# Patient Record
Sex: MALE | Race: WHITE | Employment: OTHER | URBAN - METROPOLITAN AREA
[De-identification: names, ages, dates, MRNs, and addresses within clinical notes are randomized per-mention and may not be internally consistent; named-entity substitution may affect disease eponyms.]

---

## 2017-01-17 PROBLEM — K21.00 GASTROESOPHAGEAL REFLUX DISEASE WITH ESOPHAGITIS: Status: ACTIVE | Noted: 2017-01-17

## 2017-04-17 PROBLEM — F32.A DEPRESSION: Status: ACTIVE | Noted: 2017-04-17

## 2018-03-29 ENCOUNTER — OFFICE VISIT (OUTPATIENT)
Dept: FAMILY MEDICINE CLINIC | Age: 68
End: 2018-03-29
Payer: MEDICARE

## 2018-03-29 VITALS
WEIGHT: 217 LBS | HEIGHT: 69 IN | RESPIRATION RATE: 16 BRPM | DIASTOLIC BLOOD PRESSURE: 84 MMHG | OXYGEN SATURATION: 94 % | SYSTOLIC BLOOD PRESSURE: 130 MMHG | TEMPERATURE: 98.5 F | BODY MASS INDEX: 32.14 KG/M2 | HEART RATE: 95 BPM

## 2018-03-29 DIAGNOSIS — M25.511 CHRONIC RIGHT SHOULDER PAIN: ICD-10-CM

## 2018-03-29 DIAGNOSIS — Z00.00 ROUTINE GENERAL MEDICAL EXAMINATION AT A HEALTH CARE FACILITY: Primary | ICD-10-CM

## 2018-03-29 DIAGNOSIS — G89.29 CHRONIC RIGHT SHOULDER PAIN: ICD-10-CM

## 2018-03-29 PROCEDURE — G0438 PPPS, INITIAL VISIT: HCPCS | Performed by: FAMILY MEDICINE

## 2018-03-29 RX ORDER — CLOPIDOGREL BISULFATE 75 MG/1
TABLET ORAL
COMMUNITY
Start: 2018-03-28 | End: 2019-04-25 | Stop reason: SDUPTHER

## 2018-03-29 RX ORDER — HYDROCODONE BITARTRATE AND ACETAMINOPHEN 7.5; 325 MG/1; MG/1
1 TABLET ORAL 3 TIMES DAILY PRN
Qty: 90 TABLET | Refills: 0 | Status: SHIPPED | OUTPATIENT
Start: 2018-03-29 | End: 2018-04-26 | Stop reason: SDUPTHER

## 2018-03-29 ASSESSMENT — LIFESTYLE VARIABLES
HOW OFTEN DURING THE LAST YEAR HAVE YOU HAD A FEELING OF GUILT OR REMORSE AFTER DRINKING: 0
HAVE YOU OR SOMEONE ELSE BEEN INJURED AS A RESULT OF YOUR DRINKING: 0
HOW OFTEN DURING THE LAST YEAR HAVE YOU NEEDED AN ALCOHOLIC DRINK FIRST THING IN THE MORNING TO GET YOURSELF GOING AFTER A NIGHT OF HEAVY DRINKING: 0
AUDIT TOTAL SCORE: 4
HOW OFTEN DURING THE LAST YEAR HAVE YOU BEEN UNABLE TO REMEMBER WHAT HAPPENED THE NIGHT BEFORE BECAUSE YOU HAD BEEN DRINKING: 0
AUDIT-C TOTAL SCORE: 4
HOW OFTEN DO YOU HAVE SIX OR MORE DRINKS ON ONE OCCASION: 0
HOW MANY STANDARD DRINKS CONTAINING ALCOHOL DO YOU HAVE ON A TYPICAL DAY: 0
HOW OFTEN DURING THE LAST YEAR HAVE YOU FAILED TO DO WHAT WAS NORMALLY EXPECTED FROM YOU BECAUSE OF DRINKING: 0
HAS A RELATIVE, FRIEND, DOCTOR, OR ANOTHER HEALTH PROFESSIONAL EXPRESSED CONCERN ABOUT YOUR DRINKING OR SUGGESTED YOU CUT DOWN: 0
HOW OFTEN DO YOU HAVE A DRINK CONTAINING ALCOHOL: 4
HOW OFTEN DURING THE LAST YEAR HAVE YOU FOUND THAT YOU WERE NOT ABLE TO STOP DRINKING ONCE YOU HAD STARTED: 0

## 2018-03-29 ASSESSMENT — ANXIETY QUESTIONNAIRES: GAD7 TOTAL SCORE: 1

## 2018-03-29 ASSESSMENT — PATIENT HEALTH QUESTIONNAIRE - PHQ9: SUM OF ALL RESPONSES TO PHQ QUESTIONS 1-9: 0

## 2018-03-29 NOTE — PROGRESS NOTES
Medicare Annual Wellness Visit  Name: Bianca Holt Date: 3/29/2018   MRN: <[id number]> Sex: Male   Age: 79 y.o. Ethnicity: Non-/Non    : 1950 Race: White      Ben [address] is here for Medicare AWV    Screenings for behavioral, psychosocial and functional/safety risks, and cognitive dysfunction are all negative except as indicated below. These results, as well as other patient data from the [address] form, are documented in Flowsheets linked to this Encounter. Allergies   Allergen Reactions    Midazolam Hcl      Wake up wild. ... Must be reversed with romazicon or will wake up wild and combative     Prior to Visit Medications    Medication Sig Taking? Authorizing Provider   clopidogrel (PLAVIX) 75 MG tablet  Yes Historical Provider, MD   HYDROcodone-acetaminophen (NORCO) 7.5-325 MG per tablet Take 1 tablet by mouth 3 times daily as needed for Pain for up to 30 days. Earliest Fill Date: 18 Yes Barbara Corona DO   fenofibrate (TRICOR) 145 MG tablet TAKE ONE TABLET BY MOUTH ONCE DAILY Yes Barbara Corona DO   varenicline (CHANTIX STARTING MONTH GAVIN) 0.5 MG X 11 & 1 MG X 42 tablet Take by mouth. Yes Barbara Corona DO   simvastatin (ZOCOR) 20 MG tablet TAKE ONE TABLET BY MOUTH ONCE DAILY Yes Elvie Devine CNP   tamsulosin (FLOMAX) 0.4 MG capsule TAKE ONE CAPSULE BY MOUTH ONCE DAILY Yes Barbara Corona DO   meloxicam (MOBIC) 15 MG tablet TAKE ONE TABLET BY MOUTH ONCE DAILY Yes Barbara Corona DO   tiZANidine (ZANAFLEX) 4 MG tablet TAKE ONE TABLET BY MOUTH ONCE DAILY AT  NIGHT Yes Barbara Corona DO   sertraline (ZOLOFT) 50 MG tablet Take 1 tablet by mouth daily Yes Barbara Corona DO   LORazepam (ATIVAN) 1 MG tablet Take 1 mg by mouth daily .  Yes Historical Provider, MD   buPROPion [facility] SR) 150 MG extended release tablet Take 1 tablet by mouth 2 times daily Yes Elvie Devine CNP   meclizihaven [facility] Call) General  Josh Vazquez DO as PCP - [facility] Attributed Provider  Nelly Layton MD as Surgeon (Gastroenterology)    Wt Readings from Last 3 Encounters:   03/29/18 217 lb (98.4 kg)   02/27/18 217 lb (98.4 kg)   01/30/18 214 lb (97.1 kg)     Vitals:    03/29/18 1039   BP: 130/84   Pulse: 95   Resp: 16   Temp: 98.5 °F (36.9 °C)   SpO2: 94%   Weight: 217 lb (98.4 kg)   Height: 5' 9\" (1.753 m)       General Appearance: alert and oriented to person, place and time, well developed and well- nourished, in no acute distress  Skin: warm and dry, no rash or erythema. Head: normocephalic and atraumatic  Eyes: pupils equal, round, and reactive to light, extraocular eye movements intact, conjunctivae normal  ENT: tympanic membrane, external ear and ear canal normal bilaterally, nose without deformity, nasal mucosa and turbinates normal without polyps  Neck: supple and non-tender without mass, no thyromegaly or thyroid nodules, no cervical lymphadenopathy  Pulmonary/Chest: clear to auscultation bilaterally- no wheezes, rales or rhonchi, normal air movement, no respiratory distress  Cardiovascular: normal rate, regular rhythm, normal S1 and S2, no murmurs, rubs, clicks, or gallops, distal pulses intact, no carotid bruits  Abdomen: soft, non-tender, non-distended, normal bowel sounds, no masses or organomegaly  Extremities: no cyanosis, clubbing or edema  Musculoskeletal: normal range of motion, no joint swelling, deformity or tenderness  Neurologic: reflexes normal and symmetric, no cranial nerve deficit, gait, coordination and speech normal    Patient's complete Health Risk Assessment and screening values have been reviewed and are found in Flowsheets. The following problems were reviewed today and where indicated follow up appointments were made and/or referrals ordered.     Positive Risk Factor Screenings with Interventions:           Substance Abuse:  Social History     Tobacco History     Smoking Status  Current Some Day Smoker Smoking Frequency  0.5 packs/day for 52 years (26 pk yrs) Smoking Tobacco Type  Cigarettes    Smokeless Tobacco Use  Never Used          Alcohol History     Alcohol Use Status  Yes Drinks/Week  12 Cans of beer per week Amount  7.2 oz alcohol/wk Comment  oc12 cans per mth          Drug Use     Drug Use Status  No Comment  in past cocaine 10 yrs ago          Sexual Activity     Sexually Active  No               Audit Questionnaire: Screen for Alcohol Misuse  How often do you have a drink containing alcohol?: Four or more times a week  How many standard drinks containing alcohol do you have on a typical day when drinking?: One or two  How often do you have six or more drinks on one occasion?: Never  Audit-C Score: 4  During the past year, how often have you found that you were not able to stop drinking once you had started?: Never  During the past year, how often have you failed to do what was normally expected of you because of drinking?: Never  During the past year, how often have you needed a drink in the morning to get yourself going after a heavy drinking session?: Never  During the past year, how often have you had a feeling of guilt or remorse after drinking?: Never  During the past year, have you been unable to remember what happened the night before because you had been drinking?: Never  Have you or someone else been injured as a result of your drinking?: No  Has a relative or friend, doctor or health worker been concerned about your drinking or suggested you cut down?: No  Total Score: 4  Substance Abuse Interventions:  · None indicated    General Health:  General  In general, how would you say your health is?: Fair  In the past 7 days, have you experienced any of the following?: (!) Loneliness, Stress  Do you get the social and emotional support that you need?: (!) No  Do you have a Living Will?: Yes  General Health Risk Interventions:  · None indicated    Health Habits/Nutrition:  Health monitoring: possible medication side effects, risk of tolerance and/or dependence, and alternative treatments discussed, no signs of potential drug abuse or diversion identified and OARRS report reviewed today- activity consistent with treatment plan. ASSESSMENT/PLAN:    1. Chronic right shoulder pain  - HYDROcodone-acetaminophen (NORCO) 7.5-325 MG per tablet; Take 1 tablet by mouth 3 times daily as needed for Pain for up to 30 days. Earliest Fill Date: 3/29/18  Dispense: 90 tablet;  Refill: 0

## 2018-04-20 ENCOUNTER — TELEPHONE (OUTPATIENT)
Dept: FAMILY MEDICINE CLINIC | Age: 68
End: 2018-04-20

## 2018-04-20 RX ORDER — VARENICLINE TARTRATE 1 MG/1
1 TABLET, FILM COATED ORAL 2 TIMES DAILY
Qty: 60 TABLET | Refills: 3 | Status: SHIPPED | OUTPATIENT
Start: 2018-04-20 | End: 2018-04-26

## 2018-04-26 ENCOUNTER — OFFICE VISIT (OUTPATIENT)
Dept: FAMILY MEDICINE CLINIC | Age: 68
End: 2018-04-26
Payer: MEDICARE

## 2018-04-26 VITALS
RESPIRATION RATE: 16 BRPM | SYSTOLIC BLOOD PRESSURE: 112 MMHG | DIASTOLIC BLOOD PRESSURE: 68 MMHG | OXYGEN SATURATION: 95 % | TEMPERATURE: 98.1 F | BODY MASS INDEX: 32.89 KG/M2 | HEIGHT: 68 IN | WEIGHT: 217 LBS | HEART RATE: 71 BPM

## 2018-04-26 DIAGNOSIS — K21.00 GASTROESOPHAGEAL REFLUX DISEASE WITH ESOPHAGITIS: ICD-10-CM

## 2018-04-26 DIAGNOSIS — R73.9 HYPERGLYCEMIA: Primary | ICD-10-CM

## 2018-04-26 DIAGNOSIS — G89.29 CHRONIC RIGHT SHOULDER PAIN: ICD-10-CM

## 2018-04-26 DIAGNOSIS — M25.511 CHRONIC RIGHT SHOULDER PAIN: ICD-10-CM

## 2018-04-26 LAB — HBA1C MFR BLD: 6 %

## 2018-04-26 PROCEDURE — 83036 HEMOGLOBIN GLYCOSYLATED A1C: CPT | Performed by: FAMILY MEDICINE

## 2018-04-26 PROCEDURE — G8427 DOCREV CUR MEDS BY ELIG CLIN: HCPCS | Performed by: FAMILY MEDICINE

## 2018-04-26 PROCEDURE — 4004F PT TOBACCO SCREEN RCVD TLK: CPT | Performed by: FAMILY MEDICINE

## 2018-04-26 PROCEDURE — 99213 OFFICE O/P EST LOW 20 MIN: CPT | Performed by: FAMILY MEDICINE

## 2018-04-26 PROCEDURE — 1123F ACP DISCUSS/DSCN MKR DOCD: CPT | Performed by: FAMILY MEDICINE

## 2018-04-26 PROCEDURE — 4040F PNEUMOC VAC/ADMIN/RCVD: CPT | Performed by: FAMILY MEDICINE

## 2018-04-26 PROCEDURE — G8417 CALC BMI ABV UP PARAM F/U: HCPCS | Performed by: FAMILY MEDICINE

## 2018-04-26 PROCEDURE — 3017F COLORECTAL CA SCREEN DOC REV: CPT | Performed by: FAMILY MEDICINE

## 2018-04-26 RX ORDER — OMEPRAZOLE 40 MG/1
40 CAPSULE, DELAYED RELEASE ORAL DAILY
Qty: 90 CAPSULE | Refills: 3 | Status: SHIPPED | OUTPATIENT
Start: 2018-04-26 | End: 2019-07-26 | Stop reason: SDUPTHER

## 2018-04-26 RX ORDER — HYDROCODONE BITARTRATE AND ACETAMINOPHEN 7.5; 325 MG/1; MG/1
1 TABLET ORAL 3 TIMES DAILY PRN
Qty: 90 TABLET | Refills: 0 | Status: SHIPPED | OUTPATIENT
Start: 2018-04-26 | End: 2018-05-24 | Stop reason: SDUPTHER

## 2018-04-26 RX ORDER — VARENICLINE TARTRATE
KIT
COMMUNITY
Start: 2018-04-25 | End: 2019-02-25

## 2018-05-18 ENCOUNTER — HOSPITAL ENCOUNTER (OUTPATIENT)
Dept: CT IMAGING | Age: 68
Discharge: HOME | End: 2018-05-18
Payer: MEDICARE

## 2018-05-18 DIAGNOSIS — C85.90 NON-HODGKIN'S LYMPHOMA, UNSPECIFIED BODY REGION, UNSPECIFIED NON-HODGKIN LYMPHOMA TYPE (HCC): ICD-10-CM

## 2018-05-18 PROCEDURE — 6360000004 HC RX CONTRAST MEDICATION: Performed by: RADIOLOGY

## 2018-05-18 PROCEDURE — 71270 CT THORAX DX C-/C+: CPT

## 2018-05-18 RX ADMIN — IOPAMIDOL 80 ML: 755 INJECTION, SOLUTION INTRAVENOUS at 15:28

## 2018-05-24 ENCOUNTER — OFFICE VISIT (OUTPATIENT)
Dept: FAMILY MEDICINE CLINIC | Age: 68
End: 2018-05-24
Payer: MEDICARE

## 2018-05-24 VITALS
RESPIRATION RATE: 20 BRPM | BODY MASS INDEX: 32.89 KG/M2 | HEART RATE: 57 BPM | OXYGEN SATURATION: 94 % | SYSTOLIC BLOOD PRESSURE: 138 MMHG | WEIGHT: 217 LBS | HEIGHT: 68 IN | TEMPERATURE: 98.6 F | DIASTOLIC BLOOD PRESSURE: 86 MMHG

## 2018-05-24 DIAGNOSIS — M25.511 CHRONIC RIGHT SHOULDER PAIN: ICD-10-CM

## 2018-05-24 DIAGNOSIS — R53.82 CHRONIC FATIGUE: Primary | ICD-10-CM

## 2018-05-24 DIAGNOSIS — G89.29 CHRONIC RIGHT SHOULDER PAIN: ICD-10-CM

## 2018-05-24 DIAGNOSIS — F33.0 MAJOR DEPRESSIVE DISORDER, RECURRENT, MILD (HCC): ICD-10-CM

## 2018-05-24 PROCEDURE — 1123F ACP DISCUSS/DSCN MKR DOCD: CPT | Performed by: FAMILY MEDICINE

## 2018-05-24 PROCEDURE — G8427 DOCREV CUR MEDS BY ELIG CLIN: HCPCS | Performed by: FAMILY MEDICINE

## 2018-05-24 PROCEDURE — G8417 CALC BMI ABV UP PARAM F/U: HCPCS | Performed by: FAMILY MEDICINE

## 2018-05-24 PROCEDURE — 3017F COLORECTAL CA SCREEN DOC REV: CPT | Performed by: FAMILY MEDICINE

## 2018-05-24 PROCEDURE — 4040F PNEUMOC VAC/ADMIN/RCVD: CPT | Performed by: FAMILY MEDICINE

## 2018-05-24 PROCEDURE — 99213 OFFICE O/P EST LOW 20 MIN: CPT | Performed by: FAMILY MEDICINE

## 2018-05-24 PROCEDURE — 4004F PT TOBACCO SCREEN RCVD TLK: CPT | Performed by: FAMILY MEDICINE

## 2018-05-24 RX ORDER — SILDENAFIL CITRATE 20 MG/1
TABLET ORAL
Qty: 30 TABLET | Refills: 3 | Status: ON HOLD
Start: 2018-05-24 | End: 2022-01-17 | Stop reason: HOSPADM

## 2018-05-24 RX ORDER — HYDROCODONE BITARTRATE AND ACETAMINOPHEN 7.5; 325 MG/1; MG/1
1 TABLET ORAL 3 TIMES DAILY PRN
Qty: 90 TABLET | Refills: 0 | Status: SHIPPED | OUTPATIENT
Start: 2018-05-24 | End: 2018-06-26 | Stop reason: SDUPTHER

## 2018-05-24 RX ORDER — TIZANIDINE 4 MG/1
4 TABLET ORAL EVERY 8 HOURS PRN
Qty: 90 TABLET | Refills: 3 | Status: SHIPPED | OUTPATIENT
Start: 2018-05-24 | End: 2019-10-23 | Stop reason: SDUPTHER

## 2018-05-24 RX ORDER — AMOXICILLIN AND CLAVULANATE POTASSIUM 875; 125 MG/1; MG/1
TABLET, FILM COATED ORAL
COMMUNITY
Start: 2018-05-17 | End: 2018-06-26

## 2018-06-26 ENCOUNTER — OFFICE VISIT (OUTPATIENT)
Dept: FAMILY MEDICINE CLINIC | Age: 68
End: 2018-06-26
Payer: MEDICARE

## 2018-06-26 VITALS
TEMPERATURE: 98.4 F | RESPIRATION RATE: 16 BRPM | OXYGEN SATURATION: 97 % | HEIGHT: 69 IN | HEART RATE: 74 BPM | DIASTOLIC BLOOD PRESSURE: 82 MMHG | BODY MASS INDEX: 31.84 KG/M2 | WEIGHT: 215 LBS | SYSTOLIC BLOOD PRESSURE: 128 MMHG

## 2018-06-26 DIAGNOSIS — G89.29 CHRONIC RIGHT SHOULDER PAIN: ICD-10-CM

## 2018-06-26 DIAGNOSIS — M25.511 CHRONIC RIGHT SHOULDER PAIN: ICD-10-CM

## 2018-06-26 PROCEDURE — 99213 OFFICE O/P EST LOW 20 MIN: CPT | Performed by: FAMILY MEDICINE

## 2018-06-26 PROCEDURE — G8427 DOCREV CUR MEDS BY ELIG CLIN: HCPCS | Performed by: FAMILY MEDICINE

## 2018-06-26 PROCEDURE — 3017F COLORECTAL CA SCREEN DOC REV: CPT | Performed by: FAMILY MEDICINE

## 2018-06-26 PROCEDURE — 1123F ACP DISCUSS/DSCN MKR DOCD: CPT | Performed by: FAMILY MEDICINE

## 2018-06-26 PROCEDURE — 4004F PT TOBACCO SCREEN RCVD TLK: CPT | Performed by: FAMILY MEDICINE

## 2018-06-26 PROCEDURE — 4040F PNEUMOC VAC/ADMIN/RCVD: CPT | Performed by: FAMILY MEDICINE

## 2018-06-26 PROCEDURE — G8417 CALC BMI ABV UP PARAM F/U: HCPCS | Performed by: FAMILY MEDICINE

## 2018-06-26 RX ORDER — MONTELUKAST SODIUM 10 MG/1
10 TABLET ORAL DAILY
Qty: 90 TABLET | Refills: 3 | Status: SHIPPED | OUTPATIENT
Start: 2018-06-26 | End: 2019-07-18 | Stop reason: SDUPTHER

## 2018-06-26 RX ORDER — HYDROCODONE BITARTRATE AND ACETAMINOPHEN 7.5; 325 MG/1; MG/1
1 TABLET ORAL 3 TIMES DAILY PRN
Qty: 90 TABLET | Refills: 0 | Status: SHIPPED | OUTPATIENT
Start: 2018-06-26 | End: 2018-07-31 | Stop reason: SDUPTHER

## 2018-06-26 ASSESSMENT — ENCOUNTER SYMPTOMS
WHEEZING: 0
DIARRHEA: 0
ORTHOPNEA: 0
DOUBLE VISION: 0
CONSTIPATION: 0
BLOOD IN STOOL: 0

## 2018-07-24 ENCOUNTER — TELEPHONE (OUTPATIENT)
Dept: ADMINISTRATIVE | Age: 68
End: 2018-07-24

## 2018-07-31 ENCOUNTER — HOSPITAL ENCOUNTER (OUTPATIENT)
Age: 68
Discharge: HOME | End: 2018-08-02
Payer: MEDICARE

## 2018-07-31 ENCOUNTER — OFFICE VISIT (OUTPATIENT)
Dept: FAMILY MEDICINE CLINIC | Age: 68
End: 2018-07-31
Payer: MEDICARE

## 2018-07-31 VITALS
SYSTOLIC BLOOD PRESSURE: 124 MMHG | HEIGHT: 69 IN | DIASTOLIC BLOOD PRESSURE: 70 MMHG | WEIGHT: 214 LBS | TEMPERATURE: 98.1 F | HEART RATE: 70 BPM | RESPIRATION RATE: 16 BRPM | BODY MASS INDEX: 31.7 KG/M2 | OXYGEN SATURATION: 95 %

## 2018-07-31 DIAGNOSIS — M25.511 CHRONIC RIGHT SHOULDER PAIN: ICD-10-CM

## 2018-07-31 DIAGNOSIS — Z51.81 THERAPEUTIC DRUG MONITORING: ICD-10-CM

## 2018-07-31 DIAGNOSIS — R73.9 HYPERGLYCEMIA: Primary | ICD-10-CM

## 2018-07-31 DIAGNOSIS — G89.29 CHRONIC RIGHT SHOULDER PAIN: ICD-10-CM

## 2018-07-31 LAB
AMPHETAMINE SCREEN, URINE: NOT DETECTED
BARBITURATE SCREEN URINE: NOT DETECTED
BENZODIAZEPINE SCREEN, URINE: NOT DETECTED
CANNABINOID SCREEN URINE: NOT DETECTED
COCAINE METABOLITE SCREEN URINE: NOT DETECTED
HBA1C MFR BLD: 5.6 %
METHADONE SCREEN, URINE: NOT DETECTED
OPIATE SCREEN URINE: POSITIVE
PHENCYCLIDINE SCREEN URINE: NOT DETECTED
PROPOXYPHENE SCREEN: NOT DETECTED

## 2018-07-31 PROCEDURE — 4040F PNEUMOC VAC/ADMIN/RCVD: CPT | Performed by: FAMILY MEDICINE

## 2018-07-31 PROCEDURE — 4004F PT TOBACCO SCREEN RCVD TLK: CPT | Performed by: FAMILY MEDICINE

## 2018-07-31 PROCEDURE — 3017F COLORECTAL CA SCREEN DOC REV: CPT | Performed by: FAMILY MEDICINE

## 2018-07-31 PROCEDURE — 80307 DRUG TEST PRSMV CHEM ANLYZR: CPT

## 2018-07-31 PROCEDURE — 83036 HEMOGLOBIN GLYCOSYLATED A1C: CPT | Performed by: FAMILY MEDICINE

## 2018-07-31 PROCEDURE — G8427 DOCREV CUR MEDS BY ELIG CLIN: HCPCS | Performed by: FAMILY MEDICINE

## 2018-07-31 PROCEDURE — G8417 CALC BMI ABV UP PARAM F/U: HCPCS | Performed by: FAMILY MEDICINE

## 2018-07-31 PROCEDURE — 99214 OFFICE O/P EST MOD 30 MIN: CPT | Performed by: FAMILY MEDICINE

## 2018-07-31 PROCEDURE — G0480 DRUG TEST DEF 1-7 CLASSES: HCPCS

## 2018-07-31 PROCEDURE — 1101F PT FALLS ASSESS-DOCD LE1/YR: CPT | Performed by: FAMILY MEDICINE

## 2018-07-31 PROCEDURE — 1123F ACP DISCUSS/DSCN MKR DOCD: CPT | Performed by: FAMILY MEDICINE

## 2018-07-31 RX ORDER — HYDROCODONE BITARTRATE AND ACETAMINOPHEN 7.5; 325 MG/1; MG/1
1 TABLET ORAL 3 TIMES DAILY PRN
Qty: 90 TABLET | Refills: 0 | Status: SHIPPED | OUTPATIENT
Start: 2018-07-31 | End: 2018-08-28 | Stop reason: SDUPTHER

## 2018-07-31 NOTE — PROGRESS NOTES
Chief Complaint   Patient presents with    Pain     pain is all over. pain is 5/10. pt took last [place] yesterday. pt needs refill of meds    Diabetes     A1C today 5.6       HPI:  Jonsa here for follow-up of BACK PAIN. Patient complains of  Spasm, Tightness and worsening after sitting or standing. Pt needs refills. Scheduled Meds:  Continuous Infusions:  PRN Meds:.    Past Medical History, Surgical History, and Family History has been reviewed and updated. We did discuss pain management and exercises. Review of Systems:  Constitutional:  No fever, no fatigue, no chills, no headaches, no weight change  Dermatology:  No rash, no mole, no dry or sensitive skin  ENT:  No cough, no sore throat, no sinus pain, no runny nose, no ear pain  Cardiology:  No chest pain, no palpitations, no leg edema, no shortness of breath, no PND  Gastroenterology:  No dysphagia, no abdominal pain, no nausea, no Straight Leg Raise Test on the , no constipation, no diarrhea, no heartburn  Musculoskeletal:  Pain and Spasm Lumbar Triangle. Respiratory:  No shortness of breath, no orthopnea, no wheezing, no ESQUIVEL, no hemoptysis    ON EXAMINATION    Vitals:    07/31/18 0923   BP: 124/70   Pulse: 70   Resp: 16   Temp: 98.1 °F (36.7 °C)   SpO2: 95%   Weight: 214 lb (97.1 kg)   Height: 5' 9\" (1.753 m)       Spinal Examination: decreased ROM, palpable pain and straight leg raising pain,     General:  Patient alert and oriented x 3, NAD, pleasant  Neck:  Supple, no goiter, no carotid bruits, no LAD  Lungs:  CTA Bilaterally  Heart:  RRR, no murmurs, gallops or rubs  Abdomen:  Soft/nt/nd, + bowel sounds  Extremities:  No clubbing, cyanosis or edema      Assessment/Plan:  Natural history/expected course discussed, and patient's questions answered  ASSESSMENT/PLAN:    1. Chronic right shoulder pain  - HYDROcodone-acetaminophen (NORCO) 7.5-325 MG per tablet; Take 1 tablet by mouth 3 times daily as needed for Pain for up to 30 days. Haley Meyers Date: 7/31/18  Dispense: 90 tablet; Refill: 0    2. Hyperglycemia  - POCT glycosylated hemoglobin (Hb A1C)    3. Therapeutic drug monitoring  - URINE DRUG Kristine Deal was seen today for pain and diabetes. Diagnoses and all orders for this visit:    Hyperglycemia  -     POCT glycosylated hemoglobin (Hb A1C)    Chronic right shoulder pain  -     HYDROcodone-acetaminophen (NORCO) 7.5-325 MG per tablet; Take 1 tablet by mouth 3 times daily as needed for Pain for up to 30 days. Akilah Soria Date: 7/31/18    Therapeutic drug monitoring  -     URINE DRUG SCREEN        Controlled substances monitoring: possible medication side effects, risk of tolerance and/or dependence, and alternative treatments discussed, no signs of potential drug abuse or diversion identified and OARRS report reviewed today- activity consistent with treatment plan. Diagnosis:    Patient Active Problem List   Diagnosis Code    Lymphoma ([facility] [address] C85.90    Gastroenteritis K52.9    Back pain at L4-L5 level M54.5    Impotence N52.9    Chronic fatigue R53.82    Urinary frequency R35.0    Tobacco abuse Z72.0    Bronchitis J40    Gastroesophageal reflux disease with esophagitis K21.0    Depression F32.9     RX: As written and recorded.                                                      Wally Bernal D.O. 7/31/18

## 2018-08-04 LAB
6AM URINE: <10 NG/ML
CODEINE, URINE: <20 NG/ML
HYDROCODONE, URINE: 817 NG/ML
HYDROMORPHONE, URINE: <20 NG/ML
MORPHINE URINE: <20 NG/ML
NORHYDROCODONE, URINE: 1610 NG/ML
NOROXYCODONE, URINE: <20 NG/ML
NOROXYMORPHONE, URINE: <20 NG/ML
OXYCODONE, URINE CONFIRMATION: <20 NG/ML
OXYMORPHONE, URINE: <20 NG/ML

## 2018-08-28 ENCOUNTER — OFFICE VISIT (OUTPATIENT)
Dept: FAMILY MEDICINE CLINIC | Age: 68
End: 2018-08-28
Payer: MEDICARE

## 2018-08-28 VITALS
RESPIRATION RATE: 16 BRPM | SYSTOLIC BLOOD PRESSURE: 124 MMHG | BODY MASS INDEX: 31.4 KG/M2 | HEIGHT: 69 IN | DIASTOLIC BLOOD PRESSURE: 78 MMHG | OXYGEN SATURATION: 96 % | TEMPERATURE: 97.9 F | HEART RATE: 62 BPM | WEIGHT: 212 LBS

## 2018-08-28 DIAGNOSIS — G89.29 CHRONIC RIGHT SHOULDER PAIN: ICD-10-CM

## 2018-08-28 DIAGNOSIS — Z12.5 SCREENING FOR PROSTATE CANCER: ICD-10-CM

## 2018-08-28 DIAGNOSIS — M25.511 CHRONIC RIGHT SHOULDER PAIN: ICD-10-CM

## 2018-08-28 DIAGNOSIS — N53.14 RETROGRADE EJACULATION: Primary | ICD-10-CM

## 2018-08-28 PROCEDURE — 3017F COLORECTAL CA SCREEN DOC REV: CPT | Performed by: FAMILY MEDICINE

## 2018-08-28 PROCEDURE — G8427 DOCREV CUR MEDS BY ELIG CLIN: HCPCS | Performed by: FAMILY MEDICINE

## 2018-08-28 PROCEDURE — 4040F PNEUMOC VAC/ADMIN/RCVD: CPT | Performed by: FAMILY MEDICINE

## 2018-08-28 PROCEDURE — 1101F PT FALLS ASSESS-DOCD LE1/YR: CPT | Performed by: FAMILY MEDICINE

## 2018-08-28 PROCEDURE — G8417 CALC BMI ABV UP PARAM F/U: HCPCS | Performed by: FAMILY MEDICINE

## 2018-08-28 PROCEDURE — 1123F ACP DISCUSS/DSCN MKR DOCD: CPT | Performed by: FAMILY MEDICINE

## 2018-08-28 PROCEDURE — 4004F PT TOBACCO SCREEN RCVD TLK: CPT | Performed by: FAMILY MEDICINE

## 2018-08-28 PROCEDURE — 99213 OFFICE O/P EST LOW 20 MIN: CPT | Performed by: FAMILY MEDICINE

## 2018-08-28 RX ORDER — HYDROCODONE BITARTRATE AND ACETAMINOPHEN 7.5; 325 MG/1; MG/1
1 TABLET ORAL 3 TIMES DAILY PRN
Qty: 90 TABLET | Refills: 0 | Status: SHIPPED | OUTPATIENT
Start: 2018-08-28 | End: 2018-09-25 | Stop reason: SDUPTHER

## 2018-08-28 NOTE — PROGRESS NOTES
Chief Complaint   Patient presents with    Back Pain     low back pain. pain is 6/10. pt needs refill of meds. pt would like to discuss something stonger, states these don't last long. HPI:  Rustam Deborahdulce maria here for follow-up of BACK PAIN. Patient complains of  Spasm, Tightness and worsening after sitting or standing. Pt needs refills. Scheduled Meds:  Continuous Infusions:  PRN Meds:.    Past Medical History, Surgical History, and Family History has been reviewed and updated. We did discuss pain management and exercises. Review of Systems:  Constitutional:  No fever, no fatigue, no chills, no headaches, no weight change  Dermatology:  No rash, no mole, no dry or sensitive skin  ENT:  No cough, no sore throat, no sinus pain, no runny nose, no ear pain  Cardiology:  No chest pain, no palpitations, no leg edema, no shortness of breath, no PND  Gastroenterology:  No dysphagia, no abdominal pain, no nausea, no Straight Leg Raise Test on the , no constipation, no diarrhea, no heartburn  Musculoskeletal:  Pain and Spasm Lumbar Triangle. Respiratory:  No shortness of breath, no orthopnea, no wheezing, no ESQUIVEL, no hemoptysis  Urology:  No blood in the urine, no urinary frequency, no urinary incontinence, no urinary urgency, no nocturia, no dysuria    ON EXAMINATION    Vitals:    08/28/18 0931   BP: 124/78   Pulse: 62   Resp: 16   Temp: 97.9 °F (36.6 °C)   SpO2: 96%   Weight: 212 lb (96.2 kg)   Height: 5' 9\" (1.753 m)       Spinal Examination: decreased ROM, palpable pain and straight leg raising pain,     General:  Patient alert and oriented x 3, NAD, pleasant  Neck:  Supple, no goiter, no carotid bruits, no LAD  Lungs:  CTA Bilaterally  Heart:  RRR, no murmurs, gallops or rubs  Abdomen:  Soft/nt/nd, + bowel sounds  Extremities:  No clubbing, cyanosis or edema      Assessment/Plan:  Natural history/expected course discussed, and patient's questions answered  ASSESSMENT/PLAN:    1.  Chronic right shoulder pain  -

## 2018-09-05 ENCOUNTER — TELEPHONE (OUTPATIENT)
Dept: FAMILY MEDICINE CLINIC | Age: 68
End: 2018-09-05

## 2018-09-13 ENCOUNTER — OFFICE VISIT (OUTPATIENT)
Dept: FAMILY MEDICINE CLINIC | Age: 68
End: 2018-09-13
Payer: MEDICARE

## 2018-09-13 VITALS
DIASTOLIC BLOOD PRESSURE: 72 MMHG | RESPIRATION RATE: 16 BRPM | TEMPERATURE: 98.4 F | SYSTOLIC BLOOD PRESSURE: 138 MMHG | OXYGEN SATURATION: 97 % | WEIGHT: 215 LBS | HEIGHT: 68 IN | HEART RATE: 64 BPM | BODY MASS INDEX: 32.58 KG/M2

## 2018-09-13 DIAGNOSIS — K40.90 HERNIA, INGUINAL, LEFT: Primary | ICD-10-CM

## 2018-09-13 PROCEDURE — G8417 CALC BMI ABV UP PARAM F/U: HCPCS | Performed by: FAMILY MEDICINE

## 2018-09-13 PROCEDURE — 4004F PT TOBACCO SCREEN RCVD TLK: CPT | Performed by: FAMILY MEDICINE

## 2018-09-13 PROCEDURE — 1101F PT FALLS ASSESS-DOCD LE1/YR: CPT | Performed by: FAMILY MEDICINE

## 2018-09-13 PROCEDURE — 1123F ACP DISCUSS/DSCN MKR DOCD: CPT | Performed by: FAMILY MEDICINE

## 2018-09-13 PROCEDURE — G8427 DOCREV CUR MEDS BY ELIG CLIN: HCPCS | Performed by: FAMILY MEDICINE

## 2018-09-13 PROCEDURE — 4040F PNEUMOC VAC/ADMIN/RCVD: CPT | Performed by: FAMILY MEDICINE

## 2018-09-13 PROCEDURE — 3017F COLORECTAL CA SCREEN DOC REV: CPT | Performed by: FAMILY MEDICINE

## 2018-09-13 PROCEDURE — 99213 OFFICE O/P EST LOW 20 MIN: CPT | Performed by: FAMILY MEDICINE

## 2018-09-13 NOTE — PROGRESS NOTES
Chief Complaint   Patient presents with    Groin Pain     pain in left side groin area. pt thinks he might have pulled a muscle Tuesday       HPI:  Felipe Toure here for follow-up of Right groin hernia pain. Patient complains of  Spasm, Tightness and worsening after sitting or standing. Pt needs refills. Past Medical History, Surgical History, and Family History has been reviewed and updated. We did discuss pain management and exercises. Review of Systems:  Constitutional:  No fever, no fatigue, no chills, no headaches, no weight change  Dermatology:  No rash, no mole, no dry or sensitive skin  ENT:  No cough, no sore throat, no sinus pain, no runny nose, no ear pain  Cardiology:  No chest pain, no palpitations, no leg edema, no shortness of breath, no PND  Gastroenterology:  No dysphagia, no abdominal pain, no nausea, no Straight Leg Raise Test on the , no constipation, no diarrhea, no heartburn  Musculoskeletal:  Left inguinal hernia   Respiratory:  No shortness of breath, no orthopnea, no wheezing, no ESQUIVEL, no hemoptysis  Urology:  No blood in the urine, no urinary frequency, no urinary incontinence, no urinary urgency, no nocturia, no dysuria    ON EXAMINATION    Vitals:    09/13/18 0801   BP: 138/72   Pulse: 64   Resp: 16   Temp: 98.4 °F (36.9 °C)   SpO2: 97%   Weight: 215 lb (97.5 kg)   Height: 5' 8\" (1.727 m)       Spinal Examination: decreased ROM, palpable pain and straight leg raising pain,     General:  Patient alert and oriented x 3, NAD, pleasant  Neck:  Supple, no goiter, no carotid bruits, no LAD  Lungs:  CTA Bilaterally  Heart:  RRR, no murmurs, gallops or rubs  Abdomen:  Soft/nt/nd, + bowel sounds  Extremities:  No clubbing, cyanosis or edema      Assessment/Plan:  Natural history/expected course discussed, and patient's questions answered  ASSESSMENT/PLAN:     ASSESSMENT/PLAN:    1.  Hernia, inguinal, left          Diagnosis:    Patient Active Problem List   Diagnosis Code    [facility] [place]

## 2018-09-25 ENCOUNTER — OFFICE VISIT (OUTPATIENT)
Dept: FAMILY MEDICINE CLINIC | Age: 68
End: 2018-09-25
Payer: MEDICARE

## 2018-09-25 ENCOUNTER — HOSPITAL ENCOUNTER (OUTPATIENT)
Age: 68
Discharge: HOME | End: 2018-09-27
Payer: MEDICARE

## 2018-09-25 VITALS
BODY MASS INDEX: 32.28 KG/M2 | HEART RATE: 60 BPM | WEIGHT: 213 LBS | HEIGHT: 68 IN | TEMPERATURE: 98.4 F | OXYGEN SATURATION: 94 % | RESPIRATION RATE: 16 BRPM | SYSTOLIC BLOOD PRESSURE: 130 MMHG | DIASTOLIC BLOOD PRESSURE: 78 MMHG

## 2018-09-25 DIAGNOSIS — Z12.5 SCREENING FOR PROSTATE CANCER: ICD-10-CM

## 2018-09-25 DIAGNOSIS — G89.29 CHRONIC RIGHT SHOULDER PAIN: ICD-10-CM

## 2018-09-25 DIAGNOSIS — M25.511 CHRONIC RIGHT SHOULDER PAIN: ICD-10-CM

## 2018-09-25 LAB — PROSTATE SPECIFIC ANTIGEN: 5.55 NG/ML (ref 0–4)

## 2018-09-25 PROCEDURE — 1101F PT FALLS ASSESS-DOCD LE1/YR: CPT | Performed by: FAMILY MEDICINE

## 2018-09-25 PROCEDURE — G0103 PSA SCREENING: HCPCS

## 2018-09-25 PROCEDURE — G8417 CALC BMI ABV UP PARAM F/U: HCPCS | Performed by: FAMILY MEDICINE

## 2018-09-25 PROCEDURE — 99213 OFFICE O/P EST LOW 20 MIN: CPT | Performed by: FAMILY MEDICINE

## 2018-09-25 PROCEDURE — 4040F PNEUMOC VAC/ADMIN/RCVD: CPT | Performed by: FAMILY MEDICINE

## 2018-09-25 PROCEDURE — 4004F PT TOBACCO SCREEN RCVD TLK: CPT | Performed by: FAMILY MEDICINE

## 2018-09-25 PROCEDURE — 1123F ACP DISCUSS/DSCN MKR DOCD: CPT | Performed by: FAMILY MEDICINE

## 2018-09-25 PROCEDURE — G8427 DOCREV CUR MEDS BY ELIG CLIN: HCPCS | Performed by: FAMILY MEDICINE

## 2018-09-25 PROCEDURE — 3017F COLORECTAL CA SCREEN DOC REV: CPT | Performed by: FAMILY MEDICINE

## 2018-09-25 RX ORDER — HYDROCODONE BITARTRATE AND ACETAMINOPHEN 7.5; 325 MG/1; MG/1
1 TABLET ORAL 3 TIMES DAILY PRN
Qty: 90 TABLET | Refills: 0 | Status: SHIPPED | OUTPATIENT
Start: 2018-09-25 | End: 2018-10-23 | Stop reason: SDUPTHER

## 2018-09-25 NOTE — PROGRESS NOTES
HYDROcodone-acetaminophen (NORCO) 7.5-325 MG per tablet; Take 1 tablet by mouth 3 times daily as needed for Pain for up to 30 days. .  Dispense: 90 tablet; Refill: 0      Mario Orlando was seen today for shoulder pain. Diagnoses and all orders for this visit:    Chronic right shoulder pain  -     HYDROcodone-acetaminophen (NORCO) 7.5-325 MG per tablet; Take 1 tablet by mouth 3 times daily as needed for Pain for up to 30 days. .        Controlled substances monitoring: possible medication side effects, risk of tolerance and/or dependence, and alternative treatments discussed, no signs of potential drug abuse or diversion identified and OARRS report reviewed today- activity consistent with treatment plan. Diagnosis:    Patient Active Problem List   Diagnosis Code    Lymphoma ([facility] [address] C85.90    Gastroenteritis K52.9    Back pain at L4-L5 level M54.5    Impotence N52.9    Chronic fatigue R53.82    Urinary frequency R35.0    Tobacco abuse Z72.0    Bronchitis J40    Gastroesophageal reflux disease with esophagitis K21.0    Depression F32.9     RX: As written and recorded.                                                      Deshawn Hernandez D.O. 9/25/18

## 2018-10-23 ENCOUNTER — OFFICE VISIT (OUTPATIENT)
Dept: FAMILY MEDICINE CLINIC | Age: 68
End: 2018-10-23
Payer: MEDICARE

## 2018-10-23 VITALS
HEIGHT: 69 IN | SYSTOLIC BLOOD PRESSURE: 132 MMHG | BODY MASS INDEX: 30.36 KG/M2 | DIASTOLIC BLOOD PRESSURE: 78 MMHG | RESPIRATION RATE: 16 BRPM | HEART RATE: 63 BPM | WEIGHT: 205 LBS | TEMPERATURE: 98.1 F | OXYGEN SATURATION: 97 %

## 2018-10-23 DIAGNOSIS — G89.29 CHRONIC RIGHT SHOULDER PAIN: ICD-10-CM

## 2018-10-23 DIAGNOSIS — M25.511 CHRONIC RIGHT SHOULDER PAIN: ICD-10-CM

## 2018-10-23 PROCEDURE — 3017F COLORECTAL CA SCREEN DOC REV: CPT | Performed by: FAMILY MEDICINE

## 2018-10-23 PROCEDURE — 99213 OFFICE O/P EST LOW 20 MIN: CPT | Performed by: FAMILY MEDICINE

## 2018-10-23 PROCEDURE — 1123F ACP DISCUSS/DSCN MKR DOCD: CPT | Performed by: FAMILY MEDICINE

## 2018-10-23 PROCEDURE — 4040F PNEUMOC VAC/ADMIN/RCVD: CPT | Performed by: FAMILY MEDICINE

## 2018-10-23 PROCEDURE — G8427 DOCREV CUR MEDS BY ELIG CLIN: HCPCS | Performed by: FAMILY MEDICINE

## 2018-10-23 PROCEDURE — 4004F PT TOBACCO SCREEN RCVD TLK: CPT | Performed by: FAMILY MEDICINE

## 2018-10-23 PROCEDURE — G8484 FLU IMMUNIZE NO ADMIN: HCPCS | Performed by: FAMILY MEDICINE

## 2018-10-23 PROCEDURE — 1101F PT FALLS ASSESS-DOCD LE1/YR: CPT | Performed by: FAMILY MEDICINE

## 2018-10-23 PROCEDURE — G8417 CALC BMI ABV UP PARAM F/U: HCPCS | Performed by: FAMILY MEDICINE

## 2018-10-23 RX ORDER — HYDROCODONE BITARTRATE AND ACETAMINOPHEN 7.5; 325 MG/1; MG/1
1 TABLET ORAL 3 TIMES DAILY PRN
Qty: 90 TABLET | Refills: 0 | Status: SHIPPED | OUTPATIENT
Start: 2018-10-23 | End: 2018-11-26 | Stop reason: SDUPTHER

## 2018-11-26 ENCOUNTER — OFFICE VISIT (OUTPATIENT)
Dept: FAMILY MEDICINE CLINIC | Age: 68
End: 2018-11-26
Payer: MEDICARE

## 2018-11-26 VITALS
RESPIRATION RATE: 16 BRPM | HEIGHT: 68 IN | HEART RATE: 72 BPM | SYSTOLIC BLOOD PRESSURE: 120 MMHG | BODY MASS INDEX: 31.37 KG/M2 | OXYGEN SATURATION: 93 % | TEMPERATURE: 98.2 F | DIASTOLIC BLOOD PRESSURE: 80 MMHG | WEIGHT: 207 LBS

## 2018-11-26 DIAGNOSIS — M25.511 CHRONIC RIGHT SHOULDER PAIN: ICD-10-CM

## 2018-11-26 DIAGNOSIS — G89.29 CHRONIC RIGHT SHOULDER PAIN: ICD-10-CM

## 2018-11-26 DIAGNOSIS — I25.110 CORONARY ARTERY DISEASE INVOLVING NATIVE CORONARY ARTERY OF NATIVE HEART WITH UNSTABLE ANGINA PECTORIS (HCC): ICD-10-CM

## 2018-11-26 PROCEDURE — 1123F ACP DISCUSS/DSCN MKR DOCD: CPT | Performed by: FAMILY MEDICINE

## 2018-11-26 PROCEDURE — G8484 FLU IMMUNIZE NO ADMIN: HCPCS | Performed by: FAMILY MEDICINE

## 2018-11-26 PROCEDURE — 3017F COLORECTAL CA SCREEN DOC REV: CPT | Performed by: FAMILY MEDICINE

## 2018-11-26 PROCEDURE — 4040F PNEUMOC VAC/ADMIN/RCVD: CPT | Performed by: FAMILY MEDICINE

## 2018-11-26 PROCEDURE — G8417 CALC BMI ABV UP PARAM F/U: HCPCS | Performed by: FAMILY MEDICINE

## 2018-11-26 PROCEDURE — 1101F PT FALLS ASSESS-DOCD LE1/YR: CPT | Performed by: FAMILY MEDICINE

## 2018-11-26 PROCEDURE — 4004F PT TOBACCO SCREEN RCVD TLK: CPT | Performed by: FAMILY MEDICINE

## 2018-11-26 PROCEDURE — G8427 DOCREV CUR MEDS BY ELIG CLIN: HCPCS | Performed by: FAMILY MEDICINE

## 2018-11-26 PROCEDURE — G8598 ASA/ANTIPLAT THER USED: HCPCS | Performed by: FAMILY MEDICINE

## 2018-11-26 PROCEDURE — 99214 OFFICE O/P EST MOD 30 MIN: CPT | Performed by: FAMILY MEDICINE

## 2018-11-26 RX ORDER — HYDROCODONE BITARTRATE AND ACETAMINOPHEN 7.5; 325 MG/1; MG/1
1 TABLET ORAL 3 TIMES DAILY PRN
Qty: 90 TABLET | Refills: 0 | Status: SHIPPED | OUTPATIENT
Start: 2018-11-26 | End: 2018-12-20 | Stop reason: SDUPTHER

## 2018-11-26 NOTE — PROGRESS NOTES
shoulder pain  - HYDROcodone-acetaminophen (NORCO) 7.5-325 MG per tablet; Take 1 tablet by mouth 3 times daily as needed for Pain for up to 30 days. Shelda Beery Date: 11/26/18  Dispense: 90 tablet; Refill: 0    2. Coronary artery disease involving native coronary artery of native heart with unstable angina pectoris ([facility] [address]  - ECHO Complete 2D W Doppler W Color; Future      Fabiola Garcia was seen today for shoulder pain, shortness of breath and lower back pain. Diagnoses and all orders for this visit:    Chronic right shoulder pain  -     HYDROcodone-acetaminophen (NORCO) 7.5-325 MG per tablet; Take 1 tablet by mouth 3 times daily as needed for Pain for up to 30 days. Shelda Beery Date: 11/26/18    Coronary artery disease involving native coronary artery of native heart with unstable angina pectoris ([facility]  -     ECHO Complete 2D W Doppler W Color; Future        Controlled substances monitoring: possible medication side effects, risk of tolerance and/or dependence, and alternative treatments discussed, no signs of potential drug abuse or diversion identified and OARRS report reviewed today- activity consistent with treatment plan. Diagnosis:    Patient Active Problem List   Diagnosis Code    Lymphoma ([facility] [address] C85.90    Gastroenteritis K52.9    Back pain at L4-L5 level M54.5    Impotence N52.9    Chronic fatigue R53.82    Urinary frequency R35.0    Tobacco abuse Z72.0    Bronchitis J40    Gastroesophageal reflux disease with esophagitis K21.0    Depression F32.9    Coronary artery disease involving native coronary artery of native heart with unstable angina pectoris ([facility] I25.110     RX: As written and recorded.                                                      Paola Benitez D.O. 11/26/18

## 2018-12-06 ENCOUNTER — TELEPHONE (OUTPATIENT)
Dept: FAMILY MEDICINE CLINIC | Age: 68
End: 2018-12-06

## 2018-12-13 ENCOUNTER — HOSPITAL ENCOUNTER (OUTPATIENT)
Dept: NON INVASIVE DIAGNOSTICS | Age: 68
Discharge: HOME | End: 2018-12-13
Payer: MEDICARE

## 2018-12-13 DIAGNOSIS — I25.110 CORONARY ARTERY DISEASE INVOLVING NATIVE CORONARY ARTERY OF NATIVE HEART WITH UNSTABLE ANGINA PECTORIS (HCC): ICD-10-CM

## 2018-12-13 LAB
LV EF: 50 %
LVEF MODALITY: NORMAL

## 2018-12-13 PROCEDURE — 93306 TTE W/DOPPLER COMPLETE: CPT

## 2018-12-20 ENCOUNTER — OFFICE VISIT (OUTPATIENT)
Dept: FAMILY MEDICINE CLINIC | Age: 68
End: 2018-12-20
Payer: MEDICARE

## 2018-12-20 VITALS
SYSTOLIC BLOOD PRESSURE: 138 MMHG | HEART RATE: 74 BPM | RESPIRATION RATE: 14 BRPM | DIASTOLIC BLOOD PRESSURE: 80 MMHG | OXYGEN SATURATION: 97 % | TEMPERATURE: 98 F | HEIGHT: 69 IN | BODY MASS INDEX: 31.1 KG/M2 | WEIGHT: 210 LBS

## 2018-12-20 DIAGNOSIS — Z72.0 TOBACCO ABUSE: Primary | ICD-10-CM

## 2018-12-20 DIAGNOSIS — M25.511 CHRONIC RIGHT SHOULDER PAIN: ICD-10-CM

## 2018-12-20 DIAGNOSIS — G89.29 CHRONIC RIGHT SHOULDER PAIN: ICD-10-CM

## 2018-12-20 PROCEDURE — 1101F PT FALLS ASSESS-DOCD LE1/YR: CPT | Performed by: FAMILY MEDICINE

## 2018-12-20 PROCEDURE — G8598 ASA/ANTIPLAT THER USED: HCPCS | Performed by: FAMILY MEDICINE

## 2018-12-20 PROCEDURE — 4040F PNEUMOC VAC/ADMIN/RCVD: CPT | Performed by: FAMILY MEDICINE

## 2018-12-20 PROCEDURE — 4004F PT TOBACCO SCREEN RCVD TLK: CPT | Performed by: FAMILY MEDICINE

## 2018-12-20 PROCEDURE — G8427 DOCREV CUR MEDS BY ELIG CLIN: HCPCS | Performed by: FAMILY MEDICINE

## 2018-12-20 PROCEDURE — G8484 FLU IMMUNIZE NO ADMIN: HCPCS | Performed by: FAMILY MEDICINE

## 2018-12-20 PROCEDURE — 99213 OFFICE O/P EST LOW 20 MIN: CPT | Performed by: FAMILY MEDICINE

## 2018-12-20 PROCEDURE — 3017F COLORECTAL CA SCREEN DOC REV: CPT | Performed by: FAMILY MEDICINE

## 2018-12-20 PROCEDURE — 1123F ACP DISCUSS/DSCN MKR DOCD: CPT | Performed by: FAMILY MEDICINE

## 2018-12-20 PROCEDURE — G8417 CALC BMI ABV UP PARAM F/U: HCPCS | Performed by: FAMILY MEDICINE

## 2018-12-20 RX ORDER — VARENICLINE TARTRATE
KIT
Status: CANCELLED | OUTPATIENT
Start: 2018-12-20

## 2018-12-20 RX ORDER — VARENICLINE TARTRATE 1 MG/1
1 TABLET, FILM COATED ORAL 2 TIMES DAILY
Qty: 60 TABLET | Refills: 3 | Status: SHIPPED | OUTPATIENT
Start: 2018-12-20 | End: 2019-05-20

## 2018-12-20 RX ORDER — HYDROCODONE BITARTRATE AND ACETAMINOPHEN 7.5; 325 MG/1; MG/1
1 TABLET ORAL 3 TIMES DAILY PRN
Qty: 90 TABLET | Refills: 0 | Status: SHIPPED | OUTPATIENT
Start: 2018-12-20 | End: 2019-01-21 | Stop reason: SDUPTHER

## 2018-12-20 NOTE — PROGRESS NOTES
HYDROcodone-acetaminophen (NORCO) 7.5-325 MG per tablet; Take 1 tablet by mouth 3 times daily as needed for Pain for up to 30 days. Josselyn Connor Date: 12/20/18  Dispense: 90 tablet; Refill: 0    2. Tobacco abuse  - varenicline (CHANTIX CONTINUING MONTH GAVIN) 1 MG tablet; Take 1 tablet by mouth 2 times daily  Dispense: 60 tablet; Refill: 3      Tali Howard was seen today for shoulder pain. Diagnoses and all orders for this visit:    Tobacco abuse  -     varenicline (CHANTIX CONTINUING MONTH GAVIN) 1 MG tablet; Take 1 tablet by mouth 2 times daily    Chronic right shoulder pain  -     HYDROcodone-acetaminophen (NORCO) 7.5-325 MG per tablet; Take 1 tablet by mouth 3 times daily as needed for Pain for up to 30 days. Josselyn Ryan Date: 12/20/18    Other orders  -     Cancel: CHANTIX STARTING MONTH PAK 0.5 MG X 11 & 1 MG X 42 tablet; Controlled substances monitoring: possible medication side effects, risk of tolerance and/or dependence, and alternative treatments discussed, no signs of potential drug abuse or diversion identified and OARRS report reviewed today- activity consistent with treatment plan. Diagnosis:    Patient Active Problem List   Diagnosis Code    [facility] C85.90    Gastroenteritis K52.9    Back pain at L4-L5 level M54.5    Impotence N52.9    Chronic fatigue R53.82    Urinary frequency R35.0    Tobacco abuse Z72.0    Bronchitis J40    Gastroesophageal reflux disease with esophagitis K21.0    Depression F32.9    Coronary artery disease involving native coronary artery of native heart with unstable angina pectoris ([facility] I25.110     RX: As written and recorded.                                                      Alona Lima D.O. 12/20/18

## 2019-01-18 ENCOUNTER — HOSPITAL ENCOUNTER (EMERGENCY)
Age: 69
Discharge: HOME | End: 2019-01-18
Attending: EMERGENCY MEDICINE
Payer: MEDICARE

## 2019-01-18 VITALS
SYSTOLIC BLOOD PRESSURE: 178 MMHG | HEART RATE: 72 BPM | OXYGEN SATURATION: 93 % | RESPIRATION RATE: 20 BRPM | TEMPERATURE: 97.8 F | DIASTOLIC BLOOD PRESSURE: 77 MMHG

## 2019-01-18 DIAGNOSIS — S05.01XA CORNEAL ABRASION, RIGHT, INITIAL ENCOUNTER: Primary | ICD-10-CM

## 2019-01-18 PROCEDURE — 99283 EMERGENCY DEPT VISIT LOW MDM: CPT

## 2019-01-18 PROCEDURE — 6370000000 HC RX 637 (ALT 250 FOR IP): Performed by: EMERGENCY MEDICINE

## 2019-01-18 PROCEDURE — 6370000000 HC RX 637 (ALT 250 FOR IP): Performed by: STUDENT IN AN ORGANIZED HEALTH CARE EDUCATION/TRAINING PROGRAM

## 2019-01-18 RX ORDER — TETRACAINE HYDROCHLORIDE 5 MG/ML
1 SOLUTION OPHTHALMIC ONCE
Status: COMPLETED | OUTPATIENT
Start: 2019-01-18 | End: 2019-01-18

## 2019-01-18 RX ORDER — ERYTHROMYCIN 5 MG/G
OINTMENT OPHTHALMIC ONCE
Status: COMPLETED | OUTPATIENT
Start: 2019-01-18 | End: 2019-01-18

## 2019-01-18 RX ORDER — ERYTHROMYCIN 5 MG/G
OINTMENT OPHTHALMIC
Qty: 1 TUBE | Refills: 0 | Status: SHIPPED | OUTPATIENT
Start: 2019-01-18 | End: 2019-01-21 | Stop reason: SDUPTHER

## 2019-01-18 RX ADMIN — TETRACAINE HYDROCHLORIDE 1 DROP: 5 SOLUTION OPHTHALMIC at 21:50

## 2019-01-18 RX ADMIN — FLUORESCEIN SODIUM 1 EACH: 0.6 STRIP OPHTHALMIC at 21:50

## 2019-01-18 RX ADMIN — ERYTHROMYCIN: 5 OINTMENT OPHTHALMIC at 22:18

## 2019-01-18 ASSESSMENT — ENCOUNTER SYMPTOMS
ABDOMINAL PAIN: 0
VOMITING: 0
CONSTIPATION: 0
WHEEZING: 0
NAUSEA: 0
SORE THROAT: 0
EYE ITCHING: 0
CHEST TIGHTNESS: 0
TROUBLE SWALLOWING: 0
DIARRHEA: 0
EYE REDNESS: 0
PHOTOPHOBIA: 0
BLOOD IN STOOL: 0
EYE DISCHARGE: 0
ABDOMINAL DISTENTION: 0
BACK PAIN: 0
SINUS PRESSURE: 0
SHORTNESS OF BREATH: 0
EYE PAIN: 1
COUGH: 0
RHINORRHEA: 0

## 2019-01-18 ASSESSMENT — PAIN DESCRIPTION - PAIN TYPE: TYPE: ACUTE PAIN

## 2019-01-18 ASSESSMENT — PAIN DESCRIPTION - LOCATION: LOCATION: EYE

## 2019-01-18 ASSESSMENT — PAIN SCALES - GENERAL: PAINLEVEL_OUTOF10: 9

## 2019-01-18 ASSESSMENT — PAIN DESCRIPTION - ORIENTATION: ORIENTATION: RIGHT

## 2019-01-21 ENCOUNTER — OFFICE VISIT (OUTPATIENT)
Dept: FAMILY MEDICINE CLINIC | Age: 69
End: 2019-01-21
Payer: MEDICARE

## 2019-01-21 VITALS
RESPIRATION RATE: 12 BRPM | HEART RATE: 96 BPM | HEIGHT: 69 IN | DIASTOLIC BLOOD PRESSURE: 76 MMHG | BODY MASS INDEX: 31.1 KG/M2 | WEIGHT: 210 LBS | TEMPERATURE: 98.2 F | OXYGEN SATURATION: 96 % | SYSTOLIC BLOOD PRESSURE: 118 MMHG

## 2019-01-21 DIAGNOSIS — H18.891 CORNEAL RUST RING OF RIGHT EYE: Primary | ICD-10-CM

## 2019-01-21 DIAGNOSIS — M25.511 CHRONIC RIGHT SHOULDER PAIN: ICD-10-CM

## 2019-01-21 DIAGNOSIS — G89.29 CHRONIC RIGHT SHOULDER PAIN: ICD-10-CM

## 2019-01-21 PROCEDURE — 99213 OFFICE O/P EST LOW 20 MIN: CPT | Performed by: FAMILY MEDICINE

## 2019-01-21 PROCEDURE — G8417 CALC BMI ABV UP PARAM F/U: HCPCS | Performed by: FAMILY MEDICINE

## 2019-01-21 PROCEDURE — 1101F PT FALLS ASSESS-DOCD LE1/YR: CPT | Performed by: FAMILY MEDICINE

## 2019-01-21 PROCEDURE — G8484 FLU IMMUNIZE NO ADMIN: HCPCS | Performed by: FAMILY MEDICINE

## 2019-01-21 PROCEDURE — G8427 DOCREV CUR MEDS BY ELIG CLIN: HCPCS | Performed by: FAMILY MEDICINE

## 2019-01-21 PROCEDURE — 1123F ACP DISCUSS/DSCN MKR DOCD: CPT | Performed by: FAMILY MEDICINE

## 2019-01-21 PROCEDURE — 4004F PT TOBACCO SCREEN RCVD TLK: CPT | Performed by: FAMILY MEDICINE

## 2019-01-21 PROCEDURE — 3017F COLORECTAL CA SCREEN DOC REV: CPT | Performed by: FAMILY MEDICINE

## 2019-01-21 PROCEDURE — G8598 ASA/ANTIPLAT THER USED: HCPCS | Performed by: FAMILY MEDICINE

## 2019-01-21 PROCEDURE — 4040F PNEUMOC VAC/ADMIN/RCVD: CPT | Performed by: FAMILY MEDICINE

## 2019-01-21 RX ORDER — HYDROCODONE BITARTRATE AND ACETAMINOPHEN 7.5; 325 MG/1; MG/1
1 TABLET ORAL 3 TIMES DAILY PRN
Qty: 90 TABLET | Refills: 0 | Status: SHIPPED | OUTPATIENT
Start: 2019-01-21 | End: 2019-02-25 | Stop reason: SDUPTHER

## 2019-01-21 RX ORDER — ERYTHROMYCIN 5 MG/G
OINTMENT OPHTHALMIC
Qty: 1 TUBE | Refills: 0 | Status: SHIPPED | OUTPATIENT
Start: 2019-01-21 | End: 2019-01-31

## 2019-02-25 ENCOUNTER — OFFICE VISIT (OUTPATIENT)
Dept: FAMILY MEDICINE CLINIC | Age: 69
End: 2019-02-25
Payer: MEDICARE

## 2019-02-25 VITALS
TEMPERATURE: 98.1 F | OXYGEN SATURATION: 96 % | HEIGHT: 69 IN | RESPIRATION RATE: 16 BRPM | SYSTOLIC BLOOD PRESSURE: 134 MMHG | BODY MASS INDEX: 32.14 KG/M2 | DIASTOLIC BLOOD PRESSURE: 82 MMHG | WEIGHT: 217 LBS | HEART RATE: 72 BPM

## 2019-02-25 DIAGNOSIS — G89.29 CHRONIC RIGHT SHOULDER PAIN: ICD-10-CM

## 2019-02-25 DIAGNOSIS — M75.101 TEAR OF RIGHT ROTATOR CUFF, UNSPECIFIED TEAR EXTENT: Primary | ICD-10-CM

## 2019-02-25 DIAGNOSIS — M25.511 CHRONIC RIGHT SHOULDER PAIN: ICD-10-CM

## 2019-02-25 PROCEDURE — 1123F ACP DISCUSS/DSCN MKR DOCD: CPT | Performed by: FAMILY MEDICINE

## 2019-02-25 PROCEDURE — G8427 DOCREV CUR MEDS BY ELIG CLIN: HCPCS | Performed by: FAMILY MEDICINE

## 2019-02-25 PROCEDURE — 4040F PNEUMOC VAC/ADMIN/RCVD: CPT | Performed by: FAMILY MEDICINE

## 2019-02-25 PROCEDURE — 4004F PT TOBACCO SCREEN RCVD TLK: CPT | Performed by: FAMILY MEDICINE

## 2019-02-25 PROCEDURE — G8417 CALC BMI ABV UP PARAM F/U: HCPCS | Performed by: FAMILY MEDICINE

## 2019-02-25 PROCEDURE — 3017F COLORECTAL CA SCREEN DOC REV: CPT | Performed by: FAMILY MEDICINE

## 2019-02-25 PROCEDURE — G8598 ASA/ANTIPLAT THER USED: HCPCS | Performed by: FAMILY MEDICINE

## 2019-02-25 PROCEDURE — 99213 OFFICE O/P EST LOW 20 MIN: CPT | Performed by: FAMILY MEDICINE

## 2019-02-25 PROCEDURE — 20610 DRAIN/INJ JOINT/BURSA W/O US: CPT | Performed by: FAMILY MEDICINE

## 2019-02-25 PROCEDURE — G8484 FLU IMMUNIZE NO ADMIN: HCPCS | Performed by: FAMILY MEDICINE

## 2019-02-25 PROCEDURE — 1101F PT FALLS ASSESS-DOCD LE1/YR: CPT | Performed by: FAMILY MEDICINE

## 2019-02-25 RX ORDER — HYDROCODONE BITARTRATE AND ACETAMINOPHEN 7.5; 325 MG/1; MG/1
1 TABLET ORAL 3 TIMES DAILY PRN
Qty: 90 TABLET | Refills: 0 | Status: SHIPPED | OUTPATIENT
Start: 2019-02-25 | End: 2019-03-27

## 2019-02-25 RX ORDER — HYDROCODONE BITARTRATE AND ACETAMINOPHEN 7.5; 325 MG/1; MG/1
1 TABLET ORAL EVERY 8 HOURS PRN
COMMUNITY
End: 2019-03-25 | Stop reason: SDUPTHER

## 2019-02-25 RX ORDER — METHYLPREDNISOLONE ACETATE 80 MG/ML
80 INJECTION, SUSPENSION INTRA-ARTICULAR; INTRALESIONAL; INTRAMUSCULAR; SOFT TISSUE ONCE
Status: COMPLETED | OUTPATIENT
Start: 2019-02-25 | End: 2019-02-25

## 2019-02-25 RX ADMIN — METHYLPREDNISOLONE ACETATE 80 MG: 80 INJECTION, SUSPENSION INTRA-ARTICULAR; INTRALESIONAL; INTRAMUSCULAR; SOFT TISSUE at 13:56

## 2019-03-25 ENCOUNTER — OFFICE VISIT (OUTPATIENT)
Dept: FAMILY MEDICINE CLINIC | Age: 69
End: 2019-03-25
Payer: MEDICARE

## 2019-03-25 VITALS
BODY MASS INDEX: 32.88 KG/M2 | WEIGHT: 222 LBS | TEMPERATURE: 98.7 F | HEART RATE: 69 BPM | OXYGEN SATURATION: 96 % | HEIGHT: 69 IN | RESPIRATION RATE: 14 BRPM | SYSTOLIC BLOOD PRESSURE: 132 MMHG | DIASTOLIC BLOOD PRESSURE: 70 MMHG

## 2019-03-25 DIAGNOSIS — G89.29 CHRONIC RIGHT SHOULDER PAIN: Primary | ICD-10-CM

## 2019-03-25 DIAGNOSIS — M75.101 TEAR OF RIGHT ROTATOR CUFF, UNSPECIFIED TEAR EXTENT: ICD-10-CM

## 2019-03-25 DIAGNOSIS — M25.511 CHRONIC RIGHT SHOULDER PAIN: Primary | ICD-10-CM

## 2019-03-25 PROCEDURE — G8484 FLU IMMUNIZE NO ADMIN: HCPCS | Performed by: FAMILY MEDICINE

## 2019-03-25 PROCEDURE — G8427 DOCREV CUR MEDS BY ELIG CLIN: HCPCS | Performed by: FAMILY MEDICINE

## 2019-03-25 PROCEDURE — 99213 OFFICE O/P EST LOW 20 MIN: CPT | Performed by: FAMILY MEDICINE

## 2019-03-25 PROCEDURE — 1123F ACP DISCUSS/DSCN MKR DOCD: CPT | Performed by: FAMILY MEDICINE

## 2019-03-25 PROCEDURE — 3017F COLORECTAL CA SCREEN DOC REV: CPT | Performed by: FAMILY MEDICINE

## 2019-03-25 PROCEDURE — 4004F PT TOBACCO SCREEN RCVD TLK: CPT | Performed by: FAMILY MEDICINE

## 2019-03-25 PROCEDURE — G8598 ASA/ANTIPLAT THER USED: HCPCS | Performed by: FAMILY MEDICINE

## 2019-03-25 PROCEDURE — G8417 CALC BMI ABV UP PARAM F/U: HCPCS | Performed by: FAMILY MEDICINE

## 2019-03-25 PROCEDURE — 1101F PT FALLS ASSESS-DOCD LE1/YR: CPT | Performed by: FAMILY MEDICINE

## 2019-03-25 PROCEDURE — 4040F PNEUMOC VAC/ADMIN/RCVD: CPT | Performed by: FAMILY MEDICINE

## 2019-03-25 RX ORDER — HYDROCODONE BITARTRATE AND ACETAMINOPHEN 7.5; 325 MG/1; MG/1
1 TABLET ORAL EVERY 8 HOURS PRN
Qty: 90 TABLET | Refills: 0 | Status: SHIPPED | OUTPATIENT
Start: 2019-03-25 | End: 2019-04-25 | Stop reason: SDUPTHER

## 2019-04-25 ENCOUNTER — OFFICE VISIT (OUTPATIENT)
Dept: FAMILY MEDICINE CLINIC | Age: 69
End: 2019-04-25
Payer: MEDICARE

## 2019-04-25 VITALS
TEMPERATURE: 97.7 F | DIASTOLIC BLOOD PRESSURE: 74 MMHG | BODY MASS INDEX: 33.03 KG/M2 | HEART RATE: 74 BPM | RESPIRATION RATE: 12 BRPM | HEIGHT: 69 IN | WEIGHT: 223 LBS | SYSTOLIC BLOOD PRESSURE: 124 MMHG | OXYGEN SATURATION: 97 %

## 2019-04-25 DIAGNOSIS — F33.0 MAJOR DEPRESSIVE DISORDER, RECURRENT, MILD (HCC): ICD-10-CM

## 2019-04-25 DIAGNOSIS — I25.110 CORONARY ARTERY DISEASE INVOLVING NATIVE CORONARY ARTERY OF NATIVE HEART WITH UNSTABLE ANGINA PECTORIS (HCC): Primary | ICD-10-CM

## 2019-04-25 DIAGNOSIS — K40.90 NON-RECURRENT UNILATERAL INGUINAL HERNIA WITHOUT OBSTRUCTION OR GANGRENE: ICD-10-CM

## 2019-04-25 DIAGNOSIS — G89.29 CHRONIC RIGHT SHOULDER PAIN: ICD-10-CM

## 2019-04-25 DIAGNOSIS — M75.101 TEAR OF RIGHT ROTATOR CUFF, UNSPECIFIED TEAR EXTENT: ICD-10-CM

## 2019-04-25 DIAGNOSIS — M25.511 CHRONIC RIGHT SHOULDER PAIN: ICD-10-CM

## 2019-04-25 DIAGNOSIS — Z91.81 AT HIGH RISK FOR FALLS: ICD-10-CM

## 2019-04-25 PROCEDURE — G8417 CALC BMI ABV UP PARAM F/U: HCPCS | Performed by: FAMILY MEDICINE

## 2019-04-25 PROCEDURE — 99214 OFFICE O/P EST MOD 30 MIN: CPT | Performed by: FAMILY MEDICINE

## 2019-04-25 PROCEDURE — G8427 DOCREV CUR MEDS BY ELIG CLIN: HCPCS | Performed by: FAMILY MEDICINE

## 2019-04-25 PROCEDURE — 4040F PNEUMOC VAC/ADMIN/RCVD: CPT | Performed by: FAMILY MEDICINE

## 2019-04-25 PROCEDURE — 4004F PT TOBACCO SCREEN RCVD TLK: CPT | Performed by: FAMILY MEDICINE

## 2019-04-25 PROCEDURE — G8598 ASA/ANTIPLAT THER USED: HCPCS | Performed by: FAMILY MEDICINE

## 2019-04-25 PROCEDURE — 3017F COLORECTAL CA SCREEN DOC REV: CPT | Performed by: FAMILY MEDICINE

## 2019-04-25 PROCEDURE — 1123F ACP DISCUSS/DSCN MKR DOCD: CPT | Performed by: FAMILY MEDICINE

## 2019-04-25 RX ORDER — HYDROCODONE BITARTRATE AND ACETAMINOPHEN 7.5; 325 MG/1; MG/1
1 TABLET ORAL EVERY 8 HOURS PRN
Qty: 90 TABLET | Refills: 0 | Status: SHIPPED | OUTPATIENT
Start: 2019-04-25 | End: 2019-05-20 | Stop reason: SDUPTHER

## 2019-04-25 RX ORDER — CLOPIDOGREL BISULFATE 75 MG/1
75 TABLET ORAL DAILY
Qty: 90 TABLET | Refills: 3 | Status: SHIPPED | OUTPATIENT
Start: 2019-04-25 | End: 2019-10-23 | Stop reason: SDUPTHER

## 2019-04-25 ASSESSMENT — ENCOUNTER SYMPTOMS
APNEA: 0
EYE DISCHARGE: 0
WHEEZING: 0
BLOOD IN STOOL: 0
DIARRHEA: 0
CONSTIPATION: 0

## 2019-04-25 NOTE — PROGRESS NOTES
HPI:  Patient comes in today for   Chief Complaint   Patient presents with    Shoulder Pain     Med refill visit. Pt states the med does help with everyday shoulder pain. Rates pain 4/10    Lower Back Pain     left sided pain   . Review of Systems  Review of Systems   Constitutional: Negative for activity change, chills and diaphoresis. HENT: Negative for congestion, ear discharge, ear pain, hearing loss, nosebleeds and tinnitus. Eyes: Negative for discharge. Respiratory: Negative for apnea and wheezing. Cardiovascular: Negative for chest pain (Chest pressure at rest at times). Gastrointestinal: Negative for blood in stool, constipation and diarrhea. Genitourinary: Negative for dysuria, flank pain and hematuria. Skin: Negative for rash. Neurological: Negative for dizziness and headaches. Hematological: Does not bruise/bleed easily. Psychiatric/Behavioral: Negative for agitation. PE:  VS:  /74   Pulse 74   Temp 97.7 °F (36.5 °C) (Oral)   Resp 12   Ht 5' 9\" (1.753 m)   Wt 223 lb (101.2 kg)   SpO2 97%   BMI 32.93 kg/m²   Physical Exam   Constitutional: He appears well-developed. HENT:   Head: Normocephalic. Eyes: Pupils are equal, round, and reactive to light. Neck: Normal range of motion. No thyromegaly present. Cardiovascular: Normal rate and regular rhythm. No murmur heard. Pulmonary/Chest: Effort normal.   Few rhonchi     Abdominal: Soft. Musculoskeletal: Normal range of motion. He exhibits tenderness (Right shoulder pain with Impingement post op.). Neurological: He is alert. Skin: Skin is warm. Capillary refill takes less than 2 seconds. Psychiatric: He has a normal mood and affect. Assessment/Plan:  Kendall Soares was seen today for shoulder pain and lower back pain.     Diagnoses and all orders for this visit:    Coronary artery disease involving native coronary artery of native heart with unstable angina pectoris (HCC)  -     clopidogrel (PLAVIX) 75 MG tablet; Take 1 tablet by mouth daily    Chronic right shoulder pain  -     HYDROcodone-acetaminophen (NORCO) 7.5-325 MG per tablet; Take 1 tablet by mouth every 8 hours as needed for Pain for up to 30 days. Tear of right rotator cuff, unspecified tear extent  -     HYDROcodone-acetaminophen (NORCO) 7.5-325 MG per tablet; Take 1 tablet by mouth every 8 hours as needed for Pain for up to 30 days. Non-recurrent unilateral inguinal hernia without obstruction or gangrene  -     Rimma Blevins MD, General Surgery, [facility]      Controlled substances monitoring: possible medication side effects, risk of tolerance and/or dependence, and alternative treatments discussed, no signs of potential drug abuse or diversion identified and OARRS report reviewed today- activity consistent with treatment plan. Martinez Jama D.O. On the basis of positive falls risk screening, assessment and plan is as follows: Rachel Adame

## 2019-04-29 ENCOUNTER — INITIAL CONSULT (OUTPATIENT)
Dept: SURGERY | Age: 69
End: 2019-04-29
Payer: MEDICARE

## 2019-04-29 VITALS
TEMPERATURE: 98 F | HEIGHT: 68 IN | WEIGHT: 221 LBS | SYSTOLIC BLOOD PRESSURE: 158 MMHG | HEART RATE: 67 BPM | DIASTOLIC BLOOD PRESSURE: 76 MMHG | BODY MASS INDEX: 33.49 KG/M2

## 2019-04-29 DIAGNOSIS — N53.19 EJACULATORY DISORDER: Primary | ICD-10-CM

## 2019-04-29 PROCEDURE — 99204 OFFICE O/P NEW MOD 45 MIN: CPT | Performed by: SURGERY

## 2019-04-29 PROCEDURE — 3017F COLORECTAL CA SCREEN DOC REV: CPT | Performed by: SURGERY

## 2019-04-29 PROCEDURE — 1123F ACP DISCUSS/DSCN MKR DOCD: CPT | Performed by: SURGERY

## 2019-04-29 PROCEDURE — G8427 DOCREV CUR MEDS BY ELIG CLIN: HCPCS | Performed by: SURGERY

## 2019-04-29 PROCEDURE — 4040F PNEUMOC VAC/ADMIN/RCVD: CPT | Performed by: SURGERY

## 2019-04-29 PROCEDURE — G8417 CALC BMI ABV UP PARAM F/U: HCPCS | Performed by: SURGERY

## 2019-04-29 PROCEDURE — 4004F PT TOBACCO SCREEN RCVD TLK: CPT | Performed by: SURGERY

## 2019-04-29 PROCEDURE — G8598 ASA/ANTIPLAT THER USED: HCPCS | Performed by: SURGERY

## 2019-04-29 NOTE — PROGRESS NOTES
ANGIOPLASTY WITH STENT PLACEMENT      2008    OTHER SURGICAL HISTORY  6/29/2015    lapraoscopic cholecystectomy    SHOULDER ARTHROSCOPY Right 10-8-15    rotator cuff repair, subachromoplasty with labrial debridement    TONSILLECTOMY      UPPER GASTROINTESTINAL ENDOSCOPY         Allergies   Allergen Reactions    Midazolam Hcl      Wake up wild. ... Must be reversed with romazicon or will wake up wild and combative       The patient has a family history that is negative for severe cardiovascular or respiratory issues, negative for reaction to anesthesia. Time spent reviewing past medical, surgical, social and family history, vitals, nursing assessment and images. No changes from above documented history. Social History     Socioeconomic History    Marital status:      Spouse name: Not on file    Number of children: Not on file    Years of education: Not on file    Highest education level: Not on file   Occupational History    Not on file   Social Needs    Financial resource strain: Not on file    Food insecurity:     Worry: Not on file     Inability: Not on file    Transportation needs:     Medical: Not on file     Non-medical: Not on file   Tobacco Use    Smoking status: Current Some Day Smoker     Packs/day: 0.50     Years: 52.00     Pack years: 26.00     Types: Cigarettes    Smokeless tobacco: Never Used   Substance and Sexual Activity    Alcohol use:  Yes     Alcohol/week: 7.2 oz     Types: 12 Cans of beer per week     Comment: oc12 cans per mth, 1-2 beers/day    Drug use: No     Comment: in past cocaine 10 yrs ago    Sexual activity: Never   Lifestyle    Physical activity:     Days per week: Not on file     Minutes per session: Not on file    Stress: Not on file   Relationships    Social connections:     Talks on phone: Not on file     Gets together: Not on file     Attends L[employer] service: Not on file     Active member of club or organization: Not on file     Attends meetings reviewed relevant abdominal imaging from this admission and that available in the EMR       Assessment:  Cece Santana is a 76 y.o. male with left inguinal hernia, nonrecurrent  Patient Active Problem List   Diagnosis    Lymphoma ([facility] [address]    Gastroenteritis    Back pain at L4-L5 level    Impotence    Chronic fatigue    Urinary frequency    Tobacco abuse    Bronchitis    Gastroesophageal reflux disease with esophagitis    Depression    Coronary artery disease involving native coronary artery of native heart with unstable angina pectoris ([address]         Plan:  OR for laparoscopic robot assisted left inguinal hernia repair with mesh possible bilateral  Cardiac and medical clearance  Needs off plavix for one week  Discussed the risk, benefits and alternatives of surgery including wound infections, bleeding, scar, recurrent hernia formation, mesh infection and migration, chronic groin pain, nerve injury, testicle injury, repeat procedures and the risks of general anesthetic including MI, CVA, sudden death or reactions to anesthetic medications. The patient understands the risks and alternatives and the possibility of converting to an open procedure. All questions were answered to the patient's satisfaction and they freely signed the consent.            Alem Irene MD  9:46 AM  4/29/2019

## 2019-05-07 ENCOUNTER — OFFICE VISIT (OUTPATIENT)
Dept: CARDIOLOGY CLINIC | Age: 69
End: 2019-05-07
Payer: MEDICARE

## 2019-05-07 VITALS
SYSTOLIC BLOOD PRESSURE: 128 MMHG | BODY MASS INDEX: 32.58 KG/M2 | WEIGHT: 220 LBS | HEART RATE: 63 BPM | DIASTOLIC BLOOD PRESSURE: 80 MMHG | HEIGHT: 69 IN

## 2019-05-07 DIAGNOSIS — E66.8 MODERATE OBESITY: ICD-10-CM

## 2019-05-07 DIAGNOSIS — E78.00 PURE HYPERCHOLESTEROLEMIA: ICD-10-CM

## 2019-05-07 DIAGNOSIS — J44.9 CHRONIC OBSTRUCTIVE PULMONARY DISEASE, UNSPECIFIED COPD TYPE (HCC): ICD-10-CM

## 2019-05-07 DIAGNOSIS — Z01.810 PREOPERATIVE CARDIOVASCULAR EXAMINATION: ICD-10-CM

## 2019-05-07 DIAGNOSIS — I25.110 CORONARY ARTERY DISEASE INVOLVING NATIVE CORONARY ARTERY OF NATIVE HEART WITH UNSTABLE ANGINA PECTORIS (HCC): Primary | ICD-10-CM

## 2019-05-07 PROBLEM — E66.9 MODERATE OBESITY: Status: ACTIVE | Noted: 2019-05-07

## 2019-05-07 PROCEDURE — 3023F SPIROM DOC REV: CPT | Performed by: INTERNAL MEDICINE

## 2019-05-07 PROCEDURE — 3017F COLORECTAL CA SCREEN DOC REV: CPT | Performed by: INTERNAL MEDICINE

## 2019-05-07 PROCEDURE — 4004F PT TOBACCO SCREEN RCVD TLK: CPT | Performed by: INTERNAL MEDICINE

## 2019-05-07 PROCEDURE — G8926 SPIRO NO PERF OR DOC: HCPCS | Performed by: INTERNAL MEDICINE

## 2019-05-07 PROCEDURE — G8598 ASA/ANTIPLAT THER USED: HCPCS | Performed by: INTERNAL MEDICINE

## 2019-05-07 PROCEDURE — G8427 DOCREV CUR MEDS BY ELIG CLIN: HCPCS | Performed by: INTERNAL MEDICINE

## 2019-05-07 PROCEDURE — 4040F PNEUMOC VAC/ADMIN/RCVD: CPT | Performed by: INTERNAL MEDICINE

## 2019-05-07 PROCEDURE — 99204 OFFICE O/P NEW MOD 45 MIN: CPT | Performed by: INTERNAL MEDICINE

## 2019-05-07 PROCEDURE — 1123F ACP DISCUSS/DSCN MKR DOCD: CPT | Performed by: INTERNAL MEDICINE

## 2019-05-07 PROCEDURE — 93000 ELECTROCARDIOGRAM COMPLETE: CPT | Performed by: INTERNAL MEDICINE

## 2019-05-07 PROCEDURE — G8417 CALC BMI ABV UP PARAM F/U: HCPCS | Performed by: INTERNAL MEDICINE

## 2019-05-07 NOTE — PROGRESS NOTES
OUTPATIENT CARDIOLOGY CONSULT    Name: Lisa Brooks    Age: 76 y.o. Date of Service: 5/7/2019    Reason for Consultation: Coronary atherosclerosis, chronic obstructive lung disease, moderate obesity, preoperative cardiovascular evaluation    Referring Physician: Lamont Esteban D.O. History of Present Illness: The patient is a 55-year-old white male with a known history of coronary atherosclerosis with previous local cardiovascular care by Ari Pickett and coronary angiography and percutaneous coronary intervention at the [facility] in March, 2018 demonstrating evidence of a 70% stenosis of the mid right coronary at the distal edge of a previously placed stent and 60% stenosis of the distal circumflex with subsequent repeat coronary intervention of the right coronary distribution. A subsequent echocardiogram has demonstrated evidence of left ventricular systolic function at the lower limits of normal in the absence of regional wall motion abnormalities with right ventricular dilatation and no reports of right ventricular dysfunction. He additionally has a history of chronic obstructive lung disease with ongoing tobacco consumption as well as that of hyperlipidemia. He remains active with functional capabilities of approximately 5 METs and denies any subsequent recurrent symptoms of anginal-like chest discomfort following his intervention with chronic (functional class II) exertional dyspnea in the absence of additional manifestations of decompensated left ventricular systolic dysfunction or volume overload. He denies any arrhythmia related symptoms. At the time of his evaluation, he remains normotensive. Review of Systems: The remainder of a complete multisystem review including consitutional, central nervous, respiratory, circulatory, gastrointestinal, genitourinary, endocrinologic, hematologic, musculoskeletal and psychiatric are negative.     Past Medical History:  Past Medical History:   Diagnosis Date    Anesthesia complication     wakes up  violant   only ok if reversed with romazacon    Arthritis     shoulders,ankle    CAD (coronary artery disease)     Erectile dysfunction     GERD (gastroesophageal reflux disease)     H/O coronary angioplasty with stents[V45.82] 9/2018 another stent    Hyperlipidemia     Lymphoma ([address] 11/4/2011    Sleep apnea     wont wear machine    Unspecified cerebral artery occlusion with cerebral infarction        Past Surgical History:  Past Surgical History:   Procedure Laterality Date    ANKLE FRACTURE SURGERY Right march 2014    ORIF right ankle    ANKLE SURGERY  2007    rt ankle    BRONCHOSCOPY  sept 2011    bronch / medistinoscopy    CARDIAC SURGERY      stent    CAROTID ENDARTERECTOMY Left     12 yrs ago    CHOLECYSTECTOMY      COLONOSCOPY  7/30/13     [facility]      2008    OTHER SURGICAL HISTORY  6/29/2015    lapraoscopic cholecystectomy    SHOULDER ARTHROSCOPY Right 10-8-15    rotator cuff repair, subachromoplasty with labrial debridement    TONSILLECTOMY      UPPER GASTROINTESTINAL ENDOSCOPY         Family History:  Family History   Problem Relation Age of Onset    Diabetes Mother     Diabetes Father     Diabetes Sister     Diabetes Brother        Social History:  Social History     Socioeconomic History    Marital status:      Spouse name: Not on file    Number of children: Not on file    Years of education: Not on file    Highest education level: Not on file   Occupational History    Not on file   Social Needs    Financial resource strain: Not on file    Food insecurity:     Worry: Not on file     Inability: Not on file    Transportation needs:     Medical: Not on file     Non-medical: Not on file   Tobacco Use    Smoking status: Current Some Day Smoker     Packs/day: 0.25     Years: 52.00     Pack years: 13.00     Types: Cigarettes    ONE TABLET BY MOUTH ONCE DAILY 90 tablet 3    montelukast (SINGULAIR) 10 MG tablet Take 1 tablet by mouth daily 90 tablet 3    tiZANidine (ZANAFLEX) 4 MG tablet Take 1 tablet by mouth every 8 hours as needed (spasms) 90 tablet 3    sildenafil (REVATIO) 20 MG tablet Take one daily as needed 30 tablet 3    omeprazole (PRILOSEC) 40 MG delayed release capsule Take 1 capsule by mouth daily 90 capsule 3    LORazepam (ATIVAN) 1 MG tablet Take 1 mg by mouth daily .  buPROPion (WELLBUTRIN SR) 150 MG extended release tablet Take 1 tablet by mouth 2 times daily (Patient taking differently: Take 150 mg by mouth daily ) 60 tablet 3    meclizine (ANTIVERT) 25 MG tablet TAKE ONE TABLET BY MOUTH THREE TIMES DAILY AS NEEDED FOR DIZZINESS 45 tablet 0    PROAIR  (90 BASE) MCG/ACT inhaler INHALE TWO PUFFS BY MOUTH EVERY 6 HOURS AS DIRECTED 3 Inhaler 5    aspirin 81 MG chewable tablet Take 81 mg by mouth daily. Last dose 1 week ago       No current facility-administered medications for this visit. Physical Exam:  /80 (Cuff Size: Large Adult)   Pulse 63   Ht 5' 9\" (1.753 m)   Wt 220 lb (99.8 kg)   BMI 32.49 kg/m²   Wt Readings from Last 3 Encounters:   05/07/19 220 lb (99.8 kg)   04/29/19 221 lb (100.2 kg)   04/25/19 223 lb (101.2 kg)     The patient is awake, alert and in no discomfort or distress. No gross musculoskeletal deformity or lymphadenopathy are present. No significant skin or nail changes are present. Gross examination of head, eyes, nose and throat are negative. Jugular venous pressure is normal and no carotid bruits are present. No thyromegaly is noted. Normal respiratory effort is noted with no accessory muscle usage present. Lung fields are clear to ascultation with distant breath sounds throughout all lung fields. Cardiac examination is notable for a regular rate and rhythm with no palpable thrill. No gallop rhythm or cardiac murmur are identified.  A benign abdominal examination is present with the exception of obesity and no masses or organomegaly. A normal abdominal aortic pulsation is present. Intact pulses are present throughout all extremities and no peripheral edema is present. No focal neurologic deficits are present. Laboratory Tests:  Lab Results   Component Value Date    CREATININE 1.0 01/12/2018    BUN 22 01/12/2018     01/12/2018    K 4.0 01/12/2018     01/12/2018    CO2 28 01/12/2018     Lab Results   Component Value Date     (H) 12/01/2011     Lab Results   Component Value Date    WBC 8.6 02/14/2017    RBC 5.48 02/14/2017    HGB 15.9 02/14/2017    HCT 47.8 02/14/2017    MCV 87.2 02/14/2017    MCH 29.0 02/14/2017    MCHC 33.3 02/14/2017    RDW 12.6 02/14/2017     02/14/2017    MPV 12.4 02/14/2017     No results for input(s): ALKPHOS, ALT, AST, PROT, BILITOT, BILIDIR, LABALBU in the last 72 hours.   Lab Results   Component Value Date    MG 1.9 07/20/2013     Lab Results   Component Value Date    PROTIME 12.2 10/07/2015    PROTIME 13.0 11/03/2011    INR 1.0 10/07/2015     Lab Results   Component Value Date    TSH 1.100 01/12/2018     No components found for: CHLPL  Lab Results   Component Value Date    TRIG 129 01/12/2018    TRIG 138 07/20/2013    TRIG 88 04/11/2011     Lab Results   Component Value Date    HDL 45 01/12/2018    HDL 42.0 07/20/2013    HDL 39.0 (A) 04/11/2011     Lab Results   Component Value Date    LDLCALC 104 (H) 01/12/2018    LDLCALC 77 07/20/2013    LDLCALC 91 04/11/2011       Cardiac Tests:  ECG: A resting electrocardiogram demonstrates evidence of sinus rhythm with nonspecific ST changes  Last Echocardiogram: An echocardiogram of December, 2018 reportedly demonstrated evidence of a normal size left ventricular chamber with mild concentric left ventricular hypertrophy and left ventricular systolic function at the lower limits of normal with an estimated ejection fraction of 50% with right ventricular dilatation no mention of right ventricular function with no evidence of significant valvular pathology  Last cardiac catheterization: Coronary angiography of March, 2018 at the [facility] demonstrated evidence of a 70% stenosis of the mid right coronary artery distal to a previous stent with a 60% stenosis of the distal circumflex and no significant disease of the left anterior descending. Subsequent percutaneous coronary intervention including that of a 2.75 x 38 mm drug-eluting stent was placed with post dilation to 3.0 cm    ASSESSMENT / PLAN: On a clinical basis, the patient appears stable from a cardiovascular standpoint with no active cardiovascular symptomatology in the face of his known coronary atherosclerosis. On this basis, I feel that he would being susceptible candidate for his proposed noncardiac surgical procedure with a low risk of ischemic events and needs of cautious periprocedural fluid administration to reduce risk of iatrogenic volume overload. A five-day interruption of his clopidogrel would be permissible and unless absolutely contraindicated the continuation of low-dose aspirin would be advisable to optimal reduce risk of adverse perioperative cardiovascular events. I have additionally discussed recommended alteration of his medical regimen with a discontinuation of his meloxicam based on the increased risk of bleeding in conjunction with dual antiplatelet therapy as well as its adverse effects on cardiovascular health in addition to his needs of both lifestyle modification to achieve weight reduction to optimal benefit diastolic cardiac performance as well as that of smoking cessation both to reduce risk of further adverse effects on his chronic obstructive lung disease as well as that of reducing risk of progressive atherosclerotic development.  I would addition recommend optimization of his lipid management with conversion of his combined simvastatin and fibric acid derivative that of high-dose atorvastatin or rosuvastatin to reduce risk of atherosclerotic progression. I have recommended annual cardiovascular follow-up but he is extremely disinterested in any medical care date is not in his mind essential. I will happily reassess him the skin the future should any additional cardiovascular difficulties or concerns arise. Follow-up office visit in 1 year. Thank you for allowing me to participate in your patient's care. Please feel free to contact me if you have any questions or concerns. Note: This report was completed utilizing a computerized voice recognition software. Every effort has been made to insure accuracy, however; inadvertent computerized transcription errors may be present. Lynnette Joshi.  [facility], [address] [place] [address] Cardiology    An electronic copy of this consult note was forwarded to Dr. Patrick Schulte and Blanca Leger

## 2019-05-07 NOTE — PATIENT INSTRUCTIONS
Patient Education        Learning About Benefits From Quitting Smoking  How does quitting smoking make you healthier? If you're thinking about quitting smoking, you may have a few reasons to be smoke-free. Your health may be one of them. · When you quit smoking, you lower your risks for cancer, lung disease, heart attack, stroke, blood vessel disease, and blindness from macular degeneration. · When you're smoke-free, you get sick less often, and you heal faster. You are less likely to get colds, flu, bronchitis, and pneumonia. · As a nonsmoker, you may find that your mood is better and you are less stressed. When and how will you feel healthier? Quitting has real health benefits that start from day 1 of being smoke-free. And the longer you stay smoke-free, the healthier you get and the better you feel. The first hours  · After just 20 minutes, your blood pressure and heart rate go down. That means there's less stress on your heart and blood vessels. · Within 12 hours, the level of carbon monoxide in your blood drops back to normal. That makes room for more oxygen. With more oxygen in your body, you may notice that you have more energy than when you smoked. After 2 weeks  · Your lungs start to work better. · Your risk of heart attack starts to drop. After 1 month  · When your lungs are clear, you cough less and breathe deeper, so it's easier to be active. · Your sense of taste and smell return. That means you can enjoy food more than you have since you started smoking. Over the years  · After 1 year, your risk of heart disease is half what it would be if you kept smoking. · After 5 years, your risk of stroke starts to shrink. Within a few years after that, it's about the same as if you'd never smoked. · After 10 years, your risk of dying from lung cancer is cut by about half. And your risk for many other types of cancer is lower too. How would quitting help others in your life?   When you quit smoking, you improve the health of everyone who now breathes in your smoke. · Their heart, lung, and cancer risks drop, much like yours. · They are sick less. For babies and small children, living smoke-free means they're less likely to have ear infections, pneumonia, and bronchitis. · If you're a woman who is or will be pregnant someday, quitting smoking means a healthier . · Children who are close to you are less likely to become adult smokers. Where can you learn more? Go to https://[employer]. org and sign in to your [employer] account. Enter [phone] in the [facility] box to learn more about \"Learning About Benefits From Quitting Smoking. \"     If you do not have an account, please click on the \"Sign Up Now\" link. Current as of: 2018  Content Version: 11.9  © 2808-6152 [employer]. Care instructions adapted under license by [facility] ([facility]). If you have questions about a medical condition or this instruction, always ask your healthcare professional. [address] any warranty or liability for your use of this information.

## 2019-05-20 ENCOUNTER — OFFICE VISIT (OUTPATIENT)
Dept: FAMILY MEDICINE CLINIC | Age: 69
End: 2019-05-20
Payer: MEDICARE

## 2019-05-20 VITALS
WEIGHT: 218 LBS | HEART RATE: 84 BPM | SYSTOLIC BLOOD PRESSURE: 128 MMHG | OXYGEN SATURATION: 95 % | DIASTOLIC BLOOD PRESSURE: 76 MMHG | RESPIRATION RATE: 16 BRPM | TEMPERATURE: 98.1 F | BODY MASS INDEX: 33.04 KG/M2 | HEIGHT: 68 IN

## 2019-05-20 DIAGNOSIS — G89.29 CHRONIC RIGHT SHOULDER PAIN: ICD-10-CM

## 2019-05-20 DIAGNOSIS — M25.511 CHRONIC RIGHT SHOULDER PAIN: ICD-10-CM

## 2019-05-20 DIAGNOSIS — M75.101 TEAR OF RIGHT ROTATOR CUFF, UNSPECIFIED TEAR EXTENT: ICD-10-CM

## 2019-05-20 PROCEDURE — 3017F COLORECTAL CA SCREEN DOC REV: CPT | Performed by: FAMILY MEDICINE

## 2019-05-20 PROCEDURE — G8417 CALC BMI ABV UP PARAM F/U: HCPCS | Performed by: FAMILY MEDICINE

## 2019-05-20 PROCEDURE — 99213 OFFICE O/P EST LOW 20 MIN: CPT | Performed by: FAMILY MEDICINE

## 2019-05-20 PROCEDURE — 1123F ACP DISCUSS/DSCN MKR DOCD: CPT | Performed by: FAMILY MEDICINE

## 2019-05-20 PROCEDURE — G8427 DOCREV CUR MEDS BY ELIG CLIN: HCPCS | Performed by: FAMILY MEDICINE

## 2019-05-20 PROCEDURE — G8598 ASA/ANTIPLAT THER USED: HCPCS | Performed by: FAMILY MEDICINE

## 2019-05-20 PROCEDURE — 4004F PT TOBACCO SCREEN RCVD TLK: CPT | Performed by: FAMILY MEDICINE

## 2019-05-20 PROCEDURE — 4040F PNEUMOC VAC/ADMIN/RCVD: CPT | Performed by: FAMILY MEDICINE

## 2019-05-20 RX ORDER — HYDROCODONE BITARTRATE AND ACETAMINOPHEN 7.5; 325 MG/1; MG/1
1 TABLET ORAL EVERY 8 HOURS PRN
Qty: 90 TABLET | Refills: 0 | Status: SHIPPED | OUTPATIENT
Start: 2019-05-20 | End: 2019-06-20 | Stop reason: SDUPTHER

## 2019-06-17 PROBLEM — F33.0 MAJOR DEPRESSIVE DISORDER, RECURRENT, MILD (HCC): Status: ACTIVE | Noted: 2019-06-17

## 2019-06-18 ENCOUNTER — HOSPITAL ENCOUNTER (OUTPATIENT)
Dept: CT IMAGING | Age: 69
Discharge: HOME | End: 2019-06-18
Payer: MEDICARE

## 2019-06-18 ENCOUNTER — HOSPITAL ENCOUNTER (OUTPATIENT)
Age: 69
Discharge: HOME | End: 2019-06-18
Payer: MEDICARE

## 2019-06-18 DIAGNOSIS — C85.90 NON-HODGKIN'S LYMPHOMA, UNSPECIFIED BODY REGION, UNSPECIFIED NON-HODGKIN LYMPHOMA TYPE (HCC): ICD-10-CM

## 2019-06-18 DIAGNOSIS — C85.80 OTHER SPECIFIED TYPE OF NON-HODGKIN LYMPHOMA, UNSPECIFIED BODY REGION (HCC): ICD-10-CM

## 2019-06-18 LAB
ANION GAP SERPL CALCULATED.3IONS-SCNC: 12 MMOL/L (ref 7–16)
BASOPHILS ABSOLUTE: 0.04 E9/L (ref 0–0.2)
BASOPHILS RELATIVE PERCENT: 0.6 % (ref 0–2)
BUN BLDV-MCNC: 22 MG/DL (ref 8–23)
CALCIUM SERPL-MCNC: 9.4 MG/DL (ref 8.6–10.2)
CHLORIDE BLD-SCNC: 108 MMOL/L (ref 98–107)
CO2: 26 MMOL/L (ref 22–29)
CREAT SERPL-MCNC: 1 MG/DL (ref 0.7–1.2)
EOSINOPHILS ABSOLUTE: 0.21 E9/L (ref 0.05–0.5)
EOSINOPHILS RELATIVE PERCENT: 3.1 % (ref 0–6)
GFR AFRICAN AMERICAN: >60
GFR NON-AFRICAN AMERICAN: >60 ML/MIN/1.73
GLUCOSE BLD-MCNC: 106 MG/DL (ref 74–99)
HCT VFR BLD CALC: 45.4 % (ref 37–54)
HEMOGLOBIN: 14.8 G/DL (ref 12.5–16.5)
IMMATURE GRANULOCYTES #: 0.04 E9/L
IMMATURE GRANULOCYTES %: 0.6 % (ref 0–5)
LYMPHOCYTES ABSOLUTE: 1.78 E9/L (ref 1.5–4)
LYMPHOCYTES RELATIVE PERCENT: 26 % (ref 20–42)
MCH RBC QN AUTO: 29.4 PG (ref 26–35)
MCHC RBC AUTO-ENTMCNC: 32.6 % (ref 32–34.5)
MCV RBC AUTO: 90.1 FL (ref 80–99.9)
MONOCYTES ABSOLUTE: 0.6 E9/L (ref 0.1–0.95)
MONOCYTES RELATIVE PERCENT: 8.8 % (ref 2–12)
NEUTROPHILS ABSOLUTE: 4.18 E9/L (ref 1.8–7.3)
NEUTROPHILS RELATIVE PERCENT: 60.9 % (ref 43–80)
PDW BLD-RTO: 12.5 FL (ref 11.5–15)
PLATELET # BLD: 232 E9/L (ref 130–450)
PMV BLD AUTO: 11.5 FL (ref 7–12)
POTASSIUM SERPL-SCNC: 3.9 MMOL/L (ref 3.5–5)
RBC # BLD: 5.04 E12/L (ref 3.8–5.8)
SODIUM BLD-SCNC: 146 MMOL/L (ref 132–146)
WBC # BLD: 6.9 E9/L (ref 4.5–11.5)

## 2019-06-18 PROCEDURE — 80048 BASIC METABOLIC PNL TOTAL CA: CPT

## 2019-06-18 PROCEDURE — 71260 CT THORAX DX C+: CPT

## 2019-06-18 PROCEDURE — 6360000004 HC RX CONTRAST MEDICATION: Performed by: RADIOLOGY

## 2019-06-18 PROCEDURE — 74177 CT ABD & PELVIS W/CONTRAST: CPT

## 2019-06-18 PROCEDURE — 85025 COMPLETE CBC W/AUTO DIFF WBC: CPT

## 2019-06-18 PROCEDURE — 36415 COLL VENOUS BLD VENIPUNCTURE: CPT

## 2019-06-18 RX ADMIN — IOPAMIDOL 80 ML: 755 INJECTION, SOLUTION INTRAVENOUS at 16:02

## 2019-06-18 RX ADMIN — IOHEXOL 50 ML: 240 INJECTION, SOLUTION INTRATHECAL; INTRAVASCULAR; INTRAVENOUS; ORAL at 16:01

## 2019-06-20 ENCOUNTER — OFFICE VISIT (OUTPATIENT)
Dept: FAMILY MEDICINE CLINIC | Age: 69
End: 2019-06-20
Payer: MEDICARE

## 2019-06-20 VITALS
BODY MASS INDEX: 32.19 KG/M2 | HEIGHT: 69 IN | DIASTOLIC BLOOD PRESSURE: 70 MMHG | SYSTOLIC BLOOD PRESSURE: 128 MMHG | TEMPERATURE: 98.2 F | HEART RATE: 76 BPM | OXYGEN SATURATION: 97 % | RESPIRATION RATE: 14 BRPM

## 2019-06-20 DIAGNOSIS — M54.50 CHRONIC BILATERAL LOW BACK PAIN WITHOUT SCIATICA: Primary | ICD-10-CM

## 2019-06-20 DIAGNOSIS — M25.511 CHRONIC RIGHT SHOULDER PAIN: ICD-10-CM

## 2019-06-20 DIAGNOSIS — G89.29 CHRONIC RIGHT SHOULDER PAIN: ICD-10-CM

## 2019-06-20 DIAGNOSIS — G89.29 CHRONIC BILATERAL LOW BACK PAIN WITHOUT SCIATICA: Primary | ICD-10-CM

## 2019-06-20 PROCEDURE — 4040F PNEUMOC VAC/ADMIN/RCVD: CPT | Performed by: FAMILY MEDICINE

## 2019-06-20 PROCEDURE — 99213 OFFICE O/P EST LOW 20 MIN: CPT | Performed by: FAMILY MEDICINE

## 2019-06-20 PROCEDURE — G8598 ASA/ANTIPLAT THER USED: HCPCS | Performed by: FAMILY MEDICINE

## 2019-06-20 PROCEDURE — 4004F PT TOBACCO SCREEN RCVD TLK: CPT | Performed by: FAMILY MEDICINE

## 2019-06-20 PROCEDURE — 3017F COLORECTAL CA SCREEN DOC REV: CPT | Performed by: FAMILY MEDICINE

## 2019-06-20 PROCEDURE — 1123F ACP DISCUSS/DSCN MKR DOCD: CPT | Performed by: FAMILY MEDICINE

## 2019-06-20 PROCEDURE — 20610 DRAIN/INJ JOINT/BURSA W/O US: CPT | Performed by: FAMILY MEDICINE

## 2019-06-20 PROCEDURE — G8417 CALC BMI ABV UP PARAM F/U: HCPCS | Performed by: FAMILY MEDICINE

## 2019-06-20 PROCEDURE — G8427 DOCREV CUR MEDS BY ELIG CLIN: HCPCS | Performed by: FAMILY MEDICINE

## 2019-06-20 RX ORDER — METHYLPREDNISOLONE ACETATE 80 MG/ML
80 INJECTION, SUSPENSION INTRA-ARTICULAR; INTRALESIONAL; INTRAMUSCULAR; SOFT TISSUE ONCE
Status: COMPLETED | OUTPATIENT
Start: 2019-06-20 | End: 2019-06-20

## 2019-06-20 RX ORDER — HYDROCODONE BITARTRATE AND ACETAMINOPHEN 7.5; 325 MG/1; MG/1
1 TABLET ORAL EVERY 8 HOURS PRN
Qty: 90 TABLET | Refills: 0 | Status: SHIPPED | OUTPATIENT
Start: 2019-06-20 | End: 2019-07-18 | Stop reason: SDUPTHER

## 2019-06-20 RX ADMIN — METHYLPREDNISOLONE ACETATE 80 MG: 80 INJECTION, SUSPENSION INTRA-ARTICULAR; INTRALESIONAL; INTRAMUSCULAR; SOFT TISSUE at 14:41

## 2019-06-20 NOTE — PROGRESS NOTES
Chief Complaint   Patient presents with    Shoulder Pain     Med refill visit    Fatigue       HPI:  [place] here for follow-up of BACK PAIN. Patient complains of  Spasm, Tightness and worsening after sitting or standing. Pt needs refills. Scheduled Meds:  Continuous Infusions:  PRN Meds:.    Past Medical History, Surgical History, and Family History has been reviewed and updated. We did discuss pain management and exercises. Review of Systems:  Constitutional:  No fever, no fatigue, no chills, no headaches, no weight change  Dermatology:  No rash, no mole, no dry or sensitive skin  ENT:  No cough, no sore throat, no sinus pain, no runny nose, no ear pain  Cardiology:  No chest pain, no palpitations, no leg edema, no shortness of breath, no PND  Gastroenterology:  No dysphagia, no abdominal pain, no nausea, no Straight Leg Raise Test on the , no constipation, no diarrhea, no heartburn  Musculoskeletal:  Pain and Spasm Lumbar Triangle. Respiratory:  No shortness of breath, no orthopnea, no wheezing, no ESQUIVEL, no hemoptysis  Urology:  No blood in the urine, no urinary frequency, no urinary incontinence, no urinary urgency, no nocturia, no dysuria    ON EXAMINATION    Vitals:    06/20/19 0844   BP: 128/70   Pulse: 76   Resp: 14   Temp: 98.2 °F (36.8 °C)   TempSrc: Oral   SpO2: 97%   Height: 5' 9\" (1.753 m)       Spinal Examination: decreased ROM, palpable pain and straight leg raising pain,     General:  Patient alert and oriented x 3, NAD, pleasant  Neck:  Supple, no goiter, no carotid bruits, no LAD  Lungs:  CTA Bilaterally  Heart:  RRR, no murmurs, gallops or rubs  Abdomen:  Soft/nt/nd, + bowel sounds  Extremities:  No clubbing, cyanosis or edema    Procedure:  Intra-Articular Injection right shoulder: The patient was counseled on the options for treating the affected shoulder.   These include but were not limited to trail of oral anti-inflammatories, oral steroids, physical therapy referral, or ortho monitoring: possible medication side effects, risk of tolerance and/or dependence, and alternative treatments discussed, no signs of potential drug abuse or diversion identified and OARRS report reviewed today- activity consistent with treatment plan. CT's reviewed. Diagnosis:    Patient Active Problem List   Diagnosis Code    Lymphoma ([facility] [address] C85.90    Gastroenteritis K52.9    Back pain at L4-L5 level M54.5    Impotence N52.9    Chronic fatigue R53.82    Urinary frequency R35.0    Tobacco abuse Z72.0    Bronchitis J40    Gastroesophageal reflux disease with esophagitis K21.0    Depression F32.9    Coronary artery disease involving native coronary artery of native heart with unstable angina pectoris (HCC) I25.110    Chronic obstructive pulmonary disease (HCC) J44.9    Pure hypercholesterolemia E78.00    Moderate obesity E66.8    Major depressive disorder, recurrent, mild (HCC) F33.0     RX: As written and recorded.                                                      Paramjit Kiser D.O. 6/20/19

## 2019-07-18 ENCOUNTER — OFFICE VISIT (OUTPATIENT)
Dept: FAMILY MEDICINE CLINIC | Age: 69
End: 2019-07-18
Payer: MEDICARE

## 2019-07-18 ENCOUNTER — HOSPITAL ENCOUNTER (OUTPATIENT)
Age: 69
Discharge: HOME | End: 2019-07-20
Payer: MEDICARE

## 2019-07-18 VITALS
BODY MASS INDEX: 33.65 KG/M2 | WEIGHT: 222 LBS | DIASTOLIC BLOOD PRESSURE: 70 MMHG | SYSTOLIC BLOOD PRESSURE: 128 MMHG | RESPIRATION RATE: 16 BRPM | HEIGHT: 68 IN | OXYGEN SATURATION: 96 % | HEART RATE: 63 BPM | TEMPERATURE: 98.7 F

## 2019-07-18 DIAGNOSIS — R53.83 FATIGUE, UNSPECIFIED TYPE: ICD-10-CM

## 2019-07-18 DIAGNOSIS — Z23 NEED FOR PNEUMOCOCCAL VACCINE: Primary | ICD-10-CM

## 2019-07-18 DIAGNOSIS — G89.29 CHRONIC RIGHT SHOULDER PAIN: ICD-10-CM

## 2019-07-18 DIAGNOSIS — M25.511 CHRONIC RIGHT SHOULDER PAIN: ICD-10-CM

## 2019-07-18 DIAGNOSIS — G47.33 OSA (OBSTRUCTIVE SLEEP APNEA): ICD-10-CM

## 2019-07-18 LAB
T4 FREE: 1.05 NG/DL (ref 0.93–1.7)
TSH SERPL DL<=0.05 MIU/L-ACNC: 1.54 UIU/ML (ref 0.27–4.2)

## 2019-07-18 PROCEDURE — G8598 ASA/ANTIPLAT THER USED: HCPCS | Performed by: FAMILY MEDICINE

## 2019-07-18 PROCEDURE — 84443 ASSAY THYROID STIM HORMONE: CPT

## 2019-07-18 PROCEDURE — 4004F PT TOBACCO SCREEN RCVD TLK: CPT | Performed by: FAMILY MEDICINE

## 2019-07-18 PROCEDURE — 84439 ASSAY OF FREE THYROXINE: CPT

## 2019-07-18 PROCEDURE — 3017F COLORECTAL CA SCREEN DOC REV: CPT | Performed by: FAMILY MEDICINE

## 2019-07-18 PROCEDURE — G8417 CALC BMI ABV UP PARAM F/U: HCPCS | Performed by: FAMILY MEDICINE

## 2019-07-18 PROCEDURE — 1123F ACP DISCUSS/DSCN MKR DOCD: CPT | Performed by: FAMILY MEDICINE

## 2019-07-18 PROCEDURE — 99214 OFFICE O/P EST MOD 30 MIN: CPT | Performed by: FAMILY MEDICINE

## 2019-07-18 PROCEDURE — 4040F PNEUMOC VAC/ADMIN/RCVD: CPT | Performed by: FAMILY MEDICINE

## 2019-07-18 PROCEDURE — G8427 DOCREV CUR MEDS BY ELIG CLIN: HCPCS | Performed by: FAMILY MEDICINE

## 2019-07-18 RX ORDER — HYDROCODONE BITARTRATE AND ACETAMINOPHEN 7.5; 325 MG/1; MG/1
1 TABLET ORAL EVERY 8 HOURS PRN
Qty: 90 TABLET | Refills: 0 | Status: SHIPPED | OUTPATIENT
Start: 2019-07-18 | End: 2019-08-22 | Stop reason: SDUPTHER

## 2019-07-18 RX ORDER — MONTELUKAST SODIUM 10 MG/1
TABLET ORAL
Qty: 90 TABLET | Refills: 3 | Status: SHIPPED | OUTPATIENT
Start: 2019-07-18 | End: 2019-10-23 | Stop reason: SDUPTHER

## 2019-07-18 NOTE — PROGRESS NOTES
Chief Complaint   Patient presents with    Surgical Consult     Pt needs surgical clearance for an Inguinal Hernia surgery performed by Dr. Uriel Bingham, Date TBD    Back Pain     Needs refill. Rates pain 4.5/10    Health Maintenance     Pt would like to be referred for colonoscopy, [id number] ordered       HPI:  Jamie Leung here for follow-up of BACK PAIN. Patient complains of  Spasm, Tightness and worsening after sitting or standing. Pt needs refills. Scheduled Meds:  Continuous Infusions:  PRN Meds:.    Past Medical History, Surgical History, and Family History has been reviewed and updated. We did discuss pain management and exercises. Review of Systems:  Constitutional:  No fever, no fatigue, no chills, no headaches, no weight change  Dermatology:  No rash, no mole, no dry or sensitive skin  ENT:  No cough, no sore throat, no sinus pain, no runny nose, no ear pain  Cardiology:  No chest pain, no palpitations, no leg edema, no shortness of breath, no PND  Gastroenterology:  No dysphagia, no abdominal pain, no nausea, no Straight Leg Raise Test on the , no constipation, no diarrhea, no heartburn  Musculoskeletal:  Pain and Spasm Lumbar Triangle.    Respiratory:  No shortness of breath, no orthopnea, no wheezing, no ESQUIVEL, no hemoptysis  Urology:  No blood in the urine, no urinary frequency, no urinary incontinence, no urinary urgency, no nocturia, no dysuria    ON EXAMINATION    Vitals:    07/18/19 0807   BP: 128/70   Pulse: 63   Resp: 16   Temp: 98.7 °F (37.1 °C)   TempSrc: Oral   SpO2: 96%   Weight: 222 lb (100.7 kg)   Height: 5' 8\" (1.727 m)       Spinal Examination: decreased ROM, palpable pain and straight leg raising pain,     General:  Patient alert and oriented x 3, NAD, pleasant  Neck:  Supple, no goiter, no carotid bruits, no LAD  Lungs:  CTA Bilaterally  Heart:  RRR, no murmurs, gallops or rubs  Abdomen:  Soft/nt/nd, + bowel sounds  Extremities:  No clubbing, cyanosis or edema      Assessment/Plan:  Natural

## 2019-07-24 ENCOUNTER — TELEPHONE (OUTPATIENT)
Dept: SURGERY | Age: 69
End: 2019-07-24

## 2019-07-24 ENCOUNTER — TELEPHONE (OUTPATIENT)
Dept: FAMILY MEDICINE CLINIC | Age: 69
End: 2019-07-24

## 2019-07-29 ENCOUNTER — OFFICE VISIT (OUTPATIENT)
Dept: SURGERY | Age: 69
End: 2019-07-29
Payer: MEDICARE

## 2019-07-29 VITALS
HEIGHT: 68 IN | SYSTOLIC BLOOD PRESSURE: 117 MMHG | HEART RATE: 76 BPM | BODY MASS INDEX: 33.65 KG/M2 | RESPIRATION RATE: 18 BRPM | TEMPERATURE: 98.3 F | DIASTOLIC BLOOD PRESSURE: 65 MMHG | WEIGHT: 222 LBS | OXYGEN SATURATION: 96 %

## 2019-07-29 DIAGNOSIS — K40.90 LEFT INGUINAL HERNIA: Primary | ICD-10-CM

## 2019-07-29 PROCEDURE — 4040F PNEUMOC VAC/ADMIN/RCVD: CPT | Performed by: SURGERY

## 2019-07-29 PROCEDURE — G8417 CALC BMI ABV UP PARAM F/U: HCPCS | Performed by: SURGERY

## 2019-07-29 PROCEDURE — 4004F PT TOBACCO SCREEN RCVD TLK: CPT | Performed by: SURGERY

## 2019-07-29 PROCEDURE — G8598 ASA/ANTIPLAT THER USED: HCPCS | Performed by: SURGERY

## 2019-07-29 PROCEDURE — 99214 OFFICE O/P EST MOD 30 MIN: CPT | Performed by: SURGERY

## 2019-07-29 PROCEDURE — 3017F COLORECTAL CA SCREEN DOC REV: CPT | Performed by: SURGERY

## 2019-07-29 PROCEDURE — 1123F ACP DISCUSS/DSCN MKR DOCD: CPT | Performed by: SURGERY

## 2019-07-29 PROCEDURE — G8427 DOCREV CUR MEDS BY ELIG CLIN: HCPCS | Performed by: SURGERY

## 2019-07-29 NOTE — PROGRESS NOTES
General Surgery History and Physical  [facility]    Patient's Name/Date of Birth: Hattie Dickinson / 1950    Date: July 29, 2019     Surgeon: Christie Crawford M.D.    PCP: Aj Monge DO     Chief Complaint: left Inguinal bulge    HPI:   Hattie Dickinson is a 76 y.o. male who presents for evaluation of left inguinal hernia. Timing is constant, radiation to left groin, alleviated by none and started last July and severity is 8/10. States pain has been worsening, no symptoms of bowel obstruction, nausea, vomiting, fever, chills, abdominal pain. States it protrudes with activity, it is reducible. He is a smoker. Had recent stenting 6 months ago at [facility].      Patient Active Problem List   Diagnosis    Lymphoma ([address]    Gastroenteritis    Back pain at L4-L5 level    Impotence    Chronic fatigue    Urinary frequency    Tobacco abuse    Bronchitis    Gastroesophageal reflux disease with esophagitis    Depression    Coronary artery disease involving native coronary artery of native heart with unstable angina pectoris (HCC)    Chronic obstructive pulmonary disease ([address]    Pure hypercholesterolemia    Moderate obesity    Major depressive disorder, recurrent, mild (HCC)       Past Medical History:   Diagnosis Date    Anesthesia complication     wakes up  violant   only ok if reversed with romazacon    Arthritis     shoulders,ankle    CAD (coronary artery disease)     Erectile dysfunction     GERD (gastroesophageal reflux disease)     H/O coronary angioplasty with stents[V45.82] 9/2018 another stent    Hyperlipidemia     Lymphoma ([address] 11/4/2011    Sleep apnea     wont wear machine    Unspecified cerebral artery occlusion with cerebral infarction        Past Surgical History:   Procedure Laterality Date    ANKLE FRACTURE SURGERY Right march 2014    ORIF right ankle    ANKLE SURGERY  2007    rt ankle    BRONCHOSCOPY  sept 2011    bronch / medistinoscopy   

## 2019-08-01 DIAGNOSIS — R19.5 POSITIVE COLORECTAL CANCER SCREENING USING COLOGUARD TEST: Primary | ICD-10-CM

## 2019-08-15 ENCOUNTER — ANESTHESIA EVENT (OUTPATIENT)
Dept: OPERATING ROOM | Age: 69
End: 2019-08-15
Payer: MEDICARE

## 2019-08-15 ENCOUNTER — HOSPITAL ENCOUNTER (OUTPATIENT)
Age: 69
Setting detail: OUTPATIENT SURGERY
Discharge: HOME | End: 2019-08-15
Attending: SURGERY | Admitting: SURGERY
Payer: MEDICARE

## 2019-08-15 ENCOUNTER — ANESTHESIA (OUTPATIENT)
Dept: OPERATING ROOM | Age: 69
End: 2019-08-15
Payer: MEDICARE

## 2019-08-15 VITALS
TEMPERATURE: 96.6 F | OXYGEN SATURATION: 99 % | DIASTOLIC BLOOD PRESSURE: 72 MMHG | SYSTOLIC BLOOD PRESSURE: 122 MMHG | RESPIRATION RATE: 8 BRPM

## 2019-08-15 VITALS
SYSTOLIC BLOOD PRESSURE: 140 MMHG | HEART RATE: 72 BPM | OXYGEN SATURATION: 91 % | BODY MASS INDEX: 32.13 KG/M2 | DIASTOLIC BLOOD PRESSURE: 70 MMHG | TEMPERATURE: 97.8 F | HEIGHT: 68 IN | RESPIRATION RATE: 20 BRPM | WEIGHT: 212 LBS

## 2019-08-15 DIAGNOSIS — M25.511 CHRONIC RIGHT SHOULDER PAIN: ICD-10-CM

## 2019-08-15 DIAGNOSIS — G89.29 CHRONIC RIGHT SHOULDER PAIN: ICD-10-CM

## 2019-08-15 LAB
ANION GAP SERPL CALCULATED.3IONS-SCNC: 10 MMOL/L (ref 7–16)
BUN BLDV-MCNC: 21 MG/DL (ref 8–23)
CALCIUM SERPL-MCNC: 9.2 MG/DL (ref 8.6–10.2)
CHLORIDE BLD-SCNC: 103 MMOL/L (ref 98–107)
CO2: 27 MMOL/L (ref 22–29)
CREAT SERPL-MCNC: 1 MG/DL (ref 0.7–1.2)
GFR AFRICAN AMERICAN: >60
GFR NON-AFRICAN AMERICAN: >60 ML/MIN/1.73
GLUCOSE BLD-MCNC: 112 MG/DL (ref 74–99)
HCT VFR BLD CALC: 47.4 % (ref 37–54)
HEMOGLOBIN: 15.4 G/DL (ref 12.5–16.5)
MCH RBC QN AUTO: 29 PG (ref 26–35)
MCHC RBC AUTO-ENTMCNC: 32.5 % (ref 32–34.5)
MCV RBC AUTO: 89.3 FL (ref 80–99.9)
PDW BLD-RTO: 13.2 FL (ref 11.5–15)
PLATELET # BLD: 206 E9/L (ref 130–450)
PMV BLD AUTO: 11.4 FL (ref 7–12)
POTASSIUM REFLEX MAGNESIUM: 4.1 MMOL/L (ref 3.5–5)
RBC # BLD: 5.31 E12/L (ref 3.8–5.8)
SODIUM BLD-SCNC: 140 MMOL/L (ref 132–146)
WBC # BLD: 7 E9/L (ref 4.5–11.5)

## 2019-08-15 PROCEDURE — 2709999900 HC NON-CHARGEABLE SUPPLY: Performed by: SURGERY

## 2019-08-15 PROCEDURE — 36415 COLL VENOUS BLD VENIPUNCTURE: CPT

## 2019-08-15 PROCEDURE — 6360000002 HC RX W HCPCS: Performed by: ANESTHESIOLOGY

## 2019-08-15 PROCEDURE — 7100000000 HC PACU RECOVERY - FIRST 15 MIN: Performed by: SURGERY

## 2019-08-15 PROCEDURE — 3600000019 HC SURGERY ROBOT ADDTL 15MIN: Performed by: SURGERY

## 2019-08-15 PROCEDURE — 85027 COMPLETE CBC AUTOMATED: CPT

## 2019-08-15 PROCEDURE — 6360000002 HC RX W HCPCS: Performed by: NURSE ANESTHETIST, CERTIFIED REGISTERED

## 2019-08-15 PROCEDURE — 7100000010 HC PHASE II RECOVERY - FIRST 15 MIN: Performed by: SURGERY

## 2019-08-15 PROCEDURE — 49650 LAP ING HERNIA REPAIR INIT: CPT | Performed by: SURGERY

## 2019-08-15 PROCEDURE — 3600000009 HC SURGERY ROBOT BASE: Performed by: SURGERY

## 2019-08-15 PROCEDURE — 2500000003 HC RX 250 WO HCPCS: Performed by: SURGERY

## 2019-08-15 PROCEDURE — 2580000003 HC RX 258: Performed by: SURGERY

## 2019-08-15 PROCEDURE — 7100000011 HC PHASE II RECOVERY - ADDTL 15 MIN: Performed by: SURGERY

## 2019-08-15 PROCEDURE — 3700000000 HC ANESTHESIA ATTENDED CARE: Performed by: SURGERY

## 2019-08-15 PROCEDURE — 3700000001 HC ADD 15 MINUTES (ANESTHESIA): Performed by: SURGERY

## 2019-08-15 PROCEDURE — 80048 BASIC METABOLIC PNL TOTAL CA: CPT

## 2019-08-15 PROCEDURE — 7100000001 HC PACU RECOVERY - ADDTL 15 MIN: Performed by: SURGERY

## 2019-08-15 PROCEDURE — S2900 ROBOTIC SURGICAL SYSTEM: HCPCS | Performed by: SURGERY

## 2019-08-15 PROCEDURE — 2500000003 HC RX 250 WO HCPCS: Performed by: NURSE ANESTHETIST, CERTIFIED REGISTERED

## 2019-08-15 PROCEDURE — 6360000002 HC RX W HCPCS: Performed by: SURGERY

## 2019-08-15 PROCEDURE — C1781 MESH (IMPLANTABLE): HCPCS | Performed by: SURGERY

## 2019-08-15 DEVICE — MESH HERN W10XL15CM POLY POLYLACTIC ACID 70% CLLGN 30% GLYC: Type: IMPLANTABLE DEVICE | Site: INGUINAL | Status: FUNCTIONAL

## 2019-08-15 RX ORDER — SUCCINYLCHOLINE/SOD CL,ISO/PF 200MG/10ML
SYRINGE (ML) INTRAVENOUS PRN
Status: DISCONTINUED | OUTPATIENT
Start: 2019-08-15 | End: 2019-08-15 | Stop reason: SDUPTHER

## 2019-08-15 RX ORDER — DEXAMETHASONE SODIUM PHOSPHATE 10 MG/ML
INJECTION, SOLUTION INTRAMUSCULAR; INTRAVENOUS PRN
Status: DISCONTINUED | OUTPATIENT
Start: 2019-08-15 | End: 2019-08-15 | Stop reason: SDUPTHER

## 2019-08-15 RX ORDER — SODIUM CHLORIDE 9 MG/ML
INJECTION, SOLUTION INTRAVENOUS CONTINUOUS
Status: DISCONTINUED | OUTPATIENT
Start: 2019-08-15 | End: 2019-08-15 | Stop reason: HOSPADM

## 2019-08-15 RX ORDER — MEPERIDINE HYDROCHLORIDE 25 MG/ML
12.5 INJECTION INTRAMUSCULAR; INTRAVENOUS; SUBCUTANEOUS EVERY 5 MIN PRN
Status: DISCONTINUED | OUTPATIENT
Start: 2019-08-15 | End: 2019-08-15 | Stop reason: HOSPADM

## 2019-08-15 RX ORDER — ONDANSETRON 2 MG/ML
INJECTION INTRAMUSCULAR; INTRAVENOUS PRN
Status: DISCONTINUED | OUTPATIENT
Start: 2019-08-15 | End: 2019-08-15 | Stop reason: SDUPTHER

## 2019-08-15 RX ORDER — LIDOCAINE HYDROCHLORIDE 20 MG/ML
INJECTION, SOLUTION INTRAVENOUS PRN
Status: DISCONTINUED | OUTPATIENT
Start: 2019-08-15 | End: 2019-08-15 | Stop reason: SDUPTHER

## 2019-08-15 RX ORDER — BUPIVACAINE HYDROCHLORIDE AND EPINEPHRINE 2.5; 5 MG/ML; UG/ML
INJECTION, SOLUTION EPIDURAL; INFILTRATION; INTRACAUDAL; PERINEURAL PRN
Status: DISCONTINUED | OUTPATIENT
Start: 2019-08-15 | End: 2019-08-15 | Stop reason: ALTCHOICE

## 2019-08-15 RX ORDER — FENTANYL CITRATE 50 UG/ML
INJECTION, SOLUTION INTRAMUSCULAR; INTRAVENOUS PRN
Status: DISCONTINUED | OUTPATIENT
Start: 2019-08-15 | End: 2019-08-15 | Stop reason: SDUPTHER

## 2019-08-15 RX ORDER — FENTANYL CITRATE 50 UG/ML
INJECTION, SOLUTION INTRAMUSCULAR; INTRAVENOUS
Status: DISCONTINUED
Start: 2019-08-15 | End: 2019-08-15 | Stop reason: HOSPADM

## 2019-08-15 RX ORDER — NEOSTIGMINE METHYLSULFATE 1 MG/ML
INJECTION, SOLUTION INTRAVENOUS PRN
Status: DISCONTINUED | OUTPATIENT
Start: 2019-08-15 | End: 2019-08-15 | Stop reason: SDUPTHER

## 2019-08-15 RX ORDER — SODIUM CHLORIDE 0.9 % (FLUSH) 0.9 %
10 SYRINGE (ML) INJECTION PRN
Status: DISCONTINUED | OUTPATIENT
Start: 2019-08-15 | End: 2019-08-15 | Stop reason: HOSPADM

## 2019-08-15 RX ORDER — FENTANYL CITRATE 50 UG/ML
50 INJECTION, SOLUTION INTRAMUSCULAR; INTRAVENOUS EVERY 5 MIN PRN
Status: DISCONTINUED | OUTPATIENT
Start: 2019-08-15 | End: 2019-08-15 | Stop reason: HOSPADM

## 2019-08-15 RX ORDER — SODIUM CHLORIDE 0.9 % (FLUSH) 0.9 %
10 SYRINGE (ML) INJECTION EVERY 12 HOURS SCHEDULED
Status: DISCONTINUED | OUTPATIENT
Start: 2019-08-15 | End: 2019-08-15 | Stop reason: HOSPADM

## 2019-08-15 RX ORDER — DOCUSATE SODIUM 100 MG/1
100 CAPSULE, LIQUID FILLED ORAL 2 TIMES DAILY
Qty: 30 CAPSULE | Refills: 0 | Status: SHIPPED | OUTPATIENT
Start: 2019-08-15 | End: 2019-08-30

## 2019-08-15 RX ORDER — PROPOFOL 10 MG/ML
INJECTION, EMULSION INTRAVENOUS PRN
Status: DISCONTINUED | OUTPATIENT
Start: 2019-08-15 | End: 2019-08-15 | Stop reason: SDUPTHER

## 2019-08-15 RX ORDER — GLYCOPYRROLATE 1 MG/5 ML
SYRINGE (ML) INTRAVENOUS PRN
Status: DISCONTINUED | OUTPATIENT
Start: 2019-08-15 | End: 2019-08-15 | Stop reason: SDUPTHER

## 2019-08-15 RX ORDER — IBUPROFEN 800 MG/1
800 TABLET ORAL EVERY 6 HOURS PRN
Qty: 90 TABLET | Refills: 1 | Status: SHIPPED | OUTPATIENT
Start: 2019-08-15 | End: 2019-10-23 | Stop reason: SDUPTHER

## 2019-08-15 RX ORDER — MIDAZOLAM HYDROCHLORIDE 1 MG/ML
INJECTION INTRAMUSCULAR; INTRAVENOUS
Status: DISCONTINUED
Start: 2019-08-15 | End: 2019-08-15 | Stop reason: HOSPADM

## 2019-08-15 RX ORDER — FENTANYL CITRATE 50 UG/ML
25 INJECTION, SOLUTION INTRAMUSCULAR; INTRAVENOUS EVERY 5 MIN PRN
Status: DISCONTINUED | OUTPATIENT
Start: 2019-08-15 | End: 2019-08-15 | Stop reason: HOSPADM

## 2019-08-15 RX ORDER — ONDANSETRON 2 MG/ML
4 INJECTION INTRAMUSCULAR; INTRAVENOUS
Status: DISCONTINUED | OUTPATIENT
Start: 2019-08-15 | End: 2019-08-15 | Stop reason: HOSPADM

## 2019-08-15 RX ORDER — HYDROMORPHONE HYDROCHLORIDE 1 MG/ML
0.25 INJECTION, SOLUTION INTRAMUSCULAR; INTRAVENOUS; SUBCUTANEOUS EVERY 5 MIN PRN
Status: DISCONTINUED | OUTPATIENT
Start: 2019-08-15 | End: 2019-08-15 | Stop reason: HOSPADM

## 2019-08-15 RX ORDER — ROCURONIUM BROMIDE 10 MG/ML
INJECTION, SOLUTION INTRAVENOUS PRN
Status: DISCONTINUED | OUTPATIENT
Start: 2019-08-15 | End: 2019-08-15 | Stop reason: SDUPTHER

## 2019-08-15 RX ADMIN — FENTANYL CITRATE 100 MCG: 50 INJECTION, SOLUTION INTRAMUSCULAR; INTRAVENOUS at 08:52

## 2019-08-15 RX ADMIN — Medication 3 MG: at 08:46

## 2019-08-15 RX ADMIN — ONDANSETRON HYDROCHLORIDE 4 MG: 2 INJECTION, SOLUTION INTRAMUSCULAR; INTRAVENOUS at 08:06

## 2019-08-15 RX ADMIN — FENTANYL CITRATE 50 MCG: 50 INJECTION INTRAMUSCULAR; INTRAVENOUS at 10:05

## 2019-08-15 RX ADMIN — Medication 0.6 MG: at 08:46

## 2019-08-15 RX ADMIN — Medication 10 MG: at 08:01

## 2019-08-15 RX ADMIN — Medication 30 MG: at 08:06

## 2019-08-15 RX ADMIN — Medication 2 G: at 07:58

## 2019-08-15 RX ADMIN — SODIUM CHLORIDE: 9 INJECTION, SOLUTION INTRAVENOUS at 07:09

## 2019-08-15 RX ADMIN — LIDOCAINE HYDROCHLORIDE 80 MG: 20 INJECTION, SOLUTION INTRAVENOUS at 08:01

## 2019-08-15 RX ADMIN — DEXAMETHASONE SODIUM PHOSPHATE 10 MG: 10 INJECTION, SOLUTION INTRAMUSCULAR; INTRAVENOUS at 08:06

## 2019-08-15 RX ADMIN — PROPOFOL 200 MG: 10 INJECTION, EMULSION INTRAVENOUS at 08:01

## 2019-08-15 RX ADMIN — Medication 180 MG: at 08:01

## 2019-08-15 RX ADMIN — FENTANYL CITRATE 100 MCG: 50 INJECTION, SOLUTION INTRAMUSCULAR; INTRAVENOUS at 08:01

## 2019-08-15 RX ADMIN — FENTANYL CITRATE 50 MCG: 50 INJECTION INTRAMUSCULAR; INTRAVENOUS at 10:00

## 2019-08-15 RX ADMIN — FENTANYL CITRATE 50 MCG: 50 INJECTION, SOLUTION INTRAMUSCULAR; INTRAVENOUS at 08:13

## 2019-08-15 ASSESSMENT — PULMONARY FUNCTION TESTS
PIF_VALUE: 36
PIF_VALUE: 39
PIF_VALUE: 7
PIF_VALUE: 39
PIF_VALUE: 23
PIF_VALUE: 24
PIF_VALUE: 1
PIF_VALUE: 0
PIF_VALUE: 39
PIF_VALUE: 35
PIF_VALUE: 21
PIF_VALUE: 25
PIF_VALUE: 0
PIF_VALUE: 35
PIF_VALUE: 23
PIF_VALUE: 39
PIF_VALUE: 21
PIF_VALUE: 39
PIF_VALUE: 8
PIF_VALUE: 21
PIF_VALUE: 1
PIF_VALUE: 23
PIF_VALUE: 24
PIF_VALUE: 39
PIF_VALUE: 18
PIF_VALUE: 39
PIF_VALUE: 28
PIF_VALUE: 6
PIF_VALUE: 6
PIF_VALUE: 39
PIF_VALUE: 35
PIF_VALUE: 0
PIF_VALUE: 21
PIF_VALUE: 35
PIF_VALUE: 39
PIF_VALUE: 22
PIF_VALUE: 39
PIF_VALUE: 39
PIF_VALUE: 1
PIF_VALUE: 24
PIF_VALUE: 23
PIF_VALUE: 40
PIF_VALUE: 39
PIF_VALUE: 30
PIF_VALUE: 21
PIF_VALUE: 3
PIF_VALUE: 22
PIF_VALUE: 39
PIF_VALUE: 38
PIF_VALUE: 26
PIF_VALUE: 22
PIF_VALUE: 28
PIF_VALUE: 39
PIF_VALUE: 27
PIF_VALUE: 0
PIF_VALUE: 30

## 2019-08-15 ASSESSMENT — PAIN SCALES - GENERAL
PAINLEVEL_OUTOF10: 5
PAINLEVEL_OUTOF10: 7
PAINLEVEL_OUTOF10: 0
PAINLEVEL_OUTOF10: 7
PAINLEVEL_OUTOF10: 0

## 2019-08-15 ASSESSMENT — PAIN DESCRIPTION - ORIENTATION: ORIENTATION: MID

## 2019-08-15 ASSESSMENT — PAIN DESCRIPTION - FREQUENCY: FREQUENCY: INTERMITTENT

## 2019-08-15 ASSESSMENT — PAIN - FUNCTIONAL ASSESSMENT: PAIN_FUNCTIONAL_ASSESSMENT: 0-10

## 2019-08-15 ASSESSMENT — PAIN DESCRIPTION - LOCATION: LOCATION: ABDOMEN

## 2019-08-15 ASSESSMENT — PAIN DESCRIPTION - DESCRIPTORS: DESCRIPTORS: ACHING

## 2019-08-15 ASSESSMENT — PAIN DESCRIPTION - PAIN TYPE
TYPE: SURGICAL PAIN
TYPE: SURGICAL PAIN

## 2019-08-15 NOTE — ANESTHESIA PRE PROCEDURE
tablet Take 1 tablet by mouth 2 times daily  Patient taking differently: Take 150 mg by mouth daily  4/17/17  Yes JULIAN Finley CNP   meclizine (ANTIVERT) 25 MG tablet TAKE ONE TABLET BY MOUTH THREE TIMES DAILY AS NEEDED FOR DIZZINESS 1/18/17  Yes Daren Joel DO   PROAIR  (90 BASE) MCG/ACT inhaler INHALE TWO PUFFS BY MOUTH EVERY 6 HOURS AS DIRECTED 11/4/16  Yes Daren Joel DO   aspirin 81 MG chewable tablet Take 81 mg by mouth daily. Last dose 1 week ago   Yes Historical Provider, MD       Current medications:    Current Facility-Administered Medications   Medication Dose Route Frequency Provider Last Rate Last Dose    0.9 % sodium chloride infusion   Intravenous Continuous Keri Mayer MD        sodium chloride flush 0.9 % injection 10 mL  10 mL Intravenous 2 times per day Keri Mayer MD        sodium chloride flush 0.9 % injection 10 mL  10 mL Intravenous PRN Keri Mayer MD        ceFAZolin (ANCEF) 2 g in dextrose 5 % 100 mL IVPB  2 g Intravenous On Call to Leonard Lees MD           Allergies: Allergies   Allergen Reactions    Midazolam Hcl      Wake up wild. ...  Must be reversed with romazicon or will wake up wild and combative       Problem List:    Patient Active Problem List   Diagnosis Code    Lymphoma ([facility] C85.90    Gastroenteritis K52.9    Back pain at L4-L5 level M54.5    Impotence N52.9    Chronic fatigue R53.82    Urinary frequency R35.0    Tobacco abuse Z72.0    Bronchitis J40    Gastroesophageal reflux disease with esophagitis K21.0    Depression F32.9    Coronary artery disease involving native coronary artery of native heart with unstable angina pectoris (HCC) I25.110    Chronic obstructive pulmonary disease (HCC) J44.9    Pure hypercholesterolemia E78.00    Moderate obesity E66.8    Major depressive disorder, recurrent, mild (HCC) F33.0       Past Medical History:        Diagnosis Date    Anesthesia complication wakes up  violant   only ok if reversed with romazacon    Arthritis     shoulders,ankle    CAD (coronary artery disease)     Erectile dysfunction     GERD (gastroesophageal reflux disease)     H/O coronary angioplasty with stents[V45.82] 9/2018 another stent    Hyperlipidemia     Lymphoma ([address] 11/4/2011    Sleep apnea     wont wear machine    Unspecified cerebral artery occlusion with cerebral infarction     no deficits       Past Surgical History:        Procedure Laterality Date    ANKLE FRACTURE SURGERY Right march 2014    ORIF right ankle    ANKLE SURGERY  2007    rt ankle    BRONCHOSCOPY  sept 2011    bronch / medistinoscopy    CARDIAC SURGERY      stent    CAROTID ENDARTERECTOMY Left     12 yrs ago    CHOLECYSTECTOMY      COLONOSCOPY  7/30/13    DR MULLINS    CORONARY ANGIOPLASTY WITH STENT PLACEMENT      2008    ENDOSCOPY, COLON, DIAGNOSTIC      HAND SURGERY Left     fell on a buzzsaw    OTHER SURGICAL HISTORY  6/29/2015    lapraoscopic cholecystectomy    SHOULDER ARTHROSCOPY Right 10-8-15    rotator cuff repair, subachromoplasty with labrial debridement    TESTICLE REMOVAL      TONSILLECTOMY      UPPER GASTROINTESTINAL ENDOSCOPY         Social History:    Social History     Tobacco Use    Smoking status: Current Some Day Smoker     Packs/day: 0.50     Years: 52.00     Pack years: 26.00     Types: Cigarettes    Smokeless tobacco: Never Used   Substance Use Topics    Alcohol use: Yes     Alcohol/week: 12.0 standard drinks     Types: 12 Cans of beer per week     Comment: 2-3 beers per week                                Ready to quit: Not Answered  Counseling given: Not Answered      Vital Signs (Current): There were no vitals filed for this visit.                                            BP Readings from Last 3 Encounters:   07/29/19 117/65   07/18/19 128/70   06/20/19 128/70       NPO Status:                                                                                 BMI:   Wt Readings from Last 3 Encounters:   07/29/19 222 lb (100.7 kg)   07/18/19 222 lb (100.7 kg)   05/20/19 218 lb (98.9 kg)     There is no height or weight on file to calculate BMI.    CBC:   Lab Results   Component Value Date    WBC 6.9 06/18/2019    RBC 5.04 06/18/2019    HGB 14.8 06/18/2019    HCT 45.4 06/18/2019    MCV 90.1 06/18/2019    RDW 12.5 06/18/2019     06/18/2019       CMP:   Lab Results   Component Value Date     06/18/2019    K 3.9 06/18/2019     06/18/2019    CO2 26 06/18/2019    BUN 22 06/18/2019    CREATININE 1.0 06/18/2019    GFRAA >60 06/18/2019    LABGLOM >60 06/18/2019    GLUCOSE 106 06/18/2019    GLUCOSE 152 12/01/2011    PROT 6.9 01/12/2018    CALCIUM 9.4 06/18/2019    BILITOT 0.4 01/12/2018    ALKPHOS 52 01/12/2018    AST 18 01/12/2018    ALT 25 01/12/2018       POC Tests: No results for input(s): POCGLU, POCNA, POCK, POCCL, POCBUN, POCHEMO, POCHCT in the last 72 hours. Coags:   Lab Results   Component Value Date    PROTIME 12.2 10/07/2015    PROTIME 13.0 11/03/2011    INR 1.0 10/07/2015    APTT 30.9 10/07/2015       HCG (If Applicable): No results found for: PREGTESTUR, PREGSERUM, HCG, HCGQUANT     ABGs: No results found for: PHART, PO2ART, AAJ4ORG, GKT7MIG, BEART, F2OIXDGB     Type & Screen (If Applicable):  No results found for: [facility]    Anesthesia Evaluation  Patient summary reviewed history of anesthetic complications (Wake up violent.):   Airway:         Dental:          Pulmonary:   (+) COPD:  sleep apnea:                             Cardiovascular:    (+) angina:, CAD:, CABG/stent (2 stents.):, hyperlipidemia                  Neuro/Psych:   (+) CVA:, psychiatric history:            GI/Hepatic/Renal:   (+) GERD:,          ROS comment: Erectile Dysfunction. .   Endo/Other: Negative Endo/Other ROS                     ROS comment: Lymphoma. Abdominal:           Vascular: negative vascular ROS.                                        Anesthesia Plan      general

## 2019-08-15 NOTE — PROGRESS NOTES
1220 O2 removed and pulse ox running 92% on room air. Will continue to assess. Pt states' ready to get out of here\". denie any sob or difficulty with breathing. Will continue to assess.  faby rn

## 2019-08-15 NOTE — OP NOTE
Operative Note  Lennox Beck, MD, MS Bernard Gutiérrez  MRN: [id number]  DATE OF PROCEDURE: 8/15/2019    PREOPERATIVE DIAGNOSIS: Symptomatic left inguinal hernia.     POSTOPERATIVE DIAGNOSIS: Symptomatic bilateral inguinal hernias.     PROCEDURE: Laparoscopic robotic assisted bilateral inguinal hernia repair with mesh.      ANESTHESIA: General endotracheal.     SURGEON: Jesús Bragg MD     ASSISTANT: None.     COMPLICATIONS: None.     ESTIMATED BLOOD LOSS: Minimal.     FLUIDS: Crystalloid.     SPECIMENS: None.      MESH: ProGrip 15 cm x 10 cm mesh bilateral.     INDICATIONS: Bernard Gutiérrez is a 76 y.o. male with symptomatic inguinal hernia. On physical exam, he had a palpable bilateral inguinal hernia. After being explained the risks, benefits, and alternatives of procedure, including but not limited to bleeding, scar, infection, recurrence he agreed to proceed.     DESCRIPTION OF PROCEDURE: He was taken to the operating room, placed supine and administered general anesthesia and intubated. Once the airway was secured and he was adequately sedated, he was prepped and draped in the normal sterile fashion. Timeout was performed to confirm surgical site and the patient's name. He received preoperative antibiotics in the form of IV. We initially made an 8-mm incision superior to the umbilicus, inserted a Veress needle, confirmed the needle placement and insufflated to 15 mmHg. We then removed the Veress needle, inserted an 8-mm trocar, inserted the camera and, following, inspected the abdomen. Upon entrance into the abdomen, we identified large indirect left and smaller indirect right inguinal hernias. The patient was placed in steep Trendelenburg position and two more 8 mm trocars, 1 was inserted in the right lower quadrant and 1 in the left lower quadrant. The robot was then docked over the left side of the patient.  We used electrocautery and scissors to dissect the preperitoneal space creating bilateral peritoneal inguinal flaps. We started on the left side and dissected out the cord structures and completely dissected out the hernia sac reducing it from the inguinal canal. There was a large hernia sac and cord lipoma, which was completely reduced from the inguinal canal. We completely exposed the pubic tubercle and Jesús's ligament. In a similar fashion on the opposite side, we created a preperitoneal space and identified the hernia sac. We completely reduced the hernia sac off of the cord structures. We surrounded the cord structures and skeletonized them. We then inserted a 15 cm x 10 cm piece of ProGrip mesh, placed in the inguinal canal and unrolled it. It self adhered to the inguinal canal overlapping Jesús's ligament with at least 2cm overlap. In a similar fashion, the same size ProGrip mesh was inserted in the opposite inguinal canal. It was completely unfolded and it secured itself in place. We then closed the preperitoneal space with running 6 inch V-Loc sutures. We then removed both of the needles from the V-Loc sutures, decompressed the abdomen and removed each one of our trocars. We performed inguinal block with 0.25% Marcaine 10 mL. We then reapproximated each one of the skin incisions using 4-0 Monocryl suture. SurgiGlue was placed over the top. The patient was awoken, extubated and transferred to the postoperative care unit in stable condition. All instrument counts, lap counts, and needle counts were correct at the completion of the procedure.     Thien Smith MD, MS  Minimally Invasive and Bariatric Surgery  [phone] (p)  8/15/2019  9:00 AM

## 2019-08-22 ENCOUNTER — OFFICE VISIT (OUTPATIENT)
Dept: FAMILY MEDICINE CLINIC | Age: 69
End: 2019-08-22
Payer: MEDICARE

## 2019-08-22 VITALS
TEMPERATURE: 98.3 F | DIASTOLIC BLOOD PRESSURE: 80 MMHG | SYSTOLIC BLOOD PRESSURE: 128 MMHG | HEIGHT: 68 IN | OXYGEN SATURATION: 97 % | WEIGHT: 219.2 LBS | RESPIRATION RATE: 16 BRPM | BODY MASS INDEX: 33.22 KG/M2 | HEART RATE: 57 BPM

## 2019-08-22 DIAGNOSIS — G89.29 CHRONIC RIGHT SHOULDER PAIN: ICD-10-CM

## 2019-08-22 DIAGNOSIS — M25.511 CHRONIC RIGHT SHOULDER PAIN: ICD-10-CM

## 2019-08-22 PROCEDURE — G8427 DOCREV CUR MEDS BY ELIG CLIN: HCPCS | Performed by: FAMILY MEDICINE

## 2019-08-22 PROCEDURE — 1123F ACP DISCUSS/DSCN MKR DOCD: CPT | Performed by: FAMILY MEDICINE

## 2019-08-22 PROCEDURE — 99213 OFFICE O/P EST LOW 20 MIN: CPT | Performed by: FAMILY MEDICINE

## 2019-08-22 PROCEDURE — G8417 CALC BMI ABV UP PARAM F/U: HCPCS | Performed by: FAMILY MEDICINE

## 2019-08-22 PROCEDURE — 4040F PNEUMOC VAC/ADMIN/RCVD: CPT | Performed by: FAMILY MEDICINE

## 2019-08-22 PROCEDURE — 4004F PT TOBACCO SCREEN RCVD TLK: CPT | Performed by: FAMILY MEDICINE

## 2019-08-22 PROCEDURE — G8598 ASA/ANTIPLAT THER USED: HCPCS | Performed by: FAMILY MEDICINE

## 2019-08-22 PROCEDURE — 3017F COLORECTAL CA SCREEN DOC REV: CPT | Performed by: FAMILY MEDICINE

## 2019-08-22 RX ORDER — HYDROCODONE BITARTRATE AND ACETAMINOPHEN 7.5; 325 MG/1; MG/1
1 TABLET ORAL EVERY 8 HOURS PRN
Qty: 90 TABLET | Refills: 0 | Status: SHIPPED | OUTPATIENT
Start: 2019-08-22 | End: 2019-09-23 | Stop reason: SDUPTHER

## 2019-08-22 NOTE — PROGRESS NOTES
Take 1 tablet by mouth every 8 hours as needed for Pain for up to 30 days. Dispense: 90 tablet; Refill: 0      Roverto Torres was seen today for back pain. Diagnoses and all orders for this visit:    Chronic right shoulder pain  -     HYDROcodone-acetaminophen (NORCO) 7.5-325 MG per tablet; Take 1 tablet by mouth every 8 hours as needed for Pain for up to 30 days. Controlled substances monitoring: possible medication side effects, risk of tolerance and/or dependence, and alternative treatments discussed, no signs of potential drug abuse or diversion identified and OARRS report reviewed today- activity consistent with treatment plan. Diagnosis:    Patient Active Problem List   Diagnosis Code    Lymphoma ([facility] [address] C85.90    Gastroenteritis K52.9    Back pain at L4-L5 level M54.5    Impotence N52.9    Chronic fatigue R53.82    Urinary frequency R35.0    Tobacco abuse Z72.0    Bronchitis J40    Gastroesophageal reflux disease with esophagitis K21.0    Depression F32.9    Coronary artery disease involving native coronary artery of native heart with unstable angina pectoris (HCC) I25.110    Chronic obstructive pulmonary disease (HCC) J44.9    Pure hypercholesterolemia E78.00    Moderate obesity E66.8    Major depressive disorder, recurrent, mild (HCC) F33.0     RX: As written and recorded.                                                      Aide Henderson D.O. 8/22/19

## 2019-08-28 ENCOUNTER — OFFICE VISIT (OUTPATIENT)
Dept: SURGERY | Age: 69
End: 2019-08-28

## 2019-08-28 VITALS
HEART RATE: 76 BPM | TEMPERATURE: 98.2 F | OXYGEN SATURATION: 97 % | SYSTOLIC BLOOD PRESSURE: 121 MMHG | WEIGHT: 219 LBS | RESPIRATION RATE: 18 BRPM | DIASTOLIC BLOOD PRESSURE: 73 MMHG | HEIGHT: 68 IN | BODY MASS INDEX: 33.19 KG/M2

## 2019-08-28 DIAGNOSIS — K40.20 NON-RECURRENT BILATERAL INGUINAL HERNIA WITHOUT OBSTRUCTION OR GANGRENE: ICD-10-CM

## 2019-08-28 DIAGNOSIS — K40.90 LEFT INGUINAL HERNIA: Primary | ICD-10-CM

## 2019-08-28 PROCEDURE — 99024 POSTOP FOLLOW-UP VISIT: CPT | Performed by: SURGERY

## 2019-08-28 NOTE — PROGRESS NOTES
General Surgery Office Note  Lorraine Campbell MD, MS    Patient's Name/Date of Birth: Jackelyn Stewart / 1950    Date: August 28, 2019     Chief compaint: Postop visit from laparoscopic bilateral inguinal hernia repair with mesh    Surgeon: Christin Bird MD    Patient Active Problem List   Diagnosis    Lymphoma ([facility] [address]    Gastroenteritis    Back pain at L4-L5 level    Impotence    Chronic fatigue    Urinary frequency    Tobacco abuse    Bronchitis    Gastroesophageal reflux disease with esophagitis    Depression    Coronary artery disease involving native coronary artery of native heart with unstable angina pectoris ([address]    Chronic obstructive pulmonary disease ([address]    Pure hypercholesterolemia    Moderate obesity    Major depressive disorder, recurrent, mild (HCC)       Subjective: Doing well, no new complaints, pain prior to surgery has resolved, tolerating diet, bowel function normal, anxious to return to normal physical activity    Objective:  /73 (Site: Left Upper Arm, Position: Sitting, Cuff Size: Large Adult)   Pulse 76   Temp 98.2 °F (36.8 °C) (Oral)   Resp 18   Ht 5' 8\" (1.727 m)   Wt 219 lb (99.3 kg)   SpO2 97%   BMI 33.30 kg/m²   Labs:  No results for input(s): WBC, HGB, HCT in the last 72 hours. Invalid input(s): PLR  Lab Results   Component Value Date    CREATININE 1.0 08/15/2019    BUN 21 08/15/2019     08/15/2019    K 4.1 08/15/2019     08/15/2019    CO2 27 08/15/2019     No results for input(s): LIPASE, AMYLASE in the last 72 hours.       Review of Systems -  General ROS: negative for - chills, fatigue or malaise  ENT ROS: negative for - hearing change, nasal congestion or nasal discharge  Allergy and Immunology ROS: negative for - hives, itchy/watery eyes or nasal congestion  Hematological and Lymphatic ROS: negative for - blood clots, blood transfusions, bruising or fatigue  Endocrine ROS: negative for -

## 2019-09-23 ENCOUNTER — OFFICE VISIT (OUTPATIENT)
Dept: FAMILY MEDICINE CLINIC | Age: 69
End: 2019-09-23
Payer: MEDICARE

## 2019-09-23 VITALS
WEIGHT: 215 LBS | DIASTOLIC BLOOD PRESSURE: 78 MMHG | HEART RATE: 58 BPM | BODY MASS INDEX: 32.58 KG/M2 | HEIGHT: 68 IN | OXYGEN SATURATION: 91 % | SYSTOLIC BLOOD PRESSURE: 124 MMHG | TEMPERATURE: 97.9 F

## 2019-09-23 DIAGNOSIS — F43.21 GRIEF: ICD-10-CM

## 2019-09-23 DIAGNOSIS — M25.511 CHRONIC RIGHT SHOULDER PAIN: ICD-10-CM

## 2019-09-23 DIAGNOSIS — G89.29 CHRONIC RIGHT SHOULDER PAIN: ICD-10-CM

## 2019-09-23 DIAGNOSIS — F32.89 OTHER DEPRESSION: Primary | ICD-10-CM

## 2019-09-23 PROCEDURE — G8598 ASA/ANTIPLAT THER USED: HCPCS | Performed by: FAMILY MEDICINE

## 2019-09-23 PROCEDURE — 4004F PT TOBACCO SCREEN RCVD TLK: CPT | Performed by: FAMILY MEDICINE

## 2019-09-23 PROCEDURE — 4040F PNEUMOC VAC/ADMIN/RCVD: CPT | Performed by: FAMILY MEDICINE

## 2019-09-23 PROCEDURE — G8417 CALC BMI ABV UP PARAM F/U: HCPCS | Performed by: FAMILY MEDICINE

## 2019-09-23 PROCEDURE — G8427 DOCREV CUR MEDS BY ELIG CLIN: HCPCS | Performed by: FAMILY MEDICINE

## 2019-09-23 PROCEDURE — 1123F ACP DISCUSS/DSCN MKR DOCD: CPT | Performed by: FAMILY MEDICINE

## 2019-09-23 PROCEDURE — 3017F COLORECTAL CA SCREEN DOC REV: CPT | Performed by: FAMILY MEDICINE

## 2019-09-23 PROCEDURE — 99214 OFFICE O/P EST MOD 30 MIN: CPT | Performed by: FAMILY MEDICINE

## 2019-09-23 RX ORDER — SERTRALINE HYDROCHLORIDE 100 MG/1
100 TABLET, FILM COATED ORAL DAILY
Qty: 30 TABLET | Refills: 3 | Status: SHIPPED | OUTPATIENT
Start: 2019-09-23 | End: 2019-10-23 | Stop reason: SDUPTHER

## 2019-09-23 RX ORDER — HYDROCODONE BITARTRATE AND ACETAMINOPHEN 7.5; 325 MG/1; MG/1
1 TABLET ORAL EVERY 8 HOURS PRN
Qty: 90 TABLET | Refills: 0 | Status: SHIPPED | OUTPATIENT
Start: 2019-09-23 | End: 2019-10-23 | Stop reason: SDUPTHER

## 2019-10-23 ENCOUNTER — OFFICE VISIT (OUTPATIENT)
Dept: FAMILY MEDICINE CLINIC | Age: 69
End: 2019-10-23
Payer: MEDICARE

## 2019-10-23 VITALS
DIASTOLIC BLOOD PRESSURE: 70 MMHG | SYSTOLIC BLOOD PRESSURE: 138 MMHG | WEIGHT: 217 LBS | TEMPERATURE: 98.7 F | OXYGEN SATURATION: 98 % | HEIGHT: 68 IN | HEART RATE: 87 BPM | RESPIRATION RATE: 18 BRPM | BODY MASS INDEX: 32.89 KG/M2

## 2019-10-23 DIAGNOSIS — K21.00 GASTROESOPHAGEAL REFLUX DISEASE WITH ESOPHAGITIS: ICD-10-CM

## 2019-10-23 DIAGNOSIS — F32.A DEPRESSION, UNSPECIFIED DEPRESSION TYPE: ICD-10-CM

## 2019-10-23 DIAGNOSIS — M25.511 CHRONIC RIGHT SHOULDER PAIN: ICD-10-CM

## 2019-10-23 DIAGNOSIS — Z12.11 SCREENING FOR MALIGNANT NEOPLASM OF COLON: Primary | ICD-10-CM

## 2019-10-23 DIAGNOSIS — J45.909 MODERATE ASTHMA WITHOUT COMPLICATION, UNSPECIFIED WHETHER PERSISTENT: ICD-10-CM

## 2019-10-23 DIAGNOSIS — T17.208S: ICD-10-CM

## 2019-10-23 DIAGNOSIS — F32.89 OTHER DEPRESSION: ICD-10-CM

## 2019-10-23 DIAGNOSIS — Z23 NEED FOR PNEUMOCOCCAL VACCINE: ICD-10-CM

## 2019-10-23 DIAGNOSIS — R35.0 URINARY FREQUENCY: ICD-10-CM

## 2019-10-23 DIAGNOSIS — I25.110 CORONARY ARTERY DISEASE INVOLVING NATIVE CORONARY ARTERY OF NATIVE HEART WITH UNSTABLE ANGINA PECTORIS (HCC): ICD-10-CM

## 2019-10-23 DIAGNOSIS — G89.29 CHRONIC RIGHT SHOULDER PAIN: ICD-10-CM

## 2019-10-23 DIAGNOSIS — E78.2 MIXED HYPERLIPIDEMIA: ICD-10-CM

## 2019-10-23 DIAGNOSIS — T17.308S: ICD-10-CM

## 2019-10-23 DIAGNOSIS — F43.21 GRIEF: ICD-10-CM

## 2019-10-23 PROCEDURE — 3017F COLORECTAL CA SCREEN DOC REV: CPT | Performed by: FAMILY MEDICINE

## 2019-10-23 PROCEDURE — G8427 DOCREV CUR MEDS BY ELIG CLIN: HCPCS | Performed by: FAMILY MEDICINE

## 2019-10-23 PROCEDURE — 1123F ACP DISCUSS/DSCN MKR DOCD: CPT | Performed by: FAMILY MEDICINE

## 2019-10-23 PROCEDURE — G8484 FLU IMMUNIZE NO ADMIN: HCPCS | Performed by: FAMILY MEDICINE

## 2019-10-23 PROCEDURE — 4040F PNEUMOC VAC/ADMIN/RCVD: CPT | Performed by: FAMILY MEDICINE

## 2019-10-23 PROCEDURE — 99214 OFFICE O/P EST MOD 30 MIN: CPT | Performed by: FAMILY MEDICINE

## 2019-10-23 PROCEDURE — G8598 ASA/ANTIPLAT THER USED: HCPCS | Performed by: FAMILY MEDICINE

## 2019-10-23 PROCEDURE — G8417 CALC BMI ABV UP PARAM F/U: HCPCS | Performed by: FAMILY MEDICINE

## 2019-10-23 PROCEDURE — 4004F PT TOBACCO SCREEN RCVD TLK: CPT | Performed by: FAMILY MEDICINE

## 2019-10-23 RX ORDER — IBUPROFEN 800 MG/1
800 TABLET ORAL EVERY 6 HOURS PRN
Qty: 90 TABLET | Refills: 1 | Status: SHIPPED
Start: 2019-10-23 | End: 2020-10-23 | Stop reason: CLARIF

## 2019-10-23 RX ORDER — SERTRALINE HYDROCHLORIDE 100 MG/1
100 TABLET, FILM COATED ORAL DAILY
Qty: 30 TABLET | Refills: 3 | Status: SHIPPED
Start: 2019-10-23 | End: 2020-04-28 | Stop reason: SDUPTHER

## 2019-10-23 RX ORDER — TIZANIDINE 4 MG/1
4 TABLET ORAL EVERY 8 HOURS PRN
Qty: 90 TABLET | Refills: 3 | Status: SHIPPED
Start: 2019-10-23 | End: 2021-02-12 | Stop reason: ALTCHOICE

## 2019-10-23 RX ORDER — FENOFIBRATE 145 MG/1
TABLET, COATED ORAL
Qty: 90 TABLET | Refills: 2 | Status: SHIPPED
Start: 2019-10-23 | End: 2020-07-15 | Stop reason: SDUPTHER

## 2019-10-23 RX ORDER — BUPROPION HYDROCHLORIDE 150 MG/1
150 TABLET, EXTENDED RELEASE ORAL 2 TIMES DAILY
Qty: 60 TABLET | Refills: 3 | Status: SHIPPED
Start: 2019-10-23 | End: 2020-04-22

## 2019-10-23 RX ORDER — OMEPRAZOLE 40 MG/1
CAPSULE, DELAYED RELEASE ORAL
Qty: 90 CAPSULE | Refills: 3 | Status: SHIPPED
Start: 2019-10-23 | End: 2020-07-15 | Stop reason: SDUPTHER

## 2019-10-23 RX ORDER — SIMVASTATIN 20 MG
TABLET ORAL
Qty: 90 TABLET | Refills: 0 | Status: SHIPPED
Start: 2019-10-23 | End: 2020-03-02 | Stop reason: SDUPTHER

## 2019-10-23 RX ORDER — MONTELUKAST SODIUM 10 MG/1
TABLET ORAL
Qty: 90 TABLET | Refills: 3 | Status: SHIPPED
Start: 2019-10-23 | End: 2020-07-15 | Stop reason: SDUPTHER

## 2019-10-23 RX ORDER — CLOPIDOGREL BISULFATE 75 MG/1
75 TABLET ORAL DAILY
Qty: 90 TABLET | Refills: 3 | Status: SHIPPED
Start: 2019-10-23 | End: 2020-07-15 | Stop reason: SDUPTHER

## 2019-10-23 RX ORDER — ALBUTEROL SULFATE 90 UG/1
AEROSOL, METERED RESPIRATORY (INHALATION)
Qty: 3 INHALER | Refills: 5 | Status: SHIPPED
Start: 2019-10-23 | End: 2020-07-15 | Stop reason: SDUPTHER

## 2019-10-23 RX ORDER — TAMSULOSIN HYDROCHLORIDE 0.4 MG/1
CAPSULE ORAL
Qty: 90 CAPSULE | Refills: 0 | Status: SHIPPED
Start: 2019-10-23 | End: 2020-03-02 | Stop reason: SDUPTHER

## 2019-10-23 RX ORDER — MELOXICAM 15 MG/1
TABLET ORAL
Qty: 90 TABLET | Refills: 2 | Status: SHIPPED
Start: 2019-10-23 | End: 2020-07-15 | Stop reason: SDUPTHER

## 2019-10-23 RX ORDER — HYDROCODONE BITARTRATE AND ACETAMINOPHEN 7.5; 325 MG/1; MG/1
1 TABLET ORAL EVERY 8 HOURS PRN
Qty: 90 TABLET | Refills: 0 | Status: SHIPPED | OUTPATIENT
Start: 2019-10-23 | End: 2019-11-25 | Stop reason: SDUPTHER

## 2019-10-23 ASSESSMENT — ENCOUNTER SYMPTOMS
DIARRHEA: 0
WHEEZING: 0
CONSTIPATION: 0
BLOOD IN STOOL: 0

## 2019-10-25 ENCOUNTER — TELEPHONE (OUTPATIENT)
Dept: FAMILY MEDICINE CLINIC | Age: 69
End: 2019-10-25

## 2019-10-25 ENCOUNTER — TELEPHONE (OUTPATIENT)
Dept: ENT CLINIC | Age: 69
End: 2019-10-25

## 2019-11-01 ENCOUNTER — OFFICE VISIT (OUTPATIENT)
Dept: ENT CLINIC | Age: 69
End: 2019-11-01
Payer: MEDICARE

## 2019-11-01 VITALS
BODY MASS INDEX: 32.44 KG/M2 | SYSTOLIC BLOOD PRESSURE: 130 MMHG | DIASTOLIC BLOOD PRESSURE: 74 MMHG | HEIGHT: 69 IN | WEIGHT: 219 LBS

## 2019-11-01 DIAGNOSIS — K22.5 ZENKER DIVERTICULA: Primary | ICD-10-CM

## 2019-11-01 DIAGNOSIS — R49.0 HOARSENESS OF VOICE: ICD-10-CM

## 2019-11-01 PROCEDURE — G8417 CALC BMI ABV UP PARAM F/U: HCPCS | Performed by: OTOLARYNGOLOGY

## 2019-11-01 PROCEDURE — G8427 DOCREV CUR MEDS BY ELIG CLIN: HCPCS | Performed by: OTOLARYNGOLOGY

## 2019-11-01 PROCEDURE — 3017F COLORECTAL CA SCREEN DOC REV: CPT | Performed by: OTOLARYNGOLOGY

## 2019-11-01 PROCEDURE — G8484 FLU IMMUNIZE NO ADMIN: HCPCS | Performed by: OTOLARYNGOLOGY

## 2019-11-01 PROCEDURE — G8598 ASA/ANTIPLAT THER USED: HCPCS | Performed by: OTOLARYNGOLOGY

## 2019-11-01 PROCEDURE — 4040F PNEUMOC VAC/ADMIN/RCVD: CPT | Performed by: OTOLARYNGOLOGY

## 2019-11-01 PROCEDURE — 99204 OFFICE O/P NEW MOD 45 MIN: CPT | Performed by: OTOLARYNGOLOGY

## 2019-11-01 PROCEDURE — 1123F ACP DISCUSS/DSCN MKR DOCD: CPT | Performed by: OTOLARYNGOLOGY

## 2019-11-01 PROCEDURE — 4004F PT TOBACCO SCREEN RCVD TLK: CPT | Performed by: OTOLARYNGOLOGY

## 2019-11-01 PROCEDURE — 31575 DIAGNOSTIC LARYNGOSCOPY: CPT | Performed by: OTOLARYNGOLOGY

## 2019-11-06 ENCOUNTER — HOSPITAL ENCOUNTER (OUTPATIENT)
Age: 69
Discharge: HOME | End: 2019-11-08
Payer: MEDICARE

## 2019-11-06 ENCOUNTER — OFFICE VISIT (OUTPATIENT)
Dept: FAMILY MEDICINE CLINIC | Age: 69
End: 2019-11-06
Payer: MEDICARE

## 2019-11-06 VITALS
DIASTOLIC BLOOD PRESSURE: 78 MMHG | TEMPERATURE: 98.1 F | RESPIRATION RATE: 16 BRPM | BODY MASS INDEX: 31.84 KG/M2 | OXYGEN SATURATION: 96 % | HEART RATE: 62 BPM | SYSTOLIC BLOOD PRESSURE: 132 MMHG | HEIGHT: 69 IN | WEIGHT: 215 LBS

## 2019-11-06 DIAGNOSIS — I25.110 CORONARY ARTERY DISEASE INVOLVING NATIVE CORONARY ARTERY OF NATIVE HEART WITH UNSTABLE ANGINA PECTORIS (HCC): ICD-10-CM

## 2019-11-06 DIAGNOSIS — K92.1 MELENA: Primary | ICD-10-CM

## 2019-11-06 DIAGNOSIS — Z12.5 SCREENING FOR PROSTATE CANCER: ICD-10-CM

## 2019-11-06 DIAGNOSIS — K92.1 MELENA: ICD-10-CM

## 2019-11-06 LAB
ALBUMIN SERPL-MCNC: 4.6 G/DL (ref 3.5–5.2)
ALP BLD-CCNC: 73 U/L (ref 40–129)
ALT SERPL-CCNC: 21 U/L (ref 0–40)
ANION GAP SERPL CALCULATED.3IONS-SCNC: 15 MMOL/L (ref 7–16)
AST SERPL-CCNC: 19 U/L (ref 0–39)
BASOPHILS ABSOLUTE: 0.05 E9/L (ref 0–0.2)
BASOPHILS RELATIVE PERCENT: 0.7 % (ref 0–2)
BILIRUB SERPL-MCNC: 0.4 MG/DL (ref 0–1.2)
BUN BLDV-MCNC: 16 MG/DL (ref 8–23)
CALCIUM SERPL-MCNC: 9.6 MG/DL (ref 8.6–10.2)
CHLORIDE BLD-SCNC: 102 MMOL/L (ref 98–107)
CHOLESTEROL, TOTAL: 198 MG/DL (ref 0–199)
CO2: 25 MMOL/L (ref 22–29)
CREAT SERPL-MCNC: 0.9 MG/DL (ref 0.7–1.2)
EOSINOPHILS ABSOLUTE: 0.37 E9/L (ref 0.05–0.5)
EOSINOPHILS RELATIVE PERCENT: 4.9 % (ref 0–6)
GFR AFRICAN AMERICAN: >60
GFR NON-AFRICAN AMERICAN: >60 ML/MIN/1.73
GLUCOSE BLD-MCNC: 101 MG/DL (ref 74–99)
HCT VFR BLD CALC: 52.7 % (ref 37–54)
HDLC SERPL-MCNC: 36 MG/DL
HEMOGLOBIN: 16.4 G/DL (ref 12.5–16.5)
IMMATURE GRANULOCYTES #: 0.04 E9/L
IMMATURE GRANULOCYTES %: 0.5 % (ref 0–5)
LDL CHOLESTEROL CALCULATED: 127 MG/DL (ref 0–99)
LYMPHOCYTES ABSOLUTE: 1.67 E9/L (ref 1.5–4)
LYMPHOCYTES RELATIVE PERCENT: 22 % (ref 20–42)
MCH RBC QN AUTO: 28.4 PG (ref 26–35)
MCHC RBC AUTO-ENTMCNC: 31.1 % (ref 32–34.5)
MCV RBC AUTO: 91.3 FL (ref 80–99.9)
MONOCYTES ABSOLUTE: 0.64 E9/L (ref 0.1–0.95)
MONOCYTES RELATIVE PERCENT: 8.4 % (ref 2–12)
NEUTROPHILS ABSOLUTE: 4.82 E9/L (ref 1.8–7.3)
NEUTROPHILS RELATIVE PERCENT: 63.5 % (ref 43–80)
PDW BLD-RTO: 13.3 FL (ref 11.5–15)
PLATELET # BLD: 269 E9/L (ref 130–450)
PMV BLD AUTO: 12.4 FL (ref 7–12)
POTASSIUM SERPL-SCNC: 4.8 MMOL/L (ref 3.5–5)
PROSTATE SPECIFIC ANTIGEN: 5.58 NG/ML (ref 0–4)
RBC # BLD: 5.77 E12/L (ref 3.8–5.8)
SODIUM BLD-SCNC: 142 MMOL/L (ref 132–146)
TOTAL PROTEIN: 7.3 G/DL (ref 6.4–8.3)
TRIGL SERPL-MCNC: 174 MG/DL (ref 0–149)
VLDLC SERPL CALC-MCNC: 35 MG/DL
WBC # BLD: 7.6 E9/L (ref 4.5–11.5)

## 2019-11-06 PROCEDURE — G0103 PSA SCREENING: HCPCS

## 2019-11-06 PROCEDURE — 85025 COMPLETE CBC W/AUTO DIFF WBC: CPT

## 2019-11-06 PROCEDURE — 80061 LIPID PANEL: CPT

## 2019-11-06 PROCEDURE — 4040F PNEUMOC VAC/ADMIN/RCVD: CPT | Performed by: FAMILY MEDICINE

## 2019-11-06 PROCEDURE — 1123F ACP DISCUSS/DSCN MKR DOCD: CPT | Performed by: FAMILY MEDICINE

## 2019-11-06 PROCEDURE — G8598 ASA/ANTIPLAT THER USED: HCPCS | Performed by: FAMILY MEDICINE

## 2019-11-06 PROCEDURE — G8484 FLU IMMUNIZE NO ADMIN: HCPCS | Performed by: FAMILY MEDICINE

## 2019-11-06 PROCEDURE — G8427 DOCREV CUR MEDS BY ELIG CLIN: HCPCS | Performed by: FAMILY MEDICINE

## 2019-11-06 PROCEDURE — 80053 COMPREHEN METABOLIC PANEL: CPT

## 2019-11-06 PROCEDURE — 99213 OFFICE O/P EST LOW 20 MIN: CPT | Performed by: FAMILY MEDICINE

## 2019-11-06 PROCEDURE — 3017F COLORECTAL CA SCREEN DOC REV: CPT | Performed by: FAMILY MEDICINE

## 2019-11-06 PROCEDURE — G8417 CALC BMI ABV UP PARAM F/U: HCPCS | Performed by: FAMILY MEDICINE

## 2019-11-06 PROCEDURE — 4004F PT TOBACCO SCREEN RCVD TLK: CPT | Performed by: FAMILY MEDICINE

## 2019-11-06 ASSESSMENT — ENCOUNTER SYMPTOMS
NAUSEA: 1
VOMITING: 1
BLOOD IN STOOL: 1
DIARRHEA: 0
CONSTIPATION: 0
WHEEZING: 0

## 2019-11-12 ASSESSMENT — ENCOUNTER SYMPTOMS
TROUBLE SWALLOWING: 1
COUGH: 0
VOICE CHANGE: 0
VOMITING: 0
SHORTNESS OF BREATH: 0
RHINORRHEA: 1

## 2019-11-25 ENCOUNTER — OFFICE VISIT (OUTPATIENT)
Dept: FAMILY MEDICINE CLINIC | Age: 69
End: 2019-11-25
Payer: MEDICARE

## 2019-11-25 VITALS
SYSTOLIC BLOOD PRESSURE: 126 MMHG | HEART RATE: 87 BPM | WEIGHT: 216 LBS | BODY MASS INDEX: 31.99 KG/M2 | HEIGHT: 69 IN | TEMPERATURE: 98.5 F | DIASTOLIC BLOOD PRESSURE: 72 MMHG | OXYGEN SATURATION: 96 %

## 2019-11-25 DIAGNOSIS — Z12.5 SCREENING FOR PROSTATE CANCER: ICD-10-CM

## 2019-11-25 DIAGNOSIS — Z23 NEED FOR PNEUMOCOCCAL VACCINE: Primary | ICD-10-CM

## 2019-11-25 DIAGNOSIS — M25.511 CHRONIC RIGHT SHOULDER PAIN: ICD-10-CM

## 2019-11-25 DIAGNOSIS — G89.29 CHRONIC RIGHT SHOULDER PAIN: ICD-10-CM

## 2019-11-25 DIAGNOSIS — R97.20 ELEVATED PSA: ICD-10-CM

## 2019-11-25 PROCEDURE — 90732 PPSV23 VACC 2 YRS+ SUBQ/IM: CPT | Performed by: FAMILY MEDICINE

## 2019-11-25 PROCEDURE — G0009 ADMIN PNEUMOCOCCAL VACCINE: HCPCS | Performed by: FAMILY MEDICINE

## 2019-11-25 PROCEDURE — 1123F ACP DISCUSS/DSCN MKR DOCD: CPT | Performed by: FAMILY MEDICINE

## 2019-11-25 PROCEDURE — G8427 DOCREV CUR MEDS BY ELIG CLIN: HCPCS | Performed by: FAMILY MEDICINE

## 2019-11-25 PROCEDURE — 20610 DRAIN/INJ JOINT/BURSA W/O US: CPT | Performed by: FAMILY MEDICINE

## 2019-11-25 PROCEDURE — 99213 OFFICE O/P EST LOW 20 MIN: CPT | Performed by: FAMILY MEDICINE

## 2019-11-25 PROCEDURE — 4004F PT TOBACCO SCREEN RCVD TLK: CPT | Performed by: FAMILY MEDICINE

## 2019-11-25 PROCEDURE — G8417 CALC BMI ABV UP PARAM F/U: HCPCS | Performed by: FAMILY MEDICINE

## 2019-11-25 PROCEDURE — G8598 ASA/ANTIPLAT THER USED: HCPCS | Performed by: FAMILY MEDICINE

## 2019-11-25 PROCEDURE — 3017F COLORECTAL CA SCREEN DOC REV: CPT | Performed by: FAMILY MEDICINE

## 2019-11-25 PROCEDURE — G8484 FLU IMMUNIZE NO ADMIN: HCPCS | Performed by: FAMILY MEDICINE

## 2019-11-25 PROCEDURE — 4040F PNEUMOC VAC/ADMIN/RCVD: CPT | Performed by: FAMILY MEDICINE

## 2019-11-25 RX ORDER — METHYLPREDNISOLONE ACETATE 80 MG/ML
80 INJECTION, SUSPENSION INTRA-ARTICULAR; INTRALESIONAL; INTRAMUSCULAR; SOFT TISSUE ONCE
Status: COMPLETED | OUTPATIENT
Start: 2019-11-25 | End: 2019-11-25

## 2019-11-25 RX ORDER — HYDROCODONE BITARTRATE AND ACETAMINOPHEN 7.5; 325 MG/1; MG/1
1 TABLET ORAL EVERY 8 HOURS PRN
Qty: 90 TABLET | Refills: 0 | Status: SHIPPED | OUTPATIENT
Start: 2019-11-25 | End: 2020-01-02 | Stop reason: SDUPTHER

## 2019-11-25 RX ADMIN — METHYLPREDNISOLONE ACETATE 80 MG: 80 INJECTION, SUSPENSION INTRA-ARTICULAR; INTRALESIONAL; INTRAMUSCULAR; SOFT TISSUE at 10:22

## 2019-11-25 ASSESSMENT — ENCOUNTER SYMPTOMS
RHINORRHEA: 0
DIARRHEA: 0
COUGH: 0
NAUSEA: 0
SHORTNESS OF BREATH: 0
BACK PAIN: 0
ABDOMINAL PAIN: 0
VOMITING: 0

## 2019-12-06 ENCOUNTER — HOSPITAL ENCOUNTER (OUTPATIENT)
Dept: GENERAL RADIOLOGY | Age: 69
Discharge: HOME | End: 2019-12-08
Payer: MEDICARE

## 2019-12-06 DIAGNOSIS — R49.0 HOARSENESS OF VOICE: ICD-10-CM

## 2019-12-06 DIAGNOSIS — K22.5 ZENKER DIVERTICULA: ICD-10-CM

## 2019-12-06 PROCEDURE — 74220 X-RAY XM ESOPHAGUS 1CNTRST: CPT

## 2019-12-06 PROCEDURE — 6370000000 HC RX 637 (ALT 250 FOR IP): Performed by: OTOLARYNGOLOGY

## 2019-12-06 PROCEDURE — 2500000003 HC RX 250 WO HCPCS: Performed by: OTOLARYNGOLOGY

## 2019-12-06 RX ADMIN — BARIUM SULFATE 176 G: 960 POWDER, FOR SUSPENSION ORAL at 10:15

## 2019-12-06 RX ADMIN — ANTACID/ANTIFLATULENT 1 EACH: 380; 550; 10; 10 GRANULE, EFFERVESCENT ORAL at 10:19

## 2019-12-06 RX ADMIN — BARIUM SULFATE 340 G: 980 POWDER, FOR SUSPENSION ORAL at 10:19

## 2020-01-02 ENCOUNTER — OFFICE VISIT (OUTPATIENT)
Dept: FAMILY MEDICINE CLINIC | Age: 70
End: 2020-01-02
Payer: MEDICARE

## 2020-01-02 VITALS
HEART RATE: 72 BPM | BODY MASS INDEX: 32.29 KG/M2 | TEMPERATURE: 98 F | OXYGEN SATURATION: 96 % | DIASTOLIC BLOOD PRESSURE: 80 MMHG | SYSTOLIC BLOOD PRESSURE: 132 MMHG | HEIGHT: 69 IN | WEIGHT: 218 LBS

## 2020-01-02 PROCEDURE — 99213 OFFICE O/P EST LOW 20 MIN: CPT | Performed by: FAMILY MEDICINE

## 2020-01-02 PROCEDURE — G8484 FLU IMMUNIZE NO ADMIN: HCPCS | Performed by: FAMILY MEDICINE

## 2020-01-02 PROCEDURE — G8417 CALC BMI ABV UP PARAM F/U: HCPCS | Performed by: FAMILY MEDICINE

## 2020-01-02 PROCEDURE — G8427 DOCREV CUR MEDS BY ELIG CLIN: HCPCS | Performed by: FAMILY MEDICINE

## 2020-01-02 PROCEDURE — 20610 DRAIN/INJ JOINT/BURSA W/O US: CPT | Performed by: FAMILY MEDICINE

## 2020-01-02 PROCEDURE — 3017F COLORECTAL CA SCREEN DOC REV: CPT | Performed by: FAMILY MEDICINE

## 2020-01-02 PROCEDURE — 4004F PT TOBACCO SCREEN RCVD TLK: CPT | Performed by: FAMILY MEDICINE

## 2020-01-02 PROCEDURE — 4040F PNEUMOC VAC/ADMIN/RCVD: CPT | Performed by: FAMILY MEDICINE

## 2020-01-02 PROCEDURE — 1123F ACP DISCUSS/DSCN MKR DOCD: CPT | Performed by: FAMILY MEDICINE

## 2020-01-02 RX ORDER — METHYLPREDNISOLONE ACETATE 80 MG/ML
80 INJECTION, SUSPENSION INTRA-ARTICULAR; INTRALESIONAL; INTRAMUSCULAR; SOFT TISSUE ONCE
Status: COMPLETED | OUTPATIENT
Start: 2020-01-02 | End: 2020-01-02

## 2020-01-02 RX ORDER — HYDROCODONE BITARTRATE AND ACETAMINOPHEN 7.5; 325 MG/1; MG/1
1 TABLET ORAL EVERY 8 HOURS PRN
Qty: 90 TABLET | Refills: 0 | Status: SHIPPED | OUTPATIENT
Start: 2020-01-02 | End: 2020-02-03 | Stop reason: SDUPTHER

## 2020-01-02 RX ADMIN — METHYLPREDNISOLONE ACETATE 80 MG: 80 INJECTION, SUSPENSION INTRA-ARTICULAR; INTRALESIONAL; INTRAMUSCULAR; SOFT TISSUE at 12:43

## 2020-01-02 ASSESSMENT — ENCOUNTER SYMPTOMS
BLOOD IN STOOL: 0
DIARRHEA: 0
SHORTNESS OF BREATH: 0
CONSTIPATION: 0
WHEEZING: 0

## 2020-01-02 NOTE — PROGRESS NOTES
HPI:  Patient comes in today for   Chief Complaint   Patient presents with    Shoulder Pain     medication refill and injection   . Review of Systems  Review of Systems   Constitutional: Negative for chills and diaphoresis. HENT: Negative for ear discharge, ear pain, hearing loss, nosebleeds and tinnitus. Respiratory: Negative for shortness of breath and wheezing. Cardiovascular: Negative for chest pain. Gastrointestinal: Negative for blood in stool, constipation and diarrhea. Genitourinary: Negative for dysuria, flank pain and hematuria. Musculoskeletal:        Right shoulder pain. Skin: Negative for rash. Neurological: Negative for headaches. Hematological: Does not bruise/bleed easily. Psychiatric/Behavioral: Negative for hallucinations and suicidal ideas. PE:  VS:  /80   Pulse 72   Temp 98 °F (36.7 °C) (Oral)   Ht 5' 9\" (1.753 m)   Wt 218 lb (98.9 kg)   SpO2 96%   BMI 32.19 kg/m²   Physical Exam  Constitutional:       Appearance: Normal appearance. He is well-developed. HENT:      Head: Normocephalic and atraumatic. Eyes:      Conjunctiva/sclera: Conjunctivae normal.      Pupils: Pupils are equal, round, and reactive to light. Neck:      Musculoskeletal: Neck supple. Thyroid: No thyromegaly. Vascular: No JVD. Trachea: No tracheal deviation. Cardiovascular:      Rate and Rhythm: Normal rate and regular rhythm. Heart sounds: Normal heart sounds. No murmur. No gallop. Pulmonary:      Effort: No respiratory distress. Breath sounds: No wheezing. Chest:      Chest wall: No tenderness. Abdominal:      Palpations: There is no mass. Tenderness: There is no tenderness. There is no rebound. Musculoskeletal:         General: No swelling or tenderness. Comments: Right shoulder decreased ABduction 30 degrees with pain. Skin:     General: Skin is warm and dry. Findings: No erythema or rash.    Neurological: General: No focal deficit present. Mental Status: He is alert and oriented to person, place, and time. Cranial Nerves: No cranial nerve deficit. Coordination: Coordination normal.      Deep Tendon Reflexes: Reflexes normal.      Comments:            Procedure:  Intra-Articular Injection right shoulder: The patient was counseled on the options for treating the affected shoulder. These include but were not limited to trail of oral anti-inflammatories, oral steroids, physical therapy referral, or ortho consultation. The patient is electing injection. The risks of the injection were explained, including but not limited to infection, bleeding, bruising, tendon injury, skin contour deformity and soft tissue/fat necrosis. The patient gave verbal permission to proceed. The patient was placed in a seated position. The skin of [facility] was prepped with Betadine swabs. It was allowed to dry. Utilizing a  Posterior Sub-Acromial approach an injection of 80 mg of  DepoMedrol and 1 cc of 1% lidocaine without epinephrine and 0.5 cc of 0.25% Bupivacaine,was injected into the right Shoulder joint after first aspirating to assure no blood return. The injection site was then wiped again with Betadine and covered with a band aid. The procedure was tolerated well. Routine wound instructions given verbally to the patient. Advised to notify if bleeding, excessive bruising, or swelling develop. Surgeon: Azeem Jama D.O. Assessment/Plan:  Stefania Dill was seen today for shoulder pain. Diagnoses and all orders for this visit:    Medication monitoring encounter  -     Urine Drug Screen; Future    Chronic right shoulder pain  -     HYDROcodone-acetaminophen (NORCO) 7.5-325 MG per tablet; Take 1 tablet by mouth every 8 hours as needed for Pain for up to 30 days.   -     methylPREDNISolone acetate (DEPO-MEDROL) injection 80 mg  -     SD ARTHROCENTESIS ASPIR&/INJ MAJOR JT/BURSA W/O

## 2020-02-03 ENCOUNTER — HOSPITAL ENCOUNTER (OUTPATIENT)
Age: 70
Discharge: HOME | End: 2020-02-05
Payer: MEDICARE

## 2020-02-03 ENCOUNTER — OFFICE VISIT (OUTPATIENT)
Dept: FAMILY MEDICINE CLINIC | Age: 70
End: 2020-02-03
Payer: MEDICARE

## 2020-02-03 VITALS
SYSTOLIC BLOOD PRESSURE: 124 MMHG | TEMPERATURE: 98 F | DIASTOLIC BLOOD PRESSURE: 82 MMHG | OXYGEN SATURATION: 93 % | BODY MASS INDEX: 33.56 KG/M2 | HEART RATE: 80 BPM | WEIGHT: 226.6 LBS | HEIGHT: 69 IN

## 2020-02-03 LAB
AMPHETAMINE SCREEN, URINE: NOT DETECTED
BARBITURATE SCREEN URINE: NOT DETECTED
BENZODIAZEPINE SCREEN, URINE: NOT DETECTED
CANNABINOID SCREEN URINE: NOT DETECTED
COCAINE METABOLITE SCREEN URINE: NOT DETECTED
FENTANYL SCREEN, URINE: POSITIVE
Lab: ABNORMAL
METHADONE SCREEN, URINE: NOT DETECTED
OPIATE SCREEN URINE: POSITIVE
OXYCODONE URINE: NOT DETECTED
PHENCYCLIDINE SCREEN URINE: NOT DETECTED

## 2020-02-03 PROCEDURE — 4040F PNEUMOC VAC/ADMIN/RCVD: CPT | Performed by: FAMILY MEDICINE

## 2020-02-03 PROCEDURE — 80307 DRUG TEST PRSMV CHEM ANLYZR: CPT

## 2020-02-03 PROCEDURE — G8427 DOCREV CUR MEDS BY ELIG CLIN: HCPCS | Performed by: FAMILY MEDICINE

## 2020-02-03 PROCEDURE — 3017F COLORECTAL CA SCREEN DOC REV: CPT | Performed by: FAMILY MEDICINE

## 2020-02-03 PROCEDURE — 99213 OFFICE O/P EST LOW 20 MIN: CPT | Performed by: FAMILY MEDICINE

## 2020-02-03 PROCEDURE — G8417 CALC BMI ABV UP PARAM F/U: HCPCS | Performed by: FAMILY MEDICINE

## 2020-02-03 PROCEDURE — 1123F ACP DISCUSS/DSCN MKR DOCD: CPT | Performed by: FAMILY MEDICINE

## 2020-02-03 PROCEDURE — G0480 DRUG TEST DEF 1-7 CLASSES: HCPCS

## 2020-02-03 PROCEDURE — 4004F PT TOBACCO SCREEN RCVD TLK: CPT | Performed by: FAMILY MEDICINE

## 2020-02-03 PROCEDURE — G8484 FLU IMMUNIZE NO ADMIN: HCPCS | Performed by: FAMILY MEDICINE

## 2020-02-03 RX ORDER — HYDROCODONE BITARTRATE AND ACETAMINOPHEN 7.5; 325 MG/1; MG/1
1 TABLET ORAL EVERY 8 HOURS PRN
Qty: 90 TABLET | Refills: 0 | Status: SHIPPED | OUTPATIENT
Start: 2020-02-03 | End: 2020-03-02 | Stop reason: SDUPTHER

## 2020-02-03 ASSESSMENT — ENCOUNTER SYMPTOMS
CONSTIPATION: 0
DIARRHEA: 0
WHEEZING: 0
BLOOD IN STOOL: 0

## 2020-02-07 LAB
FENTANYL URINE: <1 NG/ML
NORFENTANYL, URINE: <1 NG/ML

## 2020-02-08 LAB
6AM URINE: <10 NG/ML
CODEINE, URINE: <20 NG/ML
HYDROCODONE, URINE: 455 NG/ML
HYDROMORPHONE, URINE: <20 NG/ML
MORPHINE URINE: <20 NG/ML
NORHYDROCODONE, URINE: 1137 NG/ML
NOROXYCODONE, URINE: <20 NG/ML
NOROXYMORPHONE, URINE: <20 NG/ML
OXYCODONE, URINE CONFIRMATION: <20 NG/ML
OXYMORPHONE, URINE: <20 NG/ML

## 2020-02-17 ENCOUNTER — TELEPHONE (OUTPATIENT)
Dept: FAMILY MEDICINE CLINIC | Age: 70
End: 2020-02-17

## 2020-02-17 NOTE — TELEPHONE ENCOUNTER
LM for patient to call regarding sleep study referral placed in July 2019.      Was patient ever contacted to schedule and does he wish to proceed with the referral.

## 2020-03-02 ENCOUNTER — OFFICE VISIT (OUTPATIENT)
Dept: FAMILY MEDICINE CLINIC | Age: 70
End: 2020-03-02
Payer: MEDICARE

## 2020-03-02 VITALS
BODY MASS INDEX: 34.51 KG/M2 | DIASTOLIC BLOOD PRESSURE: 84 MMHG | HEART RATE: 79 BPM | SYSTOLIC BLOOD PRESSURE: 136 MMHG | TEMPERATURE: 98.2 F | HEIGHT: 69 IN | WEIGHT: 233 LBS | RESPIRATION RATE: 16 BRPM | OXYGEN SATURATION: 96 %

## 2020-03-02 PROCEDURE — G8417 CALC BMI ABV UP PARAM F/U: HCPCS | Performed by: FAMILY MEDICINE

## 2020-03-02 PROCEDURE — 1123F ACP DISCUSS/DSCN MKR DOCD: CPT | Performed by: FAMILY MEDICINE

## 2020-03-02 PROCEDURE — 3017F COLORECTAL CA SCREEN DOC REV: CPT | Performed by: FAMILY MEDICINE

## 2020-03-02 PROCEDURE — 3023F SPIROM DOC REV: CPT | Performed by: FAMILY MEDICINE

## 2020-03-02 PROCEDURE — 4004F PT TOBACCO SCREEN RCVD TLK: CPT | Performed by: FAMILY MEDICINE

## 2020-03-02 PROCEDURE — G8926 SPIRO NO PERF OR DOC: HCPCS | Performed by: FAMILY MEDICINE

## 2020-03-02 PROCEDURE — 4040F PNEUMOC VAC/ADMIN/RCVD: CPT | Performed by: FAMILY MEDICINE

## 2020-03-02 PROCEDURE — 99214 OFFICE O/P EST MOD 30 MIN: CPT | Performed by: FAMILY MEDICINE

## 2020-03-02 PROCEDURE — G8484 FLU IMMUNIZE NO ADMIN: HCPCS | Performed by: FAMILY MEDICINE

## 2020-03-02 PROCEDURE — G8427 DOCREV CUR MEDS BY ELIG CLIN: HCPCS | Performed by: FAMILY MEDICINE

## 2020-03-02 RX ORDER — SIMVASTATIN 20 MG
TABLET ORAL
Qty: 90 TABLET | Refills: 0 | Status: SHIPPED
Start: 2020-03-02 | End: 2020-07-15 | Stop reason: SDUPTHER

## 2020-03-02 RX ORDER — TAMSULOSIN HYDROCHLORIDE 0.4 MG/1
CAPSULE ORAL
Qty: 90 CAPSULE | Refills: 0 | Status: SHIPPED
Start: 2020-03-02 | End: 2021-02-12 | Stop reason: ALTCHOICE

## 2020-03-02 RX ORDER — HYDROCODONE BITARTRATE AND ACETAMINOPHEN 7.5; 325 MG/1; MG/1
1 TABLET ORAL EVERY 8 HOURS PRN
Qty: 90 TABLET | Refills: 0 | Status: SHIPPED | OUTPATIENT
Start: 2020-03-02 | End: 2020-04-02 | Stop reason: SDUPTHER

## 2020-03-02 RX ORDER — IPRATROPIUM BROMIDE AND ALBUTEROL SULFATE 2.5; .5 MG/3ML; MG/3ML
1 SOLUTION RESPIRATORY (INHALATION) ONCE
Status: COMPLETED | OUTPATIENT
Start: 2020-03-02 | End: 2020-03-02

## 2020-03-02 RX ADMIN — IPRATROPIUM BROMIDE AND ALBUTEROL SULFATE 1 AMPULE: 2.5; .5 SOLUTION RESPIRATORY (INHALATION) at 16:25

## 2020-03-02 ASSESSMENT — ENCOUNTER SYMPTOMS
BACK PAIN: 0
SHORTNESS OF BREATH: 1
SORE THROAT: 0
ABDOMINAL PAIN: 0
CONSTIPATION: 0
BLOOD IN STOOL: 0
WHEEZING: 0
DIARRHEA: 0
COUGH: 0

## 2020-03-02 NOTE — PROGRESS NOTES
HPI:  Patient comes in today for   Chief Complaint   Patient presents with    Shoulder Pain     right shoulder pt rates pain at 8/10 here for refills    . Pt reports shortness of breath with exertion in the last couple of days. Reduced smoking and reports smoking one cigarette in the last month. Review of Systems  Review of Systems   Constitutional: Negative for chills and diaphoresis. HENT: Negative for ear discharge, ear pain, hearing loss, nosebleeds, sore throat and tinnitus. Respiratory: Positive for shortness of breath. Negative for cough and wheezing. Cardiovascular: Negative for chest pain. Gastrointestinal: Negative for abdominal pain, blood in stool, constipation and diarrhea. Genitourinary: Negative for dysuria, flank pain and hematuria. Musculoskeletal: Negative for back pain and neck pain. Right shoulder pain   Skin: Negative for rash. Neurological: Negative for dizziness, weakness, numbness and headaches. Hematological: Does not bruise/bleed easily. Psychiatric/Behavioral: Negative for hallucinations and suicidal ideas. PE:  VS:  /84   Pulse 79   Temp 98.2 °F (36.8 °C) (Oral)   Resp 16   Ht 5' 9\" (1.753 m)   Wt 233 lb (105.7 kg)   SpO2 96%   BMI 34.41 kg/m²        Physical Exam  Constitutional:       Appearance: Normal appearance. He is well-developed. HENT:      Head: Normocephalic and atraumatic. Eyes:      General: No scleral icterus. Conjunctiva/sclera: Conjunctivae normal.      Pupils: Pupils are equal, round, and reactive to light. Neck:      Musculoskeletal: Neck supple. Thyroid: No thyromegaly. Vascular: No JVD. Trachea: No tracheal deviation. Cardiovascular:      Rate and Rhythm: Normal rate and regular rhythm. Heart sounds: Normal heart sounds. No murmur. No gallop. Pulmonary:      Effort: Pulmonary effort is normal. No respiratory distress. Breath sounds: Normal breath sounds. No wheezing.

## 2020-03-11 ENCOUNTER — APPOINTMENT (OUTPATIENT)
Dept: CT IMAGING | Age: 70
End: 2020-03-11
Payer: MEDICARE

## 2020-03-11 ENCOUNTER — HOSPITAL ENCOUNTER (EMERGENCY)
Age: 70
Discharge: HOME | End: 2020-03-11
Attending: EMERGENCY MEDICINE
Payer: MEDICARE

## 2020-03-11 ENCOUNTER — HOSPITAL ENCOUNTER (EMERGENCY)
Age: 70
Discharge: HOME | End: 2020-03-11
Payer: MEDICARE

## 2020-03-11 VITALS
BODY MASS INDEX: 35.31 KG/M2 | DIASTOLIC BLOOD PRESSURE: 89 MMHG | HEART RATE: 102 BPM | SYSTOLIC BLOOD PRESSURE: 168 MMHG | WEIGHT: 233 LBS | HEIGHT: 68 IN | RESPIRATION RATE: 18 BRPM | TEMPERATURE: 98.3 F | OXYGEN SATURATION: 97 %

## 2020-03-11 VITALS
WEIGHT: 233 LBS | SYSTOLIC BLOOD PRESSURE: 151 MMHG | RESPIRATION RATE: 20 BRPM | BODY MASS INDEX: 35.31 KG/M2 | DIASTOLIC BLOOD PRESSURE: 93 MMHG | HEART RATE: 73 BPM | TEMPERATURE: 98 F | HEIGHT: 68 IN | OXYGEN SATURATION: 97 %

## 2020-03-11 LAB
ANION GAP SERPL CALCULATED.3IONS-SCNC: 14 MMOL/L (ref 7–16)
BASOPHILS ABSOLUTE: 0.03 E9/L (ref 0–0.2)
BASOPHILS RELATIVE PERCENT: 0.4 % (ref 0–2)
BUN BLDV-MCNC: 19 MG/DL (ref 8–23)
CALCIUM SERPL-MCNC: 9.6 MG/DL (ref 8.6–10.2)
CHLORIDE BLD-SCNC: 99 MMOL/L (ref 98–107)
CO2: 27 MMOL/L (ref 22–29)
CREAT SERPL-MCNC: 1.2 MG/DL (ref 0.7–1.2)
EOSINOPHILS ABSOLUTE: 0.26 E9/L (ref 0.05–0.5)
EOSINOPHILS RELATIVE PERCENT: 3.2 % (ref 0–6)
GFR AFRICAN AMERICAN: >60
GFR NON-AFRICAN AMERICAN: 60 ML/MIN/1.73
GLUCOSE BLD-MCNC: 103 MG/DL (ref 74–99)
HCT VFR BLD CALC: 48 % (ref 37–54)
HEMOGLOBIN: 15.9 G/DL (ref 12.5–16.5)
IMMATURE GRANULOCYTES #: 0.03 E9/L
IMMATURE GRANULOCYTES %: 0.4 % (ref 0–5)
LYMPHOCYTES ABSOLUTE: 1.27 E9/L (ref 1.5–4)
LYMPHOCYTES RELATIVE PERCENT: 15.8 % (ref 20–42)
MCH RBC QN AUTO: 29.5 PG (ref 26–35)
MCHC RBC AUTO-ENTMCNC: 33.1 % (ref 32–34.5)
MCV RBC AUTO: 89.1 FL (ref 80–99.9)
MONOCYTES ABSOLUTE: 0.62 E9/L (ref 0.1–0.95)
MONOCYTES RELATIVE PERCENT: 7.7 % (ref 2–12)
NEUTROPHILS ABSOLUTE: 5.81 E9/L (ref 1.8–7.3)
NEUTROPHILS RELATIVE PERCENT: 72.5 % (ref 43–80)
PDW BLD-RTO: 14.2 FL (ref 11.5–15)
PLATELET # BLD: 241 E9/L (ref 130–450)
PMV BLD AUTO: 10.9 FL (ref 7–12)
POTASSIUM SERPL-SCNC: 5.4 MMOL/L (ref 3.5–5)
RBC # BLD: 5.39 E12/L (ref 3.8–5.8)
SODIUM BLD-SCNC: 140 MMOL/L (ref 132–146)
WBC # BLD: 8 E9/L (ref 4.5–11.5)

## 2020-03-11 PROCEDURE — 6360000004 HC RX CONTRAST MEDICATION: Performed by: RADIOLOGY

## 2020-03-11 PROCEDURE — 6370000000 HC RX 637 (ALT 250 FOR IP): Performed by: STUDENT IN AN ORGANIZED HEALTH CARE EDUCATION/TRAINING PROGRAM

## 2020-03-11 PROCEDURE — 85025 COMPLETE CBC W/AUTO DIFF WBC: CPT

## 2020-03-11 PROCEDURE — 99212 OFFICE O/P EST SF 10 MIN: CPT

## 2020-03-11 PROCEDURE — 99283 EMERGENCY DEPT VISIT LOW MDM: CPT

## 2020-03-11 PROCEDURE — 80048 BASIC METABOLIC PNL TOTAL CA: CPT

## 2020-03-11 PROCEDURE — 70491 CT SOFT TISSUE NECK W/DYE: CPT

## 2020-03-11 RX ADMIN — LIDOCAINE HYDROCHLORIDE: 20 SOLUTION ORAL; TOPICAL at 13:57

## 2020-03-11 RX ADMIN — IOPAMIDOL 80 ML: 755 INJECTION, SOLUTION INTRAVENOUS at 15:09

## 2020-03-11 ASSESSMENT — PAIN DESCRIPTION - LOCATION
LOCATION: THROAT
LOCATION: THROAT

## 2020-03-11 ASSESSMENT — ENCOUNTER SYMPTOMS
SORE THROAT: 1
NAUSEA: 0
WHEEZING: 0
EYE DISCHARGE: 0
EYE PAIN: 0
COUGH: 0
ABDOMINAL PAIN: 0
SHORTNESS OF BREATH: 0
BACK PAIN: 0
DIARRHEA: 0
VOMITING: 0
TROUBLE SWALLOWING: 1
EYE REDNESS: 0
SINUS PRESSURE: 0

## 2020-03-11 ASSESSMENT — PAIN SCALES - GENERAL
PAINLEVEL_OUTOF10: 3
PAINLEVEL_OUTOF10: 7

## 2020-03-11 ASSESSMENT — PAIN - FUNCTIONAL ASSESSMENT: PAIN_FUNCTIONAL_ASSESSMENT: PREVENTS OR INTERFERES SOME ACTIVE ACTIVITIES AND ADLS

## 2020-03-11 ASSESSMENT — PAIN DESCRIPTION - FREQUENCY
FREQUENCY: CONTINUOUS
FREQUENCY: CONTINUOUS

## 2020-03-11 ASSESSMENT — PAIN DESCRIPTION - PAIN TYPE
TYPE: ACUTE PAIN
TYPE: ACUTE PAIN

## 2020-03-11 ASSESSMENT — PAIN DESCRIPTION - PROGRESSION: CLINICAL_PROGRESSION: NOT CHANGED

## 2020-03-11 ASSESSMENT — PAIN DESCRIPTION - DESCRIPTORS: DESCRIPTORS: DISCOMFORT

## 2020-03-11 ASSESSMENT — PAIN DESCRIPTION - ONSET: ONSET: SUDDEN

## 2020-03-11 NOTE — ED PROVIDER NOTES
oropharyngeal erythema. Tonsils: No tonsillar exudate or tonsillar abscesses. Cardiovascular:      Rate and Rhythm: Normal rate and regular rhythm. Pulses: Normal pulses. Heart sounds: Normal heart sounds. No murmur. Pulmonary:      Effort: Pulmonary effort is normal. No respiratory distress. Breath sounds: Normal breath sounds. No stridor. No wheezing, rhonchi or rales. Abdominal:      General: Abdomen is flat. There is no distension. Palpations: Abdomen is soft. Tenderness: There is no abdominal tenderness. There is no guarding or rebound. Neurological:      General: No focal deficit present. Mental Status: He is alert and oriented to person, place, and time. Motor: No weakness. Coordination: Coordination normal.          Procedures     MDM  Number of Diagnoses or Management Options  Foreign body sensation in throat:   Diagnosis management comments: I doubt that the patient has a retained foreign body has he was able to continue eating and drinking after the sensation developed. He has had this sensation in the past and a foreign body has never been identified on radiographs or scopes. A CT scan was performed today in the department which also did not reveal any masses, foreign bodies, or diverticuli. He was thus discharged home in stable condition. He does not meet criteria for an emergent endoscopy at this time. --------------------------------------------- PAST HISTORY ---------------------------------------------  Past Medical History:  has a past medical history of Anesthesia complication, Arthritis, CAD (coronary artery disease), Erectile dysfunction, GERD (gastroesophageal reflux disease), H/O coronary angioplasty with stents[V45.82], Hyperlipidemia, Lymphoma ([facility] [address] Sleep apnea, and Unspecified cerebral artery occlusion with cerebral infarction.     Past Surgical History:  has a past surgical history that includes Cardiac surgery;
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
## 2020-03-13 ENCOUNTER — OFFICE VISIT (OUTPATIENT)
Dept: ENT CLINIC | Age: 70
End: 2020-03-13
Payer: MEDICARE

## 2020-03-13 VITALS
BODY MASS INDEX: 36.83 KG/M2 | WEIGHT: 243 LBS | SYSTOLIC BLOOD PRESSURE: 146 MMHG | HEIGHT: 68 IN | DIASTOLIC BLOOD PRESSURE: 90 MMHG

## 2020-03-13 PROCEDURE — 1123F ACP DISCUSS/DSCN MKR DOCD: CPT | Performed by: OTOLARYNGOLOGY

## 2020-03-13 PROCEDURE — 4040F PNEUMOC VAC/ADMIN/RCVD: CPT | Performed by: OTOLARYNGOLOGY

## 2020-03-13 PROCEDURE — G8427 DOCREV CUR MEDS BY ELIG CLIN: HCPCS | Performed by: OTOLARYNGOLOGY

## 2020-03-13 PROCEDURE — 31575 DIAGNOSTIC LARYNGOSCOPY: CPT | Performed by: OTOLARYNGOLOGY

## 2020-03-13 PROCEDURE — G8484 FLU IMMUNIZE NO ADMIN: HCPCS | Performed by: OTOLARYNGOLOGY

## 2020-03-13 PROCEDURE — 3017F COLORECTAL CA SCREEN DOC REV: CPT | Performed by: OTOLARYNGOLOGY

## 2020-03-13 PROCEDURE — 99214 OFFICE O/P EST MOD 30 MIN: CPT | Performed by: OTOLARYNGOLOGY

## 2020-03-13 PROCEDURE — G8417 CALC BMI ABV UP PARAM F/U: HCPCS | Performed by: OTOLARYNGOLOGY

## 2020-03-13 PROCEDURE — 4004F PT TOBACCO SCREEN RCVD TLK: CPT | Performed by: OTOLARYNGOLOGY
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
## 2020-03-31 ASSESSMENT — ENCOUNTER SYMPTOMS
VOMITING: 0
COUGH: 0
SHORTNESS OF BREATH: 0

## 2020-04-02 ENCOUNTER — OFFICE VISIT (OUTPATIENT)
Dept: FAMILY MEDICINE CLINIC | Age: 70
End: 2020-04-02
Payer: MEDICARE

## 2020-04-02 VITALS
WEIGHT: 235 LBS | DIASTOLIC BLOOD PRESSURE: 73 MMHG | HEIGHT: 69 IN | SYSTOLIC BLOOD PRESSURE: 121 MMHG | RESPIRATION RATE: 18 BRPM | BODY MASS INDEX: 34.8 KG/M2 | TEMPERATURE: 98.1 F | HEART RATE: 88 BPM

## 2020-04-02 PROCEDURE — 1123F ACP DISCUSS/DSCN MKR DOCD: CPT | Performed by: FAMILY MEDICINE

## 2020-04-02 PROCEDURE — G0439 PPPS, SUBSEQ VISIT: HCPCS | Performed by: FAMILY MEDICINE

## 2020-04-02 PROCEDURE — 4040F PNEUMOC VAC/ADMIN/RCVD: CPT | Performed by: FAMILY MEDICINE

## 2020-04-02 PROCEDURE — G0444 DEPRESSION SCREEN ANNUAL: HCPCS | Performed by: FAMILY MEDICINE

## 2020-04-02 PROCEDURE — 3017F COLORECTAL CA SCREEN DOC REV: CPT | Performed by: FAMILY MEDICINE

## 2020-04-02 RX ORDER — HYDROCODONE BITARTRATE AND ACETAMINOPHEN 7.5; 325 MG/1; MG/1
1 TABLET ORAL EVERY 6 HOURS PRN
COMMUNITY
End: 2020-04-28

## 2020-04-02 RX ORDER — HYDROCODONE BITARTRATE AND ACETAMINOPHEN 7.5; 325 MG/1; MG/1
1 TABLET ORAL EVERY 8 HOURS PRN
Qty: 90 TABLET | Refills: 0 | Status: SHIPPED | OUTPATIENT
Start: 2020-04-02 | End: 2020-04-28 | Stop reason: SDUPTHER

## 2020-04-02 ASSESSMENT — PATIENT HEALTH QUESTIONNAIRE - PHQ9
5. POOR APPETITE OR OVEREATING: 1
3. TROUBLE FALLING OR STAYING ASLEEP: 1
1. LITTLE INTEREST OR PLEASURE IN DOING THINGS: 0
4. FEELING TIRED OR HAVING LITTLE ENERGY: 3
9. THOUGHTS THAT YOU WOULD BE BETTER OFF DEAD, OR OF HURTING YOURSELF: 0
8. MOVING OR SPEAKING SO SLOWLY THAT OTHER PEOPLE COULD HAVE NOTICED. OR THE OPPOSITE, BEING SO FIGETY OR RESTLESS THAT YOU HAVE BEEN MOVING AROUND A LOT MORE THAN USUAL: 0
6. FEELING BAD ABOUT YOURSELF - OR THAT YOU ARE A FAILURE OR HAVE LET YOURSELF OR YOUR FAMILY DOWN: 0
10. IF YOU CHECKED OFF ANY PROBLEMS, HOW DIFFICULT HAVE THESE PROBLEMS MADE IT FOR YOU TO DO YOUR WORK, TAKE CARE OF THINGS AT HOME, OR GET ALONG WITH OTHER PEOPLE: 1
SUM OF ALL RESPONSES TO PHQ QUESTIONS 1-9: 8
SUM OF ALL RESPONSES TO PHQ QUESTIONS 1-9: 8
7. TROUBLE CONCENTRATING ON THINGS, SUCH AS READING THE NEWSPAPER OR WATCHING TELEVISION: 0
SUM OF ALL RESPONSES TO PHQ9 QUESTIONS 1 & 2: 3
2. FEELING DOWN, DEPRESSED OR HOPELESS: 3

## 2020-04-02 NOTE — PROGRESS NOTES
Yes  Have all throw rugs been removed or fastened?: (!) No  Do you have non-slip mats or surfaces in all bathtubs/showers?: (!) No  Do all of your stairways have a railing or banister?: (!) No  Are your doorways, halls and stairs free of clutter?: Yes  Do you always fasten your seatbelt when you are in a car?: (!) No  Safety Interventions:  · none    Personalized Preventive Plan   Current Health Maintenance Status  Immunization History   Administered Date(s) Administered    Pneumococcal Conjugate 13-valent ([id number]) 12/14/2016    Pneumococcal Polysaccharide ([id number]) 07/20/2013, 03/30/2014, 11/25/2019        Health Maintenance   Topic Date Due    Hepatitis C screen  1950    DTaP/Tdap/Td vaccine (1 - Tdap) 09/13/1969    Shingles Vaccine (1 of 2) 09/13/2000    Annual Wellness Visit (AWV)  05/29/2019    Flu vaccine (Season Ended) 09/01/2020    Lipid screen  11/06/2020    PSA counseling  11/06/2020    Diabetes screen  07/31/2021    Colon cancer screen colonoscopy  10/18/2024    Pneumococcal 65+ yrs at Risk Vaccine  Completed    AAA screen  Completed    Hepatitis A vaccine  Aged Out    Hepatitis B vaccine  Aged Out    Hib vaccine  Aged Out    Meningococcal (ACWY) vaccine  Aged Out     Recommendations for [facility] Due: see orders and patient instructions/AVS.  . Recommended screening schedule for the next 5-10 years is provided to the patient in written form: see Patient Instructions/AVS.    Noe De Dios was seen today for medicare awv and shoulder pain. Diagnoses and all orders for this visit:    Chronic right shoulder pain  -     HYDROcodone-acetaminophen (NORCO) 7.5-325 MG per tablet; Take 1 tablet by mouth every 8 hours as needed for Pain for up to 30 days. Controlled substances monitoring: no signs of potential drug abuse or diversion identified and OARRS report reviewed today- activity consistent with treatment plan.

## 2020-04-20 ENCOUNTER — HOSPITAL ENCOUNTER (OUTPATIENT)
Dept: CT IMAGING | Age: 70
Discharge: HOME | End: 2020-04-20
Payer: MEDICARE

## 2020-04-20 LAB
ANION GAP SERPL CALCULATED.3IONS-SCNC: 10 MMOL/L (ref 7–16)
BASOPHILS ABSOLUTE: 0.07 E9/L (ref 0–0.2)
BASOPHILS RELATIVE PERCENT: 0.9 % (ref 0–2)
BUN BLDV-MCNC: 20 MG/DL (ref 8–23)
CALCIUM SERPL-MCNC: 9.5 MG/DL (ref 8.6–10.2)
CHLORIDE BLD-SCNC: 103 MMOL/L (ref 98–107)
CO2: 28 MMOL/L (ref 22–29)
CREAT SERPL-MCNC: 1.3 MG/DL (ref 0.7–1.2)
EOSINOPHILS ABSOLUTE: 0.26 E9/L (ref 0.05–0.5)
EOSINOPHILS RELATIVE PERCENT: 3.4 % (ref 0–6)
GFR AFRICAN AMERICAN: >60
GFR NON-AFRICAN AMERICAN: 55 ML/MIN/1.73
GLUCOSE BLD-MCNC: 107 MG/DL (ref 74–99)
HCT VFR BLD CALC: 48.7 % (ref 37–54)
HEMOGLOBIN: 15.6 G/DL (ref 12.5–16.5)
IMMATURE GRANULOCYTES #: 0.06 E9/L
IMMATURE GRANULOCYTES %: 0.8 % (ref 0–5)
LYMPHOCYTES ABSOLUTE: 1.6 E9/L (ref 1.5–4)
LYMPHOCYTES RELATIVE PERCENT: 20.7 % (ref 20–42)
MCH RBC QN AUTO: 28.9 PG (ref 26–35)
MCHC RBC AUTO-ENTMCNC: 32 % (ref 32–34.5)
MCV RBC AUTO: 90.4 FL (ref 80–99.9)
MONOCYTES ABSOLUTE: 0.71 E9/L (ref 0.1–0.95)
MONOCYTES RELATIVE PERCENT: 9.2 % (ref 2–12)
NEUTROPHILS ABSOLUTE: 5.03 E9/L (ref 1.8–7.3)
NEUTROPHILS RELATIVE PERCENT: 65 % (ref 43–80)
PDW BLD-RTO: 13.2 FL (ref 11.5–15)
PLATELET # BLD: 239 E9/L (ref 130–450)
PMV BLD AUTO: 10.2 FL (ref 7–12)
POTASSIUM SERPL-SCNC: 4.1 MMOL/L (ref 3.5–5)
RBC # BLD: 5.39 E12/L (ref 3.8–5.8)
SODIUM BLD-SCNC: 141 MMOL/L (ref 132–146)
WBC # BLD: 7.7 E9/L (ref 4.5–11.5)

## 2020-04-20 PROCEDURE — 36415 COLL VENOUS BLD VENIPUNCTURE: CPT

## 2020-04-20 PROCEDURE — 6360000004 HC RX CONTRAST MEDICATION: Performed by: RADIOLOGY

## 2020-04-20 PROCEDURE — 74177 CT ABD & PELVIS W/CONTRAST: CPT

## 2020-04-20 PROCEDURE — 85025 COMPLETE CBC W/AUTO DIFF WBC: CPT

## 2020-04-20 PROCEDURE — 80048 BASIC METABOLIC PNL TOTAL CA: CPT

## 2020-04-20 PROCEDURE — 71260 CT THORAX DX C+: CPT

## 2020-04-20 RX ADMIN — IOHEXOL 50 ML: 240 INJECTION, SOLUTION INTRATHECAL; INTRAVASCULAR; INTRAVENOUS; ORAL at 10:46

## 2020-04-20 RX ADMIN — IOPAMIDOL 75 ML: 755 INJECTION, SOLUTION INTRAVENOUS at 10:46

## 2020-04-22 RX ORDER — BUPROPION HYDROCHLORIDE 150 MG/1
TABLET, EXTENDED RELEASE ORAL
Qty: 60 TABLET | Refills: 3 | Status: SHIPPED
Start: 2020-04-22 | End: 2020-07-15 | Stop reason: SDUPTHER

## 2020-04-28 ENCOUNTER — OFFICE VISIT (OUTPATIENT)
Dept: FAMILY MEDICINE CLINIC | Age: 70
End: 2020-04-28
Payer: MEDICARE

## 2020-04-28 VITALS
DIASTOLIC BLOOD PRESSURE: 84 MMHG | HEIGHT: 69 IN | OXYGEN SATURATION: 97 % | HEART RATE: 88 BPM | RESPIRATION RATE: 16 BRPM | WEIGHT: 236.3 LBS | TEMPERATURE: 97.7 F | SYSTOLIC BLOOD PRESSURE: 128 MMHG | BODY MASS INDEX: 35 KG/M2

## 2020-04-28 PROCEDURE — 1123F ACP DISCUSS/DSCN MKR DOCD: CPT | Performed by: FAMILY MEDICINE

## 2020-04-28 PROCEDURE — 99213 OFFICE O/P EST LOW 20 MIN: CPT | Performed by: FAMILY MEDICINE

## 2020-04-28 PROCEDURE — G8427 DOCREV CUR MEDS BY ELIG CLIN: HCPCS | Performed by: FAMILY MEDICINE

## 2020-04-28 PROCEDURE — 4040F PNEUMOC VAC/ADMIN/RCVD: CPT | Performed by: FAMILY MEDICINE

## 2020-04-28 PROCEDURE — G8417 CALC BMI ABV UP PARAM F/U: HCPCS | Performed by: FAMILY MEDICINE

## 2020-04-28 PROCEDURE — 1036F TOBACCO NON-USER: CPT | Performed by: FAMILY MEDICINE

## 2020-04-28 PROCEDURE — 3017F COLORECTAL CA SCREEN DOC REV: CPT | Performed by: FAMILY MEDICINE

## 2020-04-28 RX ORDER — HYDROCODONE BITARTRATE AND ACETAMINOPHEN 7.5; 325 MG/1; MG/1
1 TABLET ORAL EVERY 8 HOURS PRN
Qty: 90 TABLET | Refills: 0 | Status: SHIPPED | OUTPATIENT
Start: 2020-04-28 | End: 2020-06-02 | Stop reason: SDUPTHER

## 2020-04-28 RX ORDER — SERTRALINE HYDROCHLORIDE 100 MG/1
100 TABLET, FILM COATED ORAL DAILY
Qty: 30 TABLET | Refills: 3 | Status: SHIPPED
Start: 2020-04-28 | End: 2020-07-15 | Stop reason: SDUPTHER

## 2020-04-28 ASSESSMENT — ENCOUNTER SYMPTOMS
DIARRHEA: 0
BLOOD IN STOOL: 0
CONSTIPATION: 0
WHEEZING: 0

## 2020-05-04 ENCOUNTER — APPOINTMENT (OUTPATIENT)
Dept: CT IMAGING | Age: 70
End: 2020-05-04
Payer: MEDICARE

## 2020-05-04 ENCOUNTER — APPOINTMENT (OUTPATIENT)
Dept: GENERAL RADIOLOGY | Age: 70
End: 2020-05-04
Payer: MEDICARE

## 2020-05-04 ENCOUNTER — HOSPITAL ENCOUNTER (EMERGENCY)
Age: 70
Discharge: HOME | End: 2020-05-04
Attending: EMERGENCY MEDICINE
Payer: MEDICARE

## 2020-05-04 ENCOUNTER — OFFICE VISIT (OUTPATIENT)
Dept: FAMILY MEDICINE CLINIC | Age: 70
End: 2020-05-04
Payer: MEDICARE

## 2020-05-04 VITALS
WEIGHT: 243 LBS | HEART RATE: 65 BPM | RESPIRATION RATE: 15 BRPM | BODY MASS INDEX: 36.83 KG/M2 | TEMPERATURE: 98.1 F | SYSTOLIC BLOOD PRESSURE: 183 MMHG | DIASTOLIC BLOOD PRESSURE: 122 MMHG | HEIGHT: 68 IN | OXYGEN SATURATION: 95 %

## 2020-05-04 VITALS
DIASTOLIC BLOOD PRESSURE: 94 MMHG | RESPIRATION RATE: 26 BRPM | SYSTOLIC BLOOD PRESSURE: 178 MMHG | HEART RATE: 107 BPM | HEIGHT: 69 IN | OXYGEN SATURATION: 93 % | WEIGHT: 246 LBS | TEMPERATURE: 98 F | BODY MASS INDEX: 36.43 KG/M2

## 2020-05-04 LAB
ANION GAP SERPL CALCULATED.3IONS-SCNC: 11 MMOL/L (ref 7–16)
BASOPHILS ABSOLUTE: 0.04 E9/L (ref 0–0.2)
BASOPHILS RELATIVE PERCENT: 0.6 % (ref 0–2)
BUN BLDV-MCNC: 22 MG/DL (ref 8–23)
CALCIUM SERPL-MCNC: 9.9 MG/DL (ref 8.6–10.2)
CHLORIDE BLD-SCNC: 103 MMOL/L (ref 98–107)
CO2: 30 MMOL/L (ref 22–29)
CREAT SERPL-MCNC: 1.3 MG/DL (ref 0.7–1.2)
EKG ATRIAL RATE: 74 BPM
EKG P AXIS: 10 DEGREES
EKG P-R INTERVAL: 144 MS
EKG Q-T INTERVAL: 384 MS
EKG QRS DURATION: 92 MS
EKG QTC CALCULATION (BAZETT): 426 MS
EKG R AXIS: 14 DEGREES
EKG T AXIS: -12 DEGREES
EKG VENTRICULAR RATE: 74 BPM
EOSINOPHILS ABSOLUTE: 0.29 E9/L (ref 0.05–0.5)
EOSINOPHILS RELATIVE PERCENT: 4 % (ref 0–6)
GFR AFRICAN AMERICAN: >60
GFR NON-AFRICAN AMERICAN: 55 ML/MIN/1.73
GLUCOSE BLD-MCNC: 109 MG/DL (ref 74–99)
HCT VFR BLD CALC: 48 % (ref 37–54)
HEMOGLOBIN: 15.4 G/DL (ref 12.5–16.5)
IMMATURE GRANULOCYTES #: 0.06 E9/L
IMMATURE GRANULOCYTES %: 0.8 % (ref 0–5)
LYMPHOCYTES ABSOLUTE: 1.82 E9/L (ref 1.5–4)
LYMPHOCYTES RELATIVE PERCENT: 25.3 % (ref 20–42)
MCH RBC QN AUTO: 29 PG (ref 26–35)
MCHC RBC AUTO-ENTMCNC: 32.1 % (ref 32–34.5)
MCV RBC AUTO: 90.4 FL (ref 80–99.9)
MONOCYTES ABSOLUTE: 0.71 E9/L (ref 0.1–0.95)
MONOCYTES RELATIVE PERCENT: 9.9 % (ref 2–12)
NEUTROPHILS ABSOLUTE: 4.28 E9/L (ref 1.8–7.3)
NEUTROPHILS RELATIVE PERCENT: 59.4 % (ref 43–80)
PDW BLD-RTO: 12.8 FL (ref 11.5–15)
PLATELET # BLD: 251 E9/L (ref 130–450)
PMV BLD AUTO: 11.1 FL (ref 7–12)
POTASSIUM REFLEX MAGNESIUM: 4.4 MMOL/L (ref 3.5–5)
RBC # BLD: 5.31 E12/L (ref 3.8–5.8)
SODIUM BLD-SCNC: 144 MMOL/L (ref 132–146)
TROPONIN: <0.01 NG/ML (ref 0–0.03)
WBC # BLD: 7.2 E9/L (ref 4.5–11.5)

## 2020-05-04 PROCEDURE — 80048 BASIC METABOLIC PNL TOTAL CA: CPT

## 2020-05-04 PROCEDURE — 85025 COMPLETE CBC W/AUTO DIFF WBC: CPT

## 2020-05-04 PROCEDURE — 4040F PNEUMOC VAC/ADMIN/RCVD: CPT | Performed by: FAMILY MEDICINE

## 2020-05-04 PROCEDURE — 6360000004 HC RX CONTRAST MEDICATION: Performed by: RADIOLOGY

## 2020-05-04 PROCEDURE — G8926 SPIRO NO PERF OR DOC: HCPCS | Performed by: FAMILY MEDICINE

## 2020-05-04 PROCEDURE — 71045 X-RAY EXAM CHEST 1 VIEW: CPT

## 2020-05-04 PROCEDURE — 3017F COLORECTAL CA SCREEN DOC REV: CPT | Performed by: FAMILY MEDICINE

## 2020-05-04 PROCEDURE — 93005 ELECTROCARDIOGRAM TRACING: CPT | Performed by: STUDENT IN AN ORGANIZED HEALTH CARE EDUCATION/TRAINING PROGRAM

## 2020-05-04 PROCEDURE — G8417 CALC BMI ABV UP PARAM F/U: HCPCS | Performed by: FAMILY MEDICINE

## 2020-05-04 PROCEDURE — 70450 CT HEAD/BRAIN W/O DYE: CPT

## 2020-05-04 PROCEDURE — 3023F SPIROM DOC REV: CPT | Performed by: FAMILY MEDICINE

## 2020-05-04 PROCEDURE — 71275 CT ANGIOGRAPHY CHEST: CPT

## 2020-05-04 PROCEDURE — 1036F TOBACCO NON-USER: CPT | Performed by: FAMILY MEDICINE

## 2020-05-04 PROCEDURE — 99284 EMERGENCY DEPT VISIT MOD MDM: CPT

## 2020-05-04 PROCEDURE — 84484 ASSAY OF TROPONIN QUANT: CPT

## 2020-05-04 PROCEDURE — G8427 DOCREV CUR MEDS BY ELIG CLIN: HCPCS | Performed by: FAMILY MEDICINE

## 2020-05-04 PROCEDURE — 1123F ACP DISCUSS/DSCN MKR DOCD: CPT | Performed by: FAMILY MEDICINE

## 2020-05-04 PROCEDURE — 99213 OFFICE O/P EST LOW 20 MIN: CPT | Performed by: FAMILY MEDICINE

## 2020-05-04 RX ORDER — IPRATROPIUM BROMIDE AND ALBUTEROL SULFATE 2.5; .5 MG/3ML; MG/3ML
1 SOLUTION RESPIRATORY (INHALATION) ONCE
Status: COMPLETED | OUTPATIENT
Start: 2020-05-04 | End: 2020-05-04

## 2020-05-04 RX ORDER — LISINOPRIL 10 MG/1
10 TABLET ORAL DAILY
Qty: 14 TABLET | Refills: 0 | Status: SHIPPED | OUTPATIENT
Start: 2020-05-04 | End: 2020-05-11 | Stop reason: SDUPTHER

## 2020-05-04 RX ADMIN — IOPAMIDOL 75 ML: 755 INJECTION, SOLUTION INTRAVENOUS at 13:23

## 2020-05-04 RX ADMIN — IPRATROPIUM BROMIDE AND ALBUTEROL SULFATE 1 AMPULE: 2.5; .5 SOLUTION RESPIRATORY (INHALATION) at 11:21

## 2020-05-04 ASSESSMENT — ENCOUNTER SYMPTOMS
DIARRHEA: 0
TROUBLE SWALLOWING: 0
VOMITING: 0
BACK PAIN: 0
ABDOMINAL PAIN: 0
NAUSEA: 0
COUGH: 0
PHOTOPHOBIA: 0
VOMITING: 0
WHEEZING: 1
BLOOD IN STOOL: 0
NAUSEA: 0
VOICE CHANGE: 0
SHORTNESS OF BREATH: 1
DIARRHEA: 0
WHEEZING: 0
CONSTIPATION: 0
SHORTNESS OF BREATH: 1
COUGH: 0

## 2020-05-04 NOTE — ED PROVIDER NOTES
The patient is a 19-year-old male who presents to the emergency department complaining of high blood pressure and dizziness. Patient saw his family doctor this morning, Dr. Arnie Castillo, who recommended him to come here for evaluation and treatment. Patient states he is been experiencing dizziness since Friday that has become more constant, he also complains of intermittent headaches. Patient also has been experiencing shortness of breath worse with exertion. He denies any fever, chills, nausea, vomiting, diarrhea, abdominal pain, history of blood clots, history of bleeding disorders, history of high blood pressure, change in medications, recent hospitalization, recent illness, cough, congestion, numbness, tingling, blurry vision, double vision, or other acute symptoms or concerns. The history is provided by the patient. Dizziness   Quality:  Lightheadedness  Severity:  Mild  Onset quality:  Sudden  Timing:  Intermittent  Progression:  Worsening  Chronicity:  New  Context: not with inactivity and not with loss of consciousness    Relieved by:  None tried  Worsened by:  Nothing  Ineffective treatments:  None tried  Associated symptoms: headaches and shortness of breath    Associated symptoms: no chest pain, no diarrhea, no nausea, no palpitations, no syncope, no vomiting and no weakness    Risk factors: no new medications         Review of Systems   Constitutional: Negative for chills, fatigue and fever. HENT: Negative for congestion, trouble swallowing and voice change. Eyes: Negative for photophobia and visual disturbance. Respiratory: Positive for shortness of breath. Negative for cough and wheezing. Cardiovascular: Negative for chest pain, palpitations, leg swelling and syncope. Gastrointestinal: Negative for abdominal pain, diarrhea, nausea and vomiting. Genitourinary: Negative for dysuria and frequency. Musculoskeletal: Negative for arthralgias and back pain.    Skin: Negative for rash and wound. Neurological: Positive for dizziness and headaches. Negative for syncope and weakness. Hematological: Negative for adenopathy. All other systems reviewed and are negative. Physical Exam  Vitals signs and nursing note reviewed. Constitutional:       General: He is not in acute distress. Appearance: He is well-developed. He is not ill-appearing, toxic-appearing or diaphoretic. HENT:      Head: Normocephalic and atraumatic. Nose: Nose normal.      Mouth/Throat:      Lips: Pink. No lesions. Mouth: Mucous membranes are moist.   Neck:      Musculoskeletal: Normal range of motion and neck supple. Cardiovascular:      Rate and Rhythm: Normal rate and regular rhythm. Heart sounds: Normal heart sounds, S1 normal and S2 normal. No murmur. Pulmonary:      Effort: Pulmonary effort is normal. No respiratory distress. Breath sounds: Normal air entry. Wheezing (Intermittent inspiratory and expiratory wheezing when the patient becomes anxious or agitated, no wheezing at rest.) present. No decreased breath sounds, rhonchi or rales. Abdominal:      General: Bowel sounds are normal.      Palpations: Abdomen is soft. Tenderness: There is no abdominal tenderness. There is no guarding or rebound. Skin:     General: Skin is warm and dry. Neurological:      Mental Status: He is alert and oriented to person, place, and time. Motor: No tremor or abnormal muscle tone. Coordination: Coordination normal.          Procedures     MDM  Number of Diagnoses or Management Options  Dizziness:   Essential hypertension:   Diagnosis management comments: The patient is a 51-year-old male presents the emergency department complaining of hypertension, dizziness, headache, and shortness of breath. The patient was hypertensive on arrival with a blood pressure of 174/90, otherwise hemodynamically stable, nontoxic in appearance, and in no acute distress.   I suspect possible orthostatic DO Aggie       --------------------------------------------- PAST HISTORY ---------------------------------------------  Past Medical History:  has a past medical history of Anesthesia complication, Arthritis, CAD (coronary artery disease), Erectile dysfunction, GERD (gastroesophageal reflux disease), H/O coronary angioplasty with stents[V45.82], Hyperlipidemia, Lymphoma [address] [address] Sleep apnea, and Unspecified cerebral artery occlusion with cerebral infarction. Past Surgical History:  has a past surgical history that includes Cardiac surgery; Coronary angioplasty with stent; Ankle surgery (2007); bronchoscopy (sept 2011); Carotid endarterectomy (Left); Ankle fracture surgery (Right, march 2014); other surgical history (6/29/2015); Cholecystectomy; Upper gastrointestinal endoscopy; Colonoscopy (7/30/13); Tonsillectomy; Shoulder arthroscopy (Right, 10-8-15); Endoscopy, colon, diagnostic; Hand surgery (Left); Testicle removal; and hernia repair (Bilateral, 8/15/2019). Social History:  reports that he quit smoking about 4 months ago. His smoking use included cigarettes. He has a 26.00 pack-year smoking history. He has never used smokeless tobacco. He reports current alcohol use of about 12.0 standard drinks of alcohol per week. He reports that he does not use drugs. Family History: family history includes Diabetes in his brother, father, mother, and sister. The patients home medications have been reviewed.     Allergies: Midazolam hcl    -------------------------------------------------- RESULTS -------------------------------------------------  Labs:  Results for orders placed or performed during the hospital encounter of 05/04/20   CBC auto differential   Result Value Ref Range    WBC 7.2 4.5 - 11.5 E9/L    RBC 5.31 3.80 - 5.80 E12/L    Hemoglobin 15.4 12.5 - 16.5 g/dL    Hematocrit 48.0 37.0 - 54.0 %    MCV 90.4 80.0 - 99.9 fL    MCH 29.0 26.0 - 35.0 pg    MCHC 32.1 32.0 - 34.5 %    RDW 12.8 11.5 - Ht 5' 8\" (1.727 m)   Wt 243 lb (110.2 kg)   SpO2 95%   BMI 36.95 kg/m²   Oxygen Saturation Interpretation: Normal      ------------------------------------------ PROGRESS NOTES ------------------------------------------  3:41 PM EDT  I have spoken with the patient and discussed todays results, in addition to providing specific details for the plan of care and counseling regarding the diagnosis and prognosis. Their questions are answered at this time and they are agreeable with the plan. I discussed at length with them reasons for immediate return here for re evaluation. They will followup with their primary care physician by calling their office tomorrow. --------------------------------- ADDITIONAL PROVIDER NOTES ---------------------------------  At this time the patient is without objective evidence of an acute process requiring hospitalization or inpatient management. They have remained hemodynamically stable throughout their entire ED visit and are stable for discharge with outpatient follow-up. The plan has been discussed in detail and they are aware of the specific conditions for emergent return, as well as the importance of follow-up. Discharge Medication List as of 5/4/2020  3:03 PM      START taking these medications    Details   lisinopril (PRINIVIL;ZESTRIL) 10 MG tablet Take 1 tablet by mouth daily for 14 days, Disp-14 tablet, R-0Print             Diagnosis:  1. Essential hypertension    2. Dizziness        Disposition:  Patient's disposition: Discharge to home  Patient's condition is stable.        Frances Yang DO  Resident  05/04/20 0383

## 2020-05-04 NOTE — ED NOTES
Orthostatic vital signs preformed, the patent complained of being lightheaded with all movement, lying, sitting, and standing. Dr. Josi Schroeder notified.      Roberto Starkey RN  05/04/20 1912

## 2020-05-04 NOTE — ED NOTES
Dr. Stefan Zamora notified of the patient's BP, okay to discharge.       Rhonda Guthrie RN  05/04/20 1127

## 2020-05-04 NOTE — PROGRESS NOTES
Skin:     Findings: No erythema or rash. Neurological:      General: No focal deficit present. Mental Status: He is alert and oriented to person, place, and time. Comments:            Assessment/Plan:  Qing Rueda was seen today for dizziness. Diagnoses and all orders for this visit:    SOB (shortness of breath)  -     XR CHEST STANDARD (2 VW); Future    Acute exacerbation of chronic obstructive pulmonary disease (COPD) ([facility] [address]      Pt hypertensive. Wheezing in office. Given breathing treatment. Pt sent to emergency room, spoke with attending prior. LILIA Luz.

## 2020-05-05 ENCOUNTER — TELEPHONE (OUTPATIENT)
Dept: PRIMARY CARE CLINIC | Age: 70
End: 2020-05-05

## 2020-05-05 NOTE — TELEPHONE ENCOUNTER
[facility] ED Follow Up Call    ED Visit Date:  2020    Patient: Gilbert Mendoza Patient : 1950     PCP: Shira Schmitz DO     Left message to call the office back regarding ED visit. Patient was seen in the ED on 2020. Awaiting call back.

## 2020-05-11 ENCOUNTER — OFFICE VISIT (OUTPATIENT)
Dept: FAMILY MEDICINE CLINIC | Age: 70
End: 2020-05-11
Payer: MEDICARE

## 2020-05-11 VITALS
TEMPERATURE: 97.7 F | WEIGHT: 235 LBS | SYSTOLIC BLOOD PRESSURE: 140 MMHG | OXYGEN SATURATION: 94 % | HEART RATE: 73 BPM | BODY MASS INDEX: 34.8 KG/M2 | RESPIRATION RATE: 18 BRPM | DIASTOLIC BLOOD PRESSURE: 88 MMHG | HEIGHT: 69 IN

## 2020-05-11 PROCEDURE — 3017F COLORECTAL CA SCREEN DOC REV: CPT | Performed by: FAMILY MEDICINE

## 2020-05-11 PROCEDURE — 99214 OFFICE O/P EST MOD 30 MIN: CPT | Performed by: FAMILY MEDICINE

## 2020-05-11 PROCEDURE — 1036F TOBACCO NON-USER: CPT | Performed by: FAMILY MEDICINE

## 2020-05-11 PROCEDURE — 1123F ACP DISCUSS/DSCN MKR DOCD: CPT | Performed by: FAMILY MEDICINE

## 2020-05-11 PROCEDURE — 4040F PNEUMOC VAC/ADMIN/RCVD: CPT | Performed by: FAMILY MEDICINE

## 2020-05-11 PROCEDURE — G8427 DOCREV CUR MEDS BY ELIG CLIN: HCPCS | Performed by: FAMILY MEDICINE

## 2020-05-11 PROCEDURE — G8417 CALC BMI ABV UP PARAM F/U: HCPCS | Performed by: FAMILY MEDICINE

## 2020-05-11 RX ORDER — LISINOPRIL 10 MG/1
10 TABLET ORAL DAILY
Qty: 14 TABLET | Refills: 0 | Status: SHIPPED
Start: 2020-05-11 | End: 2020-07-15 | Stop reason: SDUPTHER

## 2020-05-11 ASSESSMENT — ENCOUNTER SYMPTOMS
CONSTIPATION: 0
DIARRHEA: 0
WHEEZING: 0
BLOOD IN STOOL: 0

## 2020-05-11 NOTE — PROGRESS NOTES
HPI:  Patient comes in today for   Chief Complaint   Patient presents with    Hypertension     was in [facility] ER 5/4. does not check BP at home. has appt with dr. Augusta Beebe on the 14th   . Review of Systems  Review of Systems   Constitutional: Negative for chills and diaphoresis. HENT: Negative for ear discharge, ear pain, hearing loss, nosebleeds and tinnitus. Respiratory: Negative for wheezing. Cardiovascular: Negative for chest pain. Gastrointestinal: Negative for blood in stool, constipation and diarrhea. Genitourinary: Negative for dysuria, flank pain and hematuria. Skin: Negative for rash. Neurological: Negative for headaches. Hematological: Does not bruise/bleed easily. PE:  VS:  BP (!) 140/88   Pulse 73   Temp 97.7 °F (36.5 °C) (Oral)   Resp 18   Ht 5' 9\" (1.753 m)   Wt 235 lb (106.6 kg)   SpO2 94%   BMI 34.70 kg/m²   Physical Exam  Constitutional:       Appearance: He is well-developed. HENT:      Head: Normocephalic and atraumatic. Right Ear: Tympanic membrane normal.      Nose: Nose normal.      Mouth/Throat:      Mouth: Mucous membranes are moist.   Eyes:      General: No scleral icterus. Conjunctiva/sclera: Conjunctivae normal.      Pupils: Pupils are equal, round, and reactive to light. Neck:      Musculoskeletal: Neck supple. Thyroid: No thyromegaly. Vascular: No JVD. Trachea: No tracheal deviation. Cardiovascular:      Rate and Rhythm: Normal rate and regular rhythm. Heart sounds: Normal heart sounds. No murmur. No gallop. Pulmonary:      Effort: Pulmonary effort is normal. No respiratory distress. Breath sounds: No wheezing. Chest:      Chest wall: No tenderness. Abdominal:      General: Abdomen is flat. Palpations: There is no mass. Tenderness: There is no abdominal tenderness. There is no rebound. Musculoskeletal:         General: No tenderness.    Skin:     Capillary Refill: Capillary refill takes less than 2 seconds. Findings: No erythema or rash. Neurological:      General: No focal deficit present. Cranial Nerves: No cranial nerve deficit. Coordination: Coordination normal.      Deep Tendon Reflexes: Reflexes normal.      Comments:        Psychiatric:         Mood and Affect: Mood normal.         Greater than 15 minutes was spent during this visit counseling this patient. This time was spent with pt reviewing CT chest Images of 5/4/2020 and 6/19. I se a left lung mass not mentioned. I'll check with RAD      Assessment/Plan:  Amarjit Silva was seen today for hypertension. 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

## 2020-05-14 ENCOUNTER — TELEPHONE (OUTPATIENT)
Dept: ENT CLINIC | Age: 70
End: 2020-05-14

## 2020-06-02 ENCOUNTER — OFFICE VISIT (OUTPATIENT)
Dept: FAMILY MEDICINE CLINIC | Age: 70
End: 2020-06-02
Payer: MEDICARE

## 2020-06-02 VITALS
DIASTOLIC BLOOD PRESSURE: 78 MMHG | HEART RATE: 80 BPM | SYSTOLIC BLOOD PRESSURE: 130 MMHG | BODY MASS INDEX: 35.7 KG/M2 | HEIGHT: 69 IN | OXYGEN SATURATION: 95 % | WEIGHT: 241 LBS | TEMPERATURE: 98 F

## 2020-06-02 PROCEDURE — G8427 DOCREV CUR MEDS BY ELIG CLIN: HCPCS | Performed by: FAMILY MEDICINE

## 2020-06-02 PROCEDURE — 3017F COLORECTAL CA SCREEN DOC REV: CPT | Performed by: FAMILY MEDICINE

## 2020-06-02 PROCEDURE — 3023F SPIROM DOC REV: CPT | Performed by: FAMILY MEDICINE

## 2020-06-02 PROCEDURE — 1036F TOBACCO NON-USER: CPT | Performed by: FAMILY MEDICINE

## 2020-06-02 PROCEDURE — G8417 CALC BMI ABV UP PARAM F/U: HCPCS | Performed by: FAMILY MEDICINE

## 2020-06-02 PROCEDURE — 4040F PNEUMOC VAC/ADMIN/RCVD: CPT | Performed by: FAMILY MEDICINE

## 2020-06-02 PROCEDURE — 1123F ACP DISCUSS/DSCN MKR DOCD: CPT | Performed by: FAMILY MEDICINE

## 2020-06-02 PROCEDURE — G8926 SPIRO NO PERF OR DOC: HCPCS | Performed by: FAMILY MEDICINE

## 2020-06-02 PROCEDURE — 99213 OFFICE O/P EST LOW 20 MIN: CPT | Performed by: FAMILY MEDICINE

## 2020-06-02 RX ORDER — HYDROCODONE BITARTRATE AND ACETAMINOPHEN 7.5; 325 MG/1; MG/1
1 TABLET ORAL EVERY 8 HOURS PRN
Qty: 90 TABLET | Refills: 0 | Status: SHIPPED | OUTPATIENT
Start: 2020-06-02 | End: 2020-07-08 | Stop reason: SDUPTHER

## 2020-06-02 ASSESSMENT — ENCOUNTER SYMPTOMS
WHEEZING: 0
BLOOD IN STOOL: 0
CONSTIPATION: 0
DIARRHEA: 0

## 2020-06-11 ENCOUNTER — OFFICE VISIT (OUTPATIENT)
Dept: FAMILY MEDICINE CLINIC | Age: 70
End: 2020-06-11
Payer: MEDICARE

## 2020-06-11 VITALS
OXYGEN SATURATION: 96 % | DIASTOLIC BLOOD PRESSURE: 86 MMHG | RESPIRATION RATE: 14 BRPM | SYSTOLIC BLOOD PRESSURE: 134 MMHG | TEMPERATURE: 97.2 F | BODY MASS INDEX: 35.7 KG/M2 | WEIGHT: 241 LBS | HEIGHT: 69 IN | HEART RATE: 77 BPM

## 2020-06-11 PROCEDURE — 3017F COLORECTAL CA SCREEN DOC REV: CPT | Performed by: FAMILY MEDICINE

## 2020-06-11 PROCEDURE — G8427 DOCREV CUR MEDS BY ELIG CLIN: HCPCS | Performed by: FAMILY MEDICINE

## 2020-06-11 PROCEDURE — 1036F TOBACCO NON-USER: CPT | Performed by: FAMILY MEDICINE

## 2020-06-11 PROCEDURE — 1123F ACP DISCUSS/DSCN MKR DOCD: CPT | Performed by: FAMILY MEDICINE

## 2020-06-11 PROCEDURE — G8417 CALC BMI ABV UP PARAM F/U: HCPCS | Performed by: FAMILY MEDICINE

## 2020-06-11 PROCEDURE — 4040F PNEUMOC VAC/ADMIN/RCVD: CPT | Performed by: FAMILY MEDICINE

## 2020-06-11 PROCEDURE — 99213 OFFICE O/P EST LOW 20 MIN: CPT | Performed by: FAMILY MEDICINE

## 2020-06-11 RX ORDER — PREDNISONE 5 MG/1
TABLET ORAL
Qty: 21 EACH | Refills: 0 | Status: SHIPPED
Start: 2020-06-11 | End: 2020-10-23 | Stop reason: CLARIF

## 2020-06-11 ASSESSMENT — ENCOUNTER SYMPTOMS
SHORTNESS OF BREATH: 1
BLOOD IN STOOL: 0
ABDOMINAL DISTENTION: 0
CHEST TIGHTNESS: 1
CONSTIPATION: 0
COUGH: 1
DIARRHEA: 0
WHEEZING: 0

## 2020-06-11 NOTE — PROGRESS NOTES
HPI:  Patient comes in today for   Chief Complaint   Patient presents with    Shoulder Pain     right arm. Monday he was drying back off and \"heard a snap\". still able to move arm but hurts.  Shortness of Breath     At rest           Review of Systems  Review of Systems   Constitutional: Negative for chills and diaphoresis. HENT: Negative for congestion, ear discharge, ear pain, hearing loss, nosebleeds and tinnitus. Respiratory: Positive for cough, chest tightness and shortness of breath. Negative for wheezing. Cardiovascular: Negative for chest pain. Gastrointestinal: Negative for abdominal distention, blood in stool, constipation and diarrhea. Genitourinary: Negative for dysuria, flank pain and hematuria. Musculoskeletal: Positive for arthralgias and myalgias (Right biceps). Right shoulder pain at rest and worse with movement   Skin: Negative for rash. Neurological: Negative for headaches. Hematological: Does not bruise/bleed easily. Psychiatric/Behavioral: Negative for agitation. PE[de-identified]  /86   Pulse 77   Temp 97.2 °F (36.2 °C) (Temporal)   Resp 14   Ht 5' 9\" (1.753 m)   Wt 241 lb (109.3 kg)   SpO2 96%   BMI 35.59 kg/m²       Physical Exam  Constitutional:       Appearance: Normal appearance. He is well-developed. HENT:      Head: Normocephalic and atraumatic. Mouth/Throat:      Mouth: Mucous membranes are moist.   Eyes:      General: No scleral icterus. Conjunctiva/sclera: Conjunctivae normal.      Pupils: Pupils are equal, round, and reactive to light. Neck:      Musculoskeletal: Neck supple. Thyroid: No thyromegaly. Vascular: No JVD. Trachea: No tracheal deviation. Cardiovascular:      Rate and Rhythm: Normal rate and regular rhythm. Heart sounds: Normal heart sounds. No murmur. No gallop. Pulmonary:      Effort: Pulmonary effort is normal. No respiratory distress. Breath sounds: Normal breath sounds. No wheezing.

## 2020-06-12 ENCOUNTER — HOSPITAL ENCOUNTER (OUTPATIENT)
Age: 70
Discharge: HOME | End: 2020-06-12
Payer: MEDICARE

## 2020-06-12 ENCOUNTER — HOSPITAL ENCOUNTER (OUTPATIENT)
Dept: GENERAL RADIOLOGY | Age: 70
Discharge: HOME | End: 2020-06-14
Payer: MEDICARE

## 2020-06-12 PROCEDURE — 73030 X-RAY EXAM OF SHOULDER: CPT

## 2020-07-08 ENCOUNTER — OFFICE VISIT (OUTPATIENT)
Dept: FAMILY MEDICINE CLINIC | Age: 70
End: 2020-07-08
Payer: MEDICARE

## 2020-07-08 VITALS
BODY MASS INDEX: 35.99 KG/M2 | HEIGHT: 69 IN | SYSTOLIC BLOOD PRESSURE: 138 MMHG | OXYGEN SATURATION: 96 % | TEMPERATURE: 98 F | DIASTOLIC BLOOD PRESSURE: 82 MMHG | WEIGHT: 243 LBS | HEART RATE: 76 BPM

## 2020-07-08 PROCEDURE — G8417 CALC BMI ABV UP PARAM F/U: HCPCS | Performed by: FAMILY MEDICINE

## 2020-07-08 PROCEDURE — 4040F PNEUMOC VAC/ADMIN/RCVD: CPT | Performed by: FAMILY MEDICINE

## 2020-07-08 PROCEDURE — 3017F COLORECTAL CA SCREEN DOC REV: CPT | Performed by: FAMILY MEDICINE

## 2020-07-08 PROCEDURE — G8427 DOCREV CUR MEDS BY ELIG CLIN: HCPCS | Performed by: FAMILY MEDICINE

## 2020-07-08 PROCEDURE — 1123F ACP DISCUSS/DSCN MKR DOCD: CPT | Performed by: FAMILY MEDICINE

## 2020-07-08 PROCEDURE — 99213 OFFICE O/P EST LOW 20 MIN: CPT | Performed by: FAMILY MEDICINE

## 2020-07-08 PROCEDURE — 1036F TOBACCO NON-USER: CPT | Performed by: FAMILY MEDICINE

## 2020-07-08 RX ORDER — HYDROCODONE BITARTRATE AND ACETAMINOPHEN 7.5; 325 MG/1; MG/1
1 TABLET ORAL EVERY 8 HOURS PRN
Qty: 90 TABLET | Refills: 0 | Status: SHIPPED | OUTPATIENT
Start: 2020-07-08 | End: 2020-08-05 | Stop reason: SDUPTHER

## 2020-07-08 NOTE — PROGRESS NOTES
Chief Complaint   Patient presents with    Shoulder Pain     med refills       HPI:  Wally Hendrix here for follow-up of BACK PAIN. Patient complains of  Spasm, Tightness and worsening after sitting or standing. Pt needs refills. Scheduled Meds:  Continuous Infusions:  PRN Meds:.    Past Medical History, Surgical History, and Family History has been reviewed and updated. We did discuss pain management and exercises. Review of Systems:  Constitutional:  No fever, no fatigue, no chills, no headaches, no weight change  Dermatology:  No rash, no mole, no dry or sensitive skin  ENT:  No cough, no sore throat, no sinus pain, no runny nose, no ear pain  Cardiology:  No chest pain, no palpitations, no leg edema, no shortness of breath, no PND  Gastroenterology:  No dysphagia, no abdominal pain, no nausea, no Straight Leg Raise Test on the , no constipation, no diarrhea, no heartburn  Musculoskeletal:  Pain and Spasm Lumbar Triangle.    Respiratory:  No shortness of breath, no orthopnea, no wheezing, no ESQUIVEL, no hemoptysis  Urology:  No blood in the urine, no urinary frequency, no urinary incontinence, no urinary urgency, no nocturia, no dysuria    ON EXAMINATION    Vitals:    07/08/20 0906   BP: 138/82   Pulse: 76   Temp: 98 °F (36.7 °C)   TempSrc: Oral   SpO2: 96%   Weight: 243 lb (110.2 kg)   Height: 5' 9\" (1.753 m)       Spinal Examination: decreased ROM, palpable pain and straight leg raising pain,     General:  Patient alert and oriented x 3, NAD, pleasant  Neck:  Supple, no goiter, no carotid bruits, no LAD  Lungs:  CTA Bilaterally  Heart:  RRR, no murmurs, gallops or rubs  Abdomen:  Soft/nt/nd, + bowel sounds  Extremities:  No clubbing, cyanosis or edema                 Diagnosis:    Patient Active Problem List   Diagnosis Code    Lymphoma ([facility] [address] C85.90    Gastroenteritis K52.9    Back pain at L4-L5 level M54.5    Impotence N52.9    Chronic fatigue R53.82    Urinary frequency R35.0    Tobacco abuse Z72.0    Bronchitis J40    Gastroesophageal reflux disease with esophagitis K21.0    Depression F32.9    Coronary artery disease involving native coronary artery of native heart with unstable angina pectoris (HCC) I25.110    Chronic obstructive pulmonary disease (HCC) J44.9    Pure hypercholesterolemia E78.00    Moderate obesity E66.8    Major depressive disorder, recurrent, mild (HCC) F33.0     RX: As written and recorded. ASSESSMENT/PLAN:    1. Chronic right shoulder pain  - HYDROcodone-acetaminophen (NORCO) 7.5-325 MG per tablet; Take 1 tablet by mouth every 8 hours as needed for Pain for up to 30 days. Dispense: 90 tablet; Refill: 0      Controlled substances monitoring: no signs of potential drug abuse or diversion identified and OARRS report reviewed today- activity consistent with treatment plan.                                                               Marifer Hernandez D.O. 7/8/20

## 2020-07-15 RX ORDER — FENOFIBRATE 145 MG/1
TABLET, COATED ORAL
Qty: 90 TABLET | Refills: 2 | Status: SHIPPED
Start: 2020-07-15 | End: 2021-02-25 | Stop reason: SDUPTHER

## 2020-07-15 RX ORDER — OMEPRAZOLE 40 MG/1
CAPSULE, DELAYED RELEASE ORAL
Qty: 90 CAPSULE | Refills: 3 | Status: SHIPPED
Start: 2020-07-15 | End: 2021-06-01

## 2020-07-15 RX ORDER — MONTELUKAST SODIUM 10 MG/1
TABLET ORAL
Qty: 90 TABLET | Refills: 3 | Status: SHIPPED
Start: 2020-07-15 | End: 2020-08-03

## 2020-07-15 RX ORDER — LISINOPRIL 10 MG/1
10 TABLET ORAL DAILY
Qty: 14 TABLET | Refills: 0 | Status: SHIPPED
Start: 2020-07-15 | End: 2020-07-22 | Stop reason: SDUPTHER

## 2020-07-15 RX ORDER — SERTRALINE HYDROCHLORIDE 100 MG/1
100 TABLET, FILM COATED ORAL DAILY
Qty: 30 TABLET | Refills: 3 | Status: SHIPPED
Start: 2020-07-15 | End: 2020-11-03

## 2020-07-15 RX ORDER — ALBUTEROL SULFATE 90 UG/1
AEROSOL, METERED RESPIRATORY (INHALATION)
Qty: 3 INHALER | Refills: 5 | Status: SHIPPED
Start: 2020-07-15 | End: 2021-02-25 | Stop reason: SDUPTHER

## 2020-07-15 RX ORDER — CLOPIDOGREL BISULFATE 75 MG/1
75 TABLET ORAL DAILY
Qty: 90 TABLET | Refills: 3 | Status: SHIPPED
Start: 2020-07-15 | End: 2021-06-01

## 2020-07-15 RX ORDER — BUPROPION HYDROCHLORIDE 150 MG/1
TABLET, EXTENDED RELEASE ORAL
Qty: 60 TABLET | Refills: 3 | Status: SHIPPED
Start: 2020-07-15 | End: 2020-11-03

## 2020-07-15 RX ORDER — SIMVASTATIN 20 MG
TABLET ORAL
Qty: 90 TABLET | Refills: 0 | Status: SHIPPED
Start: 2020-07-15 | End: 2020-10-05

## 2020-07-15 RX ORDER — MELOXICAM 15 MG/1
TABLET ORAL
Qty: 90 TABLET | Refills: 2 | Status: ON HOLD
Start: 2020-07-15 | End: 2021-02-19 | Stop reason: HOSPADM

## 2020-07-22 RX ORDER — LISINOPRIL 10 MG/1
10 TABLET ORAL DAILY
Qty: 30 TABLET | Refills: 3 | Status: SHIPPED
Start: 2020-07-22 | End: 2020-10-23 | Stop reason: CLARIF

## 2020-08-03 RX ORDER — MONTELUKAST SODIUM 10 MG/1
TABLET ORAL
Qty: 90 TABLET | Refills: 0 | Status: SHIPPED
Start: 2020-08-03 | End: 2021-06-01

## 2020-08-05 ENCOUNTER — OFFICE VISIT (OUTPATIENT)
Dept: FAMILY MEDICINE CLINIC | Age: 70
End: 2020-08-05
Payer: MEDICARE

## 2020-08-05 VITALS
WEIGHT: 236.6 LBS | SYSTOLIC BLOOD PRESSURE: 196 MMHG | RESPIRATION RATE: 20 BRPM | DIASTOLIC BLOOD PRESSURE: 82 MMHG | BODY MASS INDEX: 35.04 KG/M2 | HEIGHT: 69 IN | HEART RATE: 75 BPM | OXYGEN SATURATION: 97 % | TEMPERATURE: 97.4 F

## 2020-08-05 PROBLEM — F01.53 VASCULAR DEMENTIA WITH DEPRESSED MOOD (HCC): Status: ACTIVE | Noted: 2020-08-05

## 2020-08-05 PROBLEM — J45.909 MODERATE ASTHMA WITHOUT COMPLICATION: Status: ACTIVE | Noted: 2020-08-05

## 2020-08-05 PROCEDURE — 99213 OFFICE O/P EST LOW 20 MIN: CPT | Performed by: FAMILY MEDICINE

## 2020-08-05 PROCEDURE — 1123F ACP DISCUSS/DSCN MKR DOCD: CPT | Performed by: FAMILY MEDICINE

## 2020-08-05 PROCEDURE — 4040F PNEUMOC VAC/ADMIN/RCVD: CPT | Performed by: FAMILY MEDICINE

## 2020-08-05 PROCEDURE — G8428 CUR MEDS NOT DOCUMENT: HCPCS | Performed by: FAMILY MEDICINE

## 2020-08-05 PROCEDURE — 1036F TOBACCO NON-USER: CPT | Performed by: FAMILY MEDICINE

## 2020-08-05 PROCEDURE — G8417 CALC BMI ABV UP PARAM F/U: HCPCS | Performed by: FAMILY MEDICINE

## 2020-08-05 PROCEDURE — 3017F COLORECTAL CA SCREEN DOC REV: CPT | Performed by: FAMILY MEDICINE

## 2020-08-05 RX ORDER — HYDROCODONE BITARTRATE AND ACETAMINOPHEN 7.5; 325 MG/1; MG/1
1 TABLET ORAL EVERY 8 HOURS PRN
Qty: 90 TABLET | Refills: 0 | Status: SHIPPED
Start: 2020-08-05 | End: 2020-09-02 | Stop reason: SDUPTHER

## 2020-08-05 RX ORDER — MECLIZINE HYDROCHLORIDE 25 MG/1
25 TABLET ORAL 3 TIMES DAILY PRN
Qty: 30 TABLET | Refills: 0 | Status: SHIPPED | OUTPATIENT
Start: 2020-08-05 | End: 2020-08-15

## 2020-08-05 NOTE — PROGRESS NOTES
Chief Complaint   Patient presents with    1 Month Follow-Up    Shoulder Pain       HPI:  Gray Cuadra here for follow-up of BACK PAIN. Patient complains of  Spasm, Tightness and worsening after sitting or standing. Pt needs refills. Scheduled Meds:  Continuous Infusions:  PRN Meds:.    Past Medical History, Surgical History, and Family History has been reviewed and updated. We did discuss pain management and exercises. Review of Systems:  Constitutional:  No fever, no fatigue, no chills, no headaches, no weight change  Dermatology:  No rash, no mole, no dry or sensitive skin  ENT:  No cough, no sore throat, no sinus pain, no runny nose, no ear pain  Cardiology:  No chest pain, no palpitations, no leg edema, no shortness of breath, no PND  Gastroenterology:  No dysphagia, no abdominal pain, no nausea, no Straight Leg Raise Test on the , no constipation, no diarrhea, no heartburn  Musculoskeletal:  Pain and Spasm Lumbar Triangle. Respiratory:  No shortness of breath, no orthopnea, no wheezing, no ESQUIVEL, no hemoptysis  Urology:  No blood in the urine, no urinary frequency, no urinary incontinence, no urinary urgency, no nocturia, no dysuria    ON EXAMINATION    Vitals:    08/05/20 1012   BP: (!) 196/82   Pulse: 75   Resp: 20   Temp: 97.4 °F (36.3 °C)   TempSrc: Temporal   SpO2: 97%   Weight: 236 lb 9.6 oz (107.3 kg)   Height: 5' 9\" (1.753 m)       Spinal Examination: decreased ROM, palpable pain and straight leg raising pain,     General:  Patient alert and oriented x 3, NAD, pleasant  Neck:  Supple, no goiter, no carotid bruits, no LAD  Lungs:  CTA Bilaterally  Heart:  RRR, no murmurs, gallops or rubs  Abdomen:  Soft/nt/nd, + bowel sounds  Extremities:  Right shoulder  Decreased ROM      Assessment/Plan:  Natural history/expected course discussed, and patient's questions answered  ASSESSMENT/PLAN:    1. Chronic right shoulder pain  - HYDROcodone-acetaminophen (NORCO) 7.5-325 MG per tablet;  Take 1 tablet by mouth depressive disorder, recurrent, mild (HCC) F33.0    Vascular dementia with depressed mood ([facility] [address] F01.51, F32.9    Moderate asthma without complication J49.269     RX: As written and recorded.                                                      Sd Henderson D.O. 8/5/20

## 2020-09-02 ENCOUNTER — OFFICE VISIT (OUTPATIENT)
Dept: FAMILY MEDICINE CLINIC | Age: 70
End: 2020-09-02
Payer: MEDICARE

## 2020-09-02 ENCOUNTER — HOSPITAL ENCOUNTER (OUTPATIENT)
Age: 70
Discharge: HOME | End: 2020-09-04
Payer: MEDICARE

## 2020-09-02 VITALS
SYSTOLIC BLOOD PRESSURE: 160 MMHG | TEMPERATURE: 96.7 F | HEIGHT: 69 IN | WEIGHT: 234.8 LBS | DIASTOLIC BLOOD PRESSURE: 78 MMHG | BODY MASS INDEX: 34.78 KG/M2 | HEART RATE: 94 BPM | OXYGEN SATURATION: 95 % | RESPIRATION RATE: 20 BRPM

## 2020-09-02 LAB
ALBUMIN SERPL-MCNC: 4.5 G/DL (ref 3.5–5.2)
ANION GAP SERPL CALCULATED.3IONS-SCNC: 12 MMOL/L (ref 7–16)
BUN BLDV-MCNC: 28 MG/DL (ref 8–23)
CALCIUM SERPL-MCNC: 10 MG/DL (ref 8.6–10.2)
CHLORIDE BLD-SCNC: 103 MMOL/L (ref 98–107)
CO2: 26 MMOL/L (ref 22–29)
CREAT SERPL-MCNC: 1.3 MG/DL (ref 0.7–1.2)
GFR AFRICAN AMERICAN: >60
GFR NON-AFRICAN AMERICAN: 55 ML/MIN/1.73
GLUCOSE BLD-MCNC: 98 MG/DL (ref 74–99)
PHOSPHORUS: 3.8 MG/DL (ref 2.5–4.5)
POTASSIUM SERPL-SCNC: 4.6 MMOL/L (ref 3.5–5)
PROSTATE SPECIFIC ANTIGEN: 5.75 NG/ML (ref 0–4)
SODIUM BLD-SCNC: 141 MMOL/L (ref 132–146)

## 2020-09-02 PROCEDURE — 80069 RENAL FUNCTION PANEL: CPT

## 2020-09-02 PROCEDURE — 99214 OFFICE O/P EST MOD 30 MIN: CPT | Performed by: FAMILY MEDICINE

## 2020-09-02 PROCEDURE — 1036F TOBACCO NON-USER: CPT | Performed by: FAMILY MEDICINE

## 2020-09-02 PROCEDURE — 3017F COLORECTAL CA SCREEN DOC REV: CPT | Performed by: FAMILY MEDICINE

## 2020-09-02 PROCEDURE — G8417 CALC BMI ABV UP PARAM F/U: HCPCS | Performed by: FAMILY MEDICINE

## 2020-09-02 PROCEDURE — 84153 ASSAY OF PSA TOTAL: CPT

## 2020-09-02 PROCEDURE — 4040F PNEUMOC VAC/ADMIN/RCVD: CPT | Performed by: FAMILY MEDICINE

## 2020-09-02 PROCEDURE — G8427 DOCREV CUR MEDS BY ELIG CLIN: HCPCS | Performed by: FAMILY MEDICINE

## 2020-09-02 PROCEDURE — 1123F ACP DISCUSS/DSCN MKR DOCD: CPT | Performed by: FAMILY MEDICINE

## 2020-09-02 RX ORDER — DUTASTERIDE 0.5 MG/1
0.5 CAPSULE, LIQUID FILLED ORAL DAILY
Qty: 90 CAPSULE | Refills: 3 | Status: SHIPPED
Start: 2020-09-02 | End: 2022-01-26

## 2020-09-02 RX ORDER — HYDROCODONE BITARTRATE AND ACETAMINOPHEN 7.5; 325 MG/1; MG/1
1 TABLET ORAL EVERY 8 HOURS PRN
Qty: 90 TABLET | Refills: 0 | Status: SHIPPED | OUTPATIENT
Start: 2020-09-02 | End: 2020-09-23 | Stop reason: SDUPTHER

## 2020-09-02 NOTE — PROGRESS NOTES
Chief Complaint   Patient presents with    1 Month Follow-Up    Medication Refill    Urinary Frequency     would like medication    Urniary Frequency at Night     3-5 X per night       HPI:  Jonas here for follow-up of BACK PAIN. Patient complains of  Spasm, Tightness and worsening after sitting or standing. Pt needs refills. Scheduled Meds:  Continuous Infusions:  Also CO Nocturia as well as some positural urgency and feels like could leak. Weak stream, non powerful,    Past Medical History, Surgical History, and Family History has been reviewed and updated. We did discuss pain management and exercises. Review of Systems:  Constitutional:  No fever, no fatigue, no chills, no headaches, no weight change  Dermatology:  No rash, no mole, no dry or sensitive skin  ENT:  No cough, no sore throat, no sinus pain, no runny nose, no ear pain  Cardiology:  No chest pain, no palpitations, no leg edema, no shortness of breath, no PND  Gastroenterology:  No dysphagia, no abdominal pain, no nausea, no Straight Leg Raise Test on the , no constipation, no diarrhea, no heartburn  Musculoskeletal:  Pain and Spasm Lumbar Triangle. Respiratory:  No shortness of breath, no orthopnea, no wheezing, no ESQUIVEL, no hemoptysis  Urology:  No blood in the urine, no urinary frequency, no urinary incontinence, no urinary urgency, no nocturia, no dysuria    ON EXAMINATION    Vitals:    09/02/20 0948 09/02/20 0950   BP: (!) 180/72 (!) 160/78   Pulse: 94    Resp: 20    Temp: 96.7 °F (35.9 °C)    TempSrc: Temporal    SpO2: 95%    Weight: 234 lb 12.8 oz (106.5 kg)    Height: 5' 9\" (1.753 m)        Spinal Examination: decreased ROM, palpable pain and straight leg raising pain,     General:  Patient alert and oriented x 3, NAD, pleasant  Neck:  Supple, no goiter, no carotid bruits, no LAD  Lungs:  CTA Bilaterally  Heart:  RRR, no murmurs, gallops or rubs  Abdomen:  Soft/nt/nd, + bowel sounds  Extremities:  No clubbing, cyanosis or edema. Pain right shoulder. Assessment/Plan:  Lumbar and some radiation. ASSESSMENT/PLAN:    1. Chronic right shoulder pain  - HYDROcodone-acetaminophen (NORCO) 7.5-325 MG per tablet; Take 1 tablet by mouth every 8 hours as needed for Pain for up to 30 days. Dispense: 90 tablet; Refill: 0    2. CKD stage G3a/A3, GFR 45-59 and albumin creatinine ratio >300 mg/g ([facility])  - Renal Function Panel; Future    3. Elevated PSA  - PSA, Diagnostic; Future    4. Benign prostatic hyperplasia with nocturia  - dutasteride (AVODART) 0.5 MG capsule; Take 1 capsule by mouth daily  Dispense: 90 capsule; Refill: 3      Vasquez Noel was seen today for 1 month follow-up, medication refill, urinary frequency and urniary frequency at night. Diagnoses and all orders for this visit:    CKD stage G3a/A3, GFR 45-59 and albumin creatinine ratio >300 mg/g ([facility])  -     Renal Function Panel; Future    Chronic right shoulder pain  -     HYDROcodone-acetaminophen (NORCO) 7.5-325 MG per tablet; Take 1 tablet by mouth every 8 hours as needed for Pain for up to 30 days. Elevated PSA  -     PSA, Diagnostic; Future    Benign prostatic hyperplasia with nocturia  -     dutasteride (AVODART) 0.5 MG capsule;  Take 1 capsule by mouth daily            Diagnosis:    Patient Active Problem List   Diagnosis Code    Lymphoma ([facility] [address] C85.90    Gastroenteritis K52.9    Back pain at L4-L5 level M54.5    Impotence N52.9    Chronic fatigue R53.82    Urinary frequency R35.0    Tobacco abuse Z72.0    Bronchitis J40    Gastroesophageal reflux disease with esophagitis K21.0    Depression F32.9    Coronary artery disease involving native coronary artery of native heart with unstable angina pectoris (HCC) I25.110    Chronic obstructive pulmonary disease (HCC) J44.9    Pure hypercholesterolemia E78.00    Moderate obesity E66.8    Major depressive disorder, recurrent, mild (HCC) F33.0    Vascular dementia with depressed mood (HCC) F01.51, F32.9    Moderate asthma

## 2020-09-23 ENCOUNTER — OFFICE VISIT (OUTPATIENT)
Dept: FAMILY MEDICINE CLINIC | Age: 70
End: 2020-09-23
Payer: MEDICARE

## 2020-09-23 VITALS
BODY MASS INDEX: 34.4 KG/M2 | HEIGHT: 68 IN | SYSTOLIC BLOOD PRESSURE: 122 MMHG | DIASTOLIC BLOOD PRESSURE: 74 MMHG | WEIGHT: 227 LBS | RESPIRATION RATE: 18 BRPM | TEMPERATURE: 97.3 F | HEART RATE: 80 BPM | OXYGEN SATURATION: 96 %

## 2020-09-23 PROCEDURE — 1123F ACP DISCUSS/DSCN MKR DOCD: CPT | Performed by: FAMILY MEDICINE

## 2020-09-23 PROCEDURE — G8427 DOCREV CUR MEDS BY ELIG CLIN: HCPCS | Performed by: FAMILY MEDICINE

## 2020-09-23 PROCEDURE — 3017F COLORECTAL CA SCREEN DOC REV: CPT | Performed by: FAMILY MEDICINE

## 2020-09-23 PROCEDURE — 1036F TOBACCO NON-USER: CPT | Performed by: FAMILY MEDICINE

## 2020-09-23 PROCEDURE — G8417 CALC BMI ABV UP PARAM F/U: HCPCS | Performed by: FAMILY MEDICINE

## 2020-09-23 PROCEDURE — 99213 OFFICE O/P EST LOW 20 MIN: CPT | Performed by: FAMILY MEDICINE

## 2020-09-23 PROCEDURE — 4040F PNEUMOC VAC/ADMIN/RCVD: CPT | Performed by: FAMILY MEDICINE

## 2020-09-23 RX ORDER — MECLIZINE HYDROCHLORIDE 25 MG/1
25 TABLET ORAL 3 TIMES DAILY PRN
Qty: 30 TABLET | Refills: 0 | Status: SHIPPED | OUTPATIENT
Start: 2020-09-23 | End: 2020-10-03

## 2020-09-23 RX ORDER — HYDROCODONE BITARTRATE AND ACETAMINOPHEN 7.5; 325 MG/1; MG/1
1 TABLET ORAL EVERY 8 HOURS PRN
Qty: 90 TABLET | Refills: 0 | Status: SHIPPED | OUTPATIENT
Start: 2020-09-23 | End: 2020-10-23 | Stop reason: SDUPTHER

## 2020-09-23 ASSESSMENT — ENCOUNTER SYMPTOMS
DIARRHEA: 0
BLOOD IN STOOL: 0
CONSTIPATION: 0
WHEEZING: 0

## 2020-09-23 NOTE — PROGRESS NOTES
HPI:  Patient comes in today for   Chief Complaint   Patient presents with    Dizziness     ongoing for about 10 days           Review of Systems  Review of Systems   Constitutional: Negative for chills and diaphoresis. HENT: Negative for ear discharge, ear pain, hearing loss, nosebleeds and tinnitus. Respiratory: Negative for wheezing. Cardiovascular: Negative for chest pain. Gastrointestinal: Negative for blood in stool, constipation and diarrhea. Genitourinary: Negative for dysuria, flank pain and hematuria. Skin: Negative for rash. Neurological: Negative for headaches. Hematological: Does not bruise/bleed easily. PE[de-identified]  /74   Pulse 80   Temp 97.3 °F (36.3 °C) (Temporal)   Resp 18   Ht 5' 8\" (1.727 m)   Wt 227 lb (103 kg)   SpO2 96%   BMI 34.52 kg/m²       Physical Exam  Pulmonary:      Comments: Some cough    Musculoskeletal:      Comments: Marked back and shoulder pain. Neurological:      Comments: Negative Rhomberg's Test    Positional vertigo       ASSESSMENT/PLAN:    1. Benign paroxysmal positional vertigo of left ear  - meclizine (ANTIVERT) 25 MG tablet; Take 1 tablet by mouth 3 times daily as needed for Dizziness  Dispense: 30 tablet; Refill: 0    2. Chronic right shoulder pain  - HYDROcodone-acetaminophen (NORCO) 7.5-325 MG per tablet; Take 1 tablet by mouth every 8 hours as needed for Pain for up to 30 days. Dispense: 90 tablet; Refill: 0    Controlled substances monitoring: no signs of potential drug abuse or diversion identified and OARRS report reviewed today- activity consistent with treatment plan. LILIA Rogers.

## 2020-10-05 RX ORDER — SIMVASTATIN 20 MG
TABLET ORAL
Qty: 90 TABLET | Refills: 0 | Status: SHIPPED
Start: 2020-10-05 | End: 2020-12-07

## 2020-10-06 ENCOUNTER — HOSPITAL ENCOUNTER (EMERGENCY)
Age: 70
Discharge: HOME | End: 2020-10-06
Payer: MEDICARE

## 2020-10-06 VITALS
SYSTOLIC BLOOD PRESSURE: 150 MMHG | WEIGHT: 233 LBS | HEIGHT: 69 IN | OXYGEN SATURATION: 94 % | BODY MASS INDEX: 34.51 KG/M2 | TEMPERATURE: 96.9 F | HEART RATE: 88 BPM | DIASTOLIC BLOOD PRESSURE: 80 MMHG | RESPIRATION RATE: 20 BRPM

## 2020-10-06 PROCEDURE — 12001 RPR S/N/AX/GEN/TRNK 2.5CM/<: CPT

## 2020-10-06 PROCEDURE — 99213 OFFICE O/P EST LOW 20 MIN: CPT

## 2020-10-06 PROCEDURE — 90471 IMMUNIZATION ADMIN: CPT | Performed by: PHYSICIAN ASSISTANT

## 2020-10-06 PROCEDURE — 2500000003 HC RX 250 WO HCPCS: Performed by: PHYSICIAN ASSISTANT

## 2020-10-06 PROCEDURE — 90715 TDAP VACCINE 7 YRS/> IM: CPT | Performed by: PHYSICIAN ASSISTANT

## 2020-10-06 PROCEDURE — 96372 THER/PROPH/DIAG INJ SC/IM: CPT

## 2020-10-06 PROCEDURE — 6360000002 HC RX W HCPCS: Performed by: PHYSICIAN ASSISTANT

## 2020-10-06 RX ORDER — LIDOCAINE HYDROCHLORIDE 10 MG/ML
5 INJECTION, SOLUTION INFILTRATION; PERINEURAL ONCE
Status: COMPLETED | OUTPATIENT
Start: 2020-10-06 | End: 2020-10-06

## 2020-10-06 RX ADMIN — LIDOCAINE HYDROCHLORIDE 5 ML: 10 INJECTION, SOLUTION INFILTRATION; PERINEURAL at 14:11

## 2020-10-06 RX ADMIN — TETANUS TOXOID, REDUCED DIPHTHERIA TOXOID AND ACELLULAR PERTUSSIS VACCINE, ADSORBED 0.5 ML: 5; 2.5; 8; 8; 2.5 SUSPENSION INTRAMUSCULAR at 14:11

## 2020-10-06 ASSESSMENT — PAIN DESCRIPTION - PROGRESSION
CLINICAL_PROGRESSION: GRADUALLY WORSENING
CLINICAL_PROGRESSION: GRADUALLY IMPROVING

## 2020-10-06 ASSESSMENT — PAIN DESCRIPTION - FREQUENCY
FREQUENCY: CONTINUOUS
FREQUENCY: CONTINUOUS

## 2020-10-06 ASSESSMENT — PAIN DESCRIPTION - ORIENTATION
ORIENTATION: LEFT
ORIENTATION: LEFT

## 2020-10-06 ASSESSMENT — PAIN SCALES - GENERAL
PAINLEVEL_OUTOF10: 2
PAINLEVEL_OUTOF10: 2
PAINLEVEL_OUTOF10: 1

## 2020-10-06 ASSESSMENT — PAIN DESCRIPTION - LOCATION
LOCATION: ARM
LOCATION: ARM

## 2020-10-06 ASSESSMENT — PAIN - FUNCTIONAL ASSESSMENT
PAIN_FUNCTIONAL_ASSESSMENT: ACTIVITIES ARE NOT PREVENTED
PAIN_FUNCTIONAL_ASSESSMENT: 0-10
PAIN_FUNCTIONAL_ASSESSMENT: ACTIVITIES ARE NOT PREVENTED

## 2020-10-06 ASSESSMENT — PAIN DESCRIPTION - DESCRIPTORS
DESCRIPTORS: ACHING
DESCRIPTORS: ACHING

## 2020-10-06 ASSESSMENT — PAIN DESCRIPTION - ONSET
ONSET: ON-GOING
ONSET: ON-GOING

## 2020-10-06 ASSESSMENT — PAIN DESCRIPTION - PAIN TYPE
TYPE: ACUTE PAIN
TYPE: ACUTE PAIN

## 2020-10-06 NOTE — ED PROVIDER NOTES
This is a 78-year-old male that presents to urgent care complaining of a laceration to the left forearm today. He was moving a piece of steel and it rubbed up against his skin. He denies any numbness or tingling. No weakness. No bony pain. On first contact patient he appears to be in no acute distress. Patient states he is unsure of his last tetanus shot. Review of Systems   Constitutional:        Pertinent positives and negatives are stated within HPI, all other systems reviewed and are negative. Physical Exam  Vitals signs and nursing note reviewed. Constitutional:       Appearance: He is well-developed. HENT:      Head: Normocephalic and atraumatic. Jaw: No trismus. Right Ear: Hearing, tympanic membrane, ear canal and external ear normal.      Left Ear: Hearing, tympanic membrane, ear canal and external ear normal.      Nose: Nose normal.      Right Sinus: No maxillary sinus tenderness or frontal sinus tenderness. Left Sinus: No maxillary sinus tenderness or frontal sinus tenderness. Mouth/Throat:      Pharynx: Uvula midline. No uvula swelling. Eyes:      General: Lids are normal.      Conjunctiva/sclera: Conjunctivae normal.      Pupils: Pupils are equal, round, and reactive to light. Neck:      Musculoskeletal: Normal range of motion and neck supple. Cardiovascular:      Rate and Rhythm: Normal rate and regular rhythm. Heart sounds: Normal heart sounds. No murmur. Pulmonary:      Effort: Pulmonary effort is normal.      Breath sounds: Normal breath sounds. Abdominal:      General: Bowel sounds are normal.      Palpations: Abdomen is soft. Abdomen is not rigid. Tenderness: There is no abdominal tenderness. There is no guarding or rebound. Skin:     General: Skin is warm and dry. Findings: Laceration present. No abrasion or rash. Comments: Approximate 3.5 cm linear laceration to the dorsal aspect of the mid left forearm.   I do not immediate complications        MDM         --------------------------------------------- PAST HISTORY ---------------------------------------------  Past Medical History:  has a past medical history of Anesthesia complication, Arthritis, CAD (coronary artery disease), Erectile dysfunction, GERD (gastroesophageal reflux disease), H/O coronary angioplasty with stents[V45.82], Hyperlipidemia, Lymphoma ([facility] [address] Sleep apnea, and Unspecified cerebral artery occlusion with cerebral infarction. Past Surgical History:  has a past surgical history that includes Cardiac surgery; Coronary angioplasty with stent; Ankle surgery (2007); bronchoscopy (sept 2011); Carotid endarterectomy (Left); Ankle fracture surgery (Right, march 2014); other surgical history (6/29/2015); Cholecystectomy; Upper gastrointestinal endoscopy; Colonoscopy (7/30/13); Tonsillectomy; Shoulder arthroscopy (Right, 10-8-15); Endoscopy, colon, diagnostic; Hand surgery (Left); Testicle removal; and hernia repair (Bilateral, 8/15/2019). Social History:  reports that he quit smoking about 9 months ago. His smoking use included cigarettes. He has a 26.00 pack-year smoking history. He has never used smokeless tobacco. He reports current alcohol use of about 12.0 standard drinks of alcohol per week. He reports that he does not use drugs. Family History: family history includes Diabetes in his brother, father, mother, and sister. The patients home medications have been reviewed. Allergies: Midazolam hcl    -------------------------------------------------- RESULTS -------------------------------------------------  No results found for this visit on 10/06/20. No orders to display       ------------------------- NURSING NOTES AND VITALS REVIEWED ---------------------------   The nursing notes within the ED encounter and vital signs as below have been reviewed.    BP (!) 150/80   Pulse 88   Temp 96.9 °F (36.1 °C) (Temporal)   Resp 20   Ht 5' 9\" (1.753 m)   Wt 233 lb (105.7 kg)   SpO2 94%   BMI 34.41 kg/m²   Oxygen Saturation Interpretation: Normal      ------------------------------------------ PROGRESS NOTES ------------------------------------------   I have spoken with the patient and discussed todays results, in addition to providing specific details for the plan of care and counseling regarding the diagnosis and prognosis. Their questions are answered at this time and they are agreeable with the plan.      --------------------------------- ADDITIONAL PROVIDER NOTES ---------------------------------      This patient is stable for discharge. I have shared the specific conditions for return, as well as the importance of follow-up. * NOTE: This report was transcribed using voice recognition software. Every effort was made to ensure accuracy; however, inadvertent computerized transcription errors may be present.    --------------------------------- IMPRESSION AND DISPOSITION ---------------------------------    IMPRESSION  1.  Laceration of left forearm, initial encounter        DISPOSITION  Disposition: Discharge to home  Patient condition is good         Gena Guzman PA-C  10/06/20 3958

## 2020-10-07 ENCOUNTER — CARE COORDINATION (OUTPATIENT)
Dept: CARE COORDINATION | Age: 70
End: 2020-10-07

## 2020-10-07 NOTE — CARE COORDINATION
Called patient's daughter Jesus Truong number listed (on HIPPA form) to discuss Care Coordination Program. I left a voice mail message introducing myself. Requested a call back. Direct contact information given.

## 2020-10-07 NOTE — CARE COORDINATION
Called patient to discuss Care Coordination Program. I left a voice mail message on pt's listed mobile number introducing myself and a brief description of the Care Coordination Program.   Requested a call back to discuss the program, enrollment, and schedule a time to speak with me. Direct contact information given.

## 2020-10-14 ENCOUNTER — CARE COORDINATION (OUTPATIENT)
Dept: CARE COORDINATION | Age: 70
End: 2020-10-14

## 2020-10-14 NOTE — CARE COORDINATION
2nd attempt, Called patient to discuss Care Coordination Program. I left a voice mail message introducing myself and a brief description of the Care Coordination Program.   Requested a call back to discuss the program, enrollment, and schedule a time to speak with me. Direct contact information given.

## 2020-10-19 RX ORDER — MECLIZINE HYDROCHLORIDE 25 MG/1
TABLET ORAL
Qty: 45 TABLET | Refills: 0 | Status: SHIPPED
Start: 2020-10-19 | End: 2021-09-16

## 2020-10-22 ENCOUNTER — CARE COORDINATION (OUTPATIENT)
Dept: CARE COORDINATION | Age: 70
End: 2020-10-22

## 2020-10-22 NOTE — CARE COORDINATION
Care Coordination Services explained to patient. Patient verbalizes understanding, but declines at this time. Reports that he has pill pack medications currently and is happy with that, denies any needs or any needed to manage his health conditions. Reports that he follows up regularly wit his PCP and his specialists. Patient encouraged to call PCP office with any questions/concerns.

## 2020-10-22 NOTE — CARE COORDINATION
3rd attempt, Called patient to discuss Care Coordination Program. I left a voice mail message introducing myself and a brief description of the Care Coordination Program.   Requested a call back to discuss the program, enrollment, and schedule a time to speak with me. Direct contact information given. Unable to reach patient. No further outreaches at this time.

## 2020-10-23 ENCOUNTER — HOSPITAL ENCOUNTER (OUTPATIENT)
Age: 70
Discharge: HOME | End: 2020-10-25
Payer: MEDICARE

## 2020-10-23 ENCOUNTER — OFFICE VISIT (OUTPATIENT)
Dept: FAMILY MEDICINE CLINIC | Age: 70
End: 2020-10-23
Payer: MEDICARE

## 2020-10-23 VITALS
WEIGHT: 225 LBS | TEMPERATURE: 97.3 F | BODY MASS INDEX: 34.1 KG/M2 | SYSTOLIC BLOOD PRESSURE: 142 MMHG | HEART RATE: 70 BPM | RESPIRATION RATE: 16 BRPM | HEIGHT: 68 IN | DIASTOLIC BLOOD PRESSURE: 82 MMHG | OXYGEN SATURATION: 93 %

## 2020-10-23 LAB
ANION GAP SERPL CALCULATED.3IONS-SCNC: 14 MMOL/L (ref 7–16)
BUN BLDV-MCNC: 25 MG/DL (ref 8–23)
CALCIUM SERPL-MCNC: 10.3 MG/DL (ref 8.6–10.2)
CHLORIDE BLD-SCNC: 104 MMOL/L (ref 98–107)
CO2: 24 MMOL/L (ref 22–29)
CREAT SERPL-MCNC: 1.3 MG/DL (ref 0.7–1.2)
GFR AFRICAN AMERICAN: >60
GFR NON-AFRICAN AMERICAN: 55 ML/MIN/1.73
GLUCOSE BLD-MCNC: 91 MG/DL (ref 74–99)
POTASSIUM SERPL-SCNC: 4.2 MMOL/L (ref 3.5–5)
SODIUM BLD-SCNC: 142 MMOL/L (ref 132–146)

## 2020-10-23 PROCEDURE — 80048 BASIC METABOLIC PNL TOTAL CA: CPT

## 2020-10-23 PROCEDURE — 99213 OFFICE O/P EST LOW 20 MIN: CPT | Performed by: NURSE PRACTITIONER

## 2020-10-23 PROCEDURE — G8417 CALC BMI ABV UP PARAM F/U: HCPCS | Performed by: NURSE PRACTITIONER

## 2020-10-23 PROCEDURE — 4040F PNEUMOC VAC/ADMIN/RCVD: CPT | Performed by: NURSE PRACTITIONER

## 2020-10-23 PROCEDURE — 3017F COLORECTAL CA SCREEN DOC REV: CPT | Performed by: NURSE PRACTITIONER

## 2020-10-23 PROCEDURE — 1036F TOBACCO NON-USER: CPT | Performed by: NURSE PRACTITIONER

## 2020-10-23 PROCEDURE — G8484 FLU IMMUNIZE NO ADMIN: HCPCS | Performed by: NURSE PRACTITIONER

## 2020-10-23 PROCEDURE — G8427 DOCREV CUR MEDS BY ELIG CLIN: HCPCS | Performed by: NURSE PRACTITIONER

## 2020-10-23 PROCEDURE — 1123F ACP DISCUSS/DSCN MKR DOCD: CPT | Performed by: NURSE PRACTITIONER

## 2020-10-23 RX ORDER — HYDROCODONE BITARTRATE AND ACETAMINOPHEN 7.5; 325 MG/1; MG/1
1 TABLET ORAL EVERY 8 HOURS PRN
Qty: 90 TABLET | Refills: 0 | Status: SHIPPED
Start: 2020-10-23 | End: 2020-11-30 | Stop reason: SDUPTHER

## 2020-10-23 ASSESSMENT — ENCOUNTER SYMPTOMS
VOMITING: 0
DIARRHEA: 0
NAUSEA: 0
SHORTNESS OF BREATH: 1
COUGH: 1
WHEEZING: 0
CONSTIPATION: 0

## 2020-10-23 NOTE — PROGRESS NOTES
HPI:  Patient comes intoday for   Chief Complaint   Patient presents with    Dizziness     patient reports dizziness everytime he moves his head too fast. orthostatic bp's competed .  Shoulder Pain     patient is scheduled with  on the 16th but wondered if he could address the shoulder pain while here. requests refill on  norco to manage pain. .    Complains of dizziness and pressure to head when changing positions quickly. Some relief with meclizine but does not completely correct symptoms. Symptoms started one month ago    Complains of chronic pain to right shoulder. He has had surgery but continues to have pain. Taking norco prn with good results. Prior to Visit Medications    Medication Sig Taking? Authorizing Provider   HYDROcodone-acetaminophen (NORCO) 7.5-325 MG per tablet Take 1 tablet by mouth every 8 hours as needed for Pain for up to 30 days.  Yes JULIAN Miller CNP   meclizine (ANTIVERT) 25 MG tablet TAKE ONE TABLET BY MOUTH THREE TIMES DAILY AS NEEDED FOR DIZZINESS Yes Aura Lyn DO   simvastatin (ZOCOR) 20 MG tablet TAKE 1 TABLET BY MOUTH ONCE DAILY Yes Aura Lyn DO   dutasteride (AVODART) 0.5 MG capsule Take 1 capsule by mouth daily Yes Aura Lyn DO   montelukast (SINGULAIR) 10 MG tablet Take 1 tablet by mouth once daily Yes JULIAN Miller CNP   sertraline (ZOLOFT) 100 MG tablet Take 1 tablet by mouth daily Yes Aura Lyn DO   albuterol sulfate HFA (PROAIR HFA) 108 (90 Base) MCG/ACT inhaler INHALE TWO PUFFS BY MOUTH EVERY 6 HOURS AS DIRECTED Yes Aura Lyn DO   clopidogrel (PLAVIX) 75 MG tablet Take 1 tablet by mouth daily Yes Aura Lyn DO   meloxicam (MOBIC) 15 MG tablet TAKE ONE TABLET BY MOUTH ONCE DAILY Yes Aura Lyn DO   buPROPion [facility]) 150 MG extended release tablet Take 1 tablet by mouth twice daily Yes Aura Lyn DO   fenofibrate (TRICOR) 145 MG tablet TAKE ONE TABLET BY MOUTH ONCE DAILY Yes Christina Fraga, DO   omeprazole (PRILOSEC) 40 MG delayed release capsule TAKE 1 CAPSULE BY MOUTH ONCE DAILY Yes Christina Fraga DO   tamsulosin (FLOMAX) 0.4 MG capsule TAKE 1 CAPSULE BY MOUTH ONCE DAILY Yes Christina Fraga, DO   tiZANidine (ZANAFLEX) 4 MG tablet Take 1 tablet by mouth every 8 hours as needed (spasms) Yes Christina Fraga DO   sildenafil (REVATIO) 20 MG tablet Take one daily as needed Yes Christina Fraga DO   LORazepam (ATIVAN) 1 MG tablet Take 1 mg by mouth daily as needed. Yes Historical Provider, MD   aspirin 81 MG chewable tablet Take 81 mg by mouth daily. Last dose 1 week ago Yes Historical Provider, MD         Allergies   Allergen Reactions    Midazolam Hcl      Wake up wild. ... Must be reversed with romazicon or will wake up wild and combative         Review of Systems  Review of Systems   Constitutional: Positive for unexpected weight change (loss due to diet). Negative for activity change and appetite change. Respiratory: Positive for cough and shortness of breath. Negative for wheezing. Chronic   Cardiovascular: Negative for chest pain and palpitations. Gastrointestinal: Negative for constipation, diarrhea, nausea and vomiting. Musculoskeletal: Positive for arthralgias and myalgias. Neurological: Positive for dizziness. Negative for weakness and headaches. VS:  BP (!) 142/82   Pulse 70   Temp 97.3 °F (36.3 °C) (Temporal)   Resp 16   Ht 5' 8\" (1.727 m)   Wt 225 lb (102.1 kg)   SpO2 93%   BMI 34.21 kg/m²     Physical Exam  Physical Exam  Constitutional:       General: He is not in acute distress. Appearance: He is well-developed. HENT:      Head: Normocephalic and atraumatic. Eyes:      Extraocular Movements: Extraocular movements intact. Pupils: Pupils are equal, round, and reactive to light. Neck:      Thyroid: No thyromegaly. Vascular: No carotid bruit.       Trachea: No tracheal deviation. Cardiovascular:      Rate and Rhythm: Normal rate and regular rhythm. Heart sounds: No murmur. Pulmonary:      Effort: Pulmonary effort is normal.      Breath sounds: Wheezing and rales (coarse) present. Chest:      Chest wall: No tenderness. Abdominal:      General: Bowel sounds are normal.      Palpations: Abdomen is soft. Tenderness: There is no abdominal tenderness. Musculoskeletal:         General: Tenderness present. Comments: Right shoulder shows no obvious deformity. Tender anteriorly. ROM limited due to pain. Lymphadenopathy:      Cervical: No cervical adenopathy. Skin:     General: Skin is warm and dry. Neurological:      Mental Status: He is alert and oriented to person, place, and time. Cranial Nerves: No cranial nerve deficit. Comments: Negative pronator drift   Psychiatric:         Behavior: Behavior normal.           Assessment/Plan:  Marychuy Marti was seen today for dizziness and shoulder pain. Diagnoses and all orders for this visit:    Dizziness  -     CT HEAD W WO CONTRAST; Future  -     Basic Metabolic Panel; Future  -Epley maneuver information given  -Meclizine prn  -Contact office if symptoms do not improve as expected or worsen. Chronic right shoulder pain  -     HYDROcodone-acetaminophen (NORCO) 7.5-325 MG per tablet; Take 1 tablet by mouth every 8 hours as needed for Pain for up to 30 days.  -The current medical regimen is effective;  continue present plan and medications. Pressure in head  -     CT HEAD W WO CONTRAST; Future  -     Basic Metabolic Panel; Future    Controlled Substance Monitoring:    Acute and Chronic Pain Monitoring:   RX Monitoring 10/23/2020   Attestation -   Acute Pain Prescriptions -   Periodic Controlled Substance Monitoring No signs of potential drug abuse or diversion identified.    Chronic Pain > 80 MEDD -             Greater than 15  Minutes was spent with patient and more than 50% of the time was spent face to facecounseling and educating regarding diagnoses

## 2020-10-29 ENCOUNTER — HOSPITAL ENCOUNTER (OUTPATIENT)
Dept: CT IMAGING | Age: 70
Discharge: HOME | End: 2020-10-29
Payer: MEDICARE

## 2020-10-29 PROCEDURE — 6360000004 HC RX CONTRAST MEDICATION: Performed by: RADIOLOGY

## 2020-10-29 PROCEDURE — 70470 CT HEAD/BRAIN W/O & W/DYE: CPT

## 2020-10-29 RX ADMIN — IOPAMIDOL 75 ML: 755 INJECTION, SOLUTION INTRAVENOUS at 12:58

## 2020-11-03 RX ORDER — BUPROPION HYDROCHLORIDE 150 MG/1
TABLET, EXTENDED RELEASE ORAL
Qty: 60 TABLET | Refills: 2 | Status: SHIPPED
Start: 2020-11-03 | End: 2021-01-04

## 2020-11-03 RX ORDER — LISINOPRIL 10 MG/1
10 TABLET ORAL DAILY
Qty: 30 TABLET | Refills: 2 | Status: SHIPPED
Start: 2020-11-03 | End: 2021-02-01

## 2020-11-03 RX ORDER — SERTRALINE HYDROCHLORIDE 100 MG/1
100 TABLET, FILM COATED ORAL DAILY
Qty: 30 TABLET | Refills: 2 | Status: SHIPPED
Start: 2020-11-03 | End: 2021-01-04

## 2020-11-30 ENCOUNTER — OFFICE VISIT (OUTPATIENT)
Dept: FAMILY MEDICINE CLINIC | Age: 70
End: 2020-11-30
Payer: MEDICARE

## 2020-11-30 VITALS
BODY MASS INDEX: 34.28 KG/M2 | HEIGHT: 68 IN | TEMPERATURE: 97.4 F | OXYGEN SATURATION: 97 % | SYSTOLIC BLOOD PRESSURE: 140 MMHG | DIASTOLIC BLOOD PRESSURE: 80 MMHG | WEIGHT: 226.2 LBS | HEART RATE: 71 BPM | RESPIRATION RATE: 20 BRPM

## 2020-11-30 PROCEDURE — 99214 OFFICE O/P EST MOD 30 MIN: CPT | Performed by: FAMILY MEDICINE

## 2020-11-30 PROCEDURE — 3017F COLORECTAL CA SCREEN DOC REV: CPT | Performed by: FAMILY MEDICINE

## 2020-11-30 PROCEDURE — G8484 FLU IMMUNIZE NO ADMIN: HCPCS | Performed by: FAMILY MEDICINE

## 2020-11-30 PROCEDURE — G8417 CALC BMI ABV UP PARAM F/U: HCPCS | Performed by: FAMILY MEDICINE

## 2020-11-30 PROCEDURE — 1123F ACP DISCUSS/DSCN MKR DOCD: CPT | Performed by: FAMILY MEDICINE

## 2020-11-30 PROCEDURE — G8427 DOCREV CUR MEDS BY ELIG CLIN: HCPCS | Performed by: FAMILY MEDICINE

## 2020-11-30 PROCEDURE — 1036F TOBACCO NON-USER: CPT | Performed by: FAMILY MEDICINE

## 2020-11-30 PROCEDURE — 4040F PNEUMOC VAC/ADMIN/RCVD: CPT | Performed by: FAMILY MEDICINE

## 2020-11-30 RX ORDER — VARENICLINE TARTRATE 1 MG/1
TABLET, FILM COATED ORAL
COMMUNITY
Start: 2020-10-26 | End: 2021-02-12 | Stop reason: ALTCHOICE

## 2020-11-30 RX ORDER — HYDROCODONE BITARTRATE AND ACETAMINOPHEN 7.5; 325 MG/1; MG/1
1 TABLET ORAL EVERY 8 HOURS PRN
Qty: 90 TABLET | Refills: 0 | Status: SHIPPED | OUTPATIENT
Start: 2020-11-30 | End: 2021-01-18 | Stop reason: SDUPTHER

## 2020-11-30 NOTE — PROGRESS NOTES
Chief Complaint   Patient presents with    Pain       HPI:  Davin Baker here for follow-up of BACK PAIN. Patient complains of  Spasm, Tightness and worsening after sitting or standing. Pt needs refills. Scheduled Meds:  Continuous Infusions:  PRN Meds:.    Past Medical History, Surgical History, and Family History has been reviewed and updated. We did discuss pain management and exercises. Review of Systems:  Constitutional:  No fever, no fatigue, no chills, no headaches, no weight change  Dermatology:  No rash, no mole, no dry or sensitive skin  ENT:  No cough, no sore throat, no sinus pain, no runny nose, no ear pain  Cardiology:  No chest pain, no palpitations, no leg edema, no shortness of breath, no PND  Gastroenterology:  No dysphagia, no abdominal pain, no nausea, no Straight Leg Raise Test on the , no constipation, no diarrhea, no heartburn  Musculoskeletal:  Pain and Spasm Lumbar Triangle. Right shoulder [id number] with a long tern rotator cuff. Respiratory:  No shortness of breath, no orthopnea, no wheezing, no ESQUIVEL, no hemoptysis  Urology:  No blood in the urine, no urinary frequency, no urinary incontinence, no urinary urgency, no nocturia, no dysuria    ON EXAMINATION    Vitals:    11/30/20 0834 11/30/20 0837   BP: (!) 148/82 (!) 140/80   Pulse: 71    Resp: 20    Temp: 97.4 °F (36.3 °C)    TempSrc: Temporal    SpO2: 97%    Weight: 226 lb 3.2 oz (102.6 kg)    Height: 5' 8\" (1.727 m)        Spinal Examination: decreased ROM, palpable pain and straight leg raising pain,     General:  Patient alert and oriented x 3, NAD, pleasant  Neck:  Supple, no goiter, no carotid bruits, no LAD  Lungs:  CTA Bilaterally  Heart:  RRR, no murmurs, gallops or rubs  Abdomen:  Soft/nt/nd, + bowel sounds  Extremities:  No clubbing, cyanosis or edema Pain on all ranges of motion ABduction is 15 degrees. Assessment/Plan:  none  ASSESSMENT/PLAN:    1.  Chronic right shoulder pain  - HYDROcodone-acetaminophen (NORCO) 7.5-325 MG per tablet; Take 1 tablet by mouth every 8 hours as needed for Pain for up to 30 days. Dispense: 90 tablet; Refill: 0    2. Nontraumatic complete tear of right rotator cuff  - MRI SHOULDER RIGHT WO CONTRAST; Future  - Giovani Gray DO, [facility] ([facility])  - [facility], Johanny 74 was seen today for pain. Diagnoses and all orders for this visit:    Nontraumatic complete tear of right rotator cuff  -     MRI SHOULDER RIGHT WO CONTRAST; Future  -     Giovani Gray DO, [facility] ([facility])  -     [facility]    Chronic right shoulder pain  -     HYDROcodone-acetaminophen (NORCO) 7.5-325 MG per tablet; Take 1 tablet by mouth every 8 hours as needed for Pain for up to 30 days. Controlled substances monitoring: no signs of potential drug abuse or diversion identified and OARRS report reviewed today- activity consistent with treatment plan. Diagnosis:    Patient Active Problem List   Diagnosis Code    Lymphoma ([facility] [address] C85.90    Gastroenteritis K52.9    Back pain at L4-L5 level M54.5    Impotence N52.9    Chronic fatigue R53.82    Urinary frequency R35.0    Tobacco abuse Z72.0    Bronchitis J40    Gastroesophageal reflux disease with esophagitis K21.00    Depression F32.9    Coronary artery disease involving native coronary artery of native heart with unstable angina pectoris (HCC) I25.110    Chronic obstructive pulmonary disease (HCC) J44.9    Pure hypercholesterolemia E78.00    Moderate obesity E66.8    Major depressive disorder, recurrent, mild (HCC) F33.0    Vascular dementia with depressed mood (HCC) F01.51, F32.9    Moderate asthma without complication N55.452     RX: As written and recorded.                                                      Juliane Alvarado D.O. 11/30/20

## 2020-12-01 ENCOUNTER — EVALUATION (OUTPATIENT)
Dept: PHYSICAL THERAPY | Age: 70
End: 2020-12-01
Payer: MEDICARE

## 2020-12-01 PROBLEM — M75.121 NONTRAUMATIC COMPLETE TEAR OF RIGHT ROTATOR CUFF: Status: ACTIVE | Noted: 2020-12-01

## 2020-12-01 PROCEDURE — 97162 PT EVAL MOD COMPLEX 30 MIN: CPT | Performed by: PHYSICAL THERAPIST

## 2020-12-01 PROCEDURE — 97110 THERAPEUTIC EXERCISES: CPT | Performed by: PHYSICAL THERAPIST

## 2020-12-01 NOTE — PROGRESS NOTES
Physical Therapy Daily Treatment Note    Date: 2020  Patient Name: Christos Bingham  : 1950   MRN: [id number]  [facility]: 5 years  DOSx: None   Referring Provider: Kylee Gonzalez DO  [address], [address] [facility] Diagnosis:      Diagnosis Orders   1. Nontraumatic complete tear of right rotator cuff         Outcome Measure: Quick Dash: 53% Impairment    S: See eval  O: Pt given written HEP  Time 8624-7038     Visit 1 Repeat outcome measure at mid point and end. Pain 7/10     ROM See eval     Modalities            Manual 15 mins flex, abd, IR, ER bilaterally                 Stretch      Table slides      Wall Walk Flex      Wall Walk ABD      Wall ER Stretch      Towel IR Stretch      Supine Stretch with Arms Behind Head            Exercise      UBE          Pulleys - Flex      Pulleys - IR      Supine Wand Flex NEXT     Supine Wand Bench Press NEXT     Supine Wand ER/IR NEXT     Standing Wand IR NEXT     Standing Wand Scaption NEXT     Standing Wand Flex      Standing Wand ER/IR      Shrugs AROM       Pendulum Ex            Supine Flex      S-lying ABD      S-lying ER            Prone Flexion      Prone Ext      Prone Row with ER      Prone Horizontal ABD            Standing Flex      Standing Scaption       Standing ABD      Standing Shoulder Press       Functional activities To aid in ROM and strength needed for reaching , lifting ,pushing and pulling at home/work    ROWS: H       ROWS: M  \"    ROWS: L  \"    ER  \"    IR  \"    Shoulder Flex      Shoulder ABD      A:  Tolerated well. Above added to written HEP.   P: Continue with rehab plan  Keesha Pryor, PT    Treatment Charges: Mins Units   Initial Evaluation 30 1   Re-Evaluation     Ther Exercise         TE 15 1   Manual Therapy     MT     Ther Activities        TA     Gait Training          GT     Neuro Re-education NR     Modalities     Non-Billable Service Time     Other     Total Time/Units 45 2

## 2020-12-01 NOTE — PROGRESS NOTES
[facility] OUTPATIENT REHABILITATION  PHYSICAL THERAPY INITIAL EVALUATION         Date:  2020   Patient: Edil Romero   \"Red\"   : 1950    MRN: [id number]  Referring Provider: Yaakov Roque DO  [address], [address]      Medical Diagnosis:      Diagnosis Orders   1. Nontraumatic complete tear of right rotator cuff     2. L shoulder pain     SUBJECTIVE:     Onset date: 4-5 years    Onset[de-identified] Gradual Onset    Mechanism of Injury / History: Pt stated that his shoulder progressively got worse throughout the years but that there was not a specific insult to the joint. Patient is right handed. Previous PT: none    Medical Management for Current Problem: medications, injections, OTC meds     Chief complaint: pain, decreased motion, decreased mobility and weakness    Behavior: condition is getting worse    Pain: constant  Current: 4/10     Best: 4/10     Worst:10/10    Symptom Type/Quality: sharp, shooting  Location[de-identified] noted above mid-deltoid region      Aggravated by: reaching overhead, reaching behind back, lifting/carrying/material handling    Relieved by: rest, ice, heat, medication      Imaging results: No results found.     Past Medical History:  Past Medical History:   Diagnosis Date    Anesthesia complication     wakes up  violant   only ok if reversed with romazacon    Arthritis     shoulders,ankle    CAD (coronary artery disease)     Erectile dysfunction     GERD (gastroesophageal reflux disease)     H/O coronary angioplasty with stents[V45.82] 2018 another stent    Hyperlipidemia     Lymphoma ([facility] [address] 2011    Sleep apnea     wont wear machine    Unspecified cerebral artery occlusion with cerebral infarction     no deficits     Past Surgical History:   Procedure Laterality Date    ANKLE FRACTURE SURGERY Right 2014    ORIF right ankle    ANKLE SURGERY      rt ankle    BRONCHOSCOPY  2011    Nancy Pry / DAILY 90 tablet 2    fenofibrate (TRICOR) 145 MG tablet TAKE ONE TABLET BY MOUTH ONCE DAILY 90 tablet 2    omeprazole (PRILOSEC) 40 MG delayed release capsule TAKE 1 CAPSULE BY MOUTH ONCE DAILY 90 capsule 3    tamsulosin (FLOMAX) 0.4 MG capsule TAKE 1 CAPSULE BY MOUTH ONCE DAILY 90 capsule 0    tiZANidine (ZANAFLEX) 4 MG tablet Take 1 tablet by mouth every 8 hours as needed (spasms) 90 tablet 3    sildenafil (REVATIO) 20 MG tablet Take one daily as needed 30 tablet 3    LORazepam (ATIVAN) 1 MG tablet Take 1 mg by mouth daily as needed.  aspirin 81 MG chewable tablet Take 81 mg by mouth daily. Last dose 1 week ago       No current facility-administered medications for this visit. Occupation: retired but is still active with [employer] jobs. Physical demands include: lifting, carrying, pushing, pulling heavy weighted items, assorted work positions (kneeling, crouching, crawling, stooping). Status: part time. Exercise regimen: none    Hobbies: yard work, working around [employer], work on car    Patient Goals: pain relief, return to prior activity, get back to normal    Precautions/Contraindications: none    OBJECTIVE:     Observations: well nourished male    Inspection: irregular scapulohumeral rhythm right shoulder, irregular scapulohumeral rhythm left shoulder.                      Palpation: Tender to palpation mid deltoid     Joint/Motion:  Right Shoulder:  AROM: 105° Forward elevation,  60° ER,  IR to 40  PROM: 160° Forward elevation,  80° ER,  70° IR    Left Shoulder:  AROM: 150° Forward elevation,  65° ER,  IR to 45  PROM: 160° Forward elevation,  80° ER , 55° IR    Strength:   Right Shoulder: Flexion 3-/5,  Abduction 3-/5, ER 3-/5, IR 3-/5      Left Shoulder: Flexion 4-/5,  Abduction 4-/5, ER 4-/5, IR 4-/5       Special Tests/Functional Screens:    [] Moses []+ / [] -  [] Chaves's []+ / [] -   []  Joshua's []+ / [] -    []GH drawer []+ / [] -    [] Bicep Load []+ / [] - [] Crank []+ / [] -  [] Alyx Peper []+ / [] -   [] Elbow Varus []+ / [] -  [] Neer's []+ / [] -      [] Speed's []+ / [] -   []Sulcus Sign []+ / [] -   [] Apprehension []+ / [] -   [] Bicep Load II []+ / [] -   [] Juaquin Vanegas []+ / [] -   [] Elbow Valgus []+ / [] -     [] Other: []+ / [] -         ASSESSMENT     Outcome Measure:   QuickDASH (Disorders of the Arm, Shoulder, and Hand) 53% disability    Problems:    Pain reported 10/10   ROM decreased   Strength decreased 3-/5 R shoulder, 4-/5 L shoulder   Decreased functional ability with work, ADLs, use of right upper extremity, use of left upper extremity, reaching, lifting, carrying    [x] There are no barriers affecting plan of care or recovery    [] Barriers to this patient's plan of care or recovery include. Domestic Concerns:  [x] No  [] Yes:    Short Term goals (2-3 weeks)   Decrease reported pain to 5/10   Increase ROM to [facility]   Increase Strength to 4/5 B shoulders     Able to perform/complete the following functions/tasks: Perform ADLs, hobbies, housework with less pain.  QuickDASH (Disorders of the Arm, Shoulder, and Hand) 40% disability    Long Term goals (4-6 weeks)   Decrease reported pain to 0-3/10   Increase ROM to WNL   Increase Strength to 5/5 B shoulders     Able to perform/complete the following functions/tasks: Perform ADLs, hobbies, housework with no/minimal pain.     QuickDASH 0-30% disability   Independent with Home Exercise Programs    Rehab Potential: [x] Good  [] Fair  [] Poor    PLAN       Treatment Plan:   [x] Therapeutic Exercise  [x] Therapeutic Activity  [x] Neuromuscular Re-education   [] Gait Training  [] Balance Training  [] Aerobic conditioning  [x] Manual Therapy  [x] Massage/Fascial release   [] Work/Sport specific activities    [] Pain Neuroscience [x] Cold/hotpack  [] Vasocompression  [x] Electrical Stimulation  [] Lumbar/Cervical Traction  [x] Ultrasound   [] Iontophoresis: 4 mg/mL Dexamethasone Sodium Phosphate 40-80 Cain        [x] Instruction in HEP      []  Medication allergies reviewed for use of Dexamethasone Sodium Phosphate 4mg/ml  with iontophoresis treatments. Patient is not allergic. The following CPT codes are likely to be used in the care of this patient: 56044 PT Evaluation: Moderate Complexity , 19788 PT Re-Evaluation , 28318 Therapeutic Exercise , 74138 Neuromuscular Re-Education , 26495 Therapeutic Activities , 35167 Manual Therapy ,  Electrical Stimulation      Suggested Professional Referral: [x] No  [] Yes:     Patient Education:  [x] Plans/Goals, Risks/Benefits discussed  [x] Home exercise program  Method of Education: [x] Verbal  [x] Demo  [x] Written  Comprehension of Education:  [x] Verbalizes understanding. [x] Demonstrates understanding. [] Needs Review. [] Demonstrates/verbalizes understanding of HEP/Ed previously given. Frequency:  1-2 days per week for 4-6 weeks    Patient understands diagnosis/prognosis and consents to treatment, plan and goals: [x] Yes    [] No     Thank you for the opportunity to work with your patient. If you have questions or comments, please contact me at [phone]; fax: [phone]. Electronically signed by: Claudia Coto, PT    Medicare Patients Only     Please sign Physician's Certification and return to: [address] [place] [address]  Dept: [phone]  Dept Fax: [phone] Certification / Comments     Frequency/Duration 1-2 days per week for 4-6 weeks. Certification period from 12/1/2020  to 3/1/2020. I have reviewed the Plan of Care established for skilled therapy services and certify that the services are required and that they will be provided while the patient is under my care.     Physician's Comments/Revisions:               Physician's Printed Name:                                           [de-identified] Signature: Date:

## 2020-12-07 ENCOUNTER — TREATMENT (OUTPATIENT)
Dept: PHYSICAL THERAPY | Age: 70
End: 2020-12-07
Payer: MEDICARE

## 2020-12-07 ENCOUNTER — HOSPITAL ENCOUNTER (OUTPATIENT)
Age: 70
Discharge: HOME | End: 2020-12-07
Payer: MEDICARE

## 2020-12-07 LAB — PROSTATE SPECIFIC ANTIGEN: 3.02 NG/ML (ref 0–4)

## 2020-12-07 PROCEDURE — 97110 THERAPEUTIC EXERCISES: CPT

## 2020-12-07 PROCEDURE — 36415 COLL VENOUS BLD VENIPUNCTURE: CPT

## 2020-12-07 PROCEDURE — 97140 MANUAL THERAPY 1/> REGIONS: CPT

## 2020-12-07 PROCEDURE — 84153 ASSAY OF PSA TOTAL: CPT

## 2020-12-07 RX ORDER — SIMVASTATIN 20 MG
TABLET ORAL
Qty: 90 TABLET | Refills: 0 | Status: SHIPPED
Start: 2020-12-07 | End: 2021-02-25 | Stop reason: SDUPTHER

## 2020-12-07 NOTE — PROGRESS NOTES
Physical Therapy Daily Treatment Note    Date: 2020  Patient Name: Roberto Wright  : 1950   MRN: [id number]  [facility]: 5 years  DOSx: None   Referring Provider:  Jolene Vazquez DO    Medical Diagnosis:        Diagnosis Orders   1. Nontraumatic complete tear of right rotator cuff         Outcome Measure: Quick Dash: 53% Impairment    S: no change in status  O: Pt given written HEP  Time 845-930     Visit 2 Repeat outcome measure at mid point and end. Pain 7/10     ROM See eval     Modalities            Manual 15 mins flex, abd, IR, ER bilaterally                 Stretch      Table slides      Wall Walk Flex      Wall Walk ABD      Wall ER Stretch      Towel IR Stretch      Supine Stretch with Arms Behind Head            Exercise      UBE          Pulleys - Flex 5 min     Pulleys - IR      Supine Wand Flex 2 x 20     Supine Wand Bench Press 2 x 20     Supine Wand ER/IR 2 x 20 Elbows at side    Standing Wand IR 2 x 20     Standing Wand Scaption Unable due to pain     Standing Wand Flex      Standing Wand ER/IR      Shrugs AROM       Pendulum Ex            Supine Flex      S-lying ABD      S-lying ER            Prone Flexion      Prone Ext      Prone Row with ER      Prone Horizontal ABD            Standing Flex      Standing Scaption       Standing ABD      Standing Shoulder Press       Functional activities To aid in ROM and strength needed for reaching , lifting ,pushing and pulling at home/work    ROWS: H       ROWS: M  \"    ROWS: L  \"    ER  \"    IR  \"    Shoulder Flex      Shoulder ABD      A:  Tolerated fair. Above added to written HEP.   P: Continue with rehab plan  Jacques Albert, ROHAN    Treatment Charges: Mins Units   Initial Evaluation     Re-Evaluation     Ther Exercise         TE 30 2   Manual Therapy     MT 12 1   Ther Activities        TA     Gait Training          GT     Neuro Re-education NR     Modalities     Non-Billable Service Time     Other     Total Time/Units 42 3

## 2020-12-09 ENCOUNTER — TREATMENT (OUTPATIENT)
Dept: PHYSICAL THERAPY | Age: 70
End: 2020-12-09
Payer: MEDICARE

## 2020-12-09 PROCEDURE — 97140 MANUAL THERAPY 1/> REGIONS: CPT

## 2020-12-09 PROCEDURE — 97110 THERAPEUTIC EXERCISES: CPT

## 2020-12-09 NOTE — PROGRESS NOTES
Physical Therapy Daily Treatment Note    Date: 2020  Patient Name: Petrona Mejia  : 1950   MRN: [id number]  [facility]: 5 years  DOSx: None   Referring Provider:  Reena Etseban DO    Medical Diagnosis:        Diagnosis Orders   1. Nontraumatic complete tear of right rotator cuff         Outcome Measure: Quick Dash: 53% Impairment    S: no change in status  O: Pt given written HEP  Time 243-000     Visit 3 Repeat outcome measure at mid point and end. Pain 7/10     ROM See eval     Modalities            Manual 15 mins flex, abd, IR, ER bilaterally                 Stretch      Table slides      Wall Walk Flex      Wall Walk ABD      Wall ER Stretch      Towel IR Stretch      Supine Stretch with Arms Behind Head            Exercise      UBE          Pulleys - Flex 5 min     Pulleys - IR      Supine Wand Flex 2 x 20     Supine Wand Bench Press 2 x 20     Supine Wand ER/IR 15x Elbows at side    Standing Wand IR 2 x 20     Standing Wand Scaption Unable due to pain     Standing Wand Flex      Standing Wand ER/IR      Shrugs AROM       Pendulum Ex            Supine Flex      S-lying ABD      S-lying ER            Prone Flexion      Prone Ext      Prone Row with ER      Prone Horizontal ABD            Standing Flex      Standing Scaption       Standing ABD      Standing Shoulder Press       Functional activities To aid in ROM and strength needed for reaching , lifting ,pushing and pulling at home/work    ROWS: H Green 2 x 20      ROWS: M Green 2 x 20 \"    ROWS: L  \"    ER  \"    IR  \"    Shoulder Flex      Shoulder ABD      A:  Tolerated well. SOB with all activities.   O2 level at 97%  P: Continue with rehab plan  Usama Lipoma, PTA    Treatment Charges: Mins Units   Initial Evaluation     Re-Evaluation     Ther Exercise         TE 30 2   Manual Therapy     MT 12 1   Ther Activities        TA     Gait Training          GT     Neuro Re-education NR     Modalities     Non-Billable Service Time Other     Total Time/Units 42 3

## 2020-12-14 ENCOUNTER — HOSPITAL ENCOUNTER (OUTPATIENT)
Dept: MRI IMAGING | Age: 70
Discharge: HOME | End: 2020-12-16
Payer: MEDICARE

## 2020-12-15 ENCOUNTER — TREATMENT (OUTPATIENT)
Dept: PHYSICAL THERAPY | Age: 70
End: 2020-12-15
Payer: MEDICARE

## 2020-12-15 PROCEDURE — 97140 MANUAL THERAPY 1/> REGIONS: CPT

## 2020-12-15 PROCEDURE — 97110 THERAPEUTIC EXERCISES: CPT

## 2020-12-15 NOTE — PROGRESS NOTES
Physical Therapy Daily Treatment Note    Date: 12/15/2020  Patient Name: Bhavna Jesus  : 1950   MRN: [id number]  [facility]: 5 years  DOSx: None   Referring Provider:  Lashawn Clarke DO    Medical Diagnosis:        Diagnosis Orders   1. Nontraumatic complete tear of right rotator cuff         Outcome Measure: Quick Dash: 53% Impairment    S: no change in status  O: Pt given written HEP  Time 841-930     Visit 3 Repeat outcome measure at mid point and end. Pain 7/10     ROM See eval     Modalities            Manual 15 mins flex, abd, IR, ER bilaterally                 Stretch      Table slides      Wall Walk Flex      Wall Walk ABD      Wall ER Stretch      Towel IR Stretch      Supine Stretch with Arms Behind Head            Exercise      UBE          Pulleys - Flex 5 min     Pulleys - IR      Supine Wand Flex 2 x 20     Supine Wand Bench Press  x 20 1# 2 x 20     Supine Wand ER/IR 2 x 25 Elbows at side    Standing Wand IR 2 x 20     Standing Wand Scaption Unable due to pain     Supine flex 1# 10x     Standing Wand Flex      Standing Wand ER/IR      Shrugs AROM       Pendulum Ex            Supine Flex      S-lying ABD      S-lying ER 10x           Prone Flexion      Prone Ext      Prone Row with ER      Prone Horizontal ABD            Standing Flex      Standing Scaption       Standing ABD      Standing Shoulder Press       Functional activities To aid in ROM and strength needed for reaching , lifting ,pushing and pulling at home/work    ROWS: H Green 2 x 20      ROWS: M Green 2 x 20 \"    ROWS: L  \"    ER Red 2 x 20 \"    IR  2 x 20 Limited motion    Shoulder Flex      Shoulder ABD      A:  Tolerated well.   Due to increased pain in left shoulder eliminate wand ex's   P: Continue with rehab plan  Radha Patten PTA    Treatment Charges: Mins Units   Initial Evaluation     Re-Evaluation     Ther Exercise         TE 30 2   Manual Therapy     MT 12 1   Ther Activities        TA     Gait Training

## 2020-12-17 ENCOUNTER — TREATMENT (OUTPATIENT)
Dept: PHYSICAL THERAPY | Age: 70
End: 2020-12-17
Payer: MEDICARE

## 2020-12-17 PROCEDURE — 97110 THERAPEUTIC EXERCISES: CPT

## 2020-12-17 PROCEDURE — 97140 MANUAL THERAPY 1/> REGIONS: CPT

## 2020-12-17 NOTE — PROGRESS NOTES
Physical Therapy Daily Treatment Note    Date: 2020  Patient Name: Jey Vigil  : 1950   MRN: [id number]  [facility]: 5 years  DOSx: None   Referring Provider:  Giuseppe English DO    Medical Diagnosis:   1. Nontraumatic complete tear of right rotator cuff             Outcome Measure: Quick Dash: 53% Impairment    S: no change in status  O: Pt given written HEP  Time      Visit 4 Repeat outcome measure at mid point and end. Pain 7/10     ROM See eval     Modalities            Manual 15 mins flex, abd, IR, ER bilaterally                 Stretch      Table slides      Wall Walk Flex      Wall Walk ABD      Wall ER Stretch      Towel IR Stretch      Supine Stretch with Arms Behind Head            Exercise      UBE          Pulleys - Flex 5 min     Pulleys - IR      Supine Wand Flex 2 x 20     Supine Wand Bench Press  x 20 1# 2 x 20     Supine Wand ER/IR 2 x 25 Elbows at side    Standing Wand IR 2 x 20     Standing Wand Scaption Unable due to pain     Supine flex 1# 15x, 10x     Standing Wand Flex      Standing Wand ER/IR      Shrugs AROM       Pendulum Ex            Supine Flex      S-lying ABD      S-lying ER 8x, 25x           Prone Flexion      Prone Ext      Prone Row with ER      Prone Horizontal ABD            Standing Flex      Standing Scaption       Standing ABD      Standing Shoulder Press       Functional activities To aid in ROM and strength needed for reaching , lifting ,pushing and pulling at home/work    ROWS: H Green 2 x 20      ROWS: M Green 2 x 20 \"    ROWS: L  \"    ER Red 2 x 20 \"    IR  2 x 20 Limited motion    Shoulder Flex      Shoulder ABD      A:  Tolerated well.   Due to increased pain in left shoulder eliminate wand ex's   P: Continue with rehab plan  Zari Singh PTA    Treatment Charges: Mins Units   Initial Evaluation     Re-Evaluation     Ther Exercise         TE 30 2   Manual Therapy     MT 12 1   Ther Activities        TA     Gait Training          GT Neuro Re-education NR     Modalities     Non-Billable Service Time     Other     Total Time/Units 42 3

## 2020-12-22 ENCOUNTER — TREATMENT (OUTPATIENT)
Dept: PHYSICAL THERAPY | Age: 70
End: 2020-12-22
Payer: MEDICARE

## 2020-12-22 PROCEDURE — 97140 MANUAL THERAPY 1/> REGIONS: CPT

## 2020-12-22 PROCEDURE — 97110 THERAPEUTIC EXERCISES: CPT

## 2020-12-22 NOTE — PROGRESS NOTES
Neuro Re-education NR     Modalities     Non-Billable Service Time     Other     Total Time/Units 42 3

## 2020-12-28 ENCOUNTER — OFFICE VISIT (OUTPATIENT)
Dept: CARDIOLOGY CLINIC | Age: 70
End: 2020-12-28
Payer: MEDICARE

## 2020-12-28 VITALS
BODY MASS INDEX: 33.95 KG/M2 | WEIGHT: 224 LBS | DIASTOLIC BLOOD PRESSURE: 82 MMHG | HEIGHT: 68 IN | HEART RATE: 76 BPM | SYSTOLIC BLOOD PRESSURE: 138 MMHG

## 2020-12-28 PROCEDURE — G8427 DOCREV CUR MEDS BY ELIG CLIN: HCPCS | Performed by: INTERNAL MEDICINE

## 2020-12-28 PROCEDURE — G8484 FLU IMMUNIZE NO ADMIN: HCPCS | Performed by: INTERNAL MEDICINE

## 2020-12-28 PROCEDURE — 99214 OFFICE O/P EST MOD 30 MIN: CPT | Performed by: INTERNAL MEDICINE

## 2020-12-28 PROCEDURE — 3017F COLORECTAL CA SCREEN DOC REV: CPT | Performed by: INTERNAL MEDICINE

## 2020-12-28 PROCEDURE — G8417 CALC BMI ABV UP PARAM F/U: HCPCS | Performed by: INTERNAL MEDICINE

## 2020-12-28 PROCEDURE — 93000 ELECTROCARDIOGRAM COMPLETE: CPT | Performed by: INTERNAL MEDICINE

## 2020-12-28 PROCEDURE — G8926 SPIRO NO PERF OR DOC: HCPCS | Performed by: INTERNAL MEDICINE

## 2020-12-28 PROCEDURE — 4040F PNEUMOC VAC/ADMIN/RCVD: CPT | Performed by: INTERNAL MEDICINE

## 2020-12-28 PROCEDURE — 3023F SPIROM DOC REV: CPT | Performed by: INTERNAL MEDICINE

## 2020-12-28 PROCEDURE — 1123F ACP DISCUSS/DSCN MKR DOCD: CPT | Performed by: INTERNAL MEDICINE

## 2020-12-28 PROCEDURE — 4004F PT TOBACCO SCREEN RCVD TLK: CPT | Performed by: INTERNAL MEDICINE

## 2020-12-28 SDOH — HEALTH STABILITY: MENTAL HEALTH: HOW MANY STANDARD DRINKS CONTAINING ALCOHOL DO YOU HAVE ON A TYPICAL DAY?: NOT ASKED

## 2020-12-28 SDOH — HEALTH STABILITY: MENTAL HEALTH: HOW OFTEN DO YOU HAVE A DRINK CONTAINING ALCOHOL?: NOT ASKED

## 2020-12-28 NOTE — PROGRESS NOTES
OUTPATIENT CARDIOLOGY FOLLOW-UP    Name: Bhavna Jesus    Age: 79 y.o. Primary Care Physician: Lashawn Clarke DO    Date of Service: 12/28/2020    Chief Complaint: Coronary atherosclerosis, chronic obstructive lung disease, hypertension, moderate obesity    Interim History: Since his most recent evaluation, the patient denies any interim symptoms of anginal-like chest discomfort or other ischemic equivalents nor manifestations of decompensated left ventricular systolic dysfunction or volume overload. He does relate symptoms of mild (functional class II) exertional dyspnea of a multifactorial nature with no additional manifestations of decompensated left ventricular systolic dysfunction or volume overload. He denies any arrhythmia related manifestations. Since his most recent assessment, he has gained approximately 5 pounds with borderline control of systolic blood pressures at the time of assessment. Review of Systems: The remainder of a complete multisystem review including consitutional, central nervous, respiratory, circulatory, gastrointestinal, genitourinary, endocrinologic, hematologic, musculoskeletal and psychiatric are negative.     Past Medical History:  Past Medical History:   Diagnosis Date    Anesthesia complication     wakes up  violant   only ok if reversed with romazacon    Arthritis     shoulders,ankle    CAD (coronary artery disease)     Erectile dysfunction     GERD (gastroesophageal reflux disease)     H/O coronary angioplasty with stents[V45.82] 9/2018 another stent    Hyperlipidemia     Lymphoma ([address] 11/4/2011    Sleep apnea     wont wear machine    Unspecified cerebral artery occlusion with cerebral infarction     no deficits       Past Surgical History:  Past Surgical History:   Procedure Laterality Date    ANKLE FRACTURE SURGERY Right march 2014    ORIF right ankle    ANKLE SURGERY  2007    rt ankle    BRONCHOSCOPY  sept 2011 bronch / medistinoscopy    CARDIAC SURGERY      stent    CAROTID ENDARTERECTOMY Left     12 yrs ago    CHOLECYSTECTOMY      COLONOSCOPY  13     [facility]    CORONARY ANGIOPLASTY WITH STENT PLACEMENT          ENDOSCOPY, COLON, DIAGNOSTIC      HAND SURGERY Left     fell on a buzzsaw    HERNIA REPAIR Bilateral 8/15/2019    HERNIA  BILATERAL INGUINAL REPAIR WITH MESH LAPAROSCOPIC ROBOTIC XI ASSISTED performed by Sandra Sims MD at [address] [place] [address]  2015    lapraoscopic cholecystectomy    SHOULDER ARTHROSCOPY Right 10-8-15    rotator cuff repair, subachromoplasty with labrial debridement    TESTICLE REMOVAL      TONSILLECTOMY      UPPER GASTROINTESTINAL ENDOSCOPY         Family History:  Family History   Problem Relation Age of Onset    Diabetes Mother     Diabetes Father     Diabetes Sister     Diabetes Brother        Social History:  Social History     Socioeconomic History    Marital status:      Spouse name: Not on file    Number of children: Not on file    Years of education: Not on file    Highest education level: Not on file   Occupational History    Not on file   Social Needs    Financial resource strain: Not on file    Food insecurity     Worry: Not on file     Inability: Not on file    Transportation needs     Medical: Not on file     Non-medical: Not on file   Tobacco Use    Smoking status: Current Some Day Smoker     Packs/day: 0.50     Years: 52.00     Pack years: 26.00     Types: Cigarettes     Last attempt to quit: 2020     Years since quittin.9    Smokeless tobacco: Never Used    Tobacco comment: trying to quit. maybe one a week   Substance and Sexual Activity    Alcohol use: Yes     Alcohol/week: 12.0 standard drinks     Types: 12 Cans of beer per week     Comment: 2-3 beers per week.  mtn dew daily    Drug use: No     Comment: in past cocaine 10 yrs ago    Sexual activity: Not Currently     Partners: Female   Lifestyle  Physical activity     Days per week: Not on file     Minutes per session: Not on file    Stress: Not on file   Relationships    Social connections     Talks on phone: Not on file     Gets together: Not on file     Attends Nondenominational service: Not on file     Active member of club or organization: Not on file     Attends meetings of clubs or organizations: Not on file     Relationship status: Not on file    Intimate partner violence     Fear of current or ex partner: Not on file     Emotionally abused: Not on file     Physically abused: Not on file     Forced sexual activity: Not on file   Other Topics Concern    Not on file   Social History Narrative    Not on file       Allergies: Allergies   Allergen Reactions    Midazolam Hcl      Wake up wild. ... Must be reversed with romazicon or will wake up wild and combative       Current Medications:  Current Outpatient Medications   Medication Sig Dispense Refill    simvastatin (ZOCOR) 20 MG tablet Take 1 tablet by mouth once daily. 90 tablet 0    HYDROcodone-acetaminophen (NORCO) 7.5-325 MG per tablet Take 1 tablet by mouth every 8 hours as needed for Pain for up to 30 days. 90 tablet 0    CHANTIX 1 MG tablet TAKE 1 TABLET BY MOUTH TWICE DAILY. START AFTER FIRST WEEK      sertraline (ZOLOFT) 100 MG tablet Take 1 tablet by mouth daily. 30 tablet 2    buPROPion (WELLBUTRIN SR) 150 MG extended release tablet Take 1 tablet by mouth twice daily. 60 tablet 2    lisinopril (PRINIVIL;ZESTRIL) 10 MG tablet Take 1 tablet by mouth daily.  30 tablet 2    meclizine (ANTIVERT) 25 MG tablet TAKE ONE TABLET BY MOUTH THREE TIMES DAILY AS NEEDED FOR DIZZINESS 45 tablet 0    dutasteride (AVODART) 0.5 MG capsule Take 1 capsule by mouth daily 90 capsule 3    montelukast (SINGULAIR) 10 MG tablet Take 1 tablet by mouth once daily 90 tablet 0    albuterol sulfate HFA (PROAIR HFA) 108 (90 Base) MCG/ACT inhaler INHALE TWO PUFFS BY MOUTH EVERY 6 HOURS AS DIRECTED 3 Inhaler 5  clopidogrel (PLAVIX) 75 MG tablet Take 1 tablet by mouth daily 90 tablet 3    meloxicam (MOBIC) 15 MG tablet TAKE ONE TABLET BY MOUTH ONCE DAILY 90 tablet 2    fenofibrate (TRICOR) 145 MG tablet TAKE ONE TABLET BY MOUTH ONCE DAILY 90 tablet 2    omeprazole (PRILOSEC) 40 MG delayed release capsule TAKE 1 CAPSULE BY MOUTH ONCE DAILY 90 capsule 3    tamsulosin (FLOMAX) 0.4 MG capsule TAKE 1 CAPSULE BY MOUTH ONCE DAILY 90 capsule 0    tiZANidine (ZANAFLEX) 4 MG tablet Take 1 tablet by mouth every 8 hours as needed (spasms) 90 tablet 3    sildenafil (REVATIO) 20 MG tablet Take one daily as needed 30 tablet 3    LORazepam (ATIVAN) 1 MG tablet Take 1 mg by mouth daily as needed.  aspirin 81 MG chewable tablet Take 81 mg by mouth daily. Last dose 1 week ago       No current facility-administered medications for this visit. Physical Exam:  /82   Pulse 76   Ht 5' 8\" (1.727 m)   Wt 224 lb (101.6 kg)   BMI 34.06 kg/m²   Wt Readings from Last 3 Encounters:   12/28/20 224 lb (101.6 kg)   11/30/20 226 lb 3.2 oz (102.6 kg)   10/23/20 225 lb (102.1 kg)     The patient is awake, alert and in no discomfort or distress. No gross musculoskeletal deformity is present. No significant skin or nail changes are present. Gross examination of head, eyes, nose and throat are negative. Jugular venous pressure is normal and no carotid bruits are present. Normal respiratory effort is noted with no accessory muscle usage present. Lung fields are clear to ascultation. Cardiac examination is notable for a regular rate and rhythm with no palpable thrill. No gallop rhythm or cardiac murmur are identified. A benign abdominal examination is present with the exception of obesity and no masses or organomegaly. Intact pulses are present throughout all extremities and no peripheral edema is present. No focal neurologic deficits are present.     Laboratory Tests:  Lab Results   Component Value Date CREATININE 1.3 (H) 10/23/2020    BUN 25 (H) 10/23/2020     10/23/2020    K 4.2 10/23/2020     10/23/2020    CO2 24 10/23/2020     Lab Results   Component Value Date     (H) 12/01/2011     Lab Results   Component Value Date    WBC 7.2 05/04/2020    RBC 5.31 05/04/2020    HGB 15.4 05/04/2020    HCT 48.0 05/04/2020    MCV 90.4 05/04/2020    MCH 29.0 05/04/2020    MCHC 32.1 05/04/2020    RDW 12.8 05/04/2020     05/04/2020    MPV 11.1 05/04/2020     No results for input(s): ALKPHOS, ALT, AST, PROT, BILITOT, BILIDIR, LABALBU in the last 72 hours.   Lab Results   Component Value Date    MG 1.9 07/20/2013     Lab Results   Component Value Date    PROTIME 12.2 10/07/2015    PROTIME 13.0 11/03/2011    INR 1.0 10/07/2015     Lab Results   Component Value Date    TSH 1.540 07/18/2019     No components found for: CHLPL  Lab Results   Component Value Date    TRIG 174 (H) 11/06/2019    TRIG 129 01/12/2018    TRIG 138 07/20/2013     Lab Results   Component Value Date    HDL 36 11/06/2019    HDL 45 01/12/2018    HDL 42.0 07/20/2013     Lab Results   Component Value Date    LDLCALC 127 (H) 11/06/2019    LDLCALC 104 (H) 01/12/2018    LDLCALC 77 07/20/2013       Cardiac Tests:  ECG: A resting electrocardiogram demonstrates evidence of sinus rhythm with nonspecific ST changes ASSESSMENT / PLAN: On a clinical basis, the patient remains compensated from a cardiovascular standpoint in the face of his coronary atherosclerosis with residual symptoms of exertional dyspnea of a multifactorial nature in the face of concomitant chronic obstructive lung disease with only a recent termination of tobacco consumption as well as that of moderate obesity. Presently, I have not altered his existing medical regimen and have reinforced his needs of ongoing smoking cessation both to assist in reducing risk of further adverse effects on his chronic obstructive lung disease as well as to assist in reducing his risk of progressive atherosclerosis as well as that of weight reduction to further benefit diastolic cardiac performance. Continued careful monitoring of his blood pressure will be necessary both to benefit diastolic cardiac performance as well as that of reducing risk of future atherosclerosis in addition to that of aggressive risk factor modification of serum lipids. As previously outlined, the adverse effects of his nonsteroidal anti-inflammatory therapy both on blood pressure control and cardiovascular risk suggest the discontinuation of his meloxicam and I will defer this to your discretion as well as that of an appropriate alternative. I presently plan to continue annual cardiovascular reassessment with consideration of a functional assessment of his coronary circulation in conjunction with his next follow-up. I would happily reassess him in the interim should additional cardiovascular difficulties or concerns arise. The patient's current medication list, allergies, problem list and results of all previously ordered testing were reviewed at today's visit.     Follow-up office visit in 1 year Note: This report was completed using computerized voice recognition software. Every effort has been made to ensure accuracy, however; and invert and computerized transcription errors may be present. [place].  Gabriela Lau, [address] [place] [address]    An electronic copy of this follow-up progress note was forwarded to Dr. Stefano Jackson

## 2020-12-29 ENCOUNTER — TREATMENT (OUTPATIENT)
Dept: PHYSICAL THERAPY | Age: 70
End: 2020-12-29
Payer: MEDICARE

## 2020-12-29 PROCEDURE — 97140 MANUAL THERAPY 1/> REGIONS: CPT

## 2020-12-29 PROCEDURE — 97110 THERAPEUTIC EXERCISES: CPT

## 2020-12-29 NOTE — PROGRESS NOTES
Physical Therapy Daily Treatment Note    Date: 2020  Patient Name: Ememtt Barfield  : 1950   MRN: [id number]  [facility]: 5 years  DOSx: None   Referring Provider:  Gardenia Kauffman DO    Medical Diagnosis:   1. 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 Pain 7/10     ROM See eval     Modalities            Manual 15 mins flex, abd, IR, ER bilaterally                 Stretch      Table slides      Wall Walk Flex      Wall Walk ABD      Wall ER Stretch      Towel IR Stretch      Supine Stretch with Arms Behind Head            Exercise      UBE          Pulleys - Flex 5 min     Pulleys - IR      Supine  Flex 1# 2 x 20     Supine  Bench Press   1# 2 x 20     Supine Wand ER/IR 2 x 25 Elbows at side    Standing Wand IR 2 x 20     Standing Wand Scaption Unable due to pain     Supine flex      Standing Wand Flex      Standing Wand ER/IR      Shrugs AROM       Pendulum Ex            Supine Flex      S-lying ABD      S-lying ER 15x, 5x           Prone Flexion      Prone Ext      Prone Row with ER      Prone Horizontal ABD            Standing Flex      Standing Scaption       Standing ABD      Standing Shoulder Press 1# 20x      Functional activities To aid in ROM and strength needed for reaching , lifting ,pushing and pulling at home/work    ROWS: H Green 2 x 20      ROWS: M Green 2 x 20 \"    ROWS: L  \"    ER Red 2 x 20 \"    IR  2 x 20 Limited motion    Shoulder Flex      Shoulder ABD      A:  Tolerated well.   Due to increased pain in left shoulder eliminate wand ex's   P: Continue with rehab plan  Domingo Tom PTA    Treatment Charges: Mins Units   Initial Evaluation     Re-Evaluation     Ther Exercise         TE 30 2   Manual Therapy     MT 12 1   Ther Activities        TA     Gait Training          GT     Neuro

## 2021-01-01 DIAGNOSIS — F32.89 OTHER DEPRESSION: ICD-10-CM

## 2021-01-01 DIAGNOSIS — F32.A DEPRESSION, UNSPECIFIED DEPRESSION TYPE: ICD-10-CM

## 2021-01-01 DIAGNOSIS — F43.21 GRIEF: ICD-10-CM

## 2021-01-04 RX ORDER — SERTRALINE HYDROCHLORIDE 100 MG/1
100 TABLET, FILM COATED ORAL DAILY
Qty: 30 TABLET | Refills: 1 | Status: SHIPPED
Start: 2021-01-04 | End: 2021-02-25 | Stop reason: SDUPTHER

## 2021-01-04 RX ORDER — BUPROPION HYDROCHLORIDE 150 MG/1
TABLET, EXTENDED RELEASE ORAL
Qty: 60 TABLET | Refills: 1 | Status: SHIPPED
Start: 2021-01-04 | End: 2021-02-25 | Stop reason: SDUPTHER

## 2021-01-06 ENCOUNTER — TREATMENT (OUTPATIENT)
Dept: PHYSICAL THERAPY | Age: 71
End: 2021-01-06
Payer: MEDICARE

## 2021-01-06 ENCOUNTER — OFFICE VISIT (OUTPATIENT)
Dept: ORTHOPEDIC SURGERY | Age: 71
End: 2021-01-06
Payer: MEDICARE

## 2021-01-06 VITALS — WEIGHT: 224 LBS | HEIGHT: 68 IN | TEMPERATURE: 98.6 F | BODY MASS INDEX: 33.95 KG/M2

## 2021-01-06 DIAGNOSIS — M25.512 ACUTE PAIN OF LEFT SHOULDER: Primary | ICD-10-CM

## 2021-01-06 DIAGNOSIS — M75.121 NONTRAUMATIC COMPLETE TEAR OF RIGHT ROTATOR CUFF: Primary | ICD-10-CM

## 2021-01-06 DIAGNOSIS — M96.89 FAILURE OF PREVIOUS ROTATOR CUFF REPAIR: ICD-10-CM

## 2021-01-06 PROCEDURE — G8427 DOCREV CUR MEDS BY ELIG CLIN: HCPCS | Performed by: ORTHOPAEDIC SURGERY

## 2021-01-06 PROCEDURE — G8417 CALC BMI ABV UP PARAM F/U: HCPCS | Performed by: ORTHOPAEDIC SURGERY

## 2021-01-06 PROCEDURE — 4040F PNEUMOC VAC/ADMIN/RCVD: CPT | Performed by: ORTHOPAEDIC SURGERY

## 2021-01-06 PROCEDURE — 1123F ACP DISCUSS/DSCN MKR DOCD: CPT | Performed by: ORTHOPAEDIC SURGERY

## 2021-01-06 PROCEDURE — 97140 MANUAL THERAPY 1/> REGIONS: CPT | Performed by: PHYSICAL THERAPIST

## 2021-01-06 PROCEDURE — 4004F PT TOBACCO SCREEN RCVD TLK: CPT | Performed by: ORTHOPAEDIC SURGERY

## 2021-01-06 PROCEDURE — 99203 OFFICE O/P NEW LOW 30 MIN: CPT | Performed by: ORTHOPAEDIC SURGERY

## 2021-01-06 PROCEDURE — 3017F COLORECTAL CA SCREEN DOC REV: CPT | Performed by: ORTHOPAEDIC SURGERY

## 2021-01-06 PROCEDURE — 97110 THERAPEUTIC EXERCISES: CPT | Performed by: PHYSICAL THERAPIST

## 2021-01-06 PROCEDURE — G8484 FLU IMMUNIZE NO ADMIN: HCPCS | Performed by: ORTHOPAEDIC SURGERY

## 2021-01-06 NOTE — PROGRESS NOTES
Physical Therapy Daily Treatment Note    Date: 2021  Patient Name: Nancy Paulino  : 1950   MRN: [id number]  [facility]: 5 years  DOSx: None   Referring Provider:  Toby Suarez DO    Medical Diagnosis:   1. 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 O: Pt given written HEP  Time 486-322     Visit 5 Repeat outcome measure at mid point and end. Pain 8/10 L shoulder, 5/10 R shoulder     ROM See eval     Modalities            Manual 15 mins flex, abd, IR, ER bilaterally                 Stretch      Table slides      Wall Walk Flex      Wall Walk ABD      Wall ER Stretch      Towel IR Stretch      Supine Stretch with Arms Behind Head            Exercise      UBE          Pulleys - Flex 5 min     Pulleys - IR      Supine  Flex 1# 2 x 20     Supine  Bench Press   1# 2 x 20     Supine Wand ER/IR 2 x 25 Elbows at side    Standing Wand IR 2 x 20     Standing Wand Scaption Unable due to pain     Supine flex      Standing Wand Flex      Standing Wand ER/IR      Shrugs AROM       Pendulum Ex            Supine Flex      S-lying ABD      S-lying ER 15x, 5x           Prone Flexion      Prone Ext      Prone Row with ER      Prone Horizontal ABD            Standing Flex      Standing Scaption       Standing ABD      Standing Shoulder Press 1# 20x      Functional activities To aid in ROM and strength needed for reaching , lifting ,pushing and pulling at home/work    ROWS: H      ROWS: M \"    ROWS: L \"    ER \"    IR Limited motion    Shoulder Flex      Shoulder ABD      A: = Attempted wand exercises again with JANNET but pt said it hurt his L arm too bad to continue on with. Pt unable to progress weight and tolerated fairly.    P: Continue with rehab plan  Hoa Zhang PT    Treatment Charges: Mins Units   Initial Evaluation     Re-Evaluation     Ther Exercise         TE 30 2   Manual Therapy     MT 15 1   Ther Activities        TA     Gait Training          GT     Neuro Re-education NR     Modalities     Non-Billable Service Time     Other     Total Time/Units 45 3

## 2021-01-06 NOTE — PROGRESS NOTES
Yasmany Molina is a 79 y.o. male, who presents   Chief Complaint   Patient presents with    Shoulder Pain     B/l shoulder pain. Right shoulder has been hurting since 11/25/2020. Was carrying gun one day and hurt shoulder. Left shoulder started hurting on 1/3/2021 when putting wood into fireplace and felt snap in shoulder. Overall shoulders have been giving him problems for about 20 years when a car landed on him while working on it. HPI[de-identified] Right shoulder pain is been present for quite some time. There is also weakness. Patient has difficulty with some ranges of motion and lowering the arm as well. There has been a history of a rotator cuff repair patient says about 6 years ago was done by a doctor in [facility]. A few days ago he was tossing some wood with his left upper extremity experienced a pop and had pain and weakness in the shoulder since that time. He has difficulty elevating his arm away from his body much. Allergies; medications; past medical, surgical, family, and social history; and problem list have been reviewed today and updated as indicated in this encounter - see below following Ortho specifics. Musculoskeletal: The patient has some labored breathing and obvious wheezing at all times. He has had problems with this apparently for quite some time. The right shoulder range of motion is good actively and passively but he has pain lowering the arm. There is pain and weakness with manual muscle testing suggesting pathology in the rotator cuff. There is no gross instability. There are no palpable deformities and little tenderness to palpation. The left shoulder range of motion is good as well. There is no gross instability in [facility] or glenohumeral joints. There is no swelling or redness. There is weakness in the muscles with rotator cuff testing and this is most noticeable on abduction and also with extra terminal rotation with the arm at the side.     Radiologic Studies: Imaging studies of the right shoulder shows presence of 3 anchors with 2 in the humeral head and 1 beneath the acromion, apparently having pulled loose at some point. There are some [facility] joint. The glenohumeral joint was not well visualized on these standard positions. The left shoulder shows some lateral sharpening of the acromion with slight decrease in the subacromial space. There is little degeneration the acromioclavicular joint as well. ASSESSMENT:  Suki Howard was seen today for shoulder pain. Diagnoses and all orders for this visit:    Acute pain of left shoulder  -     XR SHOULDER LEFT (MIN 2 VIEWS); Future    Failure of previous rotator cuff repair     Treatment alternatives were reviewed including medical and physical therapies, injections, and surgical options, expected risks benefits and likely outcome of each were discussed in detail, questions asked and answered and understood. We discussed the symptoms as well as physical findings and imaging results. There is suspicion of acute tear in the left rotator cuff. There is suggestion of at least partial failure of the rotator cuff repair on the right. PLAN: We will seek approval for MRI of the left shoulder in a more open machine setting that the patient can tolerate. After that we will schedule arthrogram of the right shoulder to evaluate repair status and also help determine position of the loose anchor.         Patient Active Problem List   Diagnosis    Lymphoma ([address]    Gastroenteritis    Back pain at L4-L5 level    Impotence    Chronic fatigue    Urinary frequency    Tobacco abuse    Bronchitis    Gastroesophageal reflux disease with esophagitis    Depression    Coronary artery disease involving native coronary artery of native heart with unstable angina pectoris (HCC)    Chronic obstructive pulmonary disease ([address]    Pure hypercholesterolemia    Moderate obesity    Major depressive disorder, TAKE 1 TABLET BY MOUTH TWICE DAILY. START AFTER FIRST WEEK      lisinopril (PRINIVIL;ZESTRIL) 10 MG tablet Take 1 tablet by mouth daily. 30 tablet 2    meclizine (ANTIVERT) 25 MG tablet TAKE ONE TABLET BY MOUTH THREE TIMES DAILY AS NEEDED FOR DIZZINESS 45 tablet 0    dutasteride (AVODART) 0.5 MG capsule Take 1 capsule by mouth daily 90 capsule 3    montelukast (SINGULAIR) 10 MG tablet Take 1 tablet by mouth once daily 90 tablet 0    albuterol sulfate HFA (PROAIR HFA) 108 (90 Base) MCG/ACT inhaler INHALE TWO PUFFS BY MOUTH EVERY 6 HOURS AS DIRECTED 3 Inhaler 5    clopidogrel (PLAVIX) 75 MG tablet Take 1 tablet by mouth daily 90 tablet 3    meloxicam (MOBIC) 15 MG tablet TAKE ONE TABLET BY MOUTH ONCE DAILY 90 tablet 2    fenofibrate (TRICOR) 145 MG tablet TAKE ONE TABLET BY MOUTH ONCE DAILY 90 tablet 2    omeprazole (PRILOSEC) 40 MG delayed release capsule TAKE 1 CAPSULE BY MOUTH ONCE DAILY 90 capsule 3    tamsulosin (FLOMAX) 0.4 MG capsule TAKE 1 CAPSULE BY MOUTH ONCE DAILY 90 capsule 0    tiZANidine (ZANAFLEX) 4 MG tablet Take 1 tablet by mouth every 8 hours as needed (spasms) 90 tablet 3    sildenafil (REVATIO) 20 MG tablet Take one daily as needed 30 tablet 3    LORazepam (ATIVAN) 1 MG tablet Take 1 mg by mouth daily as needed.  aspirin 81 MG chewable tablet Take 81 mg by mouth daily. Last dose 1 week ago       No current facility-administered medications for this visit. Allergies   Allergen Reactions    Midazolam Hcl      Wake up wild. ...  Must be reversed with romazicon or will wake up wild and combative       Social History     Socioeconomic History    Marital status:      Spouse name: None    Number of children: None    Years of education: None    Highest education level: None   Occupational History    None   Social Needs    Financial resource strain: None    Food insecurity     Worry: None     Inability: None    Transportation needs     Medical: None     Non-medical: None   Tobacco Use    Smoking status: Current Some Day Smoker     Packs/day: 0.50     Years: 52.00     Pack years: 26.00     Types: Cigarettes     Last attempt to quit: 2020     Years since quittin.0    Smokeless tobacco: Never Used    Tobacco comment: trying to quit. maybe one a week   Substance and Sexual Activity    Alcohol use: Yes     Alcohol/week: 12.0 standard drinks     Types: 12 Cans of beer per week     Comment: 2-3 beers per week. mtn dew daily    Drug use: No     Comment: in past cocaine 10 yrs ago    Sexual activity: Not Currently     Partners: Female   Lifestyle    Physical activity     Days per week: None     Minutes per session: None    Stress: None   Relationships    Social connections     Talks on phone: None     Gets together: None     Attends [employer] service: None     Active member of club or organization: None     Attends meetings of clubs or organizations: None     Relationship status: None    Intimate partner violence     Fear of current or ex partner: None     Emotionally abused: None     Physically abused: None     Forced sexual activity: None   Other Topics Concern    None   Social History Narrative    None       Family History   Problem Relation Age of Onset    Diabetes Mother     Diabetes Father     Diabetes Sister     Diabetes Brother          Review of Systems:   As follows except as previously noted in HPI:  Constitutional: Negative for chills, diaphoresis,  fever   Respiratory: Negative for cough, shortness of breath and wheezing. Cardiovascular: Negative for chest pain and palpitations. Neurological: Negative for dizziness, syncope,   GI / : abdominal pain or cramping  Musculoskeletal: see HPI       Objective:   Physical Exam   Constitutional: Oriented to person, place, and time. and appears well-developed and well-nourished. :   Head: Normocephalic and atraumatic. Neck: Neck supple.   Eyes: EOM are normal.   Pulmonary/Chest: Effort normal.  No respiratory distress, no wheezes. Neurological: Alert and oriented to person  Skin: Skin is warm and dry. Willa Posey DO    1/6/21  10:01 AM    All reasonable efforts have been made to minimize the risk of errors that may occur in the use of voice recognition and other electronic means of charting.

## 2021-01-08 ENCOUNTER — TREATMENT (OUTPATIENT)
Dept: PHYSICAL THERAPY | Age: 71
End: 2021-01-08
Payer: MEDICARE

## 2021-01-08 DIAGNOSIS — M75.121 NONTRAUMATIC COMPLETE TEAR OF RIGHT ROTATOR CUFF: Primary | ICD-10-CM

## 2021-01-08 PROCEDURE — 97110 THERAPEUTIC EXERCISES: CPT | Performed by: PHYSICAL THERAPIST

## 2021-01-08 PROCEDURE — 97140 MANUAL THERAPY 1/> REGIONS: CPT | Performed by: PHYSICAL THERAPIST

## 2021-01-08 NOTE — PROGRESS NOTES
Physical Therapy Daily Treatment Note    Date: 2021  Patient Name: Mirtha Nielsen  : 1950   MRN: [id number]  [facility]: 5 years  DOSx: None   Referring Provider:  Ping Antony DO    Medical Diagnosis:   1. Nontraumatic complete tear of right rotator cuff             Outcome Measure: Quick Dash: 53% Impairment    S: Pt states that his daughter is looking into setting up an MRI for both shoulders for the pt. Pt stated that he performs his HEP 2x per day at home currently. O: Pt given written HEP  Time 497-324     Visit 5 Repeat outcome measure at mid point and end.     Pain 8/10 L shoulder, 5/10 R shoulder     ROM See eval     Modalities            Manual 15 mins flex, abd, IR, ER bilaterally                 Stretch      Table slides      Wall Walk Flex      Wall Walk ABD      Wall ER Stretch      Towel IR Stretch      Supine Stretch with Arms Behind Head            Exercise      UBE          Pulleys - Flex 5 min     Pulleys - IR      Supine  Flex 1# 2 x 20     Supine  Bench Press   1# 2 x 20     Supine Wand ER/IR 2 x 25 Elbows at side    Standing Wand IR 2 x 20     Standing Wand Scaption Unable due to pain     Supine flex      Standing Wand Flex      Standing Wand ER/IR      Shrugs AROM       Pendulum Ex            Supine Flex      S-lying ABD 1# 1 x 20     S-lying ER 1 x 20           Prone Flexion      Prone Ext      Prone Row with ER      Prone Horizontal ABD            Standing Flex      Standing Scaption       Standing ABD      Standing Shoulder Press 1# 20x      Functional activities To aid in ROM and strength needed for reaching , lifting ,pushing and pulling at home/work    ROWS: H      ROWS: M \"    ROWS: L \"    ER \"    IR Limited motion    Shoulder Flex      Shoulder ABD      A: Pt continues to be very painful and limited with AROM with his PROM being WFL except for R IR.   P: Continue with rehab plan  Fatimah De La Rosa PT    Treatment Charges: Mins Units   Initial Evaluation

## 2021-01-13 ENCOUNTER — TREATMENT (OUTPATIENT)
Dept: PHYSICAL THERAPY | Age: 71
End: 2021-01-13
Payer: MEDICARE

## 2021-01-13 DIAGNOSIS — M75.121 NONTRAUMATIC COMPLETE TEAR OF RIGHT ROTATOR CUFF: Primary | ICD-10-CM

## 2021-01-13 PROCEDURE — 97140 MANUAL THERAPY 1/> REGIONS: CPT

## 2021-01-13 PROCEDURE — 97110 THERAPEUTIC EXERCISES: CPT

## 2021-01-13 NOTE — PROGRESS NOTES
Physical Therapy Daily Treatment Note    Date: 2021  Patient Name: Elias Baltazar  : 1950   MRN: [id number]  [facility]: 5 years  DOSx: None   Referring Provider:  Timmy Dent DO    Medical Diagnosis:   1. Nontraumatic complete tear of right rotator cuff             Outcome Measure: Quick Dash: 53% Impairment    S: Pt reports going to [facility] for open MRI on Left shoulder, then dr is going to address R shoulder. Pt stated that he performs his HEP 2x per day at home currently. O: Pt given written HEP  Time 922-538     Visit 6 Repeat outcome measure at mid point and end.     Pain 8/10 L shoulder, 5/10 R shoulder     ROM See eval     Modalities            Manual 15 mins flex, abd, IR, ER bilaterally                 Stretch      Table slides      Wall Walk Flex      Wall Walk ABD      Wall ER Stretch      Towel IR Stretch      Supine Stretch with Arms Behind Head            Exercise      UBE          Pulleys - Flex 5 min     Pulleys - IR      Supine  Flex 1# 2 x 20     Supine  Bench Press   1# 2 x 20     Supine Wand ER/IR 2 x 25 Elbows at side    Standing Wand IR 2 x 20     Standing Wand Scaption Unable due to pain     Supine flex      Standing Wand Flex      Standing Wand ER/IR      Shrugs AROM       Pendulum Ex            Supine Flex      S-lying ABD 1# 2 x 20     S-lying ER 2 x 20 Painful unable 1#          Prone Flexion      Prone Ext      Prone Row with ER      Prone Horizontal ABD            Standing Flex      Standing Scaption       Standing ABD      Standing Shoulder Press 1# 20x      Functional activities To aid in ROM and strength needed for reaching , lifting ,pushing and pulling at home/work    ROWS: H      ROWS: M \"    ROWS: L \"    ER \"    IR Limited motion    Shoulder Flex      Shoulder ABD      A: Pt continues to be very painful and limited with AROM with his PROM being WFL except for R IR.   P: Continue with rehab plan  Jose Chung, PTA    Treatment Charges: Mins Units Initial Evaluation     Re-Evaluation     Ther Exercise         TE 30 2   Manual Therapy     MT 15 1   Ther Activities        TA     Gait Training          GT     Neuro Re-education NR     Modalities     Non-Billable Service Time     Other     Total Time/Units 45 3

## 2021-01-15 ENCOUNTER — TREATMENT (OUTPATIENT)
Dept: PHYSICAL THERAPY | Age: 71
End: 2021-01-15
Payer: MEDICARE

## 2021-01-15 DIAGNOSIS — M75.121 NONTRAUMATIC COMPLETE TEAR OF RIGHT ROTATOR CUFF: Primary | ICD-10-CM

## 2021-01-15 PROCEDURE — 97110 THERAPEUTIC EXERCISES: CPT

## 2021-01-15 PROCEDURE — 97140 MANUAL THERAPY 1/> REGIONS: CPT

## 2021-01-15 NOTE — PROGRESS NOTES
Physical Therapy Daily Treatment Note    Date: 1/15/2021  Patient Name: Wild Olson  : 1950   MRN: [id number]  [facility]: 5 years  DOSx: None   Referring Provider:  Matt Sanchez DO    Medical Diagnosis:   1. Nontraumatic complete tear of right rotator cuff             Outcome Measure: Quick Dash: 53% Impairment    S: Pt reports shouldn't get MRI due to not knowing if a stent was put in the neck carotid several years ago  . Pt stated that he performs his HEP 2x per day at home currently. O: Pt given written HEP  Time [id number]     Visit 7 Repeat outcome measure at mid point and end. Pain 8/10 L shoulder, 5/10 R shoulder     ROM See eval     Modalities            Manual 15 mins flex, abd, IR, ER bilaterally                 Stretch      Table slides      Wall Walk Flex      Wall Walk ABD      Wall ER Stretch      Towel IR Stretch      Supine Stretch with Arms Behind Head            Exercise      UBE          Pulleys - Flex 5 min     Pulleys - IR      Supine  Flex 1# 2 x 20     Supine  Bench Press   1# 2 x 20     Supine Wand ER/IR 2 x 25 Elbows at side    Standing Wand IR 2 x 20     Standing Wand Scaption Unable due to pain     Supine flex      Standing Wand Flex      Standing Wand ER/IR      Shrugs AROM       Pendulum Ex            Supine Flex      S-lying ABD 1# 2 x 20     S-lying ER 2 x 20 Painful unable 1#          Prone Flexion      Prone Ext      Prone Row with ER      Prone Horizontal ABD            Standing Flex      Standing Scaption       Standing ABD      Standing Shoulder Press 1# 15x,      Functional activities To aid in ROM and strength needed for reaching , lifting ,pushing and pulling at home/work    ROWS: H      ROWS: M \"    ROWS: L \"    ER \"    IR Limited motion    Shoulder Flex      Shoulder ABD      A: Pt continues to be very painful and limited with AROM with his PROM being [facility] except for R IR. P: put on hold until can get an MRI and further follow up with ortho.    Alisa Dozier Protiva, PTA    Treatment Charges: Mins Units   Initial Evaluation     Re-Evaluation     Ther Exercise         TE 30 2   Manual Therapy     MT 15 1   Ther Activities        TA     Gait Training          GT     Neuro Re-education NR     Modalities     Non-Billable Service Time     Other     Total Time/Units 45 3

## 2021-01-18 ENCOUNTER — OFFICE VISIT (OUTPATIENT)
Dept: FAMILY MEDICINE CLINIC | Age: 71
End: 2021-01-18
Payer: MEDICARE

## 2021-01-18 VITALS
BODY MASS INDEX: 35.43 KG/M2 | OXYGEN SATURATION: 96 % | RESPIRATION RATE: 20 BRPM | WEIGHT: 233.8 LBS | TEMPERATURE: 97.6 F | DIASTOLIC BLOOD PRESSURE: 76 MMHG | HEART RATE: 76 BPM | SYSTOLIC BLOOD PRESSURE: 130 MMHG | HEIGHT: 68 IN

## 2021-01-18 DIAGNOSIS — R22.1 MASS IN NECK: ICD-10-CM

## 2021-01-18 DIAGNOSIS — G47.33 OSA (OBSTRUCTIVE SLEEP APNEA): Primary | ICD-10-CM

## 2021-01-18 DIAGNOSIS — G89.29 CHRONIC RIGHT SHOULDER PAIN: ICD-10-CM

## 2021-01-18 DIAGNOSIS — M25.511 CHRONIC RIGHT SHOULDER PAIN: ICD-10-CM

## 2021-01-18 PROCEDURE — G8417 CALC BMI ABV UP PARAM F/U: HCPCS | Performed by: FAMILY MEDICINE

## 2021-01-18 PROCEDURE — 3017F COLORECTAL CA SCREEN DOC REV: CPT | Performed by: FAMILY MEDICINE

## 2021-01-18 PROCEDURE — G8484 FLU IMMUNIZE NO ADMIN: HCPCS | Performed by: FAMILY MEDICINE

## 2021-01-18 PROCEDURE — 99214 OFFICE O/P EST MOD 30 MIN: CPT | Performed by: FAMILY MEDICINE

## 2021-01-18 PROCEDURE — 4004F PT TOBACCO SCREEN RCVD TLK: CPT | Performed by: FAMILY MEDICINE

## 2021-01-18 PROCEDURE — 1123F ACP DISCUSS/DSCN MKR DOCD: CPT | Performed by: FAMILY MEDICINE

## 2021-01-18 PROCEDURE — G8427 DOCREV CUR MEDS BY ELIG CLIN: HCPCS | Performed by: FAMILY MEDICINE

## 2021-01-18 PROCEDURE — 4040F PNEUMOC VAC/ADMIN/RCVD: CPT | Performed by: FAMILY MEDICINE

## 2021-01-18 RX ORDER — HYDROCODONE BITARTRATE AND ACETAMINOPHEN 7.5; 325 MG/1; MG/1
1 TABLET ORAL EVERY 8 HOURS PRN
Qty: 90 TABLET | Refills: 0 | Status: SHIPPED | OUTPATIENT
Start: 2021-01-18 | End: 2021-02-17 | Stop reason: SDUPTHER

## 2021-01-18 ASSESSMENT — ENCOUNTER SYMPTOMS
DIARRHEA: 0
COUGH: 1
WHEEZING: 0
SHORTNESS OF BREATH: 1
CONSTIPATION: 0
BLOOD IN STOOL: 0
BACK PAIN: 1

## 2021-01-18 NOTE — PROGRESS NOTES
Shirley Iqbal (:  1950) is a 79 y.o. male,Established patient, here for evaluation of the following chief complaint(s):  Shoulder Pain (Patient presents for a follow up on bilateral shoulder pain and he is requesting a refill on norco )      ASSESSMENT/PLAN:  1. JOSE (obstructive sleep apnea)  -     Baseline Diagnostic Sleep Study; Future  2. Chronic right shoulder pain  -     HYDROcodone-acetaminophen (NORCO) 7.5-325 MG per tablet; Take 1 tablet by mouth every 8 hours as needed for Pain for up to 30 days. , Disp-90 tablet, R-0Print  3. Mass in neck  -     XR NECK SOFT TISSUE; Future           SUBJECTIVE/OBJECTIVE:  Continued back pain as well as right shoulder pain as well as no energy. Sleep study ordered, but he never called back when they called him to schedule. Review of Systems   Constitutional: Negative for chills and diaphoresis. HENT: Negative for ear discharge, ear pain, hearing loss, nosebleeds and tinnitus. Respiratory: Positive for cough and shortness of breath (especially with exertion). Negative for wheezing. Cardiovascular: Negative for chest pain. Gastrointestinal: Negative for blood in stool, constipation and diarrhea. Genitourinary: Negative for dysuria, flank pain and hematuria. Musculoskeletal: Positive for arthralgias, back pain (Chronic and severe at times) and myalgias. Skin: Negative for rash. Neurological: Negative for headaches. Hematological: Does not bruise/bleed easily. Physical Exam  HENT:      Right Ear: Tympanic membrane normal.      Nose: Nose normal.   Eyes:      General:         Right eye: No discharge. Left eye: No discharge. Neck:      Musculoskeletal: No neck rigidity. Cardiovascular:      Rate and Rhythm: Normal rate and regular rhythm. Heart sounds: No murmur. Pulmonary:      Effort: No respiratory distress. Breath sounds: No rhonchi or rales. Abdominal:      Palpations: Abdomen is soft. Tenderness: There is no abdominal tenderness. Musculoskeletal:      Comments: Right neck fullness  At site where he had his carotid surgery. Skin:     General: Skin is warm. Capillary Refill: Capillary refill takes less than 2 seconds. Coloration: Skin is not pale. Findings: No bruising. Neurological:      Mental Status: He is alert. Psychiatric:         Mood and Affect: Mood normal.       Controlled substances monitoring: no signs of potential drug abuse or diversion identified and [place] report reviewed today- activity consistent with treatment plan. On this date 01/18/21 I have spent 20 minutes reviewing previous notes, test results and face to face with the patient discussing the diagnosis and importance of compliance with the treatment plan as well as documenting on the day of the visit. An electronic signature was used to authenticate this note.     --Azeb Richter, DO

## 2021-01-19 ENCOUNTER — HOSPITAL ENCOUNTER (OUTPATIENT)
Dept: GENERAL RADIOLOGY | Age: 71
Discharge: HOME | End: 2021-01-21
Payer: MEDICARE

## 2021-01-19 ENCOUNTER — HOSPITAL ENCOUNTER (OUTPATIENT)
Age: 71
Discharge: HOME | End: 2021-01-21
Payer: MEDICARE

## 2021-01-19 DIAGNOSIS — R22.1 MASS IN NECK: ICD-10-CM

## 2021-01-19 PROCEDURE — 70360 X-RAY EXAM OF NECK: CPT

## 2021-01-25 ENCOUNTER — TELEPHONE (OUTPATIENT)
Dept: SLEEP CENTER | Age: 71
End: 2021-01-25

## 2021-01-25 DIAGNOSIS — G47.33 OSA (OBSTRUCTIVE SLEEP APNEA): Primary | ICD-10-CM

## 2021-01-26 DIAGNOSIS — M25.512 ACUTE PAIN OF LEFT SHOULDER: ICD-10-CM

## 2021-01-27 ENCOUNTER — HOSPITAL ENCOUNTER (OUTPATIENT)
Age: 71
Discharge: HOME | End: 2021-01-29
Payer: MEDICARE

## 2021-01-27 DIAGNOSIS — G47.33 OSA (OBSTRUCTIVE SLEEP APNEA): ICD-10-CM

## 2021-01-27 PROCEDURE — U0003 INFECTIOUS AGENT DETECTION BY NUCLEIC ACID (DNA OR RNA); SEVERE ACUTE RESPIRATORY SYNDROME CORONAVIRUS 2 (SARS-COV-2) (CORONAVIRUS DISEASE [COVID-19]), AMPLIFIED PROBE TECHNIQUE, MAKING USE OF HIGH THROUGHPUT TECHNOLOGIES AS DESCRIBED BY CMS-2020-01-R: HCPCS

## 2021-01-29 ENCOUNTER — OFFICE VISIT (OUTPATIENT)
Dept: ORTHOPEDIC SURGERY | Age: 71
End: 2021-01-29
Payer: MEDICARE

## 2021-01-29 ENCOUNTER — TELEPHONE (OUTPATIENT)
Dept: CARDIOLOGY CLINIC | Age: 71
End: 2021-01-29

## 2021-01-29 VITALS — HEIGHT: 68 IN | TEMPERATURE: 98 F | WEIGHT: 235 LBS | BODY MASS INDEX: 35.61 KG/M2

## 2021-01-29 DIAGNOSIS — M19.012 ARTHRITIS OF LEFT ACROMIOCLAVICULAR JOINT: ICD-10-CM

## 2021-01-29 DIAGNOSIS — M75.122 COMPLETE TEAR OF LEFT ROTATOR CUFF, UNSPECIFIED WHETHER TRAUMATIC: Primary | ICD-10-CM

## 2021-01-29 LAB
SARS-COV-2: NOT DETECTED
SOURCE: NORMAL

## 2021-01-29 PROCEDURE — G8417 CALC BMI ABV UP PARAM F/U: HCPCS | Performed by: ORTHOPAEDIC SURGERY

## 2021-01-29 PROCEDURE — 99214 OFFICE O/P EST MOD 30 MIN: CPT | Performed by: ORTHOPAEDIC SURGERY

## 2021-01-29 PROCEDURE — G8427 DOCREV CUR MEDS BY ELIG CLIN: HCPCS | Performed by: ORTHOPAEDIC SURGERY

## 2021-01-29 PROCEDURE — 3017F COLORECTAL CA SCREEN DOC REV: CPT | Performed by: ORTHOPAEDIC SURGERY

## 2021-01-29 PROCEDURE — 1036F TOBACCO NON-USER: CPT | Performed by: ORTHOPAEDIC SURGERY

## 2021-01-29 PROCEDURE — 1123F ACP DISCUSS/DSCN MKR DOCD: CPT | Performed by: ORTHOPAEDIC SURGERY

## 2021-01-29 PROCEDURE — 4040F PNEUMOC VAC/ADMIN/RCVD: CPT | Performed by: ORTHOPAEDIC SURGERY

## 2021-01-29 PROCEDURE — G8484 FLU IMMUNIZE NO ADMIN: HCPCS | Performed by: ORTHOPAEDIC SURGERY

## 2021-01-29 NOTE — TELEPHONE ENCOUNTER
Patient is scheduled for Left Shoulder Cuff Repair on 2/18/21. Dr. Isaura Kirk is requesting Cardiac Clearance. Please Advise.

## 2021-01-29 NOTE — PROGRESS NOTES
Chief Complaint:   Chief Complaint   Patient presents with    Shoulder Pain     left shoulder pain since 11/25/2020. Getting a little better. Ayan Arias follows up for left shoulder problems. He had his MRI at [facility]. He still has a discomfort is noted before the shoulder area. He said that physical therapy helped him a little bit but not a whole lot. Allergies; medications; past medical, surgical, family, and social history; and problem list have been reviewed today and updated as indicated in this encounter seen below. Exam: Range of motion and stability are as noted before. There is no instability of the glenohumeral or AC joints. There is some weakness of motion and some crepitus. Distal neurovascular function is good. Radiographs: MRI imaging 1/26/2021 showed full-thickness rotator cuff tear supraspinatus and infraspinatus left. There is retraction of these tendons and muscles. The long head biceps tendon appears to be intact and located within the groove. The subscapularis is grossly intact as well. There is muscle atrophy of supraspinatus and infraspinatus consistent with subacute injury. ASSESSMENT:    There are no diagnoses linked to this encounter. PLAN: We discussed surgical treatment of this. He would like this fixed if possible. We discussed the arthroscopic surgery, risk, prognosis, limitations and rehab issues. Also the possibility that repair is not possible if muscles are contracted and fibrosed. He is aware of that and accepts the risks to try to achieve the benefits. The patient was counseled at length about the risks of joana Covid-19 during their perioperative period and any recovery window from their procedure. The patient was made aware that joana Covid-19  may worsen their prognosis for recovering from their procedure  and lend to a higher morbidity and/or mortality risk.   All material risks, benefits, and reasonable alternatives including postponing the procedure were discussed. The patient does wish to proceed with the procedure at this time. No follow-ups on file. Current Outpatient Medications   Medication Sig Dispense Refill    HYDROcodone-acetaminophen (NORCO) 7.5-325 MG per tablet Take 1 tablet by mouth every 8 hours as needed for Pain for up to 30 days. 90 tablet 0    buPROPion (WELLBUTRIN SR) 150 MG extended release tablet Take 1 tablet by mouth twice daily. 60 tablet 1    sertraline (ZOLOFT) 100 MG tablet Take 1 tablet by mouth daily. 30 tablet 1    simvastatin (ZOCOR) 20 MG tablet Take 1 tablet by mouth once daily. 90 tablet 0    CHANTIX 1 MG tablet TAKE 1 TABLET BY MOUTH TWICE DAILY. START AFTER FIRST WEEK      meclizine (ANTIVERT) 25 MG tablet TAKE ONE TABLET BY MOUTH THREE TIMES DAILY AS NEEDED FOR DIZZINESS 45 tablet 0    dutasteride (AVODART) 0.5 MG capsule Take 1 capsule by mouth daily 90 capsule 3    montelukast (SINGULAIR) 10 MG tablet Take 1 tablet by mouth once daily 90 tablet 0    albuterol sulfate HFA (PROAIR HFA) 108 (90 Base) MCG/ACT inhaler INHALE TWO PUFFS BY MOUTH EVERY 6 HOURS AS DIRECTED 3 Inhaler 5    clopidogrel (PLAVIX) 75 MG tablet Take 1 tablet by mouth daily 90 tablet 3    meloxicam (MOBIC) 15 MG tablet TAKE ONE TABLET BY MOUTH ONCE DAILY 90 tablet 2    fenofibrate (TRICOR) 145 MG tablet TAKE ONE TABLET BY MOUTH ONCE DAILY 90 tablet 2    omeprazole (PRILOSEC) 40 MG delayed release capsule TAKE 1 CAPSULE BY MOUTH ONCE DAILY 90 capsule 3    tamsulosin (FLOMAX) 0.4 MG capsule TAKE 1 CAPSULE BY MOUTH ONCE DAILY 90 capsule 0    tiZANidine (ZANAFLEX) 4 MG tablet Take 1 tablet by mouth every 8 hours as needed (spasms) 90 tablet 3    sildenafil (REVATIO) 20 MG tablet Take one daily as needed 30 tablet 3    LORazepam (ATIVAN) 1 MG tablet Take 1 mg by mouth daily as needed.  aspirin 81 MG chewable tablet Take 81 mg by mouth daily.  Last dose 1 week ago     Bibi Cohen lisinopril (PRINIVIL;ZESTRIL) 10 MG tablet Take 1 tablet by mouth daily. 30 tablet 2     No current facility-administered medications for this visit.         Patient Active Problem List   Diagnosis    Lymphoma ([address]    Gastroenteritis    Back pain at L4-L5 level    Impotence    Chronic fatigue    Urinary frequency    Tobacco abuse    Bronchitis    Gastroesophageal reflux disease with esophagitis    Depression    Coronary artery disease involving native coronary artery of native heart with unstable angina pectoris (HCC)    Chronic obstructive pulmonary disease ([address]    Pure hypercholesterolemia    Moderate obesity    Major depressive disorder, recurrent, mild (HCC)    Vascular dementia with depressed mood (HCC)    Moderate asthma without complication    Nontraumatic complete tear of right rotator cuff       Past Medical History:   Diagnosis Date    Anesthesia complication     wakes up  violant   only ok if reversed with romazacon    Arthritis     shoulders,ankle    CAD (coronary artery disease)     Erectile dysfunction     GERD (gastroesophageal reflux disease)     H/O coronary angioplasty with stents[V45.82] 9/2018 another stent    Hyperlipidemia     Lymphoma ([address] 11/4/2011    Sleep apnea     wont wear machine    Unspecified cerebral artery occlusion with cerebral infarction     no deficits       Past Surgical History:   Procedure Laterality Date    ANKLE FRACTURE SURGERY Right march 2014    ORIF right ankle    ANKLE SURGERY  2007    rt ankle    BRONCHOSCOPY  sept 2011    bronch / medistinoscopy    CARDIAC SURGERY      stent    CAROTID ENDARTERECTOMY Left     12 yrs ago    CHOLECYSTECTOMY      COLONOSCOPY  7/30/13    DR MULLINS    CORONARY ANGIOPLASTY WITH STENT PLACEMENT      2008    ENDOSCOPY, COLON, DIAGNOSTIC      HAND SURGERY Left     fell on a buzzsaw    HERNIA REPAIR Bilateral 8/15/2019    HERNIA  BILATERAL INGUINAL REPAIR WITH MESH LAPAROSCOPIC ROBOTIC XI ASSISTED performed by Octaviano Ormond, MD at [facility]  2015    lapraoscopic cholecystectomy    SHOULDER ARTHROSCOPY Right 10-8-15    rotator cuff repair, subachromoplasty with labrial debridement    TESTICLE REMOVAL      TONSILLECTOMY      UPPER GASTROINTESTINAL ENDOSCOPY         Allergies   Allergen Reactions    Midazolam Hcl      Wake up wild. ... Must be reversed with romazicon or will wake up wild and combative       Social History     Socioeconomic History    Marital status:      Spouse name: None    Number of children: None    Years of education: None    Highest education level: None   Occupational History    None   Social Needs    Financial resource strain: None    Food insecurity     Worry: None     Inability: None    Transportation needs     Medical: None     Non-medical: None   Tobacco Use    Smoking status: Former Smoker     Packs/day: 0.50     Years: 52.00     Pack years: 26.00     Types: Cigarettes     Quit date: 2020     Years since quittin.0    Smokeless tobacco: Never Used    Tobacco comment: trying to quit. maybe one a week   Substance and Sexual Activity    Alcohol use: Yes     Alcohol/week: 12.0 standard drinks     Types: 12 Cans of beer per week     Comment: 2-3 beers per week.  mtn dew daily    Drug use: No     Comment: in past cocaine 10 yrs ago    Sexual activity: Not Currently     Partners: Female   Lifestyle    Physical activity     Days per week: None     Minutes per session: None    Stress: None   Relationships    Social connections     Talks on phone: None     Gets together: None     Attends Religion service: None     Active member of club or organization: None     Attends meetings of clubs or organizations: None     Relationship status: None    Intimate partner violence     Fear of current or ex partner: None     Emotionally abused: None     Physically abused: None     Forced sexual activity: None   Other Topics Concern    None   Social History Narrative    None       Review of Systems  As follows except as previously noted in HPI:  Constitutional: Negative for chills, diaphoresis, fatigue, fever and unexpected weight change. Respiratory: Negative for cough, shortness of breath and wheezing. Cardiovascular: Negative for chest pain and palpitations. Neurological: Negative for dizziness, syncope, cephalgia. GI / : negative  Musculoskeletal: see HPI       Objective:   Physical Exam   Constitutional: Oriented to person, place, and time. and appears well-developed and well-nourished. :   Head: Normocephalic and atraumatic. Eyes: EOM are normal.   Neck: Neck supple. Cardiovascular: Normal rate and regular rhythm. Pulmonary/Chest: Effort normal. No stridor. No respiratory distress, no wheezes. Abdominal:  No abnormal distension. Neurological: Alert and oriented to person, place, and time. Skin: Skin is warm and dry. Psychiatric: Normal mood and affect.  Behavior is normal. Thought content normal.    AJAY Morales DO    1/29/21  9:10 AM

## 2021-01-29 NOTE — TELEPHONE ENCOUNTER
Patient would be an acceptable candidate for proposed procedure based on most recent assessment and could withhold clopidogrel for 5 days prior to the procedure. Ideally if aspirin can be maintained this would provide optimal protection from a coronary atherosclerotic standpoint but if additionally needs discontinued prior to surgery because of bleeding risk could also hold for 5 days prior to procedure. Need cautious periprocedural fluid administration to reduce risk of perioperative volume related complications.

## 2021-02-01 ENCOUNTER — TELEPHONE (OUTPATIENT)
Dept: SLEEP CENTER | Age: 71
End: 2021-02-01

## 2021-02-01 RX ORDER — LISINOPRIL 10 MG/1
10 TABLET ORAL DAILY
Qty: 90 TABLET | Refills: 2 | Status: SHIPPED
Start: 2021-02-01 | End: 2021-10-28

## 2021-02-03 ENCOUNTER — HOSPITAL ENCOUNTER (OUTPATIENT)
Dept: SLEEP CENTER | Age: 71
Discharge: HOME | End: 2021-02-03
Payer: MEDICARE

## 2021-02-03 DIAGNOSIS — G47.33 OSA (OBSTRUCTIVE SLEEP APNEA): ICD-10-CM

## 2021-02-03 PROCEDURE — 95810 POLYSOM 6/> YRS 4/> PARAM: CPT

## 2021-02-03 PROCEDURE — 95810 POLYSOM 6/> YRS 4/> PARAM: CPT | Performed by: INTERNAL MEDICINE

## 2021-02-08 NOTE — PROGRESS NOTES
[address] [place] [address] Rolando                               SLEEP STUDY REPORT    PATIENT NAME: Rachel ZURITA              :        1950  MED REC NO:   [id number]                            ROOM:  ACCOUNT NO:   [de-identified]                           ADMIT DATE: 2021  PROVIDER:     Cesar Ballesteros MD    DATE OF STUDY:  2021    FULL NIGHT POLYSOMNOGRAM REPORT    LOCATION:  [address] [place] [address]. REFERRING PROVIDER:  Mushtaq Borjas DO    AGE: 79 yrs      SEX: Male          HEIGHT: 5 ft   9 in         WEIGHT: 232 lbs          BMI: 34.3 kg/m2    NECK CIRCUMFERENCE: 18 in    Symptoms: Excessive daytime sleepiness, snoring, napping, nocturnal choking/gasping, nocturia, restless legs, leg kicking, sleep paralysis. The [place] Sleepiness Scale was 14 out of 24 (scores above or equal to 10 are suggestive of hypersomnolence). Indication: Suspected obstructive sleep apnea. Medical History:   Obesity, COPD, hyperlipidemia, GERD, history of lymphoma, depression, GERD, hypertension, BPH, chronic shoulder pain. Medications: Lisinopril, Norco, bupropion, sertraline, simvastatin, Chantix, meclizine, dutasteride, montelukast, Breo, clopidogrel, meloxicam, fenofibrate, omeprazole, tamsulosin, tizanidine, sildenafil, lorazepam, aspirin. For DESCRIPTION: This full night polysomnogram consisted of EEG, EOG, EMG and 2-lead ECG monitoring. Oronasal airflow (nasal pressure transducer and thermistor), chest and abdominal efforts by respiratory inductance plethysmography or polyvinylidene fluoride (PVDF) sensor, and pulse oximetry were monitored as well. Hypopneas were scored as at least a 30% reduction in amplitude of the semi-quantitative flow signal, associated with a 4% or greater oxygen desaturation.  Respiratory effort related arousals (RERAs) were scored as at least a 30% reduction in amplitude of the semi-quantitative flow signal, associated with an EEG microarousal.     FINDINGS:  SLEEP CONTINUITY AND SLEEP ARCHITECTURE:  Lights were turned off 10:03 PM at and lights were turned on at 4:50 AM. Total recording time was 406 minutes and total sleep time was 143 minutes. The overall sleep efficiency was significantly reduced at 35%. Sleep onset latency was normal at 20 minutes and REM latency was significantly increased at 366 minutes. There were 45 awakenings during this study. The duration of wakefulness after sleep onset was 243 minutes. The amount of N1 sleep was 27.5% of total sleep time, or 40 minutes. The amount of N2 sleep was 49% of total sleep time, or 70 minutes. The amount of REM sleep was 12.5% of total sleep time, or 18 minutes. Slow wave sleep was 11% of total sleep time, or 16 minutes. The microarousal index was significantly increased at 44; arousals were spontaneous in nature and related to periodic limb movements and respiratory events. RESPIRATORY MONITORING:  This study documented 1 obstructive apneas, 0 central apneas, 1 mixed apneas, 40 hypopneas, and 8 respiratory effort related arousals (RERAs) during the total recorded sleep time. The overall apnea/hypopnea index (AHI) was 17.6. The overall respiratory disturbance index (RDI includes RERAs) was 21. The non-REM RDI was 23 and the REM RDI was 6.7. There was no supine sleep during the study. The average oxyhemoglobin saturation was 90% while awake, 89% during REM sleep and 89% during non-REM sleep. The overall 4% oxygen desaturation index during sleep was 17.2. The lowest oxygen saturation during sleep was 83%. Oxygen saturations were less than or equal to 88% for  22 minutes or 15% of the total sleep time. Moderate snoring was noted. EEG:  No abnormal epileptiform activity was observed. ECG: The electrocardiogram documented normal sinus rhythm with occasional PVCs.   The average heart rate during sleep was 67 beats per minute. PERIODIC LIMB MOVEMENTS: Occasional periodic limb movements were noted. EMG/VIDEO MONITORING: Loss of REM atonia, bruxism, and parasomnias were not observed. IMPRESSION:   1. This study is consistent with moderate obstructive sleep apnea. The severity of this patient's sleep disordered breathing was likely underestimated due to very poor sleep efficiency and absence of supine sleep. 2.  Hypoxia independent of respiratory events was observed, with an average oxygen saturation of 89% during sleep. 3.  A split night study was not performed due to the moderate nature of this patient's sleep disordered breathing and overall poor sleep efficiency. RECOMMENDATIONS:    1. A full night in-lab titration study is recommended to determine optimal PAP therapy settings to effectively treat the patient's obstructive sleep apnea and ensure normalization of oxygen saturations. This should be ordered by the referring provider. 2.  The patient should be strongly counseled against driving while drowsy. 3.  Weight loss efforts should be encouraged, as the severity of obstructive sleep apnea may improve although no guarantee of cure can be made. 4.  The patient may be referred to the [facility] for ongoing management of his obstructive sleep apnea and PAP therapy.          Mateusz Montiel MD    D: 02/08/2021 12:41:55       T: 02/08/2021 13:41:33     [email or web address]  Job#: [id number]     Doc#: [id number]

## 2021-02-12 ENCOUNTER — ANESTHESIA EVENT (OUTPATIENT)
Dept: OPERATING ROOM | Age: 71
End: 2021-02-12
Payer: MEDICARE

## 2021-02-12 ENCOUNTER — HOSPITAL ENCOUNTER (OUTPATIENT)
Age: 71
Discharge: HOME | End: 2021-02-14
Payer: MEDICARE

## 2021-02-12 DIAGNOSIS — M75.102 TEAR OF LEFT ROTATOR CUFF, UNSPECIFIED TEAR EXTENT, UNSPECIFIED WHETHER TRAUMATIC: ICD-10-CM

## 2021-02-12 LAB
ANION GAP SERPL CALCULATED.3IONS-SCNC: 12 MMOL/L (ref 7–16)
BUN BLDV-MCNC: 25 MG/DL (ref 8–23)
CALCIUM SERPL-MCNC: 10.1 MG/DL (ref 8.6–10.2)
CHLORIDE BLD-SCNC: 106 MMOL/L (ref 98–107)
CO2: 26 MMOL/L (ref 22–29)
CREAT SERPL-MCNC: 1.4 MG/DL (ref 0.7–1.2)
GFR AFRICAN AMERICAN: >60
GFR NON-AFRICAN AMERICAN: 50 ML/MIN/1.73
GLUCOSE BLD-MCNC: 85 MG/DL (ref 74–99)
HCT VFR BLD CALC: 47.6 % (ref 37–54)
HEMOGLOBIN: 14.9 G/DL (ref 12.5–16.5)
MCH RBC QN AUTO: 28.4 PG (ref 26–35)
MCHC RBC AUTO-ENTMCNC: 31.3 % (ref 32–34.5)
MCV RBC AUTO: 90.7 FL (ref 80–99.9)
PDW BLD-RTO: 13.2 FL (ref 11.5–15)
PLATELET # BLD: 270 E9/L (ref 130–450)
PMV BLD AUTO: 11.3 FL (ref 7–12)
POTASSIUM SERPL-SCNC: 4.9 MMOL/L (ref 3.5–5)
RBC # BLD: 5.25 E12/L (ref 3.8–5.8)
SODIUM BLD-SCNC: 144 MMOL/L (ref 132–146)
WBC # BLD: 6.3 E9/L (ref 4.5–11.5)

## 2021-02-12 PROCEDURE — U0003 INFECTIOUS AGENT DETECTION BY NUCLEIC ACID (DNA OR RNA); SEVERE ACUTE RESPIRATORY SYNDROME CORONAVIRUS 2 (SARS-COV-2) (CORONAVIRUS DISEASE [COVID-19]), AMPLIFIED PROBE TECHNIQUE, MAKING USE OF HIGH THROUGHPUT TECHNOLOGIES AS DESCRIBED BY CMS-2020-01-R: HCPCS

## 2021-02-12 NOTE — PROGRESS NOTES
Dr Manisha Mcnair aware pt can stop his plavix pre op but not his baby aspirin per cardiologist recommendation

## 2021-02-13 LAB
SARS-COV-2: NOT DETECTED
SOURCE: NORMAL

## 2021-02-16 DIAGNOSIS — G47.33 OSA (OBSTRUCTIVE SLEEP APNEA): Primary | ICD-10-CM

## 2021-02-17 ENCOUNTER — OFFICE VISIT (OUTPATIENT)
Dept: FAMILY MEDICINE CLINIC | Age: 71
End: 2021-02-17
Payer: MEDICARE

## 2021-02-17 VITALS
OXYGEN SATURATION: 96 % | DIASTOLIC BLOOD PRESSURE: 78 MMHG | HEIGHT: 69 IN | SYSTOLIC BLOOD PRESSURE: 134 MMHG | TEMPERATURE: 96.8 F | HEART RATE: 80 BPM | BODY MASS INDEX: 34.36 KG/M2 | WEIGHT: 232 LBS | RESPIRATION RATE: 16 BRPM

## 2021-02-17 DIAGNOSIS — J44.9 CHRONIC OBSTRUCTIVE PULMONARY DISEASE, UNSPECIFIED COPD TYPE (HCC): ICD-10-CM

## 2021-02-17 DIAGNOSIS — M25.511 CHRONIC RIGHT SHOULDER PAIN: ICD-10-CM

## 2021-02-17 DIAGNOSIS — J40 BRONCHITIS: Primary | ICD-10-CM

## 2021-02-17 DIAGNOSIS — M12.812 ROTATOR CUFF ARTHROPATHY OF LEFT SHOULDER: ICD-10-CM

## 2021-02-17 DIAGNOSIS — G89.29 CHRONIC RIGHT SHOULDER PAIN: ICD-10-CM

## 2021-02-17 PROCEDURE — G8427 DOCREV CUR MEDS BY ELIG CLIN: HCPCS | Performed by: FAMILY MEDICINE

## 2021-02-17 PROCEDURE — 3017F COLORECTAL CA SCREEN DOC REV: CPT | Performed by: FAMILY MEDICINE

## 2021-02-17 PROCEDURE — 1036F TOBACCO NON-USER: CPT | Performed by: FAMILY MEDICINE

## 2021-02-17 PROCEDURE — 99214 OFFICE O/P EST MOD 30 MIN: CPT | Performed by: FAMILY MEDICINE

## 2021-02-17 PROCEDURE — 4040F PNEUMOC VAC/ADMIN/RCVD: CPT | Performed by: FAMILY MEDICINE

## 2021-02-17 PROCEDURE — G8484 FLU IMMUNIZE NO ADMIN: HCPCS | Performed by: FAMILY MEDICINE

## 2021-02-17 PROCEDURE — 1123F ACP DISCUSS/DSCN MKR DOCD: CPT | Performed by: FAMILY MEDICINE

## 2021-02-17 PROCEDURE — G8417 CALC BMI ABV UP PARAM F/U: HCPCS | Performed by: FAMILY MEDICINE

## 2021-02-17 PROCEDURE — G8926 SPIRO NO PERF OR DOC: HCPCS | Performed by: FAMILY MEDICINE

## 2021-02-17 PROCEDURE — 3023F SPIROM DOC REV: CPT | Performed by: FAMILY MEDICINE

## 2021-02-17 RX ORDER — BUDESONIDE AND FORMOTEROL FUMARATE DIHYDRATE 80; 4.5 UG/1; UG/1
2 AEROSOL RESPIRATORY (INHALATION) 2 TIMES DAILY
Qty: 1 INHALER | Refills: 3 | Status: SHIPPED
Start: 2021-02-17 | End: 2022-01-26

## 2021-02-17 RX ORDER — HYDROCODONE BITARTRATE AND ACETAMINOPHEN 7.5; 325 MG/1; MG/1
1 TABLET ORAL EVERY 8 HOURS PRN
Qty: 90 TABLET | Refills: 0 | Status: SHIPPED | OUTPATIENT
Start: 2021-02-17 | End: 2021-03-17 | Stop reason: SDUPTHER

## 2021-02-17 ASSESSMENT — ENCOUNTER SYMPTOMS
BLOOD IN STOOL: 0
CONSTIPATION: 0
WHEEZING: 0
DIARRHEA: 0

## 2021-02-17 NOTE — PROGRESS NOTES
[address] [facility]                                                                                                                    PRE OP INSTRUCTIONS FOR  Taty Parker        Date: 2/17/2021    Date of surgery:  2/18/2021   Arrival Time: Hospital will call you between 5pm and 7pm with your final arrival time for surgery    1. Do not eat or drink anything after    Midnight   prior to surgery. This includes no water, chewing gum, mints or ice chips. 2. Take the following medications with a small sip of water on the morning of Surgery:  Use inhalers and bring  May have pain med up to 4 hrs preop also take prilosec zoloft and wellbutrin dos with sip water     3. Diabetics may take evening dose of insulin but none after midnight. If you feel symptomatic or low blood sugar morning of surgery drink 1-2 ounces of apple juice only. 4. Aspirin, Ibuprofen, Advil, Naproxen, Vitamin E and other Anti-inflammatory products should be stopped  before surgery  as directed by your physician. Take Tylenol only unless instructed otherwise by your surgeon. 5. Check with your Doctor regarding stopping Plavix, Coumadin, Lovenox, Eliquis, Effient, or other blood thinners. 6. Do not smoke,use illicit drugs and do not drink any alcoholic beverages 24 hours prior to surgery. 7. You may brush your teeth the morning of surgery. DO NOT SWALLOW WATER    8. You MUST make arrangements for a responsible adult to take you home after your surgery. You will not be allowed to leave alone or drive yourself home. It is strongly suggested someone stay with you the first 24 hrs. Your surgery will be cancelled if you do not have a ride home. 9. PEDIATRIC PATIENTS ONLY:  A parent/legal guardian must accompany a child scheduled for surgery and plan to stay at the hospital until the child is discharged. Please do not bring other children with you.     10. Please wear simple, loose fitting clothing to the hospital.  Debo Baeza not bring valuables (money, credit cards, checkbooks, etc.) Do not wear any makeup (including no eye makeup) or nail polish on your fingers or toes. 11. DO NOT wear any jewelry or piercings on day of surgery. All body piercing jewelry must be removed. 12. Shower the night before surgery with _x__Antibacterial soap /BRIANDA WIPES________    13. TOTAL JOINT REPLACEMENT/HYSTERECTOMY PATIENTS ONLY---Remember to bring Blood Bank bracelet to the hospital on the day of surgery. 14. If you have a Living Will and Durable Power of  for Healthcare, please bring in a copy. 15. If appropriate bring crutches, inspirex, WALKER, CANE etc... 12. Notify your Surgeon if you develop any illness between now and surgery time, cough, cold, fever, sore throat, nausea, vomiting, etc.  Please notify your surgeon if you experience dizziness, shortness of breath or blurred vision between now & the time of your surgery. 17. If you have ___dentures, they will be removed before going to the OR; we will provide you a container. If you wear ___contact lenses or ___glasses, they will be removed; please bring a case for them. 18. To provide excellent care visitors will be limited to 2 in the room at any given time. 19. Please bring picture ID and insurance card. 20. Sleep apnea patients need to bring CPAP AND SETTINGS to hospital on day of surgery. 21. During flu season no children under the age of 15 are permitted in the hospital for the safety of all patients. 22. Other                  Please call AMBULATORY CARE if you have any further questions.    [address]     [address]

## 2021-02-17 NOTE — PROGRESS NOTES
Marlyn Belle (:  1950) is a 79 y.o. male,Established patient, here for evaluation of the following chief complaint(s):  Pre-op Exam (21 left shoulder rotator cuff - Dr Zee Mauricio)      ASSESSMENT/PLAN:  1. Bronchitis  2. Chronic right shoulder pain  -     HYDROcodone-acetaminophen (NORCO) 7.5-325 MG per tablet; Take 1 tablet by mouth every 8 hours as needed for Pain for up to 30 days. , Disp-90 tablet, R-0Print  3. Chronic obstructive pulmonary disease, unspecified COPD type (HCC)  -     budesonide-formoterol (SYMBICORT) 80-4.5 MCG/ACT AERO; Inhale 2 puffs into the lungs 2 times daily, Disp-1 Inhaler, R-3Normal  4. Rotator cuff arthropathy of left shoulder      No follow-ups on file. SUBJECTIVE/OBJECTIVE:  Here for pre op. He already saw [facility] Dr Zee Mauricio and is cleared from that standpoint. I did read his note from last month in tho regard. Review of Systems   Constitutional: Negative for chills and diaphoresis. HENT: Negative for ear discharge, ear pain, hearing loss (Yes and not compensated), nosebleeds and tinnitus. Respiratory: Negative for wheezing. Cardiovascular: Negative for chest pain. Gastrointestinal: Negative for blood in stool, constipation and diarrhea. Genitourinary: Negative for dysuria, flank pain and hematuria. Musculoskeletal: Negative for arthralgias. Skin: Negative for rash. Neurological: Negative for seizures, speech difficulty and headaches. Hematological: Does not bruise/bleed easily. Psychiatric/Behavioral: Negative for agitation. Physical Exam  Eyes:      General:         Right eye: No discharge. Left eye: No discharge. Neck:      Musculoskeletal: No neck rigidity. Cardiovascular:      Rate and Rhythm: Normal rate and regular rhythm. Heart sounds: No murmur. Pulmonary:      Effort: No respiratory distress. Breath sounds: Wheezing (present on recumbency ) present. No rhonchi or rales.    Abdominal: Tenderness: There is no abdominal tenderness. Musculoskeletal:         General: No deformity. Comments: Left shoulder has decreased abduction and is tender   Skin:     General: Skin is warm. Capillary Refill: Capillary refill takes less than 2 seconds. Coloration: Skin is not pale. Findings: No bruising. Neurological:      Mental Status: He is alert. Psychiatric:         Mood and Affect: Mood normal.       ASSESSMENT/PLAN:    1. Chronic right shoulder pain  - HYDROcodone-acetaminophen (NORCO) 7.5-325 MG per tablet; Take 1 tablet by mouth every 8 hours as needed for Pain for up to 30 days. Dispense: 90 tablet; Refill: 0    2. Bronchitis. sTABLE AND WE WILL PRESCRIBE INHALER TODAY. 3. Chronic obstructive pulmonary disease, unspecified COPD type ([facility] [address]    Currently status QUO AND HE IS GOING TO SURGERY TOMORROW  Controlled substances monitoring: no signs of potential drug abuse or diversion identified and [place]ARRS report reviewed today- activity consistent with treatment plan. MDM: Moderate                   An electronic signature was used to authenticate this note.     --Ping Antony, DO

## 2021-02-18 ENCOUNTER — HOSPITAL ENCOUNTER (OUTPATIENT)
Age: 71
Setting detail: OBSERVATION
Discharge: HOME | End: 2021-02-19
Attending: ORTHOPAEDIC SURGERY | Admitting: ORTHOPAEDIC SURGERY
Payer: MEDICARE

## 2021-02-18 ENCOUNTER — APPOINTMENT (OUTPATIENT)
Dept: GENERAL RADIOLOGY | Age: 71
End: 2021-02-18
Attending: ORTHOPAEDIC SURGERY
Payer: MEDICARE

## 2021-02-18 ENCOUNTER — ANESTHESIA (OUTPATIENT)
Dept: OPERATING ROOM | Age: 71
End: 2021-02-18
Payer: MEDICARE

## 2021-02-18 VITALS
SYSTOLIC BLOOD PRESSURE: 145 MMHG | RESPIRATION RATE: 22 BRPM | TEMPERATURE: 98.6 F | DIASTOLIC BLOOD PRESSURE: 96 MMHG | OXYGEN SATURATION: 94 %

## 2021-02-18 DIAGNOSIS — M75.102 TEAR OF LEFT ROTATOR CUFF, UNSPECIFIED TEAR EXTENT, UNSPECIFIED WHETHER TRAUMATIC: Primary | ICD-10-CM

## 2021-02-18 PROBLEM — Z98.890 S/P ARTHROSCOPY OF SHOULDER: Status: ACTIVE | Noted: 2021-02-18

## 2021-02-18 PROBLEM — M75.52 BURSITIS OF LEFT SHOULDER: Chronic | Status: ACTIVE | Noted: 2021-02-18

## 2021-02-18 PROBLEM — M24.112 LABRAL TEAR OF SHOULDER, DEGENERATIVE, LEFT: Chronic | Status: ACTIVE | Noted: 2021-02-18

## 2021-02-18 PROBLEM — M75.42 IMPINGEMENT SYNDROME OF LEFT SHOULDER: Chronic | Status: ACTIVE | Noted: 2021-02-18

## 2021-02-18 PROBLEM — M75.122 NONTRAUMATIC COMPLETE TEAR OF ROTATOR CUFF, LEFT: Chronic | Status: ACTIVE | Noted: 2021-02-18

## 2021-02-18 LAB
ALBUMIN SERPL-MCNC: 4.4 G/DL (ref 3.5–5.2)
ALP BLD-CCNC: 41 U/L (ref 40–129)
ALT SERPL-CCNC: 24 U/L (ref 0–40)
ANION GAP SERPL CALCULATED.3IONS-SCNC: 12 MMOL/L (ref 7–16)
AST SERPL-CCNC: 27 U/L (ref 0–39)
BILIRUB SERPL-MCNC: 0.3 MG/DL (ref 0–1.2)
BUN BLDV-MCNC: 24 MG/DL (ref 8–23)
CALCIUM SERPL-MCNC: 9 MG/DL (ref 8.6–10.2)
CHLORIDE BLD-SCNC: 104 MMOL/L (ref 98–107)
CO2: 21 MMOL/L (ref 22–29)
CREAT SERPL-MCNC: 1.3 MG/DL (ref 0.7–1.2)
D DIMER: 234 NG/ML DDU
GFR AFRICAN AMERICAN: >60
GFR NON-AFRICAN AMERICAN: 55 ML/MIN/1.73
GLUCOSE BLD-MCNC: 169 MG/DL (ref 74–99)
HCT VFR BLD CALC: 42.1 % (ref 37–54)
HEMOGLOBIN: 13.9 G/DL (ref 12.5–16.5)
MAGNESIUM: 1.4 MG/DL (ref 1.6–2.6)
MCH RBC QN AUTO: 29.1 PG (ref 26–35)
MCHC RBC AUTO-ENTMCNC: 33 % (ref 32–34.5)
MCV RBC AUTO: 88.1 FL (ref 80–99.9)
PDW BLD-RTO: 13.2 FL (ref 11.5–15)
PHOSPHORUS: 3.6 MG/DL (ref 2.5–4.5)
PLATELET # BLD: 279 E9/L (ref 130–450)
PMV BLD AUTO: 10.9 FL (ref 7–12)
POTASSIUM SERPL-SCNC: 4.9 MMOL/L (ref 3.5–5)
RBC # BLD: 4.78 E12/L (ref 3.8–5.8)
SODIUM BLD-SCNC: 137 MMOL/L (ref 132–146)
TOTAL PROTEIN: 6.8 G/DL (ref 6.4–8.3)
TROPONIN: <0.01 NG/ML (ref 0–0.03)
WBC # BLD: 10.5 E9/L (ref 4.5–11.5)

## 2021-02-18 PROCEDURE — 6360000002 HC RX W HCPCS

## 2021-02-18 PROCEDURE — 6360000002 HC RX W HCPCS: Performed by: ANESTHESIOLOGY

## 2021-02-18 PROCEDURE — 36415 COLL VENOUS BLD VENIPUNCTURE: CPT

## 2021-02-18 PROCEDURE — 85027 COMPLETE CBC AUTOMATED: CPT

## 2021-02-18 PROCEDURE — 84484 ASSAY OF TROPONIN QUANT: CPT

## 2021-02-18 PROCEDURE — 3600000003 HC SURGERY LEVEL 3 BASE: Performed by: ORTHOPAEDIC SURGERY

## 2021-02-18 PROCEDURE — 7100000000 HC PACU RECOVERY - FIRST 15 MIN: Performed by: ORTHOPAEDIC SURGERY

## 2021-02-18 PROCEDURE — G0378 HOSPITAL OBSERVATION PER HR: HCPCS

## 2021-02-18 PROCEDURE — 7100000001 HC PACU RECOVERY - ADDTL 15 MIN: Performed by: ORTHOPAEDIC SURGERY

## 2021-02-18 PROCEDURE — 3700000001 HC ADD 15 MINUTES (ANESTHESIA): Performed by: ORTHOPAEDIC SURGERY

## 2021-02-18 PROCEDURE — 29827 SHO ARTHRS SRG RT8TR CUF RPR: CPT | Performed by: ORTHOPAEDIC SURGERY

## 2021-02-18 PROCEDURE — 84100 ASSAY OF PHOSPHORUS: CPT

## 2021-02-18 PROCEDURE — 29826 SHO ARTHRS SRG DECOMPRESSION: CPT | Performed by: ORTHOPAEDIC SURGERY

## 2021-02-18 PROCEDURE — 2580000003 HC RX 258: Performed by: ANESTHESIOLOGY

## 2021-02-18 PROCEDURE — 85378 FIBRIN DEGRADE SEMIQUANT: CPT

## 2021-02-18 PROCEDURE — 2720000010 HC SURG SUPPLY STERILE: Performed by: ORTHOPAEDIC SURGERY

## 2021-02-18 PROCEDURE — 2500000003 HC RX 250 WO HCPCS

## 2021-02-18 PROCEDURE — C1713 ANCHOR/SCREW BN/BN,TIS/BN: HCPCS | Performed by: ORTHOPAEDIC SURGERY

## 2021-02-18 PROCEDURE — 7100000011 HC PHASE II RECOVERY - ADDTL 15 MIN: Performed by: ORTHOPAEDIC SURGERY

## 2021-02-18 PROCEDURE — 2709999900 HC NON-CHARGEABLE SUPPLY: Performed by: ORTHOPAEDIC SURGERY

## 2021-02-18 PROCEDURE — 6360000002 HC RX W HCPCS: Performed by: INTERNAL MEDICINE

## 2021-02-18 PROCEDURE — 6370000000 HC RX 637 (ALT 250 FOR IP): Performed by: ORTHOPAEDIC SURGERY

## 2021-02-18 PROCEDURE — 3600000013 HC SURGERY LEVEL 3 ADDTL 15MIN: Performed by: ORTHOPAEDIC SURGERY

## 2021-02-18 PROCEDURE — 83735 ASSAY OF MAGNESIUM: CPT

## 2021-02-18 PROCEDURE — 7100000010 HC PHASE II RECOVERY - FIRST 15 MIN: Performed by: ORTHOPAEDIC SURGERY

## 2021-02-18 PROCEDURE — 3700000000 HC ANESTHESIA ATTENDED CARE: Performed by: ORTHOPAEDIC SURGERY

## 2021-02-18 PROCEDURE — 2500000003 HC RX 250 WO HCPCS: Performed by: ORTHOPAEDIC SURGERY

## 2021-02-18 PROCEDURE — 71045 X-RAY EXAM CHEST 1 VIEW: CPT

## 2021-02-18 PROCEDURE — 6360000002 HC RX W HCPCS: Performed by: ORTHOPAEDIC SURGERY

## 2021-02-18 PROCEDURE — 94640 AIRWAY INHALATION TREATMENT: CPT

## 2021-02-18 PROCEDURE — 2700000000 HC OXYGEN THERAPY PER DAY

## 2021-02-18 PROCEDURE — 94664 DEMO&/EVAL PT USE INHALER: CPT

## 2021-02-18 PROCEDURE — 80053 COMPREHEN METABOLIC PANEL: CPT

## 2021-02-18 PROCEDURE — 6370000000 HC RX 637 (ALT 250 FOR IP)

## 2021-02-18 PROCEDURE — 6370000000 HC RX 637 (ALT 250 FOR IP): Performed by: INTERNAL MEDICINE

## 2021-02-18 DEVICE — ANCHOR SUT W/ ORTHOCORD KNOTLESS FOR ROT CUF REP VERSALOK: Type: IMPLANTABLE DEVICE | Site: SHOULDER | Status: FUNCTIONAL

## 2021-02-18 RX ORDER — NEOSTIGMINE METHYLSULFATE 1 MG/ML
INJECTION, SOLUTION INTRAVENOUS PRN
Status: DISCONTINUED | OUTPATIENT
Start: 2021-02-18 | End: 2021-02-18 | Stop reason: SDUPTHER

## 2021-02-18 RX ORDER — ONDANSETRON 2 MG/ML
4 INJECTION INTRAMUSCULAR; INTRAVENOUS EVERY 6 HOURS PRN
Status: DISCONTINUED | OUTPATIENT
Start: 2021-02-18 | End: 2021-02-19 | Stop reason: HOSPADM

## 2021-02-18 RX ORDER — ACETAMINOPHEN 500 MG
1000 TABLET ORAL EVERY 6 HOURS PRN
Status: DISCONTINUED | OUTPATIENT
Start: 2021-02-18 | End: 2021-02-19 | Stop reason: HOSPADM

## 2021-02-18 RX ORDER — POLYETHYLENE GLYCOL 3350 17 G/17G
17 POWDER, FOR SOLUTION ORAL DAILY PRN
Status: DISCONTINUED | OUTPATIENT
Start: 2021-02-18 | End: 2021-02-19 | Stop reason: HOSPADM

## 2021-02-18 RX ORDER — OXYCODONE HYDROCHLORIDE 5 MG/1
5 TABLET ORAL EVERY 4 HOURS PRN
Status: DISCONTINUED | OUTPATIENT
Start: 2021-02-18 | End: 2021-02-19 | Stop reason: HOSPADM

## 2021-02-18 RX ORDER — ARFORMOTEROL TARTRATE 15 UG/2ML
15 SOLUTION RESPIRATORY (INHALATION) 2 TIMES DAILY
Status: DISCONTINUED | OUTPATIENT
Start: 2021-02-18 | End: 2021-02-19 | Stop reason: HOSPADM

## 2021-02-18 RX ORDER — SODIUM CHLORIDE, SODIUM LACTATE, POTASSIUM CHLORIDE, CALCIUM CHLORIDE 600; 310; 30; 20 MG/100ML; MG/100ML; MG/100ML; MG/100ML
INJECTION, SOLUTION INTRAVENOUS CONTINUOUS
Status: DISCONTINUED | OUTPATIENT
Start: 2021-02-18 | End: 2021-02-18 | Stop reason: SDUPTHER

## 2021-02-18 RX ORDER — ONDANSETRON 2 MG/ML
INJECTION INTRAMUSCULAR; INTRAVENOUS PRN
Status: DISCONTINUED | OUTPATIENT
Start: 2021-02-18 | End: 2021-02-18 | Stop reason: SDUPTHER

## 2021-02-18 RX ORDER — FENTANYL CITRATE 50 UG/ML
25 INJECTION, SOLUTION INTRAMUSCULAR; INTRAVENOUS EVERY 5 MIN PRN
Status: DISCONTINUED | OUTPATIENT
Start: 2021-02-18 | End: 2021-02-18 | Stop reason: HOSPADM

## 2021-02-18 RX ORDER — SODIUM CHLORIDE 0.9 % (FLUSH) 0.9 %
10 SYRINGE (ML) INJECTION PRN
Status: DISCONTINUED | OUTPATIENT
Start: 2021-02-18 | End: 2021-02-19 | Stop reason: HOSPADM

## 2021-02-18 RX ORDER — LABETALOL HYDROCHLORIDE 5 MG/ML
INJECTION, SOLUTION INTRAVENOUS PRN
Status: DISCONTINUED | OUTPATIENT
Start: 2021-02-18 | End: 2021-02-18 | Stop reason: SDUPTHER

## 2021-02-18 RX ORDER — BUDESONIDE AND FORMOTEROL FUMARATE DIHYDRATE 80; 4.5 UG/1; UG/1
2 AEROSOL RESPIRATORY (INHALATION) 2 TIMES DAILY
Status: DISCONTINUED | OUTPATIENT
Start: 2021-02-18 | End: 2021-02-18

## 2021-02-18 RX ORDER — OXYCODONE HYDROCHLORIDE 5 MG/1
10 TABLET ORAL EVERY 4 HOURS PRN
Status: DISCONTINUED | OUTPATIENT
Start: 2021-02-18 | End: 2021-02-19 | Stop reason: HOSPADM

## 2021-02-18 RX ORDER — HYDROCODONE BITARTRATE AND ACETAMINOPHEN 5; 325 MG/1; MG/1
2 TABLET ORAL PRN
Status: DISCONTINUED | OUTPATIENT
Start: 2021-02-18 | End: 2021-02-18 | Stop reason: HOSPADM

## 2021-02-18 RX ORDER — HYDROCODONE BITARTRATE AND ACETAMINOPHEN 5; 325 MG/1; MG/1
1 TABLET ORAL PRN
Status: DISCONTINUED | OUTPATIENT
Start: 2021-02-18 | End: 2021-02-18 | Stop reason: HOSPADM

## 2021-02-18 RX ORDER — ASPIRIN 81 MG/1
81 TABLET, CHEWABLE ORAL DAILY
Status: DISCONTINUED | OUTPATIENT
Start: 2021-02-19 | End: 2021-02-19 | Stop reason: HOSPADM

## 2021-02-18 RX ORDER — GLYCOPYRROLATE 1 MG/5 ML
SYRINGE (ML) INTRAVENOUS PRN
Status: DISCONTINUED | OUTPATIENT
Start: 2021-02-18 | End: 2021-02-18 | Stop reason: SDUPTHER

## 2021-02-18 RX ORDER — MEPERIDINE HYDROCHLORIDE 25 MG/ML
25 INJECTION INTRAMUSCULAR; INTRAVENOUS; SUBCUTANEOUS ONCE
Status: COMPLETED | OUTPATIENT
Start: 2021-02-18 | End: 2021-02-18

## 2021-02-18 RX ORDER — MONTELUKAST SODIUM 10 MG/1
10 TABLET ORAL NIGHTLY
Status: DISCONTINUED | OUTPATIENT
Start: 2021-02-18 | End: 2021-02-19 | Stop reason: HOSPADM

## 2021-02-18 RX ORDER — FINASTERIDE 5 MG/1
5 TABLET, FILM COATED ORAL DAILY
Refills: 3 | Status: DISCONTINUED | OUTPATIENT
Start: 2021-02-18 | End: 2021-02-19 | Stop reason: HOSPADM

## 2021-02-18 RX ORDER — IPRATROPIUM BROMIDE AND ALBUTEROL SULFATE 2.5; .5 MG/3ML; MG/3ML
SOLUTION RESPIRATORY (INHALATION)
Status: COMPLETED
Start: 2021-02-18 | End: 2021-02-18

## 2021-02-18 RX ORDER — SODIUM CHLORIDE 0.9 % (FLUSH) 0.9 %
10 SYRINGE (ML) INJECTION PRN
Status: DISCONTINUED | OUTPATIENT
Start: 2021-02-18 | End: 2021-02-18 | Stop reason: HOSPADM

## 2021-02-18 RX ORDER — LISINOPRIL 10 MG/1
10 TABLET ORAL DAILY
Status: DISCONTINUED | OUTPATIENT
Start: 2021-02-18 | End: 2021-02-19 | Stop reason: HOSPADM

## 2021-02-18 RX ORDER — PHENYLEPHRINE HYDROCHLORIDE 10 MG/ML
INJECTION INTRAVENOUS PRN
Status: DISCONTINUED | OUTPATIENT
Start: 2021-02-18 | End: 2021-02-18 | Stop reason: SDUPTHER

## 2021-02-18 RX ORDER — MEPERIDINE HYDROCHLORIDE 25 MG/ML
12.5 INJECTION INTRAMUSCULAR; INTRAVENOUS; SUBCUTANEOUS EVERY 5 MIN PRN
Status: DISCONTINUED | OUTPATIENT
Start: 2021-02-18 | End: 2021-02-18 | Stop reason: HOSPADM

## 2021-02-18 RX ORDER — SODIUM CHLORIDE 0.9 % (FLUSH) 0.9 %
10 SYRINGE (ML) INJECTION EVERY 12 HOURS SCHEDULED
Status: DISCONTINUED | OUTPATIENT
Start: 2021-02-18 | End: 2021-02-19 | Stop reason: HOSPADM

## 2021-02-18 RX ORDER — PROMETHAZINE HYDROCHLORIDE 25 MG/ML
6.25 INJECTION, SOLUTION INTRAMUSCULAR; INTRAVENOUS
Status: DISCONTINUED | OUTPATIENT
Start: 2021-02-18 | End: 2021-02-18 | Stop reason: HOSPADM

## 2021-02-18 RX ORDER — BUPIVACAINE HYDROCHLORIDE AND EPINEPHRINE 2.5; 5 MG/ML; UG/ML
INJECTION, SOLUTION EPIDURAL; INFILTRATION; INTRACAUDAL; PERINEURAL PRN
Status: DISCONTINUED | OUTPATIENT
Start: 2021-02-18 | End: 2021-02-18 | Stop reason: ALTCHOICE

## 2021-02-18 RX ORDER — MORPHINE SULFATE 2 MG/ML
2 INJECTION, SOLUTION INTRAMUSCULAR; INTRAVENOUS EVERY 5 MIN PRN
Status: DISCONTINUED | OUTPATIENT
Start: 2021-02-18 | End: 2021-02-18 | Stop reason: HOSPADM

## 2021-02-18 RX ORDER — IPRATROPIUM BROMIDE AND ALBUTEROL SULFATE 2.5; .5 MG/3ML; MG/3ML
1 SOLUTION RESPIRATORY (INHALATION) ONCE
Status: COMPLETED | OUTPATIENT
Start: 2021-02-18 | End: 2021-02-18

## 2021-02-18 RX ORDER — PROMETHAZINE HYDROCHLORIDE 25 MG/ML
12.5 INJECTION, SOLUTION INTRAMUSCULAR; INTRAVENOUS ONCE
Status: COMPLETED | OUTPATIENT
Start: 2021-02-18 | End: 2021-02-18

## 2021-02-18 RX ORDER — PANTOPRAZOLE SODIUM 40 MG/1
40 TABLET, DELAYED RELEASE ORAL
Status: DISCONTINUED | OUTPATIENT
Start: 2021-02-19 | End: 2021-02-19 | Stop reason: HOSPADM

## 2021-02-18 RX ORDER — FENTANYL CITRATE 50 UG/ML
INJECTION, SOLUTION INTRAMUSCULAR; INTRAVENOUS PRN
Status: DISCONTINUED | OUTPATIENT
Start: 2021-02-18 | End: 2021-02-18 | Stop reason: SDUPTHER

## 2021-02-18 RX ORDER — LIDOCAINE HYDROCHLORIDE 20 MG/ML
INJECTION, SOLUTION INTRAVENOUS PRN
Status: DISCONTINUED | OUTPATIENT
Start: 2021-02-18 | End: 2021-02-18 | Stop reason: SDUPTHER

## 2021-02-18 RX ORDER — PROPOFOL 10 MG/ML
INJECTION, EMULSION INTRAVENOUS PRN
Status: DISCONTINUED | OUTPATIENT
Start: 2021-02-18 | End: 2021-02-18 | Stop reason: SDUPTHER

## 2021-02-18 RX ORDER — CEFAZOLIN SODIUM 2 G/50ML
2000 SOLUTION INTRAVENOUS
Status: COMPLETED | OUTPATIENT
Start: 2021-02-18 | End: 2021-02-18

## 2021-02-18 RX ORDER — ACETAMINOPHEN 325 MG/1
650 TABLET ORAL EVERY 4 HOURS PRN
Status: DISCONTINUED | OUTPATIENT
Start: 2021-02-18 | End: 2021-02-19 | Stop reason: HOSPADM

## 2021-02-18 RX ORDER — SODIUM CHLORIDE, SODIUM LACTATE, POTASSIUM CHLORIDE, CALCIUM CHLORIDE 600; 310; 30; 20 MG/100ML; MG/100ML; MG/100ML; MG/100ML
INJECTION, SOLUTION INTRAVENOUS CONTINUOUS
Status: DISCONTINUED | OUTPATIENT
Start: 2021-02-18 | End: 2021-02-18

## 2021-02-18 RX ORDER — MEPERIDINE HYDROCHLORIDE 25 MG/ML
INJECTION INTRAMUSCULAR; INTRAVENOUS; SUBCUTANEOUS
Status: DISPENSED
Start: 2021-02-18 | End: 2021-02-19

## 2021-02-18 RX ORDER — MAGNESIUM OXIDE 400 MG/1
400 TABLET ORAL 2 TIMES DAILY
Status: COMPLETED | OUTPATIENT
Start: 2021-02-18 | End: 2021-02-19

## 2021-02-18 RX ORDER — PROMETHAZINE HYDROCHLORIDE 25 MG/ML
INJECTION, SOLUTION INTRAMUSCULAR; INTRAVENOUS
Status: COMPLETED
Start: 2021-02-18 | End: 2021-02-18

## 2021-02-18 RX ORDER — CEFAZOLIN SODIUM 1 G/50ML
1000 SOLUTION INTRAVENOUS
Status: DISCONTINUED | OUTPATIENT
Start: 2021-02-18 | End: 2021-02-18 | Stop reason: DRUGHIGH

## 2021-02-18 RX ORDER — PROMETHAZINE HYDROCHLORIDE 25 MG/1
12.5 TABLET ORAL EVERY 6 HOURS PRN
Status: DISCONTINUED | OUTPATIENT
Start: 2021-02-18 | End: 2021-02-19 | Stop reason: HOSPADM

## 2021-02-18 RX ORDER — BUDESONIDE 0.25 MG/2ML
0.25 INHALANT ORAL 2 TIMES DAILY
Status: DISCONTINUED | OUTPATIENT
Start: 2021-02-18 | End: 2021-02-19 | Stop reason: HOSPADM

## 2021-02-18 RX ORDER — ROCURONIUM BROMIDE 10 MG/ML
INJECTION, SOLUTION INTRAVENOUS PRN
Status: DISCONTINUED | OUTPATIENT
Start: 2021-02-18 | End: 2021-02-18 | Stop reason: SDUPTHER

## 2021-02-18 RX ADMIN — SODIUM CHLORIDE, POTASSIUM CHLORIDE, SODIUM LACTATE AND CALCIUM CHLORIDE: 600; 310; 30; 20 INJECTION, SOLUTION INTRAVENOUS at 11:45

## 2021-02-18 RX ADMIN — MAGNESIUM OXIDE 400 MG: 400 TABLET ORAL at 23:24

## 2021-02-18 RX ADMIN — ACETAMINOPHEN 1000 MG: 500 TABLET ORAL at 21:35

## 2021-02-18 RX ADMIN — LABETALOL HYDROCHLORIDE 10 MG: 5 INJECTION INTRAVENOUS at 13:51

## 2021-02-18 RX ADMIN — LIDOCAINE HYDROCHLORIDE 100 MG: 20 INJECTION, SOLUTION INTRAVENOUS at 11:56

## 2021-02-18 RX ADMIN — PROMETHAZINE HYDROCHLORIDE 12.5 MG: 25 INJECTION, SOLUTION INTRAMUSCULAR; INTRAVENOUS at 14:14

## 2021-02-18 RX ADMIN — MONTELUKAST 10 MG: 10 TABLET, FILM COATED ORAL at 21:35

## 2021-02-18 RX ADMIN — ONDANSETRON 4 MG: 2 INJECTION INTRAMUSCULAR; INTRAVENOUS at 13:46

## 2021-02-18 RX ADMIN — HYDROMORPHONE HYDROCHLORIDE 0.5 MG: 1 INJECTION, SOLUTION INTRAMUSCULAR; INTRAVENOUS; SUBCUTANEOUS at 14:42

## 2021-02-18 RX ADMIN — OXYCODONE 5 MG: 5 TABLET ORAL at 18:04

## 2021-02-18 RX ADMIN — MEPERIDINE HYDROCHLORIDE 25 MG: 25 INJECTION INTRAMUSCULAR; INTRAVENOUS; SUBCUTANEOUS at 14:15

## 2021-02-18 RX ADMIN — SODIUM CHLORIDE, POTASSIUM CHLORIDE, SODIUM LACTATE AND CALCIUM CHLORIDE: 600; 310; 30; 20 INJECTION, SOLUTION INTRAVENOUS at 10:08

## 2021-02-18 RX ADMIN — ARFORMOTEROL TARTRATE 15 MCG: 15 SOLUTION RESPIRATORY (INHALATION) at 19:14

## 2021-02-18 RX ADMIN — Medication 0.5 MG: at 14:29

## 2021-02-18 RX ADMIN — FENTANYL CITRATE 150 MCG: 50 INJECTION, SOLUTION INTRAMUSCULAR; INTRAVENOUS at 11:56

## 2021-02-18 RX ADMIN — PROPOFOL 200 MG: 10 INJECTION, EMULSION INTRAVENOUS at 11:56

## 2021-02-18 RX ADMIN — PHENYLEPHRINE HYDROCHLORIDE 100 MCG: 10 INJECTION INTRAVENOUS at 12:19

## 2021-02-18 RX ADMIN — IPRATROPIUM BROMIDE AND ALBUTEROL SULFATE 1 AMPULE: .5; 3 SOLUTION RESPIRATORY (INHALATION) at 14:52

## 2021-02-18 RX ADMIN — FINASTERIDE 5 MG: 5 TABLET, FILM COATED ORAL at 21:35

## 2021-02-18 RX ADMIN — Medication 0.6 MG: at 13:45

## 2021-02-18 RX ADMIN — FENTANYL CITRATE 50 MCG: 50 INJECTION, SOLUTION INTRAMUSCULAR; INTRAVENOUS at 11:50

## 2021-02-18 RX ADMIN — PROMETHAZINE HYDROCHLORIDE 12.5 MG: 25 INJECTION INTRAMUSCULAR; INTRAVENOUS at 14:14

## 2021-02-18 RX ADMIN — ROCURONIUM BROMIDE 50 MG: 10 SOLUTION INTRAVENOUS at 11:56

## 2021-02-18 RX ADMIN — FENTANYL CITRATE 50 MCG: 50 INJECTION, SOLUTION INTRAMUSCULAR; INTRAVENOUS at 11:46

## 2021-02-18 RX ADMIN — BUDESONIDE 250 MCG: 0.25 INHALANT RESPIRATORY (INHALATION) at 19:14

## 2021-02-18 RX ADMIN — CEFAZOLIN SODIUM 2 G: 2 SOLUTION INTRAVENOUS at 11:55

## 2021-02-18 RX ADMIN — IPRATROPIUM BROMIDE AND ALBUTEROL SULFATE 1 AMPULE: 2.5; .5 SOLUTION RESPIRATORY (INHALATION) at 14:52

## 2021-02-18 RX ADMIN — LISINOPRIL 10 MG: 10 TABLET ORAL at 23:25

## 2021-02-18 RX ADMIN — Medication 3 MG: at 13:45

## 2021-02-18 RX ADMIN — Medication 0.5 MG: at 14:42

## 2021-02-18 RX ADMIN — HYDROMORPHONE HYDROCHLORIDE 0.5 MG: 1 INJECTION, SOLUTION INTRAMUSCULAR; INTRAVENOUS; SUBCUTANEOUS at 14:29

## 2021-02-18 RX ADMIN — OXYCODONE 10 MG: 5 TABLET ORAL at 22:20

## 2021-02-18 ASSESSMENT — PAIN DESCRIPTION - DESCRIPTORS
DESCRIPTORS: ACHING
DESCRIPTORS: CONSTANT;DISCOMFORT
DESCRIPTORS: ACHING
DESCRIPTORS: DISCOMFORT
DESCRIPTORS: CONSTANT;DISCOMFORT

## 2021-02-18 ASSESSMENT — PULMONARY FUNCTION TESTS
PIF_VALUE: 19
PIF_VALUE: 16
PIF_VALUE: 21
PIF_VALUE: 20
PIF_VALUE: 19
PIF_VALUE: 21
PIF_VALUE: 21
PIF_VALUE: 23
PIF_VALUE: 19
PIF_VALUE: 21
PIF_VALUE: 46
PIF_VALUE: 22
PIF_VALUE: 21
PIF_VALUE: 21
PIF_VALUE: 19
PIF_VALUE: 23
PIF_VALUE: 22
PIF_VALUE: 19
PIF_VALUE: 18
PIF_VALUE: 22
PIF_VALUE: 0
PIF_VALUE: 19
PIF_VALUE: 21
PIF_VALUE: 21
PIF_VALUE: 22
PIF_VALUE: 19
PIF_VALUE: 21
PIF_VALUE: 19
PIF_VALUE: 21
PIF_VALUE: 35
PIF_VALUE: 21
PIF_VALUE: 0
PIF_VALUE: 21
PIF_VALUE: 1
PIF_VALUE: 21
PIF_VALUE: 22
PIF_VALUE: 22
PIF_VALUE: 35
PIF_VALUE: 19
PIF_VALUE: 19
PIF_VALUE: 21
PIF_VALUE: 21
PIF_VALUE: 2
PIF_VALUE: 22
PIF_VALUE: 19
PIF_VALUE: 22
PIF_VALUE: 19
PIF_VALUE: 20
PIF_VALUE: 21
PIF_VALUE: 21
PIF_VALUE: 22
PIF_VALUE: 46
PIF_VALUE: 0
PIF_VALUE: 20
PIF_VALUE: 21
PIF_VALUE: 22
PIF_VALUE: 19
PIF_VALUE: 19
PIF_VALUE: 21
PIF_VALUE: 19
PIF_VALUE: 19
PIF_VALUE: 21
PIF_VALUE: 18
PIF_VALUE: 20
PIF_VALUE: 3
PIF_VALUE: 20
PIF_VALUE: 20

## 2021-02-18 ASSESSMENT — PAIN DESCRIPTION - LOCATION
LOCATION: ELBOW
LOCATION: ELBOW
LOCATION: ARM
LOCATION: ELBOW

## 2021-02-18 ASSESSMENT — PAIN DESCRIPTION - FREQUENCY
FREQUENCY: CONTINUOUS
FREQUENCY: CONTINUOUS

## 2021-02-18 ASSESSMENT — PAIN DESCRIPTION - ORIENTATION
ORIENTATION: LEFT

## 2021-02-18 ASSESSMENT — PAIN DESCRIPTION - PAIN TYPE
TYPE: SURGICAL PAIN
TYPE: ACUTE PAIN

## 2021-02-18 ASSESSMENT — PAIN SCALES - GENERAL
PAINLEVEL_OUTOF10: 6
PAINLEVEL_OUTOF10: 4
PAINLEVEL_OUTOF10: 5
PAINLEVEL_OUTOF10: 5
PAINLEVEL_OUTOF10: 6
PAINLEVEL_OUTOF10: 6

## 2021-02-18 ASSESSMENT — LIFESTYLE VARIABLES: SMOKING_STATUS: 1

## 2021-02-18 ASSESSMENT — ENCOUNTER SYMPTOMS: SHORTNESS OF BREATH: 1

## 2021-02-18 ASSESSMENT — PAIN - FUNCTIONAL ASSESSMENT
PAIN_FUNCTIONAL_ASSESSMENT: PREVENTS OR INTERFERES WITH MANY ACTIVE NOT PASSIVE ACTIVITIES
PAIN_FUNCTIONAL_ASSESSMENT: PREVENTS OR INTERFERES WITH MANY ACTIVE NOT PASSIVE ACTIVITIES

## 2021-02-18 ASSESSMENT — PAIN DESCRIPTION - PROGRESSION: CLINICAL_PROGRESSION: NOT CHANGED

## 2021-02-18 NOTE — CONSULTS
Department of Internal Medicine  Internal Medicine Consultation Note    Primary Care Physician: Ping Antony DO   Admitting Physician:  Cesar Pillai DO  Admission date and time: 2/18/2021  9:29 AM    Room:  [address] [place] [address]  Admitting diagnosis: Tear of left rotator cuff, unspecified tear extent, unspecified whether traumatic [M75.102]  S/P arthroscopy of shoulder [Z98.890]    Patient Name: Mirtha Nielsen  MRN: [id number]    Date of Service: 2/19/2021     Reason for consultation: Medical management, hypoxic respiratory failure    History of present illness:    Patient is 70-year-old male who is status post left rotator cuff repair. Initial plan was for discharge after rotator cuff repair however due to persistent hypoxia he was admitted to the hospital. Currently patient does admit to shortness of breath and lightheadedness. He denies any chest pain or palpitations.     PAST MEDICAL Hx:  Past Medical History:   Diagnosis Date    Anesthesia complication     wakes up  violant   only ok if reversed with romazacon    Arthritis     shoulders,ankle    CAD (coronary artery disease)     COPD (chronic obstructive pulmonary disease) ([address]     Erectile dysfunction     GERD (gastroesophageal reflux disease)     H/O coronary angioplasty with stents[V45.82] 9/2018 another stent    Hyperlipidemia     Hypertension     Lymphoma ([facility] [address] 11/4/2011    had chemo  currently in remission    Myocardial infarction [facility])     more than 10 yrs ago    Sleep apnea     wont wear machine    Unspecified cerebral artery occlusion with cerebral infarction 2005    no deficits       PAST SURGICAL Hx:   Past Surgical History:   Procedure Laterality Date    ANKLE FRACTURE SURGERY Right 03/2014    ORIF right ankle    ANKLE SURGERY  2007    rt ankle    BRONCHOSCOPY  09/2011    bronch / medistinoscopy    CAROTID ENDARTERECTOMY Left     12 yrs ago    CHOLECYSTECTOMY      COLONOS[facility]  07/30/2013    [facility]ONARY [facility]GIOPLAST[facility] WITH STENT PLACEMENT  2008    states has 2 stents  follows with DR Ramos [place]    ENDOSCOPY, COLON, DIAGNOSTIC      HAND SURGERY Left     fell on a buzzsaw    HERNIA REPAIR Bilateral 08/15/2019    HERNIA  BILATERAL INGUINAL REPAIR WITH MESH LAPAROSCOPIC ROBOTIC XI ASSISTED performed by Aubree Dunham MD at [facility]  06/29/2015    lapraoscopic cholecystectomy    SHOULDER ARTHROSCOPY Right 10/08/2015    rotator cuff repair, subachromoplasty with labrial debridement    TESTICLE REMOVAL      TONSILLECTOMY      UPPER GASTROINTESTINAL ENDOSCOPY         FAMILY Hx:  Family History   Problem Relation Age of Onset    Diabetes Mother     Heart Disease Mother     Diabetes Father     Heart Disease Father     Diabetes Sister     Cancer Sister     Diabetes Brother     Cancer Brother        HOME MEDICATIONS:  Prior to Admission medications    Medication Sig Start Date End Date Taking? Authorizing Provider   HYDROcodone-acetaminophen (NORCO) 7.5-325 MG per tablet Take 1 tablet by mouth every 8 hours as needed for Pain for up to 30 days. 2/17/21 3/19/21 Yes Sushil Jack, DO   budesonide-formoterol [facility]) 80-4.5 MCG/ACT AERO Inhale 2 puffs into the lungs 2 times daily 2/17/21  Yes Sushil Jack, DO   lisinopril (PRINIVIL;ZESTRIL) 10 MG tablet Take 1 tablet by mouth daily 2/1/21 5/2/21 Yes Sushil Jack, DO   buPROPion [facility]) 150 MG extended release tablet Take 1 tablet by mouth twice daily. 1/4/21  Yes Sushil Jack, DO   sertraline (ZOLOFT) 100 MG tablet Take 1 tablet by mouth daily. 1/4/21  Yes Sushil Jack, DO   simvastatin (ZOCOR) 20 MG tablet Take 1 tablet by mouth once daily.  12/7/20  Yes Sushil Jack, DO   meclizine (ANTIVERT) 25 MG tablet TAKE ONE TABLET BY MOUTH THREE TIMES DAILY AS NEEDED FOR DIZZINESS 10/19/20  Yes Sushil Jack, DO   dutasteride (AVODART) 0.5 MG capsule Take 1 capsule by mouth daily 9/2/20 Yes Marii England DO   montelukast (SINGULAIR) 10 MG tablet Take 1 tablet by mouth once daily  Patient taking differently: Take 10 mg by mouth nightly TAKE 1 TABLET BY MOUTH ONCE DAILY 8/3/20  Yes JULIAN Menjivar CNP   albuterol sulfate HFA (PROAIR HFA) 108 (90 Base) MCG/ACT inhaler INHALE TWO PUFFS BY MOUTH EVERY 6 HOURS AS DIRECTED 7/15/20  Yes Marii England DO   clopidogrel (PLAVIX) 75 MG tablet Take 1 tablet by mouth daily  Patient taking differently: Take 75 mg by mouth daily Stopped 5 days pre op 7/15/20  Yes Marii England DO   meloxicam (MOBIC) 15 MG tablet TAKE ONE TABLET BY MOUTH ONCE DAILY 7/15/20  Yes Marii England DO   fenofibrate (TRICOR) 145 MG tablet TAKE ONE TABLET BY MOUTH ONCE DAILY 7/15/20  Yes Marii England DO   omeprazole (PRILOSEC) 40 MG delayed release capsule TAKE 1 CAPSULE BY MOUTH ONCE DAILY 7/15/20  Yes Marii England DO   LORazepam (ATIVAN) 1 MG tablet Take 1 mg by mouth daily as needed.   5/4/17  Yes Historical Provider, MD   aspirin 81 MG chewable tablet Take 81 mg by mouth daily Ld 1 week ago patient not compliant with asa daughter stated ld about 1 week ago   Yes Historical Provider, MD   sildenafil (REVATIO) 20 MG tablet Take one daily as needed 5/24/18   Marii England DO       ALLERGIES:  Midazolam hcl    SOCIAL Hx:  Social History     Socioeconomic History    Marital status:      Spouse name: Not on file    Number of children: Not on file    Years of education: Not on file    Highest education level: Not on file   Occupational History    Not on file   Social Needs    Financial resource strain: Not on file    Food insecurity     Worry: Not on file     Inability: Not on file    Transportation needs     Medical: Not on file     Non-medical: Not on file   Tobacco Use    Smoking status: Former Smoker     Packs/day: 0.50     Years: 60.00     Pack years: 30.00     Types: Cigarettes     Quit date: 5/2/2020 Years since quittin.8    Smokeless tobacco: Never Used   Substance and Sexual Activity    Alcohol use: Yes     Comment: 2-3 beers daily    Drug use: Not Currently     Comment: in past cocaine last used     Sexual activity: Not Currently     Partners: Female   Lifestyle    Physical activity     Days per week: Not on file     Minutes per session: Not on file    Stress: Not on file   Relationships    Social connections     Talks on phone: Not on file     Gets together: Not on file     Attends Confucianism service: Not on file     Active member of club or organization: Not on file     Attends meetings of clubs or organizations: Not on file     Relationship status: Not on file    Intimate partner violence     Fear of current or ex partner: Not on file     Emotionally abused: Not on file     Physically abused: Not on file     Forced sexual activity: Not on file   Other Topics Concern    Not on file   Social History Narrative    Not on file       ROS: Positive in bold  General:   Denies chills, fatigue, fever, malaise, night sweats or weight loss    Psychological:   Denies anxiety, disorientation or hallucinations    ENT:    Denies epistaxis, headaches, vertigo or visual changes    Cardiovascular:   Denies any chest pain, irregular heartbeats, or palpitations. No paroxysmal nocturnal dyspnea. Respiratory:   Denies shortness of breath, coughing, sputum production, hemoptysis, or wheezing. No orthopnea. Gastrointestinal:   Denies nausea, vomiting, diarrhea, or constipation. Denies any abdominal pain. Denies change in bowel habits or stools. Genito-Urinary:    Denies any urgency, frequency, hematuria. Voiding without difficulty. Musculoskeletal:   Denies joint pain, joint stiffness, joint swelling or muscle pain    Neurology:    Denies any headache or focal neurological deficits. No weakness or paresthesia. Derm:    Denies any rashes, ulcers, or excoriations. Denies bruising. Extremities:   Denies any lower extremity swelling or edema. PHYSICAL EXAM:  VITALS:  Vitals:    02/18/21 2302   BP: 122/63   Pulse: 74   Resp: 18   Temp:    SpO2:          CONSTITUTIONAL:    Awake, alert, cooperative, no apparent distress, and appears stated age    EYES:    PERRL, EOMI, sclera clear without icterus, conjunctiva normal    ENT:    Normocephalic, atraumatic, sinuses nontender on palpation. External ears without lesions. NECK:    Supple, symmetrical, trachea midline, no adenopathy, thyroid symmetric, not enlarged and no tenderness, skin normal, no bruits, no JVD    HEMATOLOGIC/LYMPHATICS:    No cervical lymphadenopathy and no supraclavicular lymphadenopathy    LUNGS:    Diminished breath sounds bilaterally    CARDIOVASCULAR:    Unable to assess due to sling    ABDOMEN:    Normal contour, normal bowel sounds, soft, non-distended, non-tender, no masses palpated, no hepatosplenomegaly, no rebound or guarding elicited on palpation     MUSCULOSKELETAL:    There is no redness, warmth, or swelling of the joints. Full range of motion noted. Motor strength is 5 out of 5 all extremities. Tone is normal.  Right upper extremity within normal limits. Left upper extremity with dressing over shoulder joint and arm in sling    NEUROLOGIC:    Awake, alert, oriented to name, place and time. Cranial nerves II-XII are grossly intact. Motor is 5 out of 5 bilaterally. SKIN:    No bruising or bleeding. No redness, warmth, or swelling  Patient has dressing in place over left shoulder with associated bleeding    EXTREMITIES:    Peripheral pulses present. No edema, cyanosis, or swelling.     LABORATORY DATA:  CBC with Differential:    Lab Results   Component Value Date    WBC 10.5 02/18/2021    RBC 4.78 02/18/2021    HGB 13.9 02/18/2021    HCT 42.1 02/18/2021     02/18/2021    MCV 88.1 02/18/2021    MCH 29.1 02/18/2021    MCHC 33.0 02/18/2021    RDW 13.2 02/18/2021    SEGSPCT 66 07/21/2013 LYMPHOPCT 25.3 05/04/2020    MONOPCT 9.9 05/04/2020    BASOPCT 0.6 05/04/2020    MONOSABS 0.71 05/04/2020    LYMPHSABS 1.82 05/04/2020    EOSABS 0.29 05/04/2020    BASOSABS 0.04 05/04/2020     CMP:    Lab Results   Component Value Date     02/18/2021    K 4.9 02/18/2021    K 4.4 05/04/2020     02/18/2021    CO2 21 02/18/2021    BUN 24 02/18/2021    CREATININE 1.3 02/18/2021    GFRAA >60 02/18/2021    LABGLOM 55 02/18/2021    GLUCOSE 169 02/18/2021    GLUCOSE 152 12/01/2011    PROT 6.8 02/18/2021    LABALBU 4.4 02/18/2021    LABALBU 4.2 12/01/2011    CALCIUM 9.0 02/18/2021    BILITOT 0.3 02/18/2021    ALKPHOS 41 02/18/2021    AST 27 02/18/2021    ALT 24 02/18/2021       ASSESSMENT/PLAN:  1. Status post left rotator cuff repair  2. Acute hypoxic respiratory failure  3. Coronary artery disease status post TATI placement  4. COPD  5. Hyperlipidemia  6. History of TIA  7. History of left carotid artery stent  8. Non-Hodgkin's lymphoma status post chemotherapy follows with Dr. Shahbaz May  9. Sleep apnea does not tolerate CPAP  10. GERD  11. History of tobacco abuse      Patient hypoxic status post rotator cuff repair. Will obtain blood work and chest x-ray. He was counseled on incentive spirometry use. Wean patient off supplemental oxygen as tolerated. Aspirin to be resumed tomorrow.       Karen Conner DO  1:32 AM  2/19/2021    Electronically signed by Karen Conner DO on 2/18/21 at 5:46 PM EST

## 2021-02-18 NOTE — PROGRESS NOTES
Sp02 85% on RA despite duoneb and IS. 2L 02 applied and SP02 93%. Dr. Corinne Edin notified and ok to send to [facility] to have 02 weaned off.

## 2021-02-18 NOTE — H&P
History and Physical      CHIEF COMPLAINT: low o2 saturation    HISTORY OF PRESENT ILLNESS:      Patient underwent left shoulder arthroscopy with SAD, Labral debridement and rotator cuff repair today. After the procedure the patient shortness of breath and had an oxygen saturation of 90%. The patient was given a breathing treatment and put on 2L of O2 and his saturations improved only to 92%. The patient has no oxygen at home and continues to have shortness of breath.      Past Medical History:        Diagnosis Date    Anesthesia complication     wakes up  violant   only ok if reversed with romazacon    Arthritis     shoulders,ankle    CAD (coronary artery disease)     [facility]     Erectile dysfunction     GERD (gastroesophageal reflux disease)     H/O coronary angioplasty with stents[V45.82] 9/2018 another stent    Hyperlipidemia     Hypertension     Lymphoma ([facility] 11/4/2011    had chemo  currently in remission    Myocardial infarction [facility])     more than 10 yrs ago    Sleep apnea     wont wear machine    Unspecified cerebral artery occlusion with cerebral infarction 2005    no deficits     Past Surgical History:        Procedure Laterality Date    ANKLE FRACTURE SURGERY Right 03/2014    ORIF right ankle    ANKLE SURGERY  2007    rt ankle    BRONCHOSCOPY  09/2011    bronch / medistinoscopy    CAROTID ENDARTERECTOMY Left     12 yrs ago    CHOLECYSTECTOMY      COLONOSCOPY  07/30/2013     [facility]    CORONARY ANGIOPLASTY WITH STENT PLACEMENT  2008    states has 2 stents  follows with DR Wiliam Navarro    ENDOSCOPY, COLON, DIAGNOSTIC      HAND SURGERY Left     fell on a buzzsaw    HERNIA REPAIR Bilateral 08/15/2019    HERNIA  BILATERAL INGUINAL REPAIR WITH MESH LAPAROSCOPIC ROBOTIC XI ASSISTED performed by Chun Rome MD at [facility]  06/29/2015    lapraoscopic cholecystectomy    SHOULDER ARTHROSCOPY Right 10/08/2015    rotator cuff repair, subachromoplasty with labrial debridement    TESTICLE REMOVAL      TONSILLECTOMY      UPPER GASTROINTESTINAL ENDOSCOPY       Social History:    TOBACCO:   reports that he has been smoking cigarettes. He has a 30.00 pack-year smoking history. He has never used smokeless tobacco.  ETOH:   reports current alcohol use. DRUGS:   reports previous drug use. Family History:       Problem Relation Age of Onset    Diabetes Mother     Diabetes Father     Diabetes Sister     Diabetes Brother      Medications Prior to Admission:  Medications Prior to Admission: HYDROcodone-acetaminophen (NORCO) 7.5-325 MG per tablet, Take 1 tablet by mouth every 8 hours as needed for Pain for up to 30 days. budesonide-formoterol (SYMBICORT) 80-4.5 MCG/ACT AERO, Inhale 2 puffs into the lungs 2 times daily  lisinopril (PRINIVIL;ZESTRIL) 10 MG tablet, Take 1 tablet by mouth daily  buPROPion (WELLBUTRIN SR) 150 MG extended release tablet, Take 1 tablet by mouth twice daily. sertraline (ZOLOFT) 100 MG tablet, Take 1 tablet by mouth daily. simvastatin (ZOCOR) 20 MG tablet, Take 1 tablet by mouth once daily.   meclizine (ANTIVERT) 25 MG tablet, TAKE ONE TABLET BY MOUTH THREE TIMES DAILY AS NEEDED FOR DIZZINESS  dutasteride (AVODART) 0.5 MG capsule, Take 1 capsule by mouth daily  montelukast (SINGULAIR) 10 MG tablet, Take 1 tablet by mouth once daily (Patient taking differently: Take 10 mg by mouth nightly TAKE 1 TABLET BY MOUTH ONCE DAILY)  albuterol sulfate HFA (PROAIR HFA) 108 (90 Base) MCG/ACT inhaler, INHALE TWO PUFFS BY MOUTH EVERY 6 HOURS AS DIRECTED  clopidogrel (PLAVIX) 75 MG tablet, Take 1 tablet by mouth daily (Patient taking differently: Take 75 mg by mouth daily Stopped 5 days pre op)  meloxicam (MOBIC) 15 MG tablet, TAKE ONE TABLET BY MOUTH ONCE DAILY  fenofibrate (TRICOR) 145 MG tablet, TAKE ONE TABLET BY MOUTH ONCE DAILY  omeprazole (PRILOSEC) 40 MG delayed release capsule, TAKE 1 CAPSULE BY MOUTH ONCE DAILY  LORazepam (ATIVAN) 1 MG tablet, Take 1 mg by mouth daily as needed. aspirin 81 MG chewable tablet, Take 81 mg by mouth daily Ld 1 week ago patient not compliant with asa daughter stated ld about 1 week ago  sildenafil (REVATIO) 20 MG tablet, Take one daily as needed  Allergies:  Midazolam hcl    CONSTITUTIONAL:  negative for  chills and anorexia  HEENT:  negative for  tinnitus  RESPIRATORY:  positive for  Wheezing and shortness of breath  CARDIOVASCULAR:  negative for  palpitations, syncope  GASTROINTESTINAL:  negative for vomiting and hematemesis  GENITOURINARY:  negative for hematuria  ENDOCRINE:  negative for tremor  MUSCULOSKELETAL:  positive for  arthralgias, decreased range of motion and muscle weakness  NEUROLOGICAL:  negative for seizures and syncope  BEHAVIOR/PSYCH:  negative for agitated and anxiety    PHYSICAL EXAM:  BP (!) 148/88   Pulse 84   Temp 96.6 °F (35.9 °C) (Temporal)   Resp 20   Ht 5' 8.5\" (1.74 m)   Wt 228 lb (103.4 kg)   SpO2 92% Comment: on O2 2lnc  BMI 34.16 kg/m²   General appearance:  awake, alert, cooperative, no apparent distress, and appears stated age  Neurologic:  Awake, alert, oriented to name, place and time. Cranial nerves II-XII are grossly intact. Motor is 5 out of 5 bilaterally. Cerebellar finger to nose, heel to shin intact. Sensory is intact. Babinski down going, Romberg negative, and gait is normal.  Lungs: Moderate respiratory distress  Heart:  Normal apical impulse, regular rate and rhythm, normal S1 and S2, no S3 or S4, and no murmur noted  Abdomen:  normal bowel sounds  Skin: warm and dry, no rash or erythema  ENT: tympanic membrane, external ear and ear canal normal bilaterally, oropharynx clear and moist with normal mucous membranes  Musculoskeletal: Reduced active range of motion left shoulder. Weakness and crepitus.     General Labs:  CBC:   Lab Results   Component Value Date    WBC 6.3 02/12/2021    RBC 5.25 02/12/2021    HGB 14.9 02/12/2021    HCT 47.6

## 2021-02-18 NOTE — PROGRESS NOTES
2/18/21 1635 ortho resident santana carrasco served regarding pt running between 90-92% on o2 2l nc. Aware of pre op pt was 96% on room air and aware pt had duoneb tx in pacu.  Awaiting response. kmo rn

## 2021-02-18 NOTE — PROGRESS NOTES
2/18/21 170 nurse to nurse given to fady on 3rd floor.  Pt aware of admission for observation and verbalized understanding. kmo rn

## 2021-02-18 NOTE — OP NOTE
Operative report        DATE OF PROCEDURE: 2/18/2021     SURGEON: Nakul Cochran    ASSISTANT: Joey Duff    PREOPERATIVE DIAGNOSIS: Full-thickness rotator cuff tear left shoulder with impingement    POSTOPERATIVE DIAGNOSIS: Massive rotator cuff tear left shoulder with labral degeneration and tearing, subacromial bursitis and impingement. OPERATION: Arthroscopic exam and treatment left shoulder with debridement glenohumeral joint and glenoid labrum, subacromial, acromioplasty and repair rotator cuff. ANESTHESIA: General    ESTIMATED BLOOD LOSS: Less than 50 cc    COMPLICATIONS: None    SPECIMENS: Was sent to pathology    HISTORY: The patient is a 79y.o. year old male with history of above preop diagnosis. I explained the risk, benefits, expected outcome, and alternatives to the procedure. Patient understands and is in agreement. PROCEDURE: The patient is brought the operating room after signs are identified. Adequate general anesthetic was administered by anesthesia with the patient supine on the operating table. He was then turned to the right lateral cubitus position on the Olymp backpack with careful axillary padding. The left upper extremity was placed in 10 pounds skin traction at 45° abduction 20° forward flexion. The left shoulder area was then prepped and draped in sterile fashion. The bony anatomy was outlined with a skin marker. Posterior and lateral portal areas were injected with local anesthetic with epinephrine. Posterior portal was made and glenohumeral joint was entered. There was significant tearing of the labrum anterior superior and posterior. The biceps anchor was grossly intact as was the biceps tendon itself. There is obviously a massive rotator cuff tear with retraction involving supraspinatus infraspinatus and teres minor. The subscapularis was intact. There is soft tissue remnants of tendon on the greater tuberosity.   The bursa was quite thick and fibrotic. The shaver was introduced through the lateral portal.  The glenoid labrum was debrided and then the undersurface of the cuff was debrided as well as the footprint and greater tuberosity area of the proximal humerus. The grasper this and placed in the joint and the cuff tendon was grasped. It was somewhat shredded in its midportion between supra and infraspinatus tendons. There was some tendon of both that had a firm exterior and was strong enough to hold suture. This was drawn out laterally and it was possible to bring this to the footprint area. A taper punch was used to make a hole anterior just below the top of the greater tuberosity perpendicular to the bone surface. This was in a good line to allow advancement of the supraspinatus. Horizontal mattress suture of Ortho cord was placed in the supraspinatus tendon which was then brought out then threaded through the anchor which was then placed in the joint and in the prepared hole in the humerus. The anchor was impacted and the cuff was pulled out to the footprint and tension. The anchor was then fully seated and then deployed locking the suture and the anchor in place with a stable repair. More posterior in the infraspinatus another suture was placed in a horizontal mattress fashion. A tapered hole was made for another anchor at the posterior aspect of the greater tuberosity. The suture tacks were then placed through the anchor which was placed in the joint and into the prepared hole and then impacted partially. The cuff was tensioned and the anchor fully seated and the anchor was deployed locking the suture and the anchor in place with stable fixation. There was some narrowing in the anterior aspect over underneath the acromion which was partly soft tissue partly bony. This was debrided with the shaver decompressing the area.   The area was then thoroughly irrigated and excess fluid expressed after thorough examination with no additional pathology identified. The wounds were closed with Steri-Strips and Xeroform gauze and a bulky dressing were applied and patient placed in sling and swath. His anesthetic was reversed he was taken recovery in stable condition. GROSS PATHOLOGY: Full-thickness rotator cuff tear supraspinatus and infraspinatus with at least a portion of the teres minor left shoulder with retraction. There was pliability to the footprint area. Labral degeneration and tearing. Subacromial bursal hypertrophy and fibrosis. Anterior-inferior acromial prominence. COMPONENTS USED : Ortho cord horizontal mattress sutures x2. Versalok anchors x2. All reasonable efforts have been made to minimize the risk of errors that may occur in the use of voice recognition and other electronic means of charting.       Electronically signed by Kirill Reid DO on 2/18/21 at 1:56 PM EST

## 2021-02-18 NOTE — PROGRESS NOTES
Dr. Zia Cunningham at bedside. Shoulder immobilizer adjusted. Instructed to reinforced dressing prn.

## 2021-02-18 NOTE — H&P
tobacco.  ETOH:   reports current alcohol use. DRUGS:   reports previous drug use. Family History:       Problem Relation Age of Onset    Diabetes Mother     Diabetes Father     Diabetes Sister     Diabetes Brother      Medications Prior to Admission:  Medications Prior to Admission: HYDROcodone-acetaminophen (NORCO) 7.5-325 MG per tablet, Take 1 tablet by mouth every 8 hours as needed for Pain for up to 30 days. budesonide-formoterol (SYMBICORT) 80-4.5 MCG/ACT AERO, Inhale 2 puffs into the lungs 2 times daily  lisinopril (PRINIVIL;ZESTRIL) 10 MG tablet, Take 1 tablet by mouth daily  buPROPion (WELLBUTRIN SR) 150 MG extended release tablet, Take 1 tablet by mouth twice daily. sertraline (ZOLOFT) 100 MG tablet, Take 1 tablet by mouth daily. simvastatin (ZOCOR) 20 MG tablet, Take 1 tablet by mouth once daily. meclizine (ANTIVERT) 25 MG tablet, TAKE ONE TABLET BY MOUTH THREE TIMES DAILY AS NEEDED FOR DIZZINESS  dutasteride (AVODART) 0.5 MG capsule, Take 1 capsule by mouth daily  montelukast (SINGULAIR) 10 MG tablet, Take 1 tablet by mouth once daily (Patient taking differently: Take 10 mg by mouth nightly TAKE 1 TABLET BY MOUTH ONCE DAILY)  albuterol sulfate HFA (PROAIR HFA) 108 (90 Base) MCG/ACT inhaler, INHALE TWO PUFFS BY MOUTH EVERY 6 HOURS AS DIRECTED  clopidogrel (PLAVIX) 75 MG tablet, Take 1 tablet by mouth daily (Patient taking differently: Take 75 mg by mouth daily Stopped 5 days pre op)  meloxicam (MOBIC) 15 MG tablet, TAKE ONE TABLET BY MOUTH ONCE DAILY  fenofibrate (TRICOR) 145 MG tablet, TAKE ONE TABLET BY MOUTH ONCE DAILY  omeprazole (PRILOSEC) 40 MG delayed release capsule, TAKE 1 CAPSULE BY MOUTH ONCE DAILY  LORazepam (ATIVAN) 1 MG tablet, Take 1 mg by mouth daily as needed.    aspirin 81 MG chewable tablet, Take 81 mg by mouth daily Ld 1 week ago patient not compliant with asa daughter stated ld about 1 week ago  sildenafil (REVATIO) 20 MG tablet, Take one daily as needed  Allergies: Midazolam hcl    CONSTITUTIONAL:  negative for  chills and anorexia  HEENT:  negative for  tinnitus  RESPIRATORY:  positive for  wheezing  CARDIOVASCULAR:  negative for  palpitations, syncope  GASTROINTESTINAL:  negative for vomiting and hematemesis  GENITOURINARY:  negative for hematuria  ENDOCRINE:  negative for tremor  MUSCULOSKELETAL:  positive for  arthralgias, decreased range of motion and muscle weakness  NEUROLOGICAL:  negative for seizures and syncope  BEHAVIOR/PSYCH:  negative for agitated and anxiety    PHYSICAL EXAM:  /72   Pulse 72   Temp 97.8 °F (36.6 °C) (Infrared)   Resp 18   Ht 5' 8.5\" (1.74 m)   Wt 228 lb (103.4 kg)   SpO2 96%   BMI 34.16 kg/m²   General appearance:  awake, alert, cooperative, no apparent distress, and appears stated age  Neurologic:  Awake, alert, oriented to name, place and time. Cranial nerves II-XII are grossly intact. Motor is 5 out of 5 bilaterally. Cerebellar finger to nose, heel to shin intact. Sensory is intact. Babinski down going, Romberg negative, and gait is normal.  Lungs: Moderate respiratory distress  Heart:  Normal apical impulse, regular rate and rhythm, normal S1 and S2, no S3 or S4, and no murmur noted  Abdomen:  normal bowel sounds  Skin: warm and dry, no rash or erythema  ENT: tympanic membrane, external ear and ear canal normal bilaterally, oropharynx clear and moist with normal mucous membranes  Musculoskeletal: Reduced active range of motion left shoulder. Weakness and crepitus.     General Labs:  CBC:   Lab Results   Component Value Date    WBC 6.3 02/12/2021    RBC 5.25 02/12/2021    HGB 14.9 02/12/2021    HCT 47.6 02/12/2021    MCV 90.7 02/12/2021    RDW 13.2 02/12/2021     02/12/2021     CMP:    Lab Results   Component Value Date     02/12/2021    K 4.9 02/12/2021    K 4.4 05/04/2020     02/12/2021    CO2 26 02/12/2021    BUN 25 02/12/2021    PROT 7.3 11/06/2019     U/A:  No components found for: Yonny Curtis Paul Vides, Najma Soto, REAL, LESLEY, VITA, UNITRITE, [facility], [place], Rianna, [place], [facility], [place], [facility], [place]    Radiology: Rotator cuff tear with retraction left shoulder.     ASSESSMENT AND PLAN:    Arthroscopic exam and treatment left shoulder      Electronically signed by Janine Khanna DO on 2/18/2021 at 11:27 AM

## 2021-02-18 NOTE — ANESTHESIA PRE PROCEDURE
Department of Anesthesiology  Preprocedure Note       Name:  John Schumacher   Age:  79 y.o.  :  1950                                          MRN:  [id number]         Date:  2021      Surgeon: Nina Ireland):  AJAY Valencia DO    Procedure: ARTHROSCOPIC EXAM AND TREATMENT LEFT SHOULDER (CPT 24242) (Left )    Medications prior to admission:   Prior to Admission medications    Medication Sig Start Date End Date Taking? Authorizing Provider   HYDROcodone-acetaminophen (NORCO) 7.5-325 MG per tablet Take 1 tablet by mouth every 8 hours as needed for Pain for up to 30 days. 2/17/21 3/19/21  [facility], DO   budesonide-formoterol [facility]) 80-4.5 MCG/ACT AERO Inhale 2 puffs into the lungs 2 times daily 21   [facility], DO   lisinopril (PRINIVIL;ZESTRIL) 10 MG tablet Take 1 tablet by mouth daily 21  [facility], DO   buPROPion [facility]) 150 MG extended release tablet Take 1 tablet by mouth twice daily. 21   [facility], DO   sertraline (ZOLOFT) 100 MG tablet Take 1 tablet by mouth daily. 21   [facility], DO   simvastatin (ZOCOR) 20 MG tablet Take 1 tablet by mouth once daily.  20   [facility], DO   meclizine (ANTIVERT) 25 MG tablet TAKE ONE TABLET BY MOUTH THREE TIMES DAILY AS NEEDED FOR DIZZINESS 10/19/20   [facility], DO   dutasteride (AVODART) 0.5 MG capsule Take 1 capsule by mouth daily 20   [facility], DO   montelukast (SINGULAIR) 10 MG tablet Take 1 tablet by mouth once daily  Patient taking differently: Take 10 mg by mouth nightly TAKE 1 TABLET BY MOUTH ONCE DAILY 8/3/20   JULIAN Vences CNP   albuterol sulfate HFA (PROAIR HFA) 108 (90 Base) MCG/ACT inhaler INHALE TWO PUFFS BY MOUTH EVERY 6 HOURS AS DIRECTED 7/15/20   [facility], DO   clopidogrel (PLAVIX) 75 MG tablet Take 1 tablet by mouth daily  Patient taking differently: Take 75 mg by mouth daily Stopped 5 days pre op 7/15/20   Susie Scale, DO   meloxicam (MOBIC) 15 MG tablet TAKE ONE TABLET BY MOUTH ONCE DAILY 7/15/20   Susie Scale, DO   fenofibrate (TRICOR) 145 MG tablet TAKE ONE TABLET BY MOUTH ONCE DAILY 7/15/20   Susie Scale, DO   omeprazole (PRILOSEC) 40 MG delayed release capsule TAKE 1 CAPSULE BY MOUTH ONCE DAILY 7/15/20   Susie Scale, DO   sildenafil (REVATIO) 20 MG tablet Take one daily as needed 5/24/18   Susie Scale, DO   LORazepam (ATIVAN) 1 MG tablet Take 1 mg by mouth daily as needed. 5/4/17   Historical Provider, MD   aspirin 81 MG chewable tablet Take 81 mg by mouth daily Ld 1 week ago patient not compliant with asa daughter stated ld about 1 week ago    Historical Provider, MD       Current medications:    No current facility-administered medications for this visit. No current outpatient medications on file. Facility-Administered Medications Ordered in Other Visits   Medication Dose Route Frequency Provider Last Rate Last Admin    lactated ringers infusion   Intravenous Continuous Lynn Matthews  mL/hr at 02/18/21 1008 New Bag at 02/18/21 1008    sodium chloride flush 0.9 % injection 10 mL  10 mL Intravenous PRN Lynn Matthews MD        ceFAZolin (ANCEF) 2000 mg in dextrose 3 % 50 mL IVPB (duplex)  2,000 mg Intravenous On Call to [address], DO           Allergies: Allergies   Allergen Reactions    Midazolam Hcl      Wake up wild. ...  Must be reversed with romazicon or will wake up wild and combative       Problem List:    Patient Active Problem List   Diagnosis Code    [facility] C85.90    Gastroenteritis K52.9    Back pain at L4-L5 level M54.5    Impotence N52.9    Chronic fatigue R53.82    Urinary frequency R35.0    Bronchitis J40    Gastroesophageal reflux disease with esophagitis K21.00    Depression F32.9    Coronary artery disease involving native coronary artery of native heart with unstable angina pectoris ([facility] I25.110    Chronic obstructive pulmonary disease (HCC) J44.9    Pure hypercholesterolemia E78.00    Moderate obesity E66.8    Major depressive disorder, recurrent, mild (HCC) F33.0    Vascular dementia with depressed mood (HCC) F01.51, F32.9    Moderate asthma without complication Z99.535    Nontraumatic complete tear of right rotator cuff M75.121    Rotator cuff arthropathy of left shoulder M12.812       Past Medical History:        Diagnosis Date    Anesthesia complication     wakes up  violant   only ok if reversed with romazacon    Arthritis     shoulders,ankle    CAD (coronary artery disease)     [facility]) [facility]     Erectile dysfunction     GERD (gastroesophageal reflux disease)     H/O coronary angioplasty with stents[V45.82] 9/2018 another stent    Hyperlipidemia     Hypertension     Lymphoma ([facility] 11/4/2011    had chemo  currently in remission    Myocardial infarction [facility])     more than 10 yrs ago    Sleep apnea     wont wear machine    Unspecified cerebral artery occlusion with cerebral infarction 2005    no deficits       Past Surgical History:        Procedure Laterality Date    ANKLE FRACTURE SURGERY Right 03/2014    ORIF right ankle    ANKLE SURGERY  2007    rt ankle    BRONCHOSCOPY  09/2011    bronch / medistinoscopy    CAROTID ENDARTERECTOMY Left     12 yrs ago    CHOLECYSTECTOMY      COLONOSCOPY  07/30/2013    DR MULLINS    CORONARY ANGIOPLASTY WITH STENT PLACEMENT  2008    states has 2 stents  follows with DR Ramos Willard    ENDOSCOPY, COLON, DIAGNOSTIC      HAND SURGERY Left     fell on a buzzsaw    HERNIA REPAIR Bilateral 08/15/2019    HERNIA  BILATERAL INGUINAL REPAIR WITH MESH LAPAROSCOPIC ROBOTIC XI ASSISTED performed by Aubree Dunham MD at [address] [place] [address]  06/29/2015    lapraoscopic cholecystectomy    SHOULDER ARTHROSCOPY Right 10/08/2015    rotator cuff repair, subachromoplasty with labrial debridement    TESTICLE REMOVAL      TONSILLECTOMY      UPPER GASTROINTESTINAL ENDOSCOPY         Social History:    Social History     Tobacco Use    Smoking status: Current Every Day Smoker     Packs/day: 0.50     Years: 60.00     Pack years: 30.00     Types: Cigarettes     Last attempt to quit: 2020     Years since quittin.2    Smokeless tobacco: Never Used   Substance Use Topics    Alcohol use: Yes     Comment: 2-3 beers daily                                Ready to quit: Not Answered  Counseling given: Not Answered      Vital Signs (Current): There were no vitals filed for this visit. BP Readings from Last 3 Encounters:   21 132/72   21 134/78   21 130/76       NPO Status:                                                                                 BMI:   Wt Readings from Last 3 Encounters:   21 228 lb (103.4 kg)   21 232 lb (105.2 kg)   21 235 lb (106.6 kg)     There is no height or weight on file to calculate BMI.    CBC:   Lab Results   Component Value Date    WBC 6.3 2021    RBC 5.25 2021    HGB 14.9 2021    HCT 47.6 2021    MCV 90.7 2021    RDW 13.2 2021     2021       CMP:   Lab Results   Component Value Date     2021    K 4.9 2021    K 4.4 2020     2021    CO2 26 2021    BUN 25 2021    CREATININE 1.4 2021    GFRAA >60 2021    LABGLOM 50 2021    GLUCOSE 85 2021    GLUCOSE 152 2011    PROT 7.3 2019    CALCIUM 10.1 2021    BILITOT 0.4 2019    ALKPHOS 73 2019    AST 19 2019    ALT 21 2019       POC Tests: No results for input(s): POCGLU, POCNA, POCK, POCCL, POCBUN, POCHEMO, POCHCT in the last 72 hours.     Coags:   Lab Results   Component Value Date    PROTIME 12.2 10/07/2015    PROTIME 13.0 2011    INR 1.0 10/07/2015    APTT 30.9 10/07/2015       HCG (If

## 2021-02-18 NOTE — ANESTHESIA POSTPROCEDURE EVALUATION
Department of Anesthesiology  Postprocedure Note    Patient: Jeffery Giron  MRN: [id number]  YOB: 1950  Date of evaluation: 2/18/2021  Time:  4:40 PM     Procedure Summary     Date: 02/18/21 Room / Location: [address] [place] [address] / [facility]    Anesthesia Start: 0856 Anesthesia Stop: 5153    Procedure: LEFT ROTATOR CUFF REPAIR LABRAL DEBRIDEMENT SUBACROMIAL DECOMPRESSION (Left Shoulder) Diagnosis: (ROTATOR CUFF TEAR LEFT SHOULDER)    Surgeons: Willie Charles DO Responsible Provider: Efrem Hart MD    Anesthesia Type: general ASA Status: 3          Anesthesia Type: general    Laura Phase I: Laura Score: 9    Laura Phase II: Laura Score: 10    Last vitals: Reviewed and per EMR flowsheets.        Anesthesia Post Evaluation    Patient location during evaluation: PACU  Patient participation: complete - patient participated  Level of consciousness: awake and alert  Airway patency: patent  Nausea & Vomiting: no nausea and no vomiting  Complications: no  Cardiovascular status: hemodynamically stable  Respiratory status: acceptable  Hydration status: euvolemic

## 2021-02-19 VITALS
RESPIRATION RATE: 18 BRPM | DIASTOLIC BLOOD PRESSURE: 62 MMHG | WEIGHT: 228 LBS | HEART RATE: 70 BPM | SYSTOLIC BLOOD PRESSURE: 141 MMHG | TEMPERATURE: 97.7 F | BODY MASS INDEX: 33.77 KG/M2 | HEIGHT: 69 IN | OXYGEN SATURATION: 94 %

## 2021-02-19 LAB
ALBUMIN SERPL-MCNC: 4.2 G/DL (ref 3.5–5.2)
ALP BLD-CCNC: 37 U/L (ref 40–129)
ALT SERPL-CCNC: 20 U/L (ref 0–40)
ANION GAP SERPL CALCULATED.3IONS-SCNC: 11 MMOL/L (ref 7–16)
AST SERPL-CCNC: 24 U/L (ref 0–39)
BASOPHILS ABSOLUTE: 0.02 E9/L (ref 0–0.2)
BASOPHILS RELATIVE PERCENT: 0.2 % (ref 0–2)
BILIRUB SERPL-MCNC: 0.4 MG/DL (ref 0–1.2)
BUN BLDV-MCNC: 23 MG/DL (ref 8–23)
CALCIUM SERPL-MCNC: 9.2 MG/DL (ref 8.6–10.2)
CHLORIDE BLD-SCNC: 101 MMOL/L (ref 98–107)
CO2: 27 MMOL/L (ref 22–29)
CREAT SERPL-MCNC: 1.4 MG/DL (ref 0.7–1.2)
EOSINOPHILS ABSOLUTE: 0.04 E9/L (ref 0.05–0.5)
EOSINOPHILS RELATIVE PERCENT: 0.4 % (ref 0–6)
GFR AFRICAN AMERICAN: >60
GFR NON-AFRICAN AMERICAN: 50 ML/MIN/1.73
GLUCOSE BLD-MCNC: 152 MG/DL (ref 74–99)
HCT VFR BLD CALC: 38.8 % (ref 37–54)
HEMOGLOBIN: 12.9 G/DL (ref 12.5–16.5)
IMMATURE GRANULOCYTES #: 0.06 E9/L
IMMATURE GRANULOCYTES %: 0.6 % (ref 0–5)
LYMPHOCYTES ABSOLUTE: 1.4 E9/L (ref 1.5–4)
LYMPHOCYTES RELATIVE PERCENT: 13.8 % (ref 20–42)
MCH RBC QN AUTO: 29.7 PG (ref 26–35)
MCHC RBC AUTO-ENTMCNC: 33.2 % (ref 32–34.5)
MCV RBC AUTO: 89.2 FL (ref 80–99.9)
MONOCYTES ABSOLUTE: 0.56 E9/L (ref 0.1–0.95)
MONOCYTES RELATIVE PERCENT: 5.5 % (ref 2–12)
NEUTROPHILS ABSOLUTE: 8.09 E9/L (ref 1.8–7.3)
NEUTROPHILS RELATIVE PERCENT: 79.5 % (ref 43–80)
PDW BLD-RTO: 13.2 FL (ref 11.5–15)
PLATELET # BLD: 255 E9/L (ref 130–450)
PMV BLD AUTO: 11.1 FL (ref 7–12)
POTASSIUM SERPL-SCNC: 3.9 MMOL/L (ref 3.5–5)
RBC # BLD: 4.35 E12/L (ref 3.8–5.8)
SODIUM BLD-SCNC: 139 MMOL/L (ref 132–146)
TOTAL PROTEIN: 6.2 G/DL (ref 6.4–8.3)
TROPONIN: <0.01 NG/ML (ref 0–0.03)
WBC # BLD: 10.2 E9/L (ref 4.5–11.5)

## 2021-02-19 PROCEDURE — G0378 HOSPITAL OBSERVATION PER HR: HCPCS

## 2021-02-19 PROCEDURE — 2580000003 HC RX 258: Performed by: ORTHOPAEDIC SURGERY

## 2021-02-19 PROCEDURE — 36415 COLL VENOUS BLD VENIPUNCTURE: CPT

## 2021-02-19 PROCEDURE — 6370000000 HC RX 637 (ALT 250 FOR IP): Performed by: ORTHOPAEDIC SURGERY

## 2021-02-19 PROCEDURE — 80053 COMPREHEN METABOLIC PANEL: CPT

## 2021-02-19 PROCEDURE — 94640 AIRWAY INHALATION TREATMENT: CPT

## 2021-02-19 PROCEDURE — 6360000002 HC RX W HCPCS: Performed by: INTERNAL MEDICINE

## 2021-02-19 PROCEDURE — 85025 COMPLETE CBC W/AUTO DIFF WBC: CPT

## 2021-02-19 PROCEDURE — 94761 N-INVAS EAR/PLS OXIMETRY MLT: CPT

## 2021-02-19 PROCEDURE — 2700000000 HC OXYGEN THERAPY PER DAY

## 2021-02-19 PROCEDURE — 84484 ASSAY OF TROPONIN QUANT: CPT

## 2021-02-19 PROCEDURE — 6370000000 HC RX 637 (ALT 250 FOR IP): Performed by: INTERNAL MEDICINE

## 2021-02-19 PROCEDURE — 6360000002 HC RX W HCPCS: Performed by: ORTHOPAEDIC SURGERY

## 2021-02-19 RX ORDER — ALBUTEROL SULFATE 2.5 MG/3ML
2.5 SOLUTION RESPIRATORY (INHALATION) EVERY 6 HOURS PRN
Status: DISCONTINUED | OUTPATIENT
Start: 2021-02-19 | End: 2021-02-19 | Stop reason: HOSPADM

## 2021-02-19 RX ORDER — BUPROPION HYDROCHLORIDE 150 MG/1
150 TABLET, EXTENDED RELEASE ORAL 2 TIMES DAILY
Status: DISCONTINUED | OUTPATIENT
Start: 2021-02-19 | End: 2021-02-19 | Stop reason: HOSPADM

## 2021-02-19 RX ORDER — ALBUTEROL SULFATE 90 UG/1
2 AEROSOL, METERED RESPIRATORY (INHALATION) EVERY 6 HOURS PRN
Status: DISCONTINUED | OUTPATIENT
Start: 2021-02-19 | End: 2021-02-19 | Stop reason: CLARIF

## 2021-02-19 RX ADMIN — OXYCODONE 10 MG: 5 TABLET ORAL at 08:52

## 2021-02-19 RX ADMIN — SERTRALINE HYDROCHLORIDE 100 MG: 50 TABLET ORAL at 08:52

## 2021-02-19 RX ADMIN — PANTOPRAZOLE SODIUM 40 MG: 40 TABLET, DELAYED RELEASE ORAL at 05:36

## 2021-02-19 RX ADMIN — SODIUM CHLORIDE, PRESERVATIVE FREE 10 ML: 5 INJECTION INTRAVENOUS at 08:53

## 2021-02-19 RX ADMIN — ENOXAPARIN SODIUM 40 MG: 40 INJECTION SUBCUTANEOUS at 08:52

## 2021-02-19 RX ADMIN — ARFORMOTEROL TARTRATE 15 MCG: 15 SOLUTION RESPIRATORY (INHALATION) at 06:02

## 2021-02-19 RX ADMIN — OXYCODONE 10 MG: 5 TABLET ORAL at 13:13

## 2021-02-19 RX ADMIN — BUDESONIDE 250 MCG: 0.25 INHALANT RESPIRATORY (INHALATION) at 06:02

## 2021-02-19 RX ADMIN — ASPIRIN 81 MG: 81 TABLET, CHEWABLE ORAL at 08:52

## 2021-02-19 RX ADMIN — BUPROPION HYDROCHLORIDE 150 MG: 150 TABLET, EXTENDED RELEASE ORAL at 08:52

## 2021-02-19 RX ADMIN — FINASTERIDE 5 MG: 5 TABLET, FILM COATED ORAL at 08:52

## 2021-02-19 RX ADMIN — LISINOPRIL 10 MG: 10 TABLET ORAL at 08:52

## 2021-02-19 RX ADMIN — MAGNESIUM OXIDE 400 MG: 400 TABLET ORAL at 08:52

## 2021-02-19 ASSESSMENT — PAIN SCALES - GENERAL: PAINLEVEL_OUTOF10: 7

## 2021-02-19 ASSESSMENT — PAIN DESCRIPTION - LOCATION: LOCATION: SHOULDER

## 2021-02-19 ASSESSMENT — PAIN - FUNCTIONAL ASSESSMENT: PAIN_FUNCTIONAL_ASSESSMENT: PREVENTS OR INTERFERES SOME ACTIVE ACTIVITIES AND ADLS

## 2021-02-19 NOTE — DISCHARGE SUMMARY
History of Present Illness and Hospital Course  Gabe Arita is a 79 y.o. male patient of Azeb Richter DO who  has a past medical history of Anesthesia complication, Arthritis, CAD (coronary artery disease), COPD (chronic obstructive pulmonary disease) ([facility] [address] Erectile dysfunction, GERD (gastroesophageal reflux disease), H/O coronary angioplasty with stents[V45.82], Hyperlipidemia, Hypertension, Lymphoma ([facility] [address] Myocardial infarction [facility]), Sleep apnea, and Unspecified cerebral artery occlusion with cerebral infarction. who originally had no chief complaint listed for this encounter. at presentation on 2/18/2021, and was found to have Tear of left rotator cuff, unspecified tear extent, unspecified whether traumatic [M75.102]  S/P arthroscopy of shoulder [Z98.890] after workup. The patient was admitted under the service of Peyton Wang DO with consultation to Dr. Vivi Wade and Dr. Sarah Duque. The patient resented to the hospital for elective repair of a rotator cuff tear. Patient stated that he has limited ROM and diminished function of the affected extremity. He underwent arthroscopic exam and treatment to left shoulder with debridement glenohumeral joint and glenoid labrum, subacromial, acromioplasty and repair rotator cuff on 2/18/2021. Postoperatively the patient was hypoxic. He did require oxygen therapy and was weaned off. Was ambulatory. Pain well controlled in the left upper extremity. Stable for discharge.        Brief Physical Examination and Laboratory Data on Day of Discharge  Vitals:  BP (!) 141/62   Pulse 70   Temp 97.7 °F (36.5 °C) (Oral)   Resp 18   Ht 5' 8.5\" (1.74 m)   Wt 228 lb (103.4 kg)   SpO2 94%   BMI 34.16 kg/m²   General Appearance:  awake, alert, and oriented to person, place, time, and purpose; appears stated age and cooperative; no apparent distress no labored breathing  HEENT:  NCAT; PERRL; conjunctiva pink, sclera clear  Neck:  no adenopathy, bruit, JVD, tenderness, masses, or nodules; supple, symmetrical, trachea midline, thyroid not enlarged  Lung:  clear to auscultation bilaterally; no use of accessory muscles; no rhonchi, rales, or crackles  Heart:  regular rate and regular rhythm without murmur, rub, or gallop  Abdomen: obese. soft, nontender, nondistended; normoactive bowel sounds; no organomegaly  Extremities:  extremities normal, atraumatic, no cyanosis or edema, Surgical changes to the left shoulder. Musculokeletal:  no joint swelling, no muscle tenderness. ROM normal in all joints of extremities.    Neurologic:  mental status A&Ox3, thought content appropriate; CN II-XII grossly intact; sensation intact, motor strength 5/5 globally; no slurred speech    Labs  Lab Results   Component Value Date    WBC 10.2 02/19/2021    HGB 12.9 02/19/2021    HCT 38.8 02/19/2021     02/19/2021     02/19/2021    K 3.9 02/19/2021     02/19/2021    CREATININE 1.4 (H) 02/19/2021    BUN 23 02/19/2021    CO2 27 02/19/2021    GLUCOSE 152 (H) 02/19/2021    ALT 20 02/19/2021    AST 24 02/19/2021    INR 1.0 10/07/2015     Lab Results   Component Value Date    INR 1.0 10/07/2015    INR 1.1 11/03/2011    INR 1.1 09/23/2011    PROTIME 12.2 10/07/2015    PROTIME 13.0 (H) 11/03/2011    PROTIME 12.7 09/23/2011      Lab Results   Component Value Date    TSH 1.540 07/18/2019     Lab Results   Component Value Date    TRIG 174 (H) 11/06/2019    TRIG 129 01/12/2018    TRIG 138 07/20/2013     Lab Results   Component Value Date    HDL 36 11/06/2019    HDL 45 01/12/2018    HDL 42.0 07/20/2013     Lab Results   Component Value Date    LDLCALC 127 (H) 11/06/2019    LDLCALC 104 (H) 01/12/2018    LDLCALC 77 07/20/2013     Lab Results   Component Value Date    LABA1C 5.6 07/31/2018       Pertinent Imaging  Xr Chest Portable    Result Date: 2/18/2021  EXAMINATION: ONE XRAY VIEW OF THE CHEST 2/18/2021 7:04 pm COMPARISON: May 4, 2020 HISTORY: ORDERING SYSTEM PROVIDED HISTORY: hypoxia TECHNOLOGIST PROVIDED HISTORY: Reason for exam:->hypoxia FINDINGS: Cardiac silhouette is not enlarged. The patient's arm overlies the lung bases which cannot be adequately evaluated. No evidence is seen of airspace consolidation or pleural effusions. The pulmonary vasculature is within normal limits. No radiographic evidence of acute cardiopulmonary disease. The patient's arm overlies the lung bases which cannot be  adequately evaluated. Follow-up PA and lateral radiographs may be helpful in further evaluation. Echocardiogram      Disposition  The patient's condition is stable. At this time the patient is without objective evidence of an acute process requiring continuing hospitalization or inpatient management. They are stable for discharge with outpatient follow-up. I have spoken with the patient and discussed the results of the current hospitalization, in addition to providing specific details for the plan of care and counseling regarding the diagnosis and prognosis. The plan has been discussed in detail and they are aware of the specific conditions for emergent return, as well as the importance of follow-up. Their questions are answered at this time and they are agreeable with the plan for discharge to home    Discharge Medications   [facility], [address] [facility] Medication Instructions YIX:[id number]    Printed on:02/19/21 1059   Medication Information                      albuterol sulfate HFA (PROAIR HFA) 108 (90 Base) MCG/ACT inhaler  INHALE TWO PUFFS BY MOUTH EVERY 6 HOURS AS DIRECTED             aspirin 81 MG chewable tablet  Take 81 mg by mouth daily Ld 1 week ago patient not compliant with asa daughter stated ld about 1 week ago             budesonide-formoterol (SYMBICORT) 80-4.5 MCG/ACT AERO  Inhale 2 puffs into the lungs 2 times daily             buPROPion (WELLBUTRIN SR) 150 MG extended release tablet  Take 1 tablet by mouth twice daily.              clopidogrel (PLAVIX) 75 MG tablet  Take 1 tablet by mouth daily             dutasteride (AVODART) 0.5 MG capsule  Take 1 capsule by mouth daily             fenofibrate (TRICOR) 145 MG tablet  TAKE ONE TABLET BY MOUTH ONCE DAILY             HYDROcodone-acetaminophen (NORCO) 7.5-325 MG per tablet  Take 1 tablet by mouth every 8 hours as needed for Pain for up to 30 days. lisinopril (PRINIVIL;ZESTRIL) 10 MG tablet  Take 1 tablet by mouth daily             LORazepam (ATIVAN) 1 MG tablet  Take 1 mg by mouth daily as needed. meclizine (ANTIVERT) 25 MG tablet  TAKE ONE TABLET BY MOUTH THREE TIMES DAILY AS NEEDED FOR DIZZINESS             montelukast (SINGULAIR) 10 MG tablet  Take 1 tablet by mouth once daily             omeprazole (PRILOSEC) 40 MG delayed release capsule  TAKE 1 CAPSULE BY MOUTH ONCE DAILY             sertraline (ZOLOFT) 100 MG tablet  Take 1 tablet by mouth daily. sildenafil (REVATIO) 20 MG tablet  Take one daily as needed             simvastatin (ZOCOR) 20 MG tablet  Take 1 tablet by mouth once daily. Follow-up/Instructions  · This patient is instructed to follow-up with his primary care physician in 3-5 days. · Patient is instructed to follow-up with the consults listed above as directed by them. · he is instructed to resume home medications and take new medications as indicated in the list above. · If the patient has a recurrence of symptoms, he is instructed to go to the ED. Preparing for this patient's discharge, including paperwork, orders, instructions, and meeting with patient required > 35 minutes. Florinda Gee D.O., F.A.C.O.I.  10:52 AM  2/19/2021

## 2021-02-20 ENCOUNTER — CARE COORDINATION (OUTPATIENT)
Dept: CASE MANAGEMENT | Age: 71
End: 2021-02-20

## 2021-02-22 ENCOUNTER — CARE COORDINATION (OUTPATIENT)
Dept: CASE MANAGEMENT | Age: 71
End: 2021-02-22

## 2021-02-22 NOTE — CARE COORDINATION
[facility] Transitions Initial Follow Up Call    Call within 2 business days of discharge: Yes    Patient: Jose Graf Patient : 1950   MRN: [id number]  Reason for Admission: Tear left rotator cuff  Discharge Date: 21 RARS: No data recorded    Last Discharge [facility]       Complaint Diagnosis Description Type Department Provider    21  Tear of left rotator cuff, unspecified tear extent, unspecified whether traumatic Admission (Discharged) [address], DO        Second attempt made today 21 to contact patient for hospital discharge follow up. Left message on both home/mobile numbers requesting a return call back to CTN and provided contact information. CTN signing off if no return call and will provide hand off to ACM.      Britney Bingham, APRN

## 2021-02-24 ENCOUNTER — TELEPHONE (OUTPATIENT)
Dept: SLEEP CENTER | Age: 71
End: 2021-02-24

## 2021-02-24 LAB
EKG ATRIAL RATE: 81 BPM
EKG P AXIS: 20 DEGREES
EKG P-R INTERVAL: 162 MS
EKG Q-T INTERVAL: 382 MS
EKG QRS DURATION: 98 MS
EKG QTC CALCULATION (BAZETT): 443 MS
EKG R AXIS: 27 DEGREES
EKG T AXIS: 2 DEGREES
EKG VENTRICULAR RATE: 81 BPM

## 2021-02-25 DIAGNOSIS — F43.21 GRIEF: ICD-10-CM

## 2021-02-25 DIAGNOSIS — J45.909 MODERATE ASTHMA WITHOUT COMPLICATION, UNSPECIFIED WHETHER PERSISTENT: ICD-10-CM

## 2021-02-25 DIAGNOSIS — F32.A DEPRESSION, UNSPECIFIED DEPRESSION TYPE: ICD-10-CM

## 2021-02-25 DIAGNOSIS — F32.89 OTHER DEPRESSION: ICD-10-CM

## 2021-02-25 DIAGNOSIS — E78.2 MIXED HYPERLIPIDEMIA: ICD-10-CM

## 2021-02-25 RX ORDER — SERTRALINE HYDROCHLORIDE 100 MG/1
100 TABLET, FILM COATED ORAL DAILY
Qty: 30 TABLET | Refills: 1 | Status: SHIPPED
Start: 2021-02-25 | End: 2021-05-03

## 2021-02-25 RX ORDER — BUPROPION HYDROCHLORIDE 150 MG/1
TABLET, EXTENDED RELEASE ORAL
Qty: 60 TABLET | Refills: 1 | Status: SHIPPED
Start: 2021-02-25 | End: 2021-05-03

## 2021-02-25 RX ORDER — FENOFIBRATE 145 MG/1
TABLET, COATED ORAL
Qty: 90 TABLET | Refills: 2 | Status: SHIPPED
Start: 2021-02-25 | End: 2021-11-29

## 2021-02-25 RX ORDER — SIMVASTATIN 20 MG
TABLET ORAL
Qty: 90 TABLET | Refills: 0 | Status: SHIPPED
Start: 2021-02-25 | End: 2021-05-13 | Stop reason: SDUPTHER

## 2021-02-25 RX ORDER — ALBUTEROL SULFATE 90 UG/1
AEROSOL, METERED RESPIRATORY (INHALATION)
Qty: 3 INHALER | Refills: 5 | Status: SHIPPED
Start: 2021-02-25 | End: 2021-11-18 | Stop reason: SDUPTHER

## 2021-02-26 ENCOUNTER — TELEPHONE (OUTPATIENT)
Dept: SLEEP CENTER | Age: 71
End: 2021-02-26

## 2021-03-01 ENCOUNTER — CARE COORDINATION (OUTPATIENT)
Dept: CARE COORDINATION | Age: 71
End: 2021-03-01

## 2021-03-01 ENCOUNTER — OFFICE VISIT (OUTPATIENT)
Dept: ORTHOPEDIC SURGERY | Age: 71
End: 2021-03-01

## 2021-03-01 VITALS — HEIGHT: 69 IN | BODY MASS INDEX: 33.77 KG/M2 | WEIGHT: 228 LBS

## 2021-03-01 DIAGNOSIS — M75.122 COMPLETE TEAR OF LEFT ROTATOR CUFF, UNSPECIFIED WHETHER TRAUMATIC: Primary | ICD-10-CM

## 2021-03-01 DIAGNOSIS — M19.012 ARTHRITIS OF LEFT ACROMIOCLAVICULAR JOINT: ICD-10-CM

## 2021-03-01 PROCEDURE — 99024 POSTOP FOLLOW-UP VISIT: CPT | Performed by: ORTHOPAEDIC SURGERY

## 2021-03-01 NOTE — CARE COORDINATION
Called patient to discuss Care Coordination Program. I left a voice mail message introducing myself and a brief description of the Care Coordination Program.   Requested a call back to discuss the program, enrollment, and schedule a time to speak with me. Direct contact information given. [facility] will send introduction letter via [address]

## 2021-03-01 NOTE — PROGRESS NOTES
Chief Complaint:   Chief Complaint   Patient presents with    Shoulder Pain     Post op left rotator cuff repair. DOS 2/18/2021       Lanny Lockhart Postlethwait is 11 days postop left shoulder rotator cuff repair and debridement arthroscopically. He is doing fairly well. He is done a little bit of pendulum exercise. He is not complaining of any pain. Allergies; medications; past medical, surgical, family, and social history; and problem list have been reviewed today and updated as indicated in this encounter seen below. Exam: The wounds look good are well approximated. Steri-Strips are in place. He is fine with letting them fall off by themselves. He has good strength good neurovascular function in the hand and forearm. The shoulder was not ranged for motion because of the severity of the tear. Radiographs: None    ASSESSMENT:    Lis Griffin was seen today for shoulder pain. Diagnoses and all orders for this visit:    Complete tear of left rotator cuff, unspecified whether traumatic    Arthritis of left acromioclavicular joint        PLAN: Gradually increase pendulum exercises and elbow exercises. Continue use of the sling and swath. Follow-up in 3 weeks. Will assess progress and determine when physical therapy will be started. Return in about 3 weeks (around 3/22/2021). Current Outpatient Medications   Medication Sig Dispense Refill    buPROPion (WELLBUTRIN SR) 150 MG extended release tablet bid 60 tablet 1    fenofibrate (TRICOR) 145 MG tablet TAKE ONE TABLET BY MOUTH ONCE DAILY 90 tablet 2    albuterol sulfate HFA (PROAIR HFA) 108 (90 Base) MCG/ACT inhaler INHALE TWO PUFFS BY MOUTH EVERY 6 HOURS AS DIRECTED 3 Inhaler 5    simvastatin (ZOCOR) 20 MG tablet qd 90 tablet 0    sertraline (ZOLOFT) 100 MG tablet Take 1 tablet by mouth daily 30 tablet 1    HYDROcodone-acetaminophen (NORCO) 7.5-325 MG per tablet Take 1 tablet by mouth every 8 hours as needed for Pain for up to 30 days.  80 tablet 0    budesonide-formoterol (SYMBICORT) 80-4.5 MCG/ACT AERO Inhale 2 puffs into the lungs 2 times daily 1 Inhaler 3    lisinopril (PRINIVIL;ZESTRIL) 10 MG tablet Take 1 tablet by mouth daily 90 tablet 2    meclizine (ANTIVERT) 25 MG tablet TAKE ONE TABLET BY MOUTH THREE TIMES DAILY AS NEEDED FOR DIZZINESS 45 tablet 0    dutasteride (AVODART) 0.5 MG capsule Take 1 capsule by mouth daily 90 capsule 3    montelukast (SINGULAIR) 10 MG tablet Take 1 tablet by mouth once daily (Patient taking differently: Take 10 mg by mouth nightly TAKE 1 TABLET BY MOUTH ONCE DAILY) 90 tablet 0    clopidogrel (PLAVIX) 75 MG tablet Take 1 tablet by mouth daily (Patient taking differently: Take 75 mg by mouth daily Stopped 5 days pre op) 90 tablet 3    omeprazole (PRILOSEC) 40 MG delayed release capsule TAKE 1 CAPSULE BY MOUTH ONCE DAILY 90 capsule 3    sildenafil (REVATIO) 20 MG tablet Take one daily as needed 30 tablet 3    LORazepam (ATIVAN) 1 MG tablet Take 1 mg by mouth daily as needed.  aspirin 81 MG chewable tablet Take 81 mg by mouth daily Ld 1 week ago patient not compliant with asa daughter stated ld about 1 week ago       No current facility-administered medications for this visit.         Patient Active Problem List   Diagnosis    Lymphoma ([address]    Gastroenteritis    Back pain at L4-L5 level    Impotence    Chronic fatigue    Urinary frequency    Bronchitis    Gastroesophageal reflux disease with esophagitis    Depression    Coronary artery disease involving native coronary artery of native heart with unstable angina pectoris (HCC)    Chronic obstructive pulmonary disease ([address]    Pure hypercholesterolemia    Moderate obesity    Major depressive disorder, recurrent, mild (HCC)    Vascular dementia with depressed mood (HCC)    Moderate asthma without complication    Nontraumatic complete tear of right rotator cuff    Rotator cuff arthropathy of left shoulder    Nontraumatic complete tear of rotator cuff, left    Bursitis of left shoulder    Impingement syndrome of left shoulder    Labral tear of shoulder, degenerative, left    S/P arthroscopy of shoulder       Past Medical History:   Diagnosis Date    Anesthesia complication     wakes up  violant   only ok if reversed with romazacon    Arthritis     shoulders,ankle    CAD (coronary artery disease)     [facility]) ([facility] [address]     Erectile dysfunction     GERD (gastroesophageal reflux disease)     H/O coronary angioplasty with stents[V45.82] 9/2018 another stent    Hyperlipidemia     Hypertension     Lymphoma ([facility] [address] 11/4/2011    had chemo  currently in remission    Myocardial infarction [facility])     more than 10 yrs ago    Sleep apnea     wont wear machine    Unspecified cerebral artery occlusion with cerebral infarction 2005    no deficits       Past Surgical History:   Procedure Laterality Date    ANKLE FRACTURE SURGERY Right 03/2014    ORIF right ankle    ANKLE SURGERY  2007    rt ankle    BRONCHOSCOPY  09/2011    bronch / medistinoscopy    CAROTID ENDARTERECTOMY Left     12 yrs ago    CHOLECYSTECTOMY      COLONOSCOPY  07/30/2013     [facility]    CORONARY ANGIOPLASTY WITH STENT PLACEMENT  2008    states has 2 stents  follows with DR Priscilla Hitchcock    ENDOSCOPY, COLON, DIAGNOSTIC      HAND SURGERY Left     fell on a buzzsaw    HERNIA REPAIR Bilateral 08/15/2019    HERNIA  BILATERAL INGUINAL REPAIR WITH MESH LAPAROSCOPIC ROBOTIC XI ASSISTED performed by Natasha Giang MD at [address] [facility]  06/29/2015    lapraoscopic cholecystectomy    SHOULDER ARTHROSCOPY Right 10/08/2015    rotator cuff repair, subachromoplasty with labrial debridement    SHOULDER ARTHROSCOPY Left 2/18/2021    LEFT ROTATOR CUFF REPAIR LABRAL DEBRIDEMENT SUBACROMIAL DECOMPRESSION performed by Sandra Canada DO at [place] [address]      UPPER GASTROINTESTINAL ENDOSCOPY         Allergies Objective:   Physical Exam   Constitutional: Oriented to person, place, and time. and appears well-developed and well-nourished. :   Head: Normocephalic and atraumatic. Eyes: EOM are normal.   Neck: Neck supple. Cardiovascular: Normal rate and regular rhythm. Pulmonary/Chest: Effort normal. No stridor. No respiratory distress, no wheezes. Abdominal:  No abnormal distension. Neurological: Alert and oriented to person, place, and time. Skin: Skin is warm and dry. Psychiatric: Normal mood and affect.  Behavior is normal. Thought content normal.    AJAY Valencia DO    3/1/21  9:15 AM

## 2021-03-01 NOTE — LETTER
3/1/2021    Neil Garsia Postlethwait  [address] [facility] [address]      Dear Branden Jaramillo,    My name is Willi Gonzalez and I am a registered nurse who partners with Wanda Madrigal DO to improve patients' health. As a member of your health care team, I would work with other providers involved in your care, offer education for your specific health conditions, and connect you with additional resources as needed. I will collaborate with Wanda Madrigal DO to support you in following your treatment plan. The additional support I provide is no additional cost to you. My primary focus is to help you achieve specific goals and improve your health. We are committed to walk with you on this journey and look forward to working with you. Please call me to further discuss your healthcare needs. I am available by phone at [phone].     In good health,     Willi Gonzalez, [address]

## 2021-03-02 RX ORDER — MELOXICAM 15 MG/1
TABLET ORAL
Qty: 90 TABLET | Refills: 0 | Status: SHIPPED
Start: 2021-03-02 | End: 2021-06-01

## 2021-03-17 ENCOUNTER — OFFICE VISIT (OUTPATIENT)
Dept: FAMILY MEDICINE CLINIC | Age: 71
End: 2021-03-17
Payer: MEDICARE

## 2021-03-17 VITALS
WEIGHT: 233.4 LBS | HEART RATE: 114 BPM | DIASTOLIC BLOOD PRESSURE: 74 MMHG | SYSTOLIC BLOOD PRESSURE: 138 MMHG | TEMPERATURE: 96.9 F | HEIGHT: 69 IN | RESPIRATION RATE: 22 BRPM | BODY MASS INDEX: 34.57 KG/M2 | OXYGEN SATURATION: 93 %

## 2021-03-17 DIAGNOSIS — M25.511 CHRONIC RIGHT SHOULDER PAIN: ICD-10-CM

## 2021-03-17 DIAGNOSIS — G89.29 CHRONIC RIGHT SHOULDER PAIN: ICD-10-CM

## 2021-03-17 DIAGNOSIS — J44.9 CHRONIC OBSTRUCTIVE PULMONARY DISEASE, UNSPECIFIED COPD TYPE (HCC): Primary | ICD-10-CM

## 2021-03-17 PROCEDURE — 4040F PNEUMOC VAC/ADMIN/RCVD: CPT | Performed by: FAMILY MEDICINE

## 2021-03-17 PROCEDURE — 3017F COLORECTAL CA SCREEN DOC REV: CPT | Performed by: FAMILY MEDICINE

## 2021-03-17 PROCEDURE — G8417 CALC BMI ABV UP PARAM F/U: HCPCS | Performed by: FAMILY MEDICINE

## 2021-03-17 PROCEDURE — 1036F TOBACCO NON-USER: CPT | Performed by: FAMILY MEDICINE

## 2021-03-17 PROCEDURE — 1123F ACP DISCUSS/DSCN MKR DOCD: CPT | Performed by: FAMILY MEDICINE

## 2021-03-17 PROCEDURE — 99213 OFFICE O/P EST LOW 20 MIN: CPT | Performed by: FAMILY MEDICINE

## 2021-03-17 PROCEDURE — G8427 DOCREV CUR MEDS BY ELIG CLIN: HCPCS | Performed by: FAMILY MEDICINE

## 2021-03-17 PROCEDURE — G8484 FLU IMMUNIZE NO ADMIN: HCPCS | Performed by: FAMILY MEDICINE

## 2021-03-17 RX ORDER — HYDROCODONE BITARTRATE AND ACETAMINOPHEN 7.5; 325 MG/1; MG/1
1 TABLET ORAL EVERY 8 HOURS PRN
Qty: 90 TABLET | Refills: 0 | Status: SHIPPED
Start: 2021-03-17 | End: 2021-04-15 | Stop reason: SDUPTHER

## 2021-03-17 ASSESSMENT — ENCOUNTER SYMPTOMS
WHEEZING: 0
DIARRHEA: 0
BLOOD IN STOOL: 0
CONSTIPATION: 0

## 2021-03-17 NOTE — PROGRESS NOTES
Wild Olson (:  1950) is a 79 y.o. male,Established patient, here for evaluation of the following chief complaint(s):  1 Month Follow-Up, Back Pain, Medication Refill, and Other (handicap parking placard)      ASSESSMENT/PLAN:  1. Chronic right shoulder pain  -     HYDROcodone-acetaminophen (NORCO) 7.5-325 MG per tablet; Take 1 tablet by mouth every 8 hours as needed for Pain for up to 30 days. , Disp-90 tablet, R-0Normal      No follow-ups on file. SUBJECTIVE/OBJECTIVE:  1 Month Follow-Up, Back Pain, Medication Refill, and Other (handicap parking placard)      Review of Systems   Constitutional: Negative for chills and diaphoresis. HENT: Negative for ear discharge, ear pain, hearing loss, nosebleeds and tinnitus. Respiratory: Negative for wheezing. Cardiovascular: Negative for chest pain. Gastrointestinal: Negative for blood in stool, constipation and diarrhea. Genitourinary: Negative for dysuria, flank pain and hematuria. Skin: Negative for rash. Neurological: Negative for headaches. Hematological: Does not bruise/bleed easily. /74   Pulse 114   Temp 96.9 °F (36.1 °C) (Temporal)   Resp 22   Ht 5' 8.5\" (1.74 m)   Wt 233 lb 6.4 oz (105.9 kg)   SpO2 93%   BMI 34.97 kg/m²     Physical Exam  Eyes:      General:         Right eye: No discharge. Left eye: No discharge. Neck:      Musculoskeletal: No neck rigidity. Cardiovascular:      Rate and Rhythm: Normal rate and regular rhythm. Heart sounds: No murmur. Pulmonary:      Effort: No respiratory distress. Breath sounds: No rhonchi or rales. Abdominal:      Tenderness: There is no abdominal tenderness. Skin:     General: Skin is warm. Capillary Refill: Capillary refill takes less than 2 seconds. Coloration: Skin is not pale. Findings: No bruising. Neurological:      Mental Status: He is alert.    Psychiatric:         Mood and Affect: Mood normal.

## 2021-03-22 ENCOUNTER — OFFICE VISIT (OUTPATIENT)
Dept: ORTHOPEDIC SURGERY | Age: 71
End: 2021-03-22

## 2021-03-22 VITALS — HEIGHT: 68 IN | BODY MASS INDEX: 35.31 KG/M2 | WEIGHT: 233 LBS | TEMPERATURE: 98 F

## 2021-03-22 DIAGNOSIS — M75.122 COMPLETE TEAR OF LEFT ROTATOR CUFF, UNSPECIFIED WHETHER TRAUMATIC: Primary | ICD-10-CM

## 2021-03-22 DIAGNOSIS — M19.012 ARTHRITIS OF LEFT ACROMIOCLAVICULAR JOINT: ICD-10-CM

## 2021-03-22 PROCEDURE — 99024 POSTOP FOLLOW-UP VISIT: CPT | Performed by: ORTHOPAEDIC SURGERY

## 2021-03-22 RX ORDER — TAMSULOSIN HYDROCHLORIDE 0.4 MG/1
CAPSULE ORAL
COMMUNITY
Start: 2021-03-17 | End: 2021-12-27 | Stop reason: SDUPTHER

## 2021-03-22 RX ORDER — VARENICLINE TARTRATE 1 MG/1
TABLET, FILM COATED ORAL
Status: ON HOLD | COMMUNITY
Start: 2021-03-21 | End: 2022-01-17 | Stop reason: HOSPADM

## 2021-03-22 NOTE — PROGRESS NOTES
Chief Complaint:   Chief Complaint   Patient presents with    Post-Op Check     left rotator cuff repair DOS 2/18/21. Doing well does not hurt. Michael Fleming this 32 days postop arthroscopic left rotator cuff repair. This is a very large tear with retraction. He seems to be doing fairly well. He is wearing a sling and swath. He is not doing too much with the arm. Allergies; medications; past medical, surgical, family, and social history; and problem list have been reviewed today and updated as indicated in this encounter seen below. Exam: The wounds are healing well. Gentle passive range of motion was done on the shoulder and no flexion is possible to about 85 degrees. Abduction to about 60. External rotation is about 30 degrees with the arm at the side. The patient tends to assist the motion having a difficult time relaxing. He does not seem to have much discomfort with these maneuvers. Radiographs: None    ASSESSMENT:    Willa Lea was seen today for post-op check. Diagnoses and all orders for this visit:    Complete tear of left rotator cuff, unspecified whether traumatic  -     [facility]    Arthritis of left acromioclavicular joint  -     [facility]        PLAN: Continue use of the sling and swath with limited active motion of the arm. We will schedule physical therapy to start in 7 to 10 days and follow-up in 4 weeks. Return in about 4 weeks (around 4/19/2021). Current Outpatient Medications   Medication Sig Dispense Refill    tamsulosin (FLOMAX) 0.4 MG capsule TAKE 1 CAPSULE BY MOUTH AT BEDTIME      Handicap Placard MISC by Does not apply route 1 each 0    HYDROcodone-acetaminophen (NORCO) 7.5-325 MG per tablet Take 1 tablet by mouth every 8 hours as needed for Pain for up to 30 days.  90 tablet 0    Handicap Placard MISC by Does not apply route Unable to ambulate more than 20 feet without difficulty  Expires 3/31/2026 1 each 0    meloxicam (MOBIC) 15 MG tablet Take 1 tablet by mouth once daily. 90 tablet 0    buPROPion (WELLBUTRIN SR) 150 MG extended release tablet bid 60 tablet 1    fenofibrate (TRICOR) 145 MG tablet TAKE ONE TABLET BY MOUTH ONCE DAILY 90 tablet 2    albuterol sulfate HFA (PROAIR HFA) 108 (90 Base) MCG/ACT inhaler INHALE TWO PUFFS BY MOUTH EVERY 6 HOURS AS DIRECTED 3 Inhaler 5    simvastatin (ZOCOR) 20 MG tablet qd 90 tablet 0    sertraline (ZOLOFT) 100 MG tablet Take 1 tablet by mouth daily 30 tablet 1    budesonide-formoterol (SYMBICORT) 80-4.5 MCG/ACT AERO Inhale 2 puffs into the lungs 2 times daily 1 Inhaler 3    lisinopril (PRINIVIL;ZESTRIL) 10 MG tablet Take 1 tablet by mouth daily 90 tablet 2    meclizine (ANTIVERT) 25 MG tablet TAKE ONE TABLET BY MOUTH THREE TIMES DAILY AS NEEDED FOR DIZZINESS 45 tablet 0    dutasteride (AVODART) 0.5 MG capsule Take 1 capsule by mouth daily 90 capsule 3    montelukast (SINGULAIR) 10 MG tablet Take 1 tablet by mouth once daily (Patient taking differently: Take 10 mg by mouth nightly TAKE 1 TABLET BY MOUTH ONCE DAILY) 90 tablet 0    clopidogrel (PLAVIX) 75 MG tablet Take 1 tablet by mouth daily (Patient taking differently: Take 75 mg by mouth daily Stopped 5 days pre op) 90 tablet 3    omeprazole (PRILOSEC) 40 MG delayed release capsule TAKE 1 CAPSULE BY MOUTH ONCE DAILY 90 capsule 3    sildenafil (REVATIO) 20 MG tablet Take one daily as needed 30 tablet 3    LORazepam (ATIVAN) 1 MG tablet Take 1 mg by mouth daily as needed.  aspirin 81 MG chewable tablet Take 81 mg by mouth daily Ld 1 week ago patient not compliant with asa daughter stated ld about 1 week ago      CHANTIX 1 MG tablet        No current facility-administered medications for this visit.         Patient Active Problem List   Diagnosis    Lymphoma ([address]    Gastroenteritis    Back pain at L4-L5 level    Impotence    Chronic fatigue    Urinary frequency  Bronchitis    Gastroesophageal reflux disease with esophagitis    Depression    Coronary artery disease involving native coronary artery of native heart with unstable angina pectoris (HCC)    Chronic obstructive pulmonary disease (HCC)    Pure hypercholesterolemia    Moderate obesity    Major depressive disorder, recurrent, mild (HCC)    Vascular dementia with depressed mood (HCC)    Moderate asthma without complication    Nontraumatic complete tear of right rotator cuff    Rotator cuff arthropathy of left shoulder    Nontraumatic complete tear of rotator cuff, left    Bursitis of left shoulder    Impingement syndrome of left shoulder    Labral tear of shoulder, degenerative, left    S/P arthroscopy of shoulder       Past Medical History:   Diagnosis Date    Anesthesia complication     wakes up  violant   only ok if reversed with romazacon    Arthritis     shoulders,ankle    CAD (coronary artery disease)     [facility] disease) [facility]     Erectile dysfunction     GERD (gastroesophageal reflux disease)     H/O coronary angioplasty with stents[V45.82] 9/2018 another stent    Hyperlipidemia     Hypertension     Lymphoma ([facility] [address] [facility] 11/4/2011    had chemo  currently in remission    Myocardial infarction [facility])     more than 10 yrs ago    Sleep apnea     wont wear machine    Unspecified cerebral artery occlusion with cerebral infarction 2005    no deficits       Past Surgical History:   Procedure Laterality Date    ANKLE FRACTURE SURGERY Right 03/2014    ORIF right ankle    ANKLE SURGERY  2007    rt ankle    BRONCHOSCOPY  09/2011    bronch / medistinoscopy    CAROTID ENDARTERECTOMY Left     12 yrs ago    CHOLECYSTECTOMY      COLONOSCOPY  07/30/2013    DR MULLINS    CORONARY ANGIOPLASTY WITH STENT PLACEMENT  2008    states has 2 stents  follows with DR Donya Liriano    ENDOSCOPY, COLON, DIAGNOSTIC      HAND SURGERY Left     fell on a buzzsaw    HERNIA REPAIR Bilateral 08/15/2019    HERNIA  BILATERAL INGUINAL REPAIR WITH MESH LAPAROSCOPIC ROBOTIC XI ASSISTED performed by Julio Cleveland MD at [address]  2015    lapraoscopic cholecystectomy    SHOULDER ARTHROSCOPY Right 10/08/2015    rotator cuff repair, subachromoplasty with labrial debridement    SHOULDER ARTHROSCOPY Left 2021    LEFT ROTATOR CUFF REPAIR LABRAL DEBRIDEMENT SUBACROMIAL DECOMPRESSION performed by Rosi Newell DO at [address]      TONSILLECTOMY      UPPER GASTROINTESTINAL ENDOSCOPY         Allergies   Allergen Reactions    Midazolam Hcl      Wake up wild. ...  Must be reversed with romazicon or will wake up wild and combative       Social History     Socioeconomic History    Marital status:      Spouse name: None    Number of children: None    Years of education: None    Highest education level: None   Occupational History    None   Social Needs    Financial resource strain: None    Food insecurity     Worry: None     Inability: None    Transportation needs     Medical: None     Non-medical: None   Tobacco Use    Smoking status: Former Smoker     Packs/day: 0.50     Years: 60.00     Pack years: 30.00     Types: Cigarettes     Quit date: 2020     Years since quittin.8    Smokeless tobacco: Never Used   Substance and Sexual Activity    Alcohol use: Yes     Comment: 2-3 beers daily    Drug use: Not Currently     Comment: in past cocaine last used     Sexual activity: Not Currently     Partners: Female   Lifestyle    Physical activity     Days per week: None     Minutes per session: None    Stress: None   Relationships    Social connections     Talks on phone: None     Gets together: None     Attends Latter day service: None     Active member of club or organization: None     Attends meetings of clubs or organizations: None     Relationship status: None    Intimate partner violence     Fear of current or ex partner: None

## 2021-03-29 ENCOUNTER — EVALUATION (OUTPATIENT)
Dept: PHYSICAL THERAPY | Age: 71
End: 2021-03-29
Payer: MEDICARE

## 2021-03-29 DIAGNOSIS — M75.122 COMPLETE TEAR OF LEFT ROTATOR CUFF, UNSPECIFIED WHETHER TRAUMATIC: Primary | ICD-10-CM

## 2021-03-29 PROCEDURE — 97110 THERAPEUTIC EXERCISES: CPT | Performed by: PHYSICAL THERAPIST

## 2021-03-29 PROCEDURE — 97140 MANUAL THERAPY 1/> REGIONS: CPT | Performed by: PHYSICAL THERAPIST

## 2021-03-29 PROCEDURE — 97162 PT EVAL MOD COMPLEX 30 MIN: CPT | Performed by: PHYSICAL THERAPIST

## 2021-03-29 NOTE — PROGRESS NOTES
Physical Therapy Daily Treatment Note    Date: 3/29/2021  Patient Name: Gentry Gimenez    \"Red\"  : 1950   MRN: [id number]  DOInjury: Chronic  DOSx: Arthroscopic exam and treatment left shoulder with debridement glenohumeral joint and glenoid labrum, subacromial, acromioplasty and repair rotator cuff on 2021  Referring Provider: Grey Homans, DO  1932 [address] [facility] [address]     Medical Diagnosis:      Diagnosis Orders   1. Complete tear of left rotator cuff, unspecified whether traumatic         Outcome Measure: Quick Dash: 75% Impairment    S: See eval  O: Pt given written HEP  Time 930-1015     Visit 1 Repeat outcome measure at mid point and end. Pain 6/10     ROM See eval      Modalities            Manual 10 mins flex, scap, ER, IR                 Stretch      Table slides 5 reps x 1 set with 10s holds flex, scap, abd, ER     Wall Walk Flex      Wall Walk ABD      Wall Pec/ER Stretch      Wall ER Stretch      Towel IR Stretch      Cross Body Adduction      Supine Stretch with Arms Behind Head            Exercise      UBE          Pulleys - Flex      Pulleys - IR      Supine Wand Flex      Supine Wand Bench Press      Supine Wand ER/IR      Standing Wand IR      Standing Wand Scaption      Standing Wand Flex      Standing Wand ER/IR      Shrugs AROM       Pendulum Ex HEP           Supine Flex      S-lying ABD      S-lying ER            Prone Flexion      Prone Ext      Prone Row with ER      Prone Horizontal ABD            Standing Flex      Standing ER/IR      Standing Scaption       Standing ABD      Standing Shoulder Press       Functional activities To aid in ROM and strength needed for reaching , lifting ,pushing and pulling at home/work    ROWS: H       ROWS: M  \"    ROWS: L  \"    ER  \"    IR  \"    Punches      Shoulder Press      Shoulder Flex      Shoulder ABD            Weighted Machines      Lat Pull Down      Vertical Row      A:  Tolerated well.   Above added to

## 2021-03-29 NOTE — PROGRESS NOTES
[facility] OUTPATIENT REHABILITATION  PHYSICAL THERAPY INITIAL EVALUATION         Date:  3/29/2021   Patient: Shyann Muhammad  : 1950  MRN: [id number]  Referring Provider: Kapil Armstrong DO  1932 [address],  [facility]     Medical Diagnosis:      Diagnosis Orders   1. Complete tear of left rotator cuff, unspecified whether traumatic       NO STRENGTHENING UNTIL !!!    SUBJECTIVE:     Onset date: 4-5 years    Onset[de-identified] Gradual     Sx: Arthroscopic exam and treatment left shoulder with debridement glenohumeral joint and glenoid labrum, subacromial, acromioplasty and repair rotator cuff on 2021. Mechanism of Injury / History: Pt stated that his shoulder progressively got worse throughout the years but that there was not a specific insult to the joint. Patient is right handed. Previous PT: yes - did not help    Medical Management for Current Problem: medications, OTC meds     Chief complaint: pain, decreased motion, decreased mobility and weakness    Behavior: condition is getting better slowly    Pain: intermittent  Current: 4/10     Best: 0/10     Worst:10/10    Symptom Type/Quality: aching, sharp, throbbing  Location[de-identified] noted above distal biceps tendon      Aggravated by: flexing elbow    Relieved by: rest, placing arm in sling position       Imaging results: No results found.     Past Medical History:  Past Medical History:   Diagnosis Date    Anesthesia complication     wakes up  violant   only ok if reversed with romazacon    Arthritis     shoulders,ankle    CAD (coronary artery disease)     COPD (chronic obstructive pulmonary disease) ([address]     Erectile dysfunction     GERD (gastroesophageal reflux disease)     H/O coronary angioplasty with stents[V45.82] 2018 another stent    Hyperlipidemia     Hypertension     Lymphoma ([address] 2011    had chemo  currently in remission    Myocardial infarction [facility])     more than 10 yrs ago   Osito Godfrey Sleep apnea     wont wear machine    Unspecified cerebral artery occlusion with cerebral infarction 2005    no deficits     Past Surgical History:   Procedure Laterality Date    ANKLE FRACTURE SURGERY Right 03/2014    ORIF right ankle    ANKLE SURGERY  2007    rt ankle    BRONCHOSCOPY  09/2011    bronch / medistinoscopy    CAROTID ENDARTERECTOMY Left     12 yrs ago    CHOLECYSTECTOMY      COLONOSCOPY  07/30/2013    DR Victor Manuel Austin ANGIOPLASTY WITH STENT PLACEMENT  2008    states has 2 stents  follows with DR Valarie Vázquez    ENDOSCOPY, COLON, DIAGNOSTIC      HAND SURGERY Left     fell on a buzzsaw    HERNIA REPAIR Bilateral 08/15/2019    HERNIA  BILATERAL INGUINAL REPAIR WITH MESH LAPAROSCOPIC ROBOTIC XI ASSISTED performed by Librado Eagle MD at [address] [place] [address]  06/29/2015    lapraoscopic cholecystectomy    SHOULDER ARTHROSCOPY Right 10/08/2015    rotator cuff repair, subachromoplasty with labrial debridement    SHOULDER ARTHROSCOPY Left 2/18/2021    LEFT ROTATOR CUFF REPAIR LABRAL DEBRIDEMENT SUBACROMIAL DECOMPRESSION performed by Deepak pEps DO at [place] [address]      UPPER GASTROINTESTINAL ENDOSCOPY         Medications:   Current Outpatient Medications   Medication Sig Dispense Refill    CHANTIX 1 MG tablet       tamsulosin (FLOMAX) 0.4 MG capsule TAKE 1 CAPSULE BY MOUTH AT BEDTIME      Handicap Placard MISC by Does not apply route 1 each 0    HYDROcodone-acetaminophen (NORCO) 7.5-325 MG per tablet Take 1 tablet by mouth every 8 hours as needed for Pain for up to 30 days. 90 tablet 0    Handicap Placard MISC by Does not apply route Unable to ambulate more than 20 feet without difficulty  Expires 3/31/2026 1 each 0    meloxicam (MOBIC) 15 MG tablet Take 1 tablet by mouth once daily.  90 tablet 0    buPROPion (WELLBUTRIN SR) 150 MG extended release tablet bid 60 tablet 1    fenofibrate (TRICOR) 145 MG tablet TAKE ONE TABLET BY MOUTH ONCE DAILY 90 tablet 2    albuterol sulfate HFA (PROAIR HFA) 108 (90 Base) MCG/ACT inhaler INHALE TWO PUFFS BY MOUTH EVERY 6 HOURS AS DIRECTED 3 Inhaler 5    simvastatin (ZOCOR) 20 MG tablet qd 90 tablet 0    sertraline (ZOLOFT) 100 MG tablet Take 1 tablet by mouth daily 30 tablet 1    budesonide-formoterol (SYMBICORT) 80-4.5 MCG/ACT AERO Inhale 2 puffs into the lungs 2 times daily 1 Inhaler 3    lisinopril (PRINIVIL;ZESTRIL) 10 MG tablet Take 1 tablet by mouth daily 90 tablet 2    meclizine (ANTIVERT) 25 MG tablet TAKE ONE TABLET BY MOUTH THREE TIMES DAILY AS NEEDED FOR DIZZINESS 45 tablet 0    dutasteride (AVODART) 0.5 MG capsule Take 1 capsule by mouth daily 90 capsule 3    montelukast (SINGULAIR) 10 MG tablet Take 1 tablet by mouth once daily (Patient taking differently: Take 10 mg by mouth nightly TAKE 1 TABLET BY MOUTH ONCE DAILY) 90 tablet 0    clopidogrel (PLAVIX) 75 MG tablet Take 1 tablet by mouth daily (Patient taking differently: Take 75 mg by mouth daily Stopped 5 days pre op) 90 tablet 3    omeprazole (PRILOSEC) 40 MG delayed release capsule TAKE 1 CAPSULE BY MOUTH ONCE DAILY 90 capsule 3    sildenafil (REVATIO) 20 MG tablet Take one daily as needed 30 tablet 3    LORazepam (ATIVAN) 1 MG tablet Take 1 mg by mouth daily as needed.  aspirin 81 MG chewable tablet Take 81 mg by mouth daily Ld 1 week ago patient not compliant with asa daughter stated ld about 1 week ago       No current facility-administered medications for this visit. Occupation: retired. Physical demands include: n/a. Status: n/a. Exercise regimen: none    Hobbies: yard work, working around [employer], working on your car    Patient Goals: pain relief, return to prior activity, get back to normal    Precautions/Contraindications: recent surgery    OBJECTIVE:     Observations: well nourished male    Inspection: irregular scapulohumeral rhythm left shoulder.   Incision: edges approximated, no purulent drainage, no redness, no swelling, no tenderness and not hot to touch. Palpation: Tender to palpation RTC insertion, distal biceps     Joint/Motion:  Right Shoulder:  AROM: 105° Forward elevation,  60° ER,  IR to 40  PROM: 160° Forward elevation,  80° ER,  70° IR    Left Shoulder:  AROM: N/A° Forward elevation,  N/A° ER,  IR to N/A  PROM: 145° Forward elevation,  65° ER , 35° IR    Strength:  Right Shoulder: Flexion 4/5,  Abduction 4/5, ER 2/5, IR 4-/5      Left Shoulder: Flexion 2-/5,  Abduction 2-/5, ER 2-/5, IR 2-/5       Special Tests/Functional Screens:    [] Moses []+ / [] -  [] Carteret's []+ / [] -   []  Joshua's []+ / [] -    []GH drawer []+ / [] -    [] Bicep Load []+ / [] -   [] Crank []+ / [] -  [] Deedee Darwin []+ / [] -   [] Elbow Varus []+ / [] -  [] Neer's []+ / [] -      [] Speed's []+ / [] -   []Sulcus Sign []+ / [] -   [] Apprehension []+ / [] -   [] Bicep Load II []+ / [] -   [] Madina Celestin []+ / [] -   [] Elbow Valgus []+ / [] -     [] Other: []+ / [] -         ASSESSMENT     Outcome Measure:   QuickDASH (Disorders of the Arm, Shoulder, and Hand) 75% disability    Problems:    Pain reported 10/10   ROM decreased   Strength decreased L shoulder 2-/5   Decreased functional ability with work, ADLs, use of left upper extremity, bending, reaching, lifting, carrying    [x] There are no barriers affecting plan of care or recovery    [] Barriers to this patient's plan of care or recovery include. Domestic Concerns:  [x] No  [] Yes:    Short Term goals (3-4 weeks)   Decrease reported pain to 6/10   Increase ROM to [facility]   Increase Strength to 3-/5 L shoulder    Able to perform/complete the following functions/tasks: Perform ADLs, hobbies, housework with less pain.     QuickDASH (Disorders of the Arm, Shoulder, and Hand) 60% disability    Long Term goals (6-8 weeks)   Decrease reported pain to 0-4/10   Increase ROM to WNL   Increase Strength to 4/5 L shoulder     Able to perform/complete [de-identified] Certification and return to: [facility] [address]  Dept: [phone]  Dept Fax: [phone] Certification / Comments     Frequency/Duration 1-2 days per week for 6-8 weeks. Certification period from 3/29/2021  to 6/28/2021. I have reviewed the Plan of Care established for skilled therapy services and certify that the services are required and that they will be provided while the patient is under my care.     Physician's Comments/Revisions:               Physician's Printed Name:                                           [de-identified] Signature:                                                               Date:

## 2021-03-31 DIAGNOSIS — G47.33 OSA (OBSTRUCTIVE SLEEP APNEA): Primary | ICD-10-CM

## 2021-04-01 ENCOUNTER — TREATMENT (OUTPATIENT)
Dept: PHYSICAL THERAPY | Age: 71
End: 2021-04-01
Payer: MEDICARE

## 2021-04-01 DIAGNOSIS — M75.122 COMPLETE TEAR OF LEFT ROTATOR CUFF, UNSPECIFIED WHETHER TRAUMATIC: Primary | ICD-10-CM

## 2021-04-01 PROCEDURE — 97110 THERAPEUTIC EXERCISES: CPT | Performed by: PHYSICAL THERAPIST

## 2021-04-01 PROCEDURE — 97140 MANUAL THERAPY 1/> REGIONS: CPT | Performed by: PHYSICAL THERAPIST

## 2021-04-01 PROCEDURE — 97162 PT EVAL MOD COMPLEX 30 MIN: CPT | Performed by: PHYSICAL THERAPIST

## 2021-04-01 NOTE — PROGRESS NOTES
Physical Therapy Daily Treatment Note    Date: 2021  Patient Name: Keaton Ko    \"Red\"  : 1950   MRN: [id number]  DOInjury: Chronic  DOSx: Arthroscopic exam and treatment left shoulder with debridement glenohumeral joint and glenoid labrum, subacromial, acromioplasty and repair rotator cuff on 2021  Referring Provider: No referring provider defined for this encounter. Medical Diagnosis:      Diagnosis Orders   1. Complete tear of left rotator cuff, unspecified whether traumatic       **NO STRENGTHENING UNTIL ! Outcome Measure: Quick Dash: 75% Impairment    S: Pt stated that he has been performing his HEP 2x per day since starting therapy. O: Pt given written HEP  Time 875-965     Visit 2 Repeat outcome measure at mid point and end.     Pain 4/10     ROM See eval      Modalities            Manual 15 mins flex, scap, ER, IR, adduction                  Stretch      Table slides 10 reps x 1 set with 15s holds flex, scap, abd, ER     Wall Walk Flex      Wall Walk ABD      Wall Pec/ER Stretch      Wall ER Stretch      Towel IR Stretch      Cross Body Adduction      Supine Stretch with Arms Behind Head            Exercise      UBE          Pulleys - Flex      Pulleys - IR      Supine Wand Flex NEXT     Supine Wand Bench Press NEXT     Supine Wand ER/IR NEXT     Standing Wand IR NEXT     Standing Wand Scaption NEXT     Standing Wand Flex      Standing Wand ER/IR      Shrugs AROM       Pendulum Ex 1 min each fwd/bwd, side to side, cwise/ccwise           Supine Flex      S-lying ABD      S-lying ER            Prone Flexion      Prone Ext      Prone Row with ER      Prone Horizontal ABD            Standing Flex      Standing ER/IR      Standing Scaption       Standing ABD      Standing Shoulder Press       Functional activities To aid in ROM and strength needed for reaching , lifting ,pushing and pulling at home/work    ROWS: H       ROWS: M  \"    ROWS: L  \"    ER  \"    IR  \"    Yogesh Shoulder Press      Shoulder Flex      Shoulder ABD            Weighted Machines      Lat Pull Down      Vertical Row      A:  Tolerated well. Above added to written HEP. Pt tolerated well with moderate pain. P: Continue with rehab plan. **NO STRENGTHENING UNTIL 5/13!   Bigg Dela Cruz PT    Treatment Charges: Mins Units   Initial Evaluation     Re-Evaluation     Ther Exercise         TE 45 3   Manual Therapy     MT     Ther Activities        TA     Gait Training          GT     Neuro Re-education NR     Modalities     Non-Billable Service Time     Other     Total Time/Units 45 3

## 2021-04-05 ENCOUNTER — HOSPITAL ENCOUNTER (OUTPATIENT)
Age: 71
Discharge: HOME | End: 2021-04-07
Payer: MEDICARE

## 2021-04-05 DIAGNOSIS — G47.33 OSA (OBSTRUCTIVE SLEEP APNEA): ICD-10-CM

## 2021-04-05 PROCEDURE — U0005 INFEC AGEN DETEC AMPLI PROBE: HCPCS

## 2021-04-05 PROCEDURE — U0003 INFECTIOUS AGENT DETECTION BY NUCLEIC ACID (DNA OR RNA); SEVERE ACUTE RESPIRATORY SYNDROME CORONAVIRUS 2 (SARS-COV-2) (CORONAVIRUS DISEASE [COVID-19]), AMPLIFIED PROBE TECHNIQUE, MAKING USE OF HIGH THROUGHPUT TECHNOLOGIES AS DESCRIBED BY CMS-2020-01-R: HCPCS

## 2021-04-06 ENCOUNTER — TELEPHONE (OUTPATIENT)
Dept: PHARMACY | Facility: CLINIC | Age: 71
End: 2021-04-06

## 2021-04-06 ENCOUNTER — TREATMENT (OUTPATIENT)
Dept: PHYSICAL THERAPY | Age: 71
End: 2021-04-06
Payer: MEDICARE

## 2021-04-06 ENCOUNTER — TELEPHONE (OUTPATIENT)
Dept: SLEEP CENTER | Age: 71
End: 2021-04-06

## 2021-04-06 DIAGNOSIS — M75.122 COMPLETE TEAR OF LEFT ROTATOR CUFF, UNSPECIFIED WHETHER TRAUMATIC: Primary | ICD-10-CM

## 2021-04-06 LAB
SARS-COV-2: NOT DETECTED
SOURCE: NORMAL

## 2021-04-06 PROCEDURE — 97110 THERAPEUTIC EXERCISES: CPT | Performed by: PHYSICAL THERAPIST

## 2021-04-06 NOTE — PROGRESS NOTES
Physical Therapy Daily Treatment Note    Date: 2021  Patient Name: Noris Batres    \"Red\"  : 1950   MRN: [id number]  DOInjury: Chronic  DOSx: Arthroscopic exam and treatment left shoulder with debridement glenohumeral joint and glenoid labrum, subacromial, acromioplasty and repair rotator cuff on 2021  Referring Provider: No referring provider defined for this encounter. Medical Diagnosis:      Diagnosis Orders   1. Complete tear of left rotator cuff, unspecified whether traumatic       **NO STRENGTHENING UNTIL ! Outcome Measure: Quick Dash: 75% Impairment    S: Pt stated that he has been performing his HEP 2x per day since starting therapy. O: Pt given written HEP  Time 048-339     Visit 3 Repeat outcome measure at mid point and end.     Pain 5/10     ROM See eval      Modalities            Manual               Stretch     Table slides     Wall Walk Flex      Wall Walk ABD      Wall Pec/ER Stretch      Wall ER Stretch      Towel IR Stretch      Cross Body Adduction      Supine Stretch with Arms Behind Head            Exercise      UBE          Pulleys - Flex      Pulleys - IR      Supine Wand Flex 15 reps x 2 sets     Supine Wand Bench Press 15 reps x 2 sets     Supine Wand ER/IR 15 reps x 2 sets     Standing Wand IR 15 reps x 2 sets     Standing Wand Scaption 15 reps x 2 sets     Standing Wand Flex      Standing Wand ER/IR      Shrugs AROM       Pendulum Ex 1 min each fwd/bwd, side to side, cwise/ccwise           Supine Flex      S-lying ABD      S-lying ER            Prone Flexion      Prone Ext      Prone Row with ER      Prone Horizontal ABD            Standing Flex      Standing ER/IR      Standing Scaption       Standing ABD      Standing Shoulder Press       Functional activities To aid in ROM and strength needed for reaching , lifting ,pushing and pulling at home/work    ROWS: H       ROWS: M  \"    ROWS: L  \"    ER  \"    IR  \"    Punches      Shoulder Press      Shoulder Flex      Shoulder ABD            Weighted Machines      Lat Pull Down      Vertical Row      A:  Tolerated well. Pt tolerated wand exercises well without additional pain. Pt given wand exercises for HEP. P: Continue with rehab plan. **NO STRENGTHENING UNTIL 5/13!   Kris Trejo, PT    Treatment Charges: Mins Units   Initial Evaluation     Re-Evaluation     Ther Exercise         TE 40 3   Manual Therapy     MT     Ther Activities        TA     Gait Training          GT     Neuro Re-education NR     Modalities     Non-Billable Service Time     Other     Total Time/Units 40 3

## 2021-04-06 NOTE — TELEPHONE ENCOUNTER
[facility] CLINICAL PHARMACY REVIEW: ADHERENCE REVIEW  Identified care gap per [place]; fills at [address]: ACE/ARB and Statin adherence    Last Visit: 3/17/21    Patient identified as LIS = 1, therefore patient may be able to receive a 90-day supply for the same cost as a 30-day supply     Patient not found in Outcomes MTM    ASSESSMENT  ACE/ARB ADHERENCE    Per Insurance Records through [facility]: ([facility]  100%; Potential Fail Date: 6/4/21):   LISINOPRIL TAB 10MG last filled on 2/27/21 for 30 day supply. Next refill due: 3/29/21    Per [address]:   LISINOPRIL TAB 10MG last picked up on 3/28/21 for 30 day supply. 6 refills remaining. Billed through [place]     BP Readings from Last 3 Encounters:   03/17/21 138/74   02/19/21 (!) 141/62   02/18/21 (!) 145/96     Estimated Creatinine Clearance: 58 mL/min (A) (based on SCr of 1.4 mg/dL (H)). [facility]    Per Insurance Records through [facility]:   100%; Potential Fail Date: 6/4/21):   SIMVASTATIN TAB 20MG last filled on 2/27/21 for 30 day supply. Next refill due: 3/29/21    Per [address]:   SIMVASTATIN TAB 20MG last picked up on 3/28/21 for 30 day supply. 2 refills remaining. Billed through [employer] Value Date    CHOL 198 11/06/2019    TRIG 174 (H) 11/06/2019    HDL 36 11/06/2019    LDLCALC 127 (H) 11/06/2019     ALT   Date Value Ref Range Status   02/19/2021 20 0 - 40 U/L Final     AST   Date Value Ref Range Status   02/19/2021 24 0 - 39 U/L Final     Comment:     Specimen is slightly Hemolyzed. Result may be artificially increased. The ASCVD Risk score (Nancy French et al., 2013) failed to calculate for the following reasons: The patient has a prior MI or stroke diagnosis     PLAN    Patient using pill pack pharmacy which only sends out 30 d/s at a time. Multiple refills on both Lisinopril and Simvastatin. Patient appears to be remaining adherent. No concerns at this time.      Future Appointments   Date Time Provider Kike Nguyen   4/6/2021 10:30 AM Easter Cushing, PT [facility] PT [facility]   4/7/2021  8:30 PM [facility] SLEEP LAB ROOM 1 [facility] SLEEP [facility] Mariaelena Simsca   4/8/2021 10:30 AM Easter Cushing, PT [facility] PT [facility]   4/15/2021  9:45 AM DO Chaparro Crowell [facility]   4/19/2021  8:45 AM DO Layo Everett HP       [facility] [address]   Direct: [phone]  Department, toll free: [phone], option 7

## 2021-04-06 NOTE — TELEPHONE ENCOUNTER
CLINICAL PHARMACY CONSULT: MED RECONCILIATION/REVIEW ADDENDUM    For Pharmacy Admin Tracking Only    PHSO: Yes  Recommended intervention potential cost savings: 1  Time Spent (min): [address], 0229 [facility]

## 2021-04-07 ENCOUNTER — HOSPITAL ENCOUNTER (OUTPATIENT)
Dept: SLEEP CENTER | Age: 71
Discharge: HOME | End: 2021-04-07
Payer: MEDICARE

## 2021-04-07 DIAGNOSIS — G47.33 OSA (OBSTRUCTIVE SLEEP APNEA): ICD-10-CM

## 2021-04-07 PROCEDURE — 95811 POLYSOM 6/>YRS CPAP 4/> PARM: CPT

## 2021-04-07 PROCEDURE — 95811 POLYSOM 6/>YRS CPAP 4/> PARM: CPT | Performed by: STUDENT IN AN ORGANIZED HEALTH CARE EDUCATION/TRAINING PROGRAM

## 2021-04-08 ENCOUNTER — TELEPHONE (OUTPATIENT)
Dept: PHYSICAL THERAPY | Age: 71
End: 2021-04-08

## 2021-04-12 ENCOUNTER — TREATMENT (OUTPATIENT)
Dept: PHYSICAL THERAPY | Age: 71
End: 2021-04-12
Payer: MEDICARE

## 2021-04-12 DIAGNOSIS — M75.122 COMPLETE TEAR OF LEFT ROTATOR CUFF, UNSPECIFIED WHETHER TRAUMATIC: ICD-10-CM

## 2021-04-12 DIAGNOSIS — M75.121 NONTRAUMATIC COMPLETE TEAR OF RIGHT ROTATOR CUFF: Primary | ICD-10-CM

## 2021-04-12 LAB — STATUS: NORMAL

## 2021-04-12 PROCEDURE — 97110 THERAPEUTIC EXERCISES: CPT

## 2021-04-12 NOTE — PROGRESS NOTES
M  \"    ROWS: L  \"    ER  \"    IR  \"    Punches      Shoulder Press      Shoulder Flex      Shoulder ABD            Weighted Machines      Lat Pull Down      Vertical Row      A:  Tolerated well. Pt tolerated wand exercises well without additional pain. Pt given wand exercises for HEP. P: Continue with rehab plan. **NO STRENGTHENING UNTIL 5/13!   Magali Smith PTA    Treatment Charges: Mins Units   Initial Evaluation     Re-Evaluation     Ther Exercise         TE 35 2   Manual Therapy     MT     Ther Activities        TA     Gait Training          GT     Neuro Re-education NR     Modalities     Non-Billable Service Time     Other     Total Time/Units 35 2

## 2021-04-15 ENCOUNTER — OFFICE VISIT (OUTPATIENT)
Dept: FAMILY MEDICINE CLINIC | Age: 71
End: 2021-04-15
Payer: MEDICARE

## 2021-04-15 VITALS
RESPIRATION RATE: 20 BRPM | TEMPERATURE: 96.9 F | SYSTOLIC BLOOD PRESSURE: 116 MMHG | BODY MASS INDEX: 35.74 KG/M2 | OXYGEN SATURATION: 90 % | DIASTOLIC BLOOD PRESSURE: 62 MMHG | HEIGHT: 68 IN | HEART RATE: 90 BPM | WEIGHT: 235.8 LBS

## 2021-04-15 DIAGNOSIS — M25.511 CHRONIC RIGHT SHOULDER PAIN: ICD-10-CM

## 2021-04-15 DIAGNOSIS — G47.33 OSA (OBSTRUCTIVE SLEEP APNEA): Primary | ICD-10-CM

## 2021-04-15 DIAGNOSIS — G89.29 CHRONIC RIGHT SHOULDER PAIN: ICD-10-CM

## 2021-04-15 PROCEDURE — 1123F ACP DISCUSS/DSCN MKR DOCD: CPT | Performed by: FAMILY MEDICINE

## 2021-04-15 PROCEDURE — 3288F FALL RISK ASSESSMENT DOCD: CPT | Performed by: FAMILY MEDICINE

## 2021-04-15 PROCEDURE — 3017F COLORECTAL CA SCREEN DOC REV: CPT | Performed by: FAMILY MEDICINE

## 2021-04-15 PROCEDURE — 99213 OFFICE O/P EST LOW 20 MIN: CPT | Performed by: FAMILY MEDICINE

## 2021-04-15 PROCEDURE — 1036F TOBACCO NON-USER: CPT | Performed by: FAMILY MEDICINE

## 2021-04-15 PROCEDURE — 4040F PNEUMOC VAC/ADMIN/RCVD: CPT | Performed by: FAMILY MEDICINE

## 2021-04-15 PROCEDURE — G8417 CALC BMI ABV UP PARAM F/U: HCPCS | Performed by: FAMILY MEDICINE

## 2021-04-15 PROCEDURE — G8427 DOCREV CUR MEDS BY ELIG CLIN: HCPCS | Performed by: FAMILY MEDICINE

## 2021-04-15 RX ORDER — HYDROCODONE BITARTRATE AND ACETAMINOPHEN 7.5; 325 MG/1; MG/1
1 TABLET ORAL EVERY 8 HOURS PRN
Qty: 90 TABLET | Refills: 0 | Status: SHIPPED
Start: 2021-04-15 | End: 2021-05-13 | Stop reason: SDUPTHER

## 2021-04-15 ASSESSMENT — ENCOUNTER SYMPTOMS
DIARRHEA: 0
CONSTIPATION: 0
WHEEZING: 0
BLOOD IN STOOL: 0

## 2021-04-16 ENCOUNTER — TREATMENT (OUTPATIENT)
Dept: PHYSICAL THERAPY | Age: 71
End: 2021-04-16
Payer: MEDICARE

## 2021-04-16 DIAGNOSIS — M75.122 COMPLETE TEAR OF LEFT ROTATOR CUFF, UNSPECIFIED WHETHER TRAUMATIC: Primary | ICD-10-CM

## 2021-04-16 PROCEDURE — 97110 THERAPEUTIC EXERCISES: CPT | Performed by: PHYSICAL THERAPIST

## 2021-04-16 NOTE — PROGRESS NOTES
Physical Therapy Daily Treatment Note    Date: 2021  Patient Name: Maribel Real    \"Red\"  : 1950   MRN: [id number]  DOInjury: Chronic  DOSx: Arthroscopic exam and treatment left shoulder with debridement glenohumeral joint and glenoid labrum, subacromial, acromioplasty and repair rotator cuff on 2021  Referring Provider:  Graciela Werner DO  1932 [address] Diagnosis:      Diagnosis Orders   1. Complete tear of left rotator cuff, unspecified whether traumatic       **NO STRENGTHENING UNTIL ! Outcome Measure: Quick Dash: 75% Impairment    S: Pt stated that he was planning on removing carpet and installing lament pennie and was told not to be doing activities like this because he should not even be moving his arm actively yet. Pt understood and stated that he would comply with request.   O: Pt given written HEP  Time [id number]     Visit 5 Repeat outcome measure at mid point and end.     Pain 5/10     ROM See eval      Modalities            Manual               Stretch     Table slides     Wall Walk Flex      Wall Walk ABD      Wall Pec/ER Stretch      Wall ER Stretch      Towel IR Stretch      Cross Body Adduction      Supine Stretch with Arms Behind Head            Exercise      UBE          Pulleys - Flex      Pulleys - IR      Supine Wand Flex 15 reps x 1 set with 10s hold     Supine Wand Bench Press 20 reps x 1 set     Supine Wand ER/IR 15 reps x 1 set with 10s hold     Standing Wand IR 15 reps x 1 set with 10s hold     Standing Wand Scaption 15 reps x 1 set     Standing Wand Flex NEXT     Standing Wand ER/IR NEXT     Shrugs AROM       Pendulum Ex 1 min each fwd/bwd, side to side, cwise/ccwise           Supine Flex      S-lying ABD      S-lying ER            Prone Flexion      Prone Ext      Prone Row with ER      Prone Horizontal ABD            Standing Flex      Standing ER/IR      Standing Scaption       Standing ABD      Standing Shoulder Press       Functional activities To aid in ROM and strength needed for reaching , lifting ,pushing and pulling at home/work    ROWS: H       ROWS: M  \"    ROWS: L  \"    ER  \"    IR  \"    Punches      Shoulder Press      Shoulder Flex      Shoulder ABD            Weighted Machines      Lat Pull Down      Vertical Row      A:  Tolerated well. Pt tolerated wand exercises with holds well without additional pain. Pt given wand exercises for HEP. Start AROM on Thurs 4/22/21. P: Continue with rehab plan. **NO STRENGTHENING UNTIL 5/13!   Malcolm Chin, PT    Treatment Charges: Mins Units   Initial Evaluation     Re-Evaluation     Ther Exercise         TE 45 3   Manual Therapy     MT     Ther Activities        TA     Gait Training          GT     Neuro Re-education NR     Modalities     Non-Billable Service Time     Other     Total Time/Units 45 3

## 2021-04-19 ENCOUNTER — TREATMENT (OUTPATIENT)
Dept: PHYSICAL THERAPY | Age: 71
End: 2021-04-19
Payer: MEDICARE

## 2021-04-19 ENCOUNTER — OFFICE VISIT (OUTPATIENT)
Dept: ORTHOPEDIC SURGERY | Age: 71
End: 2021-04-19

## 2021-04-19 VITALS — TEMPERATURE: 98 F | WEIGHT: 235 LBS | BODY MASS INDEX: 35.61 KG/M2 | HEIGHT: 68 IN

## 2021-04-19 DIAGNOSIS — M19.012 ARTHRITIS OF LEFT ACROMIOCLAVICULAR JOINT: ICD-10-CM

## 2021-04-19 DIAGNOSIS — M75.122 COMPLETE TEAR OF LEFT ROTATOR CUFF, UNSPECIFIED WHETHER TRAUMATIC: Primary | ICD-10-CM

## 2021-04-19 PROCEDURE — 97110 THERAPEUTIC EXERCISES: CPT

## 2021-04-19 PROCEDURE — 99024 POSTOP FOLLOW-UP VISIT: CPT | Performed by: ORTHOPAEDIC SURGERY

## 2021-04-19 NOTE — PROGRESS NOTES
Physical Therapy Daily Treatment Note    Date: 2021  Patient Name: Kennth Bosworth    \"Red\"  : 1950   MRN: [id number]  DOInjury: Chronic  DOSx: Arthroscopic exam and treatment left shoulder with debridement glenohumeral joint and glenoid labrum, subacromial, acromioplasty and repair rotator cuff on 2021  Referring Provider:  Gabriel Degroot DO  [address] [facility] [address] Diagnosis:      Diagnosis Orders   1. Complete tear of left rotator cuff, unspecified whether traumatic       **NO STRENGTHENING UNTIL ! Outcome Measure: Quick Dash: 75% Impairment    S: Pt  Continued general soreness also has a RTD visit today after PT O: Pt given written HEP  Time 8479-4370 am     Visit 6/  Repeat outcome measure at mid point and end.     Pain 5/10     ROM See eval      Modalities            Manual               Stretch     Table slides     Wall Walk Flex      Wall Walk ABD      Wall Pec/ER Stretch      Wall ER Stretch      Towel IR Stretch      Cross Body Adduction      Supine Stretch with Arms Behind Head            Exercise      UBE          Pulleys - Flex X 5 min      Pulleys - IR X 3 min            Supine Wand Flex 15 reps x 1 set with 10s hold     Supine Wand Bench Press 20 reps x 1 set     Supine Wand ER/IR 15 reps x 1 set with 10s hold     Standing Wand IR 15 reps x 1 set with 10s hold     Standing Wand Scaption 15 reps x 1 set     Standing Wand Flex NEXT     Standing Wand ER/IR NEXT     Shrugs AROM       Pendulum Ex 1 min each fwd/bwd, side to side, cwise/ccwise           Supine Flex      S-lying ABD      S-lying ER            Prone Flexion      Prone Ext      Prone Row with ER      Prone Horizontal ABD            Standing Flex      Standing ER/IR      Standing Scaption       Standing ABD      Standing Shoulder Press       Functional activities To aid in ROM and strength needed for reaching , lifting ,pushing and pulling at home/work    ROWS: H       ROWS: M  \"

## 2021-04-19 NOTE — PROGRESS NOTES
hypercholesterolemia    Moderate obesity    Major depressive disorder, recurrent, mild (HCC)    Vascular dementia with depressed mood (HCC)    Moderate asthma without complication    Complete tear of left rotator cuff    Rotator cuff arthropathy of left shoulder    Nontraumatic complete tear of rotator cuff, left    Bursitis of left shoulder    Impingement syndrome of left shoulder    Labral tear of shoulder, degenerative, left    S/P arthroscopy of shoulder    JOSE (obstructive sleep apnea)       Past Medical History:   Diagnosis Date    Anesthesia complication     wakes up  violant   only ok if reversed with romazacon    Arthritis     shoulders,ankle    CAD (coronary artery disease)     [facility] obstructive [facility] disease) [facility]     Erectile dysfunction     GERD (gastroesophageal reflux disease)     H/O coronary angioplasty with stents[V45.82] 9/2018 another stent    Hyperlipidemia     Hypertension     Lymphoma ([facility] 11/4/2011    had chemo  currently in remission    Myocardial infarction [facility])     more than 10 yrs ago    Sleep apnea     wont wear machine    Unspecified cerebral artery occlusion with cerebral infarction 2005    no deficits       Past Surgical History:   Procedure Laterality Date    ANKLE FRACTURE SURGERY Right 03/2014    ORIF right ankle    ANKLE SURGERY  2007    rt ankle    BRONCHOSCOPY  09/2011    bronch / medistinoscopy    CAROTID ENDARTERECTOMY Left     12 yrs ago    CHOLECYSTECTOMY      COLONOSCOPY  07/30/2013     [facility]    CORONARY ANGIOPLASTY WITH STENT PLACEMENT  2008    states has 2 stents  follows with DR Blanka Guerra    ENDOSCOPY, COLON, DIAGNOSTIC      HAND SURGERY Left     fell on a buzzsaw    HERNIA REPAIR Bilateral 08/15/2019    HERNIA  BILATERAL INGUINAL REPAIR WITH MESH LAPAROSCOPIC ROBOTIC XI ASSISTED performed by Luda Wood MD at [facility]  06/29/2015    lapraoscopic cholecystectomy    SHOULDER ARTHROSCOPY Right 10/08/2015    rotator cuff repair, subachromoplasty with labrial debridement    SHOULDER ARTHROSCOPY Left 2021    LEFT ROTATOR CUFF REPAIR LABRAL DEBRIDEMENT SUBACROMIAL DECOMPRESSION performed by Johnathon Scott DO at [place] [address]      UPPER GASTROINTESTINAL ENDOSCOPY         Allergies   Allergen Reactions    Midazolam Hcl      Wake up wild. ...  Must be reversed with romazicon or will wake up wild and combative       Social History     Socioeconomic History    Marital status:      Spouse name: None    Number of children: None    Years of education: None    Highest education level: None   Occupational History    None   Social Needs    Financial resource strain: None    Food insecurity     Worry: None     Inability: None    Transportation needs     Medical: None     Non-medical: None   Tobacco Use    Smoking status: Former Smoker     Packs/day: 0.50     Years: 60.00     Pack years: 30.00     Types: Cigarettes     Quit date: 2020     Years since quittin.9    Smokeless tobacco: Never Used   Substance and Sexual Activity    Alcohol use: Yes     Comment: 2-3 beers daily    Drug use: Not Currently     Comment: in past cocaine last used     Sexual activity: Not Currently     Partners: Female   Lifestyle    Physical activity     Days per week: None     Minutes per session: None    Stress: None   Relationships    Social connections     Talks on phone: None     Gets together: None     Attends [employer] service: None     Active member of club or organization: None     Attends meetings of clubs or organizations: None     Relationship status: None    Intimate partner violence     Fear of current or ex partner: None     Emotionally abused: None     Physically abused: None     Forced sexual activity: None   Other Topics Concern    None   Social History Narrative    None       Review of Systems  As follows except as previously noted in HPI:  Constitutional: Negative for chills, diaphoresis, fatigue, fever and unexpected weight change. Respiratory: Negative for cough, shortness of breath and wheezing. Cardiovascular: Negative for chest pain and palpitations. Neurological: Negative for dizziness, syncope, cephalgia. GI / : negative  Musculoskeletal: see HPI       Objective:   Physical Exam   Constitutional: Oriented to person, place, and time. and appears well-developed and well-nourished. :   Head: Normocephalic and atraumatic. Eyes: EOM are normal.   Neck: Neck supple. Cardiovascular: Normal rate and regular rhythm. Pulmonary/Chest: Effort normal. No stridor. No respiratory distress, no wheezes. Abdominal:  No abnormal distension. Neurological: Alert and oriented to person, place, and time. Skin: Skin is warm and dry. Psychiatric: Normal mood and affect.  Behavior is normal. Thought content normal.    AJAY Thao, DO    4/19/21  9:10 AM

## 2021-04-22 ENCOUNTER — TELEPHONE (OUTPATIENT)
Dept: SLEEP CENTER | Age: 71
End: 2021-04-22

## 2021-04-22 ENCOUNTER — TREATMENT (OUTPATIENT)
Dept: PHYSICAL THERAPY | Age: 71
End: 2021-04-22
Payer: MEDICARE

## 2021-04-22 DIAGNOSIS — M75.122 COMPLETE TEAR OF LEFT ROTATOR CUFF, UNSPECIFIED WHETHER TRAUMATIC: Primary | ICD-10-CM

## 2021-04-22 PROCEDURE — 97110 THERAPEUTIC EXERCISES: CPT | Performed by: PHYSICAL THERAPIST

## 2021-04-22 NOTE — PROGRESS NOTES
Physical Therapy Daily Treatment Note    Date: 2021  Patient Name: Jaylan Campos    \"Red\"  : 1950   MRN: [id number]  DOInjury: Chronic  DOSx: Arthroscopic exam and treatment left shoulder with debridement glenohumeral joint and glenoid labrum, subacromial, acromioplasty and repair rotator cuff on 2021  Referring Provider:  William Angulo DO  1932 [address] [facility] [address] Diagnosis:      Diagnosis Orders   1. Complete tear of left rotator cuff, unspecified whether traumatic       **NO STRENGTHENING UNTIL ! Outcome Measure: Quick Dash: 75% Impairment    S: Pt stated that he felt pretty good today but AROM was slightly more painful than AAROM and it was explained that this is to be expected. O: Pt given written HEP  Time 5-420     Visit 7/  Repeat outcome measure at mid point and end.     Pain 6/10     ROM See eval      Modalities            Manual               Stretch     Table slides     Wall Walk Flex 5 reps x 1 set with 15s hold     Wall Walk ABD 5 reps x 1 set with 15s hold     Wall Pec/ER Stretch      Wall ER Stretch      Towel IR Stretch      Cross Body Adduction      Supine Stretch with Arms Behind Head            Exercise      UBE          Pulleys - Flex X 4 min      Pulleys - IR X 4 min            Supine Wand Flex     Supine Wand Bench Press     Supine Wand ER/IR     Standing Wand IR     Standing Wand Scaption     Standing Wand Flex NEXT     Standing Wand ER/IR NEXT     Shrugs AROM       Pendulum Ex            Supine Flex 20 reps x 2 sets     S-lying ABD 20 reps x 2 sets     S-lying ER 20 reps x 2 sets           Prone Flexion      Prone Ext      Prone Row with ER      Prone Horizontal ABD            Standing Flex      Standing ER/IR      Standing Scaption       Standing ABD      Standing Shoulder Press       Functional activities To aid in ROM and strength needed for reaching , lifting ,pushing and pulling at home/work    ROWS: H       ROWS: M  \" ROWS: L  \"    ER  \"    IR  \"    Punches      Shoulder Press      Shoulder Flex      Shoulder ABD            Weighted Machines      Lat Pull Down      Vertical Row      A:  Tolerated well. Pt tolerated wand exercises with holds well without additional pain. Pt progressed to AROM during todays session with good tolerate but was very weak into ER.   P: Continue with rehab plan. **NO STRENGTHENING UNTIL 5/13!   Lauri Dhaliwal, PT    Treatment Charges: Mins Units   Initial Evaluation     Re-Evaluation     Ther Exercise         TE 45 3   Manual Therapy     MT     Ther Activities        TA     Gait Training          GT     Neuro Re-education NR     Modalities     Non-Billable Service Time     Other     Total Time/Units 45 3

## 2021-04-30 ENCOUNTER — TELEPHONE (OUTPATIENT)
Dept: PHYSICAL THERAPY | Age: 71
End: 2021-04-30

## 2021-05-02 DIAGNOSIS — F43.21 GRIEF: ICD-10-CM

## 2021-05-02 DIAGNOSIS — F32.A DEPRESSION, UNSPECIFIED DEPRESSION TYPE: ICD-10-CM

## 2021-05-02 DIAGNOSIS — F32.89 OTHER DEPRESSION: ICD-10-CM

## 2021-05-03 RX ORDER — SERTRALINE HYDROCHLORIDE 100 MG/1
100 TABLET, FILM COATED ORAL DAILY
Qty: 30 TABLET | Refills: 0 | Status: SHIPPED
Start: 2021-05-03 | End: 2021-06-09

## 2021-05-03 RX ORDER — BUPROPION HYDROCHLORIDE 150 MG/1
TABLET, EXTENDED RELEASE ORAL
Qty: 60 TABLET | Refills: 0 | Status: SHIPPED
Start: 2021-05-03 | End: 2021-06-09

## 2021-05-13 ENCOUNTER — OFFICE VISIT (OUTPATIENT)
Dept: FAMILY MEDICINE CLINIC | Age: 71
End: 2021-05-13
Payer: MEDICARE

## 2021-05-13 VITALS
HEART RATE: 72 BPM | SYSTOLIC BLOOD PRESSURE: 138 MMHG | DIASTOLIC BLOOD PRESSURE: 76 MMHG | OXYGEN SATURATION: 93 % | TEMPERATURE: 97.3 F | WEIGHT: 240 LBS | HEIGHT: 68 IN | BODY MASS INDEX: 36.37 KG/M2 | RESPIRATION RATE: 20 BRPM

## 2021-05-13 DIAGNOSIS — G89.29 CHRONIC RIGHT SHOULDER PAIN: ICD-10-CM

## 2021-05-13 DIAGNOSIS — E78.2 MIXED HYPERLIPIDEMIA: ICD-10-CM

## 2021-05-13 DIAGNOSIS — M25.511 CHRONIC RIGHT SHOULDER PAIN: ICD-10-CM

## 2021-05-13 PROCEDURE — 4040F PNEUMOC VAC/ADMIN/RCVD: CPT | Performed by: FAMILY MEDICINE

## 2021-05-13 PROCEDURE — G8427 DOCREV CUR MEDS BY ELIG CLIN: HCPCS | Performed by: FAMILY MEDICINE

## 2021-05-13 PROCEDURE — G8417 CALC BMI ABV UP PARAM F/U: HCPCS | Performed by: FAMILY MEDICINE

## 2021-05-13 PROCEDURE — 1123F ACP DISCUSS/DSCN MKR DOCD: CPT | Performed by: FAMILY MEDICINE

## 2021-05-13 PROCEDURE — 1036F TOBACCO NON-USER: CPT | Performed by: FAMILY MEDICINE

## 2021-05-13 PROCEDURE — 3017F COLORECTAL CA SCREEN DOC REV: CPT | Performed by: FAMILY MEDICINE

## 2021-05-13 PROCEDURE — 99213 OFFICE O/P EST LOW 20 MIN: CPT | Performed by: FAMILY MEDICINE

## 2021-05-13 RX ORDER — HYDROCODONE BITARTRATE AND ACETAMINOPHEN 7.5; 325 MG/1; MG/1
1 TABLET ORAL EVERY 8 HOURS PRN
Qty: 90 TABLET | Refills: 0 | Status: SHIPPED
Start: 2021-05-13 | End: 2021-06-16 | Stop reason: SDUPTHER

## 2021-05-13 RX ORDER — SIMVASTATIN 20 MG
TABLET ORAL
Qty: 90 TABLET | Refills: 1 | Status: SHIPPED
Start: 2021-05-13 | End: 2021-11-29

## 2021-05-13 ASSESSMENT — ENCOUNTER SYMPTOMS
APNEA: 0
NAUSEA: 0
DIARRHEA: 0
VOMITING: 0
ABDOMINAL PAIN: 0
WHEEZING: 0
CONSTIPATION: 0
SHORTNESS OF BREATH: 0
BLOOD IN STOOL: 0
COUGH: 0

## 2021-05-13 NOTE — PROGRESS NOTES
Kira Freire (:  1950) is a 79 y.o. male,Established patient, here for evaluation of the following chief complaint(s):  1 Month Follow-Up, Back Pain, and Medication Refill      ASSESSMENT/PLAN:  1. Chronic right shoulder pain  -     HYDROcodone-acetaminophen (NORCO) 7.5-325 MG per tablet; Take 1 tablet by mouth every 8 hours as needed for Pain for up to 30 days. , Disp-90 tablet, R-0Normal  2. Mixed hyperlipidemia  -     simvastatin (ZOCOR) 20 MG tablet; qd, Disp-90 tablet, R-1Normal    Controlled substances monitoring: no signs of potential drug abuse or diversion identified and [place] report reviewed today- activity consistent with treatment plan. No follow-ups on file. SUBJECTIVE/OBJECTIVE:  1 Month Follow-Up, Back Pain, and Medication Refill  Also hip and knee pain on ambulation. Review of Systems   Constitutional: Negative for chills, diaphoresis and fever. HENT: Negative for ear discharge, ear pain, hearing loss, nosebleeds and tinnitus. Respiratory: Negative for apnea, cough, shortness of breath and wheezing. Cardiovascular: Negative for chest pain. Gastrointestinal: Negative for abdominal pain, blood in stool, constipation, diarrhea, nausea and vomiting. Genitourinary: Negative for dysuria, flank pain and hematuria. Musculoskeletal: Negative for arthralgias and myalgias. Skin: Negative for rash. Neurological: Negative for headaches. Hematological: Does not bruise/bleed easily. Psychiatric/Behavioral: Negative for agitation, hallucinations and suicidal ideas. Physical Exam  HENT:      Head: Normocephalic. Nose: Nose normal.      Mouth/Throat:      Mouth: Mucous membranes are moist.   Eyes:      General:         Right eye: No discharge. Left eye: No discharge. Neck:      Musculoskeletal: No neck rigidity. Cardiovascular:      Rate and Rhythm: Normal rate and regular rhythm. Heart sounds: No murmur.    Pulmonary:      Effort: No respiratory distress. Breath sounds: No rhonchi or rales. Comments: Cough and SOB    Abdominal:      Tenderness: There is no abdominal tenderness. Musculoskeletal:      Comments: Back pain and Shoulder pain. Skin:     General: Skin is warm. Capillary Refill: Capillary refill takes less than 2 seconds. Coloration: Skin is not pale. Findings: No bruising. Neurological:      Mental Status: He is alert. Psychiatric:         Mood and Affect: Mood normal.           On this date 5/13/2021 I have spent 15 minutes reviewing previous notes, test results and face to face with the patient discussing the diagnosis and importance of compliance with the treatment plan as well as documenting on the day of the visit. An electronic signature was used to authenticate this note.     --Gabe Dubon, DO

## 2021-05-31 DIAGNOSIS — I25.110 CORONARY ARTERY DISEASE INVOLVING NATIVE CORONARY ARTERY OF NATIVE HEART WITH UNSTABLE ANGINA PECTORIS (HCC): ICD-10-CM

## 2021-05-31 DIAGNOSIS — K21.00 GASTROESOPHAGEAL REFLUX DISEASE WITH ESOPHAGITIS: ICD-10-CM

## 2021-05-31 DIAGNOSIS — J45.909 MODERATE ASTHMA WITHOUT COMPLICATION, UNSPECIFIED WHETHER PERSISTENT: ICD-10-CM

## 2021-06-01 RX ORDER — MELOXICAM 15 MG/1
TABLET ORAL
Qty: 90 TABLET | Refills: 0 | Status: SHIPPED
Start: 2021-06-01 | End: 2021-08-30

## 2021-06-01 RX ORDER — CLOPIDOGREL BISULFATE 75 MG/1
75 TABLET ORAL DAILY
Qty: 90 TABLET | Refills: 0 | Status: SHIPPED
Start: 2021-06-01 | End: 2021-08-30

## 2021-06-01 RX ORDER — OMEPRAZOLE 40 MG/1
CAPSULE, DELAYED RELEASE ORAL
Qty: 90 CAPSULE | Refills: 0 | Status: SHIPPED
Start: 2021-06-01 | End: 2021-08-30

## 2021-06-01 RX ORDER — MONTELUKAST SODIUM 10 MG/1
TABLET ORAL
Qty: 90 TABLET | Refills: 0 | Status: SHIPPED
Start: 2021-06-01 | End: 2021-08-30

## 2021-06-09 DIAGNOSIS — F32.A DEPRESSION, UNSPECIFIED DEPRESSION TYPE: ICD-10-CM

## 2021-06-09 DIAGNOSIS — F43.21 GRIEF: ICD-10-CM

## 2021-06-09 DIAGNOSIS — F32.89 OTHER DEPRESSION: ICD-10-CM

## 2021-06-09 RX ORDER — BUPROPION HYDROCHLORIDE 150 MG/1
TABLET, EXTENDED RELEASE ORAL
Qty: 60 TABLET | Refills: 2 | Status: SHIPPED
Start: 2021-06-09 | End: 2021-07-30

## 2021-06-09 RX ORDER — SERTRALINE HYDROCHLORIDE 100 MG/1
100 TABLET, FILM COATED ORAL DAILY
Qty: 30 TABLET | Refills: 2 | Status: SHIPPED
Start: 2021-06-09 | End: 2021-08-30

## 2021-06-16 ENCOUNTER — OFFICE VISIT (OUTPATIENT)
Dept: FAMILY MEDICINE CLINIC | Age: 71
End: 2021-06-16
Payer: MEDICARE

## 2021-06-16 VITALS
DIASTOLIC BLOOD PRESSURE: 88 MMHG | BODY MASS INDEX: 35.1 KG/M2 | HEIGHT: 69 IN | WEIGHT: 237 LBS | OXYGEN SATURATION: 96 % | RESPIRATION RATE: 20 BRPM | TEMPERATURE: 97.3 F | HEART RATE: 83 BPM | SYSTOLIC BLOOD PRESSURE: 138 MMHG

## 2021-06-16 DIAGNOSIS — M25.511 CHRONIC RIGHT SHOULDER PAIN: ICD-10-CM

## 2021-06-16 DIAGNOSIS — Z13.220 LIPID SCREENING: ICD-10-CM

## 2021-06-16 DIAGNOSIS — Z11.59 ENCOUNTER FOR HEPATITIS C SCREENING TEST FOR LOW RISK PATIENT: ICD-10-CM

## 2021-06-16 DIAGNOSIS — E66.01 SEVERE OBESITY (BMI 35.0-35.9 WITH COMORBIDITY) (HCC): ICD-10-CM

## 2021-06-16 DIAGNOSIS — G89.29 CHRONIC RIGHT SHOULDER PAIN: ICD-10-CM

## 2021-06-16 DIAGNOSIS — Z00.00 ROUTINE GENERAL MEDICAL EXAMINATION AT A HEALTH CARE FACILITY: ICD-10-CM

## 2021-06-16 DIAGNOSIS — F33.0 MAJOR DEPRESSIVE DISORDER, RECURRENT, MILD (HCC): ICD-10-CM

## 2021-06-16 DIAGNOSIS — F01.53 VASCULAR DEMENTIA WITH DEPRESSED MOOD: ICD-10-CM

## 2021-06-16 DIAGNOSIS — C85.91 NON-HODGKIN LYMPHOMA OF LYMPH NODES OF NECK, UNSPECIFIED NON-HODGKIN LYMPHOMA TYPE (HCC): ICD-10-CM

## 2021-06-16 DIAGNOSIS — I25.118 CORONARY ARTERY DISEASE OF NATIVE ARTERY OF NATIVE HEART WITH STABLE ANGINA PECTORIS (HCC): ICD-10-CM

## 2021-06-16 DIAGNOSIS — R42 VERTIGO: Primary | ICD-10-CM

## 2021-06-16 LAB
CHOLESTEROL, TOTAL: 226 MG/DL (ref 0–199)
HDLC SERPL-MCNC: 43 MG/DL
LDL CHOLESTEROL CALCULATED: 153 MG/DL (ref 0–99)
TRIGL SERPL-MCNC: 149 MG/DL (ref 0–149)
VLDLC SERPL CALC-MCNC: 30 MG/DL

## 2021-06-16 PROCEDURE — 1123F ACP DISCUSS/DSCN MKR DOCD: CPT | Performed by: FAMILY MEDICINE

## 2021-06-16 PROCEDURE — G0439 PPPS, SUBSEQ VISIT: HCPCS | Performed by: FAMILY MEDICINE

## 2021-06-16 PROCEDURE — 3017F COLORECTAL CA SCREEN DOC REV: CPT | Performed by: FAMILY MEDICINE

## 2021-06-16 PROCEDURE — 4040F PNEUMOC VAC/ADMIN/RCVD: CPT | Performed by: FAMILY MEDICINE

## 2021-06-16 RX ORDER — HYDROCODONE BITARTRATE AND ACETAMINOPHEN 7.5; 325 MG/1; MG/1
1 TABLET ORAL EVERY 8 HOURS PRN
Qty: 90 TABLET | Refills: 0 | Status: SHIPPED
Start: 2021-06-16 | End: 2021-07-15 | Stop reason: SDUPTHER

## 2021-06-16 RX ORDER — MECLIZINE HYDROCHLORIDE 25 MG/1
25 TABLET ORAL 3 TIMES DAILY PRN
Qty: 90 TABLET | Refills: 0 | Status: SHIPPED | OUTPATIENT
Start: 2021-06-16 | End: 2021-07-16

## 2021-06-16 SDOH — ECONOMIC STABILITY: FOOD INSECURITY: WITHIN THE PAST 12 MONTHS, YOU WORRIED THAT YOUR FOOD WOULD RUN OUT BEFORE YOU GOT MONEY TO BUY MORE.: NEVER TRUE

## 2021-06-16 SDOH — ECONOMIC STABILITY: FOOD INSECURITY: WITHIN THE PAST 12 MONTHS, THE FOOD YOU BOUGHT JUST DIDN'T LAST AND YOU DIDN'T HAVE MONEY TO GET MORE.: NEVER TRUE

## 2021-06-16 ASSESSMENT — LIFESTYLE VARIABLES
HOW OFTEN DURING THE LAST YEAR HAVE YOU FOUND THAT YOU WERE NOT ABLE TO STOP DRINKING ONCE YOU HAD STARTED: NEVER
HOW OFTEN DURING THE LAST YEAR HAVE YOU NEEDED AN ALCOHOLIC DRINK FIRST THING IN THE MORNING TO GET YOURSELF GOING AFTER A NIGHT OF HEAVY DRINKING: 0
AUDIT TOTAL SCORE: 0
HOW OFTEN DURING THE LAST YEAR HAVE YOU FOUND THAT YOU WERE NOT ABLE TO STOP DRINKING ONCE YOU HAD STARTED: 0
HOW MANY STANDARD DRINKS CONTAINING ALCOHOL DO YOU HAVE ON A TYPICAL DAY: 0
HAS A RELATIVE, FRIEND, DOCTOR, OR ANOTHER HEALTH PROFESSIONAL EXPRESSED CONCERN ABOUT YOUR DRINKING OR SUGGESTED YOU CUT DOWN: 0
HOW OFTEN DURING THE LAST YEAR HAVE YOU HAD A FEELING OF GUILT OR REMORSE AFTER DRINKING: NEVER
AUDIT-C TOTAL SCORE: 4
HAVE YOU OR SOMEONE ELSE BEEN INJURED AS A RESULT OF YOUR DRINKING: 0
HOW OFTEN DO YOU HAVE SIX OR MORE DRINKS ON ONE OCCASION: NEVER
AUDIT-C TOTAL SCORE: 0
HOW OFTEN DO YOU HAVE A DRINK CONTAINING ALCOHOL: FOUR OR MORE TIMES A WEEK
HOW OFTEN DO YOU HAVE SIX OR MORE DRINKS ON ONE OCCASION: 0
HOW OFTEN DURING THE LAST YEAR HAVE YOU NEEDED AN ALCOHOLIC DRINK FIRST THING IN THE MORNING TO GET YOURSELF GOING AFTER A NIGHT OF HEAVY DRINKING: NEVER
HAVE YOU OR SOMEONE ELSE BEEN INJURED AS A RESULT OF YOUR DRINKING: NO
HAS A RELATIVE, FRIEND, DOCTOR, OR ANOTHER HEALTH PROFESSIONAL EXPRESSED CONCERN ABOUT YOUR DRINKING OR SUGGESTED YOU CUT DOWN: NO
HOW OFTEN DURING THE LAST YEAR HAVE YOU HAD A FEELING OF GUILT OR REMORSE AFTER DRINKING: 0
HOW OFTEN DURING THE LAST YEAR HAVE YOU FAILED TO DO WHAT WAS NORMALLY EXPECTED FROM YOU BECAUSE OF DRINKING: 0
HOW OFTEN DO YOU HAVE A DRINK CONTAINING ALCOHOL: 4
HOW OFTEN DURING THE LAST YEAR HAVE YOU FAILED TO DO WHAT WAS NORMALLY EXPECTED FROM YOU BECAUSE OF DRINKING: NEVER
AUDIT TOTAL SCORE: 4
HOW OFTEN DURING THE LAST YEAR HAVE YOU BEEN UNABLE TO REMEMBER WHAT HAPPENED THE NIGHT BEFORE BECAUSE YOU HAD BEEN DRINKING: NEVER
HOW OFTEN DURING THE LAST YEAR HAVE YOU BEEN UNABLE TO REMEMBER WHAT HAPPENED THE NIGHT BEFORE BECAUSE YOU HAD BEEN DRINKING: 0
HOW MANY STANDARD DRINKS CONTAINING ALCOHOL DO YOU HAVE ON A TYPICAL DAY: ONE OR TWO

## 2021-06-16 ASSESSMENT — PATIENT HEALTH QUESTIONNAIRE - PHQ9
SUM OF ALL RESPONSES TO PHQ QUESTIONS 1-9: 2
SUM OF ALL RESPONSES TO PHQ QUESTIONS 1-9: 2
SUM OF ALL RESPONSES TO PHQ9 QUESTIONS 1 & 2: 2
2. FEELING DOWN, DEPRESSED OR HOPELESS: 1
SUM OF ALL RESPONSES TO PHQ QUESTIONS 1-9: 2
1. LITTLE INTEREST OR PLEASURE IN DOING THINGS: 1

## 2021-06-16 ASSESSMENT — SOCIAL DETERMINANTS OF HEALTH (SDOH): HOW HARD IS IT FOR YOU TO PAY FOR THE VERY BASICS LIKE FOOD, HOUSING, MEDICAL CARE, AND HEATING?: HARD

## 2021-06-16 NOTE — PROGRESS NOTES
Medicare Annual Wellness Visit  Name: Shani Valdovinos Date: 2021   MRN: <[id number]> Sex: Male   Age: 79 y.o. Ethnicity: Non-/Non    : 1950 Race: White      Cm Martini is here for Medicare AWV    Screenings for behavioral, psychosocial and functional/safety risks, and cognitive dysfunction are all negative except as indicated below. These results, as well as other patient data from the [address] form, are documented in Flowsheets linked to this Encounter. Allergies   Allergen Reactions    Midazolam Hcl      Wake up wild. ... Must be reversed with romazicon or will wake up wild and combative       Prior to Visit Medications    Medication Sig Taking? Authorizing Provider   sertraline (ZOLOFT) 100 MG tablet Take 1 tablet by mouth daily. Yes Xiang David, DO   buPROPion [facility]) 150 MG extended release tablet Take 1 tablet by mouth every 12 hours. Yes Xiang David, DO   omeprazole (PRILOSEC) 40 MG delayed release capsule Take 1 capsule by mouth once daily. Yes Xiang David, DO   meloxicam (MOBIC) 15 MG tablet Take 1 tablet by mouth once daily. Yes Xiang David, DO   clopidogrel (PLAVIX) 75 MG tablet Take 1 tablet by mouth daily. Yes Xiang David, DO   montelukast (SINGULAIR) 10 MG tablet Take 1 tablet by mouth once daily.  Yes Ritzechariah David, DO   simvastatin (ZOCOR) 20 MG tablet qd Yes Xiang David, DO   tamsulosin (FLOMAX) 0.4 MG capsule TAKE 1 CAPSULE BY MOUTH AT BEDTIME Yes Historical Provider, MD   Handicap Placard MISC by Does not apply route Yes Xiang David, DO   Irish Martinez MISC by Does not apply route Unable to ambulate more than 20 feet without difficulty  Expires 3/31/2026 Yes Xiang David, DO   fenofibrate (TRICOR) 145 MG tablet TAKE ONE TABLET BY MOUTH ONCE DAILY Yes Xiang David, DO   albuterol sulfate HFA (PROAIR HFA) 108 (90 Base) MCG/ACT inhaler INHALE TWO PUFFS BY MOUTH EVERY 6 HOURS AS DIRECTED Yes Talon Young, DO   budesonide-formoterol (SYMBICORT) 80-4.5 MCG/ACT AERO Inhale 2 puffs into the lungs 2 times daily Yes Talon Young, DO   lisinopril (PRINIVIL;ZESTRIL) 10 MG tablet Take 1 tablet by mouth daily Yes Talon Young DO   meclizine (ANTIVERT) 25 MG tablet TAKE ONE TABLET BY MOUTH THREE TIMES DAILY AS NEEDED FOR DIZZINESS Yes Talon Young DO   dutasteride (AVODART) 0.5 MG capsule Take 1 capsule by mouth daily Yes Talon Young, DO   sildenafil (REVATIO) 20 MG tablet Take one daily as needed Yes Talon Young, DO   LORazepam (ATIVAN) 1 MG tablet Take 1 mg by mouth daily as needed.   Yes Historical Provider, MD   CHANTIX 1 MG tablet   Historical Provider, MD   aspirin 81 MG chewable tablet Take 81 mg by mouth daily Ld 1 week ago patient not compliant with asa daughter stated ld about 1 week ago  Historical Provider, MD       Past Medical History:   Diagnosis Date    Anesthesia complication     wakes up  violant   only ok if reversed with romazacon    Arthritis     shoulders,ankle    CAD (coronary artery disease)     COPD [facility]) [facility]     Erectile dysfunction     GERD (gastroesophageal reflux disease)     H/O coronary angioplasty with stents[V45.82] 9/2018 another stent    Hyperlipidemia     Hypertension     Lymphoma ([facility] [facility] 11/4/2011    had chemo  currently in remission    Myocardial infarction [facility])     more than 10 yrs ago    Sleep apnea     wont wear machine    Unspecified cerebral artery occlusion with cerebral infarction 2005    no deficits       Past Surgical History:   Procedure Laterality Date    ANKLE FRACTURE SURGERY Right 03/2014    ORIF right ankle    ANKLE SURGERY  2007    rt ankle    BRONCHOSCOPY  09/2011    bronch / medistinoscopy    CAROTID ENDARTERECTOMY Left     12 yrs ago    CHOLECYSTECTOMY      COLONOSCOPY  07/30/2013    DR Ramon Molina CORONARY ANGIOPLASTY WITH STENT PLACEMENT  2008    states has 2 stents  follows with DR Deisi Torres    ENDOSCOPY, COLON, DIAGNOSTIC      HAND SURGERY Left     fell on a buzzsaw    HERNIA REPAIR Bilateral 08/15/2019    HERNIA  BILATERAL INGUINAL REPAIR WITH MESH LAPAROSCOPIC ROBOTIC XI ASSISTED performed by Lola Nuno MD at [address] [place] [address]  06/29/2015    lapraoscopic cholecystectomy    SHOULDER ARTHROSCOPY Right 10/08/2015    rotator cuff repair, subachromoplasty with labrial debridement    SHOULDER ARTHROSCOPY Left 2/18/2021    LEFT ROTATOR CUFF REPAIR LABRAL DEBRIDEMENT SUBACROMIAL DECOMPRESSION performed by Riley Michelle DO at [place] [address]      UPPER GASTROINTESTINAL ENDOSCOPY         Family History   Problem Relation Age of Onset    Diabetes Mother     Heart Disease Mother     Diabetes Father     Heart Disease Father     Diabetes Sister     Cancer Sister     Diabetes Brother     Cancer Brother        CareTeam (Including outside providers/suppliers regularly involved in providing care):   Patient Care Team:  Yolanda Lee DO as PCP - General (Family Medicine)  Yolanda Lee DO as PCP - [facility] Empaneled Provider  Gerber Quevedo MD as Surgeon (Gastroenterology)  Ben Ledesma MD as Consulting Physician (Cardiology)    Wt Readings from Last 3 Encounters:   06/16/21 237 lb (107.5 kg)   05/13/21 240 lb (108.9 kg)   04/19/21 235 lb (106.6 kg)     Vitals:    06/16/21 0857   BP: 138/88   Pulse: 83   Resp: 20   Temp: 97.3 °F (36.3 °C)   SpO2: 96%   Weight: 237 lb (107.5 kg)   Height: 5' 9\" (1.753 m)     Body mass index is 35 kg/m². Based upon direct observation of the patient, evaluation of cognition reveals recent and remote memory intact.     General Appearance: alert and oriented to person, place and time, well developed and well- nourished, in no acute distress  Skin: warm and dry, no rash or erythema  Head: normocephalic and atraumatic  Eyes: pupils equal, round, and reactive to light, extraocular eye movements intact, conjunctivae normal  ENT: tympanic membrane, external ear and ear canal normal bilaterally, nose without deformity, nasal mucosa and turbinates normal without polyps  Neck: supple and non-tender without mass, no thyromegaly or thyroid nodules, no cervical lymphadenopathy  Pulmonary/Chest: clear to auscultation bilaterally- no wheezes, rales or rhonchi, normal air movement, no respiratory distress  Cardiovascular: normal rate, regular rhythm, normal S1 and S2, no murmurs, rubs, clicks, or gallops, distal pulses intact, no carotid bruits  Abdomen: soft, non-tender, non-distended, normal bowel sounds, no masses or organomegaly  Extremities: no cyanosis, clubbing or edema  Musculoskeletal: normal range of motion, no joint swelling, deformity or tenderness  Neurologic: reflexes normal and symmetric, no cranial nerve deficit, gait, coordination and speech normal  none    Patient's complete Health Risk Assessment and screening values have been reviewed and are found in Flowsheets. The following problems were reviewed today and where indicated follow up appointments were made and/or referrals ordered. Positive Risk Factor Screenings with Interventions:     Fall Risk:  2 or more falls in past year?: (!) yes  Fall with injury in past year?: no  Fall Risk Interventions:    · none        General Health and ACP:  General  In general, how would you say your health is?: (!) Poor  In the past 7 days, have you experienced any of the following?  New or Increased Pain, New or Increased Fatigue, Loneliness, Social Isolation, Stress or Anger?: (!) New or Increased Pain, New or Increased Fatigue, Loneliness, Social Isolation, Stress, Anger  Do you get the social and emotional support that you need?: (!) No  Do you have a Living Will?: Yes  Advance Directives     Power of  Living Will ACP-Advance Directive ACP-Power of     Not on File Filed on 10/10/14 Filed Not on File      General Health Risk Interventions:  · none    Health Habits/Nutrition:  Health Habits/Nutrition  Do you exercise for at least 20 minutes 2-3 times per week?: (!) No  Have you lost any weight without trying in the past 3 months?: No  Do you eat only one meal per day?: (!) Yes  Have you seen the dentist within the past year?: (!) No  Body mass index: (!) 34.99  Health Habits/Nutrition Interventions:  · none    Hearing/Vision:  No exam data present  Hearing/Vision  Do you or your family notice any trouble with your hearing that hasn't been managed with hearing aids?: No  Do you have difficulty driving, watching TV, or doing any of your daily activities because of your eyesight?: No  Have you had an eye exam within the past year?: (!) No  Hearing/Vision Interventions:  · none    Safety:  Safety  Do you have working smoke detectors?: (!) No  Have all throw rugs been removed or fastened?: (!) No  Do you have non-slip mats or surfaces in all bathtubs/showers?: Yes  Do all of your stairways have a railing or banister?: Yes  Are your doorways, halls and stairs free of clutter?: (!) No  Do you always fasten your seatbelt when you are in a car?: (!) No  Safety Interventions:  · none     Personalized Preventive Plan   Current Health Maintenance Status  Immunization History   Administered Date(s) Administered    Pneumococcal Conjugate 13-valent ([id number]) 12/14/2016    Pneumococcal Polysaccharide ([id number]) 07/20/2013, 03/30/2014, 11/25/2019    Tdap (Boostrix, Adacel) 10/06/2020        Health Maintenance   Topic Date Due    Hepatitis C screen  Never done    COVID-19 Vaccine (1) Never done    Shingles Vaccine (1 of 2) Never done   [employer] Visit (AWV)  Never done    Lipid screen  11/06/2020    Low dose CT lung screening  05/04/2021    Diabetes screen  07/31/2021    Flu vaccine (Season Ended) 09/01/2021    Colon cancer screen colonoscopy  10/18/2024    DTaP/Tdap/Td vaccine (2 - Td or Tdap) 10/06/2030    Pneumococcal 65+ yrs at Risk Vaccine  Completed    AAA screen  Completed    Hepatitis A vaccine  Aged Out    Hepatitis B vaccine  Aged Out    Hib vaccine  Aged Out    Meningococcal (ACWY) vaccine  Aged Out     Recommendations for [employer] Due: see orders and patient instructions/AVS.  . Recommended screening schedule for the next 5-10 years is provided to the patient in written form: see Patient Instructions/AVS.    There are no diagnoses linked to this encounter.

## 2021-06-16 NOTE — PATIENT INSTRUCTIONS
Personalized Preventive Plan for Pop Paiz - 6/16/2021  Medicare offers a range of preventive health benefits. Some of the tests and screenings are paid in full while other may be subject to a deductible, co-insurance, and/or copay. Some of these benefits include a comprehensive review of your medical history including lifestyle, illnesses that may run in your family, and various assessments and screenings as appropriate. After reviewing your medical record and screening and assessments performed today your provider may have ordered immunizations, labs, imaging, and/or referrals for you. A list of these orders (if applicable) as well as your Preventive Care list are included within your After Visit Summary for your review. Other Preventive Recommendations:    · A preventive eye exam performed by an eye specialist is recommended every 1-2 years to screen for glaucoma; cataracts, macular degeneration, and other eye disorders. · A preventive dental visit is recommended every 6 months. · Try to get at least 150 minutes of exercise per week or 10,000 steps per day on a pedometer . · Order or download the FREE \"Exercise & Physical Activity: Your Everyday Guide\" from [employer]. Call 0-822.659.9096 or search [employer] online. · You need 8784-4597 mg of calcium and 5214-0539 IU of vitamin D per day. It is possible to meet your calcium requirement with diet alone, but a vitamin D supplement is usually necessary to meet this goal.  · When exposed to the sun, use a sunscreen that protects against both UVA and UVB radiation with an SPF of 30 or greater. Reapply every 2 to 3 hours or after sweating, drying off with a towel, or swimming. · Always wear a seat belt when traveling in a car. Always wear a helmet when riding a bicycle or motorcycle.

## 2021-06-17 LAB — HEPATITIS C ANTIBODY INTERPRETATION: NORMAL

## 2021-07-15 ENCOUNTER — OFFICE VISIT (OUTPATIENT)
Dept: FAMILY MEDICINE CLINIC | Age: 71
End: 2021-07-15
Payer: MEDICARE

## 2021-07-15 VITALS
HEIGHT: 68 IN | WEIGHT: 236.8 LBS | TEMPERATURE: 98.7 F | BODY MASS INDEX: 35.89 KG/M2 | RESPIRATION RATE: 20 BRPM | HEART RATE: 82 BPM | SYSTOLIC BLOOD PRESSURE: 138 MMHG | DIASTOLIC BLOOD PRESSURE: 80 MMHG | OXYGEN SATURATION: 96 %

## 2021-07-15 DIAGNOSIS — Z91.81 AT HIGH RISK FOR FALLS: Primary | ICD-10-CM

## 2021-07-15 DIAGNOSIS — M25.511 CHRONIC RIGHT SHOULDER PAIN: ICD-10-CM

## 2021-07-15 DIAGNOSIS — G89.29 CHRONIC RIGHT SHOULDER PAIN: ICD-10-CM

## 2021-07-15 PROCEDURE — 1123F ACP DISCUSS/DSCN MKR DOCD: CPT | Performed by: FAMILY MEDICINE

## 2021-07-15 PROCEDURE — 1036F TOBACCO NON-USER: CPT | Performed by: FAMILY MEDICINE

## 2021-07-15 PROCEDURE — 4040F PNEUMOC VAC/ADMIN/RCVD: CPT | Performed by: FAMILY MEDICINE

## 2021-07-15 PROCEDURE — 3017F COLORECTAL CA SCREEN DOC REV: CPT | Performed by: FAMILY MEDICINE

## 2021-07-15 PROCEDURE — G8427 DOCREV CUR MEDS BY ELIG CLIN: HCPCS | Performed by: FAMILY MEDICINE

## 2021-07-15 PROCEDURE — G8417 CALC BMI ABV UP PARAM F/U: HCPCS | Performed by: FAMILY MEDICINE

## 2021-07-15 PROCEDURE — 99213 OFFICE O/P EST LOW 20 MIN: CPT | Performed by: FAMILY MEDICINE

## 2021-07-15 RX ORDER — HYDROCODONE BITARTRATE AND ACETAMINOPHEN 7.5; 325 MG/1; MG/1
1 TABLET ORAL EVERY 8 HOURS PRN
Qty: 90 TABLET | Refills: 0 | Status: SHIPPED | OUTPATIENT
Start: 2021-07-15 | End: 2021-08-14

## 2021-07-15 ASSESSMENT — ENCOUNTER SYMPTOMS
ABDOMINAL PAIN: 0
SHORTNESS OF BREATH: 0
BACK PAIN: 1
VOMITING: 0
APNEA: 0
WHEEZING: 0
DIARRHEA: 0
BLOOD IN STOOL: 0
NAUSEA: 0
CONSTIPATION: 0
COUGH: 0

## 2021-07-15 NOTE — PROGRESS NOTES
Carson Lua (:  1950) is a 79 y.o. male,Established patient, here for evaluation of the following chief complaint(s):  Shoulder Pain (chronic right)         ASSESSMENT/PLAN:  1. Chronic right shoulder pain  -     HYDROcodone-acetaminophen (NORCO) 7.5-325 MG per tablet; Take 1 tablet by mouth every 8 hours as needed for Pain for up to 30 days. , Disp-90 tablet, R-0Normal  Controlled substances monitoring: no signs of potential drug abuse or diversion identified and OARRS report reviewed today- activity consistent with treatment plan. No follow-ups on file. Subjective   SUBJECTIVE/OBJECTIVE:  Shoulder Pain (chronic right)      Review of Systems   Constitutional: Negative for chills, diaphoresis and fever. HENT: Negative for ear discharge, ear pain, hearing loss, nosebleeds and tinnitus. Respiratory: Negative for apnea, cough, shortness of breath and wheezing. Cardiovascular: Negative for chest pain. Gastrointestinal: Negative for abdominal pain, blood in stool, constipation, diarrhea, nausea and vomiting. Genitourinary: Negative for dysuria, flank pain and hematuria. Musculoskeletal: Positive for arthralgias (Righ shoulder) and back pain (chronic). Negative for myalgias. Skin: Negative for rash. Neurological: Negative for dizziness and headaches. Hematological: Does not bruise/bleed easily. Psychiatric/Behavioral: Negative for hallucinations and suicidal ideas. Objective    /80   Pulse 82   Temp 98.7 °F (37.1 °C)   Resp 20   Ht 5' 8\" (1.727 m)   Wt 236 lb 12.8 oz (107.4 kg)   SpO2 96%   BMI 36.01 kg/m²     Physical Exam  Eyes:      General:         Right eye: No discharge. Left eye: No discharge. Cardiovascular:      Rate and Rhythm: Normal rate and regular rhythm. Heart sounds: No murmur heard. Pulmonary:      Effort: Pulmonary effort is normal. No respiratory distress. Breath sounds: No rhonchi or rales.    Abdominal: Tenderness: There is no abdominal tenderness. Musculoskeletal:      Cervical back: No rigidity. Skin:     General: Skin is warm. Capillary Refill: Capillary refill takes less than 2 seconds. Coloration: Skin is not pale. Findings: No bruising. Neurological:      Mental Status: He is alert. Psychiatric:         Mood and Affect: Mood normal.            On this date 7/15/2021 I have spent 21 minutes reviewing previous notes, test results and face to face with the patient discussing the diagnosis and importance of compliance with the treatment plan as well as documenting on the day of the visit. An electronic signature was used to authenticate this note. --Angélica Merino DO   On the basis of positive falls risk screening, assessment and plan is as follows: in-office gait and balance testing performed using The Timed Up and Go Test was negative for increased falls risk- no further intervention is currently indicated.

## 2021-07-30 DIAGNOSIS — F32.A DEPRESSION, UNSPECIFIED DEPRESSION TYPE: ICD-10-CM

## 2021-07-30 RX ORDER — BUPROPION HYDROCHLORIDE 150 MG/1
TABLET, EXTENDED RELEASE ORAL
Qty: 60 TABLET | Refills: 1 | Status: SHIPPED
Start: 2021-07-30 | End: 2021-10-28

## 2021-08-16 ENCOUNTER — OFFICE VISIT (OUTPATIENT)
Dept: FAMILY MEDICINE CLINIC | Age: 71
End: 2021-08-16
Payer: MEDICARE

## 2021-08-16 VITALS
TEMPERATURE: 98.7 F | DIASTOLIC BLOOD PRESSURE: 80 MMHG | BODY MASS INDEX: 35.16 KG/M2 | SYSTOLIC BLOOD PRESSURE: 138 MMHG | RESPIRATION RATE: 16 BRPM | WEIGHT: 232 LBS | HEIGHT: 68 IN | OXYGEN SATURATION: 95 % | HEART RATE: 67 BPM

## 2021-08-16 DIAGNOSIS — M25.511 CHRONIC RIGHT SHOULDER PAIN: ICD-10-CM

## 2021-08-16 DIAGNOSIS — G89.29 CHRONIC RIGHT SHOULDER PAIN: ICD-10-CM

## 2021-08-16 PROCEDURE — 4040F PNEUMOC VAC/ADMIN/RCVD: CPT | Performed by: FAMILY MEDICINE

## 2021-08-16 PROCEDURE — G8427 DOCREV CUR MEDS BY ELIG CLIN: HCPCS | Performed by: FAMILY MEDICINE

## 2021-08-16 PROCEDURE — 3017F COLORECTAL CA SCREEN DOC REV: CPT | Performed by: FAMILY MEDICINE

## 2021-08-16 PROCEDURE — G8417 CALC BMI ABV UP PARAM F/U: HCPCS | Performed by: FAMILY MEDICINE

## 2021-08-16 PROCEDURE — 1123F ACP DISCUSS/DSCN MKR DOCD: CPT | Performed by: FAMILY MEDICINE

## 2021-08-16 PROCEDURE — 99213 OFFICE O/P EST LOW 20 MIN: CPT | Performed by: FAMILY MEDICINE

## 2021-08-16 PROCEDURE — 1036F TOBACCO NON-USER: CPT | Performed by: FAMILY MEDICINE

## 2021-08-16 RX ORDER — HYDROCODONE BITARTRATE AND ACETAMINOPHEN 7.5; 325 MG/1; MG/1
1 TABLET ORAL EVERY 8 HOURS PRN
Qty: 90 TABLET | Refills: 0 | Status: SHIPPED
Start: 2021-08-16 | End: 2021-09-16 | Stop reason: SDUPTHER

## 2021-08-16 ASSESSMENT — ENCOUNTER SYMPTOMS
DIARRHEA: 0
BLOOD IN STOOL: 0
WHEEZING: 0
CONSTIPATION: 0

## 2021-08-16 NOTE — PROGRESS NOTES
Joanna Fischer (:  1950) is a 79 y.o. male,Established patient, here for evaluation of the following chief complaint(s):  Shoulder Pain (right )         ASSESSMENT/PLAN:  1. Chronic right shoulder pain  -     HYDROcodone-acetaminophen (NORCO) 7.5-325 MG per tablet; Take 1 tablet by mouth every 8 hours as needed for Pain for up to 30 days. , Disp-90 tablet, R-0Normal    Controlled substances monitoring: no signs of potential drug abuse or diversion identified and OARRS report reviewed today- activity consistent with treatment plan. Subjective   SUBJECTIVE/OBJECTIVE:  Shoulder Pain (right )        Review of Systems   Constitutional: Negative for chills and diaphoresis. HENT: Negative for ear discharge, ear pain, hearing loss, nosebleeds and tinnitus. Respiratory: Negative for wheezing. Cardiovascular: Negative for chest pain. Gastrointestinal: Negative for blood in stool, constipation and diarrhea. Genitourinary: Negative for dysuria, flank pain and hematuria. Musculoskeletal: Positive for arthralgias (Right shoulder decreased External ROM). Skin: Negative for rash. Neurological: Negative for headaches. Hematological: Does not bruise/bleed easily. Objective    /80   Pulse 67   Temp 98.7 °F (37.1 °C)   Resp 16   Ht 5' 8\" (1.727 m)   Wt 232 lb (105.2 kg)   SpO2 95%   BMI 35.28 kg/m²     Physical Exam  Eyes:      General:         Right eye: No discharge. Left eye: No discharge. Cardiovascular:      Rate and Rhythm: Normal rate and regular rhythm. Heart sounds: No murmur heard. Pulmonary:      Effort: No respiratory distress. Breath sounds: No rhonchi or rales. Abdominal:      Tenderness: There is no abdominal tenderness. Musculoskeletal:      Cervical back: No rigidity. Skin:     General: Skin is warm. Capillary Refill: Capillary refill takes less than 2 seconds. Coloration: Skin is not pale.       Findings: No bruising. Neurological:      Mental Status: He is alert. Psychiatric:         Mood and Affect: Mood normal.            On this date 8/16/2021 I have spent 23 minutes reviewing previous notes, test results and face to face with the patient discussing the diagnosis and importance of compliance with the treatment plan as well as documenting on the day of the visit. An electronic signature was used to authenticate this note.     --Yessica Horta, DO

## 2021-08-29 DIAGNOSIS — J45.909 MODERATE ASTHMA WITHOUT COMPLICATION, UNSPECIFIED WHETHER PERSISTENT: ICD-10-CM

## 2021-08-29 DIAGNOSIS — K21.00 GASTROESOPHAGEAL REFLUX DISEASE WITH ESOPHAGITIS: ICD-10-CM

## 2021-08-29 DIAGNOSIS — F43.21 GRIEF: ICD-10-CM

## 2021-08-29 DIAGNOSIS — I25.110 CORONARY ARTERY DISEASE INVOLVING NATIVE CORONARY ARTERY OF NATIVE HEART WITH UNSTABLE ANGINA PECTORIS (HCC): ICD-10-CM

## 2021-08-29 DIAGNOSIS — F32.89 OTHER DEPRESSION: ICD-10-CM

## 2021-08-30 RX ORDER — SERTRALINE HYDROCHLORIDE 100 MG/1
100 TABLET, FILM COATED ORAL DAILY
Qty: 30 TABLET | Refills: 1 | Status: SHIPPED
Start: 2021-08-30 | End: 2021-10-28

## 2021-08-30 RX ORDER — OMEPRAZOLE 40 MG/1
CAPSULE, DELAYED RELEASE ORAL
Qty: 90 CAPSULE | Refills: 0 | Status: SHIPPED
Start: 2021-08-30 | End: 2021-11-29

## 2021-08-30 RX ORDER — MONTELUKAST SODIUM 10 MG/1
TABLET ORAL
Qty: 90 TABLET | Refills: 0 | Status: SHIPPED
Start: 2021-08-30 | End: 2021-11-29

## 2021-08-30 RX ORDER — MELOXICAM 15 MG/1
TABLET ORAL
Qty: 90 TABLET | Refills: 0 | Status: SHIPPED
Start: 2021-08-30 | End: 2021-11-29

## 2021-08-30 RX ORDER — CLOPIDOGREL BISULFATE 75 MG/1
75 TABLET ORAL DAILY
Qty: 90 TABLET | Refills: 0 | Status: SHIPPED
Start: 2021-08-30 | End: 2021-11-29

## 2021-09-16 ENCOUNTER — HOSPITAL ENCOUNTER (OUTPATIENT)
Age: 71
Discharge: HOME | End: 2021-09-18
Payer: MEDICARE

## 2021-09-16 ENCOUNTER — OFFICE VISIT (OUTPATIENT)
Dept: FAMILY MEDICINE CLINIC | Age: 71
End: 2021-09-16
Payer: MEDICARE

## 2021-09-16 VITALS
DIASTOLIC BLOOD PRESSURE: 60 MMHG | RESPIRATION RATE: 16 BRPM | HEART RATE: 50 BPM | SYSTOLIC BLOOD PRESSURE: 122 MMHG | WEIGHT: 232 LBS | OXYGEN SATURATION: 96 % | TEMPERATURE: 96.9 F | BODY MASS INDEX: 35.16 KG/M2 | HEIGHT: 68 IN

## 2021-09-16 DIAGNOSIS — Z51.81 THERAPEUTIC DRUG MONITORING: Primary | ICD-10-CM

## 2021-09-16 DIAGNOSIS — G89.29 CHRONIC RIGHT SHOULDER PAIN: ICD-10-CM

## 2021-09-16 DIAGNOSIS — M25.511 CHRONIC RIGHT SHOULDER PAIN: ICD-10-CM

## 2021-09-16 PROCEDURE — G8427 DOCREV CUR MEDS BY ELIG CLIN: HCPCS | Performed by: FAMILY MEDICINE

## 2021-09-16 PROCEDURE — 1036F TOBACCO NON-USER: CPT | Performed by: FAMILY MEDICINE

## 2021-09-16 PROCEDURE — G8417 CALC BMI ABV UP PARAM F/U: HCPCS | Performed by: FAMILY MEDICINE

## 2021-09-16 PROCEDURE — 1123F ACP DISCUSS/DSCN MKR DOCD: CPT | Performed by: FAMILY MEDICINE

## 2021-09-16 PROCEDURE — 3017F COLORECTAL CA SCREEN DOC REV: CPT | Performed by: FAMILY MEDICINE

## 2021-09-16 PROCEDURE — 4040F PNEUMOC VAC/ADMIN/RCVD: CPT | Performed by: FAMILY MEDICINE

## 2021-09-16 PROCEDURE — 99213 OFFICE O/P EST LOW 20 MIN: CPT | Performed by: FAMILY MEDICINE

## 2021-09-16 RX ORDER — HYDROCODONE BITARTRATE AND ACETAMINOPHEN 7.5; 325 MG/1; MG/1
1 TABLET ORAL EVERY 8 HOURS PRN
Qty: 90 TABLET | Refills: 0 | Status: SHIPPED
Start: 2021-09-16 | End: 2021-10-18 | Stop reason: SDUPTHER

## 2021-09-16 ASSESSMENT — ENCOUNTER SYMPTOMS
DIARRHEA: 0
WHEEZING: 0
CONSTIPATION: 0
BLOOD IN STOOL: 0

## 2021-09-16 NOTE — PROGRESS NOTES
Lisa Mcginnis (:  1950) is a 70 y.o. male,Established patient, here for evaluation of the following chief complaint(s):  Shoulder Pain (norco refill) and Joint Pain (every joint, specifically finger)         ASSESSMENT/PLAN:  1. Therapeutic drug monitoring  -     OPIATE, Quantitative, Urine Panel; Future  -     PAIN MANAGEMENT PROFILE 1 W/ CONFIRMATION, URINE; Future  2. Chronic right shoulder pain  -     HYDROcodone-acetaminophen (NORCO) 7.5-325 MG per tablet; Take 1 tablet by mouth every 8 hours as needed for Pain for up to 30 days. , Disp-90 tablet, R-0Normal           Subjective   SUBJECTIVE/OBJECTIVE:  Shoulder Pain (norco refill) and Joint Pain (every joint, specifically finger)      Review of Systems   Constitutional: Negative for chills and diaphoresis. HENT: Negative for ear discharge, ear pain, hearing loss, nosebleeds and tinnitus. Respiratory: Negative for wheezing. Cardiovascular: Negative for chest pain. Gastrointestinal: Negative for blood in stool, constipation and diarrhea. Genitourinary: Negative for dysuria, flank pain and hematuria. Skin: Negative for rash. Neurological: Negative for headaches. Hematological: Does not bruise/bleed easily. Objective   /60   Pulse 50   Temp 96.9 °F (36.1 °C) (Temporal)   Resp 16   Ht 5' 8\" (1.727 m)   Wt 232 lb (105.2 kg)   SpO2 96%   BMI 35.28 kg/m²   Physical Exam  Eyes:      General:         Right eye: No discharge. Left eye: No discharge. Cardiovascular:      Rate and Rhythm: Normal rate and regular rhythm. Heart sounds: No murmur heard. Pulmonary:      Effort: No respiratory distress. Breath sounds: No rhonchi or rales. Abdominal:      Tenderness: There is no abdominal tenderness. Musculoskeletal:      Cervical back: No rigidity. Skin:     General: Skin is warm. Capillary Refill: Capillary refill takes less than 2 seconds. Coloration: Skin is not pale. Findings: No bruising. Neurological:      Mental Status: He is alert. Psychiatric:         Mood and Affect: Mood normal.            On this date 9/16/2021 I have spent 21 minutes reviewing previous notes, test results and face to face with the patient discussing the diagnosis and importance of compliance with the treatment plan as well as documenting on the day of the visit. An electronic signature was used to authenticate this note.     --Kristan Singh, DO

## 2021-10-18 ENCOUNTER — OFFICE VISIT (OUTPATIENT)
Dept: FAMILY MEDICINE CLINIC | Age: 71
End: 2021-10-18
Payer: MEDICARE

## 2021-10-18 VITALS
HEIGHT: 68 IN | BODY MASS INDEX: 34.46 KG/M2 | TEMPERATURE: 98.7 F | SYSTOLIC BLOOD PRESSURE: 130 MMHG | WEIGHT: 227.4 LBS | RESPIRATION RATE: 16 BRPM | HEART RATE: 105 BPM | OXYGEN SATURATION: 90 % | DIASTOLIC BLOOD PRESSURE: 80 MMHG

## 2021-10-18 DIAGNOSIS — M25.511 CHRONIC RIGHT SHOULDER PAIN: ICD-10-CM

## 2021-10-18 DIAGNOSIS — R26.2 DIFFICULTY IN WALKING: ICD-10-CM

## 2021-10-18 DIAGNOSIS — R10.2 PELVIC PAIN: ICD-10-CM

## 2021-10-18 DIAGNOSIS — M25.559 HIP PAIN: Primary | ICD-10-CM

## 2021-10-18 DIAGNOSIS — I73.9: ICD-10-CM

## 2021-10-18 DIAGNOSIS — R09.89 OTHER SPECIFIED SYMPTOMS AND SIGNS INVOLVING THE CIRCULATORY AND RESPIRATORY SYSTEMS: ICD-10-CM

## 2021-10-18 DIAGNOSIS — G89.29 CHRONIC RIGHT SHOULDER PAIN: ICD-10-CM

## 2021-10-18 PROCEDURE — 1036F TOBACCO NON-USER: CPT | Performed by: FAMILY MEDICINE

## 2021-10-18 PROCEDURE — G8484 FLU IMMUNIZE NO ADMIN: HCPCS | Performed by: FAMILY MEDICINE

## 2021-10-18 PROCEDURE — G8427 DOCREV CUR MEDS BY ELIG CLIN: HCPCS | Performed by: FAMILY MEDICINE

## 2021-10-18 PROCEDURE — 99214 OFFICE O/P EST MOD 30 MIN: CPT | Performed by: FAMILY MEDICINE

## 2021-10-18 PROCEDURE — 1123F ACP DISCUSS/DSCN MKR DOCD: CPT | Performed by: FAMILY MEDICINE

## 2021-10-18 PROCEDURE — 3017F COLORECTAL CA SCREEN DOC REV: CPT | Performed by: FAMILY MEDICINE

## 2021-10-18 PROCEDURE — 4040F PNEUMOC VAC/ADMIN/RCVD: CPT | Performed by: FAMILY MEDICINE

## 2021-10-18 PROCEDURE — G8417 CALC BMI ABV UP PARAM F/U: HCPCS | Performed by: FAMILY MEDICINE

## 2021-10-18 RX ORDER — HYDROCODONE BITARTRATE AND ACETAMINOPHEN 7.5; 325 MG/1; MG/1
1 TABLET ORAL EVERY 8 HOURS PRN
Qty: 90 TABLET | Refills: 0 | Status: SHIPPED | OUTPATIENT
Start: 2021-10-18 | End: 2021-11-18 | Stop reason: SDUPTHER

## 2021-10-18 ASSESSMENT — ENCOUNTER SYMPTOMS
WHEEZING: 0
BLOOD IN STOOL: 0
BACK PAIN: 1
DIARRHEA: 0
APNEA: 0
CONSTIPATION: 0

## 2021-10-18 NOTE — PROGRESS NOTES
Anabel Liz (:  1950) is a 70 y.o. male,Established patient, here for evaluation of the following chief complaint(s):  Pain (bilateral hip pain ), Health Maintenance (declined flu vaccine ), and Fatigue (constantly )         ASSESSMENT/PLAN:  1. Hip pain  -     XR HIP BILATERAL W AP PELVIS (2 VIEWS); Future  2. Chronic right shoulder pain  -     HYDROcodone-acetaminophen (NORCO) 7.5-325 MG per tablet; Take 1 tablet by mouth every 8 hours as needed for Pain for up to 30 days. , Disp-90 tablet, R-0Print  3. Pelvic pain  -     XR HIP BILATERAL W AP PELVIS (2 VIEWS); Future  4. Difficulty in walking  -     XR HIP BILATERAL W AP PELVIS (2 VIEWS); Future  5. Gluteal claudication ([address]  -     US DUPLEX SCAN OF AORTA; Future  6. Other specified symptoms and signs involving the circulatory and respiratory systems   -     US DUPLEX [address]; Future  Controlled substances monitoring: no signs of potential drug abuse or diversion identified and OARRS report reviewed today- activity consistent with treatment plan. Subjective   SUBJECTIVE/OBJECTIVE:  Pain (bilateral hip pain ), Health Maintenance (declined flu vaccine ), and Fatigue (constantly )  He has  feet buttock claudication as well as hip Joint pain on rest      Review of Systems   Constitutional: Negative for chills and diaphoresis. HENT: Negative for ear discharge, ear pain, hearing loss, nosebleeds and tinnitus. Respiratory: Negative for apnea and wheezing. Cardiovascular: Negative for chest pain and leg swelling. Gastrointestinal: Negative for blood in stool, constipation and diarrhea. Genitourinary: Negative for dysuria, flank pain and hematuria. Musculoskeletal: Positive for arthralgias and back pain. Skin: Negative for rash. Neurological: Negative for headaches. Hematological: Does not bruise/bleed easily.           Objective   /80   Pulse 105   Temp 98.7 °F (37.1 °C)   Resp 16   Ht 5' 8\" (1.727 m)   Wt 227 lb 6.4 oz (103.1 kg)   SpO2 90%   BMI 34.58 kg/m²   Lab Results   Component Value Date    LABA1C 5.6 07/31/2018    LABA1C 6.0 04/26/2018     Physical Exam  Eyes:      General:         Right eye: No discharge. Left eye: No discharge. Cardiovascular:      Rate and Rhythm: Normal rate and regular rhythm. Heart sounds: No murmur heard. Pulmonary:      Effort: No respiratory distress. Breath sounds: No rhonchi or rales. Abdominal:      Tenderness: There is no abdominal tenderness. Musculoskeletal:         General: Tenderness (LOw back as well as shoulder ROM issues. ) present. Cervical back: No rigidity. Comments: Hip pain   Skin:     General: Skin is warm. Capillary Refill: Capillary refill takes less than 2 seconds. Coloration: Skin is not pale. Findings: No bruising. Neurological:      General: No focal deficit present. Mental Status: He is alert. Psychiatric:         Mood and Affect: Mood normal.            On this date 10/18/2021 I have spent 23 minutes reviewing previous notes, test results and face to face with the patient discussing the diagnosis and importance of compliance with the treatment plan as well as documenting on the day of the visit. An electronic signature was used to authenticate this note.     --Mary Ibarra,

## 2021-10-22 ENCOUNTER — HOSPITAL ENCOUNTER (OUTPATIENT)
Dept: GENERAL RADIOLOGY | Age: 71
Discharge: HOME | End: 2021-10-24
Payer: MEDICARE

## 2021-10-22 ENCOUNTER — HOSPITAL ENCOUNTER (OUTPATIENT)
Age: 71
Discharge: HOME | End: 2021-10-24
Payer: MEDICARE

## 2021-10-22 DIAGNOSIS — R10.2 PELVIC PAIN: ICD-10-CM

## 2021-10-22 DIAGNOSIS — R26.2 DIFFICULTY IN WALKING: ICD-10-CM

## 2021-10-22 DIAGNOSIS — M25.559 HIP PAIN: ICD-10-CM

## 2021-10-22 PROCEDURE — 73521 X-RAY EXAM HIPS BI 2 VIEWS: CPT

## 2021-10-28 DIAGNOSIS — F43.21 GRIEF: ICD-10-CM

## 2021-10-28 DIAGNOSIS — F32.89 OTHER DEPRESSION: ICD-10-CM

## 2021-10-28 DIAGNOSIS — F32.A DEPRESSION, UNSPECIFIED DEPRESSION TYPE: ICD-10-CM

## 2021-10-28 RX ORDER — SERTRALINE HYDROCHLORIDE 100 MG/1
100 TABLET, FILM COATED ORAL DAILY
Qty: 30 TABLET | Refills: 0 | Status: SHIPPED
Start: 2021-10-28 | End: 2021-12-06

## 2021-10-28 RX ORDER — BUPROPION HYDROCHLORIDE 150 MG/1
TABLET, EXTENDED RELEASE ORAL
Qty: 60 TABLET | Refills: 0 | Status: SHIPPED
Start: 2021-10-28 | End: 2021-12-06

## 2021-10-28 RX ORDER — LISINOPRIL 10 MG/1
10 TABLET ORAL DAILY
Qty: 90 TABLET | Refills: 1 | Status: ON HOLD
Start: 2021-10-28 | End: 2022-01-17 | Stop reason: HOSPADM

## 2021-11-03 ENCOUNTER — TELEPHONE (OUTPATIENT)
Dept: FAMILY MEDICINE CLINIC | Age: 71
End: 2021-11-03

## 2021-11-03 DIAGNOSIS — R53.82 CHRONIC FATIGUE: ICD-10-CM

## 2021-11-03 DIAGNOSIS — I25.118 CORONARY ARTERY DISEASE OF NATIVE ARTERY OF NATIVE HEART WITH STABLE ANGINA PECTORIS (HCC): ICD-10-CM

## 2021-11-03 DIAGNOSIS — N18.31 STAGE 3A CHRONIC KIDNEY DISEASE (HCC): Primary | ICD-10-CM

## 2021-11-03 DIAGNOSIS — C85.91 NON-HODGKIN LYMPHOMA OF LYMPH NODES OF NECK, UNSPECIFIED NON-HODGKIN LYMPHOMA TYPE (HCC): ICD-10-CM

## 2021-11-03 NOTE — TELEPHONE ENCOUNTER
When Roberto comes in for his next visit could you do a full lab work up? I do not see a vitamin D or B with him being so fatigued it might be worth checking. Can he also get his PSA checked its been over a year and maybe a CMP and CBC? Please      He also made the comment that his antidepressant is not helping and he would like it increased.      Lastly his oxygen. His wheezing and SOB is getting worse, is it possible to talk him into O2 at home.  I'm sure he is dropping down low 80's when walking.      Thanks  [facility]

## 2021-11-10 ENCOUNTER — HOSPITAL ENCOUNTER (OUTPATIENT)
Dept: ULTRASOUND IMAGING | Age: 71
Discharge: HOME | End: 2021-11-12
Payer: MEDICARE

## 2021-11-10 DIAGNOSIS — I73.9: ICD-10-CM

## 2021-11-10 DIAGNOSIS — R09.89 OTHER SPECIFIED SYMPTOMS AND SIGNS INVOLVING THE CIRCULATORY AND RESPIRATORY SYSTEMS: ICD-10-CM

## 2021-11-10 PROCEDURE — 76775 US EXAM ABDO BACK WALL LIM: CPT

## 2021-11-10 PROCEDURE — 76775 US EXAM ABDO BACK WALL LIM: CPT | Performed by: RADIOLOGY

## 2021-11-18 ENCOUNTER — OFFICE VISIT (OUTPATIENT)
Dept: FAMILY MEDICINE CLINIC | Age: 71
End: 2021-11-18
Payer: MEDICARE

## 2021-11-18 VITALS
TEMPERATURE: 97.3 F | DIASTOLIC BLOOD PRESSURE: 70 MMHG | HEIGHT: 68 IN | HEART RATE: 66 BPM | WEIGHT: 222 LBS | RESPIRATION RATE: 16 BRPM | BODY MASS INDEX: 33.65 KG/M2 | OXYGEN SATURATION: 96 % | SYSTOLIC BLOOD PRESSURE: 138 MMHG

## 2021-11-18 DIAGNOSIS — N18.31 STAGE 3A CHRONIC KIDNEY DISEASE (HCC): ICD-10-CM

## 2021-11-18 DIAGNOSIS — M25.511 CHRONIC RIGHT SHOULDER PAIN: ICD-10-CM

## 2021-11-18 DIAGNOSIS — J45.909 MODERATE ASTHMA WITHOUT COMPLICATION, UNSPECIFIED WHETHER PERSISTENT: ICD-10-CM

## 2021-11-18 DIAGNOSIS — G89.29 CHRONIC RIGHT SHOULDER PAIN: ICD-10-CM

## 2021-11-18 DIAGNOSIS — C85.91 NON-HODGKIN LYMPHOMA OF LYMPH NODES OF NECK, UNSPECIFIED NON-HODGKIN LYMPHOMA TYPE (HCC): ICD-10-CM

## 2021-11-18 DIAGNOSIS — R53.82 CHRONIC FATIGUE: ICD-10-CM

## 2021-11-18 DIAGNOSIS — N28.9 KIDNEY DISEASE: Primary | ICD-10-CM

## 2021-11-18 LAB
ALBUMIN SERPL-MCNC: 4.8 G/DL (ref 3.5–5.2)
ALP BLD-CCNC: 41 U/L (ref 40–129)
ALT SERPL-CCNC: 19 U/L (ref 0–40)
ANION GAP SERPL CALCULATED.3IONS-SCNC: 17 MMOL/L (ref 7–16)
AST SERPL-CCNC: 25 U/L (ref 0–39)
BASOPHILS ABSOLUTE: 0.04 E9/L (ref 0–0.2)
BASOPHILS RELATIVE PERCENT: 0.7 % (ref 0–2)
BILIRUB SERPL-MCNC: 0.5 MG/DL (ref 0–1.2)
BUN BLDV-MCNC: 29 MG/DL (ref 6–23)
CALCIUM SERPL-MCNC: 10 MG/DL (ref 8.6–10.2)
CHLORIDE BLD-SCNC: 104 MMOL/L (ref 98–107)
CO2: 21 MMOL/L (ref 22–29)
CREAT SERPL-MCNC: 1.8 MG/DL (ref 0.7–1.2)
EOSINOPHILS ABSOLUTE: 0.33 E9/L (ref 0.05–0.5)
EOSINOPHILS RELATIVE PERCENT: 5.5 % (ref 0–6)
FOLATE: 11.7 NG/ML (ref 4.8–24.2)
GFR AFRICAN AMERICAN: 45
GFR NON-AFRICAN AMERICAN: 37 ML/MIN/1.73
GLUCOSE BLD-MCNC: 108 MG/DL (ref 74–99)
HCT VFR BLD CALC: 44.7 % (ref 37–54)
HEMOGLOBIN: 14.3 G/DL (ref 12.5–16.5)
IMMATURE GRANULOCYTES #: 0.01 E9/L
IMMATURE GRANULOCYTES %: 0.2 % (ref 0–5)
LYMPHOCYTES ABSOLUTE: 1.33 E9/L (ref 1.5–4)
LYMPHOCYTES RELATIVE PERCENT: 22 % (ref 20–42)
MCH RBC QN AUTO: 29 PG (ref 26–35)
MCHC RBC AUTO-ENTMCNC: 32 % (ref 32–34.5)
MCV RBC AUTO: 90.7 FL (ref 80–99.9)
MONOCYTES ABSOLUTE: 0.52 E9/L (ref 0.1–0.95)
MONOCYTES RELATIVE PERCENT: 8.6 % (ref 2–12)
NEUTROPHILS ABSOLUTE: 3.82 E9/L (ref 1.8–7.3)
NEUTROPHILS RELATIVE PERCENT: 63 % (ref 43–80)
PDW BLD-RTO: 13.2 FL (ref 11.5–15)
PHOSPHORUS: 3.2 MG/DL (ref 2.5–4.5)
PLATELET # BLD: 293 E9/L (ref 130–450)
PMV BLD AUTO: 11.4 FL (ref 7–12)
POTASSIUM SERPL-SCNC: 4.9 MMOL/L (ref 3.5–5)
RBC # BLD: 4.93 E12/L (ref 3.8–5.8)
SODIUM BLD-SCNC: 142 MMOL/L (ref 132–146)
TOTAL PROTEIN: 7.5 G/DL (ref 6.4–8.3)
VITAMIN B-12: 459 PG/ML (ref 211–946)
VITAMIN D 25-HYDROXY: 29 NG/ML (ref 30–100)
WBC # BLD: 6.1 E9/L (ref 4.5–11.5)

## 2021-11-18 PROCEDURE — 1123F ACP DISCUSS/DSCN MKR DOCD: CPT | Performed by: FAMILY MEDICINE

## 2021-11-18 PROCEDURE — 4040F PNEUMOC VAC/ADMIN/RCVD: CPT | Performed by: FAMILY MEDICINE

## 2021-11-18 PROCEDURE — G8427 DOCREV CUR MEDS BY ELIG CLIN: HCPCS | Performed by: FAMILY MEDICINE

## 2021-11-18 PROCEDURE — 3017F COLORECTAL CA SCREEN DOC REV: CPT | Performed by: FAMILY MEDICINE

## 2021-11-18 PROCEDURE — 99213 OFFICE O/P EST LOW 20 MIN: CPT | Performed by: FAMILY MEDICINE

## 2021-11-18 PROCEDURE — G8417 CALC BMI ABV UP PARAM F/U: HCPCS | Performed by: FAMILY MEDICINE

## 2021-11-18 PROCEDURE — 1036F TOBACCO NON-USER: CPT | Performed by: FAMILY MEDICINE

## 2021-11-18 PROCEDURE — G8484 FLU IMMUNIZE NO ADMIN: HCPCS | Performed by: FAMILY MEDICINE

## 2021-11-18 RX ORDER — ALBUTEROL SULFATE 90 UG/1
AEROSOL, METERED RESPIRATORY (INHALATION)
Qty: 1 EACH | Refills: 3 | Status: SHIPPED
Start: 2021-11-18 | End: 2022-01-26 | Stop reason: SDUPTHER

## 2021-11-18 RX ORDER — HYDROCODONE BITARTRATE AND ACETAMINOPHEN 7.5; 325 MG/1; MG/1
1 TABLET ORAL EVERY 8 HOURS PRN
Qty: 90 TABLET | Refills: 0 | Status: SHIPPED
Start: 2021-11-18 | End: 2021-12-15 | Stop reason: SDUPTHER

## 2021-11-18 ASSESSMENT — ENCOUNTER SYMPTOMS
NAUSEA: 0
VOMITING: 0
DIARRHEA: 0
WHEEZING: 0
CONSTIPATION: 0
SHORTNESS OF BREATH: 0
BLOOD IN STOOL: 0
COUGH: 0
ABDOMINAL PAIN: 0

## 2021-11-18 NOTE — PROGRESS NOTES
Noelle Gama (:  1950) is a 70 y.o. male,Established patient, here for evaluation of the following chief complaint(s):  Depression (per daughter), Shortness of Breath (discuss benifits of wearing O2 ), and Results (go over test results )         ASSESSMENT/PLAN:  1. Chronic right shoulder pain  -     HYDROcodone-acetaminophen (NORCO) 7.5-325 MG per tablet; Take 1 tablet by mouth every 8 hours as needed for Pain for up to 30 days. , Disp-90 tablet, R-0Normal  2. Moderate asthma without complication, unspecified whether persistent  -     albuterol sulfate HFA (PROAIR HFA) 108 (90 Base) MCG/ACT inhaler; INHALE TWO PUFFS BY MOUTH EVERY 6 HOURS AS DIRECTED, Disp-1 each, R-3Normal    Controlled substances monitoring: no signs of potential drug abuse or diversion identified and OARRS report reviewed today- activity consistent with treatment plan. No follow-ups on file. Subjective   SUBJECTIVE/OBJECTIVE:  Depression (per daughter), Shortness of Breath (discuss benifits of wearing O2 ), and Results (go over test results )      Review of Systems   Constitutional: Negative for chills, diaphoresis and fever. HENT: Negative for ear discharge, ear pain, hearing loss, nosebleeds and tinnitus. Respiratory: Negative for cough, shortness of breath and wheezing. Cardiovascular: Negative for chest pain. Gastrointestinal: Negative for abdominal pain, blood in stool, constipation, diarrhea, nausea and vomiting. Genitourinary: Negative for dysuria, flank pain and hematuria. Musculoskeletal: Negative for myalgias. Skin: Negative for rash. Neurological: Negative for headaches. Hematological: Does not bruise/bleed easily. Psychiatric/Behavioral: Negative for hallucinations and suicidal ideas.           Objective   /70   Pulse 66   Temp 97.3 °F (36.3 °C)   Resp 16   Ht 5' 8\" (1.727 m)   Wt 222 lb (100.7 kg)   SpO2 96%   BMI 33.75 kg/m²   Lab Results   Component Value Date    LABA1C

## 2021-11-27 DIAGNOSIS — E78.2 MIXED HYPERLIPIDEMIA: ICD-10-CM

## 2021-11-27 DIAGNOSIS — J45.909 MODERATE ASTHMA WITHOUT COMPLICATION, UNSPECIFIED WHETHER PERSISTENT: ICD-10-CM

## 2021-11-27 DIAGNOSIS — I25.110 CORONARY ARTERY DISEASE INVOLVING NATIVE CORONARY ARTERY OF NATIVE HEART WITH UNSTABLE ANGINA PECTORIS (HCC): ICD-10-CM

## 2021-11-27 DIAGNOSIS — K21.00 GASTROESOPHAGEAL REFLUX DISEASE WITH ESOPHAGITIS: ICD-10-CM

## 2021-11-29 RX ORDER — CLOPIDOGREL BISULFATE 75 MG/1
75 TABLET ORAL DAILY
Qty: 90 TABLET | Refills: 1 | Status: SHIPPED
Start: 2021-11-29 | End: 2022-01-26 | Stop reason: SDUPTHER

## 2021-11-29 RX ORDER — FENOFIBRATE 145 MG/1
TABLET, COATED ORAL
Qty: 90 TABLET | Refills: 1 | Status: SHIPPED
Start: 2021-11-29 | End: 2022-01-26 | Stop reason: SDUPTHER

## 2021-11-29 RX ORDER — MELOXICAM 15 MG/1
TABLET ORAL
Qty: 90 TABLET | Refills: 1 | Status: ON HOLD
Start: 2021-11-29 | End: 2022-01-17 | Stop reason: HOSPADM

## 2021-11-29 RX ORDER — SIMVASTATIN 20 MG
TABLET ORAL
Qty: 90 TABLET | Refills: 1 | Status: SHIPPED
Start: 2021-11-29 | End: 2022-01-26 | Stop reason: SDUPTHER

## 2021-11-29 RX ORDER — MONTELUKAST SODIUM 10 MG/1
TABLET ORAL
Qty: 90 TABLET | Refills: 1 | Status: SHIPPED
Start: 2021-11-29 | End: 2022-01-26 | Stop reason: SDUPTHER

## 2021-11-29 RX ORDER — OMEPRAZOLE 40 MG/1
CAPSULE, DELAYED RELEASE ORAL
Qty: 90 CAPSULE | Refills: 1 | Status: SHIPPED
Start: 2021-11-29 | End: 2022-01-26

## 2021-12-05 DIAGNOSIS — F32.89 OTHER DEPRESSION: ICD-10-CM

## 2021-12-05 DIAGNOSIS — F32.A DEPRESSION, UNSPECIFIED DEPRESSION TYPE: ICD-10-CM

## 2021-12-05 DIAGNOSIS — F43.21 GRIEF: ICD-10-CM

## 2021-12-06 ENCOUNTER — OFFICE VISIT (OUTPATIENT)
Dept: FAMILY MEDICINE CLINIC | Age: 71
End: 2021-12-06
Payer: MEDICARE

## 2021-12-06 VITALS
RESPIRATION RATE: 18 BRPM | SYSTOLIC BLOOD PRESSURE: 138 MMHG | HEIGHT: 68 IN | HEART RATE: 62 BPM | WEIGHT: 226.4 LBS | TEMPERATURE: 97.8 F | DIASTOLIC BLOOD PRESSURE: 70 MMHG | BODY MASS INDEX: 34.31 KG/M2 | OXYGEN SATURATION: 94 %

## 2021-12-06 DIAGNOSIS — N48.6 PEYRONIE DISEASE: Primary | ICD-10-CM

## 2021-12-06 PROCEDURE — 1123F ACP DISCUSS/DSCN MKR DOCD: CPT | Performed by: FAMILY MEDICINE

## 2021-12-06 PROCEDURE — G8417 CALC BMI ABV UP PARAM F/U: HCPCS | Performed by: FAMILY MEDICINE

## 2021-12-06 PROCEDURE — 1036F TOBACCO NON-USER: CPT | Performed by: FAMILY MEDICINE

## 2021-12-06 PROCEDURE — 99213 OFFICE O/P EST LOW 20 MIN: CPT | Performed by: FAMILY MEDICINE

## 2021-12-06 PROCEDURE — G8484 FLU IMMUNIZE NO ADMIN: HCPCS | Performed by: FAMILY MEDICINE

## 2021-12-06 PROCEDURE — 3017F COLORECTAL CA SCREEN DOC REV: CPT | Performed by: FAMILY MEDICINE

## 2021-12-06 PROCEDURE — 4040F PNEUMOC VAC/ADMIN/RCVD: CPT | Performed by: FAMILY MEDICINE

## 2021-12-06 PROCEDURE — G8427 DOCREV CUR MEDS BY ELIG CLIN: HCPCS | Performed by: FAMILY MEDICINE

## 2021-12-06 RX ORDER — SERTRALINE HYDROCHLORIDE 100 MG/1
100 TABLET, FILM COATED ORAL DAILY
Qty: 30 TABLET | Refills: 0 | Status: SHIPPED
Start: 2021-12-06 | End: 2022-01-26 | Stop reason: SDUPTHER

## 2021-12-06 RX ORDER — BUPROPION HYDROCHLORIDE 150 MG/1
TABLET, EXTENDED RELEASE ORAL
Qty: 60 TABLET | Refills: 0 | Status: SHIPPED
Start: 2021-12-06 | End: 2022-01-26 | Stop reason: SDUPTHER

## 2021-12-06 ASSESSMENT — ENCOUNTER SYMPTOMS
COUGH: 0
NAUSEA: 0
WHEEZING: 0
DIARRHEA: 0
VOMITING: 0
BLOOD IN STOOL: 0
ABDOMINAL PAIN: 0
CONSTIPATION: 0
SHORTNESS OF BREATH: 0

## 2021-12-06 NOTE — PROGRESS NOTES
Skin is warm. Capillary Refill: Capillary refill takes less than 2 seconds. Coloration: Skin is not pale. Findings: No bruising. Neurological:      Mental Status: He is alert. Psychiatric:         Mood and Affect: Mood normal.            On this date 12/6/2021 I have spent 23 minutes reviewing previous notes, test results and face to face with the patient discussing the diagnosis and importance of compliance with the treatment plan as well as documenting on the day of the visit. An electronic signature was used to authenticate this note.     --Estefany Carrasco, DO

## 2021-12-15 ENCOUNTER — OFFICE VISIT (OUTPATIENT)
Dept: FAMILY MEDICINE CLINIC | Age: 71
End: 2021-12-15
Payer: MEDICARE

## 2021-12-15 VITALS
RESPIRATION RATE: 20 BRPM | BODY MASS INDEX: 33.89 KG/M2 | OXYGEN SATURATION: 97 % | WEIGHT: 223.6 LBS | DIASTOLIC BLOOD PRESSURE: 88 MMHG | HEART RATE: 63 BPM | HEIGHT: 68 IN | TEMPERATURE: 97.9 F | SYSTOLIC BLOOD PRESSURE: 138 MMHG

## 2021-12-15 DIAGNOSIS — G89.29 CHRONIC RIGHT SHOULDER PAIN: ICD-10-CM

## 2021-12-15 DIAGNOSIS — N18.31 CKD STAGE G3A/A3, GFR 45-59 AND ALBUMIN CREATININE RATIO >300 MG/G (HCC): ICD-10-CM

## 2021-12-15 DIAGNOSIS — M25.511 CHRONIC RIGHT SHOULDER PAIN: ICD-10-CM

## 2021-12-15 DIAGNOSIS — J20.9 BRONCHITIS WITH BRONCHOSPASM: Primary | ICD-10-CM

## 2021-12-15 PROCEDURE — 4040F PNEUMOC VAC/ADMIN/RCVD: CPT | Performed by: FAMILY MEDICINE

## 2021-12-15 PROCEDURE — 1036F TOBACCO NON-USER: CPT | Performed by: FAMILY MEDICINE

## 2021-12-15 PROCEDURE — G8427 DOCREV CUR MEDS BY ELIG CLIN: HCPCS | Performed by: FAMILY MEDICINE

## 2021-12-15 PROCEDURE — 90694 VACC AIIV4 NO PRSRV 0.5ML IM: CPT | Performed by: FAMILY MEDICINE

## 2021-12-15 PROCEDURE — 3017F COLORECTAL CA SCREEN DOC REV: CPT | Performed by: FAMILY MEDICINE

## 2021-12-15 PROCEDURE — 1123F ACP DISCUSS/DSCN MKR DOCD: CPT | Performed by: FAMILY MEDICINE

## 2021-12-15 PROCEDURE — G8417 CALC BMI ABV UP PARAM F/U: HCPCS | Performed by: FAMILY MEDICINE

## 2021-12-15 PROCEDURE — 99214 OFFICE O/P EST MOD 30 MIN: CPT | Performed by: FAMILY MEDICINE

## 2021-12-15 PROCEDURE — G8484 FLU IMMUNIZE NO ADMIN: HCPCS | Performed by: FAMILY MEDICINE

## 2021-12-15 PROCEDURE — G0008 ADMIN INFLUENZA VIRUS VAC: HCPCS | Performed by: FAMILY MEDICINE

## 2021-12-15 RX ORDER — CEPHALEXIN 500 MG/1
500 CAPSULE ORAL 2 TIMES DAILY
Qty: 20 CAPSULE | Refills: 0 | Status: SHIPPED | OUTPATIENT
Start: 2021-12-15 | End: 2021-12-25

## 2021-12-15 RX ORDER — HYDROCODONE BITARTRATE AND ACETAMINOPHEN 7.5; 325 MG/1; MG/1
1 TABLET ORAL EVERY 8 HOURS PRN
Qty: 90 TABLET | Refills: 0 | Status: ON HOLD
Start: 2021-12-15 | End: 2022-01-17 | Stop reason: HOSPADM

## 2021-12-15 RX ORDER — GUAIFENESIN 600 MG/1
1200 TABLET, EXTENDED RELEASE ORAL 2 TIMES DAILY
Qty: 30 TABLET | Refills: 0 | Status: SHIPPED
Start: 2021-12-15 | End: 2022-01-26 | Stop reason: SDUPTHER

## 2021-12-15 ASSESSMENT — ENCOUNTER SYMPTOMS
WHEEZING: 0
SHORTNESS OF BREATH: 0
NAUSEA: 0
VOMITING: 0
BLOOD IN STOOL: 0
DIARRHEA: 0
ABDOMINAL PAIN: 0
COUGH: 0
CONSTIPATION: 0

## 2021-12-15 NOTE — PROGRESS NOTES
Roxanna Childs (:  1950) is a 70 y.o. male,Established patient, here for evaluation of the following chief complaint(s):  Medication Refill (Patient is here for medication refill. )         ASSESSMENT/PLAN:  1. Bronchitis with bronchospasm  -     guaiFENesin (MUCINEX) 600 MG extended release tablet; Take 2 tablets by mouth 2 times daily, Disp-30 tablet, R-0Normal  -     cephALEXin (KEFLEX) 500 MG capsule; Take 1 capsule by mouth 2 times daily for 10 days, Disp-20 capsule, R-0Normal  2. Chronic right shoulder pain  -     HYDROcodone-acetaminophen (NORCO) 7.5-325 MG per tablet; Take 1 tablet by mouth every 8 hours as needed for Pain for up to 30 days. , Disp-90 tablet, R-0Normal  3. CKD stage G3a/A3, GFR 45-59 and albumin creatinine ratio >300 mg/g ([facility])    Controlled substances monitoring: no signs of potential drug abuse or diversion identified and OARRS report reviewed today- activity consistent with treatment plan. Return in about 1 month (around 1/15/2022). Subjective   SUBJECTIVE/OBJECTIVE:   Shoulder and back pain chronic., Also coughing and productive of yellow copious sputum. Review of Systems   Constitutional: Negative for chills, diaphoresis and fever. HENT: Negative for ear discharge, ear pain, hearing loss, nosebleeds and tinnitus. Respiratory: Negative for cough, shortness of breath and wheezing. Cardiovascular: Negative for chest pain. Gastrointestinal: Negative for abdominal pain, blood in stool, constipation, diarrhea, nausea and vomiting. Genitourinary: Negative for dysuria, flank pain and hematuria. Musculoskeletal: Negative for myalgias. Skin: Negative for rash. Neurological: Negative for headaches. Hematological: Does not bruise/bleed easily. Psychiatric/Behavioral: Negative for hallucinations and suicidal ideas.           Objective   /88   Pulse 63   Temp 97.9 °F (36.6 °C)   Resp 20   Ht 5' 8\" (1.727 m)   Wt 223 lb 9.6 oz (101.4 kg) SpO2 97%   BMI 34.00 kg/m²   Lab Results   Component Value Date    LABA1C 5.6 07/31/2018    LABA1C 6.0 04/26/2018     Physical Exam  HENT:      Right Ear: Tympanic membrane normal.      Left Ear: Tympanic membrane normal.      Nose: Nose normal.   Eyes:      General:         Right eye: No discharge. Left eye: No discharge. Cardiovascular:      Rate and Rhythm: Normal rate and regular rhythm. Pulses: Normal pulses. Heart sounds: No murmur heard. Pulmonary:      Effort: No respiratory distress. Breath sounds: No rhonchi or rales. Abdominal:      Palpations: Abdomen is soft. Tenderness: There is no abdominal tenderness. Musculoskeletal:         General: Normal range of motion. Cervical back: No rigidity. Comments: Pain pos SLR    Skin:     General: Skin is warm. Capillary Refill: Capillary refill takes less than 2 seconds. Coloration: Skin is not pale. Findings: No bruising. Neurological:      Mental Status: He is alert. Psychiatric:         Mood and Affect: Mood normal.            On this date 12/15/2021 I have spent 24 minutes reviewing previous notes, test results and face to face with the patient discussing the diagnosis and importance of compliance with the treatment plan as well as documenting on the day of the visit. An electronic signature was used to authenticate this note.     --Maxi Gage DO

## 2021-12-27 RX ORDER — TAMSULOSIN HYDROCHLORIDE 0.4 MG/1
CAPSULE ORAL
Qty: 30 CAPSULE | Refills: 2 | Status: SHIPPED
Start: 2021-12-27 | End: 2022-01-26 | Stop reason: SDUPTHER

## 2022-01-03 ENCOUNTER — TELEPHONE (OUTPATIENT)
Dept: FAMILY MEDICINE CLINIC | Age: 72
End: 2022-01-03

## 2022-01-03 DIAGNOSIS — S43.429S SPRAIN OF ROTATOR CUFF CAPSULE, UNSPECIFIED LATERALITY, SEQUELA: ICD-10-CM

## 2022-01-03 DIAGNOSIS — R09.02 HYPOXIA: ICD-10-CM

## 2022-01-03 DIAGNOSIS — J43.9 PULMONARY EMPHYSEMA, UNSPECIFIED EMPHYSEMA TYPE (HCC): Primary | ICD-10-CM

## 2022-01-03 NOTE — TELEPHONE ENCOUNTER
Nocturnal 02 needed to get oxygen   He may not wear it. He has been encouraged to see ortho for his shoilder., I marybeth refer if he agrees to see. Was it Dr Trish Hernandez that did his surgery?

## 2022-01-03 NOTE — TELEPHONE ENCOUNTER
Received mychart Dagoberto De Los Santos is still having SOB his O2 was down to 88% today while at rest. He was not in distress he said this has become his norm. I was wondering if O2 was ever ordered. He said he planned to mention oxygen again when he comes the 15th but I think he did a walk test at his last visit. Can you please check and get back to me?     *Also, he his having horrible pain and limited movement in his right shoulder can an x-ray be ordered. The last one is from 2019 if I remember correctly.      I don't see anything in chart about o2 being ordered, Yessica Licea and [place] unaware

## 2022-01-04 ENCOUNTER — APPOINTMENT (OUTPATIENT)
Dept: GENERAL RADIOLOGY | Age: 72
DRG: 177 | End: 2022-01-04
Payer: MEDICARE

## 2022-01-04 ENCOUNTER — APPOINTMENT (OUTPATIENT)
Dept: CT IMAGING | Age: 72
DRG: 177 | End: 2022-01-04
Payer: MEDICARE

## 2022-01-04 ENCOUNTER — HOSPITAL ENCOUNTER (INPATIENT)
Age: 72
LOS: 14 days | Discharge: HOME | DRG: 177 | End: 2022-01-18
Attending: EMERGENCY MEDICINE | Admitting: INTERNAL MEDICINE
Payer: MEDICARE

## 2022-01-04 ENCOUNTER — TELEPHONE (OUTPATIENT)
Dept: FAMILY MEDICINE CLINIC | Age: 72
End: 2022-01-04

## 2022-01-04 DIAGNOSIS — R06.03 RESPIRATORY DISTRESS: Primary | ICD-10-CM

## 2022-01-04 DIAGNOSIS — R09.02 HYPOXIA: ICD-10-CM

## 2022-01-04 DIAGNOSIS — U07.1 COVID-19: ICD-10-CM

## 2022-01-04 LAB
ALBUMIN SERPL-MCNC: 4.1 G/DL (ref 3.5–5.2)
ALP BLD-CCNC: 153 U/L (ref 40–129)
ALT SERPL-CCNC: 106 U/L (ref 0–40)
ANION GAP SERPL CALCULATED.3IONS-SCNC: 12 MMOL/L (ref 7–16)
APTT: 32.6 SEC (ref 24.5–35.1)
AST SERPL-CCNC: 171 U/L (ref 0–39)
BASOPHILS ABSOLUTE: 0.02 E9/L (ref 0–0.2)
BASOPHILS RELATIVE PERCENT: 0.2 % (ref 0–2)
BILIRUB SERPL-MCNC: 0.5 MG/DL (ref 0–1.2)
BILIRUBIN URINE: NEGATIVE
BLOOD, URINE: NEGATIVE
BUN BLDV-MCNC: 19 MG/DL (ref 6–23)
CALCIUM SERPL-MCNC: 8.8 MG/DL (ref 8.6–10.2)
CHLORIDE BLD-SCNC: 101 MMOL/L (ref 98–107)
CLARITY: CLEAR
CO2: 27 MMOL/L (ref 22–29)
COLOR: YELLOW
CREAT SERPL-MCNC: 1.5 MG/DL (ref 0.7–1.2)
D DIMER: 477 NG/ML DDU
EKG ATRIAL RATE: 91 BPM
EKG P AXIS: 46 DEGREES
EKG P-R INTERVAL: 140 MS
EKG Q-T INTERVAL: 428 MS
EKG QRS DURATION: 94 MS
EKG QTC CALCULATION (BAZETT): 526 MS
EKG R AXIS: 26 DEGREES
EKG T AXIS: -126 DEGREES
EKG VENTRICULAR RATE: 91 BPM
EOSINOPHILS ABSOLUTE: 0.01 E9/L (ref 0.05–0.5)
EOSINOPHILS RELATIVE PERCENT: 0.1 % (ref 0–6)
GFR AFRICAN AMERICAN: 56
GFR NON-AFRICAN AMERICAN: 46 ML/MIN/1.73
GLUCOSE BLD-MCNC: 97 MG/DL (ref 74–99)
GLUCOSE URINE: NEGATIVE MG/DL
HCT VFR BLD CALC: 41 % (ref 37–54)
HEMOGLOBIN: 12.5 G/DL (ref 12.5–16.5)
IMMATURE GRANULOCYTES #: 0.06 E9/L
IMMATURE GRANULOCYTES %: 0.7 % (ref 0–5)
INFLUENZA A BY PCR: NOT DETECTED
INFLUENZA B BY PCR: NOT DETECTED
INR BLD: 1.1
KETONES, URINE: NEGATIVE MG/DL
LACTIC ACID: 1 MMOL/L (ref 0.5–2.2)
LEUKOCYTE ESTERASE, URINE: NEGATIVE
LYMPHOCYTES ABSOLUTE: 0.97 E9/L (ref 1.5–4)
LYMPHOCYTES RELATIVE PERCENT: 11.9 % (ref 20–42)
MCH RBC QN AUTO: 28.2 PG (ref 26–35)
MCHC RBC AUTO-ENTMCNC: 30.5 % (ref 32–34.5)
MCV RBC AUTO: 92.3 FL (ref 80–99.9)
MONOCYTES ABSOLUTE: 0.59 E9/L (ref 0.1–0.95)
MONOCYTES RELATIVE PERCENT: 7.2 % (ref 2–12)
NEUTROPHILS ABSOLUTE: 6.53 E9/L (ref 1.8–7.3)
NEUTROPHILS RELATIVE PERCENT: 79.9 % (ref 43–80)
NITRITE, URINE: NEGATIVE
PDW BLD-RTO: 13.8 FL (ref 11.5–15)
PH UA: 5.5 (ref 5–9)
PLATELET # BLD: 313 E9/L (ref 130–450)
PMV BLD AUTO: 11.1 FL (ref 7–12)
POTASSIUM REFLEX MAGNESIUM: 4.1 MMOL/L (ref 3.5–5)
PRO-BNP: 5611 PG/ML (ref 0–125)
PROTEIN UA: NEGATIVE MG/DL
PROTHROMBIN TIME: 13.2 SEC (ref 9.3–12.4)
RBC # BLD: 4.44 E12/L (ref 3.8–5.8)
SARS-COV-2, NAAT: DETECTED
SODIUM BLD-SCNC: 140 MMOL/L (ref 132–146)
SPECIFIC GRAVITY UA: >=1.03 (ref 1–1.03)
TOTAL PROTEIN: 7.4 G/DL (ref 6.4–8.3)
TROPONIN, HIGH SENSITIVITY: 38 NG/L (ref 0–11)
UROBILINOGEN, URINE: 2 E.U./DL
WBC # BLD: 8.2 E9/L (ref 4.5–11.5)

## 2022-01-04 PROCEDURE — 94640 AIRWAY INHALATION TREATMENT: CPT

## 2022-01-04 PROCEDURE — 1200000000 HC SEMI PRIVATE

## 2022-01-04 PROCEDURE — 93010 ELECTROCARDIOGRAM REPORT: CPT | Performed by: INTERNAL MEDICINE

## 2022-01-04 PROCEDURE — 87502 INFLUENZA DNA AMP PROBE: CPT

## 2022-01-04 PROCEDURE — 2580000003 HC RX 258: Performed by: INTERNAL MEDICINE

## 2022-01-04 PROCEDURE — 84484 ASSAY OF TROPONIN QUANT: CPT

## 2022-01-04 PROCEDURE — 83880 ASSAY OF NATRIURETIC PEPTIDE: CPT

## 2022-01-04 PROCEDURE — 96374 THER/PROPH/DIAG INJ IV PUSH: CPT

## 2022-01-04 PROCEDURE — 6360000002 HC RX W HCPCS: Performed by: EMERGENCY MEDICINE

## 2022-01-04 PROCEDURE — 6360000002 HC RX W HCPCS: Performed by: INTERNAL MEDICINE

## 2022-01-04 PROCEDURE — 93005 ELECTROCARDIOGRAM TRACING: CPT | Performed by: PHYSICIAN ASSISTANT

## 2022-01-04 PROCEDURE — 85610 PROTHROMBIN TIME: CPT

## 2022-01-04 PROCEDURE — 99285 EMERGENCY DEPT VISIT HI MDM: CPT

## 2022-01-04 PROCEDURE — 85730 THROMBOPLASTIN TIME PARTIAL: CPT

## 2022-01-04 PROCEDURE — 87635 SARS-COV-2 COVID-19 AMP PRB: CPT

## 2022-01-04 PROCEDURE — 80053 COMPREHEN METABOLIC PANEL: CPT

## 2022-01-04 PROCEDURE — 6360000004 HC RX CONTRAST MEDICATION: Performed by: RADIOLOGY

## 2022-01-04 PROCEDURE — 2580000003 HC RX 258: Performed by: EMERGENCY MEDICINE

## 2022-01-04 PROCEDURE — 81003 URINALYSIS AUTO W/O SCOPE: CPT

## 2022-01-04 PROCEDURE — 6370000000 HC RX 637 (ALT 250 FOR IP): Performed by: EMERGENCY MEDICINE

## 2022-01-04 PROCEDURE — 71275 CT ANGIOGRAPHY CHEST: CPT

## 2022-01-04 PROCEDURE — 85378 FIBRIN DEGRADE SEMIQUANT: CPT

## 2022-01-04 PROCEDURE — 6370000000 HC RX 637 (ALT 250 FOR IP): Performed by: INTERNAL MEDICINE

## 2022-01-04 PROCEDURE — 71046 X-RAY EXAM CHEST 2 VIEWS: CPT

## 2022-01-04 PROCEDURE — 87040 BLOOD CULTURE FOR BACTERIA: CPT

## 2022-01-04 PROCEDURE — 85025 COMPLETE CBC W/AUTO DIFF WBC: CPT

## 2022-01-04 PROCEDURE — 83605 ASSAY OF LACTIC ACID: CPT

## 2022-01-04 RX ORDER — ACETAMINOPHEN 650 MG/1
650 SUPPOSITORY RECTAL EVERY 6 HOURS PRN
Status: DISCONTINUED | OUTPATIENT
Start: 2022-01-04 | End: 2022-01-18 | Stop reason: HOSPADM

## 2022-01-04 RX ORDER — HYDROCODONE BITARTRATE AND ACETAMINOPHEN 7.5; 325 MG/1; MG/1
1 TABLET ORAL EVERY 8 HOURS PRN
Status: DISCONTINUED | OUTPATIENT
Start: 2022-01-04 | End: 2022-01-18 | Stop reason: HOSPADM

## 2022-01-04 RX ORDER — ONDANSETRON 2 MG/ML
4 INJECTION INTRAMUSCULAR; INTRAVENOUS EVERY 6 HOURS PRN
Status: DISCONTINUED | OUTPATIENT
Start: 2022-01-04 | End: 2022-01-05

## 2022-01-04 RX ORDER — BUPROPION HYDROCHLORIDE 150 MG/1
150 TABLET, EXTENDED RELEASE ORAL 2 TIMES DAILY
Status: DISCONTINUED | OUTPATIENT
Start: 2022-01-04 | End: 2022-01-09

## 2022-01-04 RX ORDER — DUTASTERIDE 0.5 MG/1
0.5 CAPSULE, LIQUID FILLED ORAL DAILY
Status: DISCONTINUED | OUTPATIENT
Start: 2022-01-04 | End: 2022-01-05

## 2022-01-04 RX ORDER — ALBUTEROL SULFATE 2.5 MG/3ML
2.5 SOLUTION RESPIRATORY (INHALATION) EVERY 6 HOURS PRN
Status: DISCONTINUED | OUTPATIENT
Start: 2022-01-04 | End: 2022-01-08

## 2022-01-04 RX ORDER — ASCORBIC ACID 500 MG
1000 TABLET ORAL DAILY
Status: DISCONTINUED | OUTPATIENT
Start: 2022-01-04 | End: 2022-01-18 | Stop reason: HOSPADM

## 2022-01-04 RX ORDER — MONTELUKAST SODIUM 10 MG/1
10 TABLET ORAL NIGHTLY
Status: DISCONTINUED | OUTPATIENT
Start: 2022-01-04 | End: 2022-01-18 | Stop reason: HOSPADM

## 2022-01-04 RX ORDER — METHYLPREDNISOLONE SODIUM SUCCINATE 125 MG/2ML
125 INJECTION, POWDER, LYOPHILIZED, FOR SOLUTION INTRAMUSCULAR; INTRAVENOUS ONCE
Status: COMPLETED | OUTPATIENT
Start: 2022-01-04 | End: 2022-01-04

## 2022-01-04 RX ORDER — FENOFIBRATE 160 MG/1
160 TABLET ORAL DAILY
Status: DISCONTINUED | OUTPATIENT
Start: 2022-01-04 | End: 2022-01-09

## 2022-01-04 RX ORDER — TAMSULOSIN HYDROCHLORIDE 0.4 MG/1
0.4 CAPSULE ORAL DAILY
Status: DISCONTINUED | OUTPATIENT
Start: 2022-01-04 | End: 2022-01-18 | Stop reason: HOSPADM

## 2022-01-04 RX ORDER — DEXAMETHASONE SODIUM PHOSPHATE 10 MG/ML
6 INJECTION INTRAMUSCULAR; INTRAVENOUS EVERY 24 HOURS
Status: COMPLETED | OUTPATIENT
Start: 2022-01-04 | End: 2022-01-13

## 2022-01-04 RX ORDER — ARFORMOTEROL TARTRATE 15 UG/2ML
15 SOLUTION RESPIRATORY (INHALATION) 2 TIMES DAILY
Status: DISCONTINUED | OUTPATIENT
Start: 2022-01-04 | End: 2022-01-18 | Stop reason: HOSPADM

## 2022-01-04 RX ORDER — DOXYCYCLINE HYCLATE 100 MG/1
100 CAPSULE ORAL EVERY 12 HOURS SCHEDULED
Status: DISCONTINUED | OUTPATIENT
Start: 2022-01-04 | End: 2022-01-18 | Stop reason: HOSPADM

## 2022-01-04 RX ORDER — ASPIRIN 81 MG/1
81 TABLET, CHEWABLE ORAL DAILY
Status: DISCONTINUED | OUTPATIENT
Start: 2022-01-04 | End: 2022-01-18 | Stop reason: HOSPADM

## 2022-01-04 RX ORDER — PANTOPRAZOLE SODIUM 40 MG/1
40 TABLET, DELAYED RELEASE ORAL EVERY MORNING
Status: DISCONTINUED | OUTPATIENT
Start: 2022-01-05 | End: 2022-01-18 | Stop reason: HOSPADM

## 2022-01-04 RX ORDER — ZINC SULFATE 50(220)MG
50 CAPSULE ORAL DAILY
Status: DISCONTINUED | OUTPATIENT
Start: 2022-01-04 | End: 2022-01-18 | Stop reason: HOSPADM

## 2022-01-04 RX ORDER — LORAZEPAM 1 MG/1
1 TABLET ORAL DAILY PRN
Status: DISCONTINUED | OUTPATIENT
Start: 2022-01-04 | End: 2022-01-09

## 2022-01-04 RX ORDER — ATORVASTATIN CALCIUM 20 MG/1
20 TABLET, FILM COATED ORAL NIGHTLY
Status: DISCONTINUED | OUTPATIENT
Start: 2022-01-04 | End: 2022-01-18 | Stop reason: HOSPADM

## 2022-01-04 RX ORDER — VITAMIN B COMPLEX
2000 TABLET ORAL DAILY
Status: DISCONTINUED | OUTPATIENT
Start: 2022-01-04 | End: 2022-01-18 | Stop reason: HOSPADM

## 2022-01-04 RX ORDER — BUDESONIDE 0.5 MG/2ML
500 INHALANT ORAL 2 TIMES DAILY
Status: DISCONTINUED | OUTPATIENT
Start: 2022-01-04 | End: 2022-01-18 | Stop reason: HOSPADM

## 2022-01-04 RX ORDER — GUAIFENESIN 600 MG/1
1200 TABLET, EXTENDED RELEASE ORAL 2 TIMES DAILY
Status: DISCONTINUED | OUTPATIENT
Start: 2022-01-04 | End: 2022-01-18 | Stop reason: HOSPADM

## 2022-01-04 RX ORDER — GUAIFENESIN/DEXTROMETHORPHAN 100-10MG/5
5 SYRUP ORAL EVERY 4 HOURS PRN
Status: DISCONTINUED | OUTPATIENT
Start: 2022-01-04 | End: 2022-01-18 | Stop reason: HOSPADM

## 2022-01-04 RX ORDER — ACETAMINOPHEN 325 MG/1
650 TABLET ORAL EVERY 6 HOURS PRN
Status: DISCONTINUED | OUTPATIENT
Start: 2022-01-04 | End: 2022-01-18 | Stop reason: HOSPADM

## 2022-01-04 RX ORDER — 0.9 % SODIUM CHLORIDE 0.9 %
1000 INTRAVENOUS SOLUTION INTRAVENOUS ONCE
Status: COMPLETED | OUTPATIENT
Start: 2022-01-04 | End: 2022-01-04

## 2022-01-04 RX ORDER — CLOPIDOGREL BISULFATE 75 MG/1
75 TABLET ORAL DAILY
Status: DISCONTINUED | OUTPATIENT
Start: 2022-01-04 | End: 2022-01-18 | Stop reason: HOSPADM

## 2022-01-04 RX ORDER — IPRATROPIUM BROMIDE AND ALBUTEROL SULFATE 2.5; .5 MG/3ML; MG/3ML
3 SOLUTION RESPIRATORY (INHALATION) ONCE
Status: COMPLETED | OUTPATIENT
Start: 2022-01-04 | End: 2022-01-04

## 2022-01-04 RX ADMIN — SERTRALINE 100 MG: 50 TABLET, FILM COATED ORAL at 20:40

## 2022-01-04 RX ADMIN — BUDESONIDE 500 MCG: 0.5 INHALANT RESPIRATORY (INHALATION) at 21:23

## 2022-01-04 RX ADMIN — DOXYCYCLINE HYCLATE 100 MG: 100 CAPSULE ORAL at 20:41

## 2022-01-04 RX ADMIN — FENOFIBRATE 160 MG: 160 TABLET ORAL at 20:50

## 2022-01-04 RX ADMIN — DEXAMETHASONE SODIUM PHOSPHATE 6 MG: 10 INJECTION INTRAMUSCULAR; INTRAVENOUS at 20:49

## 2022-01-04 RX ADMIN — BUPROPION HYDROCHLORIDE 150 MG: 150 TABLET, EXTENDED RELEASE ORAL at 20:47

## 2022-01-04 RX ADMIN — IOPAMIDOL 75 ML: 755 INJECTION, SOLUTION INTRAVENOUS at 17:22

## 2022-01-04 RX ADMIN — ENOXAPARIN SODIUM 30 MG: 100 INJECTION SUBCUTANEOUS at 20:49

## 2022-01-04 RX ADMIN — MONTELUKAST SODIUM 10 MG: 10 TABLET, FILM COATED ORAL at 20:51

## 2022-01-04 RX ADMIN — FUROSEMIDE 20 MG: 10 INJECTION, SOLUTION INTRAMUSCULAR; INTRAVENOUS at 20:48

## 2022-01-04 RX ADMIN — METHYLPREDNISOLONE SODIUM SUCCINATE 125 MG: 125 INJECTION, POWDER, FOR SOLUTION INTRAMUSCULAR; INTRAVENOUS at 15:49

## 2022-01-04 RX ADMIN — GUAIFENESIN 1200 MG: 600 TABLET, EXTENDED RELEASE ORAL at 20:39

## 2022-01-04 RX ADMIN — ARFORMOTEROL TARTRATE 15 MCG: 15 SOLUTION RESPIRATORY (INHALATION) at 21:23

## 2022-01-04 RX ADMIN — CLOPIDOGREL 75 MG: 75 TABLET, FILM COATED ORAL at 20:41

## 2022-01-04 RX ADMIN — ASPIRIN 81 MG CHEWABLE TABLET 81 MG: 81 TABLET CHEWABLE at 20:47

## 2022-01-04 RX ADMIN — Medication 2000 UNITS: at 20:39

## 2022-01-04 RX ADMIN — ATORVASTATIN CALCIUM 20 MG: 20 TABLET, FILM COATED ORAL at 20:41

## 2022-01-04 RX ADMIN — ZINC SULFATE 220 MG (50 MG) CAPSULE 50 MG: CAPSULE at 20:51

## 2022-01-04 RX ADMIN — WATER 1000 MG: 1 INJECTION INTRAMUSCULAR; INTRAVENOUS; SUBCUTANEOUS at 20:48

## 2022-01-04 RX ADMIN — TAMSULOSIN HYDROCHLORIDE 0.4 MG: 0.4 CAPSULE ORAL at 20:39

## 2022-01-04 RX ADMIN — OXYCODONE HYDROCHLORIDE AND ACETAMINOPHEN 1000 MG: 500 TABLET ORAL at 20:40

## 2022-01-04 RX ADMIN — IPRATROPIUM BROMIDE AND ALBUTEROL SULFATE 3 AMPULE: 2.5; .5 SOLUTION RESPIRATORY (INHALATION) at 15:14

## 2022-01-04 RX ADMIN — SODIUM CHLORIDE 1000 ML: 9 INJECTION, SOLUTION INTRAVENOUS at 15:48

## 2022-01-04 ASSESSMENT — ENCOUNTER SYMPTOMS
SORE THROAT: 0
WHEEZING: 1
DIARRHEA: 0
CHEST TIGHTNESS: 0
ABDOMINAL PAIN: 0
VOMITING: 0
BACK PAIN: 0
SHORTNESS OF BREATH: 1
SINUS PRESSURE: 0
NAUSEA: 0
COUGH: 1

## 2022-01-04 NOTE — ED PROVIDER NOTES
FIRST PROVIDER CONTACT ASSESSMENT NOTE           Department of Emergency Medicine                 First Provider Note            22  2:25 PM EST    Date of Encounter: No admission date for patient encounter. Patient Name: Melly Monday  : 1950  MRN: [id number]    Chief Complaint: No chief complaint on file. History of Present Illness:   Melly Monmarisabel is a 70 y.o. male who presents to the ED for SOB. Pt is chronic smoker with COPD. Was Hypoxic on arrival.   On oxygen now. Still very SOB/wheezing. Not vaccinated for Covid 19. Focused Physical Exam:  VS:    ED Triage Vitals [22 1315]   BP Temp Temp Source Pulse Resp SpO2 Height Weight   -- 97.2 °F (36.2 °C) Tympanic 86 -- (!) 84 % -- --        Physical Ex: Constitutional: Alert and non-toxic. Medical History:  has a past medical history of Anesthesia complication, Arthritis, CAD (coronary artery disease), COPD (chronic obstructive pulmonary disease) ([facility] [address] Erectile dysfunction, GERD (gastroesophageal reflux disease), H/O coronary angioplasty with stents[V45.82], Hyperlipidemia, Hypertension, Lymphoma ([facility] [address] Myocardial infarction [facility]), Sleep apnea, and Unspecified cerebral artery occlusion with cerebral infarction. Surgical History:  has a past surgical history that includes Coronary angioplasty with stent (); Ankle surgery (); bronchoscopy (2011); Carotid endarterectomy (Left); Ankle fracture surgery (Right, 2014); other surgical history (2015); Cholecystectomy; Upper gastrointestinal endoscopy; Colonoscopy (2013); Tonsillectomy; Shoulder arthroscopy (Right, 10/08/2015); Endoscopy, colon, diagnostic; Hand surgery (Left); Testicle removal; hernia repair (Bilateral, 08/15/2019); and Shoulder arthroscopy (Left, 2021). Social History:  reports that he quit smoking about 20 months ago. His smoking use included cigarettes. He has a 30.00 pack-year smoking history.  He has never used smokeless tobacco. He reports current alcohol use. He reports previous drug use. Family History: family history includes Cancer in his brother and sister; Diabetes in his brother, father, mother, and sister; Heart Disease in his father and mother.     Allergies: Midazolam hcl     Initial Plan of Care: Initiate Treatment-Testing, Proceed toTreatment Area When Bed Available for ED Attending/MLP to Continue Care      ---END OF FIRST PROVIDER CONTACT ASSESSMENT NOTE---  Electronically signed by Crystal More PA-C   DD: 1/4/22       Crystal More PA-C  01/04/22 1517

## 2022-01-04 NOTE — ED PROVIDER NOTES
Chief complaint:  Short of breath    HPI history provided by the patient and family patient presents here with a history of smoking in the past, has stopped for the last year. Uses inhalers at home but no nebulizers and no oxygen. Not vaccinated against Covid. Comes in complaining of increased shortness of breath most significantly for the last week or two. Has had some mild aches with it and some mild phlegm production but no fevers, sweats or chills and no chest pain. No lightheadedness or syncope. No abdominal pain or vomiting or diarrhea. No leg pain or swelling. No history of blood clots. No recent traveling or surgeries. Exertion makes it worse. Doing better on supplemental oxygen initially provided to him in triage. Review of Systems   Constitutional: Negative for chills, diaphoresis, fatigue and fever. HENT: Negative for congestion, postnasal drip, sinus pressure and sore throat. Respiratory: Positive for cough, shortness of breath and wheezing. Negative for chest tightness. Cardiovascular: Negative for chest pain, palpitations and leg swelling. Gastrointestinal: Negative for abdominal pain, diarrhea, nausea and vomiting. Genitourinary: Negative for dysuria, flank pain, frequency and urgency. Musculoskeletal: Positive for myalgias. Negative for arthralgias, back pain, gait problem, joint swelling, neck pain and neck stiffness. Skin: Negative for rash and wound. Neurological: Negative for dizziness, syncope, weakness, light-headedness and headaches. Hematological: Negative for adenopathy. All other systems reviewed and are negative. Physical Exam  Vitals and nursing note reviewed. Constitutional:       General: He is awake. He is not in acute distress. Appearance: He is well-developed. He is not ill-appearing, toxic-appearing or diaphoretic. HENT:      Head: Normocephalic and atraumatic. Eyes:      General: No scleral icterus.      Pupils: Pupils are equal, round, and Sensory: Sensation is intact. Motor: Motor function is intact. Coordination: Coordination is intact. Coordination normal.   Psychiatric:         Behavior: Behavior is cooperative. Procedures     MDM     ED Course as of 01/04/22 1850 Tue Jan 04, 2022 1850 Case discussed with Dr. Palak Gamboa, detailed overview given, he will admit the patient. [NC]   [id number] Patient laying in the bed in no acute distress, vital signs are noted. CT is negative. He is laying around in the bed watching television through most of his stay. [NC]      ED Course User Index  [NC] Giana Blake DO     EKG Interpretation    Interpreted by emergency department physician    Rhythm: normal sinus   Rate: 91  Axis: normal  Ectopy: none  Conduction: normal  ST Segments: no acute change  T Waves: no acute change  Q Waves: none    Clinical Impression: no acute changes, poor quality    Giana Blake DO     ED Course as of 01/04/22 1850 Tue Jan 04, 2022 1850 Case discussed with Dr. Palak Gamboa, detailed overview given, he will admit the patient. [NC]   [id number] Patient laying in the bed in no acute distress, vital signs are noted. CT is negative. He is laying around in the bed watching television through most of his stay. [NC]      ED Course User Index  [NC] Giana Blake DO       --------------------------------------------- PAST HISTORY ---------------------------------------------  Past Medical History:  has a past medical history of Anesthesia complication, Arthritis, CAD (coronary artery disease), COPD (chronic obstructive pulmonary disease) ([facility] [address] Erectile dysfunction, GERD (gastroesophageal reflux disease), H/O coronary angioplasty with stents[V45.82], Hyperlipidemia, Hypertension, [facility] infarction [facility]), Sleep apnea, and Unspecified cerebral artery occlusion with cerebral infarction. Past Surgical History:  has a past surgical history that includes Coronary angioplasty with stent (2008); Ankle surgery (2007); bronchoscopy (09/2011); Carotid endarterectomy (Left); Ankle fracture surgery (Right, 03/2014); other surgical history (06/29/2015); Cholecystectomy; Upper gastrointestinal endoscopy; Colonoscopy (07/30/2013); Tonsillectomy; Shoulder arthroscopy (Right, 10/08/2015); Endoscopy, colon, diagnostic; Hand surgery (Left); Testicle removal; hernia repair (Bilateral, 08/15/2019); and Shoulder arthroscopy (Left, 2/18/2021). Social History:  reports that he quit smoking about 20 months ago. His smoking use included cigarettes. He has a 30.00 pack-year smoking history. He has never used smokeless tobacco. He reports current alcohol use. He reports previous drug use. Family History: family history includes Cancer in his brother and sister; Diabetes in his brother, father, mother, and sister; Heart Disease in his father and mother. The patients home medications have been reviewed.     Allergies: Midazolam hcl    -------------------------------------------------- RESULTS -------------------------------------------------    Lab  Results for orders placed or performed during the hospital encounter of 01/04/22   Rapid influenza A/B antigens    Specimen: Nares   Result Value Ref Range    Influenza A by PCR Not Detected Not Detected    Influenza B by PCR Not Detected Not Detected   COVID-19, Rapid    Specimen: Nasopharyngeal Swab   Result Value Ref Range    SARS-CoV-2, NAAT DETECTED (A) Not Detected   CBC Auto Differential   Result Value Ref Range    WBC 8.2 4.5 - 11.5 E9/L    RBC 4.44 3.80 - 5.80 E12/L    Hemoglobin 12.5 12.5 - 16.5 g/dL    Hematocrit 41.0 37.0 - 54.0 %    MCV 92.3 80.0 - 99.9 fL    MCH 28.2 26.0 - 35.0 pg    MCHC 30.5 (L) 32.0 - 34.5 %    RDW 13.8 11.5 - 15.0 fL    Platelets 268 786 - 617 E9/L    MPV 11.1 7.0 - 12.0 fL    Neutrophils % 79.9 43.0 - 80.0 %    Immature Granulocytes % 0.7 0.0 - 5.0 %    Lymphocytes % 11.9 (L) 20.0 - 42.0 %    Monocytes % 7.2 2.0 - 12.0 %    Eosinophils % 0.1 0.0 - 6.0 %    Basophils % 0.2 0.0 - 2.0 %    Neutrophils Absolute 6.53 1.80 - 7.30 E9/L    Immature Granulocytes # 0.06 E9/L    Lymphocytes Absolute 0.97 (L) 1.50 - 4.00 E9/L    Monocytes Absolute 0.59 0.10 - 0.95 E9/L    Eosinophils Absolute 0.01 (L) 0.05 - 0.50 E9/L    Basophils Absolute 0.02 0.00 - 0.20 E9/L   Comprehensive Metabolic Panel w/ Reflex to MG   Result Value Ref Range    Sodium 140 132 - 146 mmol/L    Potassium reflex Magnesium 4.1 3.5 - 5.0 mmol/L    Chloride 101 98 - 107 mmol/L    CO2 27 22 - 29 mmol/L    Anion Gap 12 7 - 16 mmol/L    Glucose 97 74 - 99 mg/dL    BUN 19 6 - 23 mg/dL    CREATININE 1.5 (H) 0.7 - 1.2 mg/dL    GFR Non-African American 46 >=60 mL/min/1.73    GFR African American 56     Calcium 8.8 8.6 - 10.2 mg/dL    Total Protein 7.4 6.4 - 8.3 g/dL    Albumin 4.1 3.5 - 5.2 g/dL    Total Bilirubin 0.5 0.0 - 1.2 mg/dL    Alkaline Phosphatase 153 (H) 40 - 129 U/L     (H) 0 - 40 U/L     (H) 0 - 39 U/L   Troponin   Result Value Ref Range    Troponin, High Sensitivity 38 (H) 0 - 11 ng/L   Brain Natriuretic Peptide   Result Value Ref Range    Pro-BNP 5,611 (H) 0 - 125 pg/mL   Lactic Acid, Plasma   Result Value Ref Range    Lactic Acid 1.0 0.5 - 2.2 mmol/L   Urinalysis   Result Value Ref Range    Color, UA Yellow Straw/Yellow    Clarity, UA Clear Clear    Glucose, Ur Negative Negative mg/dL    Bilirubin Urine Negative Negative    Ketones, Urine Negative Negative mg/dL    Specific Gravity, UA >=1.030 1.005 - 1.030    Blood, Urine Negative Negative    pH, UA 5.5 5.0 - 9.0    Protein, UA Negative Negative mg/dL    Urobilinogen, Urine 2.0 (A) <2.0 E.U./dL    Nitrite, Urine Negative Negative    Leukocyte Esterase, Urine Negative Negative   D-Dimer, Quantitative   Result Value Ref Range    D-Dimer, Quant 477 ng/mL DDU   APTT   Result Value Ref Range    aPTT 32.6 24.5 - 35.1 sec   Protime-INR   Result Value Ref Range    Protime 13.2 (H) 9.3 - 12.4 sec    INR 1.1    EKG 12 Lead Result Value Ref Range    Ventricular Rate 91 BPM    Atrial Rate 91 BPM    P-R Interval 140 ms    QRS Duration 94 ms    Q-T Interval 428 ms    QTc Calculation (Bazett) 526 ms    P Axis 46 degrees    R Axis 26 degrees    T Axis -126 degrees       Radiology  CTA CHEST W CONTRAST   Final Result   1. Pleural effusions and bilateral pulmonary opacities which may represent   edema or infiltrates such as COVID pneumonia, superimposed upon pulmonary   emphysema   2. No definite acute pulmonary embolus is identified         XR CHEST (2 VW)   Final Result   Prominent reticular opacities, more confluent in the left lower lobe. Findings are compatible with pneumonia including COVID-19 pneumonia.               ------------------------- NURSING NOTES AND VITALS REVIEWED ---------------------------  Date / Time Roomed:  1/4/2022  2:34 PM  ED Bed Assignment:  26/26    The nursing notes within the ED encounter and vital signs as below have been reviewed. Patient Vitals for the past 24 hrs:   BP Temp Temp src Pulse Resp SpO2   01/04/22 1755 (!) 161/105 99.6 °F (37.6 °C) Oral 98 (!) 36 100 %   01/04/22 1514 -- -- -- -- -- 95 %   01/04/22 1426 (!) 190/98 -- -- 110 28 97 %   01/04/22 1315 -- 97.2 °F (36.2 °C) Tympanic 86 -- (!) 84 %       Oxygen Saturation Interpretation: Abnormal and Improved after treatment        I have spoken with the patient and discussed todays results, in addition to providing specific details for the plan of care and counseling regarding the diagnosis and prognosis.   Their questions are answered at this time and they are agreeable with the plan.      --------------------------------- ADDITIONAL PROVIDER NOTES ---------------------------------    This patient's ED course included: a personal history and physicial examination, re-evaluation prior to disposition, multiple bedside re-evaluations, IV medications, cardiac monitoring, continuous pulse oximetry and complex medical decision making and emergency management    This patient has remained hemodynamically stable, improved and been closely monitored during their ED course. Please note that the withdrawal or failure to initiate urgent interventions for this patient would likely result in a life threatening deterioration or permanent disability. Accordingly this patient received 30 minutes of critical care time, excluding separately billable procedures. Systems at risk for deterioration include: pulmonary, requiring breathing tx times 3 and extra oxygen and remains tachypneic. Clinical Impression  1. Respiratory distress    2. COVID-19    3. Hypoxia          Disposition  Patient's disposition: Admit to IMCU  Patient's condition is stable.        Alyssa Beyer DO  01/04/22 5909

## 2022-01-04 NOTE — TELEPHONE ENCOUNTER
Patient states if he leans forward or sideways, or if he is laying down or sitting up he has to gasp for air, He states if he is not moving it is not too bad, he has a hard time walking his 5 stairs to go to the bathroom states \"almost kills him\". Patient is audibly wheezing. Patient states he has an inhaler at home that he has been using.

## 2022-01-04 NOTE — ED NOTES
Bed: 26  Expected date:   Expected time:   Means of arrival:   Comments:     Zoe Doherty RN  01/04/22 9947

## 2022-01-04 NOTE — TELEPHONE ENCOUNTER
Spoke with  And advised patient to proceed to ER.  Patient states he will go to [address] [place] [address]

## 2022-01-05 ENCOUNTER — APPOINTMENT (OUTPATIENT)
Dept: ULTRASOUND IMAGING | Age: 72
DRG: 177 | End: 2022-01-05
Payer: MEDICARE

## 2022-01-05 ENCOUNTER — APPOINTMENT (OUTPATIENT)
Dept: GENERAL RADIOLOGY | Age: 72
DRG: 177 | End: 2022-01-05
Payer: MEDICARE

## 2022-01-05 LAB
ALBUMIN SERPL-MCNC: 3.9 G/DL (ref 3.5–5.2)
ALP BLD-CCNC: 144 U/L (ref 40–129)
ALT SERPL-CCNC: 107 U/L (ref 0–40)
ANION GAP SERPL CALCULATED.3IONS-SCNC: 11 MMOL/L (ref 7–16)
APTT: 32.9 SEC (ref 24.5–35.1)
AST SERPL-CCNC: 159 U/L (ref 0–39)
B.E.: 1.9 MMOL/L (ref -3–3)
BASOPHILS ABSOLUTE: 0.01 E9/L (ref 0–0.2)
BASOPHILS RELATIVE PERCENT: 0.2 % (ref 0–2)
BILIRUB SERPL-MCNC: 0.5 MG/DL (ref 0–1.2)
BILIRUBIN DIRECT: 0.3 MG/DL (ref 0–0.3)
BILIRUBIN, INDIRECT: 0.2 MG/DL (ref 0–1)
BUN BLDV-MCNC: 23 MG/DL (ref 6–23)
C-REACTIVE PROTEIN: 5 MG/DL (ref 0–0.4)
C-REACTIVE PROTEIN: 5.2 MG/DL (ref 0–0.4)
CALCIUM SERPL-MCNC: 8.8 MG/DL (ref 8.6–10.2)
CHLORIDE BLD-SCNC: 101 MMOL/L (ref 98–107)
CHLORIDE URINE RANDOM: 119 MMOL/L
CO2: 29 MMOL/L (ref 22–29)
CREAT SERPL-MCNC: 1.3 MG/DL (ref 0.7–1.2)
CREATININE URINE: 79 MG/DL (ref 40–278)
D DIMER: 354 NG/ML DDU
DEVICE: ABNORMAL
EOSINOPHIL, URINE: 0 % (ref 0–1)
EOSINOPHILS ABSOLUTE: 0 E9/L (ref 0.05–0.5)
EOSINOPHILS RELATIVE PERCENT: 0 % (ref 0–6)
FERRITIN: 1150 NG/ML
FIBRINOGEN: 658 MG/DL (ref 225–540)
GFR AFRICAN AMERICAN: >60
GFR NON-AFRICAN AMERICAN: 54 ML/MIN/1.73
GLUCOSE BLD-MCNC: 157 MG/DL (ref 74–99)
HCO3 ARTERIAL: 27.6 MMOL/L (ref 22–26)
HCT VFR BLD CALC: 42.5 % (ref 37–54)
HEMOGLOBIN: 13 G/DL (ref 12.5–16.5)
IMMATURE GRANULOCYTES #: 0.04 E9/L
IMMATURE GRANULOCYTES %: 0.7 % (ref 0–5)
INR BLD: 1.1
LACTIC ACID: 1.2 MMOL/L (ref 0.5–2.2)
LUTEINIZING HORMONE: 5 MIU/ML
LV EF: 28 %
LVEF MODALITY: NORMAL
LYMPHOCYTES ABSOLUTE: 0.7 E9/L (ref 1.5–4)
LYMPHOCYTES RELATIVE PERCENT: 12.5 % (ref 20–42)
MAGNESIUM: 1.8 MG/DL (ref 1.6–2.6)
MCH RBC QN AUTO: 28.3 PG (ref 26–35)
MCHC RBC AUTO-ENTMCNC: 30.6 % (ref 32–34.5)
MCV RBC AUTO: 92.6 FL (ref 80–99.9)
MONOCYTES ABSOLUTE: 0.34 E9/L (ref 0.1–0.95)
MONOCYTES RELATIVE PERCENT: 6.1 % (ref 2–12)
NEUTROPHILS ABSOLUTE: 4.5 E9/L (ref 1.8–7.3)
NEUTROPHILS RELATIVE PERCENT: 80.5 % (ref 43–80)
O2 SATURATION: 95.2 % (ref 92–98.5)
OPERATOR ID: 8219
PATIENT TEMP: 37
PCO2 (TEMP CORRECTED): 46.1 MMHG (ref 35–45)
PDW BLD-RTO: 13.8 FL (ref 11.5–15)
PH (TEMPERATURE CORRECTED): 7.38 (ref 7.35–7.45)
PHOSPHORUS: 4.2 MG/DL (ref 2.5–4.5)
PLATELET # BLD: 263 E9/L (ref 130–450)
PMV BLD AUTO: 11 FL (ref 7–12)
PO2 (TEMP CORRECTED): 78.7 MMHG (ref 60–80)
POTASSIUM SERPL-SCNC: 4 MMOL/L (ref 3.5–5)
POTASSIUM, UR: 39.4 MMOL/L
PRO-BNP: 9932 PG/ML (ref 0–125)
PROCALCITONIN: 0.21 NG/ML (ref 0–0.08)
PROTHROMBIN TIME: 12.7 SEC (ref 9.3–12.4)
RBC # BLD: 4.59 E12/L (ref 3.8–5.8)
SODIUM BLD-SCNC: 141 MMOL/L (ref 132–146)
SODIUM URINE: 122 MMOL/L
SOURCE, BLOOD GAS: ABNORMAL
TOTAL PROTEIN: 7 G/DL (ref 6.4–8.3)
TROPONIN, HIGH SENSITIVITY: 28 NG/L (ref 0–11)
TROPONIN, HIGH SENSITIVITY: 29 NG/L (ref 0–11)
VITAMIN D 25-HYDROXY: 18 NG/ML (ref 30–100)
WBC # BLD: 5.6 E9/L (ref 4.5–11.5)

## 2022-01-05 PROCEDURE — 6360000002 HC RX W HCPCS: Performed by: INTERNAL MEDICINE

## 2022-01-05 PROCEDURE — 6370000000 HC RX 637 (ALT 250 FOR IP): Performed by: NURSE PRACTITIONER

## 2022-01-05 PROCEDURE — 85610 PROTHROMBIN TIME: CPT

## 2022-01-05 PROCEDURE — 97530 THERAPEUTIC ACTIVITIES: CPT

## 2022-01-05 PROCEDURE — 82728 ASSAY OF FERRITIN: CPT

## 2022-01-05 PROCEDURE — 6370000000 HC RX 637 (ALT 250 FOR IP): Performed by: INTERNAL MEDICINE

## 2022-01-05 PROCEDURE — 80076 HEPATIC FUNCTION PANEL: CPT

## 2022-01-05 PROCEDURE — 86140 C-REACTIVE PROTEIN: CPT

## 2022-01-05 PROCEDURE — 1200000000 HC SEMI PRIVATE

## 2022-01-05 PROCEDURE — 83735 ASSAY OF MAGNESIUM: CPT

## 2022-01-05 PROCEDURE — 82803 BLOOD GASES ANY COMBINATION: CPT

## 2022-01-05 PROCEDURE — 76705 ECHO EXAM OF ABDOMEN: CPT

## 2022-01-05 PROCEDURE — 82436 ASSAY OF URINE CHLORIDE: CPT

## 2022-01-05 PROCEDURE — 97165 OT EVAL LOW COMPLEX 30 MIN: CPT

## 2022-01-05 PROCEDURE — 94640 AIRWAY INHALATION TREATMENT: CPT

## 2022-01-05 PROCEDURE — 85025 COMPLETE CBC W/AUTO DIFF WBC: CPT

## 2022-01-05 PROCEDURE — 83880 ASSAY OF NATRIURETIC PEPTIDE: CPT

## 2022-01-05 PROCEDURE — 85730 THROMBOPLASTIN TIME PARTIAL: CPT

## 2022-01-05 PROCEDURE — 82306 VITAMIN D 25 HYDROXY: CPT

## 2022-01-05 PROCEDURE — XW0DXM6 INTRODUCTION OF BARICITINIB INTO MOUTH AND PHARYNX, EXTERNAL APPROACH, NEW TECHNOLOGY GROUP 6: ICD-10-PCS | Performed by: INTERNAL MEDICINE

## 2022-01-05 PROCEDURE — 36415 COLL VENOUS BLD VENIPUNCTURE: CPT

## 2022-01-05 PROCEDURE — 85378 FIBRIN DEGRADE SEMIQUANT: CPT

## 2022-01-05 PROCEDURE — 83605 ASSAY OF LACTIC ACID: CPT

## 2022-01-05 PROCEDURE — 84133 ASSAY OF URINE POTASSIUM: CPT

## 2022-01-05 PROCEDURE — 71045 X-RAY EXAM CHEST 1 VIEW: CPT

## 2022-01-05 PROCEDURE — 84300 ASSAY OF URINE SODIUM: CPT

## 2022-01-05 PROCEDURE — 85384 FIBRINOGEN ACTIVITY: CPT

## 2022-01-05 PROCEDURE — 83002 ASSAY OF GONADOTROPIN (LH): CPT

## 2022-01-05 PROCEDURE — 87449 NOS EACH ORGANISM AG IA: CPT

## 2022-01-05 PROCEDURE — 84100 ASSAY OF PHOSPHORUS: CPT

## 2022-01-05 PROCEDURE — 2580000003 HC RX 258: Performed by: INTERNAL MEDICINE

## 2022-01-05 PROCEDURE — 84145 PROCALCITONIN (PCT): CPT

## 2022-01-05 PROCEDURE — 80048 BASIC METABOLIC PNL TOTAL CA: CPT

## 2022-01-05 PROCEDURE — 82570 ASSAY OF URINE CREATININE: CPT

## 2022-01-05 PROCEDURE — 93308 TTE F-UP OR LMTD: CPT

## 2022-01-05 PROCEDURE — 87205 SMEAR GRAM STAIN: CPT

## 2022-01-05 PROCEDURE — 84484 ASSAY OF TROPONIN QUANT: CPT

## 2022-01-05 RX ORDER — PROCHLORPERAZINE EDISYLATE 5 MG/ML
10 INJECTION INTRAMUSCULAR; INTRAVENOUS EVERY 6 HOURS PRN
Status: DISCONTINUED | OUTPATIENT
Start: 2022-01-05 | End: 2022-01-18 | Stop reason: HOSPADM

## 2022-01-05 RX ADMIN — TAMSULOSIN HYDROCHLORIDE 0.4 MG: 0.4 CAPSULE ORAL at 09:15

## 2022-01-05 RX ADMIN — ARFORMOTEROL TARTRATE 15 MCG: 15 SOLUTION RESPIRATORY (INHALATION) at 05:20

## 2022-01-05 RX ADMIN — OXYCODONE HYDROCHLORIDE AND ACETAMINOPHEN 1000 MG: 500 TABLET ORAL at 09:14

## 2022-01-05 RX ADMIN — BUDESONIDE 500 MCG: 0.5 INHALANT RESPIRATORY (INHALATION) at 05:21

## 2022-01-05 RX ADMIN — BARICITINIB 2 MG: 2 TABLET, FILM COATED ORAL at 18:01

## 2022-01-05 RX ADMIN — BUPROPION HYDROCHLORIDE 150 MG: 150 TABLET, EXTENDED RELEASE ORAL at 20:35

## 2022-01-05 RX ADMIN — DOXYCYCLINE HYCLATE 100 MG: 100 CAPSULE ORAL at 09:15

## 2022-01-05 RX ADMIN — ZINC SULFATE 220 MG (50 MG) CAPSULE 50 MG: CAPSULE at 09:14

## 2022-01-05 RX ADMIN — DOXYCYCLINE HYCLATE 100 MG: 100 CAPSULE ORAL at 20:35

## 2022-01-05 RX ADMIN — GUAIFENESIN 1200 MG: 600 TABLET, EXTENDED RELEASE ORAL at 09:15

## 2022-01-05 RX ADMIN — ENOXAPARIN SODIUM 30 MG: 100 INJECTION SUBCUTANEOUS at 09:15

## 2022-01-05 RX ADMIN — PANTOPRAZOLE SODIUM 40 MG: 40 TABLET, DELAYED RELEASE ORAL at 09:14

## 2022-01-05 RX ADMIN — Medication 2000 UNITS: at 09:15

## 2022-01-05 RX ADMIN — SERTRALINE 100 MG: 50 TABLET, FILM COATED ORAL at 09:14

## 2022-01-05 RX ADMIN — GUAIFENESIN 1200 MG: 600 TABLET, EXTENDED RELEASE ORAL at 20:35

## 2022-01-05 RX ADMIN — CLOPIDOGREL 75 MG: 75 TABLET, FILM COATED ORAL at 09:14

## 2022-01-05 RX ADMIN — WATER 1000 MG: 1 INJECTION INTRAMUSCULAR; INTRAVENOUS; SUBCUTANEOUS at 20:34

## 2022-01-05 RX ADMIN — ASPIRIN 81 MG CHEWABLE TABLET 81 MG: 81 TABLET CHEWABLE at 09:13

## 2022-01-05 RX ADMIN — DEXAMETHASONE SODIUM PHOSPHATE 6 MG: 10 INJECTION INTRAMUSCULAR; INTRAVENOUS at 20:35

## 2022-01-05 RX ADMIN — BUDESONIDE 500 MCG: 0.5 INHALANT RESPIRATORY (INHALATION) at 17:53

## 2022-01-05 RX ADMIN — MONTELUKAST SODIUM 10 MG: 10 TABLET, FILM COATED ORAL at 20:34

## 2022-01-05 RX ADMIN — ARFORMOTEROL TARTRATE 15 MCG: 15 SOLUTION RESPIRATORY (INHALATION) at 17:53

## 2022-01-05 RX ADMIN — FUROSEMIDE 20 MG: 10 INJECTION, SOLUTION INTRAMUSCULAR; INTRAVENOUS at 09:15

## 2022-01-05 RX ADMIN — ALBUTEROL SULFATE 2.5 MG: 2.5 SOLUTION RESPIRATORY (INHALATION) at 17:52

## 2022-01-05 RX ADMIN — ENOXAPARIN SODIUM 30 MG: 100 INJECTION SUBCUTANEOUS at 20:34

## 2022-01-05 NOTE — H&P
Hyperlipidemia     Hypertension     Lymphoma ([facility] [address] 11/4/2011    had chemo  currently in remission    Myocardial infarction [facility])     more than 10 yrs ago    Sleep apnea     wont wear machine    Unspecified cerebral artery occlusion with cerebral infarction 2005    no deficits       PAST SURGICAL Hx:   Past Surgical History:   Procedure Laterality Date    ANKLE FRACTURE SURGERY Right 03/2014    ORIF right ankle    ANKLE SURGERY  2007    rt ankle    BRONCHOSCOPY  09/2011    bronch / medistinoscopy    CAROTID ENDARTERECTOMY Left     12 yrs ago    CHOLECYSTECTOMY      COLONOSCOPY  07/30/2013     [facility]    CORONARY ANGIOPLASTY WITH STENT PLACEMENT  2008    states has 2 stents  follows with DR Akilah Gabriel    ENDOSCOPY, COLON, DIAGNOSTIC      HAND SURGERY Left     fell on a buzzsaw    HERNIA REPAIR Bilateral 08/15/2019    HERNIA  BILATERAL INGUINAL REPAIR WITH MESH LAPAROSCOPIC ROBOTIC XI ASSISTED performed by Inocencia Stokes MD at [address] [place] [address]  06/29/2015    lapraoscopic cholecystectomy    SHOULDER ARTHROSCOPY Right 10/08/2015    rotator cuff repair, subachromoplasty with labrial debridement    SHOULDER ARTHROSCOPY Left 2/18/2021    LEFT ROTATOR CUFF REPAIR LABRAL DEBRIDEMENT SUBACROMIAL DECOMPRESSION performed by Dilcia Astorga DO at [place] [address]      UPPER GASTROINTESTINAL ENDOSCOPY         FAMILY Hx:  Family History   Problem Relation Age of Onset    Diabetes Mother     Heart Disease Mother     Diabetes Father     Heart Disease Father     Diabetes Sister     Cancer Sister     Diabetes Brother     Cancer Brother        HOME MEDICATIONS:  Prior to Admission medications    Medication Sig Start Date End Date Taking?  Authorizing Provider   tamsulosin (FLOMAX) 0.4 MG capsule TAKE 1 CAPSULE BY MOUTH AT BEDTIME 12/27/21   Raul Meadows DO   HYDROcodone-acetaminophen [facility]) 7.5-325 MG per tablet Take 1 tablet by mouth every 8 hours as needed for Pain for up to 30 days. 12/15/21 1/14/22  Ryan Catherine DO   guaiFENesin [facility]) 600 MG extended release tablet Take 2 tablets by mouth 2 times daily 12/15/21   Ryan Catherine, DO   sertraline (ZOLOFT) 100 MG tablet Take 1 tablet by mouth daily. 12/6/21   Ryan Catherine, DO   [facility]ion [facility]) 150 MG extended release tablet Take 1 tablet by mouth every 12 hours. 12/6/21   Ryan Catherine, DO   omeprazole (PRILOSEC) 40 MG delayed release capsule Take 1 capsule by mouth once daily. 11/29/21   Ryan Catherine, DO   clopidogrel (PLAVIX) 75 MG tablet Take 1 tablet by mouth daily. 11/29/21   Ryan Catherine, DO   simvastatin (ZOCOR) 20 MG tablet Take 1 tablet by mouth daily. 11/29/21   Ryan Catherine, DO   fenofibrate (TRICOR) 145 MG tablet Take 1 tablet by mouth once daily. 11/29/21   Ryan Catherine, DO   meloxicam (MOBIC) 15 MG tablet Take 1 tablet by mouth once daily. 11/29/21   Ryan Catherine, DO   montelukast (SINGULAIR) 10 MG tablet Take 1 tablet by mouth once daily. 11/29/21   Ryan Catherine, DO   albuterol sulfate HFA (PROAIR HFA) 108 (90 Base) MCG/ACT inhaler INHALE TWO PUFFS BY MOUTH EVERY 6 HOURS AS DIRECTED 11/18/21   Ryan Catherine, DO   lisinopril (PRINIVIL;ZESTRIL) 10 MG tablet Take 1 tablet by mouth daily.  10/28/21 1/26/22  Ryan Catherine DO   CHANTIX 1 MG tablet  3/21/21   Historical Provider, MD   Handicap Placard MISC by Does not apply route 3/17/21   DO Irish Hopkins MISC by Does not apply route Unable to ambulate more than 20 feet without difficulty  Expires 3/31/2026 3/17/21   Ryan Catherine DO   budesonide-formoterol [facility]) 80-4.5 MCG/ACT AERO Inhale 2 puffs into the lungs 2 times daily 2/17/21   Ryan Catherine,    dutasteride (AVODART) 0.5 MG capsule Take 1 capsule by mouth daily 9/2/20   Ryan Catherine,    sildenafil (REVATIO) 20 MG tablet Take one daily as needed 18   Sinda Dux, DO   LORazepam (ATIVAN) 1 MG tablet Take 1 mg by mouth daily as needed. 17   Historical Provider, MD   aspirin 81 MG chewable tablet Take 81 mg by mouth daily Ld 1 week ago patient not compliant with asa daughter stated ld about 1 week ago    Historical Provider, MD       ALLERGIES:  Midazolam hcl    SOCIAL Hx:  Social History     Socioeconomic History    Marital status:      Spouse name: Not on file    Number of children: Not on file    Years of education: Not on file    Highest education level: Not on file   Occupational History    Not on file   Tobacco Use    Smoking status: Former Smoker     Packs/day: 0.50     Years: 60.00     Pack years: 30.00     Types: Cigarettes     Quit date: 2020     Years since quittin.6    Smokeless tobacco: Never Used   Vaping Use    Vaping Use: Never used   Substance and Sexual Activity    Alcohol use: Yes     Comment: 2-3 beers daily    Drug use: Not Currently     Comment: in past cocaine last used     Sexual activity: Not Currently     Partners: Female   Other Topics Concern    Not on file   Social History Narrative    Not on file     Social Determinants of Health     Financial Resource Strain: High Risk    Difficulty of Paying Living Expenses: Hard   Food Insecurity: No Food Insecurity    Worried About Running Out of Food in the Last Year: Never true    Edsi of Food in the Last Year: Never true   Transportation Needs:     Lack of Transportation (Medical): Not on file    Lack of Transportation (Non-Medical):  Not on file   Physical Activity:     Days of Exercise per Week: Not on file    Minutes of Exercise per Session: Not on file   Stress:     Feeling of Stress : Not on file   Social Connections:     Frequency of Communication with Friends and Family: Not on file    Frequency of Social Gatherings with Friends and Family: Not on file    Attends [employer] Services: Not on file   Orly Active Member of Clubs or Organizations: Not on file    Attends Club or Organization Meetings: Not on file    Marital Status: Not on file   Intimate Partner Violence:     Fear of Current or Ex-Partner: Not on file    Emotionally Abused: Not on file    Physically Abused: Not on file    Sexually Abused: Not on file   Housing Stability:     Unable to Pay for Housing in the Last Year: Not on file    Number of [place] in the Last Year: Not on file    Unstable Housing in the Last Year: Not on file       Review of System:   Constitutional:   Positive for significant fatigue and malaise , + fever/chills  HEENT:   Denies ear pain, sore throat, sinus or eye problems. Cardiovascular:   Denies any chest pain, irregular heartbeats, or palpitations. Respiratory:   + dyspnea at rest, + dyspnea on exertion, + coughing, - sputum, - hemoptysis  Gastrointestinal:   Denies nausea, vomiting. + diarrhea, - constipation. + poor appetite and poor intake. Denies any abdominal pain. Genitourinary:    Denies any urgency, frequency, hematuria. Voiding  without difficulty. Extremities:   Denies lower extremity swelling, edema or cyanosis. Neurology:    Denies any headache or focal neurological deficits, positive for generalized weakness and fatigue without focal component  Psch:   Denies being anxious or depressed. Musculoskeletal:    Denies  myalgias, joint complaints or back pain. Integumentary:   Denies any rashes, ulcers, or excoriations. Denies bruising. Hematologic/Lymphatic:  Denies bruising or bleeding. PHYSICAL EXAM:  VITALS:  Vitals:    01/05/22 0054   BP: (!) 166/81   Pulse: 64   Resp: 19   Temp:    SpO2: 95%         General appearance:  Sleeping, easily awakens but returns to sleep examination. Responsive, oriented to person, place, and time. Ill appearing, no distress. Head:  Normocephalic. No masses, lesions or tenderness. Eyes:  PERRLA. EOMI. Sclera clear.   Buccal mucosa moist.  ENT:  Ears normal. Mucosa normal.  Neck:    Supple. Trachea midline. No thyromegaly. No JVD. No bruits. Heart:    Rhythm regular. Rate controlled. S1 and S2  Lungs:    Symmetrical.  Shallow and tachypneic pattern of respiration, diminished aeration throughout. Abdomen:   Soft. Obese. Non-tender. Non-distended. Bowel sounds positive. No organomegaly or masses. No pain on palpation. Extremities:    Peripheral pulses present. No peripheral edema. No ulcers. No cyanosis. No clubbing. Neurologic:    Sleeping, easily awakens but returned to sleep examination multiple times. When awake, he is oriented x 3. Generally weak without focal component focal deficit. Cranial nerves grossly intact. No focal weakness. Psych:   Behavior is normal. Mood appears normal. Speech is not rapid and/or pressured. Musculoskeletal:   Spine ROM normal. Muscular strength intact. Gait not assessed. Integumentary:  No rashes  Skin normal color and texture.   Genitalia/Breast:  Deferred    LABORATORY DATA:  CBC with Differential:    Lab Results   Component Value Date    WBC 8.2 01/04/2022    RBC 4.44 01/04/2022    HGB 12.5 01/04/2022    HCT 41.0 01/04/2022     01/04/2022    MCV 92.3 01/04/2022    MCH 28.2 01/04/2022    MCHC 30.5 01/04/2022    RDW 13.8 01/04/2022    SEGSPCT 66 07/21/2013    LYMPHOPCT 11.9 01/04/2022    MONOPCT 7.2 01/04/2022    BASOPCT 0.2 01/04/2022    MONOSABS 0.59 01/04/2022    LYMPHSABS 0.97 01/04/2022    EOSABS 0.01 01/04/2022    BASOSABS 0.02 01/04/2022     BMP:    Lab Results   Component Value Date     01/04/2022    K 4.1 01/04/2022     01/04/2022    CO2 27 01/04/2022    BUN 19 01/04/2022    LABALBU 4.1 01/04/2022    LABALBU 4.2 12/01/2011    CREATININE 1.5 01/04/2022    CALCIUM 8.8 01/04/2022    GFRAA 56 01/04/2022    LABGLOM 46 01/04/2022    GLUCOSE 97 01/04/2022    GLUCOSE 152 12/01/2011     Hepatic Function Panel:    Lab Results   Component Value Date    ALKPHOS 153 01/04/2022     01/04/2022  01/04/2022    PROT 7.4 01/04/2022    BILITOT 0.5 01/04/2022    BILIDIR 0.2 12/01/2011    LABALBU 4.1 01/04/2022    LABALBU 4.2 12/01/2011     Recent Labs     01/04/22  1528   TROPHS 38*     Recent Labs     01/04/22  1528   DDIMER 477       ASSESSMENT:  · Acute respiratory failure with hypoxia secondary to COVID-19 pneumonia in an unvaccinated host  · Acute exacerbation of COPD secondary to viral pneumonia  · Hypertensive urgency with underlying history of hypertension  · Asymptomatic coronary artery disease with drug-eluting stents in place  · Transaminitis  · Decompensated diastolic congestive heart failure with associated pleural effusion  · Chronic kidney disease stage IIIa  · Hyperlipidemia  · Vascular disease with history of TIA and left carotid stenting   · Non-Hodgkin's lymphoma with prior chemotherapy followed by the heart center   · Untreated obstructive sleep apnea     PLAN:  Marquise Marina was admitted from the emergency department late last evening due to acute respiratory failure and hypoxia from COVID-19 pneumonia. He was rather distressed on arrival but has improved. Presently he has been weaned down to 4 L of nasal cannula oxygen during examination and maintains saturations of approximately 94%. He returns to sleep easily during examination and has a history of sleep apnea. I have concern for CO2 retention and arterial blood gas will be obtained. I have discussed introduction of CPAP or BiPAP nocturnally with him and he is not overly receptive to this as he does not rest well with the mask in place. We have discussed the importance of treating sleep apnea but he is not open to consider this at present. Transaminitis is noted and may be due to COVID-19 infection. Right upper quadrant ultrasound will be obtained we will monitor labs daily and address accordingly statin medication will be held. Mild decompensation of underlying diastolic dysfunction is noted with pleural effusion.  We will diurese gently

## 2022-01-05 NOTE — PROGRESS NOTES
[address] [facility] [address]  [facility] [place]. [place]        Date:2022                                                  Patient Name: Maggie Nava    MRN: [id number]    : 1950    Room: [facility]-84      Evaluating OT: Adore Yang OTR/L #[id number]     Referring Provider and Specific Provider Orders/Date:      22  OT eval and treat  Start:  22,   End:  22,   ONE TIME,   Standing Count:  1 Occurrences,   R         Deepali Powell, DO      Placement Recommendation: HHOT       Diagnosis:   1. Respiratory distress    2. COVID-19    3.  Hypoxia           Surgery: None      Pertinent Medical History:       Past Medical History:   Diagnosis Date    Anesthesia complication     wakes up  violant   only ok if reversed with romazacon    Arthritis     shoulders,ankle    CAD (coronary artery disease)     COPD (chronic obstructive pulmonary disease) ([facility] [address]     Erectile dysfunction     GERD (gastroesophageal reflux disease)     H/O coronary angioplasty with stents[V45.82] 2018 another stent    Hyperlipidemia     Hypertension     Lymphoma ([facility] [address] 2011    had chemo  currently in remission    Myocardial infarction [facility])     more than 10 yrs ago    Sleep apnea     wont wear machine    Unspecified cerebral artery occlusion with cerebral infarction     no deficits         Past Surgical History:   Procedure Laterality Date    ANKLE FRACTURE SURGERY Right 2014    ORIF right ankle    ANKLE SURGERY      rt ankle    BRONCHOSCOPY  2011    bronch / medistinoscopy    CAROTID ENDARTERECTOMY Left     12 yrs ago    CHOLECYSTECTOMY      COLONOSCOPY  2013     [facility]    CORONARY ANGIOPLASTY WITH STENT PLACEMENT  2008    states has 2 stents  follows with DR Drea Odonnell    ENDOSCOPY, COLON, DIAGNOSTIC      HAND SURGERY Left     fell on a buzzsaw    HERNIA REPAIR Bilateral 08/15/2019    HERNIA  BILATERAL INGUINAL REPAIR WITH MESH LAPAROSCOPIC ROBOTIC XI ASSISTED performed by Nick Reyna MD at [address] [facility]  06/29/2015    lapraoscopic cholecystectomy    SHOULDER ARTHROSCOPY Right 10/08/2015    rotator cuff repair, subachromoplasty with labrial debridement    SHOULDER ARTHROSCOPY Left 2/18/2021    LEFT ROTATOR CUFF REPAIR LABRAL DEBRIDEMENT SUBACROMIAL DECOMPRESSION performed by Jw Sanders DO at [place] [address]      UPPER GASTROINTESTINAL ENDOSCOPY          Precautions:  Fall Risk, droplet plus isolation, COVID positive, 3L supplemental O2 via NC.       Assessment of current deficits    [x] Functional mobility  [x]ADLs  [x] Strength               []Cognition    [x] Functional transfers   [x] IADLs         [x] Safety Awareness   [x]Endurance    [] Fine Coordination              [x] Balance      [] Vision/perception   []Sensation     []Gross Motor Coordination  [] ROM  [] Delirium                   [] Motor Control     OT PLAN OF CARE   OT POC based on physician orders, patient diagnosis and results of clinical assessment    Frequency/Duration 1-3 days/wk for 2 weeks PRN     Specific OT Treatment Interventions to include:   * Instruction/training on adapted ADL techniques and AE recommendations to increase functional independence within precautions       * Training on energy conservation strategies, correct breathing pattern and techniques to improve independence/tolerance for self-care routine  * Functional transfer/mobility training/DME recommendations for increased independence, safety, and fall prevention  * Patient/Family education to increase follow through with safety techniques and functional independence  * Recommendation of environmental modifications for increased safety with functional transfers/mobility and ADLs  * Therapeutic exercise to improve motor endurance, ROM, and functional strength for ADLs/functional transfers  * Therapeutic activities to facilitate/challenge dynamic balance, stand tolerance for increased safety and independence with ADLs    Recommended Adaptive Equipment: Possible shower chair     Home Living: Lives alone, single family home, bi-level, 2 steps to enter, 9 steps downward to kitchen, 3 steps upstairs to living room and bedroom, 3 stairs upstairs to bathroom. Bathroom set-up: tub/shower         Equipment owned: wheeled, cane     Prior Level of Function: Independent with ADLs , Independent with IADLs; ambulated independently without AD. Driving: Yes   Occupation: Field work/heavy [address]   Enjoys: Staying active      Pain Level: Pt denied pain. Cognition: A&O: 4/4; Follows 3 step directions   Memory: good    Sequencing: good    Problem solving: good    Judgement/safety: fair     [facility]   AM-PAC Daily Activity Inpatient   How much help for putting on and taking off regular lower body clothing?: A Lot  How much help for Bathing?: A Lot  How much help for Toileting?: A Little  How much help for putting on and taking off regular upper body clothing?: A Little  How much help for taking care of personal grooming?: A Little  How much help for eating meals?: None  AM-[facility] Inpatient Daily Activity Raw Score: 17  AM-PAC Inpatient ADL T-Scale Score : 37.26  ADL Inpatient CMS 0-100% Score: 50.11  ADL Inpatient CMS G-Code Modifier : CK     Functional Assessment:    Initial Eval Status  Date: 1/5/21 Treatment Status  Date: STGs = LTGs  Time frame: 10-14 days   Feeding Independent     Independent    Grooming Minimal Assist     Moderate [place]    UB Dressing Minimal Assist     Moderate [place]    LB Dressing Moderate Assist   Pt unable to don socks at EOB, does not wear socks at home.  Pt donned slip on shoes with set-up assist.   Moderate [place]    Bathing Moderate Assist     Moderate [place]    Toileting Minimal Assist   Simulated seated on commode   Moderate [place]    Bed Mobility  Supine to sit:  n/a  Sit to supine: n/a   Supine to sit: Independent   Sit to supine: Independent    Functional Transfers Sit to stand: Stand by Assist  Stand to sit: Stand by Assist   Commode transfer: Stand by Assist     Independent    Functional Mobility Minimal Assist with HHA to improve balance to/from bathroom, verbal cues for pacing and overall safety. Independent    Balance Sitting:     Static: stand by assist    Dynamic: stand by assist   Standing: minimal assist with HHA    Sitting:     Static: indep    Dynamic: indep  Standing:  indep    Activity Tolerance fair /fair plus; sitting tolerance at EOB >20 mins. Pt reports mild SOB with increased activity demands. Increase standing tolerance >3 minutes for improved engagement with functional transfers and indep in ADLs   Visual/  Perceptual Glasses: No     Reports changes in vision since admission: No      NA      Hand Dominance: Right      AROM (PROM) Strength Additional Info:    RUE  WFL Deferred d/t previous shoulder injury  good  and wfl FMC/dexterity noted during ADL tasks     LUE WFL 4-/5 good  and wfl FMC/dexterity noted during ADL tasks       Hearing:  [facility]   Sensation:   No c/o numbness or tingling  Tone:  WFL   Edema:      Vitals:  HR at rest: 63 bpm HR with activity: 65 bpm HR at end of session: 60s bpm   SpO2 at rest: 93% on 3L SpO2 with activity: 90-95% SpO2 at end of session: 95%   BP at rest:  BP with activity:  BP at end of session:      Comments: RN cleared patient for OT. Upon arrival patient sitting at [facility], daughter at bedside. Therapist facilitated and instructed pt on adapted  techniques & compensatory strategies to improve safety and independence with basic ADLs, bed mobility, functional transfers and mobility  to allow pt to achieve highest level of independence and safely. Pt demonstrated fair/good understanding of education & follow through.   At end of session, patient was with call light and phone within reach, all lines and tubes intact. Overall, patient demonstrated  decreased independence and safety during completion of ADL tasks. Pt would benefit from continued skilled OT to increase safety and independence with completion of ADL tasks and functional mobility for improved quality of life. Treatment: OT treatment provided this date includes:    Instruction/training on safety and adapted techniques for completion of ADLs   Instruction/training on safe functional mobility/transfer techniques   Instruction/training on energy conservation/work simplification for completion of ADLs  · Sitting EOB >20 minutes to improve dynamic sitting balance and activity tolerance during ADLs  · B UE there ex x12-15 reps through all planes to increase strength/ROM for ADLs  · Skilled monitoring of O2 sats-  see section above     Rehab Potential: Good for established goals. Patient / Family Goal: Return home      Patient and/or family were instructed on functional diagnosis, prognosis/goals and OT plan of care. Demonstrated fair/good understanding. Eval Complexity: Low    Time In: 9:45 AM   Time Out: 10:15 AM    Total Treatment Time: 10       Min Units   OT Eval Low 97165  X  1    OT Eval Medium 13847      OT Eval High 05465      OT Re-Eval [id number]            ADL/Self Care 64050     Therapeutic Activities 79106 10 1   Therapeutic Ex 28299       Orthotic Management 18343       Manual 10406     Neuro Re-Ed 40210       Non-Billable Time        Evaluation Time additionally includes thorough review of current medical information, gathering information on past medical history/social history and prior level of function, interpretation of standardized testing/informal observation of tasks, assessment of data and development of plan of care and goals.         Evaluating OT: Chadwick Lennon OTR/L #[id number]

## 2022-01-05 NOTE — ED NOTES
Nurse to Nurse given to Alyson Lacey RN. All questions answered at this time.      Alea Iniguez RN  01/05/22 6725

## 2022-01-06 LAB
ALBUMIN SERPL-MCNC: 4.1 G/DL (ref 3.5–5.2)
ALP BLD-CCNC: 129 U/L (ref 40–129)
ALT SERPL-CCNC: 95 U/L (ref 0–40)
ANION GAP SERPL CALCULATED.3IONS-SCNC: 11 MMOL/L (ref 7–16)
AST SERPL-CCNC: 89 U/L (ref 0–39)
BASOPHILS ABSOLUTE: 0.01 E9/L (ref 0–0.2)
BASOPHILS RELATIVE PERCENT: 0.2 % (ref 0–2)
BILIRUB SERPL-MCNC: 0.5 MG/DL (ref 0–1.2)
BILIRUBIN DIRECT: 0.3 MG/DL (ref 0–0.3)
BILIRUBIN, INDIRECT: 0.2 MG/DL (ref 0–1)
BUN BLDV-MCNC: 32 MG/DL (ref 6–23)
C-REACTIVE PROTEIN: 1.9 MG/DL (ref 0–0.4)
CALCIUM SERPL-MCNC: 9.2 MG/DL (ref 8.6–10.2)
CHLORIDE BLD-SCNC: 100 MMOL/L (ref 98–107)
CO2: 29 MMOL/L (ref 22–29)
CREAT SERPL-MCNC: 1.3 MG/DL (ref 0.7–1.2)
D DIMER: 227 NG/ML DDU
EOSINOPHILS ABSOLUTE: 0 E9/L (ref 0.05–0.5)
EOSINOPHILS RELATIVE PERCENT: 0 % (ref 0–6)
GFR AFRICAN AMERICAN: >60
GFR NON-AFRICAN AMERICAN: 54 ML/MIN/1.73
GLUCOSE BLD-MCNC: 118 MG/DL (ref 74–99)
HCT VFR BLD CALC: 45.2 % (ref 37–54)
HEMOGLOBIN: 13.8 G/DL (ref 12.5–16.5)
IMMATURE GRANULOCYTES #: 0.02 E9/L
IMMATURE GRANULOCYTES %: 0.3 % (ref 0–5)
L. PNEUMOPHILA SEROGP 1 UR AG: NORMAL
LYMPHOCYTES ABSOLUTE: 1.07 E9/L (ref 1.5–4)
LYMPHOCYTES RELATIVE PERCENT: 18.7 % (ref 20–42)
MCH RBC QN AUTO: 28.2 PG (ref 26–35)
MCHC RBC AUTO-ENTMCNC: 30.5 % (ref 32–34.5)
MCV RBC AUTO: 92.2 FL (ref 80–99.9)
MONOCYTES ABSOLUTE: 0.41 E9/L (ref 0.1–0.95)
MONOCYTES RELATIVE PERCENT: 7.2 % (ref 2–12)
NEUTROPHILS ABSOLUTE: 4.22 E9/L (ref 1.8–7.3)
NEUTROPHILS RELATIVE PERCENT: 73.6 % (ref 43–80)
PDW BLD-RTO: 13.9 FL (ref 11.5–15)
PLATELET # BLD: 291 E9/L (ref 130–450)
PMV BLD AUTO: 11.1 FL (ref 7–12)
POTASSIUM SERPL-SCNC: 3.7 MMOL/L (ref 3.5–5)
PROCALCITONIN: 0.18 NG/ML (ref 0–0.08)
RBC # BLD: 4.9 E12/L (ref 3.8–5.8)
SODIUM BLD-SCNC: 140 MMOL/L (ref 132–146)
STREP PNEUMONIAE ANTIGEN, URINE: NORMAL
TOTAL PROTEIN: 7.5 G/DL (ref 6.4–8.3)
WBC # BLD: 5.7 E9/L (ref 4.5–11.5)

## 2022-01-06 PROCEDURE — 99223 1ST HOSP IP/OBS HIGH 75: CPT | Performed by: INTERNAL MEDICINE

## 2022-01-06 PROCEDURE — 2700000000 HC OXYGEN THERAPY PER DAY

## 2022-01-06 PROCEDURE — 94640 AIRWAY INHALATION TREATMENT: CPT

## 2022-01-06 PROCEDURE — APPSS60 APP SPLIT SHARED TIME 46-60 MINUTES: Performed by: PHYSICIAN ASSISTANT

## 2022-01-06 PROCEDURE — 80076 HEPATIC FUNCTION PANEL: CPT

## 2022-01-06 PROCEDURE — 6360000002 HC RX W HCPCS: Performed by: INTERNAL MEDICINE

## 2022-01-06 PROCEDURE — 6370000000 HC RX 637 (ALT 250 FOR IP): Performed by: INTERNAL MEDICINE

## 2022-01-06 PROCEDURE — 80048 BASIC METABOLIC PNL TOTAL CA: CPT

## 2022-01-06 PROCEDURE — 36415 COLL VENOUS BLD VENIPUNCTURE: CPT

## 2022-01-06 PROCEDURE — 97161 PT EVAL LOW COMPLEX 20 MIN: CPT | Performed by: PHYSICAL THERAPIST

## 2022-01-06 PROCEDURE — 84145 PROCALCITONIN (PCT): CPT

## 2022-01-06 PROCEDURE — 97116 GAIT TRAINING THERAPY: CPT | Performed by: PHYSICAL THERAPIST

## 2022-01-06 PROCEDURE — 86140 C-REACTIVE PROTEIN: CPT

## 2022-01-06 PROCEDURE — 85025 COMPLETE CBC W/AUTO DIFF WBC: CPT

## 2022-01-06 PROCEDURE — 6370000000 HC RX 637 (ALT 250 FOR IP): Performed by: NURSE PRACTITIONER

## 2022-01-06 PROCEDURE — 1200000000 HC SEMI PRIVATE

## 2022-01-06 PROCEDURE — 85378 FIBRIN DEGRADE SEMIQUANT: CPT

## 2022-01-06 PROCEDURE — 2580000003 HC RX 258: Performed by: INTERNAL MEDICINE

## 2022-01-06 RX ORDER — CARVEDILOL 6.25 MG/1
6.25 TABLET ORAL 2 TIMES DAILY WITH MEALS
Status: DISCONTINUED | OUTPATIENT
Start: 2022-01-06 | End: 2022-01-09

## 2022-01-06 RX ORDER — SPIRONOLACTONE 25 MG/1
25 TABLET ORAL DAILY
Status: DISCONTINUED | OUTPATIENT
Start: 2022-01-06 | End: 2022-01-09

## 2022-01-06 RX ADMIN — BUDESONIDE 500 MCG: 0.5 INHALANT RESPIRATORY (INHALATION) at 20:47

## 2022-01-06 RX ADMIN — ARFORMOTEROL TARTRATE 15 MCG: 15 SOLUTION RESPIRATORY (INHALATION) at 20:47

## 2022-01-06 RX ADMIN — CARVEDILOL 6.25 MG: 6.25 TABLET, FILM COATED ORAL at 18:37

## 2022-01-06 RX ADMIN — ARFORMOTEROL TARTRATE 15 MCG: 15 SOLUTION RESPIRATORY (INHALATION) at 07:21

## 2022-01-06 RX ADMIN — GUAIFENESIN 1200 MG: 600 TABLET, EXTENDED RELEASE ORAL at 09:48

## 2022-01-06 RX ADMIN — DEXAMETHASONE SODIUM PHOSPHATE 6 MG: 10 INJECTION INTRAMUSCULAR; INTRAVENOUS at 18:48

## 2022-01-06 RX ADMIN — GUAIFENESIN 1200 MG: 600 TABLET, EXTENDED RELEASE ORAL at 21:24

## 2022-01-06 RX ADMIN — BUDESONIDE 500 MCG: 0.5 INHALANT RESPIRATORY (INHALATION) at 07:21

## 2022-01-06 RX ADMIN — ENOXAPARIN SODIUM 30 MG: 100 INJECTION SUBCUTANEOUS at 09:47

## 2022-01-06 RX ADMIN — TAMSULOSIN HYDROCHLORIDE 0.4 MG: 0.4 CAPSULE ORAL at 09:47

## 2022-01-06 RX ADMIN — DOXYCYCLINE HYCLATE 100 MG: 100 CAPSULE ORAL at 09:48

## 2022-01-06 RX ADMIN — PANTOPRAZOLE SODIUM 40 MG: 40 TABLET, DELAYED RELEASE ORAL at 09:47

## 2022-01-06 RX ADMIN — FENOFIBRATE 160 MG: 160 TABLET ORAL at 09:48

## 2022-01-06 RX ADMIN — OXYCODONE HYDROCHLORIDE AND ACETAMINOPHEN 1000 MG: 500 TABLET ORAL at 09:48

## 2022-01-06 RX ADMIN — DOXYCYCLINE HYCLATE 100 MG: 100 CAPSULE ORAL at 21:25

## 2022-01-06 RX ADMIN — BARICITINIB 2 MG: 2 TABLET, FILM COATED ORAL at 09:52

## 2022-01-06 RX ADMIN — Medication 2000 UNITS: at 09:48

## 2022-01-06 RX ADMIN — BUPROPION HYDROCHLORIDE 150 MG: 150 TABLET, EXTENDED RELEASE ORAL at 21:24

## 2022-01-06 RX ADMIN — FUROSEMIDE 20 MG: 10 INJECTION, SOLUTION INTRAMUSCULAR; INTRAVENOUS at 09:48

## 2022-01-06 RX ADMIN — MONTELUKAST SODIUM 10 MG: 10 TABLET, FILM COATED ORAL at 21:26

## 2022-01-06 RX ADMIN — ENOXAPARIN SODIUM 30 MG: 100 INJECTION SUBCUTANEOUS at 21:24

## 2022-01-06 RX ADMIN — CARVEDILOL 6.25 MG: 6.25 TABLET, FILM COATED ORAL at 12:43

## 2022-01-06 RX ADMIN — BUPROPION HYDROCHLORIDE 150 MG: 150 TABLET, EXTENDED RELEASE ORAL at 09:47

## 2022-01-06 RX ADMIN — SPIRONOLACTONE 25 MG: 25 TABLET ORAL at 12:46

## 2022-01-06 RX ADMIN — SERTRALINE 100 MG: 50 TABLET, FILM COATED ORAL at 09:47

## 2022-01-06 RX ADMIN — ASPIRIN 81 MG CHEWABLE TABLET 81 MG: 81 TABLET CHEWABLE at 09:49

## 2022-01-06 RX ADMIN — WATER 1000 MG: 1 INJECTION INTRAMUSCULAR; INTRAVENOUS; SUBCUTANEOUS at 18:48

## 2022-01-06 RX ADMIN — ZINC SULFATE 220 MG (50 MG) CAPSULE 50 MG: CAPSULE at 09:48

## 2022-01-06 RX ADMIN — CLOPIDOGREL 75 MG: 75 TABLET, FILM COATED ORAL at 09:48

## 2022-01-06 NOTE — CARE COORDINATION
1/6/2022 1520 CM note: POSITIVE COVID 1/4/22. ACP done. Spoke with pt by phone for transition of care needs. Pt resides alone in bilevel home, 2 step entry. PTA, he was independent with ADLs. Pt owns cane and ww but does not use. PCP is Dr Jo Lang and uses [employer]. Pt wearing [email or web address] and does not have home o2. IF o2 is needed at dc, pt has no DME preference. [facility] PT recommendations for [address] [facility] reviewed with pt, pt declines HHC at this time. Reinaldo Gonzalez rep, notified of life vest order. Lisa Borjas will obtain auth for life vest and deliver to patient. Pt plans to return home at dc. CM/SW to follow for possible o2/entresto needs.   Mary Grace SUAREZ

## 2022-01-06 NOTE — CONSULTS
Inpatient [facility] Cardiology Consultation      Reason for Consult: Cardiomyopathy    Consulting Physician: Dr. Jyotsna Lynn    Requesting Physician: Dr. Siobhan Bryan    Date of Consultation: 1/6/2022    HISTORY OF PRESENT ILLNESS:   Patient is a 70year old WM who is known to Dr. Ross Gonzalez. He also follows with Cardiology at [facility] (last seen by Dr. Hakeem Raymundo in 2018). He is being seen in consultation this hospital admission by Dr. Jyotsna Lynn for evaluation and recommendations regarding cardiomyopathy. Of note, patient is COVID-19+ and in droplet-plus isolation. In order to preserve PPE and limit exposure, interview was obtained via telephone. Patient has a known past medical history of COPD, obesity, hypertension, hyperlipidemia, coronary artery disease s/p prior PCI/stent placement (further details as noted below), history of TIA, ? LV dysfunction (EF 40%) by stress testing per [facility] records, carotid artery disease s/p left carotid stenting, former tobacco smoker, chronic kidney disease, untreated JOSE, anxiety, BPH, chronic back pain on chronic opioid therapy and non-Hodgkin's lymphoma s/p chemotherapy (follows with the [facility]). Patient reportedly presented to [address] [place] [address] on January 4, 2022 with complaints of worsening shortness of breath, cough and muscle aches for the past week or two prior to hospital presentation. He was found to have to acute hypoxic respiratory failure in setting of COVID-19+ pneumonia. Echocardiogram was obtained --> revealed EF < 35% --> cardiology consultation was requested for further evaluation and management. Patient denies complaints of chest discomfort since hospital admission. No note of nausea, emesis, diaphoresis, dizziness, palpitations, near-syncope or syncope. Family and social history as noted below. Labs and diagnostic testing as noted below.       Please note: past medical records were reviewed per electronic medical record (EMR) - see detailed reports under Past Medical/ Surgical History. PAST MEDICAL HISTORY:    · COPD. Follows with Dr. Maryann Brunner. · Former tobacco smoker. Quit 2021. · Obesity. · Hypertension. · Hyperlipidemia, on statin and fenofibrate therapy. · Coronary artery disease. S/p prior PCI as noted below. · History of TIA. · Carotid artery disease. S/p left carotid stent placement. · Chronic kidney disease. Baseline Cr appears around 1.2 to 1.5.  · Non-Hodgkin's lymphoma s/p chemotherapy. First diagnosed in 2011. Follows with the [facility]. · Untreated JOSE. · Benign prostatic hypertrophy. · Anxiety. · Chronic back pain, on chronic opioid therapy. CARDIAC TESTING    TTE (Dr. Mir Ordoñez, 2011)  Summary    Left ventricular size is grossly normal.    Mild left venticular concentric hypertrophy noted. Ejection fraction is visually estimated at 64%. No evidence of left ventricular mass or thrombus noted. No regional wall motion abnormalities seen. Borderline dilated right ventricle. No evidence of a thrombus in the right ventricle. Structurally normal mitral valve. Physiologic and/or trace mitral regurgitation is present. No mitral valve stenosis present. The tricuspid valve appears structurally normal.    Physiologic and/or trace tricuspid regurgitation. RVSP is 15.73 mmHg. Regular rhythm. Lexiscan Stress Test (Per [facility] notes, 3/2018)  No evidence of ischemia  EF 40%   Mild global hypokinesis     Left heart catheterization ([facility], 3/2018)  LMT: - The LMT is normal.   LAD:   - The LAD has mild luminal irregularities. LCX: - There was estatic. - The distal circumflex is narrowed 60 % - focal disease. RAMUS: - The Ramus is Absent. RCA:   - The mid RCA is narrowed 80 % - ISR.    Additional Comment: Focal ISR at distal edge of stent with another 80% focal   stenosis distal to stent.    S/p PCI and TATI x 1 to the distal RCA    TTE (Dr. Mir Ordoñez, 12/2018)   Summary   Left ventricular size is grossly normal. Mild left ventricular concentric hypertrophy noted. Ejection fraction is visually estimated at 50%. No evidence of left ventricular mass or thrombus noted. No regional wall motion abnormalities seen. Borderline dilated right ventricle. No evidence of a thrombus in the right ventricle. Physiologic and/or trace mitral regurgitation is present. No evidence of mitral valve stenosis. Physiologic and/or trace tricuspid regurgitation. Regular rhythm. PAST SURGICAL HISTORY:    Past Surgical History:   Procedure Laterality Date    ANKLE FRACTURE SURGERY Right 03/2014    ORIF right ankle    ANKLE SURGERY  2007    rt ankle    BRONCHOSCOPY  09/2011    bronch / medistinoscopy    CAROTID ENDARTERECTOMY Left     12 yrs ago    CHOLECYSTECTOMY      COLONOSCOPY  07/30/2013     [facility]    CORONARY ANGIOPLASTY WITH STENT PLACEMENT  2008    states has 2 stents  follows with DR Jennifer Hill    ENDOSCOPY, COLON, DIAGNOSTIC      HAND SURGERY Left     fell on a buzzsaw    HERNIA REPAIR Bilateral 08/15/2019    HERNIA  BILATERAL INGUINAL REPAIR WITH MESH LAPAROSCOPIC ROBOTIC XI ASSISTED performed by Anibal Dejesus MD at [address] [place] [address]  06/29/2015    lapraoscopic cholecystectomy    SHOULDER ARTHROSCOPY Right 10/08/2015    rotator cuff repair, subachromoplasty with labrial debridement    SHOULDER ARTHROSCOPY Left 2/18/2021    LEFT ROTATOR CUFF REPAIR LABRAL DEBRIDEMENT SUBACROMIAL DECOMPRESSION performed by Camille Posada DO at [facility]:  Prior to Admission medications    Medication Sig Start Date End Date Taking? Authorizing Provider   tamsulosin (FLOMAX) 0.4 MG capsule TAKE 1 CAPSULE BY MOUTH AT BEDTIME 12/27/21  Yes Trevor Padgett DO   HYDROcodone-acetaminophen (NORCO) 7.5-325 MG per tablet Take 1 tablet by mouth every 8 hours as needed for Pain for up to 30 days.  12/15/21 1/14/22 Yes Power Sanders DO   sertraline (ZOLOFT) 100 MG tablet Take 1 tablet by mouth daily. 12/6/21  Yes Power Sanders DO   buPROPion [facility]) 150 MG extended release tablet Take 1 tablet by mouth every 12 hours. 12/6/21  Yes Power Sanders DO   omeprazole (PRILOSEC) 40 MG delayed release capsule Take 1 capsule by mouth once daily. 11/29/21  Yes Power Sanedrs DO   clopidogrel (PLAVIX) 75 MG tablet Take 1 tablet by mouth daily. 11/29/21  Yes Power Sanders DO   simvastatin (ZOCOR) 20 MG tablet Take 1 tablet by mouth daily. 11/29/21  Yes Power Sanders DO   fenofibrate (TRICOR) 145 MG tablet Take 1 tablet by mouth once daily. 11/29/21  Yes Power Sanders DO   meloxicam (MOBIC) 15 MG tablet Take 1 tablet by mouth once daily. 11/29/21  Yes Power Sanders DO   montelukast (SINGULAIR) 10 MG tablet Take 1 tablet by mouth once daily. 11/29/21  Yes Power Sanders DO   lisinopril (PRINIVIL;ZESTRIL) 10 MG tablet Take 1 tablet by mouth daily.  10/28/21 1/26/22 Yes Power Sanders DO   aspirin 81 MG chewable tablet Take 81 mg by mouth daily Ld 1 week ago patient not compliant with asa daughter stated ld about 1 week ago   Yes Historical Provider, MD   guaiFENesin (MUCINEX) 600 MG extended release tablet Take 2 tablets by mouth 2 times daily 12/15/21   Power Sanders DO   albuterol sulfate HFA (PROAIR HFA) 108 (90 Base) MCG/ACT inhaler INHALE TWO PUFFS BY MOUTH EVERY 6 HOURS AS DIRECTED 11/18/21   Power Sanders DO   CHANTIX 1 MG tablet  3/21/21   Historical Provider, MD   Handicap Placard MISC by Does not apply route 3/17/21   DO Irish Tejeda MISC by Does not apply route Unable to ambulate more than 20 feet without difficulty  Expires 3/31/2026 3/17/21   Power Sanders DO   budesonide-formoterol [facility]) 80-4.5 MCG/ACT AERO Inhale 2 puffs into the lungs 2 times daily 2/17/21   Power Sanders DO   dutasteride (AVODART) 0.5 MG capsule Take 1 capsule by mouth daily 9/2/20   Taras Alcala DO   sildenafil (REVATIO) 20 MG tablet Take one daily as needed 5/24/18   Taras Alcala DO   LORazepam (ATIVAN) 1 MG tablet Take 1 mg by mouth daily as needed.   5/4/17   Historical Provider, MD       CURRENT MEDICATIONS:      Current Facility-Administered Medications:     spironolactone (ALDACTONE) tablet 25 mg, 25 mg, Oral, Daily, JULIAN Bergeron CNP    carvedilol (COREG) tablet 6.25 mg, 6.25 mg, Oral, BID WC, JULIAN Bergeron CNP    prochlorperazine (COMPAZINE) injection 10 mg, 10 mg, IntraVENous, Q6H PRN, Irlanda Dill DO    baricitinib (OLUMIANT) tablet 2 mg, 2 mg, Oral, Daily, JULIAN Bergeron CNP, 2 mg at 01/06/22 2720    aspirin chewable tablet 81 mg, 81 mg, Oral, Daily, Irlanda Dill DO, 81 mg at 01/06/22 0949    buPROPion [facility] SR) extended release tablet 150 mg, 150 mg, Oral, BID, Irlanda Dill DO, 150 mg at 01/06/22 0947    clopidogrel (PLAVIX) tablet 75 mg, 75 mg, Oral, Daily, Irlanda Dill DO, 75 mg at 01/06/22 0948    fenofibrate (TRIGLIDE) tablet 160 mg, 160 mg, Oral, Daily, Irlanda Dill DO, 160 mg at 01/06/22 0948    guaiFENesin (MUCINEX) extended release tablet 1,200 mg, 1,200 mg, Oral, BID, Irlanda Dill DO, 1,200 mg at 01/06/22 0948    HYDROcodone-acetaminophen (NORCO) 7.5-325 MG per tablet 1 tablet, 1 tablet, Oral, Q8H PRN, Irlanda Dill DO    LORazepam (ATIVAN) tablet 1 mg, 1 mg, Oral, Daily PRN, Irlanda Dill DO    montelukast (SINGULAIR) tablet 10 mg, 10 mg, Oral, Nightly, Irlanda Dill DO, 10 mg at 01/05/22 2034    pantoprazole (PROTONIX) tablet 40 mg, 40 mg, Oral, QAM, Irlanda Dill DO, 40 mg at 01/06/22 2714    sertraline (ZOLOFT) tablet 100 mg, 100 mg, Oral, Daily, Irlanda Dill DO, 100 mg at 01/06/22 0947    [Held by provider] atorvastatin (LIPITOR) tablet 20 mg, 20 mg, Oral, Nightly, Irlanda Dill DO, 20 mg at 01/04/22 2041    tamsulosin (FLOMAX) capsule 0.4 mg, 0.4 mg, Oral, Daily, Vidhi Ser, DO, 0.4 mg at 01/06/22 0947    cefTRIAXone (ROCEPHIN) 1,000 mg in sterile water 10 mL IV syringe, 1,000 mg, IntraVENous, Q24H, Vidhi Ser, DO, Last Rate: 0 mL/hr at 01/05/22 0058, 1,000 mg at 01/05/22 2034    albuterol (PROVENTIL) nebulizer solution 2.5 mg, 2.5 mg, Nebulization, Q6H PRN, Vidhi Ser, DO, 2.5 mg at 01/05/22 1752    Arformoterol Tartrate (BROVANA) nebulizer solution 15 mcg, 15 mcg, Nebulization, BID, Vidhi Ser, DO, 15 mcg at 01/06/22 0289    budesonide (PULMICORT) nebulizer suspension 500 mcg, 500 mcg, Nebulization, BID, Vidhi Ser, DO, 500 mcg at 01/06/22 2187    ascorbic acid (VITAMIN C) tablet 1,000 mg, 1,000 mg, Oral, Daily, Vidhi Ser, DO, 1,000 mg at 01/06/22 3322    vitamin D (CHOLECALCIFEROL) tablet 2,000 Units, 2,000 Units, Oral, Daily, Vidhi Ser, DO, 2,000 Units at 01/06/22 0236    zinc sulfate (ZINCATE) capsule 50 mg, 50 mg, Oral, Daily, Vidhi Ser, DO, 50 mg at 01/06/22 6699    doxycycline hyclate (VIBRAMYCIN) capsule 100 mg, 100 mg, Oral, 2 times per day, Vidhi Ser, DO, 100 mg at 01/06/22 2347    acetaminophen (TYLENOL) tablet 650 mg, 650 mg, Oral, Q6H PRN **OR** acetaminophen (TYLENOL) suppository 650 mg, 650 mg, Rectal, Q6H PRN, Vidhi Ser, DO    enoxaparin (LOVENOX) injection 30 mg, 30 mg, SubCUTAneous, BID, Vidhi Ser, DO, 30 mg at 01/06/22 3008    dexamethasone (DECADRON) injection 6 mg, 6 mg, IntraVENous, Q24H, Vidhi Ser, DO, 6 mg at 01/05/22 2035    guaiFENesin-dextromethorphan (ROBITUSSIN DM) 100-10 MG/5ML syrup 5 mL, 5 mL, Oral, Q4H PRN, Vidhi Ser, DO    furosemide (LASIX) injection 20 mg, 20 mg, IntraVENous, Daily, Vidhi Ser, DO, 20 mg at 01/06/22 2930      ALLERGIES:  Midazolam hcl    SOCIAL HISTORY:    Social History     Socioeconomic History    Marital status:      Spouse name: Not on file    Number of children: Not on file    Years of education: Not on file    Highest education level: Not on file   Occupational History    Not on file   Tobacco Use    Smoking status: Former Smoker     Packs/day: 0.50     Years: 60.00     Pack years: 30.00     Types: Cigarettes     Quit date: 2020     Years since quittin.6    Smokeless tobacco: Never Used   Vaping Use    Vaping Use: Never used   Substance and Sexual Activity    Alcohol use: Yes     Comment: 2-3 beers daily    Drug use: Not Currently     Comment: in past cocaine last used     Sexual activity: Not Currently     Partners: Female   Other Topics Concern    Not on file   Social History Narrative    Not on file     Social Determinants of Health     Financial Resource Strain: High Risk    Difficulty of Paying Living Expenses: Hard   Food Insecurity: No Food Insecurity    Worried About Running Out of Food in the Last Year: Never true    Edis of Food in the Last Year: Never true   Transportation Needs:     Lack of Transportation (Medical): Not on file    Lack of Transportation (Non-Medical):  Not on file   Physical Activity:     Days of Exercise per Week: Not on file    Minutes of Exercise per Session: Not on file   Stress:     Feeling of Stress : Not on file   Social Connections:     Frequency of Communication with Friends and Family: Not on file    Frequency of Social Gatherings with Friends and Family: Not on file    Attends Sikhism Services: Not on file    Active Member of [address] [place] [address] or Organizations: Not on file    Attends Club or Organization Meetings: Not on file    Marital Status: Not on file   Intimate Partner Violence:     Fear of Current or Ex-Partner: Not on file    Emotionally Abused: Not on file    Physically Abused: Not on file    Sexually Abused: Not on file   Housing Stability:     Unable to Pay for Housing in the Last Year: Not on file    Number of [place] in the Last Year: Not on file    Unstable Housing in the Last Year: Not on file       FAMILY HISTORY:   Family History   Problem Relation Age of Onset    Diabetes Mother     Heart Disease Mother     Diabetes Father     Heart Disease Father     Diabetes Sister     Cancer Sister     Diabetes Brother     Cancer Brother          REVIEW OF SYSTEMS:     Admits to SOB, cough and muscles aches. No current chest discomfort this admission. Rest as noted above in HPI. PHYSICAL EXAM:   /88   Pulse 68   Temp 97.8 °F (36.6 °C) (Oral)   Resp 18   Wt 216 lb 3.2 oz (98.1 kg)   SpO2 96%   BMI 32.87 kg/m²   Due to patient's COVID-19+ status and in droplet plus isolation, physical exam was not performed in order to preserve PPE and limit exposure. DATA:    Telemetry: NSR with HR in the 60-80s, rare PVCs    Diagnostic:  All diagnostic testing and lab work thus far this admission reviewed in detail. CXR 1/4/2022:  Impression:  Prominent reticular opacities, more confluent in the left lower lobe. Findings are compatible with pneumonia including COVID-19 pneumonia. Chest CTA 1/4/2022:  Impression:  1. Pleural effusions and bilateral pulmonary opacities which may represent  edema or infiltrates such as COVID pneumonia, superimposed upon pulmonary  emphysema  2. No definite acute pulmonary embolus is identified    CXR 1/5/2022:  Impression:  Small right and suspected trace left pleural effusions. Left lower lobe opacities, concerning for pneumonia. Gallbladder US 1/5/2022:  Impression:  Small right pleural effusion.  Liver appears normal in size.  Common bile  duct is normal.  Right renal cortical cysts noted. TTE (Dr. Dora Huitron, 1/5/2022)  Summary   Left ventricle is mildly enlarged . Normal left ventricular wall thickness. Ejection fraction is visually estimated at 28%. Global left ventricular dysfunction   Normal sized left atrium. Interatrial septum appears intact. Mildly dilated right ventricle. Physiologic and/or trace mitral regurgitation is present. No evidence of mitral valve stenosis.    Physiologic and/or trace tricuspid regurgitation. Regular rhythm.         Intake/Output Summary (Last 24 hours) at 1/6/2022 1024  Last data filed at 1/5/2022 2133  Gross per 24 hour   Intake 240 ml   Output 300 ml   Net -60 ml       Labs:   CBC:   Recent Labs     01/05/22  0623 01/06/22  0925   WBC 5.6 5.7   HGB 13.0 13.8   HCT 42.5 45.2    291     BMP:   Recent Labs     01/05/22  0623 01/06/22  0925    140   K 4.0 3.7   CO2 29 29   BUN 23 32*   CREATININE 1.3* 1.3*   LABGLOM 54 54   CALCIUM 8.8 9.2     Mag:   Recent Labs     01/05/22  0623   MG 1.8     Phos:   Recent Labs     01/05/22  0623   PHOS 4.2     HgA1c:   Lab Results   Component Value Date    LABA1C 5.6 07/31/2018     PT/INR:   Recent Labs     01/04/22  1528 01/05/22  0623   PROTIME 13.2* 12.7*   INR 1.1 1.1     APTT:  Recent Labs     01/04/22  1528 01/05/22  0623   APTT 32.6 32.9     FASTING LIPID PANEL:  Lab Results   Component Value Date    CHOL 226 06/16/2021    HDL 43 06/16/2021    LDLCALC 153 06/16/2021    TRIG 149 06/16/2021     LIVER PROFILE:  Recent Labs     01/05/22  0623 01/06/22  0925   * 89*   * 95*   LABALBU 3.9 4.1     Total Bilirubin 0.0 - 1.2 mg/dL 0.5    Bilirubin, Direct 0.0 - 0.3 mg/dL 0.3    Bilirubin, Indirect 0.0 - 1.0 mg/dL 0.2      Ref Range & Units 01/05/22 0623    Procalcitonin 0.00 - 0.08 ng/mL 0.21 High       Component Ref Range & Units 01/04/22 1528   Lactic Acid 0.5 - 2.2 mmol/L 1.0         Ref Range & Units 01/04/22 1528    Troponin, High Sensitivity 0 - 11 ng/L 38 High        Ref Range & Units 01/05/22 0623   Troponin, High Sensitivity 0 - 11 ng/L 29 High        Ref Range & Units 01/05/22 1337   Troponin, High Sensitivity 0 - 11 ng/L 28 High       Ref Range & Units 01/04/22 1528    Pro-BNP 0 - 125 pg/mL 5,611 High       Component Ref Range & Units 01/05/22 0623   Pro-BNP 0 - 125 pg/mL 9,932 High           Ref Range & Units 01/05/22 0623 01/05/22 0623   CRP 0.0 - 0.4 mg/dL 5.2 High   5.0 High        Ref Range & Units 01/04/22 1528   SARS-CoV-2, NAAT Not Detected DETECTED Abnormal       Ref Range & Units 01/04/22 1528    Influenza A by PCR Not Detected Not Detected    Influenza B by PCR Not Detected Not Detected       01/04/22 1528    Color, UA Straw/Yellow Yellow    Clarity, UA Clear Clear    Glucose, Ur Negative mg/dL Negative    Bilirubin Urine Negative Negative    Ketones, Urine Negative mg/dL Negative    Specific Gravity, UA 1.005 - 1.030 >=1.030    Blood, Urine Negative Negative    pH, UA 5.0 - 9.0 5.5    Protein, UA Negative mg/dL Negative    Urobilinogen, Urine <2.0 E.U./dL 2.0 Abnormal     Nitrite, Urine Negative Negative    Leukocyte Esterase, Urine Negative Negative      BLOOD CULTURES, LEGIONELLA AND STREP PENDING      ASSESSMENT:  · Acute on chronic HFrEF.  proBNP 5,000 on admit --> now 9,000. Small-moderate right and small left pleural effusions on Chest CTA this admission. · Cardiomyopathy, undetermined etiology  -- r/o ischemia given clinical history. EF 40% on stress testing per [facility] record 3/2018 --> TTE 3/2018 with EF 50% --> TTE this admission EF < 35%. · Acute hypoxic respiratory failure, currently on 3L NC.  · COVID-19+ with associated viral pneumonia (positive COVID-19 test on 1/4/22)  · Elevated LFTs -- improving  · Coronary artery disease. S/p PCI and TATI placement x 1 to the distal RCA at [facility] in 2018. Residual disease of 60% distal LCX on LHC at [facility] 2018. Clinically stable. · Hypertension, uncontrolled at times. · Hyperlipidemia. Statin therapy on hold in setting of elevated LFTs. · COPD. Follows with Dr. Bakari Younger. · Former tobacco smoker. Quit 1 year ago. · Untreated JOSE. · History of TIA. · Chronic kidney disease. Stable. Cr 1.3. · Carotid artery disease. S/p left carotid stent placement. · Non-Hodgkin's lymphoma s/p chemotherapy. First diagnosed in 2011. Follows with the [facility]. · BPH. · Anxiety.   · Chronic back pain, on chronic opioid therapy. RECOMMENDATIONS:  · Echocardiogram personally reviewed by Dr. Todd Driver -- EF < 35%. · Recommend switching home ACE-I to [facility] this admission pending kidney function response to Aldactone (initiation 1/6). · Continue with Coreg. · Agree with cautious diuresis -- monitor kidney function and electrolytes. · Strict intake/output and daily weights. · Continue other cardiac medications the same at this time. · Recommend LifeVest if patient is agreeable. · Reassess for ischemic evaluation with left heart catheterization once respiratory status/COVID-19 infection improves/resolves. · Recommend appropriate treatment of JOSE. · Further recommendations to follow as per Dr. Todd Driver.    The above case and recommendations have been discussed and made in collaboration with Dr. Todd Driver.      NOTE: This report was transcribed using voice recognition software. Every effort was made to ensure accuracy; however, inadvertent computerized transcription errors may be present. Pastor Christie, [address] [place] [address], [place] [address] Cardiology    Electronically signed by Jerome Nielsen PA-C on 1/6/2022 at 10:24 AM       ___________________________________________________________________________________  I independently interviewed and examined the patient. I have reviewed the above documentation completed by the [facility]. Please see my additional contributions to the HPI, physical exam, and assessment / medical decision making. HPI, ROS, PMH, PSH, [address], [place], and medications independently reviewed (agree; see above documentation)    History of Present Illness:  Currently with no chest pain, respiratory distress, or palpitations. SR on EKG and telemetry.     Review of Systems:   Cardiac: As per HPI  General: No fever, chills  Pulmonary: As per HPI  HEENT: No visual disturbances, difficult swallowing  GI: No nausea, vomiting  : No dysuria, hematuria  Endocrine: No thyroid disease or DM  Musculoskeletal: BRAVO x 4, no focal motor deficits  Skin: Intact, no rashes  Neuro: No headache, seizures  Psych: Currently with no depression, anxiety    Physical Exam:  /74   Pulse 68   Temp 96.5 °F (35.8 °C) (Oral)   Resp 20   Wt 216 lb 3.2 oz (98.1 kg)   SpO2 91%   BMI 32.87 kg/m²   Wt Readings from Last 3 Encounters:   01/06/22 216 lb 3.2 oz (98.1 kg)   12/15/21 223 lb 9.6 oz (101.4 kg)   12/06/21 226 lb 6.4 oz (102.7 kg)     Patient not examined (patient in isolation for COVID-19 infection) -- he was observed through the glass door of his room    Laboratory Tests:  Recent Labs     01/04/22  1528 01/05/22  0623 01/06/22  0925    141 140   K 4.1 4.0 3.7    101 100   CO2 27 29 29   BUN 19 23 32*   CREATININE 1.5* 1.3* 1.3*   GLUCOSE 97 157* 118*   CALCIUM 8.8 8.8 9.2     Lab Results   Component Value Date    MG 1.8 01/05/2022     Recent Labs     01/04/22  1528 01/05/22  0623 01/06/22  0925   ALKPHOS 153* 144* 129   * 107* 95*   * 159* 89*   PROT 7.4 7.0 7.5   BILITOT 0.5 0.5 0.5   BILIDIR  --  0.3 0.3   LABALBU 4.1 3.9 4.1     Recent Labs     01/04/22  1528 01/05/22  0623 01/06/22  0925   WBC 8.2 5.6 5.7   RBC 4.44 4.59 4.90   HGB 12.5 13.0 13.8   HCT 41.0 42.5 45.2   MCV 92.3 92.6 92.2   MCH 28.2 28.3 28.2   MCHC 30.5* 30.6* 30.5*   RDW 13.8 13.8 13.9    263 291   MPV 11.1 11.0 11.1     Lab Results   Component Value Date    CKTOTAL 66 07/20/2013    CKMB <0.3 07/20/2013    TROPONINI <0.01 02/19/2021    TROPONINI <0.01 02/18/2021    TROPONINI <0.01 05/04/2020     Recent Labs     01/04/22  1528 01/05/22  0623 01/05/22  1337   TROPHS 38* 29* 28*     Lab Results   Component Value Date    TSH 1.540 07/18/2019     Lab Results   Component Value Date    LABA1C 5.6 07/31/2018     No results found for: EAG  Lab Results   Component Value Date    CHOL 226 (H) 06/16/2021    CHOL 198 11/06/2019    CHOL 175 01/12/2018     Lab Results   Component Value Date    TRIG 149 06/16/2021    TRIG 174 (H) 11/06/2019    TRIG 129 01/12/2018     Lab Results   Component Value Date    HDL 43 06/16/2021    HDL 36 11/06/2019    HDL 45 01/12/2018     Lab Results   Component Value Date    LDLCALC 153 (H) 06/16/2021    LDLCALC 127 (H) 11/06/2019    LDLCALC 104 (H) 01/12/2018     Lab Results   Component Value Date    LABVLDL 30 06/16/2021    LABVLDL 35 11/06/2019    LABVLDL 26 01/12/2018     No results found for: [address]     01/04/22  1528 01/05/22  0623   PROBNP 5,611* 1,096*       Cardiac Tests:  EKG reviewed: SR, rate 91, PRWP    Telemetry reviewed (date: 1/6/2022): SR, rate 80's, PVC's    TTE (Dr. Jonlele Medina, 2011)  Summary    Left ventricular size is grossly normal.    Mild left venticular concentric hypertrophy noted. Ejection fraction is visually estimated at 64%. No evidence of left ventricular mass or thrombus noted. No regional wall motion abnormalities seen. Borderline dilated right ventricle. No evidence of a thrombus in the right ventricle. Structurally normal mitral valve. Physiologic and/or trace mitral regurgitation is present. No mitral valve stenosis present. The tricuspid valve appears structurally normal.    Physiologic and/or trace tricuspid regurgitation. RVSP is 15.73 mmHg. Regular rhythm. Lexiscan Stress Test (Per [facility] notes, 3/2018)  No evidence of ischemia  EF 40%   Mild global hypokinesis     Left heart catheterization ([facility], 3/2018)  LMT: - The LMT is normal.   LAD:   - The LAD has mild luminal irregularities. LCX: - There was estatic. - The distal circumflex is narrowed 60 % - focal disease. RAMUS: - The Ramus is Absent. RCA:   - The mid RCA is narrowed 80 % - ISR.    Additional Comment: Focal ISR at distal edge of stent with another 80% focal   stenosis distal to stent. S/p PCI and TATI x 1 to the distal RCA    TTE (Dr. Jonelle Medina, 12/2018)   Summary   Left ventricular size is grossly normal.   Mild left ventricular concentric hypertrophy noted.    Ejection fraction is visually estimated at 50%. No evidence of left ventricular mass or thrombus noted. No regional wall motion abnormalities seen. Borderline dilated right ventricle. No evidence of a thrombus in the right ventricle. Physiologic and/or trace mitral regurgitation is present. No evidence of mitral valve stenosis. Physiologic and/or trace tricuspid regurgitation. Regular rhythm. TTE (Dr. Amrik Grey, 1/5/2022)  Summary   Left ventricle is mildly enlarged . Normal left ventricular wall thickness. Ejection fraction is visually estimated at 28%. Global left ventricular dysfunction   Normal sized left atrium. Interatrial septum appears intact. Mildly dilated right ventricle. Physiologic and/or trace mitral regurgitation is present. No evidence of mitral valve stenosis. Physiologic and/or trace tricuspid regurgitation. Regular rhythm. - Images from 1/5/22 echocardiogram personally reviewed (agree with interpretation re: LV dysfunction)  - Coreg and aldactone started this admission  - Continue IV lasix  - ACE-I held on admission  - Add entresto as BMP remains stable  - Continue antiplatelet therapy / statin currently on hold  - Follow-up repeat LFT's  - Discussed option of LifeVest (he wants to think about it)  - Monitor telemetry (SR, isolated PVC's)  - Aggressive risk factor modifications / treatment of JOSE as indicated  - Records from [facility] reviewed today  - Reassess for cardiac catheterization once clinically improved  - Case discussed with primary service today        Thank you for allowing me to participate in your patient's care. Please feel free to contact me if you have any questions or concerns.     Renan Fontenot MD  [facility]) Cardiology

## 2022-01-06 NOTE — PROGRESS NOTES
Physical Therapy Initial Evaluation/Plan of Care    Room #:  7354/8608-69  Patient Name: Melly Monday  YOB: 1950  MRN: [id number]    Date of Service: 1/6/2022     Tentative placement recommendation: Home Health Physical Therapy   Equipment recommendation: None      Evaluating Physical Therapist: Brook Huynh, PT #[id number]      Specific Provider Orders/Date/Referring Provider :  01/04/22 1945   PT eval and treat Start: 01/04/22 1945, End: 01/04/22 1945, ONE TIME, Standing Count: 1 Occurrences, R    David Barbosa DO      Admitting Diagnosis:   Respiratory distress [R06.03]  Hypoxia [R09.02]  COVID-19 [U07.1]       Surgery: none  Visit Diagnoses       Codes    Respiratory distress    -  Primary R06.03    Hypoxia     R09.02          Patient Active Problem List   Diagnosis    Lymphoma ([address]    Gastroenteritis    Back pain at L4-L5 level    Impotence    Chronic fatigue    Urinary frequency    Bronchitis    Gastroesophageal reflux disease with esophagitis    Depression    Coronary artery disease of native artery of native heart with stable angina pectoris ([facility])    Chronic obstructive pulmonary disease ([address]    Pure hypercholesterolemia    Moderate obesity    Major depressive disorder, recurrent, mild ([address]    Vascular dementia with depressed mood ([address]    Moderate asthma without complication    Complete tear of left rotator cuff    Rotator cuff arthropathy of left shoulder    Nontraumatic complete tear of rotator cuff, left    Bursitis of left shoulder    Impingement syndrome of left shoulder    Labral tear of shoulder, degenerative, left    S/P arthroscopy of shoulder    Obstructive sleep apnea    CKD stage G3a/A3, GFR 45-59 and albumin creatinine ratio >300 mg/g ([facility])    COVID-19        ASSESSMENT of Current Deficits Patient exhibits decreased strength, balance and endurance impairing functional mobility, transfers, gait , gait distance and tolerance to activity unsteady gait with Loss of balance x 3 minimal assist to recover. Patient requires skilled physical therapy to address concerns listed above to increase safety and independence at discharge. PHYSICAL THERAPY  PLAN OF CARE       Physical therapy plan of care is established based on physician order,  patient diagnosis and clinical assessment    Current Treatment Recommendations:    -Standing Balance: Perform strengthening exercises in standing to promote motor control with or without upper extremity support   -Transfers: Provide instruction on proper hand and foot position for adequate transfer of weight onto lower extremities and use of gait device if needed and Cues for hand placement, technique and safety. Provide stabilization to prevent fall   -Gait: Gait training, Standing activities to improve: base of support, weight shift, weight bearing  and Pregait training to emphasize: Safety and possible use of assistive device for balance/safety   -Endurance: Utilize Supervised activities to increase level of endurance to allow for safe functional mobility including transfers and gait  and Use graduated activities to promote good breathing techniques and provide support and education to maximize respiratory function  -Stairs: Stair training with instruction on proper technique and hand placement on rail  -Instruction in independent management of O2 line    PT long term treatment goals are located in below grid    Patient and or family understand(s) diagnosis, prognosis, and plan of care. Frequency of treatments: Patient will be seen  3-5 times/week.          Prior Level of Function: Patient ambulated independently    Rehab Potential: good    for baseline    Past medical history:   Past Medical History:   Diagnosis Date    Anesthesia complication     wakes up  violant   only ok if reversed with romazacon    Arthritis     shoulders,ankle    CAD (coronary artery disease)     [facility]     Erectile dysfunction     GERD (gastroesophageal reflux disease)     H/O coronary angioplasty with stents[V45.82] 9/2018 another stent    Hyperlipidemia     Hypertension     Lymphoma ([facility] 11/4/2011    had chemo  currently in remission    Myocardial infarction [facility])     more than 10 yrs ago    Sleep apnea     wont wear machine    Unspecified cerebral artery occlusion with cerebral infarction 2005    no deficits     Past Surgical History:   Procedure Laterality Date    ANKLE FRACTURE SURGERY Right 03/2014    ORIF right ankle    ANKLE SURGERY  2007    rt ankle    BRONCHOSCOPY  09/2011    bronch / medistinoscopy    CAROTID ENDARTERECTOMY Left     12 yrs ago    CHOLECYSTECTOMY      COLONOSCOPY  07/30/2013     [facility] WITH STENT PLACEMENT  2008    states has 2 stents  follows with DR Jeannine Arrington    ENDOSCOPY, COLON, DIAGNOSTIC      HAND SURGERY Left     fell on a buzzsaw    HERNIA REPAIR Bilateral 08/15/2019    HERNIA  BILATERAL INGUINAL REPAIR WITH MESH LAPAROSCOPIC ROBOTIC XI ASSISTED performed by Patricia Sanders MD at [place] [id number]  06/29/2015    lapraoscopic cholecystectomy    SHOULDER ARTHROSCOPY Right 10/08/2015    rotator cuff repair, subachromoplasty with labrial debridement    SHOULDER ARTHROSCOPY Left 2/18/2021    LEFT ROTATOR CUFF REPAIR LABRAL DEBRIDEMENT SUBACROMIAL DECOMPRESSION performed by Gabby Jones DO at [place] [address]      UPPER GASTROINTESTINAL ENDOSCOPY         SUBJECTIVE:    Precautions: Activity as tolerated and Check Pulse Oximetry while ambulating , falls, O2 and Droplet plus/COVID-19 ,  Up in chair as much as tolerated, at least breakfast through dinner time. Ambulate in room as much as tolerated. Prone/reposition every 2 hours while in bed. Incentive spirometer 10-15 times per hour while awake.     Social history: Patient lives alone in a bilevel home 9 steps downward to kitchen, 3 steps upstairs to living room and bedroom, 3 stairs upstairs to bathroom. with 2 steps  to enter without Rail  Tub shower      Equipment owned: Cane and [address],  unused    [address]   How much difficulty turning over in bed?: None  How much difficulty sitting down on / standing up from a chair with arms?: A Little  How much difficulty moving from lying on back to sitting on side of bed?: None  How much help from another person moving to and from a bed to a chair?: A Little  How much help from another person needed to walk in hospital room?: A Little  How much help from another person for climbing 3-5 steps with a railing?: A Little  AM-[facility] Inpatient Mobility Raw Score : 20  AM-PAC Inpatient T-Scale Score : 47.67  Mobility Inpatient CMS 0-100% Score: 35.83  Mobility Inpatient CMS G-Code Modifier : [address] cleared patient for PT evaluation. The admitting diagnosis and active problem list as listed above have been reviewed prior to the initiation of this evaluation. OBJECTIVE;   Initial Evaluation  Date: 1/6/2022 Treatment Date:     Short Term/ Long Term   Goals   Was pt agreeable to Eval/treatment? Yes  To be met in 4 days   Pain level   0/10        Bed Mobility    Rolling: Not assessed patient in chair      Rolling: Independent    Supine to sit:  Independent    Sit to supine: Independent    Scooting: Independent     Transfers Sit to stand: Supervision  Cues for hand placement and safety   Sit to stand: Independent     Ambulation    1 x 80 feet, 1 x 120 feet, 1 x 20 feet using  no device with Supervision    minimal assist for loss of balance x 3 as patient fatigued, for balance and safety and cues for safety and pacing   150 feet using  least restrictive device versus no device with Independent    Stair negotiation: ascended and descended   Not assessed     10 steps 1 rail with supervision    ROM Within functional limits    Increase range of motion 10% of affected joints    Strength BUE:  refer to OT eval  RLE:  4-/5  LLE:  4-/5  Increase strength in affected mm groups by 1/3 grade   Balance Sitting EOB:  not assessed    Dynamic Standing:  fair    Sitting EOB:  good    Dynamic Standing: good       Patient is Alert & Oriented x person, place, time and situation and follows directions    Sensation:  Patient  denies numbness/tingling   Edema:  no   Endurance: fair       Vitals: 3 liters nasal cannula   Blood Pressure at rest  Blood Pressure during session    Heart Rate at rest 69 Heart Rate during session 89   SPO2 at rest 95%  SPO2 during session 88% 98% after therapy     Patient education  Patient educated on role of Physical Therapy, risks of immobility, safety and plan of care,  importance of mobility while in hospital , purse lip breathing, ankle pumps, quad set and glut set for edema control, blood clot prevention and O2 line management and safety      Patient response to education:   Pt verbalized understanding Pt demonstrated skill Pt requires further education in this area   Yes Partial Yes      Treatment:  Patient practiced and was instructed/facilitated in the following treatment: Patient ambulated in hallway, seated rest due to shortness of breath and fatigue. Ambulated in hallway 2nd rep with additional seated rest, cues purse lip breathing. returned to room up to chair. Therapeutic Exercises:  ankle pumps  x 10 reps. At end of session, patient in chair with   call light and phone within reach,  all lines and tubes intact, nursing notified. Patient would benefit from continued skilled Physical Therapy to improve functional independence and quality of life.          Patient's/ family goals   home     Time in  1130  Time out  1202    Total Treatment Time  12 minutes    Evaluation time includes thorough review of current medical information, gathering information on past medical history/social history and prior level of function, completion of standardized testing/informal observation of tasks, assessment of data, and development of Plan of care and goals.      CPT codes:  Low Complexity PT evaluation (40625)  Gait Training (45983) 12 minutes 1 unit(s)    Misa Fleming PT

## 2022-01-06 NOTE — ACP (ADVANCE CARE PLANNING)
Advance Care Planning     Advance Care Planning Activator (Inpatient)  Conversation Note      Date of ACP Conversation: 1/6/2022     Conversation Conducted with: Patient with Decision Making Capacity    ACP Activator: Norma Buenrostro RN        Health Care Decision Maker:     Current Designated Health Care Decision Maker:     Primary Decision Maker: Arron Porter - [phone]  Click here to complete Healthcare Decision Makers including section of the Healthcare Decision Maker Relationship (ie \"Primary\")      Care Preferences    Ventilation: \"If you were in your present state of health and suddenly became very ill and were unable to breathe on your own, what would your preference be about the use of a ventilator (breathing machine) if it were available to you? \"      Would the patient desire the use of ventilator (breathing machine)?: no    \"If your health worsens and it becomes clear that your chance of recovery is unlikely, what would your preference be about the use of a ventilator (breathing machine) if it were available to you? \"     Would the patient desire the use of ventilator (breathing machine)?: No      Resuscitation  \"CPR works best to restart the heart when there is a sudden event, like a heart attack, in someone who is otherwise healthy. Unfortunately, CPR does not typically restart the heart for people who have serious health conditions or who are very sick. \"    \"In the event your heart stopped as a result of an underlying serious health condition, would you want attempts to be made to restart your heart (answer \"yes\" for attempt to resuscitate) or would you prefer a natural death (answer \"no\" for do not attempt to resuscitate)? \" no       [] Yes   [x] No   Educated Patient / Oscar Martinez regarding differences between Advance Directives and portable DNR orders.     Length of ACP Conversation in minutes:  5 minutes    Conversation Outcomes:  [x] ACP discussion completed  [] Existing advance directive reviewed with patient; no changes to patient's previously recorded wishes  [] New Advance Directive completed  [] Portable Do Not Rescitate prepared for Provider review and signature  [] POLST/POST/MOLST/MOST prepared for Provider review and signature      Follow-up plan:    [] Schedule follow-up conversation to continue planning  [] Referred individual to Provider for additional questions/concerns   [] Advised patient/agent/surrogate to review completed ACP document and update if needed with changes in condition, patient preferences or care setting    [x] This note routed to one or more involved healthcare providers

## 2022-01-06 NOTE — PROGRESS NOTES
Internal Medicine Progress Note    [facility]    Estela Kiser. Kim Faria., [email or web address]. She Madrigal D.O., GRISELOLILIANA Godoy D.O. Fanny Ulloa, MSN, APRN, NP-C  Jacinto Sommer. Juno Irene, MSN, APRN-CNP     Primary Care Physician: Cj Montana DO   Admitting Physician:  Janis Phillips DO  Admission date and time: 1/4/2022  2:34 PM    Room:  [address] [place] [address]  Admitting diagnosis: Respiratory distress [R06.03]  Hypoxia [R09.02]  COVID-19 [U07.1]    Patient Name: Paty Jung  MRN: [id number]    Date of Service: 1/6/2022     Subjective:    Thai Long is a 70 y.o. male who was seen and examined today,1/6/2022, at the bedside. He was admitted from the emergency department due to hypoxia and respiratory distress in the setting of COVID-19 pneumonia. He is boarded in the emergency department throughout the day and his oxygen was able to be weaned down somewhat and his comfort improved. He has been transferred from fourth floor and is feeling better. We have discussed the results of his work-up thus far including worsening cardiomyopathy on echo. We have reinforced maximal Covid protocol including the nonpharmacologic management. Incentive spirometer has been provided and teaching has been given. We also discussed sitting up in bed throughout the day and only returning to a lying position when tired enough for sleep. We have discussed increasing ambulation as condition allows. We have also reinforced the need for repositioning and proning while in bed. No family present during my examination. Review of System:   Constitutional:   Positive for significant fatigue and malaise , - fever/chills  HEENT:   Denies ear pain, sore throat, sinus or eye problems. Cardiovascular:   Denies any chest pain, irregular heartbeats, or palpitations.    Respiratory:   + but improved dyspnea at rest, + dyspnea on exertion, + coughing, - sputum, - hemoptysis  Gastrointestinal: Denies nausea, vomiting. - diarrhea, - constipation. - poor appetite and poor intake. Denies any abdominal pain. Genitourinary:    Denies any urgency, frequency, hematuria. Voiding  without difficulty. Extremities:   Denies lower extremity swelling, edema or cyanosis. Neurology:    Denies any headache or focal neurological deficits, positive for generalized weakness and fatigue without focal component  Psch:   Denies being anxious or depressed. Musculoskeletal:    Denies  myalgias, joint complaints or back pain. Integumentary:   Denies any rashes, ulcers, or excoriations. Denies bruising. Hematologic/Lymphatic:  Denies bruising or bleeding. Physical Exam:  No intake/output data recorded. Intake/Output Summary (Last 24 hours) at 1/6/2022 1003  Last data filed at 1/5/2022 2133  Gross per 24 hour   Intake 240 ml   Output 300 ml   Net -60 ml   I/O last 3 completed shifts: In: 240 [P.O.:240]  Out: 300 [Urine:300]  Patient Vitals for the past 96 hrs (Last 3 readings):   Weight   01/06/22 0600 216 lb 3.2 oz (98.1 kg)   01/04/22 1945 223 lb 9.6 oz (101.4 kg)     Vital Signs:   Blood pressure 137/88, pulse 68, temperature 97.8 °F (36.6 °C), temperature source Oral, resp. rate 18, weight 216 lb 3.2 oz (98.1 kg), SpO2 96 %. General appearance:  Alert, responsive, oriented to person, place, and time. Ill appearing, no distress. Head:  Normocephalic. No masses, lesions or tenderness. Eyes:  PERRLA. EOMI. Sclera clear. Buccal mucosa moist.  ENT:  Ears normal. Mucosa normal.  Neck:    Supple. Trachea midline. No thyromegaly. No JVD. No bruits. Heart:    Rhythm regular. Rate controlled. S1 and S2. Systolic murmur grade 2/6. Lungs:    Symmetrical.  Shallow and tachypneic pattern of respiration, diminished aeration throughout. Abdomen:   Soft. Obese. Non-tender. Non-distended. Bowel sounds positive. No organomegaly or masses. No pain on palpation. Extremities:    Peripheral pulses present.   Improved peripheral edema. No ulcers. No cyanosis. No clubbing. Neurologic:    Alert x 3. Generally weak without focal component focal deficit. Cranial nerves grossly intact. No focal weakness. Psych:   Behavior is normal. Mood appears normal. Speech is not rapid and/or pressured. Musculoskeletal:   Spine ROM normal. Muscular strength intact. Gait not assessed. Integumentary:  No rashes  Skin normal color and texture.   Genitalia/Breast:  Deferred    Medication:  Scheduled Meds:   spironolactone  25 mg Oral Daily    carvedilol  6.25 mg Oral BID WC    baricitinib  2 mg Oral Daily    aspirin  81 mg Oral Daily    buPROPion  150 mg Oral BID    clopidogrel  75 mg Oral Daily    fenofibrate  160 mg Oral Daily    guaiFENesin  1,200 mg Oral BID    montelukast  10 mg Oral Nightly    pantoprazole  40 mg Oral QAM    sertraline  100 mg Oral Daily    [Held by provider] atorvastatin  20 mg Oral Nightly    tamsulosin  0.4 mg Oral Daily    cefTRIAXone (ROCEPHIN) IV  1,000 mg IntraVENous Q24H    Arformoterol Tartrate  15 mcg Nebulization BID    budesonide  500 mcg Nebulization BID    ascorbic acid  1,000 mg Oral Daily    Vitamin D  2,000 Units Oral Daily    zinc sulfate  50 mg Oral Daily    doxycycline hyclate  100 mg Oral 2 times per day    enoxaparin  30 mg SubCUTAneous BID    dexamethasone  6 mg IntraVENous Q24H    furosemide  20 mg IntraVENous Daily     Continuous Infusions:    Objective Data:  CBC with Differential:    Lab Results   Component Value Date    WBC 5.7 01/06/2022    RBC 4.90 01/06/2022    HGB 13.8 01/06/2022    HCT 45.2 01/06/2022     01/06/2022    MCV 92.2 01/06/2022    MCH 28.2 01/06/2022    MCHC 30.5 01/06/2022    RDW 13.9 01/06/2022    SEGSPCT 66 07/21/2013    LYMPHOPCT 18.7 01/06/2022    MONOPCT 7.2 01/06/2022    BASOPCT 0.2 01/06/2022    MONOSABS 0.41 01/06/2022    LYMPHSABS 1.07 01/06/2022    EOSABS 0.00 01/06/2022    BASOSABS 0.01 01/06/2022     BMP:    Lab Results   Component Value Date     01/05/2022    K 4.0 01/05/2022    K 4.1 01/04/2022     01/05/2022    CO2 29 01/05/2022    BUN 23 01/05/2022    LABALBU 3.9 01/05/2022    LABALBU 4.2 12/01/2011    CREATININE 1.3 01/05/2022    CALCIUM 8.8 01/05/2022    GFRAA >60 01/05/2022    LABGLOM 54 01/05/2022    GLUCOSE 157 01/05/2022    GLUCOSE 152 12/01/2011     Hepatic Function Panel:    Lab Results   Component Value Date    ALKPHOS 144 01/05/2022     01/05/2022     01/05/2022    PROT 7.0 01/05/2022    BILITOT 0.5 01/05/2022    BILIDIR 0.3 01/05/2022    IBILI 0.2 01/05/2022    LABALBU 3.9 01/05/2022    LABALBU 4.2 12/01/2011     Recent Labs     01/05/22  1337   TROPHS 28*         COVID inflammatory markers  Recent Labs     01/05/22  0623   DDIMER 354     Recent Labs     01/05/22  0623   FERRITIN 1,150     Recent Labs     01/05/22  0623   CRP 5.2*  5.0*       Assessment:  · Acute respiratory failure with hypoxia secondary to COVID-19 pneumonia in an unvaccinated host  · Acute exacerbation of COPD secondary to viral pneumonia  · Hypertensive urgency with underlying history of hypertension  · Asymptomatic coronary artery disease with drug-eluting stents in place  · Transaminitis  · Decompensated diastolic congestive heart failure with associated pleural effusion and echo evidence of worsening cardiomyopathy with LVEF now 28%  · Chronic kidney disease stage IIIa  · Hyperlipidemia  · Vascular disease with history of TIA and left carotid stenting   · Non-Hodgkin's lymphoma with prior chemotherapy followed by the heart center   · Untreated obstructive sleep apnea       Plan:   Cathalealvino Monday was admitted due to acute respiratory failure and hypoxia secondary to KNUNF-90 pneumonia complicated by lack of vaccination. He was boarded in the emergency department for a prolonged period of time and has been transitioned to the telemetry unit. He is improved but requires moderate amount of oxygen and support.   He was at 7 L saturating in the high 90s, now requires 5 L nasal cannula oxygen with more than adequate saturation. We have discussed the need to increase activity, sit up in chair as often as tolerated and prone as often as tolerated but at least every 2 hours. Incentive spirometer and flutter valve will be employed. Maximum COVID protocol is being undertaken and inflammatory biomarkers are being monitored. Clinical pharmacy has been asked to consider remdesivir versus baricitinib versus tocilizumab, dose-reduced baricitinib is employed at 2 mg daily as his CrCl is less than 60. Multiple vitamin supplementation, respiratory supportive measures and dexamethasone are being utilized as well. Echocardiogram has been obtained as he was acutely decompensated regards to his known diastolic congestive heart failure with pleural effusions. He is responding well to diuretic therapy. Echo revealed significantly depressed LVEF in comparison to his last echocardiogram with EF now at 28%. We will add carvedilol as heart rates are in the 60s and low-dose Aldactone. He was on lisinopril as an outpatient but this was held. he has likely completed his required washout or Entresto if this will be added. ER for cardiovascular team to provide consultation as ischemic work-up may be required and he may require a LifeVest upon discharge. We will defer this decision to their service. Laboratory values and vital signs are being monitored and addressed accordingly. We will continue to address underlying comorbidities during the hospitalization. Continue current therapy. See orders for further plan of care. Greater than 40 minutes of critical care time was spent with the patient. This time included chart review, , and discussion with those consultants involved in the patient's care. More than 50% of my  time was spent at the bedside counseling/coordinating care with the patient and/or family with face to face contact. This time was spent reviewing notes and laboratory data as well as instructing and counseling the patient. Time I spent with the family or surrogate(s) is included only if the patient was incapable of providing the necessary information or participating in medical decisions. I also discussed the differential diagnosis and all of the proposed management plans with the patient and individuals accompanying the patient. Areta Dose requires this high level of physician care and nursing on the IMC/Telemetry unit due the complexity of decision management and chance of rapid decline or death. Continued cardiac monitoring and higher level of nursing are required. I am readily available for any further decision-making and intervention.      Adilia Goff, JULIAN - CNP  1/6/2022  10:03 AM

## 2022-01-07 LAB
ALBUMIN SERPL-MCNC: 4 G/DL (ref 3.5–5.2)
ALP BLD-CCNC: 109 U/L (ref 40–129)
ALT SERPL-CCNC: 68 U/L (ref 0–40)
ANION GAP SERPL CALCULATED.3IONS-SCNC: 10 MMOL/L (ref 7–16)
AST SERPL-CCNC: 54 U/L (ref 0–39)
BASOPHILS ABSOLUTE: 0.01 E9/L (ref 0–0.2)
BASOPHILS RELATIVE PERCENT: 0.2 % (ref 0–2)
BILIRUB SERPL-MCNC: 0.5 MG/DL (ref 0–1.2)
BILIRUBIN DIRECT: 0.2 MG/DL (ref 0–0.3)
BILIRUBIN, INDIRECT: 0.3 MG/DL (ref 0–1)
BUN BLDV-MCNC: 31 MG/DL (ref 6–23)
C-REACTIVE PROTEIN: 1.1 MG/DL (ref 0–0.4)
CALCIUM SERPL-MCNC: 9.1 MG/DL (ref 8.6–10.2)
CHLORIDE BLD-SCNC: 96 MMOL/L (ref 98–107)
CO2: 30 MMOL/L (ref 22–29)
CREAT SERPL-MCNC: 1.3 MG/DL (ref 0.7–1.2)
D DIMER: <200 NG/ML DDU
EOSINOPHILS ABSOLUTE: 0 E9/L (ref 0.05–0.5)
EOSINOPHILS RELATIVE PERCENT: 0 % (ref 0–6)
GFR AFRICAN AMERICAN: >60
GFR NON-AFRICAN AMERICAN: 54 ML/MIN/1.73
GLUCOSE BLD-MCNC: 88 MG/DL (ref 74–99)
HCT VFR BLD CALC: 43.6 % (ref 37–54)
HEMOGLOBIN: 13.7 G/DL (ref 12.5–16.5)
IMMATURE GRANULOCYTES #: 0.02 E9/L
IMMATURE GRANULOCYTES %: 0.3 % (ref 0–5)
LYMPHOCYTES ABSOLUTE: 0.81 E9/L (ref 1.5–4)
LYMPHOCYTES RELATIVE PERCENT: 13.7 % (ref 20–42)
MCH RBC QN AUTO: 28.1 PG (ref 26–35)
MCHC RBC AUTO-ENTMCNC: 31.4 % (ref 32–34.5)
MCV RBC AUTO: 89.5 FL (ref 80–99.9)
MONOCYTES ABSOLUTE: 0.52 E9/L (ref 0.1–0.95)
MONOCYTES RELATIVE PERCENT: 8.8 % (ref 2–12)
NEUTROPHILS ABSOLUTE: 4.57 E9/L (ref 1.8–7.3)
NEUTROPHILS RELATIVE PERCENT: 77 % (ref 43–80)
PDW BLD-RTO: 13.6 FL (ref 11.5–15)
PLATELET # BLD: 290 E9/L (ref 130–450)
PMV BLD AUTO: 11.1 FL (ref 7–12)
POTASSIUM SERPL-SCNC: 3.9 MMOL/L (ref 3.5–5)
PRO-BNP: 3624 PG/ML (ref 0–125)
PROCALCITONIN: 0.13 NG/ML (ref 0–0.08)
RBC # BLD: 4.87 E12/L (ref 3.8–5.8)
SODIUM BLD-SCNC: 136 MMOL/L (ref 132–146)
TOTAL PROTEIN: 7.1 G/DL (ref 6.4–8.3)
WBC # BLD: 5.9 E9/L (ref 4.5–11.5)

## 2022-01-07 PROCEDURE — 2700000000 HC OXYGEN THERAPY PER DAY

## 2022-01-07 PROCEDURE — 86140 C-REACTIVE PROTEIN: CPT

## 2022-01-07 PROCEDURE — 83880 ASSAY OF NATRIURETIC PEPTIDE: CPT

## 2022-01-07 PROCEDURE — 1200000000 HC SEMI PRIVATE

## 2022-01-07 PROCEDURE — 6370000000 HC RX 637 (ALT 250 FOR IP): Performed by: INTERNAL MEDICINE

## 2022-01-07 PROCEDURE — 6360000002 HC RX W HCPCS: Performed by: INTERNAL MEDICINE

## 2022-01-07 PROCEDURE — 85025 COMPLETE CBC W/AUTO DIFF WBC: CPT

## 2022-01-07 PROCEDURE — 80048 BASIC METABOLIC PNL TOTAL CA: CPT

## 2022-01-07 PROCEDURE — 94640 AIRWAY INHALATION TREATMENT: CPT

## 2022-01-07 PROCEDURE — 36415 COLL VENOUS BLD VENIPUNCTURE: CPT

## 2022-01-07 PROCEDURE — 87070 CULTURE OTHR SPECIMN AEROBIC: CPT

## 2022-01-07 PROCEDURE — 87147 CULTURE TYPE IMMUNOLOGIC: CPT

## 2022-01-07 PROCEDURE — 99233 SBSQ HOSP IP/OBS HIGH 50: CPT | Performed by: INTERNAL MEDICINE

## 2022-01-07 PROCEDURE — 85378 FIBRIN DEGRADE SEMIQUANT: CPT

## 2022-01-07 PROCEDURE — 87206 SMEAR FLUORESCENT/ACID STAI: CPT

## 2022-01-07 PROCEDURE — 84145 PROCALCITONIN (PCT): CPT

## 2022-01-07 PROCEDURE — 6370000000 HC RX 637 (ALT 250 FOR IP): Performed by: NURSE PRACTITIONER

## 2022-01-07 PROCEDURE — 2580000003 HC RX 258: Performed by: INTERNAL MEDICINE

## 2022-01-07 PROCEDURE — 80076 HEPATIC FUNCTION PANEL: CPT

## 2022-01-07 RX ADMIN — GUAIFENESIN 1200 MG: 600 TABLET, EXTENDED RELEASE ORAL at 20:38

## 2022-01-07 RX ADMIN — ZINC SULFATE 220 MG (50 MG) CAPSULE 50 MG: CAPSULE at 09:17

## 2022-01-07 RX ADMIN — ENOXAPARIN SODIUM 30 MG: 100 INJECTION SUBCUTANEOUS at 09:17

## 2022-01-07 RX ADMIN — PANTOPRAZOLE SODIUM 40 MG: 40 TABLET, DELAYED RELEASE ORAL at 09:17

## 2022-01-07 RX ADMIN — ATORVASTATIN CALCIUM 20 MG: 20 TABLET, FILM COATED ORAL at 20:36

## 2022-01-07 RX ADMIN — FUROSEMIDE 20 MG: 10 INJECTION, SOLUTION INTRAMUSCULAR; INTRAVENOUS at 09:18

## 2022-01-07 RX ADMIN — CARVEDILOL 6.25 MG: 6.25 TABLET, FILM COATED ORAL at 16:49

## 2022-01-07 RX ADMIN — SPIRONOLACTONE 25 MG: 25 TABLET ORAL at 09:18

## 2022-01-07 RX ADMIN — DOXYCYCLINE HYCLATE 100 MG: 100 CAPSULE ORAL at 20:36

## 2022-01-07 RX ADMIN — BUDESONIDE 500 MCG: 0.5 INHALANT RESPIRATORY (INHALATION) at 07:40

## 2022-01-07 RX ADMIN — BUPROPION HYDROCHLORIDE 150 MG: 150 TABLET, EXTENDED RELEASE ORAL at 20:38

## 2022-01-07 RX ADMIN — MONTELUKAST SODIUM 10 MG: 10 TABLET, FILM COATED ORAL at 20:38

## 2022-01-07 RX ADMIN — HYDROCODONE BITARTRATE AND ACETAMINOPHEN 1 TABLET: 7.5; 325 TABLET ORAL at 14:17

## 2022-01-07 RX ADMIN — TAMSULOSIN HYDROCHLORIDE 0.4 MG: 0.4 CAPSULE ORAL at 09:19

## 2022-01-07 RX ADMIN — ARFORMOTEROL TARTRATE 15 MCG: 15 SOLUTION RESPIRATORY (INHALATION) at 07:40

## 2022-01-07 RX ADMIN — SACUBITRIL AND VALSARTAN 1 TABLET: 24; 26 TABLET, FILM COATED ORAL at 12:21

## 2022-01-07 RX ADMIN — ENOXAPARIN SODIUM 30 MG: 100 INJECTION SUBCUTANEOUS at 20:37

## 2022-01-07 RX ADMIN — DOXYCYCLINE HYCLATE 100 MG: 100 CAPSULE ORAL at 09:18

## 2022-01-07 RX ADMIN — CLOPIDOGREL 75 MG: 75 TABLET, FILM COATED ORAL at 09:19

## 2022-01-07 RX ADMIN — BARICITINIB 2 MG: 2 TABLET, FILM COATED ORAL at 09:18

## 2022-01-07 RX ADMIN — SACUBITRIL AND VALSARTAN 1 TABLET: 24; 26 TABLET, FILM COATED ORAL at 20:37

## 2022-01-07 RX ADMIN — FENOFIBRATE 160 MG: 160 TABLET ORAL at 09:17

## 2022-01-07 RX ADMIN — ASPIRIN 81 MG CHEWABLE TABLET 81 MG: 81 TABLET CHEWABLE at 09:17

## 2022-01-07 RX ADMIN — GUAIFENESIN 1200 MG: 600 TABLET, EXTENDED RELEASE ORAL at 09:17

## 2022-01-07 RX ADMIN — CARVEDILOL 6.25 MG: 6.25 TABLET, FILM COATED ORAL at 09:27

## 2022-01-07 RX ADMIN — DEXAMETHASONE SODIUM PHOSPHATE 6 MG: 10 INJECTION INTRAMUSCULAR; INTRAVENOUS at 20:37

## 2022-01-07 RX ADMIN — BUDESONIDE 500 MCG: 0.5 INHALANT RESPIRATORY (INHALATION) at 18:20

## 2022-01-07 RX ADMIN — ARFORMOTEROL TARTRATE 15 MCG: 15 SOLUTION RESPIRATORY (INHALATION) at 18:21

## 2022-01-07 RX ADMIN — Medication 2000 UNITS: at 09:18

## 2022-01-07 RX ADMIN — OXYCODONE HYDROCHLORIDE AND ACETAMINOPHEN 1000 MG: 500 TABLET ORAL at 09:18

## 2022-01-07 RX ADMIN — SERTRALINE 100 MG: 50 TABLET, FILM COATED ORAL at 09:18

## 2022-01-07 RX ADMIN — BUPROPION HYDROCHLORIDE 150 MG: 150 TABLET, EXTENDED RELEASE ORAL at 09:17

## 2022-01-07 RX ADMIN — WATER 1000 MG: 1 INJECTION INTRAMUSCULAR; INTRAVENOUS; SUBCUTANEOUS at 20:36

## 2022-01-07 ASSESSMENT — PAIN SCALES - GENERAL: PAINLEVEL_OUTOF10: 8

## 2022-01-07 NOTE — PROGRESS NOTES
INPATIENT CARDIOLOGY FOLLOW-UP    Name: Abida Degroot    Age: 70 y.o. Date of Admission: 1/4/2022  2:34 PM    Date of Service: 1/7/2022    Chief Complaint: Follow-up for acute on chronic HFrEF, CAD    Interim History:  Currently with no chest pain, respiratory distress, or palpitations. SR on EKG and telemetry. No new overnight cardiac complaints. Most recent SPO2 93 on 3 L NC. Review of Systems:   Cardiac: As per HPI  General: No fever, chills  Pulmonary: As per HPI  HEENT: No visual disturbances, difficult swallowing  GI: No nausea, vomiting  : No dysuria, hematuria  Endocrine: No thyroid disease or DM  Musculoskeletal: BRAVO x 4, no focal motor deficits  Skin: Intact, no rashes  Neuro: No headache, seizures  Psych: Currently with no depression, anxiety    Problem List:  Patient Active Problem List   Diagnosis    Lymphoma ([facility] [address]    Gastroenteritis    Back pain at L4-L5 level    Impotence    Chronic fatigue    Urinary frequency    Bronchitis    Gastroesophageal reflux disease with esophagitis    Depression    Coronary artery disease of native artery of native heart with stable angina pectoris ([facility])    Chronic obstructive pulmonary disease ([facility] [address]    Pure hypercholesterolemia    Moderate obesity    Major depressive disorder, recurrent, mild ([facility])    Vascular dementia with depressed mood ([facility])    Moderate asthma without complication    Complete tear of left rotator cuff    Rotator cuff arthropathy of left shoulder    Nontraumatic complete tear of rotator cuff, left    Bursitis of left shoulder    Impingement syndrome of left shoulder    Labral tear of shoulder, degenerative, left    S/P arthroscopy of shoulder    Obstructive sleep apnea    CKD stage G3a/A3, GFR 45-59 and albumin creatinine ratio >300 mg/g ([facility])    COVID-19       Allergies: Allergies   Allergen Reactions    Midazolam Hcl      Wake up wild. ...  Must be reversed with romazicon or will wake up wild and combative Current Medications:  Current Facility-Administered Medications   Medication Dose Route Frequency Provider Last Rate Last Admin    spironolactone (ALDACTONE) tablet 25 mg  25 mg Oral Daily Ofe Ginette, APRN - CNP   25 mg at 01/07/22 0918    carvedilol (COREG) tablet 6.25 mg  6.25 mg Oral BID WC Ofe Ginette, APRN - CNP   6.25 mg at 01/07/22 9327    prochlorperazine (COMPAZINE) injection 10 mg  10 mg IntraVENous Q6H PRN Geraldo Duel, DO        baricitinib (OLUMIANT) tablet 2 mg  2 mg Oral Daily Ofe Ginette, APRN - CNP   2 mg at 01/07/22 6431    aspirin chewable tablet 81 mg  81 mg Oral Daily Geraldo Duel, DO   81 mg at 01/07/22 2355    buPROPion [facility]) extended release tablet 150 mg  150 mg Oral BID Geraldo Duel, DO   150 mg at 01/07/22 6913    clopidogrel (PLAVIX) tablet 75 mg  75 mg Oral Daily Geraldo Duel, DO   75 mg at 01/07/22 0919    fenofibrate (TRIGLIDE) tablet 160 mg  160 mg Oral Daily Geraldo Duel, DO   160 mg at 01/07/22 7888    guaiFENesin (MUCINEX) extended release tablet 1,200 mg  1,200 mg Oral BID Geraldo Duel, DO   1,200 mg at 01/07/22 0917    HYDROcodone-acetaminophen (NORCO) 7.5-325 MG per tablet 1 tablet  1 tablet Oral Q8H PRN Geraldo Duel, DO        LORazepam (ATIVAN) tablet 1 mg  1 mg Oral Daily PRN Geraldo Duel, DO        montelukast (SINGULAIR) tablet 10 mg  10 mg Oral Nightly Geraldo Duel, DO   10 mg at 01/06/22 2126    pantoprazole (PROTONIX) tablet 40 mg  40 mg Oral QAM Geraldo Duel, DO   40 mg at 01/07/22 2061    sertraline (ZOLOFT) tablet 100 mg  100 mg Oral Daily Geraldo Duel, DO   100 mg at 01/07/22 8315    [Held by provider] atorvastatin (LIPITOR) tablet 20 mg  20 mg Oral Nightly Geraldo Duel, DO   20 mg at 01/04/22 2041    tamsulosin (FLOMAX) capsule 0.4 mg  0.4 mg Oral Daily Geraldo Guptal, DO   0.4 mg at 01/07/22 0919    cefTRIAXone (ROCEPHIN) 1,000 mg in sterile water 10 mL IV syringe  1,000 mg IntraVENous Q24H Geraldo Duel, DO 0 mL/hr at 01/05/22 0058 1,000 mg at 01/06/22 1848    albuterol (PROVENTIL) nebulizer solution 2.5 mg  2.5 mg Nebulization Q6H PRN Rodney Duel, DO   2.5 mg at 01/05/22 1752    Arformoterol Tartrate (BROVANA) nebulizer solution 15 mcg  15 mcg Nebulization BID Rodney Duel, DO   15 mcg at 01/07/22 0740    budesonide (PULMICORT) nebulizer suspension 500 mcg  500 mcg Nebulization BID Rodney Duel, DO   500 mcg at 01/07/22 0740    ascorbic acid (VITAMIN C) tablet 1,000 mg  1,000 mg Oral Daily Geraldo Duel, DO   1,000 mg at 01/07/22 7132    vitamin D (CHOLECALCIFEROL) tablet 2,000 Units  2,000 Units Oral Daily Geraldo Duel, DO   2,000 Units at 01/07/22 1903    zinc sulfate (ZINCATE) capsule 50 mg  50 mg Oral Daily Geraldo Duel, DO   50 mg at 01/07/22 8660    doxycycline hyclate (VIBRAMYCIN) capsule 100 mg  100 mg Oral 2 times per day Geraldo Duel, DO   100 mg at 01/07/22 3521    acetaminophen (TYLENOL) tablet 650 mg  650 mg Oral Q6H PRN Geraldo Duel, DO        Or    acetaminophen (TYLENOL) suppository 650 mg  650 mg Rectal Q6H PRN Geraldo Duel, DO        enoxaparin (LOVENOX) injection 30 mg  30 mg SubCUTAneous BID Rodney Duel, DO   30 mg at 01/07/22 4146    dexamethasone (DECADRON) injection 6 mg  6 mg IntraVENous Q24H Geraldo Duel, DO   6 mg at 01/06/22 1848    guaiFENesin-dextromethorphan (ROBITUSSIN DM) 100-10 MG/5ML syrup 5 mL  5 mL Oral Q4H PRN Geraldo Duel, DO        furosemide (LASIX) injection 20 mg  20 mg IntraVENous Daily Geraldo Duel, DO   20 mg at 01/07/22 0493         Physical Exam:  /66   Pulse 79   Temp 99.9 °F (37.7 °C) (Oral)   Resp 20   Wt 212 lb 3.2 oz (96.3 kg)   SpO2 (!) 89%   BMI 32.26 kg/m²   Wt Readings from Last 3 Encounters:   01/07/22 212 lb 3.2 oz (96.3 kg)   12/15/21 223 lb 9.6 oz (101.4 kg)   12/06/21 226 lb 6.4 oz (102.7 kg)     Patient not examined (patient in isolation for COVID-19 infection) -- he was observed through the glass door of his room    Intake/Output:    Intake/Output Summary (Last 24 hours) at evidence of a thrombus in the right ventricle.   Physiologic and/or trace mitral regurgitation is present.   No evidence of mitral valve stenosis.   Physiologic and/or trace tricuspid regurgitation.   Regular rhythm.     TTE (Dr. Nicolas Grijalva, 1/5/2022)  Summary   Left ventricle is mildly enlarged .   Normal left ventricular wall thickness.   Ejection fraction is visually estimated at 28%.   Global left ventricular dysfunction   Normal sized left atrium.   Interatrial septum appears intact.   Mildly dilated right ventricle.   Physiologic and/or trace mitral regurgitation is present.   No evidence of mitral valve stenosis.   Physiologic and/or trace tricuspid regurgitation.   Regular rhythm. ASSESSMENT / PLAN:  · Acute on chronic HFrEF.  proBNP 5,000 on admit --> 9,000 --> 3,600. Small-moderate right and small left pleural effusions on Chest CTA this admission. · Cardiomyopathy -- r/o ischemic cardiomyopathy given clinical history. EF 40% on stress testing per [facility] record 3/2018 --> TTE 3/2018 with EF 50% --> TTE this admission EF < 35%. · Acute hypoxic respiratory failure, currently on 3L NC.  · COVID-19+ with associated viral pneumonia (positive COVID-19 test on 1/4/22)  · Elevated LFTs -- improving  · Coronary artery disease. S/p PCI and TATI placement x 1 to the distal RCA at [facility] in 2018. Residual disease of 60% distal LCX on LHC at [facility] 2018. Clinically stable. · Hypertension, uncontrolled at times. · Hyperlipidemia. Statin therapy on hold in setting of elevated LFTs. · COPD. Follows with Dr. Sherice Caro. · Former tobacco smoker. Quit 1 year ago. · Untreated JOSE. · History of TIA. · Chronic kidney disease. Stable. Cr 1.3. · Carotid artery disease.  S/p left carotid stent placement. · Non-Hodgkin's lymphoma s/p chemotherapy.  First diagnosed in 2011.  Follows with the [facility]. · BPH. · Anxiety. · Chronic back pain, on chronic opioid therapy.     - Images from 1/5/22 echocardiogram personally reviewed (agree with interpretation re: LV dysfunction)  - Coreg and aldactone started this admission  - Continue IV lasix for today  - ACE-I held on admission --> will add entresto today  - Continue to up-titrate GDMT as able  - Monitor BMP and I/O's closely  - Continue antiplatelet therapy / statin currently on hold (LFT's improving)  - Discussed option of LifeVest  - Monitor telemetry (SR, isolated PVC's)  - Aggressive risk factor modifications / treatment of JOSE as indicated  - Records from [facility] reviewed (pertninent findings outlined above)  - Reassess for cardiac catheterization once clinically improved    Carlos Irizaryr MD  [facility] ([facility]) Cardiology

## 2022-01-07 NOTE — CARE COORDINATION
Ss note:1/7/2022.3:06 PM POSITIVE COVID ON 1-4-22. Consult noted for Advanced Directives. Blank copies placed in pts discharge paperwork.  NA Contreras

## 2022-01-07 NOTE — PROGRESS NOTES
Oxygen saturation 90% on 2L at rest patient ambulated in room saturation dropped to 86% on 2L. Increased oxygen to 3L and patient continued to ambulate, saturation 89% on 3L with ambulation.   Patient returned to chair and oxygen decreased to 2L and saturation 90% on 2L at rest.

## 2022-01-07 NOTE — CARE COORDINATION
1/7/2022 1202 CM note: POSITIVE COVID 1/4/22. ACP done. PTA, he was independent with ADLs. Pt wearing [email or web address] NC and does not have home o2. Referral placed to [facility]([phone])/kylah faxed([phone]). Per Rufus Dumont at [place], life vest approved and will deliver vest to pt today. Pt started on Entresto-pt enrolled for free 30 day trial and coupon placed in soft chart to be given to pt at dc. Pt plans to return home at dc. CM/SW to follow for possible o2 needs. Upon dc,pt requests dc iInstructions are reviewed with his dtr.  Mary Grace SUAREZ

## 2022-01-07 NOTE — PROGRESS NOTES
Internal Medicine Progress Note    TRACE=[facility]    Castro Tellez. Saw Sandra., GRISELOLILIANA Fischer D.O., HAN Abraham, MSN, APRN, NP-C  Vanessa Heath. Shashi Mendoza, MSN, APRN-CNP     Primary Care Physician: Reji Zamora DO   Admitting Physician:  Maribell Cerrato DO  Admission date and time: 1/4/2022  2:34 PM    Room:  [address] [place] [address]  Admitting diagnosis: Respiratory distress [R06.03]  Hypoxia [R09.02]  COVID-19 [U07.1]    Patient Name: Javier Lindquist  MRN: [id number]    Date of Service: 1/7/2022     Subjective:    Sabas Darnell is a 70 y.o. male who was seen and examined today,1/7/2022, at the bedside. Positive for significant fatigue and malaise. Resting and exertional dyspnea. Does not like the food. Positive for generalized weakness and fatigue without focal component. Feels exhausted today and states that he was barely able to work with therapy. No family present    ROS: Review of rest of 12 systems negative except as mentioned above-subjective section      Physical Exam:  I/O this shift: In: 5 [P.O.:420]  Out: 2700 [Urine:2700]    Intake/Output Summary (Last 24 hours) at 1/7/2022 1553  Last data filed at 1/7/2022 1441  Gross per 24 hour   Intake 660 ml   Output 3000 ml   Net -2340 ml   I/O last 3 completed shifts: In: 980 [P.O.:980]  Out: 1800 [Urine:1800]  Patient Vitals for the past 96 hrs (Last 3 readings):   Weight   01/07/22 0600 212 lb 3.2 oz (96.3 kg)   01/06/22 0600 216 lb 3.2 oz (98.1 kg)   01/04/22 1945 223 lb 9.6 oz (101.4 kg)     Vital Signs:   Blood pressure (!) 115/56, pulse 71, temperature 99.9 °F (37.7 °C), temperature source Oral, resp. rate 20, weight 212 lb 3.2 oz (96.3 kg), SpO2 (!) 89 %. General appearance:  Alert, responsive, oriented to person, place, and time. Ill appearing, no distress. Head:  Normocephalic. No masses, lesions or tenderness. Eyes:  PERRLA. EOMI. Sclera clear.     ENT:  Ears normal. Mucosa normal.  Neck:    Supple. Trachea midline. No thyromegaly. No JVD. No bruits. Heart:    Rhythm regular. Rate controlled. S1 and S2. Systolic murmur grade 2/6. Lungs:    Symmetrical.  Diminished aeration throughout. Rhonchi present. Abdomen:   Soft. Obese. Non-tender. Non-distended. Bowel sounds positive. No organomegaly or masses. No pain on palpation. Extremities:    Peripheral pulses present. Improved peripheral edema. No ulcers. No cyanosis. No clubbing. Neurologic:    Alert x 3. Generally weak without focal component focal deficit. Cranial nerves grossly intact. No focal weakness. Psych:   Behavior is normal. Mood appears normal. Speech is not rapid and/or pressured. Musculoskeletal:   Spine ROM normal. Muscular strength intact. Gait not assessed. Integumentary:  No rashes  Skin normal color and texture.   Genitalia/Breast:  Deferred    Medication:  Scheduled Meds:   sacubitril-valsartan  1 tablet Oral BID    spironolactone  25 mg Oral Daily    carvedilol  6.25 mg Oral BID WC    baricitinib  2 mg Oral Daily    aspirin  81 mg Oral Daily    buPROPion  150 mg Oral BID    clopidogrel  75 mg Oral Daily    fenofibrate  160 mg Oral Daily    guaiFENesin  1,200 mg Oral BID    montelukast  10 mg Oral Nightly    pantoprazole  40 mg Oral QAM    sertraline  100 mg Oral Daily    [Held by provider] atorvastatin  20 mg Oral Nightly    tamsulosin  0.4 mg Oral Daily    cefTRIAXone (ROCEPHIN) IV  1,000 mg IntraVENous Q24H    Arformoterol Tartrate  15 mcg Nebulization BID    budesonide  500 mcg Nebulization BID    ascorbic acid  1,000 mg Oral Daily    Vitamin D  2,000 Units Oral Daily    zinc sulfate  50 mg Oral Daily    doxycycline hyclate  100 mg Oral 2 times per day    enoxaparin  30 mg SubCUTAneous BID    dexamethasone  6 mg IntraVENous Q24H    furosemide  20 mg IntraVENous Daily     Continuous Infusions:    Objective Data:  CBC with Differential:    Lab Results   Component Value Date    WBC 5.9 01/07/2022    RBC 4.87 01/07/2022    HGB 13.7 01/07/2022    HCT 43.6 01/07/2022     01/07/2022    MCV 89.5 01/07/2022    MCH 28.1 01/07/2022    MCHC 31.4 01/07/2022    RDW 13.6 01/07/2022    SEGSPCT 66 07/21/2013    LYMPHOPCT 13.7 01/07/2022    MONOPCT 8.8 01/07/2022    BASOPCT 0.2 01/07/2022    MONOSABS 0.52 01/07/2022    LYMPHSABS 0.81 01/07/2022    EOSABS 0.00 01/07/2022    BASOSABS 0.01 01/07/2022     BMP:    Lab Results   Component Value Date     01/07/2022    K 3.9 01/07/2022    K 4.1 01/04/2022    CL 96 01/07/2022    CO2 30 01/07/2022    BUN 31 01/07/2022    LABALBU 4.0 01/07/2022    LABALBU 4.2 12/01/2011    CREATININE 1.3 01/07/2022    CALCIUM 9.1 01/07/2022    GFRAA >60 01/07/2022    LABGLOM 54 01/07/2022    GLUCOSE 88 01/07/2022    GLUCOSE 152 12/01/2011     Hepatic Function Panel:    Lab Results   Component Value Date    ALKPHOS 109 01/07/2022    ALT 68 01/07/2022    AST 54 01/07/2022    PROT 7.1 01/07/2022    BILITOT 0.5 01/07/2022    BILIDIR 0.2 01/07/2022    IBILI 0.3 01/07/2022    LABALBU 4.0 01/07/2022    LABALBU 4.2 12/01/2011     Recent Labs     01/05/22  1337   TROPHS 28*         COVID inflammatory markers  Recent Labs     01/07/22 0714   DDIMER <200     Recent Labs     01/05/22 0623   FERRITIN 1,150     Recent Labs     01/07/22 0714   CRP 1.1*       Assessment:  · Acute respiratory failure with hypoxia secondary to COVID-19 pneumonia in an unvaccinated host  · Acute exacerbation of COPD secondary to viral pneumonia  · Hypertensive urgency with underlying history of hypertension  · Asymptomatic coronary artery disease with drug-eluting stents in place  · Transaminitis  · Decompensated diastolic congestive heart failure with associated pleural effusion and echo evidence of worsening cardiomyopathy with LVEF now 28%  · Chronic kidney disease stage IIIa  · Hyperlipidemia  · Vascular disease with history of TIA and left carotid stenting   · Non-Hodgkin's lymphoma with prior chemotherapy followed by the heart center   · Untreated obstructive sleep apnea       Plan:     · Patient seems somewhat cranky today did not feel well. Very slightly better than he did yesterday  · Working with physical therapy  · Oxygen is currently at 2 L/min down from 3. O2 sat approximately 89%  · Appetite poor  · Has been seen by cardiology  · Follow COVID biomarkers    Greater than 40 minutes of critical care time was spent with the patient. This time included chart review, , and discussion with those consultants involved in the patient's care. More than 50% of my  time was spent at the bedside counseling/coordinating care with the patient and/or family with face to face contact. This time was spent reviewing notes and laboratory data as well as instructing and counseling the patient. Time I spent with the family or surrogate(s) is included only if the patient was incapable of providing the necessary information or participating in medical decisions. I also discussed the differential diagnosis and all of the proposed management plans with the patient and individuals accompanying the patient. Nakul Carranza requires this high level of physician care and nursing on the Telemetry unit due the complexity of decision management and chance of rapid decline or death. Continued cardiac monitoring and higher level of nursing are required. I am readily available for any further decision-making and intervention. The patient was seen, examined and then discussed with Dr. Petrona Aguilar. Jey Mcgarry, JULIAN - CNP  1/7/2022  3:53 PM       I agree with the findings and the plan of care as documented in Jey Mcgarry NP-C's  note.     Adin Jackson DO, D.O., FACOI  4:02 PM  1/7/2022

## 2022-01-08 ENCOUNTER — APPOINTMENT (OUTPATIENT)
Dept: GENERAL RADIOLOGY | Age: 72
DRG: 177 | End: 2022-01-08
Payer: MEDICARE

## 2022-01-08 LAB
ALBUMIN SERPL-MCNC: 3.9 G/DL (ref 3.5–5.2)
ALP BLD-CCNC: 102 U/L (ref 40–129)
ALT SERPL-CCNC: 51 U/L (ref 0–40)
ANION GAP SERPL CALCULATED.3IONS-SCNC: 11 MMOL/L (ref 7–16)
AST SERPL-CCNC: 41 U/L (ref 0–39)
BASOPHILS ABSOLUTE: 0.01 E9/L (ref 0–0.2)
BASOPHILS RELATIVE PERCENT: 0.2 % (ref 0–2)
BILIRUB SERPL-MCNC: 0.6 MG/DL (ref 0–1.2)
BILIRUBIN DIRECT: 0.3 MG/DL (ref 0–0.3)
BILIRUBIN, INDIRECT: 0.3 MG/DL (ref 0–1)
BUN BLDV-MCNC: 40 MG/DL (ref 6–23)
C-REACTIVE PROTEIN: 1.7 MG/DL (ref 0–0.4)
CALCIUM SERPL-MCNC: 9.4 MG/DL (ref 8.6–10.2)
CHLORIDE BLD-SCNC: 94 MMOL/L (ref 98–107)
CO2: 29 MMOL/L (ref 22–29)
CREAT SERPL-MCNC: 1.5 MG/DL (ref 0.7–1.2)
D DIMER: <200 NG/ML DDU
EOSINOPHILS ABSOLUTE: 0 E9/L (ref 0.05–0.5)
EOSINOPHILS RELATIVE PERCENT: 0 % (ref 0–6)
GFR AFRICAN AMERICAN: 56
GFR NON-AFRICAN AMERICAN: 46 ML/MIN/1.73
GLUCOSE BLD-MCNC: 103 MG/DL (ref 74–99)
HCT VFR BLD CALC: 49.6 % (ref 37–54)
HEMOGLOBIN: 16.1 G/DL (ref 12.5–16.5)
IMMATURE GRANULOCYTES #: 0.03 E9/L
IMMATURE GRANULOCYTES %: 0.5 % (ref 0–5)
LYMPHOCYTES ABSOLUTE: 0.9 E9/L (ref 1.5–4)
LYMPHOCYTES RELATIVE PERCENT: 15 % (ref 20–42)
MCH RBC QN AUTO: 28.3 PG (ref 26–35)
MCHC RBC AUTO-ENTMCNC: 32.5 % (ref 32–34.5)
MCV RBC AUTO: 87.3 FL (ref 80–99.9)
MONOCYTES ABSOLUTE: 0.76 E9/L (ref 0.1–0.95)
MONOCYTES RELATIVE PERCENT: 12.7 % (ref 2–12)
NEUTROPHILS ABSOLUTE: 4.29 E9/L (ref 1.8–7.3)
NEUTROPHILS RELATIVE PERCENT: 71.6 % (ref 43–80)
PDW BLD-RTO: 13.3 FL (ref 11.5–15)
PLATELET # BLD: 300 E9/L (ref 130–450)
PMV BLD AUTO: 10.9 FL (ref 7–12)
POTASSIUM SERPL-SCNC: 4.1 MMOL/L (ref 3.5–5)
PROCALCITONIN: 0.13 NG/ML (ref 0–0.08)
RBC # BLD: 5.68 E12/L (ref 3.8–5.8)
SODIUM BLD-SCNC: 134 MMOL/L (ref 132–146)
TOTAL PROTEIN: 7.3 G/DL (ref 6.4–8.3)
WBC # BLD: 6 E9/L (ref 4.5–11.5)

## 2022-01-08 PROCEDURE — 36415 COLL VENOUS BLD VENIPUNCTURE: CPT

## 2022-01-08 PROCEDURE — 6370000000 HC RX 637 (ALT 250 FOR IP): Performed by: NURSE PRACTITIONER

## 2022-01-08 PROCEDURE — 86140 C-REACTIVE PROTEIN: CPT

## 2022-01-08 PROCEDURE — 6370000000 HC RX 637 (ALT 250 FOR IP): Performed by: INTERNAL MEDICINE

## 2022-01-08 PROCEDURE — 84145 PROCALCITONIN (PCT): CPT

## 2022-01-08 PROCEDURE — 85025 COMPLETE CBC W/AUTO DIFF WBC: CPT

## 2022-01-08 PROCEDURE — 1200000000 HC SEMI PRIVATE

## 2022-01-08 PROCEDURE — 80076 HEPATIC FUNCTION PANEL: CPT

## 2022-01-08 PROCEDURE — 2580000003 HC RX 258: Performed by: INTERNAL MEDICINE

## 2022-01-08 PROCEDURE — 71045 X-RAY EXAM CHEST 1 VIEW: CPT

## 2022-01-08 PROCEDURE — 6360000002 HC RX W HCPCS: Performed by: INTERNAL MEDICINE

## 2022-01-08 PROCEDURE — 85378 FIBRIN DEGRADE SEMIQUANT: CPT

## 2022-01-08 PROCEDURE — 2700000000 HC OXYGEN THERAPY PER DAY

## 2022-01-08 PROCEDURE — 80048 BASIC METABOLIC PNL TOTAL CA: CPT

## 2022-01-08 PROCEDURE — 94640 AIRWAY INHALATION TREATMENT: CPT

## 2022-01-08 PROCEDURE — 99233 SBSQ HOSP IP/OBS HIGH 50: CPT | Performed by: INTERNAL MEDICINE

## 2022-01-08 RX ORDER — FUROSEMIDE 20 MG/1
20 TABLET ORAL DAILY
Status: DISCONTINUED | OUTPATIENT
Start: 2022-01-09 | End: 2022-01-09

## 2022-01-08 RX ORDER — IPRATROPIUM BROMIDE AND ALBUTEROL SULFATE 2.5; .5 MG/3ML; MG/3ML
1 SOLUTION RESPIRATORY (INHALATION) 4 TIMES DAILY
Status: DISCONTINUED | OUTPATIENT
Start: 2022-01-08 | End: 2022-01-12

## 2022-01-08 RX ADMIN — ARFORMOTEROL TARTRATE 15 MCG: 15 SOLUTION RESPIRATORY (INHALATION) at 19:27

## 2022-01-08 RX ADMIN — ATORVASTATIN CALCIUM 20 MG: 20 TABLET, FILM COATED ORAL at 22:20

## 2022-01-08 RX ADMIN — ARFORMOTEROL TARTRATE 15 MCG: 15 SOLUTION RESPIRATORY (INHALATION) at 10:52

## 2022-01-08 RX ADMIN — ACETAMINOPHEN 650 MG: 325 TABLET ORAL at 22:54

## 2022-01-08 RX ADMIN — BUPROPION HYDROCHLORIDE 150 MG: 150 TABLET, EXTENDED RELEASE ORAL at 12:22

## 2022-01-08 RX ADMIN — Medication 2000 UNITS: at 10:49

## 2022-01-08 RX ADMIN — BUDESONIDE 500 MCG: 0.5 INHALANT RESPIRATORY (INHALATION) at 10:52

## 2022-01-08 RX ADMIN — SACUBITRIL AND VALSARTAN 1 TABLET: 24; 26 TABLET, FILM COATED ORAL at 10:49

## 2022-01-08 RX ADMIN — CLOPIDOGREL 75 MG: 75 TABLET, FILM COATED ORAL at 10:49

## 2022-01-08 RX ADMIN — IPRATROPIUM BROMIDE AND ALBUTEROL SULFATE 1 AMPULE: .5; 3 SOLUTION RESPIRATORY (INHALATION) at 19:27

## 2022-01-08 RX ADMIN — GUAIFENESIN 1200 MG: 600 TABLET, EXTENDED RELEASE ORAL at 10:49

## 2022-01-08 RX ADMIN — FUROSEMIDE 20 MG: 10 INJECTION, SOLUTION INTRAMUSCULAR; INTRAVENOUS at 10:48

## 2022-01-08 RX ADMIN — PANTOPRAZOLE SODIUM 40 MG: 40 TABLET, DELAYED RELEASE ORAL at 10:49

## 2022-01-08 RX ADMIN — CARVEDILOL 6.25 MG: 6.25 TABLET, FILM COATED ORAL at 10:49

## 2022-01-08 RX ADMIN — GUAIFENESIN 1200 MG: 600 TABLET, EXTENDED RELEASE ORAL at 22:20

## 2022-01-08 RX ADMIN — ENOXAPARIN SODIUM 30 MG: 100 INJECTION SUBCUTANEOUS at 10:48

## 2022-01-08 RX ADMIN — FENOFIBRATE 160 MG: 160 TABLET ORAL at 10:49

## 2022-01-08 RX ADMIN — TAMSULOSIN HYDROCHLORIDE 0.4 MG: 0.4 CAPSULE ORAL at 10:50

## 2022-01-08 RX ADMIN — BARICITINIB 2 MG: 2 TABLET, FILM COATED ORAL at 10:49

## 2022-01-08 RX ADMIN — DEXAMETHASONE SODIUM PHOSPHATE 6 MG: 10 INJECTION INTRAMUSCULAR; INTRAVENOUS at 22:21

## 2022-01-08 RX ADMIN — DOXYCYCLINE HYCLATE 100 MG: 100 CAPSULE ORAL at 10:50

## 2022-01-08 RX ADMIN — ZINC SULFATE 220 MG (50 MG) CAPSULE 50 MG: CAPSULE at 10:50

## 2022-01-08 RX ADMIN — ENOXAPARIN SODIUM 30 MG: 100 INJECTION SUBCUTANEOUS at 22:20

## 2022-01-08 RX ADMIN — DOXYCYCLINE HYCLATE 100 MG: 100 CAPSULE ORAL at 22:21

## 2022-01-08 RX ADMIN — SERTRALINE 100 MG: 50 TABLET, FILM COATED ORAL at 10:49

## 2022-01-08 RX ADMIN — BUPROPION HYDROCHLORIDE 150 MG: 150 TABLET, EXTENDED RELEASE ORAL at 22:21

## 2022-01-08 RX ADMIN — ASPIRIN 81 MG CHEWABLE TABLET 81 MG: 81 TABLET CHEWABLE at 10:49

## 2022-01-08 RX ADMIN — CARVEDILOL 6.25 MG: 6.25 TABLET, FILM COATED ORAL at 17:59

## 2022-01-08 RX ADMIN — BUDESONIDE 500 MCG: 0.5 INHALANT RESPIRATORY (INHALATION) at 19:27

## 2022-01-08 RX ADMIN — HYDROCODONE BITARTRATE AND ACETAMINOPHEN 1 TABLET: 7.5; 325 TABLET ORAL at 08:28

## 2022-01-08 RX ADMIN — OXYCODONE HYDROCHLORIDE AND ACETAMINOPHEN 1000 MG: 500 TABLET ORAL at 10:49

## 2022-01-08 RX ADMIN — WATER 1000 MG: 1 INJECTION INTRAMUSCULAR; INTRAVENOUS; SUBCUTANEOUS at 22:21

## 2022-01-08 RX ADMIN — SPIRONOLACTONE 25 MG: 25 TABLET ORAL at 10:49

## 2022-01-08 RX ADMIN — MONTELUKAST SODIUM 10 MG: 10 TABLET, FILM COATED ORAL at 22:20

## 2022-01-08 ASSESSMENT — PAIN SCALES - GENERAL
PAINLEVEL_OUTOF10: 10
PAINLEVEL_OUTOF10: 6
PAINLEVEL_OUTOF10: 0
PAINLEVEL_OUTOF10: 10

## 2022-01-08 NOTE — PROGRESS NOTES
INPATIENT CARDIOLOGY FOLLOW-UP    Name: Elva Siemens    Age: 70 y.o. Date of Admission: 1/4/2022  2:34 PM    Date of Service: 1/8/2022    Chief Complaint: Follow-up for acute on chronic HFrEF, CAD    Interim History:  Currently with no chest pain, respiratory distress, or palpitations. SR on EKG and telemetry. No new overnight cardiac complaints. Most recent SPO2 93 on 3 L NC. Review of Systems:   Cardiac: As per HPI  General: No fever, chills  Pulmonary: As per HPI  HEENT: No visual disturbances, difficult swallowing  GI: No nausea, vomiting  : No dysuria, hematuria  Endocrine: No thyroid disease or DM  Musculoskeletal: BRAVO x 4, no focal motor deficits  Skin: Intact, no rashes  Neuro: No headache, seizures  Psych: Currently with no depression, anxiety    Problem List:  Patient Active Problem List   Diagnosis    Lymphoma ([facility] [facility]    Gastroenteritis    Back pain at L4-L5 level    Impotence    Chronic fatigue    Urinary frequency    Bronchitis    Gastroesophageal reflux disease with esophagitis    Depression    Coronary artery disease of native artery of native heart with stable angina pectoris ([facility])    Chronic obstructive pulmonary disease ([facility] [address] [facility]    Pure hypercholesterolemia    Moderate obesity    Major depressive disorder, recurrent, mild ([facility])    Vascular dementia with depressed mood ([facility])    Moderate asthma without complication    Complete tear of left rotator cuff    Rotator cuff arthropathy of left shoulder    Nontraumatic complete tear of rotator cuff, left    Bursitis of left shoulder    Impingement syndrome of left shoulder    Labral tear of shoulder, degenerative, left    S/P arthroscopy of shoulder    Obstructive sleep apnea    CKD stage G3a/A3, GFR 45-59 and albumin creatinine ratio >300 mg/g ([facility])    COVID-19       Allergies: Allergies   Allergen Reactions    Midazolam Hcl      Wake up wild. ...  Must be reversed with romazicon or will wake up wild and combative       Current Medications:  Current Facility-Administered Medications   Medication Dose Route Frequency Provider Last Rate Last Admin    sacubitril-valsartan (ENTRESTO) 24-26 MG per tablet 1 tablet  1 tablet Oral BID Kong Wright MD   1 tablet at 01/08/22 1049    spironolactone (ALDACTONE) tablet 25 mg  25 mg Oral Daily Bassam Saad, APRN - CNP   25 mg at 01/08/22 1049    carvedilol (COREG) tablet 6.25 mg  6.25 mg Oral BID [facility] Sa[facility], APRN - CNP   6.25 mg at 01/08/22 1049    prochlorperazine (COMPAZINE) injection 10 mg  10 mg IntraVENous Q6H PRN Lori Isle, DO        baricitinib (OLUMIANT) tablet 2 mg  2 mg Oral Daily Bassam Saad, APRN - CNP   2 mg at 01/08/22 1049    aspirin chewable tablet 81 mg  81 mg Oral Daily Lori Isle, DO   81 mg at 01/08/22 1049    buPROPion [facility]) extended release tablet 150 mg  150 mg Oral BID Lori Emerson, DO   150 mg at 01/08/22 1222    clopidogrel (PLAVIX) tablet 75 mg  75 mg Oral Daily Lori Isle, DO   75 mg at 01/08/22 1049    fenofibrate (TRIGLIDE) tablet 160 mg  160 mg Oral Daily Lori Isle, DO   160 mg at 01/08/22 1049    guaiFENesin (MUCINEX) extended release tablet 1,200 mg  1,200 mg Oral BID Lori Emerson, DO   1,200 mg at 01/08/22 1049    HYDROcodone-acetaminophen (NORCO) 7.5-325 MG per tablet 1 tablet  1 tablet Oral Q8H PRN Lori Emerson, DO   1 tablet at 01/08/22 5388    LORazepam (ATIVAN) tablet 1 mg  1 mg Oral Daily PRN Lori Emerson, DO        montelukast (SINGULAIR) tablet 10 mg  10 mg Oral Nightly Lori Emerson, DO   10 mg at 01/07/22 2038    pantoprazole (PROTONIX) tablet 40 mg  40 mg Oral QAM Lori Emerson, DO   40 mg at 01/08/22 1049    sertraline (ZOLOFT) tablet 100 mg  100 mg Oral Daily Olri Isle, DO   100 mg at 01/08/22 1049    atorvastatin (LIPITOR) tablet 20 mg  20 mg Oral Nightly Lori Emerson, DO   20 mg at 01/07/22 2036    tamsulosin (FLOMAX) capsule 0.4 mg  0.4 mg Oral Daily Lori Isle, DO   0.4 mg at 01/08/22 1050  cefTRIAXone (ROCEPHIN) 1,000 mg in sterile water 10 mL IV syringe  1,000 mg IntraVENous Q24H Consuello Hilding, DO 0 mL/hr at 01/05/22 0058 1,000 mg at 01/07/22 2036    albuterol (PROVENTIL) nebulizer solution 2.5 mg  2.5 mg Nebulization Q6H PRN [facility]   2.5 mg at 01/05/22 1752    Arformoterol Tartrate (BROVANA) nebulizer solution 15 mcg  15 mcg Nebulization BID Consuello Hilding, DO   15 mcg at 01/08/22 1052    budesonide (PULMICORT) nebulizer suspension 500 mcg  500 mcg Nebulization BID Consuello Hilding, DO   500 mcg at 01/08/22 1052    ascorbic acid (VITAMIN C) tablet 1,000 mg  1,000 mg Oral Daily Consuello Hilding, DO   1,000 mg at 01/08/22 1049    vitamin D (CHOLECALCIFEROL) tablet 2,000 Units  2,000 Units Oral Daily Consuello Hilding, DO   2,000 Units at 01/08/22 1049    zinc sulfate (ZINCATE) capsule 50 mg  50 mg Oral Daily Consuello Hilding, DO   50 mg at 01/08/22 1050    doxycycline hyclate (VIBRAMYCIN) capsule 100 mg  100 mg Oral 2 times per day Consuello Hilding, DO   100 mg at 01/08/22 1050    acetaminophen (TYLENOL) tablet 650 mg  650 mg Oral Q6H PRN [facility]        Or    acetaminophen (TYLENOL) suppository 650 mg  650 mg Rectal Q6H PRN [facility]        enoxaparin (LOVENOX) injection 30 mg  30 mg SubCUTAneous BID Consuello Hilding, DO   30 mg at 01/08/22 1048    dexamethasone (DECADRON) injection 6 mg  6 mg IntraVENous Q24H Consuello Hilding, DO   6 mg at 01/07/22 2037    guaiFENesin-dextromethorphan (ROBITUSSIN DM) 100-10 MG/5ML syrup 5 mL  5 mL Oral Q4H PRN [facility] DO        furosemide (LASIX) injection 20 mg  20 mg IntraVENous Daily Consuello Hilding, DO   20 mg at 01/08/22 1048         Physical Exam:  /74   Pulse 74   Temp 98.9 °F (37.2 °C) (Oral)   Resp 18   Wt 212 lb 3.2 oz (96.3 kg)   SpO2 92%   BMI 32.26 kg/m²   Wt Readings from Last 3 Encounters:   01/07/22 212 lb 3.2 oz (96.3 kg)   12/15/21 223 lb 9.6 oz (101.4 kg)   12/06/21 226 lb 6.4 oz (102.7 kg)     Patient not examined (patient in isolation for COVID-19 infection) -- he was observed through the glass door of his room    Intake/Output:    Intake/Output Summary (Last 24 hours) at 1/8/2022 1413  Last data filed at 1/7/2022 1729  Gross per 24 hour   Intake 240 ml   Output 1200 ml   Net -960 ml     No intake/output data recorded. Laboratory Tests:  Recent Labs     01/06/22 0925 01/07/22  0714 01/08/22  0952    136 134   K 3.7 3.9 4.1    96* 94*   CO2 29 30* 29   BUN 32* 31* 40*   CREATININE 1.3* 1.3* 1.5*   GLUCOSE 118* 88 103*   CALCIUM 9.2 9.1 9.4     Lab Results   Component Value Date    MG 1.8 01/05/2022     Recent Labs     01/06/22 0925 01/07/22  0714 01/08/22  0952   ALKPHOS 129 109 102   ALT 95* 68* 51*   AST 89* 54* 41*   PROT 7.5 7.1 7.3   BILITOT 0.5 0.5 0.6   BILIDIR 0.3 0.2 0.3   LABALBU 4.1 4.0 3.9     Recent Labs     01/06/22  0925 01/07/22  0714 01/08/22  0952   WBC 5.7 5.9 6.0   RBC 4.90 4.87 5.68   HGB 13.8 13.7 16.1   HCT 45.2 43.6 49.6   MCV 92.2 89.5 87.3   MCH 28.2 28.1 28.3   MCHC 30.5* 31.4* 32.5   RDW 13.9 13.6 13.3    290 300   MPV 11.1 11.1 10.9     Lab Results   Component Value Date    CKTOTAL 66 07/20/2013    CKMB <0.3 07/20/2013    TROPONINI <0.01 02/19/2021    TROPONINI <0.01 02/18/2021    TROPONINI <0.01 05/04/2020     No results for input(s): CKTOTAL, CKMB, CKMBINDEX, TROPHS in the last 72 hours.   Lab Results   Component Value Date    INR 1.1 01/05/2022    INR 1.1 01/04/2022    INR 1.0 10/07/2015    PROTIME 12.7 (H) 01/05/2022    PROTIME 13.2 (H) 01/04/2022    PROTIME 12.2 10/07/2015     Lab Results   Component Value Date    TSH 1.540 07/18/2019     Lab Results   Component Value Date    LABA1C 5.6 07/31/2018     No results found for: EAG  Lab Results   Component Value Date    CHOL 226 (H) 06/16/2021    CHOL 198 11/06/2019    CHOL 175 01/12/2018     Lab Results   Component Value Date    TRIG 149 06/16/2021    TRIG 174 (H) 11/06/2019    TRIG 129 01/12/2018     Lab Results   Component Value Date    HDL 43 06/16/2021    HDL 36 11/06/2019    HDL 45 01/12/2018     Lab Results   Component Value Date    LDLCALC 153 (H) 06/16/2021    LDLCALC 127 (H) 11/06/2019    LDLCALC 104 (H) 01/12/2018     Lab Results   Component Value Date    LABVLDL 30 06/16/2021    LABVLDL 35 11/06/2019    LABVLDL 26 01/12/2018     No results found for: CHOLHDLRATIO  Recent Labs     01/07/22  0714   PROBNP 3,624*       Cardiac Tests:  EKG reviewed: SR, rate 91, PRWP     Telemetry reviewed (date: 1/8/2022): SR, rate 70's     TTE (Dr. Radha Duran, 2011)  Summary    Left ventricular size is grossly normal.    Mild left venticular concentric hypertrophy noted.    Ejection fraction is visually estimated at 64%.    No evidence of left ventricular mass or thrombus noted.    No regional wall motion abnormalities seen.    Borderline dilated right ventricle.    No evidence of a thrombus in the right ventricle.    Structurally normal mitral valve.    Physiologic and/or trace mitral regurgitation is present.    No mitral valve stenosis present.    The tricuspid valve appears structurally normal.    Physiologic and/or trace tricuspid regurgitation.    RVSP is 15.73 mmHg.    Regular rhythm.     Lexiscan Stress Test (Per [facility] notes, 3/2018)  No evidence of ischemia  EF 40%   Mild global hypokinesis      Left heart catheterization ([facility], 3/2018)  LMT: - The LMT is normal.   LAD:   - The LAD has mild luminal irregularities. LCX: - There was estatic. - The distal circumflex is narrowed 60 % - focal disease. RAMUS: - The Ramus is Absent. RCA:   - The mid RCA is narrowed 80 % - ISR.    Additional Comment: Focal ISR at distal edge of stent with another 80% focal   stenosis distal to stent.    S/p PCI and TATI x 1 to the distal RCA     TTE (Dr. Radha Duran, 12/2018)   Summary   Left ventricular size is grossly normal.   Mild left ventricular concentric hypertrophy noted.   Ejection fraction is visually estimated at 50%.   No evidence of left ventricular mass or thrombus noted.   No regional wall motion abnormalities seen.   Borderline dilated right ventricle.   No evidence of a thrombus in the right ventricle.   Physiologic and/or trace mitral regurgitation is present.   No evidence of mitral valve stenosis.   Physiologic and/or trace tricuspid regurgitation.   Regular rhythm.     TTE (Dr. Joselyn Sheth, 1/5/2022)  Summary   Left ventricle is mildly enlarged .   Normal left ventricular wall thickness.   Ejection fraction is visually estimated at 28%.   Global left ventricular dysfunction   Normal sized left atrium.   Interatrial septum appears intact.   Mildly dilated right ventricle.   Physiologic and/or trace mitral regurgitation is present.   No evidence of mitral valve stenosis.   Physiologic and/or trace tricuspid regurgitation.   Regular rhythm. ASSESSMENT / PLAN:  · Acute on chronic HFrEF.  proBNP 5,000 on admit --> 9,000 --> 3,600. Small-moderate right and small left pleural effusions on Chest CTA this admission. · Cardiomyopathy -- r/o ischemic cardiomyopathy given clinical history. EF 40% on stress testing per [facility] record 3/2018 --> TTE 3/2018 with EF 50% --> TTE this admission EF < 35%. · Acute hypoxic respiratory failure, currently on 3L NC.  · COVID-19+ with associated viral pneumonia (positive COVID-19 test on 1/4/22)  · Elevated LFTs -- improving  · Coronary artery disease. S/p PCI and TATI placement x 1 to the distal RCA at [facility] in 2018. Residual disease of 60% distal LCX on LHC at [facility] 2018. Clinically stable. · Hypertension, uncontrolled at times. · Hyperlipidemia. Statin therapy on hold in setting of elevated LFTs. · COPD. Follows with Dr. Nancy Herring. · Former tobacco smoker. Quit 1 year ago. · Untreated JOSE. · History of TIA. · Chronic kidney disease. Stable. Cr 1.3. · Carotid artery disease.  S/p left carotid stent placement. · Non-Hodgkin's lymphoma s/p chemotherapy.  First diagnosed in 2011.  Follows with the [facility] [place]. · BPH.  · Anxiety. · Chronic back pain, on chronic opioid therapy. - Coreg and aldactone started this admission  -Doing well with IV Lasix. 2.5 L negative. Can be switched to Demadex 20 or 40 mg daily starting tomorrow.  -Placed on Cite [place]. Will monitor renal function closely. - Continue to up-titrate GDMT as able  - Monitor BMP and I/O's closely  - Continue antiplatelet therapy / statin currently on hold (LFT's improving)  - Discussed option of LifeVest.  Patient would like to think about it. - Monitor telemetry (SR, isolated PVC's)  - Aggressive risk factor modifications / treatment of JOSE as indicated  - Records from [facility] reviewed (pertninent findings outlined above)  - Reassess for cardiac catheterization once clinically improved. This can be done as an outpatient. Rajesh Johnson MD  [facility]) Cardiology     NOTE: This report was transcribed using voice recognition software. Every effort was made to ensure accuracy; however, inadvertent computerized transcription errors may be present.

## 2022-01-08 NOTE — PROGRESS NOTES
Internal Medicine Progress Note    [facility]    Alise Little, GRISELOShellyIShelly Devine D.O., HAN Mcconnell, MSN, APRN, NP-C  Shelly Coombs. Terrence Lux, MSN, APRN-CNP     Primary Care Physician: Janine Pablo DO   Admitting Physician:  Rachell Lee DO  Admission date and time: 1/4/2022  2:34 PM    Room:  [address] [place] [address]  Admitting diagnosis: Respiratory distress [R06.03]  Hypoxia [R09.02]  COVID-19 [U07.1]    Patient Name: Rosalva Camilo  MRN: [id number]    Date of Service: 1/8/2022     Subjective:    Nakul Carranza is a 70 y.o. male who was seen and examined today,1/8/2022, at the bedside. Admits to fatigue and malaise. Resting and exertional dyspnea. Family brought food- a little more happy today. Positive for generalized weakness and fatigue without focal component. No family present    ROS: Review of rest of 12 systems negative except as mentioned above-subjective section      Physical Exam:  No intake/output data recorded. Intake/Output Summary (Last 24 hours) at 1/8/2022 1226  Last data filed at 1/7/2022 1729  Gross per 24 hour   Intake 240 ml   Output 1200 ml   Net -960 ml   I/O last 3 completed shifts: In: 660 [P.O.:660]  Out: 3300 [Urine:3300]  Patient Vitals for the past 96 hrs (Last 3 readings):   Weight   01/07/22 0600 212 lb 3.2 oz (96.3 kg)   01/06/22 0600 216 lb 3.2 oz (98.1 kg)   01/04/22 1945 223 lb 9.6 oz (101.4 kg)     Vital Signs:   Blood pressure 124/74, pulse 74, temperature 98.9 °F (37.2 °C), temperature source Oral, resp. rate 18, weight 212 lb 3.2 oz (96.3 kg), SpO2 92 %. General appearance:  Alert, responsive, oriented to person, place, and time. Ill appearing, no distress. Head:  Normocephalic. No masses, lesions or tenderness. Eyes:  PERRLA. EOMI. Sclera clear. ENT:  Ears normal. Mucosa normal.  Neck:    Supple. Trachea midline. No thyromegaly. No JVD. No bruits.   Heart:    Rhythm regular. Rate controlled. S1 and S2. Systolic murmur grade 2/6. Lungs:    Symmetrical.  Diminished aeration throughout. Rhonchi present. Abdomen:   Soft. Obese. Non-tender. Non-distended. Bowel sounds positive. No organomegaly or masses. No pain on palpation. Extremities:    Peripheral pulses present. Improved peripheral edema. No ulcers. No cyanosis. No clubbing. Neurologic:    Alert x 3. Generally weak without focal component focal deficit. Cranial nerves grossly intact. No focal weakness. Psych:   Behavior is normal. Mood appears normal. Speech is not rapid and/or pressured. Musculoskeletal:   Spine ROM normal. Muscular strength intact. Gait not assessed. Integumentary:  No rashes  Skin normal color and texture.   Genitalia/Breast:  Deferred    Medication:  Scheduled Meds:   sacubitril-valsartan  1 tablet Oral BID    spironolactone  25 mg Oral Daily    carvedilol  6.25 mg Oral BID WC    baricitinib  2 mg Oral Daily    aspirin  81 mg Oral Daily    buPROPion  150 mg Oral BID    clopidogrel  75 mg Oral Daily    fenofibrate  160 mg Oral Daily    guaiFENesin  1,200 mg Oral BID    montelukast  10 mg Oral Nightly    pantoprazole  40 mg Oral QAM    sertraline  100 mg Oral Daily    atorvastatin  20 mg Oral Nightly    tamsulosin  0.4 mg Oral Daily    cefTRIAXone (ROCEPHIN) IV  1,000 mg IntraVENous Q24H    Arformoterol Tartrate  15 mcg Nebulization BID    budesonide  500 mcg Nebulization BID    ascorbic acid  1,000 mg Oral Daily    Vitamin D  2,000 Units Oral Daily    zinc sulfate  50 mg Oral Daily    doxycycline hyclate  100 mg Oral 2 times per day    enoxaparin  30 mg SubCUTAneous BID    dexamethasone  6 mg IntraVENous Q24H    furosemide  20 mg IntraVENous Daily     Continuous Infusions:    Objective Data:  CBC with Differential:    Lab Results   Component Value Date    WBC 6.0 01/08/2022    RBC 5.68 01/08/2022    HGB 16.1 01/08/2022    HCT 49.6 01/08/2022     01/08/2022 MCV 87.3 01/08/2022    MCH 28.3 01/08/2022    MCHC 32.5 01/08/2022    RDW 13.3 01/08/2022    SEGSPCT 66 07/21/2013    LYMPHOPCT 15.0 01/08/2022    MONOPCT 12.7 01/08/2022    BASOPCT 0.2 01/08/2022    MONOSABS 0.76 01/08/2022    LYMPHSABS 0.90 01/08/2022    EOSABS 0.00 01/08/2022    BASOSABS 0.01 01/08/2022     BMP:    Lab Results   Component Value Date     01/08/2022    K 4.1 01/08/2022    K 4.1 01/04/2022    CL 94 01/08/2022    CO2 29 01/08/2022    BUN 40 01/08/2022    LABALBU 3.9 01/08/2022    LABALBU 4.2 12/01/2011    CREATININE 1.5 01/08/2022    CALCIUM 9.4 01/08/2022    GFRAA 56 01/08/2022    LABGLOM 46 01/08/2022    GLUCOSE 103 01/08/2022    GLUCOSE 152 12/01/2011     Hepatic Function Panel:    Lab Results   Component Value Date    ALKPHOS 102 01/08/2022    ALT 51 01/08/2022    AST 41 01/08/2022    PROT 7.3 01/08/2022    BILITOT 0.6 01/08/2022    BILIDIR 0.3 01/08/2022    IBILI 0.3 01/08/2022    LABALBU 3.9 01/08/2022    LABALBU 4.2 12/01/2011     Recent Labs     01/05/22  1337   TROPHS 28*         COVID inflammatory markers  Recent Labs     01/08/22  0952   DDIMER <200     No results for input(s): FERRITIN in the last 72 hours.   Recent Labs     01/07/22  0714   CRP 1.1*       Assessment:    · Acute respiratory failure with hypoxia secondary to COVID-19 pneumonia in an unvaccinated host  · Acute exacerbation of COPD secondary to viral pneumonia  · Hypertensive urgency with underlying history of hypertension  · Asymptomatic coronary artery disease with drug-eluting stents in place  · Transaminitis  · Decompensated diastolic congestive heart failure with associated pleural effusion and echo evidence of worsening cardiomyopathy with LVEF now 28%  · Chronic kidney disease stage IIIa  · Hyperlipidemia  · Vascular disease with history of TIA and left carotid stenting   · Non-Hodgkin's lymphoma with prior chemotherapy followed by the heart center   · Untreated obstructive sleep apnea       Plan:     · Feels a little bit better today family brought in food  · Working with physical therapy  · Oxygen by nasal cannula at 2 to 3 L    · Follow COVID biomarkers  · Continue Dexamethasone   · Monitor inflammatory markers    · Anticoagulation: Continue Lovenox monitor D-dimer   · Activity as tolerated  · Incentive spirometry as instructed. · Sit up in chair as often as tolerated and prone as often as tolerated but at least every 2 hours. · Continue current respiratory treatments. · Maximum COVID protocol is being undertaken  · Monitor blood glucose levels and treat accordingly  · We will continue to address underlying comorbidities during the hospitalization. · Continue current therapy. See orders for further plan of care. Greater than 40 minutes of critical care time was spent with the patient. This time included chart review, , and discussion with those consultants involved in the patient's care. More than 50% of my  time was spent at the bedside counseling/coordinating care with the patient and/or family with face to face contact. This time was spent reviewing notes and laboratory data as well as instructing and counseling the patient. Time I spent with the family or surrogate(s) is included only if the patient was incapable of providing the necessary information or participating in medical decisions. I also discussed the differential diagnosis and all of the proposed management plans with the patient and individuals accompanying the patient. Kendy Olsen requires this high level of physician care and nursing on the Telemetry unit due the complexity of decision management and chance of rapid decline or death. Continued cardiac monitoring and higher level of nursing are required. I am readily available for any further decision-making and intervention. The patient was seen, examined and then discussed with Dr. Rabon Heimlich.        JULIAN Julian - CNP  1/8/2022  12:26 PM        I agree with the findings and the plan of care as documented in [facility] NP-C's  note.     [employer], [facility]  12:43 PM  1/8/2022

## 2022-01-09 ENCOUNTER — APPOINTMENT (OUTPATIENT)
Dept: GENERAL RADIOLOGY | Age: 72
DRG: 177 | End: 2022-01-09
Payer: MEDICARE

## 2022-01-09 LAB
ALBUMIN SERPL-MCNC: 4 G/DL (ref 3.5–5.2)
ALBUMIN SERPL-MCNC: 4.1 G/DL (ref 3.5–5.2)
ALP BLD-CCNC: 94 U/L (ref 40–129)
ALP BLD-CCNC: 96 U/L (ref 40–129)
ALT SERPL-CCNC: 41 U/L (ref 0–40)
ALT SERPL-CCNC: 44 U/L (ref 0–40)
ANION GAP SERPL CALCULATED.3IONS-SCNC: 14 MMOL/L (ref 7–16)
AST SERPL-CCNC: 40 U/L (ref 0–39)
AST SERPL-CCNC: 42 U/L (ref 0–39)
BASOPHILS ABSOLUTE: 0 E9/L (ref 0–0.2)
BASOPHILS RELATIVE PERCENT: 0 % (ref 0–2)
BILIRUB SERPL-MCNC: 0.5 MG/DL (ref 0–1.2)
BILIRUB SERPL-MCNC: 0.5 MG/DL (ref 0–1.2)
BILIRUBIN DIRECT: 0.2 MG/DL (ref 0–0.3)
BILIRUBIN, INDIRECT: 0.3 MG/DL (ref 0–1)
BLOOD CULTURE, ROUTINE: NORMAL
BUN BLDV-MCNC: 59 MG/DL (ref 6–23)
C-REACTIVE PROTEIN: 1.9 MG/DL (ref 0–0.4)
CALCIUM SERPL-MCNC: 9.6 MG/DL (ref 8.6–10.2)
CHLORIDE BLD-SCNC: 95 MMOL/L (ref 98–107)
CO2: 28 MMOL/L (ref 22–29)
CREAT SERPL-MCNC: 2 MG/DL (ref 0.7–1.2)
CULTURE, BLOOD 2: NORMAL
D DIMER: <200 NG/ML DDU
EOSINOPHILS ABSOLUTE: 0 E9/L (ref 0.05–0.5)
EOSINOPHILS RELATIVE PERCENT: 0 % (ref 0–6)
GFR AFRICAN AMERICAN: 40
GFR NON-AFRICAN AMERICAN: 33 ML/MIN/1.73
GLUCOSE BLD-MCNC: 147 MG/DL (ref 74–99)
HCT VFR BLD CALC: 50.5 % (ref 37–54)
HEMOGLOBIN: 16.1 G/DL (ref 12.5–16.5)
IMMATURE GRANULOCYTES #: 0.04 E9/L
IMMATURE GRANULOCYTES %: 0.8 % (ref 0–5)
LYMPHOCYTES ABSOLUTE: 0.71 E9/L (ref 1.5–4)
LYMPHOCYTES RELATIVE PERCENT: 14 % (ref 20–42)
MCH RBC QN AUTO: 28 PG (ref 26–35)
MCHC RBC AUTO-ENTMCNC: 31.9 % (ref 32–34.5)
MCV RBC AUTO: 87.8 FL (ref 80–99.9)
METER GLUCOSE: 147 MG/DL (ref 74–99)
MONOCYTES ABSOLUTE: 0.39 E9/L (ref 0.1–0.95)
MONOCYTES RELATIVE PERCENT: 7.7 % (ref 2–12)
NEUTROPHILS ABSOLUTE: 3.94 E9/L (ref 1.8–7.3)
NEUTROPHILS RELATIVE PERCENT: 77.5 % (ref 43–80)
PDW BLD-RTO: 13.4 FL (ref 11.5–15)
PLATELET # BLD: 304 E9/L (ref 130–450)
PMV BLD AUTO: 11.2 FL (ref 7–12)
POTASSIUM SERPL-SCNC: 4.4 MMOL/L (ref 3.5–5)
PRO-BNP: 634 PG/ML (ref 0–125)
PROCALCITONIN: 0.14 NG/ML (ref 0–0.08)
RBC # BLD: 5.75 E12/L (ref 3.8–5.8)
SODIUM BLD-SCNC: 137 MMOL/L (ref 132–146)
TOTAL PROTEIN: 7.4 G/DL (ref 6.4–8.3)
TOTAL PROTEIN: 7.6 G/DL (ref 6.4–8.3)
TROPONIN, HIGH SENSITIVITY: 33 NG/L (ref 0–11)
WBC # BLD: 5.1 E9/L (ref 4.5–11.5)

## 2022-01-09 PROCEDURE — 2700000000 HC OXYGEN THERAPY PER DAY

## 2022-01-09 PROCEDURE — 85025 COMPLETE CBC W/AUTO DIFF WBC: CPT

## 2022-01-09 PROCEDURE — 84484 ASSAY OF TROPONIN QUANT: CPT

## 2022-01-09 PROCEDURE — 99233 SBSQ HOSP IP/OBS HIGH 50: CPT | Performed by: INTERNAL MEDICINE

## 2022-01-09 PROCEDURE — 6370000000 HC RX 637 (ALT 250 FOR IP): Performed by: INTERNAL MEDICINE

## 2022-01-09 PROCEDURE — 2580000003 HC RX 258: Performed by: INTERNAL MEDICINE

## 2022-01-09 PROCEDURE — 82962 GLUCOSE BLOOD TEST: CPT

## 2022-01-09 PROCEDURE — 6360000002 HC RX W HCPCS: Performed by: INTERNAL MEDICINE

## 2022-01-09 PROCEDURE — 83880 ASSAY OF NATRIURETIC PEPTIDE: CPT

## 2022-01-09 PROCEDURE — 85378 FIBRIN DEGRADE SEMIQUANT: CPT

## 2022-01-09 PROCEDURE — 86140 C-REACTIVE PROTEIN: CPT

## 2022-01-09 PROCEDURE — 80053 COMPREHEN METABOLIC PANEL: CPT

## 2022-01-09 PROCEDURE — 94640 AIRWAY INHALATION TREATMENT: CPT

## 2022-01-09 PROCEDURE — 36415 COLL VENOUS BLD VENIPUNCTURE: CPT

## 2022-01-09 PROCEDURE — 1200000000 HC SEMI PRIVATE

## 2022-01-09 PROCEDURE — 71045 X-RAY EXAM CHEST 1 VIEW: CPT

## 2022-01-09 PROCEDURE — 84145 PROCALCITONIN (PCT): CPT

## 2022-01-09 PROCEDURE — 80076 HEPATIC FUNCTION PANEL: CPT

## 2022-01-09 RX ORDER — HYDROCODONE BITARTRATE AND ACETAMINOPHEN 5; 325 MG/1; MG/1
1 TABLET ORAL EVERY 6 HOURS PRN
Status: DISCONTINUED | OUTPATIENT
Start: 2022-01-09 | End: 2022-01-18 | Stop reason: HOSPADM

## 2022-01-09 RX ORDER — CARVEDILOL 3.12 MG/1
3.12 TABLET ORAL 2 TIMES DAILY WITH MEALS
Status: DISCONTINUED | OUTPATIENT
Start: 2022-01-09 | End: 2022-01-18 | Stop reason: HOSPADM

## 2022-01-09 RX ORDER — BUPROPION HYDROCHLORIDE 150 MG/1
150 TABLET, EXTENDED RELEASE ORAL DAILY
Status: DISCONTINUED | OUTPATIENT
Start: 2022-01-09 | End: 2022-01-18 | Stop reason: HOSPADM

## 2022-01-09 RX ORDER — SODIUM CHLORIDE 9 MG/ML
INJECTION, SOLUTION INTRAVENOUS CONTINUOUS
Status: DISCONTINUED | OUTPATIENT
Start: 2022-01-09 | End: 2022-01-11

## 2022-01-09 RX ADMIN — HYDROCODONE BITARTRATE AND ACETAMINOPHEN 1 TABLET: 5; 325 TABLET ORAL at 12:46

## 2022-01-09 RX ADMIN — SODIUM CHLORIDE: 9 INJECTION, SOLUTION INTRAVENOUS at 05:48

## 2022-01-09 RX ADMIN — IPRATROPIUM BROMIDE AND ALBUTEROL SULFATE 1 AMPULE: .5; 3 SOLUTION RESPIRATORY (INHALATION) at 18:44

## 2022-01-09 RX ADMIN — ZINC SULFATE 220 MG (50 MG) CAPSULE 50 MG: CAPSULE at 10:57

## 2022-01-09 RX ADMIN — ENOXAPARIN SODIUM 30 MG: 100 INJECTION SUBCUTANEOUS at 22:35

## 2022-01-09 RX ADMIN — CARVEDILOL 3.12 MG: 3.12 TABLET, FILM COATED ORAL at 17:36

## 2022-01-09 RX ADMIN — PANTOPRAZOLE SODIUM 40 MG: 40 TABLET, DELAYED RELEASE ORAL at 11:02

## 2022-01-09 RX ADMIN — ASPIRIN 81 MG CHEWABLE TABLET 81 MG: 81 TABLET CHEWABLE at 10:56

## 2022-01-09 RX ADMIN — Medication 2000 UNITS: at 10:57

## 2022-01-09 RX ADMIN — ACETAMINOPHEN 650 MG: 325 TABLET ORAL at 22:32

## 2022-01-09 RX ADMIN — DEXAMETHASONE SODIUM PHOSPHATE 6 MG: 10 INJECTION INTRAMUSCULAR; INTRAVENOUS at 22:34

## 2022-01-09 RX ADMIN — CLOPIDOGREL 75 MG: 75 TABLET, FILM COATED ORAL at 10:57

## 2022-01-09 RX ADMIN — TAMSULOSIN HYDROCHLORIDE 0.4 MG: 0.4 CAPSULE ORAL at 11:00

## 2022-01-09 RX ADMIN — SERTRALINE 25 MG: 50 TABLET, FILM COATED ORAL at 10:57

## 2022-01-09 RX ADMIN — IPRATROPIUM BROMIDE AND ALBUTEROL SULFATE 1 AMPULE: .5; 3 SOLUTION RESPIRATORY (INHALATION) at 11:29

## 2022-01-09 RX ADMIN — BARICITINIB 1 MG: 2 TABLET, FILM COATED ORAL at 10:56

## 2022-01-09 RX ADMIN — BUPROPION HYDROCHLORIDE 150 MG: 150 TABLET, EXTENDED RELEASE ORAL at 11:02

## 2022-01-09 RX ADMIN — BUDESONIDE 500 MCG: 0.5 INHALANT RESPIRATORY (INHALATION) at 18:44

## 2022-01-09 RX ADMIN — GUAIFENESIN 1200 MG: 600 TABLET, EXTENDED RELEASE ORAL at 22:34

## 2022-01-09 RX ADMIN — DOXYCYCLINE HYCLATE 100 MG: 100 CAPSULE ORAL at 10:58

## 2022-01-09 RX ADMIN — HYDROCODONE BITARTRATE AND ACETAMINOPHEN 1 TABLET: 5; 325 TABLET ORAL at 22:25

## 2022-01-09 RX ADMIN — GUAIFENESIN 1200 MG: 600 TABLET, EXTENDED RELEASE ORAL at 10:57

## 2022-01-09 RX ADMIN — OXYCODONE HYDROCHLORIDE AND ACETAMINOPHEN 1000 MG: 500 TABLET ORAL at 10:58

## 2022-01-09 RX ADMIN — CARVEDILOL 3.12 MG: 3.12 TABLET, FILM COATED ORAL at 10:58

## 2022-01-09 RX ADMIN — ARFORMOTEROL TARTRATE 15 MCG: 15 SOLUTION RESPIRATORY (INHALATION) at 18:43

## 2022-01-09 RX ADMIN — ATORVASTATIN CALCIUM 20 MG: 20 TABLET, FILM COATED ORAL at 22:33

## 2022-01-09 RX ADMIN — MONTELUKAST SODIUM 10 MG: 10 TABLET, FILM COATED ORAL at 22:34

## 2022-01-09 RX ADMIN — WATER 1000 MG: 1 INJECTION INTRAMUSCULAR; INTRAVENOUS; SUBCUTANEOUS at 22:35

## 2022-01-09 RX ADMIN — ENOXAPARIN SODIUM 30 MG: 100 INJECTION SUBCUTANEOUS at 10:59

## 2022-01-09 RX ADMIN — DOXYCYCLINE HYCLATE 100 MG: 100 CAPSULE ORAL at 22:34

## 2022-01-09 ASSESSMENT — PAIN SCALES - GENERAL
PAINLEVEL_OUTOF10: 10
PAINLEVEL_OUTOF10: 0
PAINLEVEL_OUTOF10: 10
PAINLEVEL_OUTOF10: 0

## 2022-01-09 NOTE — PROGRESS NOTES
INPATIENT CARDIOLOGY FOLLOW-UP    Name: Ezio Gomez    Age: 70 y.o. Date of Admission: 1/4/2022  2:34 PM    Date of Service: 1/9/2022    Chief Complaint: Follow-up for acute on chronic HFrEF, CAD    Interim History:  Currently with no chest pain, respiratory distress, or palpitations. SR on EKG and telemetry. Worsening oxygen requirements overnight. Hypotensive as well. Now on 7 L via nasal cannula. Renal function worsening. Review of Systems:   Cardiac: As per HPI  General: No fever, chills  Pulmonary: As per HPI  HEENT: No visual disturbances, difficult swallowing  GI: No nausea, vomiting  : No dysuria, hematuria  Endocrine: No thyroid disease or DM  Musculoskeletal: BRAVO x 4, no focal motor deficits  Skin: Intact, no rashes  Neuro: No headache, seizures  Psych: Currently with no depression, anxiety    Problem List:  Patient Active Problem List   Diagnosis    Lymphoma ([facility] [facility]    Gastroenteritis    Back pain at L4-L5 level    Impotence    Chronic fatigue    Urinary frequency    Bronchitis    Gastroesophageal reflux disease with esophagitis    Depression    Coronary artery disease of native artery of native heart with stable angina pectoris ([facility])    Chronic obstructive pulmonary disease ([facility] [facility]    Pure hypercholesterolemia    Moderate obesity    Major depressive disorder, recurrent, mild ([facility])    Vascular dementia with depressed mood ([facility])    Moderate asthma without complication    Complete tear of left rotator cuff    Rotator cuff arthropathy of left shoulder    Nontraumatic complete tear of rotator cuff, left    Bursitis of left shoulder    Impingement syndrome of left shoulder    Labral tear of shoulder, degenerative, left    S/P arthroscopy of shoulder    Obstructive sleep apnea    CKD stage G3a/A3, GFR 45-59 and albumin creatinine ratio >300 mg/g ([facility])    COVID-19       Allergies: Allergies   Allergen Reactions    Midazolam Hcl      Wake up wild. ...  Must be reversed with romazicon or will wake up wild and combative       Current Medications:  Current Facility-Administered Medications   Medication Dose Route Frequency Provider Last Rate Last Admin    0.9 % sodium chloride infusion   IntraVENous Continuous Darritchie Gee, DO 75 mL/hr at 01/09/22 0548 New Bag at 01/09/22 0548    carvedilol (COREG) tablet 3.125 mg  3.125 mg Oral BID WC Clark Gee DO   3.125 mg at 01/09/22 1058    HYDROcodone-acetaminophen (NORCO) 5-325 MG per tablet 1 tablet  1 tablet Oral Q6H PRN Cynthia Gee DO        sertraline (ZOLOFT) tablet 25 mg  25 mg Oral Daily Clark Gee DO   25 mg at 01/09/22 1057    baricitinib (OLUMIANT) tablet 1 mg  1 mg Oral Daily Clark Gee DO   1 mg at 01/09/22 1056    buPROPion [facility]) extended release tablet 150 mg  150 mg Oral Daily Clark Gee DO   150 mg at 01/09/22 1102    ipratropium-albuterol (DUONEB) nebulizer solution 1 ampule  1 ampule Inhalation 4x daily Clark Gee DO   1 ampule at 01/09/22 1129    [Held by provider] sacubitril-valsartan (ENTRESTO) 24-26 MG per tablet 1 tablet  1 tablet Oral BID Venita Becker MD   1 tablet at 01/08/22 1049    prochlorperazine (COMPAZINE) injection 10 mg  10 mg IntraVENous Q6H PRN Kathrine Mireles, DO        aspirin chewable tablet 81 mg  81 mg Oral Daily Kathrine Mireles, DO   81 mg at 01/09/22 1056    clopidogrel (PLAVIX) tablet 75 mg  75 mg Oral Daily Kathrine Mireles, DO   75 mg at 01/09/22 1057    guaiFENesin (MUCINEX) extended release tablet 1,200 mg  1,200 mg Oral BID Kathrine Mireles, DO   1,200 mg at 01/09/22 1057    HYDROcodone-acetaminophen (NORCO) 7.5-325 MG per tablet 1 tablet  1 tablet Oral Q8H PRN Kathrine Mireles, DO   1 tablet at 01/08/22 0828    montelukast (SINGULAIR) tablet 10 mg  10 mg Oral Nightly Kathrine Mireles DO   10 mg at 01/08/22 2220    pantoprazole (PROTONIX) tablet 40 mg  40 mg Oral DANUTA Barajas DO   40 mg at 01/09/22 1102    atorvastatin (LIPITOR) tablet 20 mg  20 mg Oral Nightly Anthony Bras, DO   20 mg at 01/08/22 2220    tamsulosin (FLOMAX) capsule 0.4 mg  0.4 mg Oral Daily [facility], DO   0.4 mg at 01/09/22 1100    cefTRIAXone (ROCEPHIN) 1,000 mg in sterile water 10 mL IV syringe  1,000 mg IntraVENous Q24H [facility], DO 0 mL/hr at 01/05/22 0058 1,000 mg at 01/08/22 2221    Arformoterol Tartrate (BROVANA) nebulizer solution 15 mcg  15 mcg Nebulization BID [facility], DO   15 mcg at 01/08/22 1927    budesonide (PULMICORT) nebulizer suspension 500 mcg  500 mcg Nebulization BID [facility], DO   500 mcg at 01/08/22 1927    ascorbic acid (VITAMIN C) tablet 1,000 mg  1,000 mg Oral Daily [facility], DO   1,000 mg at 01/09/22 1058    vitamin D (CHOLECALCIFEROL) tablet 2,000 Units  2,000 Units Oral Daily [facility], DO   2,000 Units at 01/09/22 1057    zinc sulfate (ZINCATE) capsule 50 mg  50 mg Oral Daily [facility], DO   50 mg at 01/09/22 1057    doxycycline hyclate (VIBRAMYCIN) capsule 100 mg  100 mg Oral 2 times per day [facility], DO   100 mg at 01/09/22 1058    acetaminophen (TYLENOL) tablet 650 mg  650 mg Oral Q6H PRN [facility], DO   650 mg at 01/08/22 2254    Or    acetaminophen (TYLENOL) suppository 650 mg  650 mg Rectal Q6H PRN [facility], DO        enoxaparin (LOVENOX) injection 30 mg  30 mg SubCUTAneous BID [facility], DO   30 mg at 01/09/22 1059    dexamethasone (DECADRON) injection 6 mg  6 mg IntraVENous Q24H [facility], DO   6 mg at 01/08/22 2221    guaiFENesin-dextromethorphan (ROBITUSSIN DM) 100-10 MG/5ML syrup 5 mL  5 mL Oral Q4H PRN [facility], DO          sodium chloride 75 mL/hr at 01/09/22 0548       Physical Exam:  /65   Pulse 61   Temp 97.5 °F (36.4 °C) (Oral)   Resp 22   Wt 201 lb (91.2 kg)   SpO2 91%   BMI 30.56 kg/m²   Wt Readings from Last 3 Encounters:   01/09/22 201 lb (91.2 kg)   12/15/21 223 lb 9.6 oz (101.4 kg)   12/06/21 226 lb 6.4 oz (102.7 kg)     Patient not examined (patient in isolation for COVID-19 infection) -- he was observed through the glass door of his room    Intake/Output:    Intake/Output Summary (Last 24 hours) at 1/9/2022 1210  Last data filed at 1/9/2022 0908  Gross per 24 hour   Intake 120 ml   Output 550 ml   Net -430 ml     I/O this shift:  In: 120 [P.O.:120]  Out: 550 [Urine:550]    Laboratory Tests:  Recent Labs     01/07/22 0714 01/08/22  0952 01/09/22  0507    134 137   K 3.9 4.1 4.4   CL 96* 94* 95*   CO2 30* 29 28   BUN 31* 40* 59*   CREATININE 1.3* 1.5* 2.0*   GLUCOSE 88 103* 147*   CALCIUM 9.1 9.4 9.6     Lab Results   Component Value Date    MG 1.8 01/05/2022     Recent Labs     01/07/22 0714 01/08/22  0952 01/09/22  0507   ALKPHOS 109 102 94  96   ALT 68* 51* 41*  44*   AST 54* 41* 40*  42*   PROT 7.1 7.3 7.4  7.6   BILITOT 0.5 0.6 0.5  0.5   BILIDIR 0.2 0.3 0.2   LABALBU 4.0 3.9 4.0  4.1     Recent Labs     01/07/22 0714 01/08/22  0952 01/09/22  0507   WBC 5.9 6.0 5.1   RBC 4.87 5.68 5.75   HGB 13.7 16.1 16.1   HCT 43.6 49.6 50.5   MCV 89.5 87.3 87.8   MCH 28.1 28.3 28.0   MCHC 31.4* 32.5 31.9*   RDW 13.6 13.3 13.4    300 304   MPV 11.1 10.9 11.2     Lab Results   Component Value Date    CKTOTAL 66 07/20/2013    CKMB <0.3 07/20/2013    TROPONINI <0.01 02/19/2021    TROPONINI <0.01 02/18/2021    TROPONINI <0.01 05/04/2020     Recent Labs     01/09/22  0507   TROPHS 33*     Lab Results   Component Value Date    INR 1.1 01/05/2022    INR 1.1 01/04/2022    INR 1.0 10/07/2015    PROTIME 12.7 (H) 01/05/2022    PROTIME 13.2 (H) 01/04/2022    PROTIME 12.2 10/07/2015     Lab Results   Component Value Date    TSH 1.540 07/18/2019     Lab Results   Component Value Date    LABA1C 5.6 07/31/2018     No results found for: EAG  Lab Results   Component Value Date    CHOL 226 (H) 06/16/2021    CHOL 198 11/06/2019    CHOL 175 01/12/2018     Lab Results   Component Value Date    TRIG 149 06/16/2021    TRIG 174 (H) 11/06/2019    TRIG 129 01/12/2018     Lab Results   Component Value Date    HDL 43 06/16/2021    HDL 36 11/06/2019    HDL 45 01/12/2018     Lab Results   Component Value Date    LDLCALC 153 (H) 06/16/2021    LDLCALC 127 (H) 11/06/2019    LDLCALC 104 (H) 01/12/2018     Lab Results   Component Value Date    LABVLDL 30 06/16/2021    LABVLDL 35 11/06/2019    LABVLDL 26 01/12/2018     No results found for: [address]     01/07/22  0714 01/09/22  0507   PROBNP 3,624* 634*       Cardiac Tests:  EKG reviewed: SR, rate 91, PRWP     Telemetry reviewed (date: 1/9/2022): SR, rate 70's     TTE (Dr. Francisco J Baird, 2011)  Summary    Left ventricular size is grossly normal.    Mild left venticular concentric hypertrophy noted.    Ejection fraction is visually estimated at 64%.    No evidence of left ventricular mass or thrombus noted.    No regional wall motion abnormalities seen.    Borderline dilated right ventricle.    No evidence of a thrombus in the right ventricle.    Structurally normal mitral valve.    Physiologic and/or trace mitral regurgitation is present.    No mitral valve stenosis present.    The tricuspid valve appears structurally normal.    Physiologic and/or trace tricuspid regurgitation.    RVSP is 15.73 mmHg.    Regular rhythm.     Lexiscan Stress Test (Per [facility] notes, 3/2018)  No evidence of ischemia  EF 40%   Mild global hypokinesis      Left heart catheterization ([facility], 3/2018)  LMT: - The LMT is normal.   LAD:   - The LAD has mild luminal irregularities. LCX: - There was estatic. - The distal circumflex is narrowed 60 % - focal disease. RAMUS: - The Ramus is Absent. RCA:   - The mid RCA is narrowed 80 % - ISR.    Additional Comment: Focal ISR at distal edge of stent with another 80% focal   stenosis distal to stent.    S/p PCI and TATI x 1 to the distal RCA     TTE (Dr. Francisco J Baird, 12/2018)   Summary   Left ventricular size is grossly normal.   Mild left ventricular concentric hypertrophy noted.   Ejection fraction is visually estimated at 50%.   No evidence of left ventricular mass or thrombus noted.   No regional wall motion abnormalities seen.   Borderline dilated right ventricle.   No evidence of a thrombus in the right ventricle.   Physiologic and/or trace mitral regurgitation is present.   No evidence of mitral valve stenosis.   Physiologic and/or trace tricuspid regurgitation.   Regular rhythm.     TTE (Dr. Fly Acosta, 1/5/2022)  Summary   Left ventricle is mildly enlarged .   Normal left ventricular wall thickness.   Ejection fraction is visually estimated at 28%.   Global left ventricular dysfunction   Normal sized left atrium.   Interatrial septum appears intact.   Mildly dilated right ventricle.   Physiologic and/or trace mitral regurgitation is present.   No evidence of mitral valve stenosis.   Physiologic and/or trace tricuspid regurgitation.   Regular rhythm. ASSESSMENT / PLAN:  · Acute on chronic HFrEF.  proBNP 5,000 on admit --> 9,000 --> 3,600. Small-moderate right and small left pleural effusions on Chest CTA this admission. · Cardiomyopathy -- r/o ischemic cardiomyopathy given clinical history. EF 40% on stress testing per [facility] record 3/2018 --> TTE 3/2018 with EF 50% --> TTE this admission EF < 35%. · Acute hypoxic respiratory failure, currently on 3L NC.  · COVID-19+ with associated viral pneumonia (positive COVID-19 test on 1/4/22)  · Elevated LFTs -- improving  · Coronary artery disease. S/p PCI and TATI placement x 1 to the distal RCA at [facility] in 2018. Residual disease of 60% distal LCX on LHC at [facility] 2018. Clinically stable. · Hypertension, uncontrolled at times. · Hyperlipidemia. Statin therapy on hold in setting of elevated LFTs. · COPD. Follows with Dr. Aurora Lee. · Former tobacco smoker. Quit 1 year ago. · Untreated JOSE. · History of TIA. · Chronic kidney disease. Stable. Cr 1.3. · Carotid artery disease.  S/p left carotid stent placement. · Non-Hodgkin's lymphoma s/p chemotherapy.  First diagnosed in 2011.  Follows with the [facility]. · BPH.  · Anxiety. · Chronic back pain, on chronic opioid therapy. - Coreg and aldactone started this admission. Subha Drones has been started as well. With worsening renal function, Entresto and Aldactone being appropriately held. Coreg dose has been decreased to 3.125 twice daily.  -I had discontinued the IV Lasix yesterday. We will hold off on any diuretics at this point.  -We will rechallenge her with guideline directed medical therapy as and when able. - - Monitor BMP and I/O's closely  - Continue antiplatelet therapy / statin currently on hold (LFT's improving)  - Discussed option of LifeVest.  Patient would like to think about it. - Monitor telemetry (SR, isolated PVC's)  - Aggressive risk factor modifications / treatment of JOSE as indicated  - Records from [facility] reviewed (pertninent findings outlined above)  - Reassess for cardiac catheterization once clinically improved. This can be done as an outpatient.  -Defer management of COVID-19 pneumonia to primary and pulmonary teams. Rhina Kellogg MD  [facility]) Cardiology     NOTE: This report was transcribed using voice recognition software. Every effort was made to ensure accuracy; however, inadvertent computerized transcription errors may be present.

## 2022-01-09 NOTE — PROGRESS NOTES
Internal Medicine Progress Note    [facility]    Neo Worrell, GRISELOShellyIShelly Brennan D.O., GRISELOHAN Davidson, MSN, APRN, NP-C  Lazarus Leep. Katherine Fothergill, MSN, APRN-CNP     Primary Care Physician: Jordy Damon DO   Admitting Physician:  Geraldo Perez DO  Admission date and time: 1/4/2022  2:34 PM    Room:  [address] [place] [address]  Admitting diagnosis: Respiratory distress [R06.03]  Hypoxia [R09.02]  COVID-19 [U07.1]    Patient Name: Maribel Real  MRN: [id number]    Date of Service: 1/9/2022     Subjective:    Ewa Iraheta is a 70 y.o. male who was seen and examined today,1/9/2022, at the bedside. Admits to fatigue and malaise. Resting and exertional dyspnea. Positive for generalized weakness and fatigue without focal component. Staff reported episode of confusion last night- states he was awakened out of a sound sleep and had a hard time figuring out where he was. No family present    ROS: Review of rest of 12 systems negative except as mentioned above-subjective section      Physical Exam:  I/O this shift:  In: 360 [P.O.:360]  Out: 550 [Urine:550]    Intake/Output Summary (Last 24 hours) at 1/9/2022 1618  Last data filed at 1/9/2022 1357  Gross per 24 hour   Intake 360 ml   Output 550 ml   Net -190 ml   No intake/output data recorded. Patient Vitals for the past 96 hrs (Last 3 readings):   Weight   01/09/22 0549 201 lb (91.2 kg)   01/07/22 0600 212 lb 3.2 oz (96.3 kg)   01/06/22 0600 216 lb 3.2 oz (98.1 kg)     Vital Signs:   Blood pressure (!) 108/58, pulse 61, temperature 97.5 °F (36.4 °C), temperature source Oral, resp. rate 22, weight 201 lb (91.2 kg), SpO2 91 %. General appearance:  Alert, responsive, oriented to person, place, and time. In no distress. Head:  Normocephalic. No masses, lesions or tenderness. Eyes:  PERRLA. EOMI. Sclera clear. ENT:  Ears normal. Mucosa normal.  Neck:    Supple. Trachea midline.  No thyromegaly. No JVD. No bruits. Heart:    Rhythm regular. Rate controlled. S1 and S2. Systolic murmur grade 2/6. Lungs:    Symmetrical.  Diminished aeration throughout. Rhonchi present. Abdomen:   Soft. Obese. Non-tender. Non-distended. Bowel sounds positive. No organomegaly or masses. No pain on palpation. Extremities:    Peripheral pulses present. Improved peripheral edema. No ulcers. No cyanosis. No clubbing. Neurologic:    Alert x 3. Generally weak without focal component focal deficit. Cranial nerves grossly intact. No focal weakness. Psych:   Behavior is normal. Mood appears normal. Speech is not rapid and/or pressured. Musculoskeletal:   Spine ROM normal. Muscular strength intact. Gait not assessed. Integumentary:  No rashes  Skin normal color and texture.   Genitalia/Breast:  Deferred    Medication:  Scheduled Meds:   carvedilol  3.125 mg Oral BID WC    sertraline  25 mg Oral Daily    baricitinib  1 mg Oral Daily    buPROPion  150 mg Oral Daily    ipratropium-albuterol  1 ampule Inhalation 4x daily    [Held by provider] sacubitril-valsartan  1 tablet Oral BID    aspirin  81 mg Oral Daily    clopidogrel  75 mg Oral Daily    guaiFENesin  1,200 mg Oral BID    montelukast  10 mg Oral Nightly    pantoprazole  40 mg Oral QAM    atorvastatin  20 mg Oral Nightly    tamsulosin  0.4 mg Oral Daily    cefTRIAXone (ROCEPHIN) IV  1,000 mg IntraVENous Q24H    Arformoterol Tartrate  15 mcg Nebulization BID    budesonide  500 mcg Nebulization BID    ascorbic acid  1,000 mg Oral Daily    Vitamin D  2,000 Units Oral Daily    zinc sulfate  50 mg Oral Daily    doxycycline hyclate  100 mg Oral 2 times per day    enoxaparin  30 mg SubCUTAneous BID    dexamethasone  6 mg IntraVENous Q24H     Continuous Infusions:   sodium chloride 75 mL/hr at 01/09/22 0548       Objective Data:  CBC with Differential:    Lab Results   Component Value Date    WBC 5.1 01/09/2022    RBC 5.75 01/09/2022    HGB 16.1 01/09/2022    HCT 50.5 01/09/2022     01/09/2022    MCV 87.8 01/09/2022    MCH 28.0 01/09/2022    MCHC 31.9 01/09/2022    RDW 13.4 01/09/2022    SEGSPCT 66 07/21/2013    LYMPHOPCT 14.0 01/09/2022    MONOPCT 7.7 01/09/2022    BASOPCT 0.0 01/09/2022    MONOSABS 0.39 01/09/2022    LYMPHSABS 0.71 01/09/2022    EOSABS 0.00 01/09/2022    BASOSABS 0.00 01/09/2022     BMP:    Lab Results   Component Value Date     01/09/2022    K 4.4 01/09/2022    K 4.1 01/04/2022    CL 95 01/09/2022    CO2 28 01/09/2022    BUN 59 01/09/2022    LABALBU 4.1 01/09/2022    LABALBU 4.0 01/09/2022    LABALBU 4.2 12/01/2011    CREATININE 2.0 01/09/2022    CALCIUM 9.6 01/09/2022    GFRAA 40 01/09/2022    LABGLOM 33 01/09/2022    GLUCOSE 147 01/09/2022    GLUCOSE 152 12/01/2011     Hepatic Function Panel:    Lab Results   Component Value Date    ALKPHOS 96 01/09/2022    ALKPHOS 94 01/09/2022    ALT 44 01/09/2022    ALT 41 01/09/2022    AST 42 01/09/2022    AST 40 01/09/2022    PROT 7.6 01/09/2022    PROT 7.4 01/09/2022    BILITOT 0.5 01/09/2022    BILITOT 0.5 01/09/2022    BILIDIR 0.2 01/09/2022    IBILI 0.3 01/09/2022    LABALBU 4.1 01/09/2022    LABALBU 4.0 01/09/2022    LABALBU 4.2 12/01/2011     Recent Labs     01/09/22  0507   TROPHS 33*         COVID inflammatory markers  Recent Labs     01/09/22  0507   DDIMER <200     No results for input(s): FERRITIN in the last 72 hours.   Recent Labs     01/09/22  0507   CRP 1.9*       Assessment:    · Acute respiratory failure with hypoxia secondary to COVID-19 pneumonia in an unvaccinated host  · Acute exacerbation of COPD secondary to viral pneumonia  · Hypertensive urgency with underlying history of hypertension  · Asymptomatic coronary artery disease with drug-eluting stents in place  · Transaminitis  · Decompensated diastolic congestive heart failure with associated pleural effusion and echo evidence of worsening cardiomyopathy with LVEF now 28%  · Chronic kidney disease stage IIIa  · Hyperlipidemia  · Vascular disease with history of TIA and left carotid stenting   · Non-Hodgkin's lymphoma with prior chemotherapy followed by the heart center   · Untreated obstructive sleep apnea       Plan:       · The patient confusion has currently resolved seem to be sleeping well  · Blood pressure has improved since last evening  · Currently on 7 L of nasal cannula with O2 sat 91%  · Follow COVID biomarkers  · Continue Dexamethasone   · Monitor inflammatory markers    · Anticoagulation: Continue Lovenox monitor D-dimer   · Activity as tolerated  · Incentive spirometry as instructed. · Sit up in chair as often as tolerated and prone as often as tolerated but at least every 2 hours. · Continue current respiratory treatments. · Maximum COVID protocol is being undertaken  · Monitor blood glucose levels and treat accordingly  · We will continue to address underlying comorbidities during the hospitalization. · Continue current therapy. See orders for further plan of care. Greater than 40 minutes of critical care time was spent with the patient. This time included chart review, , and discussion with those consultants involved in the patient's care. More than 50% of my  time was spent at the bedside counseling/coordinating care with the patient and/or family with face to face contact. This time was spent reviewing notes and laboratory data as well as instructing and counseling the patient. Time I spent with the family or surrogate(s) is included only if the patient was incapable of providing the necessary information or participating in medical decisions. I also discussed the differential diagnosis and all of the proposed management plans with the patient and individuals accompanying the patient. Dionne Mifflin requires this high level of physician care and nursing on the Telemetry unit due the complexity of decision management and chance of rapid decline or death.   Continued cardiac monitoring and higher level of nursing are required. I am readily available for any further decision-making and intervention. The patient was seen, examined and then discussed with Dr. Isaac Rockwell. JULIAN Nunes - CNP  1/9/2022  4:18 PM       I agree with the findings and the plan of care as documented in Xena Schaeffer NP-C's  note.     Salina Araujo DO, D.O., Ashley Guest  4:27 PM  1/9/2022

## 2022-01-10 LAB
ALBUMIN SERPL-MCNC: 3.9 G/DL (ref 3.5–5.2)
ALP BLD-CCNC: 88 U/L (ref 40–129)
ALT SERPL-CCNC: 34 U/L (ref 0–40)
ANION GAP SERPL CALCULATED.3IONS-SCNC: 14 MMOL/L (ref 7–16)
AST SERPL-CCNC: 43 U/L (ref 0–39)
BASOPHILS ABSOLUTE: 0.01 E9/L (ref 0–0.2)
BASOPHILS ABSOLUTE: 0.01 E9/L (ref 0–0.2)
BASOPHILS RELATIVE PERCENT: 0.2 % (ref 0–2)
BASOPHILS RELATIVE PERCENT: 0.2 % (ref 0–2)
BILIRUB SERPL-MCNC: 0.4 MG/DL (ref 0–1.2)
BILIRUBIN DIRECT: 0.2 MG/DL (ref 0–0.3)
BILIRUBIN, INDIRECT: 0.2 MG/DL (ref 0–1)
BUN BLDV-MCNC: 62 MG/DL (ref 6–23)
C-REACTIVE PROTEIN: 1.8 MG/DL (ref 0–0.4)
CALCIUM SERPL-MCNC: 9.2 MG/DL (ref 8.6–10.2)
CHLORIDE BLD-SCNC: 97 MMOL/L (ref 98–107)
CO2: 21 MMOL/L (ref 22–29)
CREAT SERPL-MCNC: 1.7 MG/DL (ref 0.7–1.2)
D DIMER: <200 NG/ML DDU
EOSINOPHILS ABSOLUTE: 0 E9/L (ref 0.05–0.5)
EOSINOPHILS ABSOLUTE: 0.01 E9/L (ref 0.05–0.5)
EOSINOPHILS RELATIVE PERCENT: 0 % (ref 0–6)
EOSINOPHILS RELATIVE PERCENT: 0.2 % (ref 0–6)
GFR AFRICAN AMERICAN: 48
GFR NON-AFRICAN AMERICAN: 40 ML/MIN/1.73
GLUCOSE BLD-MCNC: 66 MG/DL (ref 74–99)
HCT VFR BLD CALC: 45.9 % (ref 37–54)
HCT VFR BLD CALC: 46.1 % (ref 37–54)
HEMOGLOBIN: 14.3 G/DL (ref 12.5–16.5)
HEMOGLOBIN: 14.5 G/DL (ref 12.5–16.5)
IMMATURE GRANULOCYTES #: 0.02 E9/L
IMMATURE GRANULOCYTES #: 0.02 E9/L
IMMATURE GRANULOCYTES %: 0.4 % (ref 0–5)
IMMATURE GRANULOCYTES %: 0.4 % (ref 0–5)
LYMPHOCYTES ABSOLUTE: 0.93 E9/L (ref 1.5–4)
LYMPHOCYTES ABSOLUTE: 1.03 E9/L (ref 1.5–4)
LYMPHOCYTES RELATIVE PERCENT: 19.6 % (ref 20–42)
LYMPHOCYTES RELATIVE PERCENT: 20.6 % (ref 20–42)
MCH RBC QN AUTO: 27.9 PG (ref 26–35)
MCH RBC QN AUTO: 27.9 PG (ref 26–35)
MCHC RBC AUTO-ENTMCNC: 31.2 % (ref 32–34.5)
MCHC RBC AUTO-ENTMCNC: 31.5 % (ref 32–34.5)
MCV RBC AUTO: 88.7 FL (ref 80–99.9)
MCV RBC AUTO: 89.6 FL (ref 80–99.9)
MONOCYTES ABSOLUTE: 0.62 E9/L (ref 0.1–0.95)
MONOCYTES ABSOLUTE: 0.72 E9/L (ref 0.1–0.95)
MONOCYTES RELATIVE PERCENT: 13.1 % (ref 2–12)
MONOCYTES RELATIVE PERCENT: 14.4 % (ref 2–12)
NEUTROPHILS ABSOLUTE: 3.16 E9/L (ref 1.8–7.3)
NEUTROPHILS ABSOLUTE: 3.22 E9/L (ref 1.8–7.3)
NEUTROPHILS RELATIVE PERCENT: 64.2 % (ref 43–80)
NEUTROPHILS RELATIVE PERCENT: 66.7 % (ref 43–80)
PDW BLD-RTO: 13.4 FL (ref 11.5–15)
PDW BLD-RTO: 13.6 FL (ref 11.5–15)
PLATELET # BLD: 298 E9/L (ref 130–450)
PLATELET # BLD: 301 E9/L (ref 130–450)
PMV BLD AUTO: 11.5 FL (ref 7–12)
PMV BLD AUTO: 11.7 FL (ref 7–12)
POTASSIUM SERPL-SCNC: 4.6 MMOL/L (ref 3.5–5)
RBC # BLD: 5.12 E12/L (ref 3.8–5.8)
RBC # BLD: 5.2 E12/L (ref 3.8–5.8)
REASON FOR REJECTION: NORMAL
REJECTED TEST: NORMAL
SODIUM BLD-SCNC: 132 MMOL/L (ref 132–146)
TOTAL PROTEIN: 7.5 G/DL (ref 6.4–8.3)
WBC # BLD: 4.7 E9/L (ref 4.5–11.5)
WBC # BLD: 5 E9/L (ref 4.5–11.5)

## 2022-01-10 PROCEDURE — 2580000003 HC RX 258: Performed by: INTERNAL MEDICINE

## 2022-01-10 PROCEDURE — 2700000000 HC OXYGEN THERAPY PER DAY

## 2022-01-10 PROCEDURE — 99233 SBSQ HOSP IP/OBS HIGH 50: CPT | Performed by: INTERNAL MEDICINE

## 2022-01-10 PROCEDURE — 80048 BASIC METABOLIC PNL TOTAL CA: CPT

## 2022-01-10 PROCEDURE — 6370000000 HC RX 637 (ALT 250 FOR IP): Performed by: INTERNAL MEDICINE

## 2022-01-10 PROCEDURE — 80076 HEPATIC FUNCTION PANEL: CPT

## 2022-01-10 PROCEDURE — 86140 C-REACTIVE PROTEIN: CPT

## 2022-01-10 PROCEDURE — 6360000002 HC RX W HCPCS: Performed by: INTERNAL MEDICINE

## 2022-01-10 PROCEDURE — 36415 COLL VENOUS BLD VENIPUNCTURE: CPT

## 2022-01-10 PROCEDURE — 97110 THERAPEUTIC EXERCISES: CPT

## 2022-01-10 PROCEDURE — 6360000002 HC RX W HCPCS: Performed by: NURSE PRACTITIONER

## 2022-01-10 PROCEDURE — 6370000000 HC RX 637 (ALT 250 FOR IP): Performed by: NURSE PRACTITIONER

## 2022-01-10 PROCEDURE — 85025 COMPLETE CBC W/AUTO DIFF WBC: CPT

## 2022-01-10 PROCEDURE — 1200000000 HC SEMI PRIVATE

## 2022-01-10 PROCEDURE — 76937 US GUIDE VASCULAR ACCESS: CPT

## 2022-01-10 PROCEDURE — 85378 FIBRIN DEGRADE SEMIQUANT: CPT

## 2022-01-10 PROCEDURE — 97530 THERAPEUTIC ACTIVITIES: CPT

## 2022-01-10 RX ORDER — MIRTAZAPINE 15 MG/1
15 TABLET, ORALLY DISINTEGRATING ORAL NIGHTLY
Status: DISCONTINUED | OUTPATIENT
Start: 2022-01-10 | End: 2022-01-18 | Stop reason: HOSPADM

## 2022-01-10 RX ADMIN — MIRTAZAPINE 15 MG: 15 TABLET, ORALLY DISINTEGRATING ORAL at 20:06

## 2022-01-10 RX ADMIN — DEXAMETHASONE SODIUM PHOSPHATE 6 MG: 10 INJECTION INTRAMUSCULAR; INTRAVENOUS at 20:07

## 2022-01-10 RX ADMIN — TAMSULOSIN HYDROCHLORIDE 0.4 MG: 0.4 CAPSULE ORAL at 09:59

## 2022-01-10 RX ADMIN — CARVEDILOL 3.12 MG: 3.12 TABLET, FILM COATED ORAL at 18:00

## 2022-01-10 RX ADMIN — GUAIFENESIN 1200 MG: 600 TABLET, EXTENDED RELEASE ORAL at 09:59

## 2022-01-10 RX ADMIN — PANTOPRAZOLE SODIUM 40 MG: 40 TABLET, DELAYED RELEASE ORAL at 10:01

## 2022-01-10 RX ADMIN — BUPROPION HYDROCHLORIDE 150 MG: 150 TABLET, EXTENDED RELEASE ORAL at 09:59

## 2022-01-10 RX ADMIN — ENOXAPARIN SODIUM 40 MG: 100 INJECTION SUBCUTANEOUS at 20:06

## 2022-01-10 RX ADMIN — DOXYCYCLINE HYCLATE 100 MG: 100 CAPSULE ORAL at 20:11

## 2022-01-10 RX ADMIN — ACETAMINOPHEN 650 MG: 325 TABLET ORAL at 18:09

## 2022-01-10 RX ADMIN — MONTELUKAST SODIUM 10 MG: 10 TABLET, FILM COATED ORAL at 20:07

## 2022-01-10 RX ADMIN — ZINC SULFATE 220 MG (50 MG) CAPSULE 50 MG: CAPSULE at 09:59

## 2022-01-10 RX ADMIN — HYDROCODONE BITARTRATE AND ACETAMINOPHEN 1 TABLET: 5; 325 TABLET ORAL at 09:59

## 2022-01-10 RX ADMIN — CLOPIDOGREL 75 MG: 75 TABLET, FILM COATED ORAL at 09:58

## 2022-01-10 RX ADMIN — OXYCODONE HYDROCHLORIDE AND ACETAMINOPHEN 1000 MG: 500 TABLET ORAL at 09:58

## 2022-01-10 RX ADMIN — ATORVASTATIN CALCIUM 20 MG: 20 TABLET, FILM COATED ORAL at 20:07

## 2022-01-10 RX ADMIN — GUAIFENESIN 1200 MG: 600 TABLET, EXTENDED RELEASE ORAL at 20:07

## 2022-01-10 RX ADMIN — ASPIRIN 81 MG CHEWABLE TABLET 81 MG: 81 TABLET CHEWABLE at 09:57

## 2022-01-10 RX ADMIN — BARICITINIB 1 MG: 2 TABLET, FILM COATED ORAL at 09:58

## 2022-01-10 RX ADMIN — CARVEDILOL 3.12 MG: 3.12 TABLET, FILM COATED ORAL at 09:59

## 2022-01-10 RX ADMIN — Medication 2000 UNITS: at 09:58

## 2022-01-10 RX ADMIN — ENOXAPARIN SODIUM 40 MG: 100 INJECTION SUBCUTANEOUS at 09:58

## 2022-01-10 RX ADMIN — WATER 1000 MG: 1 INJECTION INTRAMUSCULAR; INTRAVENOUS; SUBCUTANEOUS at 20:07

## 2022-01-10 RX ADMIN — DOXYCYCLINE HYCLATE 100 MG: 100 CAPSULE ORAL at 09:57

## 2022-01-10 ASSESSMENT — PAIN DESCRIPTION - LOCATION
LOCATION: BACK
LOCATION: HEAD

## 2022-01-10 ASSESSMENT — PAIN SCALES - GENERAL
PAINLEVEL_OUTOF10: 0
PAINLEVEL_OUTOF10: 8
PAINLEVEL_OUTOF10: 8
PAINLEVEL_OUTOF10: 10
PAINLEVEL_OUTOF10: 10

## 2022-01-10 ASSESSMENT — PAIN DESCRIPTION - DESCRIPTORS: DESCRIPTORS: CONSTANT;DISCOMFORT

## 2022-01-10 ASSESSMENT — PAIN DESCRIPTION - ORIENTATION: ORIENTATION: LOWER

## 2022-01-10 ASSESSMENT — PAIN DESCRIPTION - PAIN TYPE: TYPE: CHRONIC PAIN

## 2022-01-10 NOTE — PROGRESS NOTES
Physical Therapy Treatment Note/Plan of Care    Room #:  5231/0812-24  Patient Name: Ezio Gomez  YOB: 1950  MRN: [id number]    Date of Service: 1/10/2022     Tentative placement recommendation: Home Health Physical Therapy   Equipment recommendation: None      Evaluating Physical Therapist: Juan Luis Posada PT #[id number]      Specific Provider Orders/Date/Referring Provider :  01/04/22 1945   PT eval and treat Start: 01/04/22 1945, End: 01/04/22 1945, ONE TIME, Standing Count: 1 Occurrences, R    Leti Route, DO      Admitting Diagnosis:   Respiratory distress [R06.03]  Hypoxia [R09.02]  COVID-19 [U07.1]       Surgery: none  Visit Diagnoses       Codes    Respiratory distress    -  Primary R06.03    Hypoxia     R09.02          Patient Active Problem List   Diagnosis    Lymphoma ([address]    Gastroenteritis    Back pain at L4-L5 level    Impotence    Chronic fatigue    Urinary frequency    Bronchitis    Gastroesophageal reflux disease with esophagitis    Depression    Coronary artery disease of native artery of native heart with stable angina pectoris (HCC)    Chronic obstructive pulmonary disease ([address]    Pure hypercholesterolemia    Moderate obesity    Major depressive disorder, recurrent, mild ([address]    Vascular dementia with depressed mood ([facility])    Moderate asthma without complication    Complete tear of left rotator cuff    Rotator cuff arthropathy of left shoulder    Nontraumatic complete tear of rotator cuff, left    Bursitis of left shoulder    Impingement syndrome of left shoulder    Labral tear of shoulder, degenerative, left    S/P arthroscopy of shoulder    Obstructive sleep apnea    CKD stage G3a/A3, GFR 45-59 and albumin creatinine ratio >300 mg/g ([facility])    COVID-19        ASSESSMENT of Current Deficits Patient exhibits decreased strength, balance and endurance impairing functional mobility, transfers, gait , gait distance and tolerance to activity unsteady gait with Loss of balance x 2 minimal assist to recover. Desaturation to 86% after gait on 8L, recovered to 90% after 2 minutes. Patient requires skilled physical therapy to address concerns listed above to increase safety and independence at discharge. PHYSICAL THERAPY  PLAN OF CARE       Physical therapy plan of care is established based on physician order,  patient diagnosis and clinical assessment    Current Treatment Recommendations:    -Standing Balance: Perform strengthening exercises in standing to promote motor control with or without upper extremity support   -Transfers: Provide instruction on proper hand and foot position for adequate transfer of weight onto lower extremities and use of gait device if needed and Cues for hand placement, technique and safety. Provide stabilization to prevent fall   -Gait: Gait training, Standing activities to improve: base of support, weight shift, weight bearing  and Pregait training to emphasize: Safety and possible use of assistive device for balance/safety   -Endurance: Utilize Supervised activities to increase level of endurance to allow for safe functional mobility including transfers and gait  and Use graduated activities to promote good breathing techniques and provide support and education to maximize respiratory function  -Stairs: Stair training with instruction on proper technique and hand placement on rail  -Instruction in independent management of O2 line    PT long term treatment goals are located in below grid    Patient and or family understand(s) diagnosis, prognosis, and plan of care. Frequency of treatments: Patient will be seen  3-5 times/week.          Prior Level of Function: Patient ambulated independently    Rehab Potential: good    for baseline    Past medical history:   Past Medical History:   Diagnosis Date    Anesthesia complication     wakes up  violant   only ok if reversed with romazacon    Arthritis     shoulders,ankle    CAD (coronary artery disease)     COPD (chronic obstructive pulmonary disease) (HCC)     Erectile dysfunction     GERD (gastroesophageal reflux disease)     H/O coronary angioplasty with stents[V45.82] 9/2018 another stent    Hyperlipidemia     Hypertension     Lymphoma ([address] 11/4/2011    had chemo  currently in remission    Myocardial infarction [facility])     more than 10 yrs ago    Sleep apnea     wont wear machine    Unspecified cerebral artery occlusion with cerebral infarction 2005    no deficits     Past Surgical History:   Procedure Laterality Date    ANKLE FRACTURE SURGERY Right 03/2014    ORIF right ankle    ANKLE SURGERY  2007    rt ankle    BRONCHOSCOPY  09/2011    bronch / medistinoscopy    CAROTID ENDARTERECTOMY Left     12 yrs ago    CHOLECYSTECTOMY      COLONOSCOPY  07/30/2013     [facility] WITH STENT PLACEMENT  2008    states has 2 stents  follows with DR Bronwyn Armstrong    ENDOSCOPY, COLON, DIAGNOSTIC      HAND SURGERY Left     fell on a buzzsaw    HERNIA REPAIR Bilateral 08/15/2019    HERNIA  BILATERAL INGUINAL REPAIR WITH MESH LAPAROSCOPIC ROBOTIC XI ASSISTED performed by Otis Boone MD at [facility]  06/29/2015    lapraoscopic cholecystectomy    SHOULDER ARTHROSCOPY Right 10/08/2015    rotator cuff repair, subachromoplasty with labrial debridement    SHOULDER ARTHROSCOPY Left 2/18/2021    LEFT ROTATOR CUFF REPAIR LABRAL DEBRIDEMENT SUBACROMIAL DECOMPRESSION performed by Joel Bowman DO at [place] [address]      UPPER GASTROINTESTINAL ENDOSCOPY         SUBJECTIVE:    Precautions: Activity as tolerated and Check Pulse Oximetry while ambulating , falls, O2 and Droplet plus/COVID-19 ,  Up in chair as much as tolerated, at least breakfast through dinner time. Ambulate in room as much as tolerated. Prone/reposition every 2 hours while in bed. Incentive spirometer 10-15 times per hour while awake.     Social history: Patient lives alone in a bilevel home 9 steps downward to kitchen, 3 steps upstairs to living room and bedroom, 3 stairs upstairs to bathroom. with 2 steps  to enter without Rail  Tub shower      Equipment owned: Cane and [address],  unused    [address]   How much difficulty turning over in bed?: None  How much difficulty sitting down on / standing up from a chair with arms?: A Little  How much difficulty moving from lying on back to sitting on side of bed?: None  How much help from another person moving to and from a bed to a chair?: A Little  How much help from another person needed to walk in hospital room?: A Little  How much help from another person for climbing 3-5 steps with a railing?: A Little  AM-PAC Inpatient Mobility Raw Score : 20  AM-PAC Inpatient T-Scale Score : 47.67  Mobility Inpatient CMS 0-100% Score: 35.83  Mobility Inpatient CMS G-Code Modifier : [address] cleared patient for PT treatment. .   OBJECTIVE;   Initial Evaluation  Date: 1/6/2022 Treatment Date:    1/10/2022   Short Term/ Long Term   Goals   Was pt agreeable to Eval/treatment? Yes yes To be met in 4 days   Pain level   0/10    10/10  Pain throughout body    Bed Mobility    Rolling: Not assessed patient in chair     Rolling: Supervision    Supine to sit: Minimal assist of 1   Sit to supine: Minimal assist of 1   Scooting: Supervision     Rolling: Independent    Supine to sit:  Independent    Sit to supine: Independent    Scooting: Independent     Transfers Sit to stand: Supervision  Cues for hand placement and safety  Sit to stand: Supervision  x3 reps     Sit to stand: Independent     Ambulation    1 x 80 feet, 1 x 120 feet, 1 x 20 feet using  no device with Supervision    minimal assist for loss of balance x 3 as patient fatigued, for balance and safety and cues for safety and pacing 2x25 feet using  hand held assist with Minimal assist of 1   cues for safety and pacing    150 feet using  least restrictive device versus no device with Independent    Stair negotiation: ascended and descended   Not assessed     10 steps 1 rail with supervision    ROM Within functional limits    Increase range of motion 10% of affected joints    Strength BUE:  refer to OT eval  RLE:  4-/5  LLE:  4-/5  Increase strength in affected mm groups by 1/3 grade   Balance Sitting EOB:  not assessed    Dynamic Standing:  fair   Sitting EOB: good   Dynamic Standing: fair    Sitting EOB:  good    Dynamic Standing: good       Patient is Alert & Oriented x person, place, time and situation and follows directions    Sensation:  Patient  denies numbness/tingling   Edema:  no   Endurance: fair       Vitals: 7 liters nasal cannula   Blood Pressure at rest  Blood Pressure during session    Heart Rate at rest 74 Heart Rate during session 89   SPO2 at rest 94%  SPO2 during session 85-94 8L on portable tank      Patient education  Patient educated on role of Physical Therapy, risks of immobility, safety and plan of care,  importance of mobility while in hospital , purse lip breathing, ankle pumps, quad set and glut set for edema control, blood clot prevention and O2 line management and safety      Patient response to education:   Pt verbalized understanding Pt demonstrated skill Pt requires further education in this area   Yes Partial Yes      Treatment:  Patient practiced and was instructed/facilitated in the following treatment: Patient assisted to EOB. Sat edge of bed 10 minutes with Supervision  to increase dynamic sitting balance and activity tolerance. seated challenges. Pt stood ambulated to doorway and then to window then to restroom. Patient stood and ambulated to chair. Therapeutic Exercises:  ankle pumps, hip abduction/adduction, long arc quad and seated marching  x 10 reps.        At end of session, patient in chair with   call light and phone within reach,  all lines and tubes intact, nursing notified. Patient would benefit from continued skilled Physical Therapy to improve functional independence and quality of life.          Patient's/ family goals   home     Time in  1222  Time out  1248    Total Treatment Time  26 minutes    CPT codes:  Therapeutic activities (28582)   13 minutes  1 unit(s)  Gait Training (26050) 10 minutes 1 unit(s)  Non billable time 3 minutes restroom    Ethyl Aguilita, [address] [facility] #[id number]

## 2022-01-10 NOTE — CARE COORDINATION
1/10/2022 1234 ROWAN note: POSITIVE COVID 1/4/22. Pt wearing [email or web address] hi flow and does not have home o2. Referral in place to [phone])/kylah faxed([phone]). Life vest has been delivered to room. Pt enrolled for free 30 day trial of entresto and coupon given to pt's dtr Ruben Del Toro. Nathalie agreeable for free 30 day trial through meds to bed. Per Ruben Del Toro, pt is illiterate and dc instructions will need reviewed with her. Ruben Del Toro relays her dad will not be agreeable for rehab/HHC but hopeful he will stay with family at dc or family member can stay with him. ROWAN/MIMI to follow for possible home o2 needs/entresto Rx.  Mary Grace SUAREZ

## 2022-01-10 NOTE — PROGRESS NOTES
INPATIENT CARDIOLOGY FOLLOW-UP    Name: Javier Lindquist    Age: 70 y.o. Date of Admission: 1/4/2022  2:34 PM    Date of Service: 1/10/2022    Chief Complaint: Follow-up for acute on chronic HFrEF, CAD    Interim History:  Currently with no chest pain, respiratory distress, or palpitations. SR on EKG and telemetry. Continues to be on 7 L via nasal cannula  Renal function slightly better today. Review of Systems:   Cardiac: As per HPI  General: No fever, chills  Pulmonary: As per HPI  HEENT: No visual disturbances, difficult swallowing  GI: No nausea, vomiting  : No dysuria, hematuria  Endocrine: No thyroid disease or DM  Musculoskeletal: BRAVO x 4, no focal motor deficits  Skin: Intact, no rashes  Neuro: No headache, seizures  Psych: Currently with no depression, anxiety    Problem List:  Patient Active Problem List   Diagnosis    [facility]oma ([facility] [address]    Gastroenteritis    Back pain at L4-L5 level    Impotence    Chronic fatigue    Urinary frequency    Bronchitis    Gastroesophageal reflux disease with esophagitis    Depression    Coronary artery disease of native artery of native heart with stable angina pectoris ([facility])    Chronic obstructive pulmonary disease ([facility] [address]    Pure hypercholesterolemia    Moderate obesity    Major depressive disorder, recurrent, mild ([facility])    Vascular dementia with depressed mood ([facility])    Moderate asthma without complication    Complete tear of left rotator cuff    Rotator cuff arthropathy of left shoulder    Nontraumatic complete tear of rotator cuff, left    Bursitis of left shoulder    Impingement syndrome of left shoulder    Labral tear of shoulder, degenerative, left    S/P arthroscopy of shoulder    Obstructive sleep apnea    CKD stage G3a/A3, GFR 45-59 and albumin creatinine ratio >300 mg/g ([facility])    COVID-19       Allergies: Allergies   Allergen Reactions    Midazolam Hcl      Wake up wild. ...  Must be reversed with romazicon or will wake up wild and combative       Current Medications:  Current Facility-Administered Medications   Medication Dose Route Frequency Provider Last Rate Last Admin    mirtazapine (REMERON SOL-TAB) disintegrating tablet 15 mg  15 mg Oral Nightly Roman Iha, APRN - CNP        enoxaparin (LOVENOX) injection 40 mg  40 mg SubCUTAneous BID Roman Iha, APRN - CNP   40 mg at 01/10/22 2299    [START ON 1/11/2022] baricitinib (OLUMIANT) tablet 2 mg  2 mg Oral Daily Roman Iha, APRN - CNP        0.9 % sodium chloride infusion   IntraVENous Continuous Juan Daniel Blas, DO 75 mL/hr at 01/09/22 0548 New Bag at 01/09/22 0548    carvedilol (COREG) tablet 3.125 mg  3.125 mg Oral BID  Clark Gee DO   3.125 mg at 01/10/22 0959    HYDROcodone-acetaminophen (NORCO) 5-325 MG per tablet 1 tablet  1 tablet Oral Q6H PRN Juan Daniel Blas DO   1 tablet at 01/10/22 0959    buPROPion [facility]) extended release tablet 150 mg  150 mg Oral Daily Clark Gee DO   150 mg at 01/10/22 0959    ipratropium-albuterol (DUONEB) nebulizer solution 1 ampule  1 ampule Inhalation 4x daily Juan Daniel Blas DO   1 ampule at 01/09/22 1844    [Held by provider] sacubitril-valsartan (ENTRESTO) 24-26 MG per tablet 1 tablet  1 tablet Oral BID Loki Mena MD   1 tablet at 01/08/22 1049    prochlorperazine (COMPAZINE) injection 10 mg  10 mg IntraVENous Q6H PRN Reina Jasso DO        aspirin chewable tablet 81 mg  81 mg Oral Daily Reina Jasso DO   81 mg at 01/10/22 0957    clopidogrel (PLAVIX) tablet 75 mg  75 mg Oral Daily Reina Jasso DO   75 mg at 01/10/22 9456    guaiFENesin (MUCINEX) extended release tablet 1,200 mg  1,200 mg Oral BID Reina Jasso DO   1,200 mg at 01/10/22 0959    HYDROcodone-acetaminophen (NORCO) 7.5-325 MG per tablet 1 tablet  1 tablet Oral Q8H PRN Reina Jasso DO   1 tablet at 01/08/22 0828    montelukast (SINGULAIR) tablet 10 mg  10 mg Oral Nightly Reina Jasso DO   10 mg at 01/09/22 6494    pantoprazole (PROTONIX) tablet 40 mg  40 mg Oral QAM Vidhi Ser, DO   40 mg at 01/10/22 1001    atorvastatin (LIPITOR) tablet 20 mg  20 mg Oral Nightly Vidhi Ser, DO   20 mg at 01/09/22 2233    tamsulosin (FLOMAX) capsule 0.4 mg  0.4 mg Oral Daily Vidhi Ser, DO   0.4 mg at 01/10/22 8575    cefTRIAXone (ROCEPHIN) 1,000 mg in sterile water 10 mL IV syringe  1,000 mg IntraVENous Q24H Vidhi Ser, DO 0 mL/hr at 01/05/22 0058 1,000 mg at 01/09/22 2235    Arformoterol Tartrate (BROVANA) nebulizer solution 15 mcg  15 mcg Nebulization BID Vidhi Ser, DO   15 mcg at 01/09/22 1843    budesonide (PULMICORT) nebulizer suspension 500 mcg  500 mcg Nebulization BID Vidhi Ser, DO   500 mcg at 01/09/22 1844    ascorbic acid (VITAMIN C) tablet 1,000 mg  1,000 mg Oral Daily Vidhi Ser, DO   1,000 mg at 01/10/22 8342    vitamin D (CHOLECALCIFEROL) tablet 2,000 Units  2,000 Units Oral Daily Vidhi Ser, DO   2,000 Units at 01/10/22 7002    zinc sulfate (ZINCATE) capsule 50 mg  50 mg Oral Daily Vidhi Ser, DO   50 mg at 01/10/22 0928    doxycycline hyclate (VIBRAMYCIN) capsule 100 mg  100 mg Oral 2 times per day Vidhi Ser, DO   100 mg at 01/10/22 0957    acetaminophen (TYLENOL) tablet 650 mg  650 mg Oral Q6H PRN Vidhi Ser, DO   650 mg at 01/09/22 2232    Or    acetaminophen (TYLENOL) suppository 650 mg  650 mg Rectal Q6H PRN Vidhi Ser, DO        dexamethasone (DECADRON) injection 6 mg  6 mg IntraVENous Q24H Vidhi Ser, DO   6 mg at 01/09/22 2234    guaiFENesin-dextromethorphan (ROBITUSSIN DM) 100-10 MG/5ML syrup 5 mL  5 mL Oral Q4H PRN Vidhi Ser, DO          sodium chloride 75 mL/hr at 01/09/22 0548       Physical Exam:  BP (!) 115/57   Pulse 70   Temp 98.7 °F (37.1 °C) (Oral)   Resp 22   Wt 201 lb (91.2 kg)   SpO2 92%   BMI 30.56 kg/m²   Wt Readings from Last 3 Encounters:   01/09/22 201 lb (91.2 kg)   12/15/21 223 lb 9.6 oz (101.4 kg)   12/06/21 226 lb 6.4 oz (102.7 kg)     Patient not examined (patient in isolation for COVID-19 infection) -- he was observed through the glass door of his room    Intake/Output:    Intake/Output Summary (Last 24 hours) at 1/10/2022 1325  Last data filed at 1/10/2022 1059  Gross per 24 hour   Intake 420 ml   Output 451 ml   Net -31 ml     I/O this shift:  In: 180 [P.O.:180]  Out: -     Laboratory Tests:  Recent Labs     01/08/22  0952 01/09/22  0507 01/10/22  1008    137 132   K 4.1 4.4 4.6   CL 94* 95* 97*   CO2 29 28 21*   BUN 40* 59* 62*   CREATININE 1.5* 2.0* 1.7*   GLUCOSE 103* 147* 66*   CALCIUM 9.4 9.6 9.2     Lab Results   Component Value Date    MG 1.8 01/05/2022     Recent Labs     01/08/22  0952 01/09/22  0507 01/10/22  1008   ALKPHOS 102 94  96 88   ALT 51* 41*  44* 34   AST 41* 40*  42* 43*   PROT 7.3 7.4  7.6 7.5   BILITOT 0.6 0.5  0.5 0.4   BILIDIR 0.3 0.2 0.2   LABALBU 3.9 4.0  4.1 3.9     Recent Labs     01/08/22 0952 01/09/22  0507   WBC 6.0 5.1   RBC 5.68 5.75   HGB 16.1 16.1   HCT 49.6 50.5   MCV 87.3 87.8   MCH 28.3 28.0   MCHC 32.5 31.9*   RDW 13.3 13.4    304   MPV 10.9 11.2     Lab Results   Component Value Date    CKTOTAL 66 07/20/2013    CKMB <0.3 07/20/2013    TROPONINI <0.01 02/19/2021    TROPONINI <0.01 02/18/2021    TROPONINI <0.01 05/04/2020     Recent Labs     01/09/22  0507   TROPHS 33*     Lab Results   Component Value Date    INR 1.1 01/05/2022    INR 1.1 01/04/2022    INR 1.0 10/07/2015    PROTIME 12.7 (H) 01/05/2022    PROTIME 13.2 (H) 01/04/2022    PROTIME 12.2 10/07/2015     Lab Results   Component Value Date    TSH 1.540 07/18/2019     Lab Results   Component Value Date    LABA1C 5.6 07/31/2018     No results found for: EAG  Lab Results   Component Value Date    CHOL 226 (H) 06/16/2021    CHOL 198 11/06/2019    CHOL 175 01/12/2018     Lab Results   Component Value Date    TRIG 149 06/16/2021    TRIG 174 (H) 11/06/2019    TRIG 129 01/12/2018     Lab Results   Component Value Date    HDL 43 06/16/2021    HDL 36 11/06/2019    HDL 45 01/12/2018     Lab Results   Component Value Date    LDLCALC 153 (H) 06/16/2021    LDLCALC 127 (H) 11/06/2019    LDLCALC 104 (H) 01/12/2018     Lab Results   Component Value Date    LABVLDL 30 06/16/2021    LABVLDL 35 11/06/2019    LABVLDL 26 01/12/2018     No results found for: CHOLHDLRATIO  Recent Labs     01/09/22  0507   PROBNP 634*       Cardiac Tests:  EKG reviewed: SR, rate 91, PRWP     Telemetry reviewed (date: 1/10/2022): SR, rate 70's     TTE (Dr. Livier Mcghee, 2011)  Summary    Left ventricular size is grossly normal.    Mild left venticular concentric hypertrophy noted.    Ejection fraction is visually estimated at 64%.    No evidence of left ventricular mass or thrombus noted.    No regional wall motion abnormalities seen.    Borderline dilated right ventricle.    No evidence of a thrombus in the right ventricle.    Structurally normal mitral valve.    Physiologic and/or trace mitral regurgitation is present.    No mitral valve stenosis present.    The tricuspid valve appears structurally normal.    Physiologic and/or trace tricuspid regurgitation.    RVSP is 15.73 mmHg.    Regular rhythm.     Lexiscan Stress Test (Per [facility] notes, 3/2018)  No evidence of ischemia  EF 40%   Mild global hypokinesis      Left heart catheterization ([facility], 3/2018)  LMT: - The LMT is normal.   LAD:   - The LAD has mild luminal irregularities. LCX: - There was estatic. - The distal circumflex is narrowed 60 % - focal disease. RAMUS: - The Ramus is Absent. RCA:   - The mid RCA is narrowed 80 % - ISR.    Additional Comment: Focal ISR at distal edge of stent with another 80% focal   stenosis distal to stent.    S/p PCI and TATI x 1 to the distal RCA     TTE (Dr. Livier Mcghee, 12/2018)   Summary   Left ventricular size is grossly normal.   Mild left ventricular concentric hypertrophy noted.   Ejection fraction is visually estimated at 50%.   No evidence of left ventricular mass or thrombus noted.   No regional wall motion abnormalities seen.   Borderline dilated right ventricle.   No evidence of a thrombus in the right ventricle.   Physiologic and/or trace mitral regurgitation is present.   No evidence of mitral valve stenosis.   Physiologic and/or trace tricuspid regurgitation.   Regular rhythm.     TTE (Dr. Petrona Aguilar, 1/5/2022)  Summary   Left ventricle is mildly enlarged .   Normal left ventricular wall thickness.   Ejection fraction is visually estimated at 28%.   Global left ventricular dysfunction   Normal sized left atrium.   Interatrial septum appears intact.   Mildly dilated right ventricle.   Physiologic and/or trace mitral regurgitation is present.   No evidence of mitral valve stenosis.   Physiologic and/or trace tricuspid regurgitation.   Regular rhythm. ASSESSMENT / PLAN:  · Acute on chronic HFrEF.  proBNP 5,000 on admit --> 9,000 --> 3,600. Small-moderate right and small left pleural effusions on Chest CTA this admission. · Cardiomyopathy -- r/o ischemic cardiomyopathy given clinical history. EF 40% on stress testing per [facility] record 3/2018 --> TTE 3/2018 with EF 50% --> TTE this admission EF < 35%. · Acute hypoxic respiratory failure, currently on 3L NC.  · COVID-19+ with associated viral pneumonia (positive COVID-19 test on 1/4/22)  · Elevated LFTs -- improving  · Coronary artery disease. S/p PCI and TATI placement x 1 to the distal RCA at [facility] in 2018. Residual disease of 60% distal LCX on LHC at [facility] 2018. Clinically stable. · Hypertension, uncontrolled at times. · Hyperlipidemia. Statin therapy on hold in setting of elevated LFTs. · COPD. Follows with Dr. Aleisha Morrison. · Former tobacco smoker. Quit 1 year ago. · Untreated JOSE. · History of TIA. · Chronic kidney disease. Stable. Cr 1.3. · Carotid artery disease.  S/p left carotid stent placement. · Non-Hodgkin's lymphoma s/p chemotherapy.  First diagnosed in 2011.  Follows with the [facility]. · BPH. · Anxiety. · Chronic back pain, on chronic opioid therapy.     - Coreg and aldactone started this admission. Yordy Fitting has been started as well. With worsening renal function, Entresto and Aldactone being appropriately held. Coreg dose has been decreased to 3.125 twice daily.  -Hold off on diuretics at this point.  -We will rechallenge her with guideline directed medical therapy as and when able. - - Monitor BMP and I/O's closely  - Continue antiplatelet therapy / statin currently on hold (LFT's improving)  - Discussed option of LifeVest.  Patient would like to think about it. - Monitor telemetry (SR, isolated PVC's)  - Aggressive risk factor modifications / treatment of JOSE as indicated  - Records from [facility] reviewed (pertninent findings outlined above)  - Reassess for cardiac catheterization once clinically improved. This can be done as an outpatient.  -Defer management of COVID-19 pneumonia to primary and pulmonary teams. Haylie Deng MD  [facility]) Cardiology     NOTE: This report was transcribed using voice recognition software. Every effort was made to ensure accuracy; however, inadvertent computerized transcription errors may be present.

## 2022-01-10 NOTE — PROGRESS NOTES
Internal Medicine Progress Note    [facility]    Virginie Benitez. Juancarlos Boyer., GRISELOLILIANA Zaman D.O., BEATRICE Armenta D.O. Hema Yeung, MSN, APRN, NP-C  Tamela Randhawa. Patti Pickering, MSN, APRN-CNP     Primary Care Physician: Maureen Underwood DO   Admitting Physician:  Rafaela Najjar, DO  Admission date and time: 1/4/2022  2:34 PM    Room:  [address] [place] [address]  Admitting diagnosis: Respiratory distress [R06.03]  Hypoxia [R09.02]  COVID-19 [U07.1]    Patient Name: Pop Paiz  MRN: [id number]    Date of Service: 1/10/2022     Subjective:    Kaye Mujica is a 70 y.o. male who was seen and examined today,1/10/2022, at the bedside. Kaye Mujica has lost motivation and is depressed. He does not wish to continue the nonpharmacologic management and we have concern for regression based upon this. I have spoken with his RN at bedside and we will attempt to arrange family visitation frequently to boost his morale. No family present    Review of systems:  Constitutional:   Positive for significant fatigue and malaise , - fever/chills  HEENT:   Denies ear pain, sore throat, sinus or eye problems. Cardiovascular:   Denies any chest pain, irregular heartbeats, or palpitations. Respiratory:   + but improved dyspnea at rest, + dyspnea on exertion, + coughing, - sputum, - hemoptysis  Gastrointestinal:   - nausea, -vomiting. - diarrhea, - constipation. + poor appetite and poor intake. - abdominal pain. Genitourinary:    Denies any urgency, frequency, hematuria. Voiding  without difficulty. Extremities:   Denies lower extremity swelling, edema or cyanosis. Neurology:    Denies any headache or focal neurological deficits, positive for generalized weakness and fatigue without focal component  Psch: Worsening depression and poor motivation  Musculoskeletal:    Denies  myalgias, joint complaints or back pain. Integumentary:   Denies any rashes, ulcers, or excoriations.   Denies bruising. Hematologic/Lymphatic:  Denies bruising or bleeding. Physical Exam:  No intake/output data recorded. Intake/Output Summary (Last 24 hours) at 1/10/2022 0911  Last data filed at 1/10/2022 0113  Gross per 24 hour   Intake 240 ml   Output 451 ml   Net -211 ml   I/O last 3 completed shifts: In: 360 [P.O.:360]  Out: 1001 [Urine:1000; Stool:1]  Patient Vitals for the past 96 hrs (Last 3 readings):   Weight   01/09/22 0549 201 lb (91.2 kg)   01/07/22 0600 212 lb 3.2 oz (96.3 kg)     Vital Signs:   Blood pressure (!) 115/57, pulse 70, temperature 98.7 °F (37.1 °C), temperature source Oral, resp. rate 22, weight 201 lb (91.2 kg), SpO2 92 %. General appearance:  Alert, responsive, oriented to person, place, and time. Ill-appearing, no distress  Head:  Normocephalic. No masses, lesions or tenderness. Eyes:  PERRLA. EOMI. Sclera clear. ENT:  Ears normal. Mucosa normal.  Neck:    Supple. Trachea midline. No thyromegaly. No JVD. No bruits. Heart:    Rhythm regular. Rate controlled. S1 and S2. Systolic murmur grade 2/6. Lungs:    Symmetrical.  Diminished aeration throughout. Transmitted upper airway turbulence which is somewhat involuntary, otherwise no adventitious breath sounds appreciated. Abdomen:   Soft. Obese. Non-tender. Non-distended. Bowel sounds positive. No organomegaly or masses. No pain on palpation. Extremities:    Peripheral pulses present. Improved peripheral edema. No ulcers. No cyanosis. No clubbing. Neurologic:    Alert x 3. Generally weak without focal component focal deficit. Cranial nerves grossly intact. No focal weakness. Psych:   Behavior is normal. Mood appears normal. Speech is not rapid and/or pressured. Musculoskeletal:   Spine ROM normal. Muscular strength intact. Gait not assessed. Integumentary:  No rashes  Skin normal color and texture.   Genitalia/Breast:  Deferred    Medication:  Scheduled Meds:   mirtazapine  15 mg Oral Nightly    carvedilol 3.125 mg Oral BID WC    baricitinib  1 mg Oral Daily    buPROPion  150 mg Oral Daily    ipratropium-albuterol  1 ampule Inhalation 4x daily    [Held by provider] sacubitril-valsartan  1 tablet Oral BID    aspirin  81 mg Oral Daily    clopidogrel  75 mg Oral Daily    guaiFENesin  1,200 mg Oral BID    montelukast  10 mg Oral Nightly    pantoprazole  40 mg Oral QAM    atorvastatin  20 mg Oral Nightly    tamsulosin  0.4 mg Oral Daily    cefTRIAXone (ROCEPHIN) IV  1,000 mg IntraVENous Q24H    Arformoterol Tartrate  15 mcg Nebulization BID    budesonide  500 mcg Nebulization BID    ascorbic acid  1,000 mg Oral Daily    Vitamin D  2,000 Units Oral Daily    zinc sulfate  50 mg Oral Daily    doxycycline hyclate  100 mg Oral 2 times per day    enoxaparin  30 mg SubCUTAneous BID    dexamethasone  6 mg IntraVENous Q24H     Continuous Infusions:   sodium chloride 75 mL/hr at 01/09/22 0548       Objective Data:  CBC with Differential:    Lab Results   Component Value Date    WBC 5.1 01/09/2022    RBC 5.75 01/09/2022    HGB 16.1 01/09/2022    HCT 50.5 01/09/2022     01/09/2022    MCV 87.8 01/09/2022    MCH 28.0 01/09/2022    MCHC 31.9 01/09/2022    RDW 13.4 01/09/2022    SEGSPCT 66 07/21/2013    LYMPHOPCT 14.0 01/09/2022    MONOPCT 7.7 01/09/2022    BASOPCT 0.0 01/09/2022    MONOSABS 0.39 01/09/2022    LYMPHSABS 0.71 01/09/2022    EOSABS 0.00 01/09/2022    BASOSABS 0.00 01/09/2022     BMP:    Lab Results   Component Value Date     01/09/2022    K 4.4 01/09/2022    K 4.1 01/04/2022    CL 95 01/09/2022    CO2 28 01/09/2022    BUN 59 01/09/2022    LABALBU 4.1 01/09/2022    LABALBU 4.0 01/09/2022    LABALBU 4.2 12/01/2011    CREATININE 2.0 01/09/2022    CALCIUM 9.6 01/09/2022    GFRAA 40 01/09/2022    LABGLOM 33 01/09/2022    GLUCOSE 147 01/09/2022    GLUCOSE 152 12/01/2011     Hepatic Function Panel:    Lab Results   Component Value Date    ALKPHOS 96 01/09/2022    ALKPHOS 94 01/09/2022    ALT 44 01/09/2022    ALT 41 01/09/2022    AST 42 01/09/2022    AST 40 01/09/2022    PROT 7.6 01/09/2022    PROT 7.4 01/09/2022    BILITOT 0.5 01/09/2022    BILITOT 0.5 01/09/2022    BILIDIR 0.2 01/09/2022    IBILI 0.3 01/09/2022    LABALBU 4.1 01/09/2022    LABALBU 4.0 01/09/2022    LABALBU 4.2 12/01/2011     Recent Labs     01/09/22  0507   TROPHS 33*         COVID inflammatory markers  Recent Labs     01/09/22  0507   DDIMER <200     No results for input(s): FERRITIN in the last 72 hours. Recent Labs     01/09/22  0507   CRP 1.9*       Assessment:    · Acute respiratory failure with hypoxia secondary to COVID-19 pneumonia in an unvaccinated host  · Acute exacerbation of COPD secondary to viral pneumonia  · Hypertensive urgency with underlying history of hypertension  · Asymptomatic coronary artery disease with drug-eluting stents in place  · Transaminitis  · Decompensated diastolic congestive heart failure with associated pleural effusion and echo evidence of worsening cardiomyopathy with LVEF now 28%  · Chronic kidney disease stage IIIa  · Hyperlipidemia  · Vascular disease with history of TIA and left carotid stenting   · Non-Hodgkin's lymphoma with prior chemotherapy followed by the heart center   · Untreated obstructive sleep apnea       Plan:   Clark's overall lack of motivation is concerning. He has been inactive since hospitalized and is not on participatory in the nonpharmacologic management of COVID. Presently he has rather high nasal cannula oxygen requirements with a very high likelihood of regression based upon his performance status. He is on 7 L of oxygen and has been lying in bed all morning thus far. He is assisted into the bedside chair his oxygen levels actually improved despite the exertion. He expresses worsening depression and lack of motivation and we have discussed medication adjustments. As he is not sleeping well, Remeron will be added to his treatment regimen.   He remains on maximal COVID protocol otherwise. He is on dose reduced baricitinib and, based upon his GFR greater than 30 but less than 60, he would benefit from the 2 mg dosing and I will talk to the pharmacy about this. He is on respiratory supportive measures, multiple vitamin supplementation and concomitant antibiotic coverage. Inflammatory markers are being monitored and addressed accordingly. Renal function worsened, Entresto was held and gentle fluids have been resumed. Labs from today are pending and we will assess response to fluids and make adjustments accordingly. Chronic comorbidities laboratory values and vital signs were monitored and addressed accordingly as well. Prognosis is poor in the setting of lack of motivation with high likelihood of progression. See additional orders for details. Greater than 40 minutes of critical care time was spent with the patient. This time included chart review, , and discussion with those consultants involved in the patient's care. More than 50% of my  time was spent at the bedside counseling/coordinating care with the patient and/or family with face to face contact. This time was spent reviewing notes and laboratory data as well as instructing and counseling the patient. Time I spent with the family or surrogate(s) is included only if the patient was incapable of providing the necessary information or participating in medical decisions. I also discussed the differential diagnosis and all of the proposed management plans with the patient and individuals accompanying the patient. Kenzie Rod requires this high level of physician care and nursing on the Telemetry unit due the complexity of decision management and chance of rapid decline or death. Continued cardiac monitoring and higher level of nursing are required. I am readily available for any further decision-making and intervention.        Anisa Jameson, APRN - CNP  1/10/2022  9:11 AM

## 2022-01-10 NOTE — PLAN OF CARE
Problem: Airway Clearance - Ineffective  Goal: Achieve or maintain patent airway  Outcome: Met This Shift     Problem:  Body Temperature -  Risk of, Imbalanced  Goal: Will regain or maintain usual level of consciousness  Outcome: Met This Shift

## 2022-01-11 LAB
ALBUMIN SERPL-MCNC: 3.7 G/DL (ref 3.5–5.2)
ALP BLD-CCNC: 80 U/L (ref 40–129)
ALT SERPL-CCNC: 29 U/L (ref 0–40)
ANION GAP SERPL CALCULATED.3IONS-SCNC: 10 MMOL/L (ref 7–16)
AST SERPL-CCNC: 38 U/L (ref 0–39)
BASOPHILS ABSOLUTE: 0 E9/L (ref 0–0.2)
BASOPHILS RELATIVE PERCENT: 0 % (ref 0–2)
BILIRUB SERPL-MCNC: 0.4 MG/DL (ref 0–1.2)
BILIRUBIN DIRECT: 0.2 MG/DL (ref 0–0.3)
BILIRUBIN, INDIRECT: 0.2 MG/DL (ref 0–1)
BUN BLDV-MCNC: 50 MG/DL (ref 6–23)
C-REACTIVE PROTEIN: 2 MG/DL (ref 0–0.4)
CALCIUM SERPL-MCNC: 9.4 MG/DL (ref 8.6–10.2)
CHLORIDE BLD-SCNC: 100 MMOL/L (ref 98–107)
CO2: 28 MMOL/L (ref 22–29)
CREAT SERPL-MCNC: 1.6 MG/DL (ref 0.7–1.2)
CULTURE, RESPIRATORY: NORMAL
D DIMER: <200 NG/ML DDU
EOSINOPHILS ABSOLUTE: 0 E9/L (ref 0.05–0.5)
EOSINOPHILS RELATIVE PERCENT: 0 % (ref 0–6)
GFR AFRICAN AMERICAN: 52
GFR NON-AFRICAN AMERICAN: 43 ML/MIN/1.73
GLUCOSE BLD-MCNC: 84 MG/DL (ref 74–99)
HCT VFR BLD CALC: 47.2 % (ref 37–54)
HEMOGLOBIN: 14.8 G/DL (ref 12.5–16.5)
IMMATURE GRANULOCYTES #: 0.01 E9/L
IMMATURE GRANULOCYTES %: 0.4 % (ref 0–5)
LYMPHOCYTES ABSOLUTE: 0.75 E9/L (ref 1.5–4)
LYMPHOCYTES RELATIVE PERCENT: 31.6 % (ref 20–42)
MCH RBC QN AUTO: 27.8 PG (ref 26–35)
MCHC RBC AUTO-ENTMCNC: 31.4 % (ref 32–34.5)
MCV RBC AUTO: 88.7 FL (ref 80–99.9)
MONOCYTES ABSOLUTE: 0.52 E9/L (ref 0.1–0.95)
MONOCYTES RELATIVE PERCENT: 21.9 % (ref 2–12)
NEUTROPHILS ABSOLUTE: 1.09 E9/L (ref 1.8–7.3)
NEUTROPHILS RELATIVE PERCENT: 46.1 % (ref 43–80)
PDW BLD-RTO: 13.6 FL (ref 11.5–15)
PLATELET # BLD: 333 E9/L (ref 130–450)
PMV BLD AUTO: 11.6 FL (ref 7–12)
POTASSIUM SERPL-SCNC: 4.7 MMOL/L (ref 3.5–5)
PRO-BNP: 222 PG/ML (ref 0–125)
PROCALCITONIN: 0.11 NG/ML (ref 0–0.08)
RBC # BLD: 5.32 E12/L (ref 3.8–5.8)
SMEAR, RESPIRATORY: NORMAL
SODIUM BLD-SCNC: 138 MMOL/L (ref 132–146)
TOTAL PROTEIN: 7.2 G/DL (ref 6.4–8.3)
WBC # BLD: 2.4 E9/L (ref 4.5–11.5)

## 2022-01-11 PROCEDURE — 94640 AIRWAY INHALATION TREATMENT: CPT

## 2022-01-11 PROCEDURE — 97530 THERAPEUTIC ACTIVITIES: CPT

## 2022-01-11 PROCEDURE — 85378 FIBRIN DEGRADE SEMIQUANT: CPT

## 2022-01-11 PROCEDURE — 2700000000 HC OXYGEN THERAPY PER DAY

## 2022-01-11 PROCEDURE — 6360000002 HC RX W HCPCS: Performed by: INTERNAL MEDICINE

## 2022-01-11 PROCEDURE — 99233 SBSQ HOSP IP/OBS HIGH 50: CPT | Performed by: INTERNAL MEDICINE

## 2022-01-11 PROCEDURE — 83880 ASSAY OF NATRIURETIC PEPTIDE: CPT

## 2022-01-11 PROCEDURE — 76937 US GUIDE VASCULAR ACCESS: CPT

## 2022-01-11 PROCEDURE — 36415 COLL VENOUS BLD VENIPUNCTURE: CPT

## 2022-01-11 PROCEDURE — 2580000003 HC RX 258: Performed by: NURSE PRACTITIONER

## 2022-01-11 PROCEDURE — 84145 PROCALCITONIN (PCT): CPT

## 2022-01-11 PROCEDURE — 80048 BASIC METABOLIC PNL TOTAL CA: CPT

## 2022-01-11 PROCEDURE — 80076 HEPATIC FUNCTION PANEL: CPT

## 2022-01-11 PROCEDURE — 85025 COMPLETE CBC W/AUTO DIFF WBC: CPT

## 2022-01-11 PROCEDURE — 6360000002 HC RX W HCPCS: Performed by: NURSE PRACTITIONER

## 2022-01-11 PROCEDURE — 86140 C-REACTIVE PROTEIN: CPT

## 2022-01-11 PROCEDURE — 97535 SELF CARE MNGMENT TRAINING: CPT

## 2022-01-11 PROCEDURE — 6370000000 HC RX 637 (ALT 250 FOR IP): Performed by: INTERNAL MEDICINE

## 2022-01-11 PROCEDURE — 6370000000 HC RX 637 (ALT 250 FOR IP): Performed by: NURSE PRACTITIONER

## 2022-01-11 PROCEDURE — 2580000003 HC RX 258: Performed by: INTERNAL MEDICINE

## 2022-01-11 PROCEDURE — 97116 GAIT TRAINING THERAPY: CPT

## 2022-01-11 PROCEDURE — 1200000000 HC SEMI PRIVATE

## 2022-01-11 RX ORDER — SODIUM CHLORIDE 9 MG/ML
INJECTION, SOLUTION INTRAVENOUS CONTINUOUS
Status: ACTIVE | OUTPATIENT
Start: 2022-01-11 | End: 2022-01-12

## 2022-01-11 RX ADMIN — GUAIFENESIN 1200 MG: 600 TABLET, EXTENDED RELEASE ORAL at 09:46

## 2022-01-11 RX ADMIN — BARICITINIB 2 MG: 2 TABLET, FILM COATED ORAL at 09:45

## 2022-01-11 RX ADMIN — HYDROCODONE BITARTRATE AND ACETAMINOPHEN 1 TABLET: 7.5; 325 TABLET ORAL at 09:46

## 2022-01-11 RX ADMIN — SODIUM CHLORIDE: 9 INJECTION, SOLUTION INTRAVENOUS at 16:56

## 2022-01-11 RX ADMIN — ZINC SULFATE 220 MG (50 MG) CAPSULE 50 MG: CAPSULE at 09:45

## 2022-01-11 RX ADMIN — ASPIRIN 81 MG CHEWABLE TABLET 81 MG: 81 TABLET CHEWABLE at 09:45

## 2022-01-11 RX ADMIN — GUAIFENESIN 1200 MG: 600 TABLET, EXTENDED RELEASE ORAL at 22:39

## 2022-01-11 RX ADMIN — SODIUM CHLORIDE: 9 INJECTION, SOLUTION INTRAVENOUS at 09:56

## 2022-01-11 RX ADMIN — BUDESONIDE 500 MCG: 0.5 INHALANT RESPIRATORY (INHALATION) at 18:38

## 2022-01-11 RX ADMIN — TAMSULOSIN HYDROCHLORIDE 0.4 MG: 0.4 CAPSULE ORAL at 09:45

## 2022-01-11 RX ADMIN — ARFORMOTEROL TARTRATE 15 MCG: 15 SOLUTION RESPIRATORY (INHALATION) at 08:32

## 2022-01-11 RX ADMIN — BUPROPION HYDROCHLORIDE 150 MG: 150 TABLET, EXTENDED RELEASE ORAL at 09:46

## 2022-01-11 RX ADMIN — Medication 2000 UNITS: at 09:46

## 2022-01-11 RX ADMIN — IPRATROPIUM BROMIDE AND ALBUTEROL SULFATE 1 AMPULE: .5; 3 SOLUTION RESPIRATORY (INHALATION) at 18:38

## 2022-01-11 RX ADMIN — ATORVASTATIN CALCIUM 20 MG: 20 TABLET, FILM COATED ORAL at 22:39

## 2022-01-11 RX ADMIN — OXYCODONE HYDROCHLORIDE AND ACETAMINOPHEN 1000 MG: 500 TABLET ORAL at 09:46

## 2022-01-11 RX ADMIN — DEXAMETHASONE SODIUM PHOSPHATE 6 MG: 10 INJECTION INTRAMUSCULAR; INTRAVENOUS at 20:36

## 2022-01-11 RX ADMIN — DOXYCYCLINE HYCLATE 100 MG: 100 CAPSULE ORAL at 22:39

## 2022-01-11 RX ADMIN — ENOXAPARIN SODIUM 40 MG: 100 INJECTION SUBCUTANEOUS at 22:39

## 2022-01-11 RX ADMIN — CARVEDILOL 3.12 MG: 3.12 TABLET, FILM COATED ORAL at 09:49

## 2022-01-11 RX ADMIN — ARFORMOTEROL TARTRATE 15 MCG: 15 SOLUTION RESPIRATORY (INHALATION) at 18:39

## 2022-01-11 RX ADMIN — CLOPIDOGREL 75 MG: 75 TABLET, FILM COATED ORAL at 09:48

## 2022-01-11 RX ADMIN — ENOXAPARIN SODIUM 40 MG: 100 INJECTION SUBCUTANEOUS at 09:46

## 2022-01-11 RX ADMIN — IPRATROPIUM BROMIDE AND ALBUTEROL SULFATE 1 AMPULE: .5; 3 SOLUTION RESPIRATORY (INHALATION) at 08:32

## 2022-01-11 RX ADMIN — BUDESONIDE 500 MCG: 0.5 INHALANT RESPIRATORY (INHALATION) at 08:32

## 2022-01-11 RX ADMIN — MONTELUKAST SODIUM 10 MG: 10 TABLET, FILM COATED ORAL at 22:39

## 2022-01-11 RX ADMIN — WATER 1000 MG: 1 INJECTION INTRAMUSCULAR; INTRAVENOUS; SUBCUTANEOUS at 21:24

## 2022-01-11 RX ADMIN — PANTOPRAZOLE SODIUM 40 MG: 40 TABLET, DELAYED RELEASE ORAL at 09:45

## 2022-01-11 RX ADMIN — DOXYCYCLINE HYCLATE 100 MG: 100 CAPSULE ORAL at 09:51

## 2022-01-11 ASSESSMENT — PAIN SCALES - GENERAL
PAINLEVEL_OUTOF10: 8
PAINLEVEL_OUTOF10: 0

## 2022-01-11 ASSESSMENT — PAIN DESCRIPTION - LOCATION: LOCATION: BACK;SHOULDER

## 2022-01-11 ASSESSMENT — PAIN DESCRIPTION - ORIENTATION: ORIENTATION: RIGHT;LEFT;LOWER

## 2022-01-11 NOTE — CARE COORDINATION
1/11/2022 1447 CM note: POSITIVE COVID 1/4/22.   Pt wearing [email or web address] hi flow and does not have home o2. Referral in place to [phone])/kylah faxed([phone]). Life vest has been delivered to room, however per cardiology notes-pt not inclined at this time . Per pt's dtr Claudia Denis, pt is illiterate and dc instructions will need reviewed with her. Claudia Denis relays her dad will not be agreeable for rehab/HHC but hopeful he will stay with family at dc or family member can stay with him. CM/SW to follow for home o2 needs/entresto Rx.  Mary Grace SUAREZ

## 2022-01-11 NOTE — PROGRESS NOTES
INPATIENT CARDIOLOGY FOLLOW-UP    Name: Dallin Gibbs    Age: 70 y.o. Date of Admission: 1/4/2022  2:34 PM    Date of Service: 1/11/2022    Chief Complaint: Follow-up for acute on chronic HFrEF, CAD    Interim History:  Currently with no chest pain, respiratory distress, or palpitations. SR on EKG and telemetry. Continues to be on 7 L via nasal cannula  Renal function slightly better today. Denies any medical complaints. Feels slightly better overall      Review of Systems:   Cardiac: As per HPI  General: No fever, chills  Pulmonary: As per HPI  HEENT: No visual disturbances, difficult swallowing  GI: No nausea, vomiting  : No dysuria, hematuria  Endocrine: No thyroid disease or DM  Musculoskeletal: BRAVO x 4, no focal motor deficits  Skin: Intact, no rashes  Neuro: No headache, seizures  Psych: Currently with no depression, anxiety    Problem List:  Patient Active Problem List   Diagnosis    [facility]    Gastroenteritis    Back pain at L4-L5 level    Impotence    Chronic fatigue    Urinary frequency    Bronchitis    Gastroesophageal reflux disease with esophagitis    Depression    Coronary artery disease of native artery of native heart with stable angina pectoris ([facility])    Chronic obstructive pulmonary disease ([facility]    Pure hypercholesterolemia    Moderate obesity    Major depressive disorder, recurrent, mild ([facility])    Vascular dementia with depressed mood ([facility])    Moderate asthma without complication    Complete tear of left rotator cuff    Rotator cuff arthropathy of left shoulder    Nontraumatic complete tear of rotator cuff, left    Bursitis of left shoulder    Impingement syndrome of left shoulder    Labral tear of shoulder, degenerative, left    S/P arthroscopy of shoulder    Obstructive sleep apnea    CKD stage G3a/A3, GFR 45-59 and albumin creatinine ratio >300 mg/g ([facility])    COVID-19       Allergies:   Allergies   Allergen Reactions    Midazolam Hcl      Wake up wild. ...  Must be reversed with romazicon or will wake up wild and combative       Current Medications:  Current Facility-Administered Medications   Medication Dose Route Frequency Provider Last Rate Last Admin    0.9 % sodium chloride infusion   IntraVENous Continuous Darrall Grumet, APRN - CNP 75 mL/hr at 01/11/22 0956 New Bag at 01/11/22 0956    mirtazapine (REMERON SOL-TAB) disintegrating tablet 15 mg  15 mg Oral Nightly Darrall Grumet, APRN - CNP   15 mg at 01/10/22 2006    enoxaparin (LOVENOX) injection 40 mg  40 mg SubCUTAneous BID Darrall Grumet, APRN - CNP   40 mg at 01/11/22 4930    baricitinib (OLUMIANT) tablet 2 mg  2 mg Oral Daily Darrall Grumet, APRN - CNP   2 mg at 01/11/22 0945    carvedilol (COREG) tablet 3.125 mg  3.125 mg Oral BID  Clark Gee, DO   3.125 mg at 01/11/22 0949    HYDROcodone-acetaminophen (NORCO) 5-325 MG per tablet 1 tablet  1 tablet Oral Q6H PRN Dahianajennifer Frias, DO   1 tablet at 01/10/22 0959    buPROPion [facility]) extended release tablet 150 mg  150 mg Oral Daily Clark Gee, DO   150 mg at 01/11/22 0946    ipratropium-albuterol (DUONEB) nebulizer solution 1 ampule  1 ampule Inhalation 4x daily Isra Gee, DO   1 ampule at 01/11/22 2333    [Held by provider] sacubitril-valsartan (ENTRESTO) 24-26 MG per tablet 1 tablet  1 tablet Oral BID Irma Liao MD   1 tablet at 01/08/22 1049    prochlorperazine (COMPAZINE) injection 10 mg  10 mg IntraVENous Q6H PRN Buzzy Daniel, DO        aspirin chewable tablet 81 mg  81 mg Oral Daily Buzzy Daniel, DO   81 mg at 01/11/22 0945    clopidogrel (PLAVIX) tablet 75 mg  75 mg Oral Daily Buzzy Daniel, DO   75 mg at 01/11/22 0948    guaiFENesin (MUCINEX) extended release tablet 1,200 mg  1,200 mg Oral BID Buzzy Daniel, DO   1,200 mg at 01/11/22 0946    HYDROcodone-acetaminophen (NORCO) 7.5-325 MG per tablet 1 tablet  1 tablet Oral Q8H PRN Buzzy Daniel, DO   1 tablet at 01/11/22 0946    montelukast (SINGULAIR) tablet 10 mg lb 9.6 oz (101.4 kg)   12/06/21 226 lb 6.4 oz (102.7 kg)     Patient not examined (patient in isolation for COVID-19 infection) -- he was observed through the glass door of his room. Communicated over the telephone while examining patient through glass door. Appears stable and comfortable no acute distress.     Intake/Output:    Intake/Output Summary (Last 24 hours) at 1/11/2022 1314  Last data filed at 1/11/2022 1028  Gross per 24 hour   Intake 240 ml   Output 1200 ml   Net -960 ml     I/O this shift:  In: 240 [P.O.:240]  Out: 300 [Urine:300]    Laboratory Tests:  Recent Labs     01/09/22  0507 01/10/22  1008 01/11/22  0904    132 138   K 4.4 4.6 4.7   CL 95* 97* 100   CO2 28 21* 28   BUN 59* 62* 50*   CREATININE 2.0* 1.7* 1.6*   GLUCOSE 147* 66* 84   CALCIUM 9.6 9.2 9.4     Lab Results   Component Value Date    MG 1.8 01/05/2022     Recent Labs     01/09/22  0507 01/10/22  1008 01/11/22  0904   ALKPHOS 94  96 88 80   ALT 41*  44* 34 29   AST 40*  42* 43* 38   PROT 7.4  7.6 7.5 7.2   BILITOT 0.5  0.5 0.4 0.4   BILIDIR 0.2 0.2 0.2   LABALBU 4.0  4.1 3.9 3.7     Recent Labs     01/09/22  0507 01/10/22  1417 01/11/22  0904   WBC 5.1 4.7  5.0 2.4*   RBC 5.75 5.12  5.20 5.32   HGB 16.1 14.3  14.5 14.8   HCT 50.5 45.9  46.1 47.2   MCV 87.8 89.6  88.7 88.7   MCH 28.0 27.9  27.9 27.8   MCHC 31.9* 31.2*  31.5* 31.4*   RDW 13.4 13.6  13.4 13.6    301  298 333   MPV 11.2 11.5  11.7 11.6     Lab Results   Component Value Date    CKTOTAL 66 07/20/2013    CKMB <0.3 07/20/2013    TROPONINI <0.01 02/19/2021    TROPONINI <0.01 02/18/2021    TROPONINI <0.01 05/04/2020     Recent Labs     01/09/22  0507   TROPHS 33*     Lab Results   Component Value Date    INR 1.1 01/05/2022    INR 1.1 01/04/2022    INR 1.0 10/07/2015    PROTIME 12.7 (H) 01/05/2022    PROTIME 13.2 (H) 01/04/2022    PROTIME 12.2 10/07/2015     Lab Results   Component Value Date    TSH 1.540 07/18/2019     Lab Results   Component Value Date    LABA1C 5.6 07/31/2018     No results found for: EAG  Lab Results   Component Value Date    CHOL 226 (H) 06/16/2021    CHOL 198 11/06/2019    CHOL 175 01/12/2018     Lab Results   Component Value Date    TRIG 149 06/16/2021    TRIG 174 (H) 11/06/2019    TRIG 129 01/12/2018     Lab Results   Component Value Date    HDL 43 06/16/2021    HDL 36 11/06/2019    HDL 45 01/12/2018     Lab Results   Component Value Date    LDLCALC 153 (H) 06/16/2021    LDLCALC 127 (H) 11/06/2019    LDLCALC 104 (H) 01/12/2018     Lab Results   Component Value Date    LABVLDL 30 06/16/2021    LABVLDL 35 11/06/2019    LABVLDL 26 01/12/2018     No results found for: CHOLHDLRATIO  Recent Labs     01/09/22  0507 01/11/22  0904   PROBNP 634* 222*       Cardiac Tests:  EKG reviewed: SR, rate 91, PRWP     Telemetry reviewed (date: 1/11/2022): SR, rate 70's     TTE (Dr. Rod Taylor, 2011)  Summary    Left ventricular size is grossly normal.    Mild left venticular concentric hypertrophy noted.    Ejection fraction is visually estimated at 64%.    No evidence of left ventricular mass or thrombus noted.    No regional wall motion abnormalities seen.    Borderline dilated right ventricle.    No evidence of a thrombus in the right ventricle.    Structurally normal mitral valve.    Physiologic and/or trace mitral regurgitation is present.    No mitral valve stenosis present.    The tricuspid valve appears structurally normal.    Physiologic and/or trace tricuspid regurgitation.    RVSP is 15.73 mmHg.    Regular rhythm.     Lexiscan Stress Test (Per [facility] notes, 3/2018)  No evidence of ischemia  EF 40%   Mild global hypokinesis      Left heart catheterization ([facility], 3/2018)  LMT: - The LMT is normal.   LAD:   - The LAD has mild luminal irregularities. LCX: - There was estatic. - The distal circumflex is narrowed 60 % - focal disease. RAMUS: - The Ramus is Absent.    RCA:   - The mid RCA is narrowed 80 % - ISR.    Additional Comment: Focal ISR at distal edge of stent with another 80% focal   stenosis distal to stent. S/p PCI and TATI x 1 to the distal RCA     TTE (Dr. Oleksandr Ortiz, 12/2018)   Summary   Left ventricular size is grossly normal.   Mild left ventricular concentric hypertrophy noted.   Ejection fraction is visually estimated at 50%.   No evidence of left ventricular mass or thrombus noted.   No regional wall motion abnormalities seen.   Borderline dilated right ventricle.   No evidence of a thrombus in the right ventricle.   Physiologic and/or trace mitral regurgitation is present.   No evidence of mitral valve stenosis.   Physiologic and/or trace tricuspid regurgitation.   Regular rhythm.     TTE (Dr. Oleksandr Ortiz, 1/5/2022)  Summary   Left ventricle is mildly enlarged .   Normal left ventricular wall thickness.   Ejection fraction is visually estimated at 28%.   Global left ventricular dysfunction   Normal sized left atrium.   Interatrial septum appears intact.   Mildly dilated right ventricle.   Physiologic and/or trace mitral regurgitation is present.   No evidence of mitral valve stenosis.   Physiologic and/or trace tricuspid regurgitation.   Regular rhythm. ASSESSMENT / PLAN:  · Acute on chronic HFrEF.  proBNP 5,000 on admit --> 9,000 --> 3,600. Small-moderate right and small left pleural effusions on Chest CTA this admission. · Cardiomyopathy -- r/o ischemic cardiomyopathy given clinical history. EF 40% on stress testing per [facility] record 3/2018 --> TTE 3/2018 with EF 50% --> TTE this admission EF < 35%. · Acute hypoxic respiratory failure, currently on 3L NC.  · COVID-19+ with associated viral pneumonia (positive COVID-19 test on 1/4/22)  · Elevated LFTs -- improving  · Coronary artery disease. S/p PCI and TATI placement x 1 to the distal RCA at [facility] in 2018. Residual disease of 60% distal LCX on LHC at [facility] 2018. Clinically stable. · Hypertension, uncontrolled at times. · Hyperlipidemia. Statin therapy on hold in setting of elevated LFTs. · COPD. Follows with Dr. Mehdi Dela Cruz. · Former tobacco smoker. Quit 1 year ago. · Untreated JOSE. · History of TIA. · Chronic kidney disease. Stable. Cr 1.3. · Carotid artery disease.  S/p left carotid stent placement. · Non-Hodgkin's lymphoma s/p chemotherapy.  First diagnosed in 2011.  Follows with [facility]. · BPH. · Anxiety. · Chronic back pain, on chronic opioid therapy. - Coreg and aldactone started this admission. Anneliese Meals has been started as well. With worsening renal function, Entresto and Aldactone being appropriately held. Coreg dose has been decreased to 3.125 twice daily.  -Hold off on diuretics at this point.  -We will rechallenge her with guideline directed medical therapy as and when able. - - Monitor BMP and I/O's closely. If creatinine is better tomorrow, consider rechallenge with low-dose Entresto. - Continue antiplatelet therapy / statin currently on hold (LFT's improving)  - Discussed option of LifeVest.  Patient is not inclined at this time. He understands the risk of not opting for LifeVest.  - Monitor telemetry (SR, isolated PVC's)  - Aggressive risk factor modifications / treatment of JOSE as indicated  - Records from [facility] reviewed (pertninent findings outlined above)  - Reassess for cardiac catheterization once clinically improved. This can be done as an outpatient.  -Defer management of COVID-19 pneumonia to primary and pulmonary teams. Dontae Acevedo MD  [facility]     NOTE: This report was transcribed using voice recognition software. Every effort was made to ensure accuracy; however, inadvertent computerized transcription errors may be present.

## 2022-01-11 NOTE — PROGRESS NOTES
OT BEDSIDE TREATMENT NOTE      Date:2022  Patient Name: Nano Lester \"Shorty\"  MRN: [id number]  : 1950  Room: [address] [place] [address]         Evaluating OT: Sumit Soria OTR/L #[id number]      Referring Provider and Specific Provider Orders/Date:      22   OT eval and treat  Start:  22,   End:  22,   ONE TIME,   Standing Count:  1 Occurrences,   R         Negar Lamar, DO       Placement Recommendation: HHOT        Diagnosis:   1. Respiratory distress    2. COVID-19    3.  Hypoxia            Surgery: None       Pertinent Medical History:       Past Medical History        Past Medical History:   Diagnosis Date    Anesthesia complication       wakes up  violant   only ok if reversed with romazacon    Arthritis       shoulders,ankle    CAD (coronary artery disease)      COPD (chronic obstructive pulmonary disease) (HCC)      Erectile dysfunction      GERD (gastroesophageal reflux disease)      H/O coronary angioplasty with stents[V45.82] 2018 another stent    Hyperlipidemia      Hypertension      Lymphoma ([facility] 2011     had chemo  currently in remission    Myocardial infarction ([address]       more than 10 yrs ago    Sleep apnea       wont wear machine    Unspecified cerebral artery occlusion with cerebral infarction      no deficits            Past Surgical History         Past Surgical History:   Procedure Laterality Date    ANKLE FRACTURE SURGERY Right 2014     ORIF right ankle    ANKLE SURGERY        rt ankle    BRONCHOSCOPY   2011     bronch / medistinoscopy    CAROTID ENDARTERECTOMY Left       12 yrs ago    CHOLECYSTECTOMY        COLONOSCOPY   2013     DR Damon Arana CORONARY ANGIOPLASTY WITH STENT PLACEMENT   2008     states has 2 stents  follows with DR Acuna Bowels    ENDOSCOPY, COLON, DIAGNOSTIC        HAND SURGERY Left       fell on a buzzsaw    HERNIA REPAIR Bilateral 08/15/2019     HERNIA  BILATERAL INGUINAL REPAIR WITH MESH LAPAROSCOPIC ROBOTIC XI ASSISTED performed by Melissa Allen MD at [facility]   06/29/2015     lapraoscopic cholecystectomy    SHOULDER ARTHROSCOPY Right 10/08/2015     rotator cuff repair, subachromoplasty with labrial debridement    SHOULDER ARTHROSCOPY Left 2/18/2021     LEFT ROTATOR CUFF REPAIR LABRAL DEBRIDEMENT SUBACROMIAL DECOMPRESSION performed by Duglas Reynoso DO at [facility]        TONSILLECTOMY        UPPER GASTROINTESTINAL ENDOSCOPY                 Precautions:  Fall Risk, droplet plus isolation, COVID positive, 4L supplemental O2 via NC with O2 sats dropping to 82%; cuing for deep breathing with O2 sats requiring ~1-2 minutes to return to low 90's. Nsg notified      Assessment of current deficits    [x]? Functional mobility            [x]?ADLs           [x]? Strength                   []?Cognition    [x]? Functional transfers          [x]? IADLs         [x]? Safety Awareness   [x]? Endurance    []? Fine Coordination              [x]? Balance      []? Vision/perception    []? Sensation      []? Gross Motor Coordination  []? ROM           []?  Delirium                   []? Motor Control      OT PLAN OF CARE   OT POC based on physician orders, patient diagnosis and results of clinical assessment     Frequency/Duration 1-3 days/wk for 2 weeks PRN      Specific OT Treatment Interventions to include:   * Instruction/training on adapted ADL techniques and AE recommendations to increase functional independence within precautions       * Training on energy conservation strategies, correct breathing pattern and techniques to improve independence/tolerance for self-care routine  * Functional transfer/mobility training/DME recommendations for increased independence, safety, and fall prevention  * Patient/Family education to increase follow through with safety techniques and functional independence  * Recommendation of environmental modifications for increased safety with functional transfers/mobility and ADLs  * Therapeutic exercise to improve motor endurance, ROM, and functional strength for ADLs/functional transfers  * Therapeutic activities to facilitate/challenge dynamic balance, stand tolerance for increased safety and independence with ADLs     Recommended Adaptive Equipment: Possible shower chair      Home Living: Lives alone, single family home, bi-level, 2 steps to enter, 9 steps downward to kitchen, 3 steps upstairs to living room and bedroom, 3 stairs upstairs to bathroom.    Bathroom set-up: tub/shower          Equipment owned: wheeled walker, cane      Prior Level of Function: Independent with ADLs , Independent with IADLs; ambulated independently without AD.      Driving: Yes   Occupation: Field work/heavy machinery   Enjoys: Staying active       Pain Level: x/o pain all over; nsg aware; pain was unquantified         Cognition: A&O: 4/4; Follows 3 step directions              Memory: good               Sequencing: good               Problem solving: good               Judgement/safety: fair      [facility]   AM-PAC Daily Activity Inpatient   How much help for putting on and taking off regular lower body clothing?: A Lot  How much help for Bathing?: A Lot  How much help for Toileting?: A Little  How much help for putting on and taking off regular upper body clothing?: A Little  How much help for taking care of personal grooming?: A Lot  How much help for eating meals?: None  AM-PAC Inpatient Daily Activity Raw Score: 16                Functional Assessment:     Initial Eval Status  Date: 1/5/21 Treatment Status  Date: 1/11/22 STGs = LTGs  Time frame: 10-14 days   Feeding Independent     Pt able to hold on to pitcher to take a drink of water through straw when seated EOB Independent    Grooming Minimal Assist     Pt required mod A to don shampoo cap and wash hair d/t decreased endurance, decreased SPO2 sats with minimal activities and B shoulder pain; pt able to comb hair when seated EOB; pt declined oral hygiene stating he \"left his teeth at home\" Moderate [place]    UB Dressing Minimal Assist     Min/mod A to change gowns and thread lines around gowns; assist to tie back of gown Moderate [place]    LB Dressing Moderate Assist   Pt unable to don socks at EOB, does not wear socks at home. Pt donned slip on shoes with set-up assist.   N/T; pt declined changing pants at this time; pt declined donning socks at this time, as well Moderate [place]    Bathing Moderate Assist     Mod A; pt able to wash UB when seated EOB at min A; anticipate pt will require assist to wash LE's d/t decreased SPO2 sats with activities, as well as decreased endurance and pain ; pt declined LE bathing at this time; assist to wash back when seated EOB Moderate [place]    Toileting Minimal Assist   Simulated seated on commode  N/T  Moderate [place]    Bed Mobility  Supine to sit:  n/a  Sit to supine: n/a  Stand by Assist for supine to sit; stand by assist for scooting; sit to supine at stand by assist; pt remained in L sidelying position to ease breathing at end of session; nsg aware Supine to sit: Independent   Sit to supine: Independent    Functional Transfers Sit to stand: Stand by Assist  Stand to sit: Stand by Assist   Commode transfer: Stand by Assist      Min A progressing to SBA for sit to stand transfers to/from EOB; HHA Independent    Functional Mobility Minimal Assist with HHA to improve balance to/from bathroom, verbal cues for pacing and overall safety.   Min A with HHA to side step to HOB ~3 ft with cuing for safety, sequencing; assist for line management  Independent    Balance Sitting:     Static: stand by assist    Dynamic: stand by assist   Standing: minimal assist with HHA    Sitting:     Static: stand by assist    Dynamic: stand by assist   Standing: minimal assist with HHA  Sitting:     Static: indep    Dynamic: indep  Standing:  indep    Activity Tolerance vish Gaming plus; sitting tolerance at EOB >20 mins. Pt reports mild SOB with increased activity demands. Fair/fair minus; pt limited d/t decreased SPO2 sats with minimal activities with pt's O2 sats ranging from 82-93% during session; cuing for deep breathing with O2 sats returning to low 90's within 1-2 minutes  Increase standing tolerance >3 minutes for improved engagement with functional transfers and indep in ADLs   Visual/  Perceptual Glasses: No      Reports changes in vision since admission: No       NA       Hand Dominance: Right      Hearing:  WFL   Sensation:   No c/o numbness or tingling  Tone:  WFL   Edema: none noted     Comments: Patient cleared by nursing staff. Upon arrival pt in L sidelying position in bed. Pt agreeable to OT tx session. Pt educated with regards to bed mobility, hand placement, safety awareness, static sitting balance,  standing balance, transfer training, functional mobility, ADL retraining,  grooming tasks, UE bathing, UE dressing, ECT's. At end of session pt in L sidelying position  with all lines and tubes intact, call light within reach. Overall, pt demonstrated decreased independence and safety during completion of ADL/functional transfers/mobility tasks. Pt would benefit from continued skilled OT to increase safety and independence with completion of ADL/IADL tasks for functional independence and quality of life. Pt required cues and education as noted above for safe facilitation and completion of tasks. Therapist provided skilled monitoring of patient's response during treatment session. Prior to and at the end of session, environmental modifications/line management completed for patients safety and efficiency of treatment session. Overall, patient demonstrates moderate difficulties with completion of BADLs and IADLs. Factors contributing to these difficulties include decreased SPO2 sats with minimal activities, decreased endurance, and generalized weakness.  As noted above, patient likely to benefit from further OT intervention to increase independence, safety, and overall quality of life. Treatment:     ? Bed mobility: Facilitated bed mobility with cues for proper body mechanics and sequencing to prepare for ADL completion. ? Functional transfers: Facilitated transfers to/from EOB with cues for body alignment, safety and hand placement. ? ADL completion: Self-care retraining for the above-mentioned ADLs; training on proper hand placement, safety technique, sequencing, and energy conservation techniques. ? Postural Balance: Sitting/standing balance retraining to improve righting reactions with postural changes during ADLs. ? Skilled positioning: Proper positioning to improve interaction with environment, overall functioning     · Pt has made fair/fair minus progress towards set goals    · OT 1-3x/week for 5-7 days during hospitalization     Treatment Time also includes thorough review of current medical information, gathering information on past medical history/social history and prior level of function, informal observation of tasks, assessment of data and education on plan of care and goals.     Treatment Time In: 10:20 AM    Treatment Time Out: 10:50 AM           Treatment Charges: Mins Units   ADL/Home Mgt     [id number] 22 1   Thera Activities     04489 8 1   Ther Ex                 [id number]       Manual Therapy    31187     Neuro Re-ed         80856     Orthotic manage/training                               45355     Non Billable Time     Total Timed Treatment 30 [address] [facility], [place]/L #[id number]

## 2022-01-11 NOTE — PROGRESS NOTES
Internal Medicine Progress Note    [facility]    Castro Tellez. Saw Sandra., GRISELOShellyIShelly Fischer D.O., HAN Abraham, MSN, APRN, NP-C  Vanessa Heath. Shashi Mendoza, MSN, APRN-CNP     Primary Care Physician: Reji Zamora DO   Admitting Physician:  Maribell Cerrato DO  Admission date and time: 1/4/2022  2:34 PM    Room:  [address] [place] [address]  Admitting diagnosis: Respiratory distress [R06.03]  Hypoxia [R09.02]  COVID-19 [U07.1]    Patient Name: Javier Lindquist  MRN: [id number]    Date of Service: 1/11/2022     Subjective:    Sabas Darnell is a 70 y.o. male who was seen and examined today,1/11/2022, at the bedside. Sabas Darnell had family visitation yesterday and seems to have recommitted somewhat. He is sitting up in the chair and has been so since 5 AM.  He is using spirometry. We have encouraged him to continue with maximal participation in nonpharmacologic management. No family present     Review of systems:  Constitutional:   Positive for significant fatigue and malaise , - fever/chills  HEENT:   Denies ear pain, sore throat, sinus or eye problems. Cardiovascular:   Denies any chest pain, irregular heartbeats, or palpitations. Respiratory:   + but improved dyspnea at rest, + dyspnea on exertion, + coughing, - sputum, - hemoptysis  Gastrointestinal:   - nausea, -vomiting. - diarrhea, - constipation. + poor appetite and poor intake. - abdominal pain. Genitourinary:    Denies any urgency, frequency, hematuria. Voiding  without difficulty. Extremities:   Denies lower extremity swelling, edema or cyanosis. Neurology:    Denies any headache or focal neurological deficits, positive for generalized weakness and fatigue without focal component  Psch: Worsening depression and poor motivation  Musculoskeletal:    Denies  myalgias, joint complaints or back pain. Integumentary:   Denies any rashes, ulcers, or excoriations.   Denies bruising. Hematologic/Lymphatic:  Denies bruising or bleeding. Physical Exam:  No intake/output data recorded. Intake/Output Summary (Last 24 hours) at 1/11/2022 0824  Last data filed at 1/10/2022 2355  Gross per 24 hour   Intake 180 ml   Output 900 ml   Net -720 ml   I/O last 3 completed shifts: In: 180 [P.O.:180]  Out: 5764 [Urine:1350; Stool:1]  Patient Vitals for the past 96 hrs (Last 3 readings):   Weight   01/09/22 0549 201 lb (91.2 kg)     Vital Signs:   Blood pressure 110/70, pulse 75, temperature 98.1 °F (36.7 °C), temperature source Oral, resp. rate 18, weight 201 lb (91.2 kg), SpO2 94 %. General appearance:  Alert, responsive, oriented to person, place, and time. Ill-appearing, no distress  Head:  Normocephalic. No masses, lesions or tenderness. Eyes:  PERRLA. EOMI. Sclera clear. ENT:  Ears normal. Mucosa normal.  Neck:    Supple. Trachea midline. No thyromegaly. No JVD. No bruits. Heart:    Rhythm regular. Rate controlled. S1 and S2. Systolic murmur grade 2/6. Lungs:    Symmetrical.  Diminished aeration throughout. Shallow pattern of respiration without leg  Abdomen:   Soft. Obese. Non-tender. Non-distended. Bowel sounds positive. No organomegaly or masses. No pain on palpation. Extremities:    Peripheral pulses present. Improved peripheral edema. No ulcers. No cyanosis. No clubbing. Neurologic:    Alert x 3. Generally weak without focal component focal deficit. Cranial nerves grossly intact. No focal weakness. Psych:   Behavior is normal. Mood appears normal. Speech is not rapid and/or pressured. Musculoskeletal:   Spine ROM normal. Muscular strength intact. Gait not assessed. Integumentary:  No rashes  Skin normal color and texture.   Genitalia/Breast:  Deferred    Medication:  Scheduled Meds:   mirtazapine  15 mg Oral Nightly    enoxaparin  40 mg SubCUTAneous BID    baricitinib  2 mg Oral Daily    carvedilol  3.125 mg Oral BID WC    buPROPion  150 mg Oral Daily  ipratropium-albuterol  1 ampule Inhalation 4x daily    [Held by provider] sacubitril-valsartan  1 tablet Oral BID    aspirin  81 mg Oral Daily    clopidogrel  75 mg Oral Daily    guaiFENesin  1,200 mg Oral BID    montelukast  10 mg Oral Nightly    pantoprazole  40 mg Oral QAM    atorvastatin  20 mg Oral Nightly    tamsulosin  0.4 mg Oral Daily    cefTRIAXone (ROCEPHIN) IV  1,000 mg IntraVENous Q24H    Arformoterol Tartrate  15 mcg Nebulization BID    budesonide  500 mcg Nebulization BID    ascorbic acid  1,000 mg Oral Daily    Vitamin D  2,000 Units Oral Daily    zinc sulfate  50 mg Oral Daily    doxycycline hyclate  100 mg Oral 2 times per day    dexamethasone  6 mg IntraVENous Q24H     Continuous Infusions:   sodium chloride         Objective Data:  CBC with Differential:    Lab Results   Component Value Date    WBC 4.7 01/10/2022    WBC 5.0 01/10/2022    RBC 5.12 01/10/2022    RBC 5.20 01/10/2022    HGB 14.3 01/10/2022    HGB 14.5 01/10/2022    HCT 45.9 01/10/2022    HCT 46.1 01/10/2022     01/10/2022     01/10/2022    MCV 89.6 01/10/2022    MCV 88.7 01/10/2022    MCH 27.9 01/10/2022    MCH 27.9 01/10/2022    MCHC 31.2 01/10/2022    MCHC 31.5 01/10/2022    RDW 13.6 01/10/2022    RDW 13.4 01/10/2022    SEGSPCT 66 07/21/2013    LYMPHOPCT 19.6 01/10/2022    LYMPHOPCT 20.6 01/10/2022    MONOPCT 13.1 01/10/2022    MONOPCT 14.4 01/10/2022    BASOPCT 0.2 01/10/2022    BASOPCT 0.2 01/10/2022    MONOSABS 0.62 01/10/2022    MONOSABS 0.72 01/10/2022    LYMPHSABS 0.93 01/10/2022    LYMPHSABS 1.03 01/10/2022    EOSABS 0.00 01/10/2022    EOSABS 0.01 01/10/2022    BASOSABS 0.01 01/10/2022    BASOSABS 0.01 01/10/2022     BMP:    Lab Results   Component Value Date     01/10/2022    K 4.6 01/10/2022    K 4.1 01/04/2022    CL 97 01/10/2022    CO2 21 01/10/2022    BUN 62 01/10/2022    LABALBU 3.9 01/10/2022    LABALBU 4.2 12/01/2011    CREATININE 1.7 01/10/2022    CALCIUM 9.2 01/10/2022 GFRAA 48 01/10/2022    LABGLOM 40 01/10/2022    GLUCOSE 66 01/10/2022    GLUCOSE 152 12/01/2011     Hepatic Function Panel:    Lab Results   Component Value Date    ALKPHOS 88 01/10/2022    ALT 34 01/10/2022    AST 43 01/10/2022    PROT 7.5 01/10/2022    BILITOT 0.4 01/10/2022    BILIDIR 0.2 01/10/2022    IBILI 0.2 01/10/2022    LABALBU 3.9 01/10/2022    LABALBU 4.2 12/01/2011     Recent Labs     01/09/22  0507   TROPHS 33*         COVID inflammatory markers  Recent Labs     01/10/22  1417   DDIMER <200     No results for input(s): FERRITIN in the last 72 hours. Recent Labs     01/10/22  1008   CRP 1.8*       Assessment:    · Acute respiratory failure with hypoxia secondary to COVID-19 pneumonia in an unvaccinated host  · Acute exacerbation of COPD secondary to viral pneumonia  · Hypertensive urgency with underlying history of hypertension  · Asymptomatic coronary artery disease with drug-eluting stents in place  · Transaminitis  · Decompensated diastolic congestive heart failure with associated pleural effusion and echo evidence of worsening cardiomyopathy with LVEF now 28%  · Chronic kidney disease stage IIIa  · Hyperlipidemia  · Vascular disease with history of TIA and left carotid stenting   · Non-Hodgkin's lymphoma with prior chemotherapy followed by the heart center   · Untreated obstructive sleep apnea       Plan:   Noelle Ambrocio has seemingly recommitted after being inactive for most of his hospitalization. Due to depressed mood, and poor sleep. Added nocturnal Remeron which seems to have improved somewhat and we will maintain this at present. He is more engaged in nonpharmacologic management. He remains on 7 L of oxygen but saturations are more adequate than they were yesterday. We have encouraged him to continue pushing forward with maximal participation. He remains on maximal COVID protocol otherwise.   Renal function is improving and we will maintain fluids until approximately 5 AM tomorrow at which point we will discontinue. Labs from today are pending to assess for creatinine today however it had been trending downward. Given his improved GFR, he has been increased to 2 mg of baricitinib. He is on respiratory supportive measures, multiple vitamin supplementation and concomitant antibiotic coverage. Inflammatory markers are being monitored and addressed accordingly. Entresto and diuretics remain on hold due to the previous ANNI. Per cardiology, they will rechallenge with GDMT at a later date. Chronic comorbidities laboratory values and vital signs were monitored and addressed accordingly as well. See additional orders for details. Goals have been discussed with the patient and he will increase activity and use of spirometry today and we will attempt further weaning. He would like to return home and he must comfortably be within 6 L of nasal cannula oxygen order to do so. Greater than 40 minutes of critical care time was spent with the patient. This time included chart review, , and discussion with those consultants involved in the patient's care. More than 50% of my  time was spent at the bedside counseling/coordinating care with the patient and/or family with face to face contact. This time was spent reviewing notes and laboratory data as well as instructing and counseling the patient. Time I spent with the family or surrogate(s) is included only if the patient was incapable of providing the necessary information or participating in medical decisions. I also discussed the differential diagnosis and all of the proposed management plans with the patient and individuals accompanying the patient. Areta Dose requires this high level of physician care and nursing on the Telemetry unit due the complexity of decision management and chance of rapid decline or death. Continued cardiac monitoring and higher level of nursing are required.  I am readily available for any further decision-making and intervention.        JULIAN Friedman - CNP  1/11/2022  8:24 AM

## 2022-01-11 NOTE — PROGRESS NOTES
Physical Therapy Treatment Note/Plan of Care    Room #:  9637/7487-66  Patient Name: Yang Cordoba  YOB: 1950  MRN: [id number]    Date of Service: 1/11/2022     Tentative placement recommendation: Home Health Physical Therapy   Equipment recommendation: None      Evaluating Physical Therapist: Giovanny Whitfield, PT #[id number]      Specific Provider Orders/Date/Referring Provider :  01/04/22 1945   PT eval and treat Start: 01/04/22 1945, End: 01/04/22 1945, ONE TIME, Standing Count: 1 Occurrences, R    Irlanda Dill, DO      Admitting Diagnosis:   Respiratory distress [R06.03]  Hypoxia [R09.02]  COVID-19 [U07.1]       Surgery: none  Visit Diagnoses       Codes    Respiratory distress    -  Primary R06.03    Hypoxia     R09.02          Patient Active Problem List   Diagnosis    Lymphoma ([address]    Gastroenteritis    Back pain at L4-L5 level    Impotence    Chronic fatigue    Urinary frequency    Bronchitis    Gastroesophageal reflux disease with esophagitis    Depression    Coronary artery disease of native artery of native heart with stable angina pectoris (HCC)    Chronic obstructive pulmonary disease ([address]    Pure hypercholesterolemia    Moderate obesity    Major depressive disorder, recurrent, mild ([address]    Vascular dementia with depressed mood [facility])    Moderate asthma without complication    Complete tear of left rotator cuff    Rotator cuff arthropathy of left shoulder    Nontraumatic complete tear of rotator cuff, left    Bursitis of left shoulder    Impingement syndrome of left shoulder    Labral tear of shoulder, degenerative, left    S/P arthroscopy of shoulder    Obstructive sleep apnea    CKD stage G3a/A3, GFR 45-59 and albumin creatinine ratio >300 mg/g ([facility])    COVID-19        ASSESSMENT of Current Deficits Patient exhibits decreased strength, balance and endurance impairing functional mobility, transfers, gait , gait distance and tolerance to activity unsteady gait with no loss of balance this session. desaturates with gait and shortness of breath. Patient needing seated rest breaks due to increased pain in bilateral hips. Patient requires skilled physical therapy to address concerns listed above to increase safety and independence at discharge. PHYSICAL THERAPY  PLAN OF CARE       Physical therapy plan of care is established based on physician order,  patient diagnosis and clinical assessment    Current Treatment Recommendations:    -Standing Balance: Perform strengthening exercises in standing to promote motor control with or without upper extremity support   -Transfers: Provide instruction on proper hand and foot position for adequate transfer of weight onto lower extremities and use of gait device if needed and Cues for hand placement, technique and safety. Provide stabilization to prevent fall   -Gait: Gait training, Standing activities to improve: base of support, weight shift, weight bearing  and Pregait training to emphasize: Safety and possible use of assistive device for balance/safety   -Endurance: Utilize Supervised activities to increase level of endurance to allow for safe functional mobility including transfers and gait  and Use graduated activities to promote good breathing techniques and provide support and education to maximize respiratory function  -Stairs: Stair training with instruction on proper technique and hand placement on rail  -Instruction in independent management of O2 line    PT long term treatment goals are located in below grid    Patient and or family understand(s) diagnosis, prognosis, and plan of care. Frequency of treatments: Patient will be seen  3-5 times/week.          Prior Level of Function: Patient ambulated independently    Rehab Potential: good    for baseline    Past medical history:   Past Medical History:   Diagnosis Date    Anesthesia complication     wakes up  violant   only ok if reversed with romazacon    Arthritis     shoulders,ankle    CAD (coronary artery disease)     COPD (chronic obstructive pulmonary disease) (HCC)     Erectile dysfunction     GERD (gastroesophageal reflux disease)     H/O coronary angioplasty with stents[V45.82] 9/2018 another stent    Hyperlipidemia     Hypertension     Lymphoma ([address] 11/4/2011    had chemo  currently in remission    Myocardial infarction [facility])     more than 10 yrs ago    Sleep apnea     wont wear machine    Unspecified cerebral artery occlusion with cerebral infarction 2005    no deficits     Past Surgical History:   Procedure Laterality Date    ANKLE FRACTURE SURGERY Right 03/2014    ORIF right ankle    ANKLE SURGERY  2007    rt ankle    BRONCHOSCOPY  09/2011    bronch / medistinoscopy    CAROTID ENDARTERECTOMY Left     12 yrs ago    CHOLECYSTECTOMY      COLONOSCOPY  07/30/2013     [facility] WITH STENT PLACEMENT  2008    states has 2 stents  follows with DR Jeniffer Vidal    ENDOSCOPY, COLON, DIAGNOSTIC      HAND SURGERY Left     fell on a buzzsaw    HERNIA REPAIR Bilateral 08/15/2019    HERNIA  BILATERAL INGUINAL REPAIR WITH MESH LAPAROSCOPIC ROBOTIC XI ASSISTED performed by Yojana Johansen MD at  [address]  06/29/2015    lapraoscopic cholecystectomy    SHOULDER ARTHROSCOPY Right 10/08/2015    rotator cuff repair, subachromoplasty with labrial debridement    SHOULDER ARTHROSCOPY Left 2/18/2021    LEFT ROTATOR CUFF REPAIR LABRAL DEBRIDEMENT SUBACROMIAL DECOMPRESSION performed by Graciela Werner DO at [place] [id number]      UPPER GASTROINTESTINAL ENDOSCOPY         SUBJECTIVE:    Precautions: Activity as tolerated and Check Pulse Oximetry while ambulating , falls, O2 and Droplet plus/COVID-19 ,  Up in chair as much as tolerated, at least breakfast through dinner time. Ambulate in room as much as tolerated. Prone/reposition every 2 hours while in bed.  Incentive spirometer 10-15 times per hour while awake. Social history: Patient lives alone in a bilevel home 9 steps downward to kitchen, 3 steps upstairs to living room and bedroom, 3 stairs upstairs to bathroom. with 2 steps  to enter without Rail  Tub shower      Equipment owned: Cane and [address],  unused    [address]   How much difficulty turning over in bed?: None  How much difficulty sitting down on / standing up from a chair with arms?: A Little  How much difficulty moving from lying on back to sitting on side of bed?: None  How much help from another person moving to and from a bed to a chair?: A Little  How much help from another person needed to walk in hospital room?: A Little  How much help from another person for climbing 3-5 steps with a railing?: A Little  AM-PAC Inpatient Mobility Raw Score : 20  AM-PAC Inpatient T-Scale Score : 47.67  Mobility Inpatient CMS 0-100% Score: 35.83  Mobility Inpatient CMS G-Code Modifier : [address] cleared patient for PT treatment. .   OBJECTIVE;   Initial Evaluation  Date: 1/6/2022 Treatment Date:    1/11/2022   Short Term/ Long Term   Goals   Was pt agreeable to Eval/treatment? Yes yes To be met in 4 days   Pain level   0/10    8/10  Bilateral hips    Bed Mobility    Rolling: Not assessed patient in chair     Rolling: Supervision    Supine to sit: Supervision    Sit to supine: Not assessed patient in chair   Scooting: Supervision     Rolling: Independent    Supine to sit: Independent    Sit to supine: Independent    Scooting: Independent     Transfers Sit to stand: Supervision  Cues for hand placement and safety  Sit to stand: Supervision  multiple reps. Cues for hand placement.       Sit to stand: Independent     Ambulation    1 x 80 feet, 1 x 120 feet, 1 x 20 feet using  no device with Supervision    minimal assist for loss of balance x 3 as patient fatigued, for balance and safety and cues for safety and pacing 2x25 feet using  IV pole with Minimal assist of 1   progressed to supervision cues for safety, pacing and pursed lip breathing    150 feet using  least restrictive device versus no device with Independent    Stair negotiation: ascended and descended   Not assessed  Not assessed     10 steps 1 rail with supervision    ROM Within functional limits    Increase range of motion 10% of affected joints    Strength BUE:  refer to OT eval  RLE:  4-/5  LLE:  4-/5  Increase strength in affected mm groups by 1/3 grade   Balance Sitting EOB:  not assessed    Dynamic Standing:  fair   Sitting EOB: good   Dynamic Standing: fair    Sitting EOB:  good    Dynamic Standing: good       Patient is Alert & Oriented x person, place, time and situation and follows directions    Sensation:  Patient  denies numbness/tingling   Edema:  no   Endurance: fair       Vitals: 7 liters nasal cannula initially and 8 liters nasal cannula due to portable o2 tank  Blood Pressure at rest  Blood Pressure during session    Heart Rate at rest 68 Heart Rate during session 89   SPO2 at rest 86% on 7 Liters SPO2 during session 84-92% 8L on portable tank      Patient education  Patient educated on role of Physical Therapy, risks of immobility, safety and plan of care,  importance of mobility while in hospital , purse lip breathing, ankle pumps, quad set and glut set for edema control, blood clot prevention and O2 line management and safety      Patient response to education:   Pt verbalized understanding Pt demonstrated skill Pt requires further education in this area   Yes Partial Yes      Treatment:  Patient practiced and was instructed/facilitated in the following treatment: Patient assisted to EOB. Sat edge of bed 10 minutes with Supervision  to increase dynamic sitting balance and activity tolerance. seated challenges. Pt stood ambulated in room x 2 reps. Incentive spirometer x 5 reps.        Therapeutic Exercises:  ankle pumps, heel raises, long arc quad and seated marching x 10 reps. At end of session, patient in chair with   call light and phone within reach,  all lines and tubes intact, nursing notified. Patient would benefit from continued skilled Physical Therapy to improve functional independence and quality of life.          Patient's/ family goals   home     Time in  65  Time out  239    Total Treatment Time  24 minutes    CPT codes:  Therapeutic activities (85567)   16 minutes  1 unit(s)  Gait Training (92408) 8/ minutes 1 unit(s) restroom    Henry [place], [place] #[id number]

## 2022-01-11 NOTE — PROGRESS NOTES
Comprehensive Nutrition Assessment    Type and Reason for Visit:  Initial,RD Nutrition Re-Screen/LOS    Nutrition Recommendations/Plan: Continue current diet and monitor    Nutrition Assessment:  Pt w/ COVID +PNA/COPD exacerbation, noted acute on chronic CHF w/ pleural effusion. Hx CKD/vascular dementia/Lymphoma s/p chemo. PO meal intake improving ~%. Continue current diet & monitor. Malnutrition Assessment:  Malnutrition Status:  Insufficient data    Context:  Acute Illness     Findings of the 6 clinical characteristics of malnutrition:  Energy Intake:  No significant decrease in energy intake  Weight Loss:  Unable to assess (d/t fluid shifts r/t CHF/CKD)     Body Fat Loss:  Unable to assess (d/t covid isolation)     Muscle Mass Loss:  Unable to assess (d/t covid isolation)    Fluid Accumulation:  No significant fluid accumulation     Strength:  Not Performed    Estimated Daily Nutrient Needs:  Energy (kcal):   (MSJ 1641 X 1.3 SF); Weight Used for Energy Requirements:  Current     Protein (g):   (1.3-1.5 monitor renal labs); Weight Used for Protein Requirements:  Ideal        Fluid (ml/day):  ; Method Used for Fluid Requirements:  1 ml/kcal      Nutrition Related Findings:  alert, -I&Os, no edema, abd WDL, abnormal renal labs      Wounds:  None       Current Nutrition Therapies:    ADULT DIET; Regular    Anthropometric Measures:  · Height: 5' 8\" (172.7 cm)  · Current Body Weight: 201 lb (91.2 kg) (1/9 bed, fluid shifts likely d/t CHF/CKD)   · Admission Body Weight: 216 lb (98 kg) (1/6 bed)    · Usual Body Weight: 228 lb (103.4 kg) (2/2021 actual per EMR)     · Ideal Body Weight: 154 lbs; % Ideal Body Weight 130.5 %   · BMI: 30.6  · BMI Categories: Obese Class 1 (BMI 30.0-34. 9)       Nutrition Diagnosis:   No nutrition diagnosis at this time    Nutrition Interventions:   Food and/or Nutrient Delivery:  Continue Current Diet  Nutrition Education/Counseling:  No recommendation at this time   Coordination of Nutrition Care:  Continue to monitor while inpatient    Goals:  Pt to consume >75% of meals       Nutrition Monitoring and Evaluation:   Food/Nutrient Intake Outcomes:  Food and Nutrient Intake  Physical Signs/Symptoms Outcomes:  Biochemical Data,GI Status,Fluid Status or Edema,Nutrition Focused Physical Findings,Skin,Weight     Discharge Planning:     Too soon to determine     Electronically signed by Isaura Orr RD, LD on 1/11/22 at 9:41 AM EST  Contact: [phone]

## 2022-01-12 ENCOUNTER — APPOINTMENT (OUTPATIENT)
Dept: GENERAL RADIOLOGY | Age: 72
DRG: 177 | End: 2022-01-12
Payer: MEDICARE

## 2022-01-12 LAB
ALBUMIN SERPL-MCNC: 3.9 G/DL (ref 3.5–5.2)
ALP BLD-CCNC: 75 U/L (ref 40–129)
ALT SERPL-CCNC: 28 U/L (ref 0–40)
ANION GAP SERPL CALCULATED.3IONS-SCNC: 12 MMOL/L (ref 7–16)
AST SERPL-CCNC: 39 U/L (ref 0–39)
BASOPHILS ABSOLUTE: 0.01 E9/L (ref 0–0.2)
BASOPHILS RELATIVE PERCENT: 0.2 % (ref 0–2)
BILIRUB SERPL-MCNC: 0.4 MG/DL (ref 0–1.2)
BILIRUBIN DIRECT: <0.2 MG/DL (ref 0–0.3)
BILIRUBIN, INDIRECT: NORMAL MG/DL (ref 0–1)
BUN BLDV-MCNC: 42 MG/DL (ref 6–23)
C-REACTIVE PROTEIN: 0.9 MG/DL (ref 0–0.4)
CALCIUM SERPL-MCNC: 9.4 MG/DL (ref 8.6–10.2)
CHLORIDE BLD-SCNC: 101 MMOL/L (ref 98–107)
CO2: 23 MMOL/L (ref 22–29)
CREAT SERPL-MCNC: 1.3 MG/DL (ref 0.7–1.2)
D DIMER: <200 NG/ML DDU
EOSINOPHILS ABSOLUTE: 0 E9/L (ref 0.05–0.5)
EOSINOPHILS RELATIVE PERCENT: 0 % (ref 0–6)
GFR AFRICAN AMERICAN: >60
GFR NON-AFRICAN AMERICAN: 54 ML/MIN/1.73
GLUCOSE BLD-MCNC: 86 MG/DL (ref 74–99)
HCT VFR BLD CALC: 46.4 % (ref 37–54)
HEMOGLOBIN: 14.6 G/DL (ref 12.5–16.5)
IMMATURE GRANULOCYTES #: 0.01 E9/L
IMMATURE GRANULOCYTES %: 0.2 % (ref 0–5)
LYMPHOCYTES ABSOLUTE: 1.26 E9/L (ref 1.5–4)
LYMPHOCYTES RELATIVE PERCENT: 25 % (ref 20–42)
MCH RBC QN AUTO: 27.5 PG (ref 26–35)
MCHC RBC AUTO-ENTMCNC: 31.5 % (ref 32–34.5)
MCV RBC AUTO: 87.5 FL (ref 80–99.9)
MONOCYTES ABSOLUTE: 0.8 E9/L (ref 0.1–0.95)
MONOCYTES RELATIVE PERCENT: 15.8 % (ref 2–12)
NEUTROPHILS ABSOLUTE: 2.97 E9/L (ref 1.8–7.3)
NEUTROPHILS RELATIVE PERCENT: 58.8 % (ref 43–80)
PDW BLD-RTO: 13.4 FL (ref 11.5–15)
PLATELET # BLD: 352 E9/L (ref 130–450)
PMV BLD AUTO: 11.5 FL (ref 7–12)
POTASSIUM SERPL-SCNC: 4.7 MMOL/L (ref 3.5–5)
PROCALCITONIN: 0.08 NG/ML (ref 0–0.08)
RBC # BLD: 5.3 E12/L (ref 3.8–5.8)
SODIUM BLD-SCNC: 136 MMOL/L (ref 132–146)
TOTAL PROTEIN: 7.1 G/DL (ref 6.4–8.3)
WBC # BLD: 5.1 E9/L (ref 4.5–11.5)

## 2022-01-12 PROCEDURE — 71045 X-RAY EXAM CHEST 1 VIEW: CPT

## 2022-01-12 PROCEDURE — 85378 FIBRIN DEGRADE SEMIQUANT: CPT

## 2022-01-12 PROCEDURE — 6360000002 HC RX W HCPCS: Performed by: INTERNAL MEDICINE

## 2022-01-12 PROCEDURE — 6370000000 HC RX 637 (ALT 250 FOR IP): Performed by: INTERNAL MEDICINE

## 2022-01-12 PROCEDURE — 6360000002 HC RX W HCPCS: Performed by: NURSE PRACTITIONER

## 2022-01-12 PROCEDURE — 85025 COMPLETE CBC W/AUTO DIFF WBC: CPT

## 2022-01-12 PROCEDURE — 6370000000 HC RX 637 (ALT 250 FOR IP): Performed by: NURSE PRACTITIONER

## 2022-01-12 PROCEDURE — 80048 BASIC METABOLIC PNL TOTAL CA: CPT

## 2022-01-12 PROCEDURE — 84145 PROCALCITONIN (PCT): CPT

## 2022-01-12 PROCEDURE — 36415 COLL VENOUS BLD VENIPUNCTURE: CPT

## 2022-01-12 PROCEDURE — 94640 AIRWAY INHALATION TREATMENT: CPT

## 2022-01-12 PROCEDURE — 2700000000 HC OXYGEN THERAPY PER DAY

## 2022-01-12 PROCEDURE — 94660 CPAP INITIATION&MGMT: CPT

## 2022-01-12 PROCEDURE — 80076 HEPATIC FUNCTION PANEL: CPT

## 2022-01-12 PROCEDURE — 99232 SBSQ HOSP IP/OBS MODERATE 35: CPT | Performed by: INTERNAL MEDICINE

## 2022-01-12 PROCEDURE — 2580000003 HC RX 258: Performed by: INTERNAL MEDICINE

## 2022-01-12 PROCEDURE — 86140 C-REACTIVE PROTEIN: CPT

## 2022-01-12 PROCEDURE — 97110 THERAPEUTIC EXERCISES: CPT

## 2022-01-12 PROCEDURE — 2060000000 HC ICU INTERMEDIATE R&B

## 2022-01-12 PROCEDURE — 97530 THERAPEUTIC ACTIVITIES: CPT

## 2022-01-12 RX ORDER — ALBUTEROL SULFATE 2.5 MG/3ML
2.5 SOLUTION RESPIRATORY (INHALATION) EVERY 6 HOURS PRN
Status: DISCONTINUED | OUTPATIENT
Start: 2022-01-12 | End: 2022-01-18 | Stop reason: HOSPADM

## 2022-01-12 RX ADMIN — BARICITINIB 2 MG: 2 TABLET, FILM COATED ORAL at 10:21

## 2022-01-12 RX ADMIN — ASPIRIN 81 MG CHEWABLE TABLET 81 MG: 81 TABLET CHEWABLE at 10:22

## 2022-01-12 RX ADMIN — CARVEDILOL 3.12 MG: 3.12 TABLET, FILM COATED ORAL at 17:30

## 2022-01-12 RX ADMIN — MIRTAZAPINE 15 MG: 15 TABLET, ORALLY DISINTEGRATING ORAL at 22:07

## 2022-01-12 RX ADMIN — ZINC SULFATE 220 MG (50 MG) CAPSULE 50 MG: CAPSULE at 10:22

## 2022-01-12 RX ADMIN — MONTELUKAST SODIUM 10 MG: 10 TABLET, FILM COATED ORAL at 21:15

## 2022-01-12 RX ADMIN — ENOXAPARIN SODIUM 40 MG: 100 INJECTION SUBCUTANEOUS at 10:53

## 2022-01-12 RX ADMIN — TAMSULOSIN HYDROCHLORIDE 0.4 MG: 0.4 CAPSULE ORAL at 10:22

## 2022-01-12 RX ADMIN — ARFORMOTEROL TARTRATE 15 MCG: 15 SOLUTION RESPIRATORY (INHALATION) at 16:45

## 2022-01-12 RX ADMIN — DEXAMETHASONE SODIUM PHOSPHATE 6 MG: 10 INJECTION INTRAMUSCULAR; INTRAVENOUS at 21:12

## 2022-01-12 RX ADMIN — CARVEDILOL 3.12 MG: 3.12 TABLET, FILM COATED ORAL at 10:22

## 2022-01-12 RX ADMIN — BUDESONIDE 500 MCG: 0.5 INHALANT RESPIRATORY (INHALATION) at 16:45

## 2022-01-12 RX ADMIN — ATORVASTATIN CALCIUM 20 MG: 20 TABLET, FILM COATED ORAL at 21:14

## 2022-01-12 RX ADMIN — HYDROCODONE BITARTRATE AND ACETAMINOPHEN 1 TABLET: 7.5; 325 TABLET ORAL at 10:54

## 2022-01-12 RX ADMIN — HYDROCODONE BITARTRATE AND ACETAMINOPHEN 1 TABLET: 7.5; 325 TABLET ORAL at 21:12

## 2022-01-12 RX ADMIN — DOXYCYCLINE HYCLATE 100 MG: 100 CAPSULE ORAL at 10:22

## 2022-01-12 RX ADMIN — GUAIFENESIN 1200 MG: 600 TABLET, EXTENDED RELEASE ORAL at 21:14

## 2022-01-12 RX ADMIN — Medication 2000 UNITS: at 10:21

## 2022-01-12 RX ADMIN — DOXYCYCLINE HYCLATE 100 MG: 100 CAPSULE ORAL at 21:14

## 2022-01-12 RX ADMIN — OXYCODONE HYDROCHLORIDE AND ACETAMINOPHEN 1000 MG: 500 TABLET ORAL at 10:21

## 2022-01-12 RX ADMIN — GUAIFENESIN 1200 MG: 600 TABLET, EXTENDED RELEASE ORAL at 10:22

## 2022-01-12 RX ADMIN — BUPROPION HYDROCHLORIDE 150 MG: 150 TABLET, EXTENDED RELEASE ORAL at 10:22

## 2022-01-12 RX ADMIN — ENOXAPARIN SODIUM 40 MG: 100 INJECTION SUBCUTANEOUS at 21:12

## 2022-01-12 RX ADMIN — CLOPIDOGREL 75 MG: 75 TABLET, FILM COATED ORAL at 10:21

## 2022-01-12 RX ADMIN — WATER 1000 MG: 1 INJECTION INTRAMUSCULAR; INTRAVENOUS; SUBCUTANEOUS at 21:12

## 2022-01-12 RX ADMIN — ALBUTEROL SULFATE 2.5 MG: 2.5 SOLUTION RESPIRATORY (INHALATION) at 16:45

## 2022-01-12 RX ADMIN — PANTOPRAZOLE SODIUM 40 MG: 40 TABLET, DELAYED RELEASE ORAL at 10:22

## 2022-01-12 ASSESSMENT — PAIN DESCRIPTION - PROGRESSION: CLINICAL_PROGRESSION: GRADUALLY WORSENING

## 2022-01-12 ASSESSMENT — PAIN DESCRIPTION - PAIN TYPE: TYPE: CHRONIC PAIN

## 2022-01-12 ASSESSMENT — PAIN SCALES - GENERAL
PAINLEVEL_OUTOF10: 7
PAINLEVEL_OUTOF10: 0
PAINLEVEL_OUTOF10: 0
PAINLEVEL_OUTOF10: 8
PAINLEVEL_OUTOF10: 3

## 2022-01-12 ASSESSMENT — PAIN DESCRIPTION - LOCATION: LOCATION: BACK

## 2022-01-12 ASSESSMENT — PAIN DESCRIPTION - ORIENTATION: ORIENTATION: LOWER

## 2022-01-12 ASSESSMENT — PAIN - FUNCTIONAL ASSESSMENT: PAIN_FUNCTIONAL_ASSESSMENT: PREVENTS OR INTERFERES SOME ACTIVE ACTIVITIES AND ADLS

## 2022-01-12 ASSESSMENT — PAIN DESCRIPTION - DESCRIPTORS: DESCRIPTORS: CONSTANT;ACHING

## 2022-01-12 ASSESSMENT — PAIN DESCRIPTION - FREQUENCY: FREQUENCY: CONTINUOUS

## 2022-01-12 ASSESSMENT — PAIN DESCRIPTION - ONSET: ONSET: ON-GOING

## 2022-01-12 NOTE — CARE COORDINATION
1/12/2022 1416 CM note: POSITIVE COVID 1/4/22.  Pt now on airvo 60liters/100% FIO2. Pt to be transferred to [facility]. Pt does not have home o2. Referral in place to [phone])/kylah faxed([phone]). Life vest has been delivered to room, however per cardiology notes-pt not inclined at this time. (Raviyazmin Ramirez will need notified to  life vest if pt does not want life vest at dc). Per pt's dtr Yoel Chamberlain, pt is illiterate and dc instructions will need reviewed with her. Yoel Chamberlain relays her dad will not be agreeable for rehab/HHC but hopeful he will stay with family at dc or family member can stay with him. CM/SW to follow for transition of care needs RINKU Kenny RN

## 2022-01-12 NOTE — PROGRESS NOTES
[place]ston Payan notified of pt moving to 6th floor, attempted to call report to 6th floor, stated they will call back.

## 2022-01-12 NOTE — PROGRESS NOTES
Internal Medicine Progress Note    [facility]    Romero Osler. David Alexis., SHANA.A.CShellyOShellyI. Hima Tripathi D.O., GRISELOLILIANA Santillan D.O. Juan Coffey, MSN, APRN, NP-C  Merry Badillo. Alexa Hall, MSN, APRN-CNP     Primary Care Physician: Addis Obrien DO   Admitting Physician:  Giovani Rodríguez DO  Admission date and time: 1/4/2022  2:34 PM    Room:  [address] [place] [address]  Admitting diagnosis: Respiratory distress [R06.03]  Hypoxia [R09.02]  COVID-19 [U07.1]    Patient Name: Eleno Sepulveda  MRN: [id number]    Date of Service: 1/12/2022     Subjective:    Kendy Underwood is a 70 y.o. male who was seen and examined today,1/12/2022, at the bedside. Kendy Underwood complicating matters quite substantially by removing himself from oxygen which provokes significant decompensation and he was unable to recover. [facility]. We have attempted to help him extensively against this type of behavior however he needs somewhat indignant and did not wish to speak about it. We have attempted to reinforce nonpharmacologic management and provide motivation however he was not open to discussing this at present. No family present     Review of systems:  Constitutional:   Positive for significant fatigue and malaise , - fever/chills  HEENT:   Denies ear pain, sore throat, sinus or eye problems. Cardiovascular:   Denies any chest pain, irregular heartbeats, or palpitations. Respiratory:   + but improved dyspnea at rest, + dyspnea on exertion, + coughing, - sputum, - hemoptysis  Gastrointestinal:   - nausea, -vomiting. - diarrhea, - constipation. + poor appetite and poor intake. - abdominal pain. Genitourinary:    Denies any urgency, frequency, hematuria. Voiding  without difficulty. Extremities:   Denies lower extremity swelling, edema or cyanosis.    Neurology:    Denies any headache or focal neurological deficits, positive for generalized weakness and fatigue without focal component  Psch: Worsening depression and poor motivation  Musculoskeletal:    Denies  myalgias, joint complaints or back pain. Integumentary:   Denies any rashes, ulcers, or excoriations. Denies bruising. Hematologic/Lymphatic:  Denies bruising or bleeding. Physical Exam:  No intake/output data recorded. Intake/Output Summary (Last 24 hours) at 1/12/2022 0854  Last data filed at 1/11/2022 1705  Gross per 24 hour   Intake 240 ml   Output 600 ml   Net -360 ml   I/O last 3 completed shifts: In: 240 [P.O.:240]  Out: 1500 [Urine:1500]  Patient Vitals for the past 96 hrs (Last 3 readings):   Weight   01/09/22 0549 201 lb (91.2 kg)     Vital Signs:   Blood pressure 133/64, pulse 67, temperature 98.5 °F (36.9 °C), temperature source Oral, resp. rate 18, height 5' 8\" (1.727 m), weight 201 lb (91.2 kg), SpO2 98 %. General appearance:  Alert, responsive, oriented to person, place, and time. More Ill-appearing, no distress  Head:  Normocephalic. No masses, lesions or tenderness. Eyes:  PERRLA. EOMI. Sclera clear. ENT:  Ears normal. Mucosa normal.  Neck:    Supple. Trachea midline. No thyromegaly. No JVD. No bruits. Heart:    Rhythm regular. Rate controlled. S1 and S2. Systolic murmur grade 2/6. Lungs:    Symmetrical.  Diminished aeration throughout. Shallow pattern of respiration without leg  Abdomen:   Soft. Obese. Non-tender. Non-distended. Bowel sounds positive. No organomegaly or masses. No pain on palpation. Extremities:    Peripheral pulses present. Improved peripheral edema. No ulcers. No cyanosis. No clubbing. Neurologic:    Alert x 3. Generally weak without focal component focal deficit. Cranial nerves grossly intact. No focal weakness. Psych:   Behavior is normal. Mood appears normal. Speech is not rapid and/or pressured. Musculoskeletal:   Spine ROM normal. Muscular strength intact. Gait not assessed.   Integumentary:  No rashes  Skin normal color and texture.   Genitalia/Breast:  Deferred    Medication:  Scheduled Meds:   mirtazapine  15 mg Oral Nightly    enoxaparin  40 mg SubCUTAneous BID    baricitinib  2 mg Oral Daily    carvedilol  3.125 mg Oral BID     buPROPion  150 mg Oral Daily    ipratropium-albuterol  1 ampule Inhalation 4x daily    [Held by provider] sacubitril-valsartan  1 tablet Oral BID    aspirin  81 mg Oral Daily    clopidogrel  75 mg Oral Daily    guaiFENesin  1,200 mg Oral BID    montelukast  10 mg Oral Nightly    pantoprazole  40 mg Oral QAM    atorvastatin  20 mg Oral Nightly    tamsulosin  0.4 mg Oral Daily    cefTRIAXone (ROCEPHIN) IV  1,000 mg IntraVENous Q24H    Arformoterol Tartrate  15 mcg Nebulization BID    budesonide  500 mcg Nebulization BID    ascorbic acid  1,000 mg Oral Daily    Vitamin D  2,000 Units Oral Daily    zinc sulfate  50 mg Oral Daily    doxycycline hyclate  100 mg Oral 2 times per day    dexamethasone  6 mg IntraVENous Q24H     Continuous Infusions:      Objective Data:  CBC with Differential:    Lab Results   Component Value Date    WBC 2.4 01/11/2022    RBC 5.32 01/11/2022    HGB 14.8 01/11/2022    HCT 47.2 01/11/2022     01/11/2022    MCV 88.7 01/11/2022    MCH 27.8 01/11/2022    MCHC 31.4 01/11/2022    RDW 13.6 01/11/2022    SEGSPCT 66 07/21/2013    LYMPHOPCT 31.6 01/11/2022    MONOPCT 21.9 01/11/2022    BASOPCT 0.0 01/11/2022    MONOSABS 0.52 01/11/2022    LYMPHSABS 0.75 01/11/2022    EOSABS 0.00 01/11/2022    BASOSABS 0.00 01/11/2022     BMP:    Lab Results   Component Value Date     01/11/2022    K 4.7 01/11/2022    K 4.1 01/04/2022     01/11/2022    CO2 28 01/11/2022    BUN 50 01/11/2022    LABALBU 3.7 01/11/2022    LABALBU 4.2 12/01/2011    CREATININE 1.6 01/11/2022    CALCIUM 9.4 01/11/2022    GFRAA 52 01/11/2022    LABGLOM 43 01/11/2022    GLUCOSE 84 01/11/2022    GLUCOSE 152 12/01/2011     Hepatic Function Panel:    Lab Results   Component Value Date    ALKPHOS 80 01/11/2022    ALT 29 01/11/2022    AST 38 01/11/2022    PROT 7.2 01/11/2022    BILITOT 0.4 01/11/2022    BILIDIR 0.2 01/11/2022    IBILI 0.2 01/11/2022    LABALBU 3.7 01/11/2022    LABALBU 4.2 12/01/2011     No results for input(s): TROPHS in the last 72 hours. COVID inflammatory markers  Recent Labs     01/11/22 0904   DDIMER <200     No results for input(s): FERRITIN in the last 72 hours. Recent Labs     01/11/22 0904   CRP 2.0*       Assessment:    · Acute respiratory failure with hypoxia secondary to COVID-19 pneumonia in an unvaccinated host  · Acute exacerbation of COPD secondary to viral pneumonia  · Hypertensive urgency with underlying history of hypertension  · Asymptomatic coronary artery disease with drug-eluting stents in place  · Transaminitis  · Decompensated diastolic congestive heart failure with associated pleural effusion and echo evidence of worsening cardiomyopathy with LVEF now 28%  · Chronic kidney disease stage IIIa  · Hyperlipidemia  · Vascular disease with history of TIA and left carotid stenting   · Non-Hodgkin's lymphoma with prior chemotherapy followed by the heart center   · Untreated obstructive sleep apnea       Plan:   Keyla Mayers had significant regression largely due to his refusal to comply with the recommended management. He removed himself from oxygen, causing substantial decompensation and was unable to recover and is now on rather maximal Aervo support. We have had a very tyrell discussion regarding the progression of disease and possible intubation and mechanical ventilation. Again, he is somewhat down today and is unwilling to have any extensive discussion at the moment. We have encouraged him to continue pushing forward with maximal participation. He remains on maximal COVID protocol otherwise. Renal function has trended downward with fluid administration and with holding Entresto, plan to complete fluids today and assess for response.   Labs from today are pending to assess for creatinine today however it had been trending downward. Given his improved GFR, he is maintained on 2 mg of baricitinib. He is on respiratory supportive measures, multiple vitamin supplementation and concomitant antibiotic coverage. Inflammatory markers are being monitored and addressed accordingly. Entresto and diuretics remain on hold due to the previous ANNI. Per cardiology, they will rechallenge with GDMT at a later date. Chronic comorbidities laboratory values and vital signs were monitored and addressed accordingly as well. See additional orders for details. Unfortunately, prognosis at this point is rather poor with a high likelihood for decompensation. He will be transferred to the intermediate or progressive level of care for more close monitoring. Greater than 40 minutes of critical care time was spent with the patient. This time included chart review, , and discussion with those consultants involved in the patient's care. More than 50% of my  time was spent at the bedside counseling/coordinating care with the patient and/or family with face to face contact. This time was spent reviewing notes and laboratory data as well as instructing and counseling the patient. Time I spent with the family or surrogate(s) is included only if the patient was incapable of providing the necessary information or participating in medical decisions. I also discussed the differential diagnosis and all of the proposed management plans with the patient and individuals accompanying the patient. Reno Noss requires this high level of physician care and nursing on the Telemetry unit due the complexity of decision management and chance of rapid decline or death. Continued cardiac monitoring and higher level of nursing are required. I am readily available for any further decision-making and intervention.        John Lee, JULIAN - CNP  1/12/2022  8:54 AM

## 2022-01-12 NOTE — PROGRESS NOTES
Physical Therapy Treatment Note/Plan of Care    Room #:  3419/8340-75  Patient Name: Maribel Real  YOB: 1950  MRN: [id number]    Date of Service: 1/12/2022     Tentative placement recommendation: Home Health Physical Therapy   Equipment recommendation: None      Evaluating Physical Therapist: Madhu Diane, PT #[id number]      Specific Provider Orders/Date/Referring Provider :  01/04/22 1945   PT eval and treat Start: 01/04/22 1945, End: 01/04/22 1945, ONE TIME, Standing Count: 1 Occurrences, R    Geraldo Perez,       Admitting Diagnosis:   Respiratory distress [R06.03]  Hypoxia [R09.02]  COVID-19 [U07.1]       Surgery: none  Visit Diagnoses       Codes    Respiratory distress    -  Primary R06.03    Hypoxia     R09.02          Patient Active Problem List   Diagnosis    Lymphoma ([facility]    Gastroenteritis    Back pain at L4-L5 level    Impotence    Chronic fatigue    Urinary frequency    Bronchitis    Gastroesophageal reflux disease with esophagitis    Depression    Coronary artery disease of native artery of native heart with stable angina pectoris (HCC)    Chronic obstructive pulmonary disease ([facility]    Pure hypercholesterolemia    Moderate obesity    Major depressive disorder, recurrent, mild ([address]    Vascular dementia with depressed mood ([facility])    Moderate asthma without complication    Complete tear of left rotator cuff    Rotator cuff arthropathy of left shoulder    Nontraumatic complete tear of rotator cuff, left    Bursitis of left shoulder    Impingement syndrome of left shoulder    Labral tear of shoulder, degenerative, left    S/P arthroscopy of shoulder    Obstructive sleep apnea    CKD stage G3a/A3, GFR 45-59 and albumin creatinine ratio >300 mg/g ([facility])    COVID-19        ASSESSMENT of Current Deficits Patient exhibits decreased strength, balance and endurance impairing functional mobility, transfers, gait , gait distance and tolerance to activity.  Occasionally takes off nasal cannula; therapist educates patient importance of leaving oxygen donned. Multiple rest breaks needed due to fatigue and pain in bilateral hips. Patient on increased liters of oxygen this session desaturates to 88% with exertion pursed lip breathing tech performed to increased to 90%. Patient requires skilled physical therapy to address concerns listed above to increase safety and independence at discharge. PHYSICAL THERAPY  PLAN OF CARE       Physical therapy plan of care is established based on physician order,  patient diagnosis and clinical assessment    Current Treatment Recommendations:    -Standing Balance: Perform strengthening exercises in standing to promote motor control with or without upper extremity support   -Transfers: Provide instruction on proper hand and foot position for adequate transfer of weight onto lower extremities and use of gait device if needed and Cues for hand placement, technique and safety. Provide stabilization to prevent fall   -Gait: Gait training, Standing activities to improve: base of support, weight shift, weight bearing  and Pregait training to emphasize: Safety and possible use of assistive device for balance/safety   -Endurance: Utilize Supervised activities to increase level of endurance to allow for safe functional mobility including transfers and gait  and Use graduated activities to promote good breathing techniques and provide support and education to maximize respiratory function  -Stairs: Stair training with instruction on proper technique and hand placement on rail  -Instruction in independent management of O2 line    PT long term treatment goals are located in below grid    Patient and or family understand(s) diagnosis, prognosis, and plan of care. Frequency of treatments: Patient will be seen  3-5 times/week.          Prior Level of Function: Patient ambulated independently    Rehab Potential: good    for baseline    Past medical history: Past Medical History:   Diagnosis Date    Anesthesia complication     wakes up  violant   only ok if reversed with romazacon    Arthritis     shoulders,ankle    CAD (coronary artery disease)     COPD [facility]) [facility]     Erectile dysfunction     GERD (gastroesophageal reflux disease)     H/O coronary angioplasty with stents[V45.82] 9/2018 another stent    Hyperlipidemia     Hypertension     Lymphoma ([facility] 11/4/2011    had chemo  currently in remission    Myocardial infarction [facility])     more than 10 yrs ago    Sleep apnea     wont wear machine    Unspecified cerebral artery occlusion with cerebral infarction 2005    no deficits     Past Surgical History:   Procedure Laterality Date    ANKLE FRACTURE SURGERY Right 03/2014    ORIF right ankle    ANKLE SURGERY  2007    rt ankle    BRONCHOSCOPY  09/2011    bronch / medistinoscopy    CAROTID ENDARTERECTOMY Left     12 yrs ago    CHOLECYSTECTOMY      COLONOSCOPY  07/30/2013     [address] [place] [address] WITH STENT PLACEMENT  2008    states has 2 stents  follows with DR Laird Paci    ENDOSCOPY, COLON, DIAGNOSTIC      HAND SURGERY Left     fell on a buzzsaw    HERNIA REPAIR Bilateral 08/15/2019    HERNIA  BILATERAL INGUINAL REPAIR WITH MESH LAPAROSCOPIC ROBOTIC XI ASSISTED performed [facility] Montrell Lau MD at [facility]  06/29/2015    lapraoscopic cholecystectomy    SHOULDER ARTHROSCOPY Right 10/08/2015    rotator cuff repair, subachromoplasty with labrial debridement    SHOULDER ARTHROSCOPY Left 2/18/2021    LEFT ROTATOR CUFF REPAIR LABRAL DEBRIDEMENT SUBACROMIAL DECOMPRESSION performed by René Dominique DO at [place] [address]      UPPER GASTROINTESTINAL ENDOSCOPY         SUBJECTIVE:    Precautions:  Activity as tolerated and Check Pulse Oximetry while ambulating , falls, O2 and Droplet plus/COVID-19 ,  Up in chair as much as tolerated, at least breakfast through dinner time. Ambulate in room as much as tolerated. Prone/reposition every 2 hours while in bed. Incentive spirometer 10-15 times per hour while awake. Social history: Patient lives alone in a bilevel home 9 steps downward to kitchen, 3 steps upstairs to living room and bedroom, 3 stairs upstairs to bathroom. with 2 steps  to enter without Rail  Tub shower      Equipment owned: Cane and [address],  unused    [facility]   How much difficulty turning over in bed?: None  How much difficulty sitting down on / standing up from a chair with arms?: A Little  How much difficulty moving from lying on back to sitting on side of bed?: None  How much help from another person moving to and from a bed to a chair?: A Little  How much help from another person needed to walk in hospital room?: A Little  How much help from another person for climbing 3-5 steps with a railing?: A Little  AM[facility] Inpatient Mobility Raw Score : 20  AM-[facility] Inpatient T-Scale Score : 47.67  Mobility Inpatient CMS 0-100% Score: 35.83  Mobility Inpatient CMS G-Code Modifier : [address] cleared patient for PT treatment. .   OBJECTIVE;   Initial Evaluation  Date: 1/6/2022 Treatment Date:    1/12/2022   Short Term/ Long Term   Goals   Was pt agreeable to Eval/treatment? Yes yes To be met in 4 days   Pain level   0/10    8/10  Bilateral hips \"states they never stop hurting\"     Bed Mobility    Rolling: Not assessed patient in chair     Rolling: Independent   Supine to sit: Independent   Sit to supine: Not assessed patient seated edge of bed   Scooting: Independent    Rolling: Independent    Supine to sit: Independent    Sit to supine: Independent    Scooting: Independent     Transfers Sit to stand: Supervision  Cues for hand placement and safety  Sit to stand: Supervision  multiple reps. Cues for hand placement.       Sit to stand: Independent     Ambulation    1 x 80 feet, 1 x 120 feet, 1 x 20 feet dynamic challenges and reaching beyond center of gravity. Multiple seated breaks as needed due to pain. Therapeutic Exercises:  ankle pumps, heel raises, hip abduction/adduction, long arc quad and seated marching  x 10 reps. At end of session, patient sitting edge of bed with   call light and phone within reach,  all lines and tubes intact, nursing notified. Patient would benefit from continued skilled Physical Therapy to improve functional independence and quality of life.          Patient's/ family goals   home     Time in  156  Time out  219    Total Treatment Time  23 minutes    CPT codes:  Therapeutic activities (69868)   15 minutes  1 unit(s)  Therapeutic exercises (84939)   8 minutes  1 unit(s)     Xander Paulson, PTA #[id number]

## 2022-01-12 NOTE — PROGRESS NOTES
INPATIENT CARDIOLOGY FOLLOW-UP    Name: Gabino Fuller    Age: 70 y.o. Date of Admission: 1/4/2022  2:34 PM    Date of Service: 1/12/2022    Chief Complaint: Follow-up for acute on chronic HFrEF, CAD    Interim History:  Currently with no chest pain, respiratory distress, or palpitations. SR on EKG and telemetry. Continues to be on 7 L via nasal cannula  Renal function slightly better today. Denies any medical complaints. Feels slightly better overall      Review of Systems:   Cardiac: As per HPI  General: No fever, chills  Pulmonary: As per HPI  HEENT: No visual disturbances, difficult swallowing  GI: No nausea, vomiting  : No dysuria, hematuria  Endocrine: No thyroid disease or DM  Musculoskeletal: BRAVO x 4, no focal motor deficits  Skin: Intact, no rashes  Neuro: No headache, seizures  Psych: Currently with no depression, anxiety    Problem List:  Patient Active Problem List   Diagnosis    Lymphoma ([facility] [address]    Gastroenteritis    Back pain at L4-L5 level    Impotence    Chronic fatigue    Urinary frequency    Bronchitis    Gastroesophageal reflux disease with esophagitis    Depression    Coronary artery disease of native artery of native heart with stable angina pectoris ([facility])    Chronic obstructive pulmonary disease ([facility] [address]    Pure hypercholesterolemia    Moderate obesity    Major depressive disorder, recurrent, mild ([facility])    Vascular dementia with depressed mood ([facility])    Moderate asthma without complication    Complete tear of left rotator cuff    Rotator cuff arthropathy of left shoulder    Nontraumatic complete tear of rotator cuff, left    Bursitis of left shoulder    Impingement syndrome of left shoulder    Labral tear of shoulder, degenerative, left    S/P arthroscopy of shoulder    Obstructive sleep apnea    CKD stage G3a/A3, GFR 45-59 and albumin creatinine ratio >300 mg/g ([facility])    COVID-19       Allergies:   Allergies   Allergen Reactions    Midazolam Hcl      Wake up wild. ...  Must be reversed with romazicon or will wake up wild and combative       Current Medications:  Current Facility-Administered Medications   Medication Dose Route Frequency Provider Last Rate Last Admin    albuterol (PROVENTIL) nebulizer solution 2.5 mg  2.5 mg Nebulization Q6H PRN Haley Lamp, DO        mirtazapine (REMERON SOL-TAB) disintegrating tablet 15 mg  15 mg Oral Nightly Nesha Go, APRN - CNP   15 mg at 01/10/22 2006    enoxaparin (LOVENOX) injection 40 mg  40 mg SubCUTAneous BID Nesha Go, APRN - CNP   40 mg at 01/12/22 1053    baricitinib (OLUMIANT) tablet 2 mg  2 mg Oral Daily Nesha Go, APRN - CNP   2 mg at 01/12/22 1021    carvedilol (COREG) tablet 3.125 mg  3.125 mg Oral BID WC Clark Gee, DO   3.125 mg at 01/12/22 1022    HYDROcodone-acetaminophen (NORCO) 5-325 MG per tablet 1 tablet  1 tablet Oral Q6H PRN Jose Farley, DO   1 tablet at 01/10/22 0959    buPROPion [facility]) extended release tablet 150 mg  150 mg Oral Daily Clark Gee, DO   150 mg at 01/12/22 1022    [Held by provider] sacubitril-valsartan (ENTRESTO) 24-26 MG per tablet 1 tablet  1 tablet Oral BID Mayra Ventura MD   1 tablet at 01/08/22 1049    prochlorperazine (COMPAZINE) injection 10 mg  10 mg IntraVENous Q6H PRN Haley Lamp, DO        aspirin chewable tablet 81 mg  81 mg Oral Daily Haley Lamp, DO   81 mg at 01/12/22 1022    clopidogrel (PLAVIX) tablet 75 mg  75 mg Oral Daily Haley Lamp, DO   75 mg at 01/12/22 1021    guaiFENesin (MUCINEX) extended release tablet 1,200 mg  1,200 mg Oral BID Haley Lamp, DO   1,200 mg at 01/12/22 1022    HYDROcodone-acetaminophen (NORCO) 7.5-325 MG per tablet 1 tablet  1 tablet Oral Q8H PRN Haley Lamp, DO   1 tablet at 01/12/22 1054    montelukast (SINGULAIR) tablet 10 mg  10 mg Oral Nightly Haley Lamp, DO   10 mg at 01/11/22 4249    pantoprazole (PROTONIX) tablet 40 mg  40 mg Oral QAM Haley Lamp, DO   40 mg at 01/12/22 1022    atorvastatin (LIPITOR) tablet 20 mg  20 mg Oral Nightly [facility], DO   20 mg at 01/11/22 2239    tamsulosin (FLOMAX) capsule 0.4 mg  0.4 mg Oral Daily [facility], DO   0.4 mg at 01/12/22 1022    cefTRIAXone (ROCEPHIN) 1,000 mg in sterile water 10 mL IV syringe  1,000 mg IntraVENous Q24H [facility], DO 0 mL/hr at 01/05/22 0058 1,000 mg at 01/11/22 2124    Arformoterol Tartrate (BROVANA) nebulizer solution 15 mcg  15 mcg Nebulization BID [facility], DO   15 mcg at 01/11/22 1839    budesonide (PULMICORT) nebulizer suspension 500 mcg  500 mcg Nebulization BID [facility], DO   500 mcg at 01/11/22 7243    ascorbic acid (VITAMIN C) tablet 1,000 mg  1,000 mg Oral Daily [facility], DO   1,000 mg at 01/12/22 1021    vitamin D (CHOLECALCIFEROL) tablet 2,000 Units  2,000 Units Oral Daily [facility], DO   2,000 Units at 01/12/22 1021    zinc sulfate (ZINCATE) capsule 50 mg  50 mg Oral Daily [facility], DO   50 mg at 01/12/22 1022    doxycycline hyclate (VIBRAMYCIN) capsule 100 mg  100 mg Oral 2 times per day [facility], DO   100 mg at 01/12/22 1022    acetaminophen (TYLENOL) tablet 650 mg  650 mg Oral Q6H PRN [facility], DO   650 mg at 01/10/22 1809    Or    acetaminophen (TYLENOL) suppository 650 mg  650 mg Rectal Q6H PRN [facility], DO        dexamethasone (DECADRON) injection 6 mg  6 mg IntraVENous Q24H [facility], DO   6 mg at 01/11/22 2036    guaiFENesin-dextromethorphan (ROBITUSSIN DM) 100-10 MG/5ML syrup 5 mL  5 mL Oral Q4H PRN [place], DO             Physical Exam:  BP (!) 123/59   Pulse 69   Temp 98.7 °F (37.1 °C) (Oral)   Resp 20   Ht 5' 8\" (1.727 m)   Wt 201 lb (91.2 kg)   SpO2 91%   BMI 30.56 kg/m²   Wt Readings from Last 3 Encounters:   01/09/22 201 lb (91.2 kg)   12/15/21 223 lb 9.6 oz (101.4 kg)   12/06/21 226 lb 6.4 oz (102.7 kg)     Patient not examined (patient in isolation for COVID-19 infection) -- he was observed through the glass door of his room.   Communicated over the telephone while examining patient through glass door. Appears stable and comfortable no acute distress. Intake/Output:    Intake/Output Summary (Last 24 hours) at 1/12/2022 1607  Last data filed at 1/11/2022 1705  Gross per 24 hour   Intake --   Output 300 ml   Net -300 ml     No intake/output data recorded. Laboratory Tests:  Recent Labs     01/10/22  1008 01/11/22  0904 01/12/22  1227    138 136   K 4.6 4.7 4.7   CL 97* 100 101   CO2 21* 28 23   BUN 62* 50* 42*   CREATININE 1.7* 1.6* 1.3*   GLUCOSE 66* 84 86   CALCIUM 9.2 9.4 9.4     Lab Results   Component Value Date    MG 1.8 01/05/2022     Recent Labs     01/10/22  1008 01/11/22  0904 01/12/22  1227   ALKPHOS 88 80 75   ALT 34 29 28   AST 43* 38 39   PROT 7.5 7.2 7.1   BILITOT 0.4 0.4 0.4   BILIDIR 0.2 0.2 <0.2   LABALBU 3.9 3.7 3.9     Recent Labs     01/10/22  1417 01/11/22  0904 01/12/22  1227   WBC 4.7  5.0 2.4* 5.1   RBC 5.12  5.20 5.32 5.30   HGB 14.3  14.5 14.8 14.6   HCT 45.9  46.1 47.2 46.4   MCV 89.6  88.7 88.7 87.5   MCH 27.9  27.9 27.8 27.5   MCHC 31.2*  31.5* 31.4* 31.5*   RDW 13.6  13.4 13.6 13.4     298 333 352   MPV 11.5  11.7 11.6 11.5     Lab Results   Component Value Date    CKTOTAL 66 07/20/2013    CKMB <0.3 07/20/2013    TROPONINI <0.01 02/19/2021    TROPONINI <0.01 02/18/2021    TROPONINI <0.01 05/04/2020     No results for input(s): CKTOTAL, CKMB, CKMBINDEX, TROPHS in the last 72 hours.   Lab Results   Component Value Date    INR 1.1 01/05/2022    INR 1.1 01/04/2022    INR 1.0 10/07/2015    PROTIME 12.7 (H) 01/05/2022    PROTIME 13.2 (H) 01/04/2022    PROTIME 12.2 10/07/2015     Lab Results   Component Value Date    TSH 1.540 07/18/2019     Lab Results   Component Value Date    LABA1C 5.6 07/31/2018     No results found for: EAG  Lab Results   Component Value Date    CHOL 226 (H) 06/16/2021    CHOL 198 11/06/2019    CHOL 175 01/12/2018     Lab Results   Component Value Date    TRIG 149 06/16/2021    TRIG 174 (H) 11/06/2019    TRIG 129 01/12/2018     Lab Results   Component Value Date    HDL 43 06/16/2021    HDL 36 11/06/2019    HDL 45 01/12/2018     Lab Results   Component Value Date    LDLCALC 153 (H) 06/16/2021    LDLCALC 127 (H) 11/06/2019    LDLCALC 104 (H) 01/12/2018     Lab Results   Component Value Date    LABVLDL 30 06/16/2021    LABVLDL 35 11/06/2019    LABVLDL 26 01/12/2018     No results found for: CHOLHDLRATIO  Recent Labs     01/11/22  0904   PROBNP 222*       Cardiac Tests:  EKG reviewed: SR, rate 91, PRWP     Telemetry reviewed (date: 1/12/2022): SR, rate 70's     TTE (Dr. Namita Fuentes, 2011)  Summary    Left ventricular size is grossly normal.    Mild left venticular concentric hypertrophy noted.    Ejection fraction is visually estimated at 64%.    No evidence of left ventricular mass or thrombus noted.    No regional wall motion abnormalities seen.    Borderline dilated right ventricle.    No evidence of a thrombus in the right ventricle.    Structurally normal mitral valve.    Physiologic and/or trace mitral regurgitation is present.    No mitral valve stenosis present.    The tricuspid valve appears structurally normal.    Physiologic and/or trace tricuspid regurgitation.    RVSP is 15.73 mmHg.    Regular rhythm.     Lexiscan Stress Test (Per [facility] notes, 3/2018)  No evidence of ischemia  EF 40%   Mild global hypokinesis      Left heart catheterization ([facility], 3/2018)  LMT: - The LMT is normal.   LAD:   - The LAD has mild luminal irregularities. LCX: - There was estatic. - The distal circumflex is narrowed 60 % - focal disease. RAMUS: - The Ramus is Absent. RCA:   - The mid RCA is narrowed 80 % - ISR.    Additional Comment: Focal ISR at distal edge of stent with another 80% focal   stenosis distal to stent.    S/p PCI and TATI x 1 to the distal RCA     TTE (Dr. Namita Fuentes, 12/2018)   Summary   Left ventricular size is grossly normal.   Mild left ventricular concentric hypertrophy noted.   Ejection fraction is visually estimated at 50%.   No evidence of left ventricular mass or thrombus noted.   No regional wall motion abnormalities seen.   Borderline dilated right ventricle.   No evidence of a thrombus in the right ventricle.   Physiologic and/or trace mitral regurgitation is present.   No evidence of mitral valve stenosis.   Physiologic and/or trace tricuspid regurgitation.   Regular rhythm.     TTE (Dr. Rashad Saunders, 1/5/2022)  Summary   Left ventricle is mildly enlarged .   Normal left ventricular wall thickness.   Ejection fraction is visually estimated at 28%.   Global left ventricular dysfunction   Normal sized left atrium.   Interatrial septum appears intact.   Mildly dilated right ventricle.   Physiologic and/or trace mitral regurgitation is present.   No evidence of mitral valve stenosis.   Physiologic and/or trace tricuspid regurgitation.   Regular rhythm. ASSESSMENT / PLAN:  · Acute on chronic HFrEF.  proBNP 5,000 on admit --> 9,000 --> 3,600. Small-moderate right and small left pleural effusions on Chest CTA this admission. · Cardiomyopathy -- r/o ischemic cardiomyopathy given clinical history. EF 40% on stress testing per [facility] record 3/2018 --> TTE 3/2018 with EF 50% --> TTE this admission EF < 35%. · Acute hypoxic respiratory failure, currently on 3L NC.  · COVID-19+ with associated viral pneumonia (positive COVID-19 test on 1/4/22)  · Elevated LFTs -- improving  · Coronary artery disease. S/p PCI and TATI placement x 1 to the distal RCA at [facility] in 2018. Residual disease of 60% distal LCX on LHC at [facility] 2018. Clinically stable. · Hypertension, uncontrolled at times. · Hyperlipidemia. Statin therapy on hold in setting of elevated LFTs. · COPD. Follows with Dr. Deni Mace. · Former tobacco smoker. Quit 1 year ago. · Untreated JOSE. · History of TIA. · Chronic kidney disease. Stable. Cr 1.3. · Carotid artery disease.  S/p left carotid stent placement.   · Non-Hodgkin's lymphoma s/p chemotherapy.  First diagnosed in 2011.  Follows with the [facility]. · BPH. · Anxiety. · Chronic back pain, on chronic opioid therapy. - Coreg and aldactone started this admission. [facility] has been started as well. With worsening renal function, Entresto and Aldactone being appropriately held. Coreg dose has been decreased to 3.125 twice daily.  -Hold off on diuretics at this point.  -We will rechallenge her with guideline directed medical therapy as and when able. - - Monitor BMP and I/O's closely. If creatinine is better tomorrow, consider rechallenge with low-dose Entresto. - Continue antiplatelet therapy / statin currently on hold (LFT's improving)  - Discussed option of LifeVest.  Patient is not inclined at this time. He understands the risk of not opting for LifeVest.  - Monitor telemetry (SR, isolated PVC's)  - Aggressive risk factor modifications / treatment of JOSE as indicated  - Records from [facility] reviewed (pertninent findings outlined above)  - Reassess for cardiac catheterization once clinically improved. This can be done as an outpatient.  -Defer management of COVID-19 pneumonia to primary and pulmonary teams. Juan Carlos Nolan MD  [facility]) Cardiology     NOTE: This report was transcribed using voice recognition software. Every effort was made to ensure accuracy; however, inadvertent computerized transcription errors may be present.

## 2022-01-12 NOTE — PROGRESS NOTES
Patient was removing oxygen and argumentative about putting it back on. Oxygen sats dropped to 72%- put on 15L non-rebreather and oxygen increased to 95%. Patient educated on the importance of leaving oxygen in place and laying in a side-lying position and prone when possible. Decadron administered as ordered and PO medications will be administered as tolerated.  Vital signs otherwise stable: 98.7 temp  /73 HR 77

## 2022-01-12 NOTE — PROGRESS NOTES
Charge nurse called physician who put in order for Airvo due to poor oxygen saturation levels- respiratory notified.

## 2022-01-12 NOTE — CARE COORDINATION
1/12/2022 1500 CM note: POSITIVE COVID 1/4/22.  Pt now on airvo 60liters/100% FIO2. Pt to be transferred to [facility]. Pt's dtr Marco Blevins ([phone]) updated on pt status and plan to transfer to [facility]. Patalvino Ppoeye relays she had conversation with her father and he does not want intubation/ventilator/CPR. Luis Nye informed her dad voiced same during ACP discussion. Dr Colt Gupta informed and orders obtained.  Mary Grace SUAREZ

## 2022-01-13 LAB
ALBUMIN SERPL-MCNC: 3.7 G/DL (ref 3.5–5.2)
ALP BLD-CCNC: 76 U/L (ref 40–129)
ALT SERPL-CCNC: 27 U/L (ref 0–40)
ANION GAP SERPL CALCULATED.3IONS-SCNC: 11 MMOL/L (ref 7–16)
AST SERPL-CCNC: 35 U/L (ref 0–39)
BASOPHILS ABSOLUTE: 0 E9/L (ref 0–0.2)
BASOPHILS RELATIVE PERCENT: 0.3 % (ref 0–2)
BILIRUB SERPL-MCNC: 0.4 MG/DL (ref 0–1.2)
BILIRUBIN DIRECT: <0.2 MG/DL (ref 0–0.3)
BILIRUBIN, INDIRECT: NORMAL MG/DL (ref 0–1)
BUN BLDV-MCNC: 36 MG/DL (ref 6–23)
C-REACTIVE PROTEIN: 1.1 MG/DL (ref 0–0.4)
CALCIUM SERPL-MCNC: 9.5 MG/DL (ref 8.6–10.2)
CHLORIDE BLD-SCNC: 99 MMOL/L (ref 98–107)
CO2: 27 MMOL/L (ref 22–29)
CREAT SERPL-MCNC: 1.2 MG/DL (ref 0.7–1.2)
D DIMER: <200 NG/ML DDU
EOSINOPHILS ABSOLUTE: 0 E9/L (ref 0.05–0.5)
EOSINOPHILS RELATIVE PERCENT: 0 % (ref 0–6)
GFR AFRICAN AMERICAN: >60
GFR NON-AFRICAN AMERICAN: 60 ML/MIN/1.73
GLUCOSE BLD-MCNC: 113 MG/DL (ref 74–99)
HCT VFR BLD CALC: 45.3 % (ref 37–54)
HEMOGLOBIN: 14.1 G/DL (ref 12.5–16.5)
LYMPHOCYTES ABSOLUTE: 0.3 E9/L (ref 1.5–4)
LYMPHOCYTES RELATIVE PERCENT: 10.4 % (ref 20–42)
MCH RBC QN AUTO: 28 PG (ref 26–35)
MCHC RBC AUTO-ENTMCNC: 31.1 % (ref 32–34.5)
MCV RBC AUTO: 89.9 FL (ref 80–99.9)
MONOCYTES ABSOLUTE: 0.18 E9/L (ref 0.1–0.95)
MONOCYTES RELATIVE PERCENT: 6.1 % (ref 2–12)
NEUTROPHILS ABSOLUTE: 2.52 E9/L (ref 1.8–7.3)
NEUTROPHILS RELATIVE PERCENT: 83.5 % (ref 43–80)
PDW BLD-RTO: 13.6 FL (ref 11.5–15)
PLATELET # BLD: 326 E9/L (ref 130–450)
PMV BLD AUTO: 11.4 FL (ref 7–12)
POTASSIUM SERPL-SCNC: 4.9 MMOL/L (ref 3.5–5)
PRO-BNP: 565 PG/ML (ref 0–125)
PROCALCITONIN: 0.06 NG/ML (ref 0–0.08)
RBC # BLD: 5.04 E12/L (ref 3.8–5.8)
RBC # BLD: NORMAL 10*6/UL
SODIUM BLD-SCNC: 137 MMOL/L (ref 132–146)
TOTAL PROTEIN: 7 G/DL (ref 6.4–8.3)
WBC # BLD: 3 E9/L (ref 4.5–11.5)

## 2022-01-13 PROCEDURE — 85378 FIBRIN DEGRADE SEMIQUANT: CPT

## 2022-01-13 PROCEDURE — 6360000002 HC RX W HCPCS: Performed by: INTERNAL MEDICINE

## 2022-01-13 PROCEDURE — 94640 AIRWAY INHALATION TREATMENT: CPT

## 2022-01-13 PROCEDURE — 83880 ASSAY OF NATRIURETIC PEPTIDE: CPT

## 2022-01-13 PROCEDURE — 6360000002 HC RX W HCPCS: Performed by: NURSE PRACTITIONER

## 2022-01-13 PROCEDURE — 6370000000 HC RX 637 (ALT 250 FOR IP): Performed by: NURSE PRACTITIONER

## 2022-01-13 PROCEDURE — 99232 SBSQ HOSP IP/OBS MODERATE 35: CPT | Performed by: INTERNAL MEDICINE

## 2022-01-13 PROCEDURE — 80048 BASIC METABOLIC PNL TOTAL CA: CPT

## 2022-01-13 PROCEDURE — 6370000000 HC RX 637 (ALT 250 FOR IP): Performed by: INTERNAL MEDICINE

## 2022-01-13 PROCEDURE — 85025 COMPLETE CBC W/AUTO DIFF WBC: CPT

## 2022-01-13 PROCEDURE — 86140 C-REACTIVE PROTEIN: CPT

## 2022-01-13 PROCEDURE — 2060000000 HC ICU INTERMEDIATE R&B

## 2022-01-13 PROCEDURE — 36415 COLL VENOUS BLD VENIPUNCTURE: CPT

## 2022-01-13 PROCEDURE — 80076 HEPATIC FUNCTION PANEL: CPT

## 2022-01-13 PROCEDURE — 84145 PROCALCITONIN (PCT): CPT

## 2022-01-13 PROCEDURE — 2580000003 HC RX 258: Performed by: INTERNAL MEDICINE

## 2022-01-13 PROCEDURE — 2700000000 HC OXYGEN THERAPY PER DAY

## 2022-01-13 RX ORDER — VITAMIN E 268 MG
400 CAPSULE ORAL DAILY
Status: DISCONTINUED | OUTPATIENT
Start: 2022-01-13 | End: 2022-01-18 | Stop reason: HOSPADM

## 2022-01-13 RX ORDER — LANOLIN ALCOHOL/MO/W.PET/CERES
500 CREAM (GRAM) TOPICAL NIGHTLY
Status: DISCONTINUED | OUTPATIENT
Start: 2022-01-13 | End: 2022-01-18 | Stop reason: HOSPADM

## 2022-01-13 RX ORDER — PENTOXIFYLLINE 400 MG/1
400 TABLET, EXTENDED RELEASE ORAL
Status: DISCONTINUED | OUTPATIENT
Start: 2022-01-13 | End: 2022-01-18 | Stop reason: HOSPADM

## 2022-01-13 RX ADMIN — Medication 400 UNITS: at 20:35

## 2022-01-13 RX ADMIN — DEXAMETHASONE SODIUM PHOSPHATE 6 MG: 10 INJECTION INTRAMUSCULAR; INTRAVENOUS at 20:35

## 2022-01-13 RX ADMIN — ASPIRIN 81 MG CHEWABLE TABLET 81 MG: 81 TABLET CHEWABLE at 08:57

## 2022-01-13 RX ADMIN — WATER 1000 MG: 1 INJECTION INTRAMUSCULAR; INTRAVENOUS; SUBCUTANEOUS at 20:41

## 2022-01-13 RX ADMIN — Medication 2000 UNITS: at 08:57

## 2022-01-13 RX ADMIN — BUPROPION HYDROCHLORIDE 150 MG: 150 TABLET, EXTENDED RELEASE ORAL at 10:15

## 2022-01-13 RX ADMIN — CARVEDILOL 3.12 MG: 3.12 TABLET, FILM COATED ORAL at 17:29

## 2022-01-13 RX ADMIN — OXYCODONE HYDROCHLORIDE AND ACETAMINOPHEN 1000 MG: 500 TABLET ORAL at 08:55

## 2022-01-13 RX ADMIN — ATORVASTATIN CALCIUM 20 MG: 20 TABLET, FILM COATED ORAL at 20:35

## 2022-01-13 RX ADMIN — CLOPIDOGREL 75 MG: 75 TABLET, FILM COATED ORAL at 08:56

## 2022-01-13 RX ADMIN — ENOXAPARIN SODIUM 40 MG: 100 INJECTION SUBCUTANEOUS at 08:57

## 2022-01-13 RX ADMIN — CARVEDILOL 3.12 MG: 3.12 TABLET, FILM COATED ORAL at 08:57

## 2022-01-13 RX ADMIN — MIRTAZAPINE 15 MG: 15 TABLET, ORALLY DISINTEGRATING ORAL at 20:35

## 2022-01-13 RX ADMIN — ARFORMOTEROL TARTRATE 15 MCG: 15 SOLUTION RESPIRATORY (INHALATION) at 07:43

## 2022-01-13 RX ADMIN — ACETAMINOPHEN 650 MG: 325 TABLET ORAL at 14:37

## 2022-01-13 RX ADMIN — PANTOPRAZOLE SODIUM 40 MG: 40 TABLET, DELAYED RELEASE ORAL at 08:57

## 2022-01-13 RX ADMIN — MONTELUKAST SODIUM 10 MG: 10 TABLET, FILM COATED ORAL at 20:35

## 2022-01-13 RX ADMIN — TAMSULOSIN HYDROCHLORIDE 0.4 MG: 0.4 CAPSULE ORAL at 08:56

## 2022-01-13 RX ADMIN — DOXYCYCLINE HYCLATE 100 MG: 100 CAPSULE ORAL at 08:57

## 2022-01-13 RX ADMIN — BARICITINIB 2 MG: 2 TABLET, FILM COATED ORAL at 08:56

## 2022-01-13 RX ADMIN — ENOXAPARIN SODIUM 40 MG: 100 INJECTION SUBCUTANEOUS at 20:35

## 2022-01-13 RX ADMIN — ARFORMOTEROL TARTRATE 15 MCG: 15 SOLUTION RESPIRATORY (INHALATION) at 17:37

## 2022-01-13 RX ADMIN — ZINC SULFATE 220 MG (50 MG) CAPSULE 50 MG: CAPSULE at 08:56

## 2022-01-13 RX ADMIN — Medication 500 MG: at 20:35

## 2022-01-13 RX ADMIN — BUDESONIDE 500 MCG: 0.5 INHALANT RESPIRATORY (INHALATION) at 07:43

## 2022-01-13 RX ADMIN — PENTOXIFYLLINE 400 MG: 400 TABLET, FILM COATED, EXTENDED RELEASE ORAL at 20:35

## 2022-01-13 RX ADMIN — BUDESONIDE 500 MCG: 0.5 INHALANT RESPIRATORY (INHALATION) at 17:37

## 2022-01-13 RX ADMIN — GUAIFENESIN 1200 MG: 600 TABLET, EXTENDED RELEASE ORAL at 08:56

## 2022-01-13 RX ADMIN — GUAIFENESIN 1200 MG: 600 TABLET, EXTENDED RELEASE ORAL at 20:35

## 2022-01-13 RX ADMIN — DOXYCYCLINE HYCLATE 100 MG: 100 CAPSULE ORAL at 20:41

## 2022-01-13 ASSESSMENT — PAIN SCALES - GENERAL
PAINLEVEL_OUTOF10: 7
PAINLEVEL_OUTOF10: 7
PAINLEVEL_OUTOF10: 3
PAINLEVEL_OUTOF10: 0
PAINLEVEL_OUTOF10: 0

## 2022-01-13 ASSESSMENT — PAIN DESCRIPTION - LOCATION: LOCATION: HIP

## 2022-01-13 ASSESSMENT — PAIN DESCRIPTION - PAIN TYPE: TYPE: CHRONIC PAIN

## 2022-01-13 NOTE — PROGRESS NOTES
Pt continues to refuse to wear life vest despite education, the importance and rationale of use. Equipment at bedside.  Electronically signed by Angie Perez RN on 1/13/2022 at 6:27 AM

## 2022-01-13 NOTE — PROGRESS NOTES
Spoke to Genevieve GARCIAA She and her nephew are visiting today. They were allowed visitation everyday before transferred here to 6th floor. There are also Family members who we will be getting a zoom call together for them today. Patient is currently sitting at bedside on Airvo 50/50 and doing well.

## 2022-01-13 NOTE — PROGRESS NOTES
Internal Medicine Progress Note    TRACE=[facility]    Arnav Zelaya. Familia Agrawal., F.A.C.OShellyI. Shamar Garcia D.O., SOFÍAA.CShellyOLILIANA Wisdom D.O. Radha Chamberlain, MSN, APRN, NP-C  [place]. [address], MSN, APRN-CNP     Primary Care Physician: Najma Kathleen DO   Admitting Physician:  Joan Young DO  Admission date and time: 1/4/2022  2:34 PM    Room:  [address] [place] [address]  Admitting diagnosis: Respiratory distress [R06.03]  Hypoxia [R09.02]  COVID-19 [U07.1]    Patient Name: Adalberto Arroyo  MRN: [id number]    Date of Service: 1/13/2022     Subjective:    Nava Mae is a 70 y.o. male who was seen and examined today,1/13/2022, at the bedside. Nava Mae is on  [employer]or Up. States it is hard to sleep with it on. Positive for significant fatigue and malaise Improved dyspnea at rest, + dyspnea on exertion, + coughing, + poor appetite and poor intake-doesn't like food. Positive for generalized weakness. Depression over being here. No family present. ROS: Review of rest of 12 systems negative except as mentioned above-subjective section        Physical Exam:  I/O this shift:  In: 130 [P.O.:120; I.V.:10]  Out: 500 [Urine:500]    Intake/Output Summary (Last 24 hours) at 1/13/2022 1714  Last data filed at 1/13/2022 0940  Gross per 24 hour   Intake 560 ml   Output 1325 ml   Net -765 ml   I/O last 3 completed shifts: In: 430 [P.O.:430]  Out: 825 [Urine:825]  Patient Vitals for the past 96 hrs (Last 3 readings):   Weight   01/13/22 0615 204 lb (92.5 kg)     Vital Signs:   Blood pressure (!) 119/54, pulse 70, temperature 98 °F (36.7 °C), temperature source Infrared, resp. rate 20, height 5' 8\" (1.727 m), weight 204 lb (92.5 kg), SpO2 92 %. General appearance:  Alert, responsive, oriented to person, place, and time. In no distress  Head:  Normocephalic. No masses, lesions or tenderness. Eyes:  PERRLA. EOMI. Sclera clear. ENT:  Ears normal. Mucosa normal.  Neck:    Supple.  Trachea MCV 89.9 01/13/2022    MCH 28.0 01/13/2022    MCHC 31.1 01/13/2022    RDW 13.6 01/13/2022    SEGSPCT 66 07/21/2013    LYMPHOPCT 10.4 01/13/2022    MONOPCT 6.1 01/13/2022    BASOPCT 0.3 01/13/2022    MONOSABS 0.18 01/13/2022    LYMPHSABS 0.30 01/13/2022    EOSABS 0.00 01/13/2022    BASOSABS 0.00 01/13/2022     BMP:    Lab Results   Component Value Date     01/13/2022    K 4.9 01/13/2022    K 4.1 01/04/2022    CL 99 01/13/2022    CO2 27 01/13/2022    BUN 36 01/13/2022    LABALBU 3.7 01/13/2022    LABALBU 4.2 12/01/2011    CREATININE 1.2 01/13/2022    CALCIUM 9.5 01/13/2022    GFRAA >60 01/13/2022    LABGLOM 60 01/13/2022    GLUCOSE 113 01/13/2022    GLUCOSE 152 12/01/2011     Hepatic Function Panel:    Lab Results   Component Value Date    ALKPHOS 76 01/13/2022    ALT 27 01/13/2022    AST 35 01/13/2022    PROT 7.0 01/13/2022    BILITOT 0.4 01/13/2022    BILIDIR <0.2 01/13/2022    IBILI see below 01/13/2022    LABALBU 3.7 01/13/2022    LABALBU 4.2 12/01/2011     No results for input(s): TROPHS in the last 72 hours. COVID inflammatory markers  Recent Labs     01/13/22  0805   DDIMER <200     No results for input(s): FERRITIN in the last 72 hours.   Recent Labs     01/13/22 0805   CRP 1.1*       Assessment:    · Acute respiratory failure with hypoxia secondary to COVID-19 pneumonia in an unvaccinated host  · Acute exacerbation of COPD secondary to viral pneumonia  · Hypertensive urgency with underlying history of hypertension  · Asymptomatic coronary artery disease with drug-eluting stents in place  · Transaminitis  · Decompensated diastolic congestive heart failure with associated pleural effusion and echo evidence of worsening cardiomyopathy with LVEF now 28%  · Chronic kidney disease stage IIIa  · Hyperlipidemia  · Vascular disease with history of TIA and left carotid stenting   · Non-Hodgkin's lymphoma with prior chemotherapy followed by the heart center   · Untreated obstructive sleep apnea Plan:      · Patient feels somewhat better today. Has been increasing activity. Currently up in chair  · Oxygen remains on heated high flow nasal cannula and is slowly being weaned down  · Cardiology has made recommendation for discharge planning  · Continue current treatment and encourage ambulation      Greater than 40 minutes of critical care time was spent with the patient. This time included chart review, , and discussion with those consultants involved in the patient's care. More than 50% of my  time was spent at the bedside counseling/coordinating care with the patient and/or family with face to face contact. This time was spent reviewing notes and laboratory data as well as instructing and counseling the patient. Time I spent with the family or surrogate(s) is included only if the patient was incapable of providing the necessary information or participating in medical decisions. I also discussed the differential diagnosis and all of the proposed management plans with the patient and individuals accompanying the patient. Nava Alert requires this high level of physician care and nursing on the Telemetry unit due the complexity of decision management and chance of rapid decline or death. Continued cardiac monitoring and higher level of nursing are required. I am readily available for any further decision-making and intervention. The patient was seen, examined and then discussed with Dr. Rod Taylor. JULIAN Flannery - CNP  1/13/2022  5:14 PM        I agree with the findings and the plan of care as documented in Charlie MILLER's  note.     Valerie Roldan DO, D.O., SANDRAOI  5:30 PM  1/13/2022

## 2022-01-13 NOTE — PROGRESS NOTES
Patient is offered zoom call with family this pm, although daughter was here earlier this date and patient states that he does not want to do the zoom call with family he thinks it's \"weird\". Daughter did not return this afternoon.  Patient will call son

## 2022-01-13 NOTE — PROGRESS NOTES
Patient up to bedside chair and tolerating well. Patient remains on Airvo at [address] and 50%. Family at bedside. Call light is in reach. Patient continues to not wear life vest. Currently patient is NSR with U waves present. Patient denies pain at this time. EF 28% per report. BUN and CR are improved. K+ 4.9. Na WNL. No noted Mg level.

## 2022-01-13 NOTE — PROGRESS NOTES
INPATIENT CARDIOLOGY FOLLOW-UP    Name: Cloyd Mcburney    Age: 70 y.o. Date of Admission: 1/4/2022  2:34 PM    Date of Service: 1/13/2022    Chief Complaint: Follow-up for acute on chronic HFrEF, CAD    Interim History:  Currently with no chest pain, respiratory distress, or palpitations. SR on EKG and telemetry. Worsening oxygenation. Now on 50% FiO2. Renal function slightly better today. Review of Systems:   Cardiac: As per HPI  General: No fever, chills  Pulmonary: As per HPI  HEENT: No visual disturbances, difficult swallowing  GI: No nausea, vomiting  : No dysuria, hematuria  Endocrine: No thyroid disease or DM  Musculoskeletal: BRAVO x 4, no focal motor deficits  Skin: Intact, no rashes  Neuro: No headache, seizures  Psych: Currently with no depression, anxiety    Problem List:  Patient Active Problem List   Diagnosis    Lymphoma ([facility] [address]    Gastroenteritis    Back pain at L4-L5 level    Impotence    Chronic fatigue    Urinary frequency    Bronchitis    Gastroesophageal reflux disease with esophagitis    Depression    Coronary artery disease of native artery of native heart with stable angina pectoris ([facility])    Chronic obstructive pulmonary disease ([facility] [address]    Pure hypercholesterolemia    Moderate obesity    Major depressive disorder, recurrent, mild ([facility])    Vascular dementia with depressed mood ([facility])    Moderate asthma without complication    Complete tear of left rotator cuff    Rotator cuff arthropathy of left shoulder    Nontraumatic complete tear of rotator cuff, left    Bursitis of left shoulder    Impingement syndrome of left shoulder    Labral tear of shoulder, degenerative, left    S/P arthroscopy of shoulder    Obstructive sleep apnea    CKD stage G3a/A3, GFR 45-59 and albumin creatinine ratio >300 mg/g ([facility])    COVID-19       Allergies: Allergies   Allergen Reactions    Midazolam Hcl      Wake up wild. ...  Must be reversed with romazicon or will wake up wild and combative       Current Medications:  Current Facility-Administered Medications   Medication Dose Route Frequency Provider Last Rate Last Admin    albuterol (PROVENTIL) nebulizer solution 2.5 mg  2.5 mg Nebulization Q6H PRN Rachell Flood, DO   2.5 mg at 01/12/22 1645    mirtazapine (REMERON SOL-TAB) disintegrating tablet 15 mg  15 mg Oral Nightly Krystal Osorio APRN - CNP   15 mg at 01/12/22 2207    enoxaparin (LOVENOX) injection 40 mg  40 mg SubCUTAneous BID Krystal Osorio, APRN - CNP   40 mg at 01/13/22 0857    baricitinib (OLUMIANT) tablet 2 mg  2 mg Oral Daily Krystal Osorio APRN - CNP   2 mg at 01/13/22 0856    carvedilol (COREG) tablet 3.125 mg  3.125 mg Oral BID  Clark Gee, DO   3.125 mg at 01/13/22 0857    HYDROcodone-acetaminophen (NORCO) 5-325 MG per tablet 1 tablet  1 tablet Oral Q6H PRN Romauro Jackson, DO   1 tablet at 01/10/22 0959    buPROPion [facility]) extended release tablet 150 mg  150 mg Oral Daily Clark Gee, DO   150 mg at 01/13/22 1015    [Held by provider] sacubitril-valsartan (ENTRESTO) 24-26 MG per tablet 1 tablet  1 tablet Oral BID Nhiarika Jackson MD   1 tablet at 01/08/22 1049    prochlorperazine (COMPAZINE) injection 10 mg  10 mg IntraVENous Q6H PRN Rachell Flood, DO        aspirin chewable tablet 81 mg  81 mg Oral Daily Rachell Flood, DO   81 mg at 01/13/22 0857    clopidogrel (PLAVIX) tablet 75 mg  75 mg Oral Daily Rachell Flood, DO   75 mg at 01/13/22 0856    guaiFENesin (MUCINEX) extended release tablet 1,200 mg  1,200 mg Oral BID [place] Flood, DO   1,200 mg at 01/13/22 0856    HYDROcodone-acetaminophen (NORCO) 7.5-325 MG per tablet 1 tablet  1 tablet Oral Q8H PRN Rachell Flood, DO   1 tablet at 01/12/22 2112    montelukast (SINGULAIR) tablet 10 mg  10 mg Oral Nightly [place] Flood, DO   10 mg at 01/12/22 2115    pantoprazole (PROTONIX) tablet 40 mg  40 mg Oral QAM [place] Flood, DO   40 mg at 01/13/22 0857    atorvastatin (LIPITOR) tablet 20 mg  20 mg Oral Nightly Yen Najjar, DO   20 mg at 01/12/22 2114    tamsulosin (FLOMAX) capsule 0.4 mg  0.4 mg Oral Daily Yen Najjar, DO   0.4 mg at 01/13/22 0856    cefTRIAXone (ROCEPHIN) 1,000 mg in sterile water 10 mL IV syringe  1,000 mg IntraVENous Q24H Yen Najjar, DO 0 mL/hr at 01/05/22 0058 1,000 mg at 01/12/22 2112    Arformoterol Tartrate (BROVANA) nebulizer solution 15 mcg  15 mcg Nebulization BID Yen Najjar, DO   15 mcg at 01/13/22 0743    budesonide (PULMICORT) nebulizer suspension 500 mcg  500 mcg Nebulization BID Yen Najjar, DO   500 mcg at 01/13/22 5517    ascorbic acid (VITAMIN C) tablet 1,000 mg  1,000 mg Oral Daily Yen Najjar, DO   1,000 mg at 01/13/22 3403    vitamin D (CHOLECALCIFEROL) tablet 2,000 Units  2,000 Units Oral Daily Yen Najjar, DO   2,000 Units at 01/13/22 0857    zinc sulfate (ZINCATE) capsule 50 mg  50 mg Oral Daily Yen Najjar, DO   50 mg at 01/13/22 5146    doxycycline hyclate (VIBRAMYCIN) capsule 100 mg  100 mg Oral 2 times per day Yen Najjar, DO   100 mg at 01/13/22 0857    acetaminophen (TYLENOL) tablet 650 mg  650 mg Oral Q6H PRN Yen Najjar, DO   650 mg at 01/10/22 1809    Or    acetaminophen (TYLENOL) suppository 650 mg  650 mg Rectal Q6H PRN Yen Najjar, DO        dexamethasone (DECADRON) injection 6 mg  6 mg IntraVENous Q24H Yen Najjar, DO   6 mg at 01/12/22 2112    guaiFENesin-dextromethorphan (ROBITUSSIN DM) 100-10 MG/5ML syrup 5 mL  5 mL Oral Q4H PRN Yen Najjar, DO             Physical Exam:  BP (!) 119/54   Pulse 70   Temp 98 °F (36.7 °C) (Infrared)   Resp 20   Ht 5' 8\" (1.727 m)   Wt 204 lb (92.5 kg)   SpO2 92%   BMI 31.02 kg/m²   Wt Readings from Last 3 Encounters:   01/13/22 204 lb (92.5 kg)   12/15/21 223 lb 9.6 oz (101.4 kg)   12/06/21 226 lb 6.4 oz (102.7 kg)     Patient not examined (patient in isolation for COVID-19 infection) -- he was observed through the glass door of his room.   Communicated over the telephone while examining patient through glass door. Appears stable and comfortable no acute distress. Intake/Output:    Intake/Output Summary (Last 24 hours) at 1/13/2022 1432  Last data filed at 1/13/2022 0940  Gross per 24 hour   Intake 560 ml   Output 1325 ml   Net -765 ml     I/O this shift:  In: 130 [P.O.:120; I.V.:10]  Out: 500 [Urine:500]    Laboratory Tests:  Recent Labs     01/11/22  0904 01/12/22  1227 01/13/22  0805    136 137   K 4.7 4.7 4.9    101 99   CO2 28 23 27   BUN 50* 42* 36*   CREATININE 1.6* 1.3* 1.2   GLUCOSE 84 86 113*   CALCIUM 9.4 9.4 9.5     Lab Results   Component Value Date    MG 1.8 01/05/2022     Recent Labs     01/11/22  0904 01/12/22  1227 01/13/22  0805   ALKPHOS 80 75 76   ALT 29 28 27   AST 38 39 35   PROT 7.2 7.1 7.0   BILITOT 0.4 0.4 0.4   BILIDIR 0.2 <0.2 <0.2   LABALBU 3.7 3.9 3.7     Recent Labs     01/11/22  0904 01/12/22  1227 01/13/22  0805   WBC 2.4* 5.1 3.0*   RBC 5.32 5.30 5.04   HGB 14.8 14.6 14.1   HCT 47.2 46.4 45.3   MCV 88.7 87.5 89.9   MCH 27.8 27.5 28.0   MCHC 31.4* 31.5* 31.1*   RDW 13.6 13.4 13.6    352 326   MPV 11.6 11.5 11.4     Lab Results   Component Value Date    CKTOTAL 66 07/20/2013    CKMB <0.3 07/20/2013    TROPONINI <0.01 02/19/2021    TROPONINI <0.01 02/18/2021    TROPONINI <0.01 05/04/2020     No results for input(s): CKTOTAL, CKMB, CKMBINDEX, TROPHS in the last 72 hours.   Lab Results   Component Value Date    INR 1.1 01/05/2022    INR 1.1 01/04/2022    INR 1.0 10/07/2015    PROTIME 12.7 (H) 01/05/2022    PROTIME 13.2 (H) 01/04/2022    PROTIME 12.2 10/07/2015     Lab Results   Component Value Date    TSH 1.540 07/18/2019     Lab Results   Component Value Date    LABA1C 5.6 07/31/2018     No results found for: EAG  Lab Results   Component Value Date    CHOL 226 (H) 06/16/2021    CHOL 198 11/06/2019    CHOL 175 01/12/2018     Lab Results   Component Value Date    TRIG 149 06/16/2021    TRIG 174 (H) 11/06/2019    TRIG 129 01/12/2018     Lab Results   Component Value Date    HDL 43 06/16/2021    HDL 36 11/06/2019    HDL 45 01/12/2018     Lab Results   Component Value Date    LDLCALC 153 (H) 06/16/2021    LDLCALC 127 (H) 11/06/2019    LDLCALC 104 (H) 01/12/2018     Lab Results   Component Value Date    LABVLDL 30 06/16/2021    LABVLDL 35 11/06/2019    LABVLDL 26 01/12/2018     No results found for: CHOLHDLRATIO  Recent Labs     01/11/22  0904 01/13/22  0805   PROBNP 222* 565*       Cardiac Tests:  EKG reviewed: SR, rate 91, PRWP     Telemetry reviewed (date: 1/13/2022): SR, rate 70's     TTE (Dr. Mara Chung, 2011)  Summary    Left ventricular size is grossly normal.    Mild left venticular concentric hypertrophy noted.    Ejection fraction is visually estimated at 64%.    No evidence of left ventricular mass or thrombus noted.    No regional wall motion abnormalities seen.    Borderline dilated right ventricle.    No evidence of a thrombus in the right ventricle.    Structurally normal mitral valve.    Physiologic and/or trace mitral regurgitation is present.    No mitral valve stenosis present.    The tricuspid valve appears structurally normal.    Physiologic and/or trace tricuspid regurgitation.    RVSP is 15.73 mmHg.    Regular rhythm.     Lexiscan Stress Test (Per [facility] notes, 3/2018)  No evidence of ischemia  EF 40%   Mild global hypokinesis      Left heart catheterization ([facility], 3/2018)  LMT: - The LMT is normal.   LAD:   - The LAD has mild luminal irregularities. LCX: - There was estatic. - The distal circumflex is narrowed 60 % - focal disease. RAMUS: - The Ramus is Absent. RCA:   - The mid RCA is narrowed 80 % - ISR.    Additional Comment: Focal ISR at distal edge of stent with another 80% focal   stenosis distal to stent.    S/p PCI and TATI x 1 to the distal RCA     TTE (Dr. Mara Chung, 12/2018)   Summary   Left ventricular size is grossly normal.   Mild left ventricular concentric hypertrophy noted.   Ejection fraction is visually estimated at 50%.   No evidence of left ventricular mass or thrombus noted.   No regional wall motion abnormalities seen.   Borderline dilated right ventricle.   No evidence of a thrombus in the right ventricle.   Physiologic and/or trace mitral regurgitation is present.   No evidence of mitral valve stenosis.   Physiologic and/or trace tricuspid regurgitation.   Regular rhythm.     TTE (Dr. Dora Huitron, 1/5/2022)  Summary   Left ventricle is mildly enlarged .   Normal left ventricular wall thickness.   Ejection fraction is visually estimated at 28%.   Global left ventricular dysfunction   Normal sized left atrium.   Interatrial septum appears intact.   Mildly dilated right ventricle.   Physiologic and/or trace mitral regurgitation is present.   No evidence of mitral valve stenosis.   Physiologic and/or trace tricuspid regurgitation.   Regular rhythm. ASSESSMENT / PLAN:  · Acute on chronic HFrEF.  proBNP 5,000 on admit --> 9,000 --> 3,600. Small-moderate right and small left pleural effusions on Chest CTA this admission. · Cardiomyopathy -- r/o ischemic cardiomyopathy given clinical history. EF 40% on stress testing per [facility] record 3/2018 --> TTE 3/2018 with EF 50% --> TTE this admission EF < 35%. · Acute hypoxic respiratory failure, currently on 3L NC.  · COVID-19+ with associated viral pneumonia (positive COVID-19 test on 1/4/22)  · Elevated LFTs -- improving  · Coronary artery disease. S/p PCI and TATI placement x 1 to the distal RCA at [facility] in 2018. Residual disease of 60% distal LCX on LHC at [facility] 2018. Clinically stable. · Hypertension, uncontrolled at times. · Hyperlipidemia. Statin therapy on hold in setting of elevated LFTs. · COPD. Follows with Dr. Jewel Christensen. · Former tobacco smoker. Quit 1 year ago. · Untreated JOSE. · History of TIA. · Chronic kidney disease. Stable. Cr 1.3. · Carotid artery disease.  S/p left carotid stent placement. · Non-Hodgkin's lymphoma s/p chemotherapy.  First diagnosed in 2011.  Follows with the [facility]. · BPH.  · Anxiety. · Chronic back pain, on chronic opioid therapy. - Coreg and aldactone started this admission. Christella Spice has been started as well. With worsening renal function, Entresto and Aldactone being appropriately held. Coreg dose has been decreased to 3.125 twice daily.  -Hold off on diuretics at this point.  -We will rechallenge her with guideline directed medical therapy as and when able. - - Monitor BMP and I/O's closely. If creatinine is better tomorrow, consider rechallenge with low-dose Entresto. - Continue antiplatelet therapy / statin currently on hold (LFT's improving)  - Discussed option of LifeVest.  Patient is not inclined at this time. He understands the risk of not opting for LifeVest.  - Monitor telemetry (SR, isolated PVC's)  - Aggressive risk factor modifications / treatment of JOSE as indicated  - Records from [facility] reviewed (pertninent findings outlined above)  - Reassess for cardiac catheterization once clinically improved. This can be done as an outpatient.  -Defer management of COVID-19 pneumonia to primary and pulmonary teams. Corry Simmons MD  [facility]) Cardiology     NOTE: This report was transcribed using voice recognition software. Every effort was made to ensure accuracy; however, inadvertent computerized transcription errors may be present.

## 2022-01-14 LAB
ALBUMIN SERPL-MCNC: 4.1 G/DL (ref 3.5–5.2)
ALP BLD-CCNC: 78 U/L (ref 40–129)
ALT SERPL-CCNC: 33 U/L (ref 0–40)
ANION GAP SERPL CALCULATED.3IONS-SCNC: 12 MMOL/L (ref 7–16)
AST SERPL-CCNC: 41 U/L (ref 0–39)
BASOPHILS ABSOLUTE: 0 E9/L (ref 0–0.2)
BASOPHILS RELATIVE PERCENT: 0 % (ref 0–2)
BILIRUB SERPL-MCNC: 0.4 MG/DL (ref 0–1.2)
BILIRUBIN DIRECT: <0.2 MG/DL (ref 0–0.3)
BILIRUBIN, INDIRECT: ABNORMAL MG/DL (ref 0–1)
BUN BLDV-MCNC: 38 MG/DL (ref 6–23)
C-REACTIVE PROTEIN: 0.8 MG/DL (ref 0–0.4)
CALCIUM SERPL-MCNC: 9.7 MG/DL (ref 8.6–10.2)
CHLORIDE BLD-SCNC: 100 MMOL/L (ref 98–107)
CO2: 27 MMOL/L (ref 22–29)
CREAT SERPL-MCNC: 1.3 MG/DL (ref 0.7–1.2)
D DIMER: <200 NG/ML DDU
EOSINOPHILS ABSOLUTE: 0 E9/L (ref 0.05–0.5)
EOSINOPHILS RELATIVE PERCENT: 0 % (ref 0–6)
GFR AFRICAN AMERICAN: >60
GFR NON-AFRICAN AMERICAN: 54 ML/MIN/1.73
GLUCOSE BLD-MCNC: 107 MG/DL (ref 74–99)
HCT VFR BLD CALC: 46.1 % (ref 37–54)
HEMOGLOBIN: 14.4 G/DL (ref 12.5–16.5)
IMMATURE GRANULOCYTES #: 0.01 E9/L
IMMATURE GRANULOCYTES %: 0.3 % (ref 0–5)
LYMPHOCYTES ABSOLUTE: 0.63 E9/L (ref 1.5–4)
LYMPHOCYTES RELATIVE PERCENT: 19.5 % (ref 20–42)
MCH RBC QN AUTO: 27.8 PG (ref 26–35)
MCHC RBC AUTO-ENTMCNC: 31.2 % (ref 32–34.5)
MCV RBC AUTO: 89 FL (ref 80–99.9)
MONOCYTES ABSOLUTE: 0.45 E9/L (ref 0.1–0.95)
MONOCYTES RELATIVE PERCENT: 13.9 % (ref 2–12)
NEUTROPHILS ABSOLUTE: 2.14 E9/L (ref 1.8–7.3)
NEUTROPHILS RELATIVE PERCENT: 66.3 % (ref 43–80)
PDW BLD-RTO: 13.4 FL (ref 11.5–15)
PLATELET # BLD: 361 E9/L (ref 130–450)
PMV BLD AUTO: 11.4 FL (ref 7–12)
POTASSIUM SERPL-SCNC: 4.7 MMOL/L (ref 3.5–5)
PROCALCITONIN: 0.06 NG/ML (ref 0–0.08)
RBC # BLD: 5.18 E12/L (ref 3.8–5.8)
SODIUM BLD-SCNC: 139 MMOL/L (ref 132–146)
TOTAL PROTEIN: 7.2 G/DL (ref 6.4–8.3)
WBC # BLD: 3.2 E9/L (ref 4.5–11.5)

## 2022-01-14 PROCEDURE — 99233 SBSQ HOSP IP/OBS HIGH 50: CPT | Performed by: INTERNAL MEDICINE

## 2022-01-14 PROCEDURE — 2700000000 HC OXYGEN THERAPY PER DAY

## 2022-01-14 PROCEDURE — 6370000000 HC RX 637 (ALT 250 FOR IP): Performed by: NURSE PRACTITIONER

## 2022-01-14 PROCEDURE — 6370000000 HC RX 637 (ALT 250 FOR IP): Performed by: INTERNAL MEDICINE

## 2022-01-14 PROCEDURE — 2060000000 HC ICU INTERMEDIATE R&B

## 2022-01-14 PROCEDURE — 97530 THERAPEUTIC ACTIVITIES: CPT

## 2022-01-14 PROCEDURE — 86140 C-REACTIVE PROTEIN: CPT

## 2022-01-14 PROCEDURE — 6360000002 HC RX W HCPCS: Performed by: NURSE PRACTITIONER

## 2022-01-14 PROCEDURE — 84145 PROCALCITONIN (PCT): CPT

## 2022-01-14 PROCEDURE — 85378 FIBRIN DEGRADE SEMIQUANT: CPT

## 2022-01-14 PROCEDURE — 36415 COLL VENOUS BLD VENIPUNCTURE: CPT

## 2022-01-14 PROCEDURE — 6360000002 HC RX W HCPCS: Performed by: INTERNAL MEDICINE

## 2022-01-14 PROCEDURE — 85025 COMPLETE CBC W/AUTO DIFF WBC: CPT

## 2022-01-14 PROCEDURE — 97110 THERAPEUTIC EXERCISES: CPT

## 2022-01-14 PROCEDURE — 80076 HEPATIC FUNCTION PANEL: CPT

## 2022-01-14 PROCEDURE — 80048 BASIC METABOLIC PNL TOTAL CA: CPT

## 2022-01-14 PROCEDURE — 94640 AIRWAY INHALATION TREATMENT: CPT

## 2022-01-14 PROCEDURE — 2580000003 HC RX 258: Performed by: INTERNAL MEDICINE

## 2022-01-14 RX ORDER — SODIUM CHLORIDE 9 MG/ML
INJECTION, SOLUTION INTRAVENOUS PRN
Status: CANCELLED | OUTPATIENT
Start: 2022-01-14

## 2022-01-14 RX ADMIN — ACETAMINOPHEN 650 MG: 325 TABLET ORAL at 09:21

## 2022-01-14 RX ADMIN — ENOXAPARIN SODIUM 40 MG: 100 INJECTION SUBCUTANEOUS at 08:57

## 2022-01-14 RX ADMIN — Medication 2000 UNITS: at 08:57

## 2022-01-14 RX ADMIN — CARVEDILOL 3.12 MG: 3.12 TABLET, FILM COATED ORAL at 16:54

## 2022-01-14 RX ADMIN — DOXYCYCLINE HYCLATE 100 MG: 100 CAPSULE ORAL at 08:55

## 2022-01-14 RX ADMIN — BARICITINIB 2 MG: 2 TABLET, FILM COATED ORAL at 08:55

## 2022-01-14 RX ADMIN — WATER 1000 MG: 1 INJECTION INTRAMUSCULAR; INTRAVENOUS; SUBCUTANEOUS at 21:15

## 2022-01-14 RX ADMIN — BUDESONIDE 500 MCG: 0.5 INHALANT RESPIRATORY (INHALATION) at 06:53

## 2022-01-14 RX ADMIN — BUDESONIDE 500 MCG: 0.5 INHALANT RESPIRATORY (INHALATION) at 18:50

## 2022-01-14 RX ADMIN — GUAIFENESIN 1200 MG: 600 TABLET, EXTENDED RELEASE ORAL at 21:14

## 2022-01-14 RX ADMIN — ATORVASTATIN CALCIUM 20 MG: 20 TABLET, FILM COATED ORAL at 21:14

## 2022-01-14 RX ADMIN — PENTOXIFYLLINE 400 MG: 400 TABLET, FILM COATED, EXTENDED RELEASE ORAL at 16:54

## 2022-01-14 RX ADMIN — ACETAMINOPHEN 650 MG: 325 TABLET ORAL at 16:53

## 2022-01-14 RX ADMIN — ASPIRIN 81 MG CHEWABLE TABLET 81 MG: 81 TABLET CHEWABLE at 08:56

## 2022-01-14 RX ADMIN — ZINC SULFATE 220 MG (50 MG) CAPSULE 50 MG: CAPSULE at 08:56

## 2022-01-14 RX ADMIN — GUAIFENESIN 1200 MG: 600 TABLET, EXTENDED RELEASE ORAL at 08:56

## 2022-01-14 RX ADMIN — TAMSULOSIN HYDROCHLORIDE 0.4 MG: 0.4 CAPSULE ORAL at 08:56

## 2022-01-14 RX ADMIN — Medication 400 UNITS: at 08:55

## 2022-01-14 RX ADMIN — CLOPIDOGREL 75 MG: 75 TABLET, FILM COATED ORAL at 08:56

## 2022-01-14 RX ADMIN — MIRTAZAPINE 15 MG: 15 TABLET, ORALLY DISINTEGRATING ORAL at 21:16

## 2022-01-14 RX ADMIN — PANTOPRAZOLE SODIUM 40 MG: 40 TABLET, DELAYED RELEASE ORAL at 08:56

## 2022-01-14 RX ADMIN — OXYCODONE HYDROCHLORIDE AND ACETAMINOPHEN 1000 MG: 500 TABLET ORAL at 08:57

## 2022-01-14 RX ADMIN — ENOXAPARIN SODIUM 40 MG: 100 INJECTION SUBCUTANEOUS at 21:14

## 2022-01-14 RX ADMIN — ARFORMOTEROL TARTRATE 15 MCG: 15 SOLUTION RESPIRATORY (INHALATION) at 18:50

## 2022-01-14 RX ADMIN — BUPROPION HYDROCHLORIDE 150 MG: 150 TABLET, EXTENDED RELEASE ORAL at 08:55

## 2022-01-14 RX ADMIN — MONTELUKAST SODIUM 10 MG: 10 TABLET, FILM COATED ORAL at 21:14

## 2022-01-14 RX ADMIN — PENTOXIFYLLINE 400 MG: 400 TABLET, FILM COATED, EXTENDED RELEASE ORAL at 12:23

## 2022-01-14 RX ADMIN — ARFORMOTEROL TARTRATE 15 MCG: 15 SOLUTION RESPIRATORY (INHALATION) at 06:53

## 2022-01-14 RX ADMIN — CARVEDILOL 3.12 MG: 3.12 TABLET, FILM COATED ORAL at 08:56

## 2022-01-14 RX ADMIN — DOXYCYCLINE HYCLATE 100 MG: 100 CAPSULE ORAL at 21:14

## 2022-01-14 RX ADMIN — Medication 500 MG: at 21:14

## 2022-01-14 RX ADMIN — PENTOXIFYLLINE 400 MG: 400 TABLET, FILM COATED, EXTENDED RELEASE ORAL at 08:55

## 2022-01-14 ASSESSMENT — PAIN SCALES - GENERAL
PAINLEVEL_OUTOF10: 3
PAINLEVEL_OUTOF10: 4
PAINLEVEL_OUTOF10: 3
PAINLEVEL_OUTOF10: 3
PAINLEVEL_OUTOF10: 7
PAINLEVEL_OUTOF10: 3

## 2022-01-14 ASSESSMENT — PAIN DESCRIPTION - LOCATION
LOCATION: BACK
LOCATION: BACK

## 2022-01-14 ASSESSMENT — PAIN DESCRIPTION - PAIN TYPE
TYPE: CHRONIC PAIN
TYPE: CHRONIC PAIN

## 2022-01-14 NOTE — PROGRESS NOTES
and combative       Current Medications:  Current Facility-Administered Medications   Medication Dose Route Frequency Provider Last Rate Last Admin    pentoxifylline (TRENTAL) extended release tablet 400 mg  400 mg Oral TID  Clark Gee, DO   400 mg at 01/14/22 0855    niacin (SLO-NIACIN) extended release tablet 500 mg  500 mg Oral Nightly Clark Gee, DO   500 mg at 01/13/22 2035    vitamin E capsule 400 Units  400 Units Oral Daily Clark Gee, DO   400 Units at 01/14/22 0855    albuterol (PROVENTIL) nebulizer solution 2.5 mg  2.5 mg Nebulization Q6H PRN Lori Isle, DO   2.5 mg at 01/12/22 1645    mirtazapine (REMERON SOL-TAB) disintegrating tablet 15 mg  15 mg Oral Nightly Bassam Saad, APRN - CNP   15 mg at 01/13/22 2035    enoxaparin (LOVENOX) injection 40 mg  40 mg SubCUTAneous BID [facility], APRN - CNP   40 mg at 01/14/22 0857    baricitinib (OLUMIANT) tablet 2 mg  2 mg Oral Daily [facility], APRN - CNP   2 mg at 01/14/22 0855    carvedilol (COREG) tablet 3.125 mg  3.125 mg Oral BID  Clark Gee, DO   3.125 mg at 01/14/22 0856    HYDROcodone-acetaminophen (NORCO) 5-325 MG per tablet 1 tablet  1 tablet Oral Q6H PRN Rodriguez Quintanilla, DO   1 tablet at 01/10/22 0959    buPROPion [facility]) extended release tablet 150 mg  150 mg Oral Daily Clark Gee, DO   150 mg at 01/14/22 0855    [Held by provider] sacubitril-valsartan (ENTRESTO) 24-26 MG per tablet 1 tablet  1 tablet Oral BID Kong Wright MD   1 tablet at 01/08/22 1049    prochlorperazine (COMPAZINE) injection 10 mg  10 mg IntraVENous Q6H PRN Lori Isle, DO        aspirin chewable tablet 81 mg  81 mg Oral Daily Lori Isle, DO   81 mg at 01/14/22 0856    clopidogrel (PLAVIX) tablet 75 mg  75 mg Oral Daily Lori Isle, DO   75 mg at 01/14/22 0856    guaiFENesin (MUCINEX) extended release tablet 1,200 mg  1,200 mg Oral BID Lori JoppaDO patricia   1,200 mg at 01/14/22 0856    HYDROcodone-acetaminophen ([address]) 7.5-325 MG per tablet 1 tablet  1 tablet Oral Q8H PRN Haley Lamp, DO   1 tablet at 01/12/22 2112    montelukast (SINGULAIR) tablet 10 mg  10 mg Oral Nightly Haley Lamp, DO   10 mg at 01/13/22 2035    pantoprazole (PROTONIX) tablet 40 mg  40 mg Oral QAM Haley Lamp, DO   40 mg at 01/14/22 0856    atorvastatin (LIPITOR) tablet 20 mg  20 mg Oral Nightly Haley Lamp, DO   20 mg at 01/13/22 2035    tamsulosin (FLOMAX) capsule 0.4 mg  0.4 mg Oral Daily Haley Lamp, DO   0.4 mg at 01/14/22 0856    cefTRIAXone (ROCEPHIN) 1,000 mg in sterile water 10 mL IV syringe  1,000 mg IntraVENous Q24H Haley Lamp, DO 0 mL/hr at 01/05/22 0058 1,000 mg at 01/13/22 2041    Arformoterol Tartrate (BROVANA) nebulizer solution 15 mcg  15 mcg Nebulization BID Haley Lamp, DO   15 mcg at 01/14/22 0460    budesonide (PULMICORT) nebulizer suspension 500 mcg  500 mcg Nebulization BID Haley Lamp, DO   500 mcg at 01/14/22 4293    ascorbic acid (VITAMIN C) tablet 1,000 mg  1,000 mg Oral Daily Haley Lamp, DO   1,000 mg at 01/14/22 4171    vitamin D (CHOLECALCIFEROL) tablet 2,000 Units  2,000 Units Oral Daily Haley Lamp, DO   2,000 Units at 01/14/22 0857    zinc sulfate (ZINCATE) capsule 50 mg  50 mg Oral Daily Haley Lamp, DO   50 mg at 01/14/22 8858    doxycycline hyclate (VIBRAMYCIN) capsule 100 mg  100 mg Oral 2 times per day Haley Lamp, DO   100 mg at 01/14/22 0855    acetaminophen (TYLENOL) tablet 650 mg  650 mg Oral Q6H PRN Haley Lamp, DO   650 mg at 01/14/22 0367    Or    acetaminophen (TYLENOL) suppository 650 mg  650 mg Rectal Q6H PRN Haley Lamp, DO        guaiFENesin-dextromethorphan (ROBITUSSIN DM) 100-10 MG/5ML syrup 5 mL  5 mL Oral Q4H PRN Haley Lamp, DO             Physical Exam:  /72   Pulse 70   Temp 97.3 °F (36.3 °C) (Infrared)   Resp 18   Ht 5' 8\" (1.727 m)   Wt 203 lb 1.6 oz (92.1 kg)   SpO2 91%   BMI 30.88 kg/m²   Wt Readings from Last 3 Encounters:   01/14/22 203 lb 1.6 oz (92.1 kg)   12/15/21 223 lb 9.6 oz (101.4 kg)   12/06/21 226 lb 6.4 oz (102.7 kg)     Patient not examined (patient in isolation for COVID-19 infection) -- he was observed through the glass door of his room. Communicated over the telephone while examining patient through glass door. Appears stable and comfortable no acute distress. Intake/Output:    Intake/Output Summary (Last 24 hours) at 1/14/2022 1143  Last data filed at 1/14/2022 0941  Gross per 24 hour   Intake 180 ml   Output 950 ml   Net -770 ml     I/O this shift:  In: 180 [P.O.:180]  Out: 950 [Urine:950]    Laboratory Tests:  Recent Labs     01/12/22  1227 01/13/22  0805 01/14/22  0849    137 139   K 4.7 4.9 4.7    99 100   CO2 23 27 27   BUN 42* 36* 38*   CREATININE 1.3* 1.2 1.3*   GLUCOSE 86 113* 107*   CALCIUM 9.4 9.5 9.7     Lab Results   Component Value Date    MG 1.8 01/05/2022     Recent Labs     01/12/22  1227 01/13/22  0805 01/14/22  0849   ALKPHOS 75 76 78   ALT 28 27 33   AST 39 35 41*   PROT 7.1 7.0 7.2   BILITOT 0.4 0.4 0.4   BILIDIR <0.2 <0.2 <0.2   LABALBU 3.9 3.7 4.1     Recent Labs     01/12/22  1227 01/13/22  0805 01/14/22  0849   WBC 5.1 3.0* 3.2*   RBC 5.30 5.04 5.18   HGB 14.6 14.1 14.4   HCT 46.4 45.3 46.1   MCV 87.5 89.9 89.0   MCH 27.5 28.0 27.8   MCHC 31.5* 31.1* 31.2*   RDW 13.4 13.6 13.4    326 361   MPV 11.5 11.4 11.4     Lab Results   Component Value Date    CKTOTAL 66 07/20/2013    CKMB <0.3 07/20/2013    TROPONINI <0.01 02/19/2021    TROPONINI <0.01 02/18/2021    TROPONINI <0.01 05/04/2020     No results for input(s): CKTOTAL, CKMB, CKMBINDEX, TROPHS in the last 72 hours.   Lab Results   Component Value Date    INR 1.1 01/05/2022    INR 1.1 01/04/2022    INR 1.0 10/07/2015    PROTIME 12.7 (H) 01/05/2022    PROTIME 13.2 (H) 01/04/2022    PROTIME 12.2 10/07/2015     Lab Results   Component Value Date    TSH 1.540 07/18/2019     Lab Results   Component Value Date    LABA1C 5.6 07/31/2018     No results found for: EAG  Lab Results Component Value Date    CHOL 226 (H) 06/16/2021    CHOL 198 11/06/2019    CHOL 175 01/12/2018     Lab Results   Component Value Date    TRIG 149 06/16/2021    TRIG 174 (H) 11/06/2019    TRIG 129 01/12/2018     Lab Results   Component Value Date    HDL 43 06/16/2021    HDL 36 11/06/2019    HDL 45 01/12/2018     Lab Results   Component Value Date    LDLCALC 153 (H) 06/16/2021    LDLCALC 127 (H) 11/06/2019    LDLCALC 104 (H) 01/12/2018     Lab Results   Component Value Date    LABVLDL 30 06/16/2021    LABVLDL 35 11/06/2019    LABVLDL 26 01/12/2018     No results found for: CHOLHDLRATIO  Recent Labs     01/13/22  0805   PROBNP 565*       Cardiac Tests:  EKG reviewed: SR, rate 91, PRWP     Telemetry reviewed (date: 1/14/2022): SR, rate 70's     TTE (Dr. Rashad Saunders, 2011)  Summary    Left ventricular size is grossly normal.    Mild left venticular concentric hypertrophy noted.    Ejection fraction is visually estimated at 64%.    No evidence of left ventricular mass or thrombus noted.    No regional wall motion abnormalities seen.    Borderline dilated right ventricle.    No evidence of a thrombus in the right ventricle.    Structurally normal mitral valve.    Physiologic and/or trace mitral regurgitation is present.    No mitral valve stenosis present.    The tricuspid valve appears structurally normal.    Physiologic and/or trace tricuspid regurgitation.    RVSP is 15.73 mmHg.    Regular rhythm.     Lexiscan Stress Test (Per [facility] notes, 3/2018)  No evidence of ischemia  EF 40%   Mild global hypokinesis      Left heart catheterization ([facility], 3/2018)  LMT: - The LMT is normal.   LAD:   - The LAD has mild luminal irregularities. LCX: - There was estatic. - The distal circumflex is narrowed 60 % - focal disease. RAMUS: - The Ramus is Absent. RCA:   - The mid RCA is narrowed 80 % - ISR.    Additional Comment: Focal ISR at distal edge of stent with another 80% focal   stenosis distal to stent.    S/p PCI and TATI x 1 to the distal RCA     TTE (Dr. Lynette De Jesus, 12/2018)   Summary   Left ventricular size is grossly normal.   Mild left ventricular concentric hypertrophy noted.   Ejection fraction is visually estimated at 50%.   No evidence of left ventricular mass or thrombus noted.   No regional wall motion abnormalities seen.   Borderline dilated right ventricle.   No evidence of a thrombus in the right ventricle.   Physiologic and/or trace mitral regurgitation is present.   No evidence of mitral valve stenosis.   Physiologic and/or trace tricuspid regurgitation.   Regular rhythm.     TTE (Dr. Lynette De Jesus, 1/5/2022)  Summary   Left ventricle is mildly enlarged .   Normal left ventricular wall thickness.   Ejection fraction is visually estimated at 28%.   Global left ventricular dysfunction   Normal sized left atrium.   Interatrial septum appears intact.   Mildly dilated right ventricle.   Physiologic and/or trace mitral regurgitation is present.   No evidence of mitral valve stenosis.   Physiologic and/or trace tricuspid regurgitation.   Regular rhythm. ASSESSMENT / PLAN:  · Acute on chronic HFrEF.  proBNP 5,000 on admit --> 9,000 --> 3,600. Small-moderate right and small left pleural effusions on Chest CTA this admission. · Cardiomyopathy -- r/o ischemic cardiomyopathy given clinical history. EF 40% on stress testing per [facility] record 3/2018 --> TTE 3/2018 with EF 50% --> TTE this admission EF < 35%. · Acute hypoxic respiratory failure, currently on 3L NC.  · COVID-19+ with associated viral pneumonia (positive COVID-19 test on 1/4/22)  · Elevated LFTs -- improving  · Coronary artery disease. S/p PCI and TATI placement x 1 to the distal RCA at [facility] in 2018. Residual disease of 60% distal LCX on LHC at [facility] 2018. Clinically stable. · Hypertension, uncontrolled at times. · Hyperlipidemia. Statin therapy on hold in setting of elevated LFTs. · COPD. Follows with Dr. Waqar Arrington. · Former tobacco smoker. Quit 1 year ago. · Untreated JOSE. · History of TIA. · Chronic kidney disease. Stable. Cr 1.3. · Carotid artery disease.  S/p left carotid stent placement. · Non-Hodgkin's lymphoma s/p chemotherapy.  First diagnosed in 2011.  Follows with the [facility]. · BPH. · Anxiety. · Chronic back pain, on chronic opioid therapy. - Coreg and aldactone started this admission. [place] has been started as well. With worsening renal function, Entresto and Aldactone being appropriately held. Coreg dose has been decreased to 3.125 twice daily.  -Hold off on diuretics at this point. Continuing to auto diurese.  -Consider rechallenge with low-dose Entresto when oxygenation improves. - - Monitor BMP and I/O's closely. Creatinine is stable. - Continue antiplatelet therapy Lipitor 20 resumed. - Discussed option of LifeVest.  Patient is not inclined at this time. He understands the risk of not opting for LifeVest.  - Monitor telemetry (SR, isolated PVC's)  - Aggressive risk factor modifications / treatment of JOSE as indicated  - Records from [facility] reviewed (pertninent findings outlined above)  - Reassess for cardiac catheterization once clinically improved. This can be done as an outpatient.  -Defer management of COVID-19 pneumonia to primary and pulmonary teams. Mandie Mendoza MD  [facility]) Cardiology     NOTE: This report was transcribed using voice recognition software. Every effort was made to ensure accuracy; however, inadvertent computerized transcription errors may be present.

## 2022-01-14 NOTE — PROGRESS NOTES
Internal Medicine Progress Note    TRACE=[facility]    Kayla Salinas. Mariluz Celaya., GRISELOShellyI. Salome Fierro D.O., HAN Cline, MSN, APRN, NP-C  Rupa Durant. Chan Kelley, MSN, APRN-CNP     Primary Care Physician: Power Sanders DO   Admitting Physician:  Reina Jasso DO  Admission date and time: 1/4/2022  2:34 PM    Room:  [address] [place] [address]  Admitting diagnosis: Respiratory distress [R06.03]  Hypoxia [R09.02]  COVID-19 [U07.1]    Patient Name: Clemente Hernandez  MRN: [id number]    Date of Service: 1/14/2022     Subjective:    Sandra Vogel is a 70 y.o. male who was seen and examined today,1/14/2022, at the bedside. Sandra Vogel is on  [employer]. Still has complaint of difficulty sleeping with oxygen on. States that cardiology did have conversation with him in regard to the need for LifeVest.  States at this time that he respectfully declines the LifeVest.  Does understand the risk for sudden cardiac death. States he is not opposed to taking the new medications as prescribed to see if they help improve the strength of his heart. Positive for fatigue and malaise Improved resting dyspnea, + dyspnea on exertion, + coughing, + poor appetite and poor intake-doesn't like food. Positive for generalized weakness. Depression over being here. No family present. ROS: Review of rest of 12 systems negative except as mentioned above-subjective section        Physical Exam:  I/O this shift:  In: 680 [P.O.:660; I.V.:20]  Out: 1300 [Urine:1300]    Intake/Output Summary (Last 24 hours) at 1/14/2022 1616  Last data filed at 1/14/2022 1547  Gross per 24 hour   Intake 680 ml   Output 1300 ml   Net -620 ml   I/O last 3 completed shifts: In: 560 [P.O.:550;  I.V.:10]  Out: 1325 [Urine:1325]  Patient Vitals for the past 96 hrs (Last 3 readings):   Weight   01/14/22 0110 203 lb 1.6 oz (92.1 kg)   01/13/22 0615 204 lb (92.5 kg)     Vital Signs:   Blood pressure (!) 147/75, pulse 78, temperature 97.5 °F (36.4 °C), temperature source Infrared, resp. rate 22, height 5' 8\" (1.727 m), weight 203 lb 1.6 oz (92.1 kg), SpO2 92 %. General appearance:  Alert, responsive, oriented to person, place, and time. In no distress  Head:  Normocephalic. No masses, lesions or tenderness. Eyes:  PERRLA. EOMI. Sclera clear. ENT:  Ears normal. Mucosa normal.  Neck:    Supple. Trachea midline. No thyromegaly. No JVD. No bruits. Heart:    Rhythm regular. Rate controlled. S1 and S2. Systolic murmur grade 2/6. Lungs:    Symmetrical.  Diminished aeration throughout. Shallow pattern of respiration without leg  Abdomen:   Soft. Obese. Non-tender. Non-distended. Bowel sounds positive. No organomegaly or masses. No pain on palpation. Extremities:    Peripheral pulses present. Improved peripheral edema. No ulcers. No cyanosis. No clubbing. Neurologic:    Alert x 3. Generally weak without focal component focal deficit. Cranial nerves grossly intact. No focal weakness. Psych:   Behavior is normal. Mood appears normal. Speech is not rapid and/or pressured. Musculoskeletal:   Spine ROM normal. Muscular strength intact. Gait not assessed. Integumentary:  No rashes  Skin normal color and texture.   Genitalia/Breast:  Deferred    Medication:  Scheduled Meds:   pentoxifylline  400 mg Oral TID WC    niacin  500 mg Oral Nightly    vitamin E  400 Units Oral Daily    mirtazapine  15 mg Oral Nightly    enoxaparin  40 mg SubCUTAneous BID    baricitinib  2 mg Oral Daily    carvedilol  3.125 mg Oral BID WC    buPROPion  150 mg Oral Daily    [Held by provider] sacubitril-valsartan  1 tablet Oral BID    aspirin  81 mg Oral Daily    clopidogrel  75 mg Oral Daily    guaiFENesin  1,200 mg Oral BID    montelukast  10 mg Oral Nightly    pantoprazole  40 mg Oral QAM    atorvastatin  20 mg Oral Nightly    tamsulosin  0.4 mg Oral Daily    cefTRIAXone (ROCEPHIN) IV  1,000 mg IntraVENous Q24H    Arformoterol Tartrate  15 mcg Nebulization BID    budesonide  500 mcg Nebulization BID    ascorbic acid  1,000 mg Oral Daily    Vitamin D  2,000 Units Oral Daily    zinc sulfate  50 mg Oral Daily    doxycycline hyclate  100 mg Oral 2 times per day     Continuous Infusions:      Objective Data:  CBC with Differential:    Lab Results   Component Value Date    WBC 3.2 01/14/2022    RBC 5.18 01/14/2022    HGB 14.4 01/14/2022    HCT 46.1 01/14/2022     01/14/2022    MCV 89.0 01/14/2022    MCH 27.8 01/14/2022    MCHC 31.2 01/14/2022    RDW 13.4 01/14/2022    SEGSPCT 66 07/21/2013    LYMPHOPCT 19.5 01/14/2022    MONOPCT 13.9 01/14/2022    BASOPCT 0.0 01/14/2022    MONOSABS 0.45 01/14/2022    LYMPHSABS 0.63 01/14/2022    EOSABS 0.00 01/14/2022    BASOSABS 0.00 01/14/2022     BMP:    Lab Results   Component Value Date     01/14/2022    K 4.7 01/14/2022    K 4.1 01/04/2022     01/14/2022    CO2 27 01/14/2022    BUN 38 01/14/2022    LABALBU 4.1 01/14/2022    LABALBU 4.2 12/01/2011    CREATININE 1.3 01/14/2022    CALCIUM 9.7 01/14/2022    GFRAA >60 01/14/2022    LABGLOM 54 01/14/2022    GLUCOSE 107 01/14/2022    GLUCOSE 152 12/01/2011     Hepatic Function Panel:    Lab Results   Component Value Date    ALKPHOS 78 01/14/2022    ALT 33 01/14/2022    AST 41 01/14/2022    PROT 7.2 01/14/2022    BILITOT 0.4 01/14/2022    BILIDIR <0.2 01/14/2022    IBILI see below 01/14/2022    LABALBU 4.1 01/14/2022    LABALBU 4.2 12/01/2011     No results for input(s): TROPHS in the last 72 hours. COVID inflammatory markers  Recent Labs     01/14/22  0849   DDIMER <200     No results for input(s): FERRITIN in the last 72 hours.   Recent Labs     01/14/22  0849   CRP 0.8*       Assessment:    · Acute respiratory failure with hypoxia secondary to COVID-19 pneumonia in an unvaccinated host  · Acute exacerbation of COPD secondary to viral pneumonia  · Hypertensive urgency with underlying history of hypertension  · Asymptomatic coronary artery disease with drug-eluting stents in place  · Transaminitis  · Decompensated diastolic congestive heart failure with associated pleural effusion and echo evidence of worsening cardiomyopathy with LVEF now 28%  · Chronic kidney disease stage IIIa  · Hyperlipidemia  · Vascular disease with history of TIA and left carotid stenting   · Non-Hodgkin's lymphoma with prior chemotherapy followed by the heart center   · Untreated obstructive sleep apnea       Plan:     · The patient was seen by cardiology. Discussed LifeVest and the patient is not receptive to this  · Patient agreeable to take medication as prescribed upon discharge not willing to use a LifeVest  · Feels slightly better today  · Patient getting up and out of bed  · Patient family did visit him today we did lift his spirits  · FiO2 is being decreased. Currently at 45% with O2 sat in the 90s  · CRP and WBC improving      Greater than 40 minutes of critical care time was spent with the patient. This time included chart review, , and discussion with those consultants involved in the patient's care. More than 50% of my  time was spent at the bedside counseling/coordinating care with the patient and/or family with face to face contact. This time was spent reviewing notes and laboratory data as well as instructing and counseling the patient. Time I spent with the family or surrogate(s) is included only if the patient was incapable of providing the necessary information or participating in medical decisions. I also discussed the differential diagnosis and all of the proposed management plans with the patient and individuals accompanying the patient. [facility] requires this high level of physician care and nursing on the Telemetry unit due the complexity of decision management and chance of rapid decline or death. Continued cardiac monitoring and higher level of nursing are required.  I am readily available for any further decision-making and intervention. The patient was seen, examined and then discussed with Dr. Petrona Aguilar. JULIAN Hoang CNP  1/14/2022  4:16 PM        I agree with the findings and the plan of care as documented in Jey Mcgarry NP-C's  note.     Adin Jackson DO, D.O., Naz Gibson  4:27 PM  1/14/2022

## 2022-01-14 NOTE — CARE COORDINATION
SS NOTE: COVID POSITIVE 1/04. Pt is on AirVo FIO2=50% 50 liters SPO2= 91%. Pt does not have home O2 and a referral to [employer] has been started. Pt is on IV Rocephin and on Lovenox. Pt has a Lifevest in his room however is refusing to use it (Estefany Medina will need notified to  life vest if pt does not want life vest at [facility]).  PT/OT recommended [facility] - on 1/12 pt took 11 side steps 10 feet and then went 15 ft twice with no devise. Pt's dtr Nathalie([phone]) advised 4th floor  that pt is illiterate and dch instructions will need reviewed with her. Jhoana also shared that pt will not be agree to [facility] or [facility]. She is hopeful pt will stay with family at [facility] or family member will dandy with him. SW/CM will continue to follow for transition of care planning.   Electronically signed by NA Cheung on 1/14/2022 at 11:03 AM

## 2022-01-14 NOTE — PROGRESS NOTES
Patient maintaining SPO2 on Airvo 50L and 50% FIO2. Will attempt to decrease FIO2 to 35% and 45L. Patient is agreeable to POC and has been up; and out of bed several times this date. Patient has had daughter and wife visiting earlier this date as well.

## 2022-01-14 NOTE — PROGRESS NOTES
Physical Therapy Treatment Note/Plan of Care    Room #:  0629/0629-01  Patient Name: Javi Bailey  YOB: 1950  MRN: [id number]    Date of Service: 1/14/2022     Tentative placement recommendation: Home Health Physical Therapy   Equipment recommendation: None      Evaluating Physical Therapist: Chace Escoto PT #[id number]      Specific Provider Orders/Date/Referring Provider :  01/04/22 1945   PT eval and treat Start: 01/04/22 1945, End: 01/04/22 1945, ONE TIME, Standing Count: 1 Occurrences, R    Kelly Long DO      Admitting Diagnosis:   Respiratory distress [R06.03]  Hypoxia [R09.02]  COVID-19 [U07.1]       Surgery: none  Visit Diagnoses       Codes    Respiratory distress    -  Primary R06.03    Hypoxia     R09.02          Patient Active Problem List   Diagnosis    Lymphoma ([address]    Gastroenteritis    Back pain at L4-L5 level    Impotence    Chronic fatigue    Urinary frequency    Bronchitis    Gastroesophageal reflux disease with esophagitis    Depression    Coronary artery disease of native artery of native heart with stable angina pectoris (HCC)    Chronic obstructive pulmonary disease ([address]    Pure hypercholesterolemia    Moderate obesity    Major depressive disorder, recurrent, mild ([address]    Vascular dementia with depressed mood ([facility]    Moderate asthma without complication    Complete tear of left rotator cuff    Rotator cuff arthropathy of left shoulder    Nontraumatic complete tear of rotator cuff, left    Bursitis of left shoulder    Impingement syndrome of left shoulder    Labral tear of shoulder, degenerative, left    S/P arthroscopy of shoulder    Obstructive sleep apnea    CKD stage G3a/A3, GFR 45-59 and albumin creatinine ratio >300 mg/g ([facility])    COVID-19        ASSESSMENT of Current Deficits Patient exhibits decreased strength, balance and endurance impairing functional mobility, transfers, gait , gait distance and tolerance to activity.  Patient with O2 desaturation to 86% after ambulation and needed 1 minute to recover to 90%. Patient's distance limited by his c/o pain in bilateral hips. Patient can be impulsive and display impaired safety awareness due to trying to perform tasks to fast with cueing for decreased speed and safety. Patient requires skilled physical therapy to address concerns listed above to increase safety and independence at discharge. PHYSICAL THERAPY  PLAN OF CARE       Physical therapy plan of care is established based on physician order,  patient diagnosis and clinical assessment    Current Treatment Recommendations:    -Standing Balance: Perform strengthening exercises in standing to promote motor control with or without upper extremity support   -Transfers: Provide instruction on proper hand and foot position for adequate transfer of weight onto lower extremities and use of gait device if needed and Cues for hand placement, technique and safety. Provide stabilization to prevent fall   -Gait: Gait training, Standing activities to improve: base of support, weight shift, weight bearing  and Pregait training to emphasize: Safety and possible use of assistive device for balance/safety   -Endurance: Utilize Supervised activities to increase level of endurance to allow for safe functional mobility including transfers and gait  and Use graduated activities to promote good breathing techniques and provide support and education to maximize respiratory function  -Stairs: Stair training with instruction on proper technique and hand placement on rail  -Instruction in independent management of O2 line    PT long term treatment goals are located in below grid    Patient and or family understand(s) diagnosis, prognosis, and plan of care. Frequency of treatments: Patient will be seen  3-5 times/week.          Prior Level of Function: Patient ambulated independently    Rehab Potential: good    for baseline    Past medical history:   Past Medical History:   Diagnosis Date    Anesthesia complication     wakes up  violant   only ok if reversed with romazacon    Arthritis     shoulders,ankle    CAD (coronary artery disease)     [facility]     Erectile dysfunction     GERD (gastroesophageal reflux disease)     H/O coronary angioplasty with stents[V45.82] 9/2018 another stent    Hyperlipidemia     Hypertension     Lymphoma ([facility] 11/4/2011    had chemo  currently in remission    Myocardial infarction [facility])     more than 10 yrs ago    Sleep apnea     wont wear machine    Unspecified cerebral artery occlusion with cerebral infarction 2005    no deficits     Past Surgical History:   Procedure Laterality Date    ANKLE FRACTURE SURGERY Right 03/2014    ORIF right ankle    ANKLE SURGERY  2007    rt ankle    BRONCHOSCOPY  09/2011    bronch / medistinoscopy    CAROTID ENDARTERECTOMY Left     12 yrs ago    CHOLECYSTECTOMY      COLONOSCOPY  07/30/2013     0 [facility] WITH STENT PLACEMENT  2008    states has 2 stents  follows with DR Laird Paci    ENDOSCOPY, COLON, DIAGNOSTIC      HAND SURGERY Left     fell on a buzzsaw    HERNIA REPAIR Bilateral 08/15/2019    HERNIA  BILATERAL INGUINAL REPAIR WITH MESH LAPAROSCOPIC ROBOTIC XI ASSISTED performed by Montrell Lau MD at [place] [address]  06/29/2015    lapraoscopic cholecystectomy    SHOULDER ARTHROSCOPY Right 10/08/2015    rotator cuff repair, subachromoplasty with labrial debridement    SHOULDER ARTHROSCOPY Left 2/18/2021    LEFT ROTATOR CUFF REPAIR LABRAL DEBRIDEMENT SUBACROMIAL DECOMPRESSION performed by René Dominique DO at [place] [id number]      UPPER GASTROINTESTINAL ENDOSCOPY         SUBJECTIVE:    Precautions:  Activity as tolerated and Check Pulse Oximetry while ambulating , falls, O2 and Droplet plus/COVID-19 ,  Up in chair as much as tolerated, at least breakfast through dinner time. Ambulate in room as much as tolerated. Prone/reposition every 2 hours while in bed. Incentive spirometer 10-15 times per hour while awake. Social history: Patient lives alone in a bilevel home 9 steps downward to kitchen, 3 steps upstairs to living room and bedroom, 3 stairs upstairs to bathroom. with 2 steps  to enter without Rail  Tub shower      Equipment owned: [employer] [address] [employer],  unused    [address]   How much difficulty turning over in bed?: None  How much difficulty sitting down on / standing up from a chair with arms?: None  How much difficulty moving from lying on back to sitting on side of bed?: None  How much help from another person moving to and from a bed to a chair?: A Little  How much help from another person needed to walk in hospital room?: A Little  How much help from another person for climbing 3-5 steps with a railing?: A Little  AM-[facility] Inpatient Mobility Raw Score : 21  AM-PAC Inpatient T-Scale Score : 50.25  Mobility Inpatient CMS 0-100% Score: 28.97  Mobility Inpatient CMS G-Code Modifier : [address] cleared patient for PT treatment. .   OBJECTIVE;   Initial Evaluation  Date: 1/6/2022 Treatment Date:    1/14/2022   Short Term/ Long Term   Goals   Was pt agreeable to Eval/treatment? Yes yes To be met in 4 days   Pain level   0/10    8/10  Bilateral hips \"states they never stop hurting\"     Bed Mobility    Rolling: Not assessed patient in chair     Rolling: Independent   Supine to sit: Independent   Sit to supine: Not assessed patient seated edge of bed   Scooting: Independent    Rolling: Independent    Supine to sit: Independent    Sit to supine: Independent    Scooting: Independent     Transfers Sit to stand: Supervision  Cues for hand placement and safety  Sit to stand: Supervision  Cues for hand placement.       Sit to stand: Independent     Ambulation    1 x 80 feet, 1 x 120 feet, 1 x 20 feet using  no device with Supervision    minimal assist for loss of balance x 3 as patient fatigued, for balance and safety and cues for safety and pacing 4 x 10 feet using  no device with Supervision    limited due to hip pain and short airvo line cues for safety, pacing and pursed lip breathing    150 feet using  least restrictive device versus no device with Independent    Stair negotiation: ascended and descended   Not assessed  Not assessed     10 steps 1 rail with supervision    ROM Within functional limits    Increase range of motion 10% of affected joints    Strength BUE:  refer to OT eval  RLE:  4-/5  LLE:  4-/5  Increase strength in affected mm groups by 1/3 grade   Balance Sitting EOB:  not assessed    Dynamic Standing:  fair   Sitting EOB: good   Dynamic Standing: fair +   Sitting EOB:  good    Dynamic Standing: good       Patient is Alert & Oriented x person, place, time and situation and follows directions    Sensation:  Patient  denies numbness/tingling   Edema:  no   Endurance: fair       Vitals: airvo 50L @ 48%   Blood Pressure at rest  Blood Pressure during session    Heart Rate at rest  Heart Rate during session    SPO2 at rest 92% SPO2 during session 86-94%     Patient education  Patient educated on role of Physical Therapy, risks of immobility, safety and plan of care,  importance of mobility while in hospital , purse lip breathing, ankle pumps, quad set and glut set for edema control, blood clot prevention and O2 line management and safety      Patient response to education:   Pt verbalized understanding Pt demonstrated skill Pt requires further education in this area   Yes Partial Yes      Treatment:  Patient practiced and was instructed/facilitated in the following treatment: Patient  Sat edge of bed 15 minutes with Supervision  to increase dynamic sitting balance and activity tolerance. Pt instructed/performed on using the spirometer, cueing for proper technique, pursed lip breathing, and pacing.  Pt stood, ambulated in the room, and back to the edge of the bed. Pt performed seated exercises. Therapeutic Exercises:  ankle pumps, heel raises, hip abduction/adduction, long arc quad and seated marching  x 20 reps. 2 - 3 sets. AROM     At end of session, patient sitting edge of bed with   call light and phone within reach,  all lines and tubes intact, nursing notified. Patient would benefit from continued skilled Physical Therapy to improve functional independence and quality of life. Patient's/ family goals   home     Time in 11:12  Time out 11:37    Total Treatment Time  25 minutes    CPT codes:  Therapeutic activities (27431)   11 minutes  1 unit(s)  Therapeutic exercises (15806)   14 minutes  1 unit(s)   Ashish Austin  [facility] [place]  LIC # [id number]

## 2022-01-15 LAB
ALBUMIN SERPL-MCNC: 3.7 G/DL (ref 3.5–5.2)
ALP BLD-CCNC: 72 U/L (ref 40–129)
ALT SERPL-CCNC: 38 U/L (ref 0–40)
ANION GAP SERPL CALCULATED.3IONS-SCNC: 11 MMOL/L (ref 7–16)
AST SERPL-CCNC: 41 U/L (ref 0–39)
BASOPHILS ABSOLUTE: 0.02 E9/L (ref 0–0.2)
BASOPHILS RELATIVE PERCENT: 0.4 % (ref 0–2)
BILIRUB SERPL-MCNC: 0.4 MG/DL (ref 0–1.2)
BILIRUBIN DIRECT: <0.2 MG/DL (ref 0–0.3)
BILIRUBIN, INDIRECT: ABNORMAL MG/DL (ref 0–1)
BUN BLDV-MCNC: 39 MG/DL (ref 6–23)
C-REACTIVE PROTEIN: 1 MG/DL (ref 0–0.4)
CALCIUM SERPL-MCNC: 9.3 MG/DL (ref 8.6–10.2)
CHLORIDE BLD-SCNC: 99 MMOL/L (ref 98–107)
CO2: 27 MMOL/L (ref 22–29)
CREAT SERPL-MCNC: 1.4 MG/DL (ref 0.7–1.2)
D DIMER: <200 NG/ML DDU
EOSINOPHILS ABSOLUTE: 0.01 E9/L (ref 0.05–0.5)
EOSINOPHILS RELATIVE PERCENT: 0.2 % (ref 0–6)
GFR AFRICAN AMERICAN: >60
GFR NON-AFRICAN AMERICAN: 50 ML/MIN/1.73
GLUCOSE BLD-MCNC: 83 MG/DL (ref 74–99)
HCT VFR BLD CALC: 45.7 % (ref 37–54)
HEMOGLOBIN: 14 G/DL (ref 12.5–16.5)
IMMATURE GRANULOCYTES #: 0.02 E9/L
IMMATURE GRANULOCYTES %: 0.4 % (ref 0–5)
LYMPHOCYTES ABSOLUTE: 1 E9/L (ref 1.5–4)
LYMPHOCYTES RELATIVE PERCENT: 17.8 % (ref 20–42)
MCH RBC QN AUTO: 27.4 PG (ref 26–35)
MCHC RBC AUTO-ENTMCNC: 30.6 % (ref 32–34.5)
MCV RBC AUTO: 89.4 FL (ref 80–99.9)
MONOCYTES ABSOLUTE: 0.67 E9/L (ref 0.1–0.95)
MONOCYTES RELATIVE PERCENT: 11.9 % (ref 2–12)
NEUTROPHILS ABSOLUTE: 3.91 E9/L (ref 1.8–7.3)
NEUTROPHILS RELATIVE PERCENT: 69.3 % (ref 43–80)
PDW BLD-RTO: 13.4 FL (ref 11.5–15)
PLATELET # BLD: 383 E9/L (ref 130–450)
PMV BLD AUTO: 11.9 FL (ref 7–12)
POTASSIUM SERPL-SCNC: 4.2 MMOL/L (ref 3.5–5)
PRO-BNP: 819 PG/ML (ref 0–125)
PROCALCITONIN: 0.07 NG/ML (ref 0–0.08)
RBC # BLD: 5.11 E12/L (ref 3.8–5.8)
SODIUM BLD-SCNC: 137 MMOL/L (ref 132–146)
TOTAL PROTEIN: 6.9 G/DL (ref 6.4–8.3)
WBC # BLD: 5.6 E9/L (ref 4.5–11.5)

## 2022-01-15 PROCEDURE — 80048 BASIC METABOLIC PNL TOTAL CA: CPT

## 2022-01-15 PROCEDURE — 99233 SBSQ HOSP IP/OBS HIGH 50: CPT | Performed by: INTERNAL MEDICINE

## 2022-01-15 PROCEDURE — 2060000000 HC ICU INTERMEDIATE R&B

## 2022-01-15 PROCEDURE — 85025 COMPLETE CBC W/AUTO DIFF WBC: CPT

## 2022-01-15 PROCEDURE — 6370000000 HC RX 637 (ALT 250 FOR IP): Performed by: INTERNAL MEDICINE

## 2022-01-15 PROCEDURE — 36415 COLL VENOUS BLD VENIPUNCTURE: CPT

## 2022-01-15 PROCEDURE — 80076 HEPATIC FUNCTION PANEL: CPT

## 2022-01-15 PROCEDURE — 94660 CPAP INITIATION&MGMT: CPT

## 2022-01-15 PROCEDURE — 6370000000 HC RX 637 (ALT 250 FOR IP): Performed by: NURSE PRACTITIONER

## 2022-01-15 PROCEDURE — 94640 AIRWAY INHALATION TREATMENT: CPT

## 2022-01-15 PROCEDURE — 6360000002 HC RX W HCPCS: Performed by: INTERNAL MEDICINE

## 2022-01-15 PROCEDURE — 83880 ASSAY OF NATRIURETIC PEPTIDE: CPT

## 2022-01-15 PROCEDURE — 85378 FIBRIN DEGRADE SEMIQUANT: CPT

## 2022-01-15 PROCEDURE — 6360000002 HC RX W HCPCS: Performed by: NURSE PRACTITIONER

## 2022-01-15 PROCEDURE — 2700000000 HC OXYGEN THERAPY PER DAY

## 2022-01-15 PROCEDURE — 84145 PROCALCITONIN (PCT): CPT

## 2022-01-15 PROCEDURE — 86140 C-REACTIVE PROTEIN: CPT

## 2022-01-15 RX ADMIN — ZINC SULFATE 220 MG (50 MG) CAPSULE 50 MG: CAPSULE at 09:11

## 2022-01-15 RX ADMIN — Medication 2000 UNITS: at 09:12

## 2022-01-15 RX ADMIN — ARFORMOTEROL TARTRATE 15 MCG: 15 SOLUTION RESPIRATORY (INHALATION) at 06:19

## 2022-01-15 RX ADMIN — PANTOPRAZOLE SODIUM 40 MG: 40 TABLET, DELAYED RELEASE ORAL at 09:11

## 2022-01-15 RX ADMIN — BUDESONIDE 500 MCG: 0.5 INHALANT RESPIRATORY (INHALATION) at 06:19

## 2022-01-15 RX ADMIN — Medication 400 UNITS: at 09:11

## 2022-01-15 RX ADMIN — PENTOXIFYLLINE 400 MG: 400 TABLET, FILM COATED, EXTENDED RELEASE ORAL at 17:46

## 2022-01-15 RX ADMIN — MIRTAZAPINE 15 MG: 15 TABLET, ORALLY DISINTEGRATING ORAL at 21:11

## 2022-01-15 RX ADMIN — CLOPIDOGREL 75 MG: 75 TABLET, FILM COATED ORAL at 09:12

## 2022-01-15 RX ADMIN — GUAIFENESIN 1200 MG: 600 TABLET, EXTENDED RELEASE ORAL at 21:09

## 2022-01-15 RX ADMIN — CARVEDILOL 3.12 MG: 3.12 TABLET, FILM COATED ORAL at 09:12

## 2022-01-15 RX ADMIN — ATORVASTATIN CALCIUM 20 MG: 20 TABLET, FILM COATED ORAL at 21:08

## 2022-01-15 RX ADMIN — ASPIRIN 81 MG CHEWABLE TABLET 81 MG: 81 TABLET CHEWABLE at 09:11

## 2022-01-15 RX ADMIN — TAMSULOSIN HYDROCHLORIDE 0.4 MG: 0.4 CAPSULE ORAL at 09:11

## 2022-01-15 RX ADMIN — GUAIFENESIN 1200 MG: 600 TABLET, EXTENDED RELEASE ORAL at 09:11

## 2022-01-15 RX ADMIN — Medication 500 MG: at 21:08

## 2022-01-15 RX ADMIN — PENTOXIFYLLINE 400 MG: 400 TABLET, FILM COATED, EXTENDED RELEASE ORAL at 09:11

## 2022-01-15 RX ADMIN — ENOXAPARIN SODIUM 40 MG: 100 INJECTION SUBCUTANEOUS at 10:32

## 2022-01-15 RX ADMIN — DOXYCYCLINE HYCLATE 100 MG: 100 CAPSULE ORAL at 21:08

## 2022-01-15 RX ADMIN — MONTELUKAST SODIUM 10 MG: 10 TABLET, FILM COATED ORAL at 21:08

## 2022-01-15 RX ADMIN — BUPROPION HYDROCHLORIDE 150 MG: 150 TABLET, EXTENDED RELEASE ORAL at 09:12

## 2022-01-15 RX ADMIN — ARFORMOTEROL TARTRATE 15 MCG: 15 SOLUTION RESPIRATORY (INHALATION) at 17:52

## 2022-01-15 RX ADMIN — BARICITINIB 2 MG: 2 TABLET, FILM COATED ORAL at 10:32

## 2022-01-15 RX ADMIN — BUDESONIDE 500 MCG: 0.5 INHALANT RESPIRATORY (INHALATION) at 17:52

## 2022-01-15 RX ADMIN — ALBUTEROL SULFATE 2.5 MG: 2.5 SOLUTION RESPIRATORY (INHALATION) at 06:19

## 2022-01-15 RX ADMIN — CARVEDILOL 3.12 MG: 3.12 TABLET, FILM COATED ORAL at 17:46

## 2022-01-15 RX ADMIN — ENOXAPARIN SODIUM 40 MG: 100 INJECTION SUBCUTANEOUS at 21:09

## 2022-01-15 RX ADMIN — OXYCODONE HYDROCHLORIDE AND ACETAMINOPHEN 1000 MG: 500 TABLET ORAL at 09:12

## 2022-01-15 RX ADMIN — DOXYCYCLINE HYCLATE 100 MG: 100 CAPSULE ORAL at 09:12

## 2022-01-15 RX ADMIN — PENTOXIFYLLINE 400 MG: 400 TABLET, FILM COATED, EXTENDED RELEASE ORAL at 12:36

## 2022-01-15 NOTE — PROGRESS NOTES
Internal Medicine Progress Note    [facility]    Romeroacio Osler. David Alexis., [email or web address]. Hima Tripathi D.O., GRISELOLILIANA Santillan D.O. Juan Coffey, MSN, APRN, NP-C  Merry Badillo. Alexa Hall, MSN, APRN-CNP     Primary Care Physician: Addis Obrien DO   Admitting Physician:  Giovani Rodríguez DO  Admission date and time: 1/4/2022  2:34 PM    Room:  [address] [place] [address]  Admitting diagnosis: Respiratory distress [R06.03]  Hypoxia [R09.02]  COVID-19 [U07.1]    Patient Name: Eleno Sepulveda  MRN: [id number]    Date of Service: 1/15/2022     Subjective:    Kendy Underwood is a 70 y.o. male who was seen and examined today,1/15/2022, at the bedside. Kendy Underwood is seated at the bedside today resting comfortably on 12 L of nasal cannula oxygen. He does not appear dyspneic. He remains agitated overall and I suspect this is his general disposition at baseline. I was able to wean him to 10 L of nasal cannula oxygen during my examination while watching him utilize the incentive spirometer. Review of systems:  Constitutional:   Ongoing malaise and fatigue but with some improvement. No fevers or chills. HEENT:   Denies ear pain, sore throat, sinus or eye problems. Admits to mild irritation associated with the nasal cannula oxygen. Cardiovascular:   Denies any chest pain, irregular heartbeats, or palpitations. Respiratory:   Persistent shortness of breath but with ongoing improvement. Gastrointestinal:   Denies any nausea or vomiting. No diarrhea or constipation. Genitourinary:    Denies any urgency, frequency, hematuria. Voiding without difficulty. Extremities:   Denies lower extremity swelling, edema or cyanosis. Neurology:    Denies any headache or focal neurological deficits, positive for generalized weakness and fatigue without focal component  Psch: Worsening depression and poor motivation  Musculoskeletal:    Denies  myalgias, joint complaints or back pain. pentoxifylline  400 mg Oral TID WC    niacin  500 mg Oral Nightly    vitamin E  400 Units Oral Daily    mirtazapine  15 mg Oral Nightly    enoxaparin  40 mg SubCUTAneous BID    baricitinib  2 mg Oral Daily    carvedilol  3.125 mg Oral BID WC    buPROPion  150 mg Oral Daily    [Held by provider] sacubitril-valsartan  1 tablet Oral BID    aspirin  81 mg Oral Daily    clopidogrel  75 mg Oral Daily    guaiFENesin  1,200 mg Oral BID    montelukast  10 mg Oral Nightly    pantoprazole  40 mg Oral QAM    atorvastatin  20 mg Oral Nightly    tamsulosin  0.4 mg Oral Daily    Arformoterol Tartrate  15 mcg Nebulization BID    budesonide  500 mcg Nebulization BID    ascorbic acid  1,000 mg Oral Daily    Vitamin D  2,000 Units Oral Daily    zinc sulfate  50 mg Oral Daily    doxycycline hyclate  100 mg Oral 2 times per day     Continuous Infusions:      Objective Data:  CBC with Differential:    Lab Results   Component Value Date    WBC 5.6 01/15/2022    RBC 5.11 01/15/2022    HGB 14.0 01/15/2022    HCT 45.7 01/15/2022     01/15/2022    MCV 89.4 01/15/2022    MCH 27.4 01/15/2022    MCHC 30.6 01/15/2022    RDW 13.4 01/15/2022    SEGSPCT 66 07/21/2013    LYMPHOPCT 17.8 01/15/2022    MONOPCT 11.9 01/15/2022    BASOPCT 0.4 01/15/2022    MONOSABS 0.67 01/15/2022    LYMPHSABS 1.00 01/15/2022    EOSABS 0.01 01/15/2022    BASOSABS 0.02 01/15/2022     BMP:    Lab Results   Component Value Date     01/15/2022    K 4.2 01/15/2022    K 4.1 01/04/2022    CL 99 01/15/2022    CO2 27 01/15/2022    BUN 39 01/15/2022    LABALBU 3.7 01/15/2022    LABALBU 4.2 12/01/2011    CREATININE 1.4 01/15/2022    CALCIUM 9.3 01/15/2022    GFRAA >60 01/15/2022    LABGLOM 50 01/15/2022    GLUCOSE 83 01/15/2022    GLUCOSE 152 12/01/2011     Hepatic Function Panel:    Lab Results   Component Value Date    ALKPHOS 72 01/15/2022    ALT 38 01/15/2022    AST 41 01/15/2022    PROT 6.9 01/15/2022    BILITOT 0.4 01/15/2022    BILIDIR <0.2 01/15/2022    IBILI see below 01/15/2022    LABALBU 3.7 01/15/2022    LABALBU 4.2 12/01/2011     No results for input(s): TROPHS in the last 72 hours. COVID inflammatory markers  Recent Labs     01/15/22  0800   DDIMER <200     No results for input(s): FERRITIN in the last 72 hours. Recent Labs     01/14/22  0849   CRP 0.8*       Assessment:  1. Acute respiratory failure with hypoxia secondary to COVID-19 pneumonia in an unvaccinated host  2. Acute exacerbation of COPD secondary to viral pneumonia  3. Hypertensive urgency with underlying history of hypertension  4. Asymptomatic coronary artery disease with drug-eluting stents in place  5. Decompensated diastolic congestive heart failure with associated pleural effusion and echo evidence of worsening cardiomyopathy with LVEF now 28%  6. Chronic kidney disease stage IIIa  7. Hyperlipidemia  8. Vascular disease with history of TIA and left carotid stenting   9. Non-Hodgkin's lymphoma with prior chemotherapy followed by the heart center   10. Untreated obstructive sleep apnea       Plan:   Antibiotic therapy will be de-escalated in the setting of downward trending CRP. He continues to receive maximal COVID protocol and I was able to wean him to 10 L of nasal cannula oxygen during my examination today. He is seated at the bedside and seems more motivated today. I visualized him utilizing the incentive spirometer. We are attempting to achieve goal-directed therapy for systolic congestive heart failure and this is largely impacted by blood pressure and renal constraints. He will continue to work aggressively with the therapy teams. He is extraordinarily anxious for discharge home this oftentimes leads to noncompliance. Greater than 40 minutes of critical care time was spent with the patient. This time included chart review, , and discussion with those consultants involved in the patient's care.     More than 50% of my  time was spent at the bedside counseling/coordinating care with the patient and/or family with face to face contact. This time was spent reviewing notes and laboratory data as well as instructing and counseling the patient. Time I spent with the family or surrogate(s) is included only if the patient was incapable of providing the necessary information or participating in medical decisions. I also discussed the differential diagnosis and all of the proposed management plans with the patient and individuals accompanying the patient. Kendy Underwood requires this high level of physician care and nursing on the Telemetry unit due the complexity of decision management and chance of rapid decline or death. Continued cardiac monitoring and higher level of nursing are required. I am readily available for any further decision-making and intervention.        Giovani Rodríguez DO  1/15/2022  12:15 PM

## 2022-01-15 NOTE — PROGRESS NOTES
INPATIENT CARDIOLOGY FOLLOW-UP    Name: Clemente Hernandez    Age: 70 y.o. Date of Admission: 1/4/2022  2:34 PM    Date of Service: 1/15/2022    Chief Complaint: Follow-up for acute on chronic HFrEF, CAD    Interim History: This was a remote visit due to ongoing treatment for COVID-19. Problem List:  Patient Active Problem List   Diagnosis    [facility]oma ([facility] [address]    Gastroenteritis    Back pain at L4-L5 level    Impotence    Chronic fatigue    Urinary frequency    Bronchitis    Gastroesophageal reflux disease with esophagitis    Depression    Coronary artery disease of native artery of native heart with stable angina pectoris ([facility])    Chronic obstructive pulmonary disease ([facility] [address]    Pure hypercholesterolemia    Moderate obesity    Major depressive disorder, recurrent, mild ([facility])    Vascular dementia with depressed mood ([facility])    Moderate asthma without complication    Complete tear of left rotator cuff    Rotator cuff arthropathy of left shoulder    Nontraumatic complete tear of rotator cuff, left    Bursitis of left shoulder    Impingement syndrome of left shoulder    Labral tear of shoulder, degenerative, left    S/P arthroscopy of shoulder    Obstructive sleep apnea    CKD stage G3a/A3, GFR 45-59 and albumin creatinine ratio >300 mg/g ([facility])    COVID-19       Allergies: Allergies   Allergen Reactions    Midazolam Hcl      Wake up wild. ...  Must be reversed with romazicon or will wake up wild and combative       Current Medications:  Current Facility-Administered Medications   Medication Dose Route Frequency Provider Last Rate Last Admin    pentoxifylline (TRENTAL) extended release tablet 400 mg  400 mg Oral TID  Clark Gee, DO   400 mg at 01/15/22 1236    niacin (SLO-NIACIN) extended release tablet 500 mg  500 mg Oral Nightly Clark Gee DO   500 mg at 01/14/22 2114    vitamin E capsule 400 Units  400 Units Oral Daily Kayla Gee DO   400 Units at 01/15/22 0911    albuterol (PROVENTIL) nebulizer solution 2.5 mg  2.5 mg Nebulization Q6H PRN Maximilian Alvarado, DO   2.5 mg at 01/15/22 0896    mirtazapine (REMERON SOL-TAB) disintegrating tablet 15 mg  15 mg Oral Nightly Gaylan Mini, APRN - CNP   15 mg at 01/14/22 2116    enoxaparin (LOVENOX) injection 40 mg  40 mg SubCUTAneous BID Gaylan Mini, APRN - CNP   40 mg at 01/15/22 1032    baricitinib (OLUMIANT) tablet 2 mg  2 mg Oral Daily Gaylan Mini, APRN - CNP   2 mg at 01/15/22 1032    carvedilol (COREG) tablet 3.125 mg  3.125 mg Oral BID  Clark Gee DO   3.125 mg at 01/15/22 0912    HYDROcodone-acetaminophen (NORCO) 5-325 MG per tablet 1 tablet  1 tablet Oral Q6H PRN Benson Swift DO   1 tablet at 01/10/22 0959    buPROPion [facility]) extended release tablet 150 mg  150 mg Oral Daily Clark Gee DO   150 mg at 01/15/22 0912    [Held by provider] sacubitril-valsartan (ENTRESTO) 24-26 MG per tablet 1 tablet  1 tablet Oral BID Kalyn Hernandez MD   1 tablet at 01/08/22 1049    prochlorperazine (COMPAZINE) injection 10 mg  10 mg IntraVENous Q6H PRN Maximilian Alvarado, DO        aspirin chewable tablet 81 mg  81 mg Oral Daily Maximilian Alvarado, DO   81 mg at 01/15/22 0911    clopidogrel (PLAVIX) tablet 75 mg  75 mg Oral Daily Maximilian Alvarado, DO   75 mg at 01/15/22 0912    guaiFENesin (MUCINEX) extended release tablet 1,200 mg  1,200 mg Oral BID Maximilian Alvarado, DO   1,200 mg at 01/15/22 0911    HYDROcodone-acetaminophen (Rebbeca Dinning) 7.5-325 MG per tablet 1 tablet  1 tablet Oral Q8H PRN Maximilian Alvarado, DO   1 tablet at 01/12/22 2112    montelukast (SINGULAIR) tablet 10 mg  10 mg Oral Nightly Debbrah Guillerminaerer, DO   10 mg at 01/14/22 2114    pantoprazole (PROTONIX) tablet 40 mg  40 mg Oral QAM Maximilian Alvarado, DO   40 mg at 01/15/22 0911    atorvastatin (LIPITOR) tablet 20 mg  20 mg Oral Nightly Maximilian Alvarado, DO   20 mg at 01/14/22 2114    tamsulosin (FLOMAX) capsule 0.4 mg  0.4 mg Oral Daily Maximilian Alvarado, DO   0.4 mg at 01/15/22 1931    Arformoterol Tartrate (BROVANA) nebulizer solution 15 mcg  15 mcg Nebulization BID Kanika Duglas, DO   15 mcg at 01/15/22 3858    budesonide (PULMICORT) nebulizer suspension 500 mcg  500 mcg Nebulization BID Kanika Duglas, DO   500 mcg at 01/15/22 2071    ascorbic acid (VITAMIN C) tablet 1,000 mg  1,000 mg Oral Daily Kanika Duglas, DO   1,000 mg at 01/15/22 8863    vitamin D (CHOLECALCIFEROL) tablet 2,000 Units  2,000 Units Oral Daily Kanika Duglas, DO   2,000 Units at 01/15/22 0912    zinc sulfate (ZINCATE) capsule 50 mg  50 mg Oral Daily Kanika Duglas, DO   50 mg at 01/15/22 7381    doxycycline hyclate (VIBRAMYCIN) capsule 100 mg  100 mg Oral 2 times per day Kanika Duglas, DO   100 mg at 01/15/22 0912    acetaminophen (TYLENOL) tablet 650 mg  650 mg Oral Q6H PRN Kanika Duglas, DO   650 mg at 01/14/22 1653    Or    acetaminophen (TYLENOL) suppository 650 mg  650 mg Rectal Q6H PRN Kanika Duglas, DO        guaiFENesin-dextromethorphan (ROBITUSSIN DM) 100-10 MG/5ML syrup 5 mL  5 mL Oral Q4H PRN Kanika Duglas, DO             Physical Exam:  /83   Pulse 79   Temp 97.5 °F (36.4 °C)   Resp 18   Ht 5' 8\" (1.727 m)   Wt 203 lb 1.6 oz (92.1 kg)   SpO2 94%   BMI 30.88 kg/m²   Wt Readings from Last 3 Encounters:   01/14/22 203 lb 1.6 oz (92.1 kg)   12/15/21 223 lb 9.6 oz (101.4 kg)   12/06/21 226 lb 6.4 oz (102.7 kg)       Intake/Output:    Intake/Output Summary (Last 24 hours) at 1/15/2022 1351  Last data filed at 1/15/2022 0900  Gross per 24 hour   Intake 500 ml   Output 350 ml   Net 150 ml     I/O this shift:  In: 240 [P.O.:240]  Out: -     Laboratory Tests:  Recent Labs     01/13/22  0805 01/14/22  0849 01/15/22  0800    139 137   K 4.9 4.7 4.2   CL 99 100 99   CO2 27 27 27   BUN 36* 38* 39*   CREATININE 1.2 1.3* 1.4*   GLUCOSE 113* 107* 83   CALCIUM 9.5 9.7 9.3     Lab Results   Component Value Date    MG 1.8 01/05/2022     Recent Labs     01/13/22  0805 01/14/22  0849 01/15/22  0800   ALKPHOS 76 78 72   ALT 27 33 38   AST 35 41* 41*   PROT 7.0 7.2 6.9   BILITOT 0.4 0.4 0.4   BILIDIR <0.2 <0.2 <0.2   LABALBU 3.7 4.1 3.7     Recent Labs     01/13/22  0805 01/14/22  0849 01/15/22  0800   WBC 3.0* 3.2* 5.6   RBC 5.04 5.18 5.11   HGB 14.1 14.4 14.0   HCT 45.3 46.1 45.7   MCV 89.9 89.0 89.4   MCH 28.0 27.8 27.4   MCHC 31.1* 31.2* 30.6*   RDW 13.6 13.4 13.4    361 383   MPV 11.4 11.4 11.9     Lab Results   Component Value Date    CKTOTAL 66 07/20/2013    CKMB <0.3 07/20/2013    TROPONINI <0.01 02/19/2021    TROPONINI <0.01 02/18/2021    TROPONINI <0.01 05/04/2020     No results for input(s): CKTOTAL, CKMB, CKMBINDEX, TROPHS in the last 72 hours.   Lab Results   Component Value Date    INR 1.1 01/05/2022    INR 1.1 01/04/2022    INR 1.0 10/07/2015    PROTIME 12.7 (H) 01/05/2022    PROTIME 13.2 (H) 01/04/2022    PROTIME 12.2 10/07/2015     Lab Results   Component Value Date    TSH 1.540 07/18/2019     Lab Results   Component Value Date    LABA1C 5.6 07/31/2018     No results found for: EAG  Lab Results   Component Value Date    CHOL 226 (H) 06/16/2021    CHOL 198 11/06/2019    CHOL 175 01/12/2018     Lab Results   Component Value Date    TRIG 149 06/16/2021    TRIG 174 (H) 11/06/2019    TRIG 129 01/12/2018     Lab Results   Component Value Date    HDL 43 06/16/2021    HDL 36 11/06/2019    HDL 45 01/12/2018     Lab Results   Component Value Date    LDLCALC 153 (H) 06/16/2021    LDLCALC 127 (H) 11/06/2019    LDLCALC 104 (H) 01/12/2018     Lab Results   Component Value Date    LABVLDL 30 06/16/2021    LABVLDL 35 11/06/2019    LABVLDL 26 01/12/2018     No results found for: CHOLHDLRATIO  Recent Labs     01/13/22  0805 01/15/22  0800   PROBNP 565* 819*       Cardiac Tests:  EKG reviewed: SR, rate 91, PRWP     Telemetry reviewed (date: 1/15/2022): SR, rate 70's     TTE (Dr. Amrik Grey, 2011)  Summary    Left ventricular size is grossly normal.    Mild left venticular concentric hypertrophy noted.    Ejection fraction is visually estimated at 64%.    No evidence of left ventricular mass or thrombus noted.    No regional wall motion abnormalities seen.    Borderline dilated right ventricle.    No evidence of a thrombus in the right ventricle.    Structurally normal mitral valve.    Physiologic and/or trace mitral regurgitation is present.    No mitral valve stenosis present.    The tricuspid valve appears structurally normal.    Physiologic and/or trace tricuspid regurgitation.    RVSP is 15.73 mmHg.    Regular rhythm.     Lexiscan Stress Test (Per [facility] notes, 3/2018)  No evidence of ischemia  EF 40%   Mild global hypokinesis      Left heart catheterization ([facility], 3/2018)  LMT: - The LMT is normal.   LAD:   - The LAD has mild luminal irregularities. LCX: - There was estatic. - The distal circumflex is narrowed 60 % - focal disease. RAMUS: - The Ramus is Absent. RCA:   - The mid RCA is narrowed 80 % - ISR.    Additional Comment: Focal ISR at distal edge of stent with another 80% focal   stenosis distal to stent.    S/p PCI and TATI x 1 to the distal RCA     TTE (Dr. Petrona Aguilar, 12/2018)   Summary   Left ventricular size is grossly normal.   Mild left ventricular concentric hypertrophy noted.   Ejection fraction is visually estimated at 50%.   No evidence of left ventricular mass or thrombus noted.   No regional wall motion abnormalities seen.   Borderline dilated right ventricle.   No evidence of a thrombus in the right ventricle.   Physiologic and/or trace mitral regurgitation is present.   No evidence of mitral valve stenosis.   Physiologic and/or trace tricuspid regurgitation.   Regular rhythm.     TTE (Dr. Petrona Aguilar, 1/5/2022)  Summary   Left ventricle is mildly enlarged .   Normal left ventricular wall thickness.   Ejection fraction is visually estimated at 28%.   Global left ventricular dysfunction   Normal sized left atrium.   Interatrial septum appears intact.   Mildly dilated right ventricle.   Physiologic and/or trace mitral regurgitation is present.   No evidence of mitral valve stenosis.   Physiologic and/or trace tricuspid regurgitation.   Regular rhythm. ASSESSMENT / PLAN:  · Acute on chronic HFrEF with EF of 28% on January 5, 2022. Continue carvedilol as blood pressure allows. If blood pressure does not allow up titration of carvedilol switch from carvedilol to metoprolol succinate. Resume Entresto when kidney function is stable. If kidney does not allow resumption of Entresto then please add hydralazine and Imdur if blood pressure will allow. Cardiac catheterization when patient is stable. Apparently patient declined LifeVest.    .  · Acute hypoxic respiratory failure/COVID-19+ with associated viral pneumonia   Management per primary service    · Coronary artery disease. S/p PCI and TATI placement x 1 to the distal RCA at [facility] in 2018. Residual disease of 60% distal LCX on LHC at [facility] 2018. Given depressed systolic function patient will benefit from invasive coronary angiogram once stable  Continue on aspirin, statin and beta-blocker as well as Plavix    · Hypertension  · Hyperlipidemia. Statin therapy on hold in setting of elevated LFTs. · COPD. · Former tobacco smoker. Quit 1 year ago. · Untreated JOSE. · History of TIA. · Chronic kidney disease. · Carotid artery disease.  S/p left carotid stent placement. · Non-Hodgkin's lymphoma s/p chemotherapy.  First diagnosed in 2011.  Follows with the [facility]. · BPH. · Anxiety. · Chronic back pain, on chronic opioid therapy. NOTE: This report was transcribed using voice recognition software. Every effort was made to ensure accuracy; however, inadvertent computerized transcription errors may be present.

## 2022-01-16 LAB
ALBUMIN SERPL-MCNC: 3.8 G/DL (ref 3.5–5.2)
ALP BLD-CCNC: 84 U/L (ref 40–129)
ALT SERPL-CCNC: 54 U/L (ref 0–40)
ANION GAP SERPL CALCULATED.3IONS-SCNC: 12 MMOL/L (ref 7–16)
AST SERPL-CCNC: 64 U/L (ref 0–39)
BASOPHILS ABSOLUTE: 0.02 E9/L (ref 0–0.2)
BASOPHILS RELATIVE PERCENT: 0.3 % (ref 0–2)
BILIRUB SERPL-MCNC: 0.5 MG/DL (ref 0–1.2)
BILIRUBIN DIRECT: <0.2 MG/DL (ref 0–0.3)
BILIRUBIN, INDIRECT: ABNORMAL MG/DL (ref 0–1)
BUN BLDV-MCNC: 30 MG/DL (ref 6–23)
C-REACTIVE PROTEIN: 6.3 MG/DL (ref 0–0.4)
CALCIUM SERPL-MCNC: 9.6 MG/DL (ref 8.6–10.2)
CHLORIDE BLD-SCNC: 98 MMOL/L (ref 98–107)
CO2: 24 MMOL/L (ref 22–29)
CREAT SERPL-MCNC: 1.3 MG/DL (ref 0.7–1.2)
D DIMER: 240 NG/ML DDU
EOSINOPHILS ABSOLUTE: 0 E9/L (ref 0.05–0.5)
EOSINOPHILS RELATIVE PERCENT: 0 % (ref 0–6)
GFR AFRICAN AMERICAN: >60
GFR NON-AFRICAN AMERICAN: 54 ML/MIN/1.73
GLUCOSE BLD-MCNC: 91 MG/DL (ref 74–99)
HCT VFR BLD CALC: 46.8 % (ref 37–54)
HEMOGLOBIN: 14.9 G/DL (ref 12.5–16.5)
IMMATURE GRANULOCYTES #: 0.03 E9/L
IMMATURE GRANULOCYTES %: 0.5 % (ref 0–5)
LYMPHOCYTES ABSOLUTE: 1.07 E9/L (ref 1.5–4)
LYMPHOCYTES RELATIVE PERCENT: 17.5 % (ref 20–42)
MCH RBC QN AUTO: 27.8 PG (ref 26–35)
MCHC RBC AUTO-ENTMCNC: 31.8 % (ref 32–34.5)
MCV RBC AUTO: 87.3 FL (ref 80–99.9)
MONOCYTES ABSOLUTE: 0.75 E9/L (ref 0.1–0.95)
MONOCYTES RELATIVE PERCENT: 12.3 % (ref 2–12)
NEUTROPHILS ABSOLUTE: 4.23 E9/L (ref 1.8–7.3)
NEUTROPHILS RELATIVE PERCENT: 69.4 % (ref 43–80)
PDW BLD-RTO: 13.2 FL (ref 11.5–15)
PLATELET # BLD: 402 E9/L (ref 130–450)
PMV BLD AUTO: 11.7 FL (ref 7–12)
POTASSIUM SERPL-SCNC: 4.2 MMOL/L (ref 3.5–5)
PROCALCITONIN: 0.07 NG/ML (ref 0–0.08)
RBC # BLD: 5.36 E12/L (ref 3.8–5.8)
SODIUM BLD-SCNC: 134 MMOL/L (ref 132–146)
TOTAL PROTEIN: 7.2 G/DL (ref 6.4–8.3)
WBC # BLD: 6.1 E9/L (ref 4.5–11.5)

## 2022-01-16 PROCEDURE — 2060000000 HC ICU INTERMEDIATE R&B

## 2022-01-16 PROCEDURE — 6370000000 HC RX 637 (ALT 250 FOR IP): Performed by: INTERNAL MEDICINE

## 2022-01-16 PROCEDURE — 2700000000 HC OXYGEN THERAPY PER DAY

## 2022-01-16 PROCEDURE — 99233 SBSQ HOSP IP/OBS HIGH 50: CPT | Performed by: INTERNAL MEDICINE

## 2022-01-16 PROCEDURE — 36415 COLL VENOUS BLD VENIPUNCTURE: CPT

## 2022-01-16 PROCEDURE — 94640 AIRWAY INHALATION TREATMENT: CPT

## 2022-01-16 PROCEDURE — 86140 C-REACTIVE PROTEIN: CPT

## 2022-01-16 PROCEDURE — 85025 COMPLETE CBC W/AUTO DIFF WBC: CPT

## 2022-01-16 PROCEDURE — 84145 PROCALCITONIN (PCT): CPT

## 2022-01-16 PROCEDURE — 6360000002 HC RX W HCPCS: Performed by: NURSE PRACTITIONER

## 2022-01-16 PROCEDURE — 6370000000 HC RX 637 (ALT 250 FOR IP): Performed by: NURSE PRACTITIONER

## 2022-01-16 PROCEDURE — 6360000002 HC RX W HCPCS: Performed by: INTERNAL MEDICINE

## 2022-01-16 PROCEDURE — 85378 FIBRIN DEGRADE SEMIQUANT: CPT

## 2022-01-16 PROCEDURE — 80076 HEPATIC FUNCTION PANEL: CPT

## 2022-01-16 PROCEDURE — 80048 BASIC METABOLIC PNL TOTAL CA: CPT

## 2022-01-16 RX ADMIN — ATORVASTATIN CALCIUM 20 MG: 20 TABLET, FILM COATED ORAL at 20:28

## 2022-01-16 RX ADMIN — ARFORMOTEROL TARTRATE 15 MCG: 15 SOLUTION RESPIRATORY (INHALATION) at 07:35

## 2022-01-16 RX ADMIN — Medication 400 UNITS: at 08:21

## 2022-01-16 RX ADMIN — CLOPIDOGREL 75 MG: 75 TABLET, FILM COATED ORAL at 08:22

## 2022-01-16 RX ADMIN — GUAIFENESIN 1200 MG: 600 TABLET, EXTENDED RELEASE ORAL at 08:21

## 2022-01-16 RX ADMIN — ENOXAPARIN SODIUM 40 MG: 100 INJECTION SUBCUTANEOUS at 10:00

## 2022-01-16 RX ADMIN — DOXYCYCLINE HYCLATE 100 MG: 100 CAPSULE ORAL at 20:28

## 2022-01-16 RX ADMIN — BARICITINIB 2 MG: 2 TABLET, FILM COATED ORAL at 08:22

## 2022-01-16 RX ADMIN — TAMSULOSIN HYDROCHLORIDE 0.4 MG: 0.4 CAPSULE ORAL at 08:21

## 2022-01-16 RX ADMIN — MIRTAZAPINE 15 MG: 15 TABLET, ORALLY DISINTEGRATING ORAL at 22:06

## 2022-01-16 RX ADMIN — PENTOXIFYLLINE 400 MG: 400 TABLET, FILM COATED, EXTENDED RELEASE ORAL at 08:22

## 2022-01-16 RX ADMIN — Medication 500 MG: at 20:28

## 2022-01-16 RX ADMIN — PANTOPRAZOLE SODIUM 40 MG: 40 TABLET, DELAYED RELEASE ORAL at 08:22

## 2022-01-16 RX ADMIN — ASPIRIN 81 MG CHEWABLE TABLET 81 MG: 81 TABLET CHEWABLE at 08:21

## 2022-01-16 RX ADMIN — CARVEDILOL 3.12 MG: 3.12 TABLET, FILM COATED ORAL at 18:24

## 2022-01-16 RX ADMIN — Medication 2000 UNITS: at 08:22

## 2022-01-16 RX ADMIN — BUDESONIDE 500 MCG: 0.5 INHALANT RESPIRATORY (INHALATION) at 07:35

## 2022-01-16 RX ADMIN — MONTELUKAST SODIUM 10 MG: 10 TABLET, FILM COATED ORAL at 20:28

## 2022-01-16 RX ADMIN — DOXYCYCLINE HYCLATE 100 MG: 100 CAPSULE ORAL at 08:21

## 2022-01-16 RX ADMIN — BUPROPION HYDROCHLORIDE 150 MG: 150 TABLET, EXTENDED RELEASE ORAL at 08:22

## 2022-01-16 RX ADMIN — ENOXAPARIN SODIUM 40 MG: 100 INJECTION SUBCUTANEOUS at 20:28

## 2022-01-16 RX ADMIN — GUAIFENESIN 1200 MG: 600 TABLET, EXTENDED RELEASE ORAL at 20:28

## 2022-01-16 RX ADMIN — CARVEDILOL 3.12 MG: 3.12 TABLET, FILM COATED ORAL at 08:22

## 2022-01-16 RX ADMIN — ARFORMOTEROL TARTRATE 15 MCG: 15 SOLUTION RESPIRATORY (INHALATION) at 20:50

## 2022-01-16 RX ADMIN — PENTOXIFYLLINE 400 MG: 400 TABLET, FILM COATED, EXTENDED RELEASE ORAL at 12:00

## 2022-01-16 RX ADMIN — OXYCODONE HYDROCHLORIDE AND ACETAMINOPHEN 1000 MG: 500 TABLET ORAL at 08:22

## 2022-01-16 RX ADMIN — BUDESONIDE 500 MCG: 0.5 INHALANT RESPIRATORY (INHALATION) at 20:50

## 2022-01-16 RX ADMIN — PENTOXIFYLLINE 400 MG: 400 TABLET, FILM COATED, EXTENDED RELEASE ORAL at 18:24

## 2022-01-16 RX ADMIN — ZINC SULFATE 220 MG (50 MG) CAPSULE 50 MG: CAPSULE at 08:22

## 2022-01-16 NOTE — PROGRESS NOTES
Pulse ox was 90% on room air at rest.  Ambulated patient on room air. Oxygen saturation was 80% on room air while ambulating. Oxygen applied. Recovery pulse ox was 90% on 3L of oxygen while ambulating.

## 2022-01-16 NOTE — PROGRESS NOTES
Internal Medicine Progress Note    [facility]    Romeroacio Osler. David Alexis., SHANA.A.CShellyOShellyI. Hima Tripathi D.O., GRISELOLILIANA Santillan D.O. Juan Coffey, MSN, APRN, NP-C  Merry Badillo. Alexa Hall, MSN, APRN-CNP     Primary Care Physician: Addis Obrien DO   Admitting Physician:  Giovani Rodríguez DO  Admission date and time: 1/4/2022  2:34 PM    Room:  [address] [place] [address]  Admitting diagnosis: Respiratory distress [R06.03]  Hypoxia [R09.02]  COVID-19 [U07.1]    Patient Name: Eleno Sepulveda  MRN: [id number]    Date of Service: 1/16/2022     Subjective:    Kendy Underwood is a 70 y.o. male who was seen and examined today,1/16/2022, at the bedside. Kendy Underwood is resting in a side-lying position and has been weaned to minimal nasal cannula oxygen. We have discussed the need to continue to push himself daily as he is reaching appropriateness for discharge. We will asked the nursing staff to ambulate him today to qualify for home oxygen and we will begin the process of arranging home health care. He has been increasingly motivated and compliant with nonpharmacologic management. No family present for update. Review of systems:  Constitutional:   Ongoing malaise and fatigue but with some improvement. No fevers or chills. HEENT:   Denies ear pain, sore throat, sinus or eye problems. Admits to mild irritation associated with the nasal cannula oxygen. Cardiovascular:   Denies any chest pain, irregular heartbeats, or palpitations. Respiratory:   No resting dyspnea. Less exertional dyspnea. Gastrointestinal:   Denies any nausea or vomiting. No diarrhea or constipation. Genitourinary:    Denies any urgency, frequency, hematuria. Voiding without difficulty. Extremities:   Denies lower extremity swelling, edema or cyanosis. Neurology:    Denies any headache or focal neurological deficits, positive for generalized weakness and fatigue without focal component  Psch:    Worsening depression and poor motivation  Musculoskeletal:    Denies  myalgias, joint complaints or back pain. Integumentary:   Denies any rashes, ulcers, or excoriations. Denies bruising. Hematologic/Lymphatic:  Denies bruising or bleeding. Physical Exam:  No intake/output data recorded. Intake/Output Summary (Last 24 hours) at 1/16/2022 1046  Last data filed at 1/15/2022 1836  Gross per 24 hour   Intake 120 ml   Output 750 ml   Net -630 ml   I/O last 3 completed shifts: In: 360 [P.O.:360]  Out: 950 [Urine:950]  Patient Vitals for the past 96 hrs (Last 3 readings):   Weight   01/16/22 0600 200 lb (90.7 kg)   01/14/22 0110 203 lb 1.6 oz (92.1 kg)   01/13/22 0615 204 lb (92.5 kg)     Vital Signs:   Blood pressure 131/64, pulse 98, temperature 97.8 °F (36.6 °C), resp. rate 20, height 5' 8\" (1.727 m), weight 200 lb (90.7 kg), SpO2 90 %. General appearance:  Comfortable, no distress. Head:  Normocephalic. No masses, lesions or tenderness. Eyes:  PERRLA. EOMI. Sclera clear. ENT:  Ears normal. Mucosa normal.  Nasal cannula oxygen was weaned to 10 L during my examination. Neck:    Supple. Trachea midline. No thyromegaly. No JVD. No bruits. Heart:    Rhythm regular. Rate controlled. S1 and S2. Systolic murmur grade 2/6. Lungs:    Better aeration throughout. Regular even and nonlabored pattern of respiration. No wheezing or rales. Abdomen:   Soft. Obese. Non-tender. Non-distended. Bowel sounds positive. No organomegaly or masses. No pain on palpation. Extremities:    Peripheral pulses present. Improved peripheral edema. No ulcers. No cyanosis. No clubbing. Neurologic:    Alert x 3. Generally weak without focal component focal deficit. Cranial nerves grossly intact. No focal weakness. Psych:   Behavior is normal. Mood appears normal. Speech is not rapid and/or pressured. Musculoskeletal:   Spine ROM normal. Muscular strength intact. Gait not assessed.   Integumentary:  No rashes  Skin normal color and texture.   Genitalia/Breast:  Deferred    Medication:  Scheduled Meds:   pentoxifylline  400 mg Oral TID WC    niacin  500 mg Oral Nightly    vitamin E  400 Units Oral Daily    mirtazapine  15 mg Oral Nightly    enoxaparin  40 mg SubCUTAneous BID    baricitinib  2 mg Oral Daily    carvedilol  3.125 mg Oral BID WC    buPROPion  150 mg Oral Daily    [Held by provider] sacubitril-valsartan  1 tablet Oral BID    aspirin  81 mg Oral Daily    clopidogrel  75 mg Oral Daily    guaiFENesin  1,200 mg Oral BID    montelukast  10 mg Oral Nightly    pantoprazole  40 mg Oral QAM    atorvastatin  20 mg Oral Nightly    tamsulosin  0.4 mg Oral Daily    Arformoterol Tartrate  15 mcg Nebulization BID    budesonide  500 mcg Nebulization BID    ascorbic acid  1,000 mg Oral Daily    Vitamin D  2,000 Units Oral Daily    zinc sulfate  50 mg Oral Daily    doxycycline hyclate  100 mg Oral 2 times per day     Continuous Infusions:      Objective Data:  CBC with Differential:    Lab Results   Component Value Date    WBC 6.1 01/16/2022    RBC 5.36 01/16/2022    HGB 14.9 01/16/2022    HCT 46.8 01/16/2022     01/16/2022    MCV 87.3 01/16/2022    MCH 27.8 01/16/2022    MCHC 31.8 01/16/2022    RDW 13.2 01/16/2022    SEGSPCT 66 07/21/2013    LYMPHOPCT 17.5 01/16/2022    MONOPCT 12.3 01/16/2022    BASOPCT 0.3 01/16/2022    MONOSABS 0.75 01/16/2022    LYMPHSABS 1.07 01/16/2022    EOSABS 0.00 01/16/2022    BASOSABS 0.02 01/16/2022     BMP:    Lab Results   Component Value Date     01/16/2022    K 4.2 01/16/2022    K 4.1 01/04/2022    CL 98 01/16/2022    CO2 24 01/16/2022    BUN 30 01/16/2022    LABALBU 3.8 01/16/2022    LABALBU 4.2 12/01/2011    CREATININE 1.3 01/16/2022    CALCIUM 9.6 01/16/2022    GFRAA >60 01/16/2022    LABGLOM 54 01/16/2022    GLUCOSE 91 01/16/2022    GLUCOSE 152 12/01/2011     Hepatic Function Panel:    Lab Results   Component Value Date    ALKPHOS 84 01/16/2022    ALT 54 01/16/2022 AST 64 01/16/2022    PROT 7.2 01/16/2022    BILITOT 0.5 01/16/2022    BILIDIR <0.2 01/16/2022    IBILI see below 01/16/2022    LABALBU 3.8 01/16/2022    LABALBU 4.2 12/01/2011     No results for input(s): TROPHS in the last 72 hours. COVID inflammatory markers  Recent Labs     01/16/22  0812   DDIMER 240     No results for input(s): FERRITIN in the last 72 hours. Recent Labs     01/15/22  0800   CRP 1.0*       Assessment:  1. Acute respiratory failure with hypoxia secondary to COVID-19 pneumonia in an unvaccinated host  2. Acute exacerbation of COPD secondary to viral pneumonia  3. Hypertensive urgency with underlying history of hypertension  4. Asymptomatic coronary artery disease with drug-eluting stents in place  5. Decompensated diastolic congestive heart failure with associated pleural effusion and echo evidence of worsening cardiomyopathy with LVEF now 28%  6. Chronic kidney disease stage IIIa  7. Hyperlipidemia  8. Vascular disease with history of TIA and left carotid stenting   9. Non-Hodgkin's lymphoma with prior chemotherapy followed by the heart center   10. Untreated obstructive sleep apnea       Plan:   Lauren Fowler is doing better overall. He continues to receive maximal COVID protocol and I was able to wean him to 2-3 L of nasal cannula oxygen during my examination today. He is increasingly motivated and active with nonpharmacologic management and this is coincided with his improvement. Ambulatory pulse ox was obtained today and we will assess his needs for oxygen going forward. Ultimate goal will be to discharge in the next 24 to 48 hours with home health care. To do this he will need to demonstrate increased capacity and functionality as he lives at home alone the vast majority of the time. We are attempting to achieve goal-directed therapy for systolic congestive heart failure and this is largely impacted by blood pressure and renal constraints.   He will continue to work aggressively with the therapy teams. He is extraordinarily anxious for discharge home this oftentimes leads to noncompliance. Greater than 40 minutes of critical care time was spent with the patient. This time included chart review, , and discussion with those consultants involved in the patient's care. More than 50% of my  time was spent at the bedside counseling/coordinating care with the patient and/or family with face to face contact. This time was spent reviewing notes and laboratory data as well as instructing and counseling the patient. Time I spent with the family or surrogate(s) is included only if the patient was incapable of providing the necessary information or participating in medical decisions. I also discussed the differential diagnosis and all of the proposed management plans with the patient and individuals accompanying the patient. Sabra Velázquez requires this high level of physician care and nursing on the Telemetry unit due the complexity of decision management and chance of rapid decline or death. Continued cardiac monitoring and higher level of nursing are required. I am readily available for any further decision-making and intervention.        JULIAN Ramires CNP  1/16/2022  10:46 AM

## 2022-01-16 NOTE — PROGRESS NOTES
albuterol (PROVENTIL) nebulizer solution 2.5 mg  2.5 mg Nebulization Q6H PRN Mateusz Fraga, DO   2.5 mg at 01/15/22 8552    mirtazapine (REMERON SOL-TAB) disintegrating tablet 15 mg  15 mg Oral Nightly Joseph Sher APRN - CNP   15 mg at 01/15/22 2111    enoxaparin (LOVENOX) injection 40 mg  40 mg SubCUTAneous BID Joseph Sher APRN - CNP   40 mg at 01/16/22 1000    baricitinib (OLUMIANT) tablet 2 mg  2 mg Oral Daily Joseph Sher APRN - CNP   2 mg at 01/16/22 1885    carvedilol (COREG) tablet 3.125 mg  3.125 mg Oral BID DEYA Gee, DO   3.125 mg at 01/16/22 2716    HYDROcodone-acetaminophen (NORCO) 5-325 MG per tablet 1 tablet  1 tablet Oral Q6H PRN Pavel Bhagat, DO   1 tablet at 01/10/22 0959    buPROPion [facility]) extended release tablet 150 mg  150 mg Oral Daily Taty Gee, DO   150 mg at 01/16/22 7475    [Held by provider] sacubitril-valsartan (ENTRESTO) 24-26 MG per tablet 1 tablet  1 tablet Oral BID Indio Palacios MD   1 tablet at 01/08/22 1049    prochlorperazine (COMPAZINE) injection 10 mg  10 mg IntraVENous Q6H PRN Mateusz Fraga, DO        aspirin chewable tablet 81 mg  81 mg Oral Daily Mateusz Fraga, DO   81 mg at 01/16/22 7955    clopidogrel (PLAVIX) tablet 75 mg  75 mg Oral Daily Mateusz Fraga, DO   75 mg at 01/16/22 6713    guaiFENesin [facility]) extended release tablet 1,200 mg  1,200 mg Oral BID Mateusz Fraga, DO   1,200 mg at 01/16/22 9265    HYDROcodone-acetaminophen (NORCO) 7.5-325 MG per tablet 1 tablet  1 tablet Oral Q8H PRN Mateusz Fraga, DO   1 tablet at 01/12/22 2112    montelukast (SINGULAIR) tablet 10 mg  10 mg Oral Nightly Maebelle Sages, DO   10 mg at 01/15/22 2108    pantoprazole (PROTONIX) tablet 40 mg  40 mg Oral QAM Maebelle Sages, DO   40 mg at 01/16/22 2315    atorvastatin (LIPITOR) tablet 20 mg  20 mg Oral Nightly Maebelle Sages, DO   20 mg at 01/15/22 2108    tamsulosin (FLOMAX) capsule 0.4 mg  0.4 mg Oral Daily Maebelle Sages, DO   0.4 mg at 01/16/22 6724    Arformoterol Tartrate (BROVANA) nebulizer solution 15 mcg  15 mcg Nebulization BID [facility] Sheerer, DO   15 mcg at 01/16/22 0735    budesonide (PULMICORT) nebulizer suspension 500 mcg  500 mcg Nebulization BID [facility]   500 mcg at 01/16/22 4109    ascorbic acid (VITAMIN C) tablet 1,000 mg  1,000 mg Oral Daily [facility] Sheerer, DO   1,000 mg at 01/16/22 7366    vitamin D (CHOLECALCIFEROL) tablet 2,000 Units  2,000 Units Oral Daily [facility] Sheerer, DO   2,000 Units at 01/16/22 6155    zinc sulfate (ZINCATE) capsule 50 mg  50 mg Oral Daily [facility] Sheerer, DO   50 mg at 01/16/22 4391    doxycycline hyclate (VIBRAMYCIN) capsule 100 mg  100 mg Oral 2 times per day [facility] Sheerer, DO   100 mg at 01/16/22 9338    acetaminophen (TYLENOL) tablet 650 mg  650 mg Oral Q6H PRN [facility] Sheerer, DO   650 mg at 01/14/22 1653    Or    acetaminophen (TYLENOL) suppository 650 mg  650 mg Rectal Q6H PRN [facility] Sheerer, DO        guaiFENesin-dextromethorphan (ROBITUSSIN DM) 100-10 MG/5ML syrup 5 mL  5 mL Oral Q4H PRN [facility] Sheerer, DO             Physical Exam:  /64   Pulse 98   Temp 97.8 °F (36.6 °C)   Resp 20   Ht 5' 8\" (1.727 m)   Wt 200 lb (90.7 kg)   SpO2 90%   BMI 30.41 kg/m²   Wt Readings from Last 3 Encounters:   01/16/22 200 lb (90.7 kg)   12/15/21 223 lb 9.6 oz (101.4 kg)   12/06/21 226 lb 6.4 oz (102.7 kg)       Intake/Output:    Intake/Output Summary (Last 24 hours) at 1/16/2022 1429  Last data filed at 1/15/2022 1836  Gross per 24 hour   Intake 120 ml   Output --   Net 120 ml     No intake/output data recorded.     Laboratory Tests:  Recent Labs     01/14/22  0849 01/15/22  0800 01/16/22  0812    137 134   K 4.7 4.2 4.2    99 98   CO2 27 27 24   BUN 38* 39* 30*   CREATININE 1.3* 1.4* 1.3*   GLUCOSE 107* 83 91   CALCIUM 9.7 9.3 9.6     Lab Results   Component Value Date    MG 1.8 01/05/2022     Recent Labs     01/14/22  0849 01/15/22  0800 01/16/22  0812   ALKPHOS 78 72 84   ALT 33 38 54*   AST 41* 41* 64*   PROT 7.2 6.9 7.2   BILITOT 0.4 0.4 0.5   BILIDIR <0.2 <0.2 <0.2   LABALBU 4.1 3.7 3.8     Recent Labs     01/14/22  0849 01/15/22  0800 01/16/22  0812   WBC 3.2* 5.6 6.1   RBC 5.18 5.11 5.36   HGB 14.4 14.0 14.9   HCT 46.1 45.7 46.8   MCV 89.0 89.4 87.3   MCH 27.8 27.4 27.8   MCHC 31.2* 30.6* 31.8*   RDW 13.4 13.4 13.2    383 402   MPV 11.4 11.9 11.7     Lab Results   Component Value Date    CKTOTAL 66 07/20/2013    CKMB <0.3 07/20/2013    TROPONINI <0.01 02/19/2021    TROPONINI <0.01 02/18/2021    TROPONINI <0.01 05/04/2020     No results for input(s): CKTOTAL, CKMB, CKMBINDEX, TROPHS in the last 72 hours.   Lab Results   Component Value Date    INR 1.1 01/05/2022    INR 1.1 01/04/2022    INR 1.0 10/07/2015    PROTIME 12.7 (H) 01/05/2022    PROTIME 13.2 (H) 01/04/2022    PROTIME 12.2 10/07/2015     Lab Results   Component Value Date    TSH 1.540 07/18/2019     Lab Results   Component Value Date    LABA1C 5.6 07/31/2018     No results found for: EAG  Lab Results   Component Value Date    CHOL 226 (H) 06/16/2021    CHOL 198 11/06/2019    CHOL 175 01/12/2018     Lab Results   Component Value Date    TRIG 149 06/16/2021    TRIG 174 (H) 11/06/2019    TRIG 129 01/12/2018     Lab Results   Component Value Date    HDL 43 06/16/2021    HDL 36 11/06/2019    HDL 45 01/12/2018     Lab Results   Component Value Date    LDLCALC 153 (H) 06/16/2021    LDLCALC 127 (H) 11/06/2019    LDLCALC 104 (H) 01/12/2018     Lab Results   Component Value Date    LABVLDL 30 06/16/2021    LABVLDL 35 11/06/2019    LABVLDL 26 01/12/2018     No results found for: CHOLHDLRATIO  Recent Labs     01/15/22  0800   PROBNP 819*       Cardiac Tests:  EKG reviewed: SR, rate 91, PRWP     Telemetry reviewed (date: 1/16/2022): SR, rate 70's     TTE (Dr. Joselyn Sheth, 2011)  Summary    Left ventricular size is grossly normal.    Mild left venticular concentric hypertrophy noted.    Ejection fraction is visually estimated at 64%.    No evidence of left ventricular mass or thrombus noted.    No regional wall motion abnormalities seen.    Borderline dilated right ventricle.    No evidence of a thrombus in the right ventricle.    Structurally normal mitral valve.    Physiologic and/or trace mitral regurgitation is present.    No mitral valve stenosis present.    The tricuspid valve appears structurally normal.    Physiologic and/or trace tricuspid regurgitation.    RVSP is 15.73 mmHg.    Regular rhythm.     Lexiscan Stress Test (Per [facility] notes, 3/2018)  No evidence of ischemia  EF 40%   Mild global hypokinesis      Left heart catheterization ([facility], 3/2018)  LMT: - The LMT is normal.   LAD:   - The LAD has mild luminal irregularities. LCX: - There was estatic. - The distal circumflex is narrowed 60 % - focal disease. RAMUS: - The Ramus is Absent. RCA:   - The mid RCA is narrowed 80 % - ISR.    Additional Comment: Focal ISR at distal edge of stent with another 80% focal   stenosis distal to stent.    S/p PCI and TATI x 1 to the distal RCA     TTE (Dr. Radha Duran, 12/2018)   Summary   Left ventricular size is grossly normal.   Mild left ventricular concentric hypertrophy noted.   Ejection fraction is visually estimated at 50%.   No evidence of left ventricular mass or thrombus noted.   No regional wall motion abnormalities seen.   Borderline dilated right ventricle.   No evidence of a thrombus in the right ventricle.   Physiologic and/or trace mitral regurgitation is present.   No evidence of mitral valve stenosis.   Physiologic and/or trace tricuspid regurgitation.   Regular rhythm.     TTE (Dr. Radha Duran, 1/5/2022)  Summary   Left ventricle is mildly enlarged .   Normal left ventricular wall thickness.   Ejection fraction is visually estimated at 28%.   Global left ventricular dysfunction   Normal sized left atrium.   Interatrial septum appears intact.   Mildly dilated right ventricle.   Physiologic and/or trace mitral regurgitation is present.   No evidence of mitral valve

## 2022-01-17 LAB
ALBUMIN SERPL-MCNC: 3.7 G/DL (ref 3.5–5.2)
ALP BLD-CCNC: 96 U/L (ref 40–129)
ALT SERPL-CCNC: 75 U/L (ref 0–40)
ANION GAP SERPL CALCULATED.3IONS-SCNC: 14 MMOL/L (ref 7–16)
AST SERPL-CCNC: 63 U/L (ref 0–39)
BASOPHILS ABSOLUTE: 0.02 E9/L (ref 0–0.2)
BASOPHILS RELATIVE PERCENT: 0.2 % (ref 0–2)
BILIRUB SERPL-MCNC: 0.5 MG/DL (ref 0–1.2)
BILIRUBIN DIRECT: 0.2 MG/DL (ref 0–0.3)
BILIRUBIN, INDIRECT: 0.3 MG/DL (ref 0–1)
BUN BLDV-MCNC: 34 MG/DL (ref 6–23)
C-REACTIVE PROTEIN: 9.7 MG/DL (ref 0–0.4)
CALCIUM SERPL-MCNC: 9.7 MG/DL (ref 8.6–10.2)
CHLORIDE BLD-SCNC: 97 MMOL/L (ref 98–107)
CO2: 23 MMOL/L (ref 22–29)
CREAT SERPL-MCNC: 1.3 MG/DL (ref 0.7–1.2)
D DIMER: <200 NG/ML DDU
EOSINOPHILS ABSOLUTE: 0.02 E9/L (ref 0.05–0.5)
EOSINOPHILS RELATIVE PERCENT: 0.2 % (ref 0–6)
GFR AFRICAN AMERICAN: >60
GFR NON-AFRICAN AMERICAN: 54 ML/MIN/1.73
GLUCOSE BLD-MCNC: 107 MG/DL (ref 74–99)
HCT VFR BLD CALC: 47.3 % (ref 37–54)
HEMOGLOBIN: 15 G/DL (ref 12.5–16.5)
IMMATURE GRANULOCYTES #: 0.04 E9/L
IMMATURE GRANULOCYTES %: 0.5 % (ref 0–5)
LYMPHOCYTES ABSOLUTE: 0.97 E9/L (ref 1.5–4)
LYMPHOCYTES RELATIVE PERCENT: 11.7 % (ref 20–42)
MCH RBC QN AUTO: 27.7 PG (ref 26–35)
MCHC RBC AUTO-ENTMCNC: 31.7 % (ref 32–34.5)
MCV RBC AUTO: 87.4 FL (ref 80–99.9)
MONOCYTES ABSOLUTE: 0.75 E9/L (ref 0.1–0.95)
MONOCYTES RELATIVE PERCENT: 9.1 % (ref 2–12)
NEUTROPHILS ABSOLUTE: 6.46 E9/L (ref 1.8–7.3)
NEUTROPHILS RELATIVE PERCENT: 78.3 % (ref 43–80)
PDW BLD-RTO: 13.3 FL (ref 11.5–15)
PLATELET # BLD: 432 E9/L (ref 130–450)
PMV BLD AUTO: 11.8 FL (ref 7–12)
POTASSIUM SERPL-SCNC: 4.3 MMOL/L (ref 3.5–5)
PRO-BNP: 539 PG/ML (ref 0–125)
PROCALCITONIN: 0.08 NG/ML (ref 0–0.08)
RBC # BLD: 5.41 E12/L (ref 3.8–5.8)
SODIUM BLD-SCNC: 134 MMOL/L (ref 132–146)
TOTAL PROTEIN: 7.2 G/DL (ref 6.4–8.3)
WBC # BLD: 8.3 E9/L (ref 4.5–11.5)

## 2022-01-17 PROCEDURE — 6370000000 HC RX 637 (ALT 250 FOR IP): Performed by: INTERNAL MEDICINE

## 2022-01-17 PROCEDURE — 83880 ASSAY OF NATRIURETIC PEPTIDE: CPT

## 2022-01-17 PROCEDURE — 84145 PROCALCITONIN (PCT): CPT

## 2022-01-17 PROCEDURE — 80048 BASIC METABOLIC PNL TOTAL CA: CPT

## 2022-01-17 PROCEDURE — 86140 C-REACTIVE PROTEIN: CPT

## 2022-01-17 PROCEDURE — 80076 HEPATIC FUNCTION PANEL: CPT

## 2022-01-17 PROCEDURE — 85378 FIBRIN DEGRADE SEMIQUANT: CPT

## 2022-01-17 PROCEDURE — 97530 THERAPEUTIC ACTIVITIES: CPT | Performed by: OCCUPATIONAL THERAPIST

## 2022-01-17 PROCEDURE — 2700000000 HC OXYGEN THERAPY PER DAY

## 2022-01-17 PROCEDURE — 6370000000 HC RX 637 (ALT 250 FOR IP): Performed by: NURSE PRACTITIONER

## 2022-01-17 PROCEDURE — 6360000002 HC RX W HCPCS: Performed by: INTERNAL MEDICINE

## 2022-01-17 PROCEDURE — 6360000002 HC RX W HCPCS: Performed by: NURSE PRACTITIONER

## 2022-01-17 PROCEDURE — 94640 AIRWAY INHALATION TREATMENT: CPT

## 2022-01-17 PROCEDURE — 2060000000 HC ICU INTERMEDIATE R&B

## 2022-01-17 PROCEDURE — 36415 COLL VENOUS BLD VENIPUNCTURE: CPT

## 2022-01-17 PROCEDURE — 85025 COMPLETE CBC W/AUTO DIFF WBC: CPT

## 2022-01-17 PROCEDURE — 97168 OT RE-EVAL EST PLAN CARE: CPT | Performed by: OCCUPATIONAL THERAPIST

## 2022-01-17 RX ORDER — PANTOPRAZOLE SODIUM 40 MG/1
40 TABLET, DELAYED RELEASE ORAL
Qty: 30 TABLET | Refills: 0 | Status: SHIPPED | OUTPATIENT
Start: 2022-01-17 | End: 2022-01-26

## 2022-01-17 RX ORDER — DOXYCYCLINE HYCLATE 100 MG/1
100 CAPSULE ORAL EVERY 12 HOURS SCHEDULED
Qty: 10 CAPSULE | Refills: 0 | Status: SHIPPED | OUTPATIENT
Start: 2022-01-17 | End: 2022-01-22

## 2022-01-17 RX ORDER — HYDRALAZINE HYDROCHLORIDE 25 MG/1
25 TABLET, FILM COATED ORAL 4 TIMES DAILY
Qty: 90 TABLET | Refills: 3 | Status: SHIPPED | OUTPATIENT
Start: 2022-01-17 | End: 2022-01-26

## 2022-01-17 RX ORDER — DEXAMETHASONE 6 MG/1
6 TABLET ORAL
Qty: 10 TABLET | Refills: 0 | Status: SHIPPED | OUTPATIENT
Start: 2022-01-17 | End: 2022-01-26

## 2022-01-17 RX ORDER — CHOLECALCIFEROL (VITAMIN D3) 50 MCG
2000 TABLET ORAL DAILY
Qty: 30 TABLET | Refills: 0 | Status: SHIPPED | OUTPATIENT
Start: 2022-01-18 | End: 2022-01-26

## 2022-01-17 RX ORDER — CARVEDILOL 3.12 MG/1
3.12 TABLET ORAL 2 TIMES DAILY WITH MEALS
Qty: 60 TABLET | Refills: 3 | Status: SHIPPED | OUTPATIENT
Start: 2022-01-17 | End: 2022-01-26

## 2022-01-17 RX ORDER — GUAIFENESIN/DEXTROMETHORPHAN 100-10MG/5
5 SYRUP ORAL EVERY 4 HOURS PRN
Qty: 120 ML | Refills: 0 | Status: SHIPPED | OUTPATIENT
Start: 2022-01-17 | End: 2022-01-26

## 2022-01-17 RX ORDER — ZINC SULFATE 50(220)MG
50 CAPSULE ORAL DAILY
Qty: 30 CAPSULE | Refills: 0 | COMMUNITY
Start: 2022-01-18 | End: 2022-01-26

## 2022-01-17 RX ADMIN — Medication 400 UNITS: at 09:37

## 2022-01-17 RX ADMIN — OXYCODONE HYDROCHLORIDE AND ACETAMINOPHEN 1000 MG: 500 TABLET ORAL at 08:58

## 2022-01-17 RX ADMIN — PENTOXIFYLLINE 400 MG: 400 TABLET, FILM COATED, EXTENDED RELEASE ORAL at 11:51

## 2022-01-17 RX ADMIN — Medication 500 MG: at 21:05

## 2022-01-17 RX ADMIN — PENTOXIFYLLINE 400 MG: 400 TABLET, FILM COATED, EXTENDED RELEASE ORAL at 08:58

## 2022-01-17 RX ADMIN — ZINC SULFATE 220 MG (50 MG) CAPSULE 50 MG: CAPSULE at 08:58

## 2022-01-17 RX ADMIN — ARFORMOTEROL TARTRATE 15 MCG: 15 SOLUTION RESPIRATORY (INHALATION) at 07:55

## 2022-01-17 RX ADMIN — MONTELUKAST SODIUM 10 MG: 10 TABLET, FILM COATED ORAL at 21:05

## 2022-01-17 RX ADMIN — MIRTAZAPINE 15 MG: 15 TABLET, ORALLY DISINTEGRATING ORAL at 21:07

## 2022-01-17 RX ADMIN — ENOXAPARIN SODIUM 40 MG: 100 INJECTION SUBCUTANEOUS at 21:05

## 2022-01-17 RX ADMIN — PENTOXIFYLLINE 400 MG: 400 TABLET, FILM COATED, EXTENDED RELEASE ORAL at 16:20

## 2022-01-17 RX ADMIN — BARICITINIB 2 MG: 2 TABLET, FILM COATED ORAL at 08:58

## 2022-01-17 RX ADMIN — DOXYCYCLINE HYCLATE 100 MG: 100 CAPSULE ORAL at 08:58

## 2022-01-17 RX ADMIN — CARVEDILOL 3.12 MG: 3.12 TABLET, FILM COATED ORAL at 16:20

## 2022-01-17 RX ADMIN — BUDESONIDE 500 MCG: 0.5 INHALANT RESPIRATORY (INHALATION) at 18:15

## 2022-01-17 RX ADMIN — GUAIFENESIN 1200 MG: 600 TABLET, EXTENDED RELEASE ORAL at 21:05

## 2022-01-17 RX ADMIN — CARVEDILOL 3.12 MG: 3.12 TABLET, FILM COATED ORAL at 08:58

## 2022-01-17 RX ADMIN — PANTOPRAZOLE SODIUM 40 MG: 40 TABLET, DELAYED RELEASE ORAL at 08:58

## 2022-01-17 RX ADMIN — GUAIFENESIN SYRUP AND DEXTROMETHORPHAN 5 ML: 100; 10 SYRUP ORAL at 06:07

## 2022-01-17 RX ADMIN — DOXYCYCLINE HYCLATE 100 MG: 100 CAPSULE ORAL at 21:05

## 2022-01-17 RX ADMIN — TAMSULOSIN HYDROCHLORIDE 0.4 MG: 0.4 CAPSULE ORAL at 08:58

## 2022-01-17 RX ADMIN — Medication 2000 UNITS: at 08:58

## 2022-01-17 RX ADMIN — ENOXAPARIN SODIUM 40 MG: 100 INJECTION SUBCUTANEOUS at 09:36

## 2022-01-17 RX ADMIN — ASPIRIN 81 MG CHEWABLE TABLET 81 MG: 81 TABLET CHEWABLE at 09:36

## 2022-01-17 RX ADMIN — ARFORMOTEROL TARTRATE 15 MCG: 15 SOLUTION RESPIRATORY (INHALATION) at 18:15

## 2022-01-17 RX ADMIN — CLOPIDOGREL 75 MG: 75 TABLET, FILM COATED ORAL at 08:58

## 2022-01-17 RX ADMIN — BUPROPION HYDROCHLORIDE 150 MG: 150 TABLET, EXTENDED RELEASE ORAL at 09:36

## 2022-01-17 RX ADMIN — ATORVASTATIN CALCIUM 20 MG: 20 TABLET, FILM COATED ORAL at 21:05

## 2022-01-17 RX ADMIN — BUDESONIDE 500 MCG: 0.5 INHALANT RESPIRATORY (INHALATION) at 07:55

## 2022-01-17 RX ADMIN — GUAIFENESIN 1200 MG: 600 TABLET, EXTENDED RELEASE ORAL at 08:58

## 2022-01-17 RX ADMIN — HYDROCODONE BITARTRATE AND ACETAMINOPHEN 1 TABLET: 5; 325 TABLET ORAL at 06:07

## 2022-01-17 ASSESSMENT — PAIN SCALES - GENERAL
PAINLEVEL_OUTOF10: 0
PAINLEVEL_OUTOF10: 6
PAINLEVEL_OUTOF10: 6
PAINLEVEL_OUTOF10: 0

## 2022-01-17 NOTE — PROGRESS NOTES
[address] [facility]  [facility] [place]. [place]         Date:2022                                                   Patient Name: Adalberto Arroyo     MRN: [id number]     : 1950     Room: [address] [place] [address]    Evaluating OT: Misha Blake OTR/L; [id number]  Referring Provider/Orders/Date:    22 1100  OT eval and treat  (PT/OT CONSULT PANEL)  ONE TIME         22 1052       Diagnosis:   1. Respiratory distress    2. COVID-19    3.  Hypoxia         Pertinent Medical History:        Past Medical History:   Diagnosis Date    Anesthesia complication     wakes up  violant   only ok if reversed with romazacon    Arthritis     shoulders,ankle    CAD (coronary artery disease)     [facility] [address]     Erectile dysfunction     GERD (gastroesophageal reflux disease)     H/O coronary angioplasty with stents[V45.82] 2018 another stent    Hyperlipidemia     Hypertension     Lymphoma ([facility] [address] 2011    had chemo  currently in remission    Myocardial infarction [facility])     more than 10 yrs ago    Sleep apnea     wont wear machine    Unspecified cerebral artery occlusion with cerebral infarction     no deficits          Past Surgical History:   Procedure Laterality Date    ANKLE FRACTURE SURGERY Right 2014    ORIF right ankle    ANKLE SURGERY      rt ankle    BRONCHOSCOPY  2011    bronch / medistinoscopy    CAROTID ENDARTERECTOMY Left     12 yrs ago    CHOLECYSTECTOMY      COLONOSCOPY  2013     [facility]    CORONARY ANGIOPLASTY WITH STENT PLACEMENT  2008    states has 2 stents  follows with DR Jerardo King    ENDOSCOPY, COLON, DIAGNOSTIC      HAND SURGERY Left     fell on a buzzsaw    HERNIA REPAIR Bilateral 08/15/2019    HERNIA  BILATERAL INGUINAL REPAIR WITH MESH LAPAROSCOPIC ROBOTIC XI ASSISTED performed by Kelley Rodriguez MD at [place] [address] HISTORY  06/29/2015    lapraoscopic cholecystectomy    SHOULDER ARTHROSCOPY Right 10/08/2015    rotator cuff repair, subachromoplasty with labrial debridement    SHOULDER ARTHROSCOPY Left 2/18/2021    LEFT ROTATOR CUFF REPAIR LABRAL DEBRIDEMENT SUBACROMIAL DECOMPRESSION performed by Radha Metzger DO at [facility]      UPPER GASTROINTESTINAL ENDOSCOPY         Precautions:  Fall Risk, covid + with droplet, 2L    Recommended placement: home with HH    Assessment of current deficits     [x] Functional mobility  [x]ADLs  [x] Strength               []Cognition     [x] Functional transfers   [x] IADLs         [x] Safety Awareness   [x]Endurance     [] Fine Coordination              [x] Balance      [] Vision/perception   []Sensation      [x]Gross Motor Coordination  [] ROM  [] Delirium                   [] Motor Control     OT PLAN OF CARE   OT POC based on physician orders, patient diagnosis and results of clinical assessment    Frequency/Duration 1-3 days/wk for 2 weeks PRN   Specific OT Treatment Interventions to include:   * Instruction/training on adapted ADL techniques and AE recommendations to increase functional independence within precautions       * Training on energy conservation strategies, correct breathing pattern and techniques to improve independence/tolerance for self-care routine  * Functional transfer/mobility training/DME recommendations for increased independence, safety, and fall prevention  * Patient/Family education to increase follow through with safety techniques and functional independence  * Recommendation of environmental modifications for increased safety with functional transfers/mobility and ADLs  * Therapeutic exercise to improve motor endurance, ROM, and functional strength for ADLs/functional transfers  * Therapeutic activities to facilitate/challenge dynamic balance, stand tolerance for increased safety and independence with ADLs  * Therapeutic activities to facilitate gross/fine motor skills for increased independence with ADLs  * Positioning to improve skin integrity, interaction with environment and functional independence    Home Living: Lives alone, single family home, bi-level, 2 steps to enter, 9 steps downward to kitchen, 3 steps upstairs to living room and bedroom, 3 stairs upstairs to bathroom. Bathroom set-up: tub/shower          Equipment owned: wheeled, cane      Prior Level of Function: Independent with ADLs , Independent with IADLs; ambulated independently without AD.      Driving: Yes   Occupation: Field work/heavy machinery   Enjoys: Staying active       Pain Level: Pt denied pain.         Cognition: A&O: 4/4; Follows 3 step directions              Memory: good               Sequencing: good               Problem solving: good               Judgement/safety: fair      [facility]   AM-PAC Daily Activity Inpatient   How much help for putting on and taking off regular lower body clothing?: A Little  How much help for Bathing?: A Little  How much help for Toileting?: A Little  How much help for putting on and taking off regular upper body clothing?: A Little  How much help for taking care of personal grooming?: A Little  How much help for eating meals?: None  AM-[facility] Inpatient Daily Activity Raw Score: 19  AM-PAC Inpatient ADL T-Scale Score : 40.22  ADL Inpatient CMS 0-100% Score: 42.8  ADL Inpatient CMS G-Code Modifier : CK                Functional Assessment:     Initial Eval Status  Date: 1/5/21 Re-eval 1/17/22: Treatment Status  Date: STGs = LTGs  Time frame: 10-14 days   Feeding Independent     Indep   Not Appropriate-PLOF     Grooming Minimal Assist     SBA standing at sink to wash hands with pt reporting fatigue   Moderate [place]    UB Dressing Minimal Assist    Min A with gown management   Moderate [place]    LB Dressing Moderate Assist   Pt unable to don socks at EOB, does not wear socks at home.  Pt donned slip on shoes with set-up assist.  Min A due to SOB and with cues for energy conservation. Don pants with assist to thread over left foot and balance assist in standing. Don mayco socks from sitting EOB   Moderate [place]    Bathing Moderate Assist     SBA in sitting/standing   Moderate [place]    Toileting Minimal Assist   Simulated seated on commode  SBA with extended O2 line provided.    Moderate [place]    Bed Mobility  Supine to sit:  n/a  Sit to supine: n/a  indep for all bed mobility   Not Appropriate-PLOF     Functional Transfers Sit to stand: Stand by Assist  Stand to sit: Stand by Assist   Commode transfer: Stand by Assist     Sit to stand: Stand by Assist  Stand to sit: Stand by Assist   Commode transfer: Stand by Assist    Independent    Functional Mobility Minimal Assist with HHA to improve balance to/from bathroom, verbal cues for pacing and overall safety. SBA without AD for short distance functional mobility throughout the room to simulate house hold distances.     Independent    Balance Sitting:     Static: stand by assist    Dynamic: stand by assist   Standing: minimal assist with HHA    Fair overall in sitting and standing with SOB    Sitting:     Static: indep    Dynamic: indep  Standing:  indep    Activity Tolerance fair /fair plus; sitting tolerance at EOB >20 mins. Pt reports mild SOB with increased activity demands. Fair tolerance with vitals ranging from 87-92% with ADLs and functional mobility. Extended time and therapeutic rest periods required.  Standing tolerance 2min average   Increase standing tolerance >3 minutes for improved engagement with functional transfers and indep in ADLs   Visual/  Perceptual Glasses: No      Reports changes in vision since admission: No     NA   NA       Hand Dominance: Right        AROM (PROM) Strength Additional Info:    RUE  WFL 4/5 good  and wfl FMC/dexterity noted during ADL tasks      LUE WFL 4-/5 good  and wfl FMC/dexterity noted during ADL tasks         Hearing:  WFL   Sensation:   No c/o numbness or tingling  Tone:  WFL   Edema: none    Comments: Re-evaluation required due to change in medical status, new physician orders or change in physical status. Upon arrival patient side lying. Pt required min A for most UB ADLs and SBA LB ADLs tasks. Limited with SB A for standing during LB ADLs and functional transfers. The biggest barriers reflect that of functional transfers, functional mobility, UB/LB ADLs, activity tolerance, balance, safety and strengthening. At end of session, patient supine with call light and phone within reach, all lines and tubes intact. Overall patient demonstrated decreased independence and safety during completion of ADL/functional transfer/mobility tasks. Nursing updated on pt position and status following OT eval. Pt would benefit from continued skilled OT to increase safety and independence with completion of ADL/IADL tasks for functional independence and quality of life. Treatment: OT treatment provided this date includes:   Instruction, education and training on safe facilitation and adapted techniques for completion of ADLs. These include neuromuscular reeducation to facilitate balance/righting reactions, safe functional transfer techniques and on energy conservation/work simplification for completion of ADLs. Education provided on hand/feet placement with bed, toilet and body mechanics for fall prevention. Cues for energy conservation and safety for in the home at [place], including modifications and DME. Extended time was also required due to covid iso and extended time for proper PPE application and gathering of needed supplies for safety to stop the spread of disease. Thorough education provided to pt regarding breathing strategies, energy conservation and safety with new covid diagnosis.    Extended time to complete all tasks, including skilled monitoring of patient's response during treatment session and vital signs. Prior to and at the end of session, environmental modifications / line management completed for patients safety and efficiency of treatment session. See above for further details. Rehab Potential: Good for established goals     Patient / Family Goal: return home      Patient and/or family were instructed on functional diagnosis, prognosis/goals and OT plan of care. Demonstrated good understanding. Eval Complexity: Re-evaluation    Time In: 1109  Time Out: 1139  Total Treatment Time: 10    Min Units   OT Eval Low 44408       OT Eval Medium 89074      OT Eval High 43417      OT Re-Eval 97168  x  1   Therapeutic Ex 97684       Therapeutic Activities 61923  10 1    ADL/Self Care 24949       Orthotic Management 22490       Manual [id number]     Neuro Re-Ed 39132       Non-Billable Time          Evaluation Time additionally includes thorough review of current medical information, gathering information on past medical history/social history and prior level of function, interpretation of standardized testing/informal observation of tasks, assessment of data and development of plan of care and goals.             Petra Forbes OTR/L; [id number]

## 2022-01-17 NOTE — PROGRESS NOTES
Spoke to patients daughter Sammi Ren, update given. Questions answered. Sammi Ren is concerned with patient restarting entresto.  Requests if being restarted to do so at hospital for monitoring

## 2022-01-17 NOTE — PROGRESS NOTES
Pt. walked around on room air O2 dropped to   87% pt. Oxygen with nasal canula 3l was 90% . Pt. Need 6l to maintain sat. Above 93% while walking. sitting on the side of the bed pt. Need 3l to maintain 92 above sat.

## 2022-01-17 NOTE — DISCHARGE SUMMARY
Internal Medicine Progress Note     TRACE=[facility]     Yovani Cortes. Sachin Wang., F.A.C.O.I. Gemma Morgan D.O., THANH.CShellyOHAN Corbin, MSN, APRN, NP-C  Romayne Divine. Tamy Huff, MSN, APRN-CNP       Internal Medicine  Discharge Summary    NAME: Shaq Espinal  :  1950  MRN:  [id number]  PCP:Chance Santos DO  ADMITTED: 2022      DISCHARGED: 22    ADMITTING PHYSICIAN: Levi Benitez DO    CONSULTANT(S):   IP CONSULT TO INTERNAL MEDICINE  IP CONSULT TO PHARMACY  IP CONSULT TO CARDIOLOGY  IP CONSULT TO SOCIAL WORK  IP CONSULT TO SOCIAL WORK     ADMITTING DIAGNOSIS:   Respiratory distress [R06.03]  Hypoxia [R09.02]  COVID-19 [U07.1]     DISCHARGE DIAGNOSES:   1. Acute respiratory failure with hypoxia secondary to COVID-19 pneumonia in an unvaccinated host  2. Acute exacerbation of COPD secondary to viral pneumonia  3. Hypertensive urgency with underlying history of hypertension  4. Asymptomatic coronary artery disease with drug-eluting stents in place  5. Decompensated diastolic congestive heart failure with associated pleural effusion and echo evidence of worsening cardiomyopathy with LVEF now 28%  6. Chronic kidney disease stage IIIa  7. Hyperlipidemia  8. Vascular disease with history of TIA and left carotid stenting   9. Non-Hodgkin's lymphoma with prior chemotherapy followed by the heart [place]   10. Untreated obstructive sleep apnea       BRIEF HISTORY OF PRESENT ILLNESS:   Kodi Chowdary is a 22-year-old male patient who presented to [address] [place] [address] emergency department with ongoing shortness of breath. Symptoms spanned across multiple days. He presented to the emergency department where he was found to be acutely hypoxic and required the institution of nasal cannula oxygen. He was somewhat distressed upon arrival but this calmed with treatment administered. He was found to have COVID-19 infection and is unvaccinated. Influenza, infection was excluded. CBC was essentially unremarkable aside from mild lymphopenia. High-sensitivity troponin x1 was only mildly elevated proBNP was elevated at 5611. Lactic acid was normal. Urinalysis was unremarkable. D-dimer was moderately elevated at 477. Chest x-ray showed prominent reticular opacities more confluent in the left lower lobe concerning for pneumonia. CTA of the chest was performed and showed pleural effusions and bilateral pulmonary opacities consistent with edema or infiltrates as well as Covid pneumonia. PE was excluded. Case was discussed with Dr. Colt Gupta and ER physician, and will be admitted for further management.     LABS[de-identified]  Lab Results   Component Value Date    WBC 8.3 01/17/2022    HGB 15.0 01/17/2022    HCT 47.3 01/17/2022     01/17/2022     01/17/2022    K 4.3 01/17/2022    CL 97 (L) 01/17/2022    CREATININE 1.3 (H) 01/17/2022    BUN 34 (H) 01/17/2022    CO2 23 01/17/2022    GLUCOSE 107 (H) 01/17/2022    ALT 75 (H) 01/17/2022    AST 63 (H) 01/17/2022    INR 1.1 01/05/2022     Lab Results   Component Value Date    INR 1.1 01/05/2022    INR 1.1 01/04/2022    INR 1.0 10/07/2015    PROTIME 12.7 (H) 01/05/2022    PROTIME 13.2 (H) 01/04/2022    PROTIME 12.2 10/07/2015      Lab Results   Component Value Date    TSH 1.540 07/18/2019     Lab Results   Component Value Date    TRIG 149 06/16/2021    TRIG 174 (H) 11/06/2019    TRIG 129 01/12/2018     Lab Results   Component Value Date    HDL 43 06/16/2021    HDL 36 11/06/2019    HDL 45 01/12/2018     Lab Results   Component Value Date    LDLCALC 153 (H) 06/16/2021    LDLCALC 127 (H) 11/06/2019    LDLCALC 104 (H) 01/12/2018     Lab Results   Component Value Date    LABA1C 5.6 07/31/2018       IMAGING:  Echo Complete    Result Date: 1/5/2022  Transthoracic Echocardiography Report (TTE)  Demographics   Patient Name     POSTLETHWAIT      Gender             Male                   Pama Loach   Medical Record   [id number]          Room Number        3622  Number   Account #        [de-identified]         Procedure Date     01/05/2022   Corporate ID                       Ordering Physician   Accession Number [id number]        Referring                                     Physician   Date of Birth    1950        Daniel Pichardo RDCS   Age              70 year(s)        Interpreting       Nestor Mccartney DO                                     Physician                                      Any Other  Procedure Type of Study   TTE procedure  Procedure Date Date: 01/05/2022 Start: 01:18 PM Study Location: Portable Technical Quality: Adequate visualization Indications:LV function. Patient Status: Routine Rhythm: Within normal limits HR: 70 bpm BP: 166/81 mmHg  Findings   Left Ventricle  Left ventricle is mildly enlarged . Normal left ventricular wall thickness. Ejection fraction is visually estimated at 28%. Global left ventricular dysfunction   Right Ventricle  Mildly dilated right ventricle. Left Atrium  Normal sized left atrium. Interatrial septum appears intact. Right Atrium  Normal right atrium size. Mitral Valve  Physiologic and/or trace mitral regurgitation is present. No evidence of mitral valve stenosis. Tricuspid Valve  Physiologic and/or trace tricuspid regurgitation. Aortic Valve  Aortic valve opens well. The aortic valve is trileaflet. No evidence of aortic valve regurgitation. No hemodynamically significant aortic stenosis is present. Pulmonic Valve  Pulmonic valve is structurally normal.   Pericardial Effusion  No evidence of pericardial effusion. Aorta  The aorta is within normal limits. Miscellaneous  Regular rhythm. Conclusions   Summary  Left ventricle is mildly enlarged . Normal left ventricular wall thickness. Ejection fraction is visually estimated at 28%. Global left ventricular dysfunction  Normal sized left atrium.   Interatrial septum appears intact. Mildly dilated right ventricle. Physiologic and/or trace mitral regurgitation is present. No evidence of mitral valve stenosis. Physiologic and/or trace tricuspid regurgitation. Regular rhythm. Signature   ----------------------------------------------------------------  Electronically signed by Ramona PATTERSONInterpreting  physician) on 01/05/2022 06:31 PM  ----------------------------------------------------------------  M-Mode/2D Measurements & Calculations   LV Diastolic        LV Systolic Dimension: 4.9 cm    LA Dimension: 3 cm  Dimension: 5.7 cm   LV Volume Diastolic: 704.6 ml  LV VX:29 %          LV Volume Systolic: 861 ml  LV PW Diastolic: 1  LV EDV/LV EDV Index: 158.4 mlLV  cm                  ESV/LV ESV Index: 114 ml         RV Diastolic  Septum Diastolic: 1 EF Calculated: 28 %              Dimension: 3.3 cm  cm   LV Mass: 226.35 g                      LVOT: 2 cm  Doppler Measurements & Calculations   MV Peak E-Wave: 0.94    AV Peak Velocity: 1.21  m/s                     m/s                      Estimated RVSP: 27.5 mmHg                          AV Peak Gradient: 5.83   Estimated RAP:5 mmHg  MV Peak Gradient: 3.6   mmHg  mmHg                    AV Mean Velocity: 0.91  MV Mean Gradient: 1.7   m/s                      TR Velocity:2.37 m/s  mmHg                    AV Mean Gradient: 3.6    TR Gradient:22.52 mmHg  MV Mean Velocity: 0.62  mmHg  m/s                     AV VTI: 25.8 cm  MV P1/2t: 62.9 msec  MVA by PHT:3.5 cm^2                           Estimated PASP: 27.52                          mmHg  [email or web address] [id number]    XR CHEST (2 VW)    Result Date: 1/4/2022  EXAMINATION: TWO XRAY VIEWS OF THE CHEST 1/4/2022 4:30 pm COMPARISON: 02/18/2021 HISTORY: ORDERING SYSTEM PROVIDED HISTORY: SOB TECHNOLOGIST PROVIDED HISTORY: Reason for exam:->SOB FINDINGS: Heart is upper limits of normal in size. Bilateral reticular opacities with hazy lung consolidation in the left lower lobe compatible with pneumonia. Consider COVID-19 pneumonia. Lung volumes are slightly increased with flattening of the hemidiaphragms on the lateral view. Osseous structures are within normal limits. Prominent reticular opacities, more confluent in the left lower lobe. Findings are compatible with pneumonia including COVID-19 pneumonia. US GALLBLADDER RUQ    Result Date: 1/5/2022  EXAMINATION: RIGHT UPPER QUADRANT ULTRASOUND 1/5/2022 10:40 am COMPARISON: None. HISTORY: ORDERING SYSTEM PROVIDED HISTORY: Transaminitis TECHNOLOGIST PROVIDED HISTORY: Reason for exam:->Transaminitis What reading provider will be dictating this exam?->CRC FINDINGS: LIVER:  The liver demonstrates normal echogenicity without evidence of intrahepatic biliary ductal dilatation. BILIARY SYSTEM:  Gallbladder is surgically absent. Common bile duct is within normal limits measuring 4.1 mm. RIGHT KIDNEY: Right kidney is normal in size, right renal cortical cysts are present, largest measures 2.5 x 2.4 x 2.4 cm. No hydronephrosis mass or calculus. PANCREAS:  Was not well visualized OTHER: There is a right pleural effusion incidentally noted. Small right pleural effusion. Liver appears normal in size. Common bile duct is normal.  Right renal cortical cysts noted. RECOMMENDATIONS: Unavailable     XR CHEST PORTABLE    Result Date: 1/12/2022  EXAMINATION: ONE XRAY VIEW OF THE CHEST 1/12/2022 9:15 am COMPARISON: None. HISTORY: ORDERING SYSTEM PROVIDED HISTORY: Worsening hypoxia TECHNOLOGIST PROVIDED HISTORY: Reason for exam:->Worsening hypoxia FINDINGS: Bilateral lower opacities identified. There is no effusion or pneumothorax. The cardiomediastinal silhouette is without acute process. The osseous structures are without acute process.      Lower lung opacities may represent atelectasis or pneumonia in the proper clinical setting. XR CHEST PORTABLE    Result Date: 1/9/2022  EXAMINATION: ONE XRAY VIEW OF THE CHEST 1/9/2022 4:51 am COMPARISON: 01/08/2022 HISTORY: ORDERING SYSTEM PROVIDED HISTORY: sob TECHNOLOGIST PROVIDED HISTORY: Reason for exam:->sob FINDINGS: EKG leads overlie the chest.  Heart size is normal.  No pneumothorax. Prominent emphysematous changes. Patchy parenchymal opacities may be slightly worsened, although lung volumes are slightly diminished. Tiny pleural effusions are suspected. No other interval change. Diminished lung volume with questionable slight worsening of bilateral infiltrates. No other significant change. Continued follow-up recommended. XR CHEST PORTABLE    Result Date: 1/8/2022  EXAMINATION: ONE XRAY VIEW OF THE CHEST 1/8/2022 6:52 pm COMPARISON: Previous CT of the chest of 01/04/2022 and previous chest x-ray of 01/05/2022 HISTORY: ORDERING SYSTEM PROVIDED HISTORY: dyspnea TECHNOLOGIST PROVIDED HISTORY: Reason for exam:->dyspnea FINDINGS: There is continued interval clearing of the bilateral pulmonary infiltrates. There is a small residual infiltrate seen within the right lower lobe laterally there is a small residual infiltrate within the left lung base. There are trace bilateral pleural effusions. The cardiac silhouette is within normals. 1. Continued interval clearing of the previously identified patchy infiltrate seen within the right and left lung bases. Minimal residual pneumonia is noted. 2. Emphysematous changes 3. Trace bilateral pleural effusions     XR CHEST PORTABLE    Result Date: 1/5/2022  EXAMINATION: ONE XRAY VIEW OF THE CHEST 1/5/2022 6:21 am COMPARISON: 01/04/2022 HISTORY: ORDERING SYSTEM PROVIDED HISTORY: recheck pleural effusions TECHNOLOGIST PROVIDED HISTORY: Reason for exam:->recheck pleural effusions FINDINGS: The cardiomediastinal silhouette is unchanged. There are left lower lung airspace opacities, unchanged. Small right pleural effusion. Suspected trace left pleural effusion. Small right and suspected trace left pleural effusions. Left lower lobe opacities, concerning for pneumonia. CTA CHEST W CONTRAST    Result Date: 1/4/2022  EXAMINATION: CTA OF THE CHEST 1/4/2022 5:14 pm TECHNIQUE: CTA of the chest was performed after the administration of intravenous contrast.  Multiplanar reformatted images are provided for review. MIP images are provided for review. Dose modulation, iterative reconstruction, and/or weight based adjustment of the mA/kV was utilized to reduce the radiation dose to as low as reasonably achievable. COMPARISON: None. HISTORY: ORDERING SYSTEM PROVIDED HISTORY: rule out PE TECHNOLOGIST PROVIDED HISTORY: Reason for exam:->rule out PE Decision Support Exception - unselect if not a suspected or confirmed emergency medical condition->Emergency Medical Condition ([place]) COVID FINDINGS: Pulmonary Arteries: No filling defects of acute PE are identified. Mediastinum: There are coronary artery calcifications. Lungs/pleura: Pulmonary opacification is present in the lower lobes and left upper lobe primarily. It consists of interstitial thickening and mildly aggregated densities. There pleural effusions, small to moderate on the right and small on the left. Partial airway occlusions are present such as right lower lobe, may represent mucous Upper Abdomen: No acute abnormality on limited evaluation Soft Tissues/Bones: No acute bone or soft tissue abnormality. 1. Pleural effusions and bilateral pulmonary opacities which may represent edema or infiltrates such as COVID pneumonia, superimposed upon pulmonary emphysema 2. No definite acute pulmonary embolus is identified         HOSPITAL COURSE:   Nicko Morales spent an extended period of time hospitalized and this will be a brief synopsis of the events that occurred during the hospitalization.   He was found to be suffering from COVID-pneumonia resulting in acute respiratory failure with hypoxia. It is important to note he is unvaccinated. His compliance was called into question multiple times throughout the hospitalization as he did not actively engage in frequent repositioning and use of the incentive spirometer. On 1 particular night, he removed the oxygen and threatened to leave [address] Fortunately, his respiratory status improved once he committed to the program.  He received maximal COVID protocol. He has been weaned to 6 L of nasal cannula oxygen and will require this moving forward. He will complete a course of corticosteroids as well as vitamin supplementation and inhaler therapy as an outpatient. Also to note, throughout the hospitalization he was found to be suffering from a new cardiomyopathy with an ejection fraction of 28%. Cardiac medications were titrated and the cardiovascular team provided consultation. Patient defers LifeVest placement. He will require cardiac catheterization in the future once his respiratory status has improved. I made it a point to spend a great deal of time in discussion today regarding the need for outpatient follow-up. Particularly with the cardiovascular team.  He voiced understanding and agreement. We reinforced the need to utilize the incentive spirometer and remain active upon returning home. He is acceptable for discharge once oxygen can be delivered. BRIEF PHYSICAL EXAMINATION AND LABORATORIES ON DAY OF DISCHARGE:  VITALS:  /79   Pulse 94   Temp 97.9 °F (36.6 °C) (Infrared)   Resp 20   Ht 5' 8\" (1.727 m)   Wt 200 lb (90.7 kg)   SpO2 91%   BMI 30.41 kg/m²     HEENT:  PERRLA. EOMI. Sclera clear. Buccal mucosa moist.  Nasal cannula oxygen in place. Neck:  Supple. Trachea midline. No thyromegaly. No JVD. No bruits. Heart:  Rhythm regular, rate controlled. Systolic murmur. Lungs:  Symmetrical. Clear to auscultation bilaterally. No wheezes. No rhonchi. No rales. Abdomen: Soft. Non-tender. Non-distended. Bowel sounds positive. No organomegaly or masses. No pain on palpation    Extremities:  Peripheral pulses present. No peripheral edema. No ulcers. Neurologic:  Alert x 3. No focal deficit. Cranial nerves grossly intact. Skin:  No petechia. No hemorrhage. No wounds. DISPOSITION:  The patient's condition is good. At this time the patient is without objective evidence of an acute process requiring continuing hospitalization or inpatient management. They are stable for discharge with outpatient follow-up. I have spoken with the patient and discussed the results of the current hospitalization, in addition to providing specific details for the plan of care and counseling regarding the diagnosis and prognosis. The plan has been discussed in detail and they are aware of the specific conditions for emergent return, as well as the importance of follow-up. Their questions are answered at this time and they are agreeable with the plan for discharge to home    DISCHARGE MEDICATIONS:   Current Discharge Medication List           Details   ascorbic acid (VITAMIN C) 1000 MG tablet Take 1 tablet by mouth daily  Qty: 30 tablet, Refills: 3      carvedilol (COREG) 3.125 MG tablet Take 1 tablet by mouth 2 times daily (with meals)  Qty: 60 tablet, Refills: 3      doxycycline hyclate (VIBRAMYCIN) 100 MG capsule Take 1 capsule by mouth every 12 hours for 5 days  Qty: 10 capsule, Refills: 0      guaiFENesin-dextromethorphan (ROBITUSSIN DM) 100-10 MG/5ML syrup Take 5 mLs by mouth every 4 hours as needed for Cough  Qty: 120 mL, Refills: 0      Vitamin D (CHOLECALCIFEROL) 50 MCG (2000 UT) TABS tablet Take 1 tablet by mouth daily  Qty: 30 tablet, Refills: 0    Comments: Labeling may look different. 25 mcg=1000 Units. Please double check dosages.       zinc sulfate (ZINCATE) 220 (50 Zn) MG capsule Take 1 capsule by mouth daily  Qty: 30 capsule, Refills: 0      hydrALAZINE (APRESOLINE) 25 MG tablet Take 1 tablet by mouth 4 times daily  Qty: 90 tablet, Refills: 3      dexamethasone (DECADRON) 6 MG tablet Take 1 tablet by mouth daily (with breakfast) for 10 days  Qty: 10 tablet, Refills: 0      pantoprazole (PROTONIX) 40 MG tablet Take 1 tablet by mouth every morning (before breakfast)  Qty: 30 tablet, Refills: 0      albuterol-ipratropium (COMBIVENT RESPIMAT)  MCG/ACT AERS inhaler Inhale 1 puff into the lungs every 6 hours  Qty: 5 each, Refills: 0    Comments: May substitute for covered alternative or individual components              Details   tamsulosin (FLOMAX) 0.4 MG capsule TAKE 1 CAPSULE BY MOUTH AT BEDTIME  Qty: 30 capsule, Refills: 2      sertraline (ZOLOFT) 100 MG tablet Take 1 tablet by mouth daily. Qty: 30 tablet, Refills: 0    Associated Diagnoses: Other depression; Grief      buPROPion (WELLBUTRIN SR) 150 MG extended release tablet Take 1 tablet by mouth every 12 hours. Qty: 60 tablet, Refills: 0    Associated Diagnoses: Depression, unspecified depression type      omeprazole (PRILOSEC) 40 MG delayed release capsule Take 1 capsule by mouth once daily. Qty: 90 capsule, Refills: 1    Associated Diagnoses: Gastroesophageal reflux disease with esophagitis      clopidogrel (PLAVIX) 75 MG tablet Take 1 tablet by mouth daily. Qty: 90 tablet, Refills: 1    Associated Diagnoses: Coronary artery disease involving native coronary artery of native heart with unstable angina pectoris (HCC)      simvastatin (ZOCOR) 20 MG tablet Take 1 tablet by mouth daily. Qty: 90 tablet, Refills: 1    Associated Diagnoses: Mixed hyperlipidemia      fenofibrate (TRICOR) 145 MG tablet Take 1 tablet by mouth once daily. Qty: 90 tablet, Refills: 1    Associated Diagnoses: Mixed hyperlipidemia      montelukast (SINGULAIR) 10 MG tablet Take 1 tablet by mouth once daily. Qty: 90 tablet, Refills: 1    Associated Diagnoses:  Moderate asthma without complication, unspecified whether persistent      aspirin 81 MG chewable tablet Take 81 mg by mouth daily Ld 1 week ago patient not compliant with asa daughter stated ld about 1 week ago      guaiFENesin (MUCINEX) 600 MG extended release tablet Take 2 tablets by mouth 2 times daily  Qty: 30 tablet, Refills: 0    Associated Diagnoses: Bronchitis with bronchospasm      albuterol sulfate HFA (PROAIR HFA) 108 (90 Base) MCG/ACT inhaler INHALE TWO PUFFS BY MOUTH EVERY 6 HOURS AS DIRECTED  Qty: 1 each, Refills: 3    Associated Diagnoses: Moderate asthma without complication, unspecified whether persistent      !! Handicap Placard MISC by Does not apply route  Qty: 1 each, Refills: 0      !! Handicap Placard MISC by Does not apply route Unable to ambulate more than 20 feet without difficulty  Expires 3/31/2026  Qty: 1 each, Refills: 0    Associated Diagnoses: Chronic obstructive pulmonary disease, unspecified COPD type (HCC)      budesonide-formoterol (SYMBICORT) 80-4.5 MCG/ACT AERO Inhale 2 puffs into the lungs 2 times daily  Qty: 1 Inhaler, Refills: 3    Associated Diagnoses: Chronic obstructive pulmonary disease, unspecified COPD type (HCC)      dutasteride (AVODART) 0.5 MG capsule Take 1 capsule by mouth daily  Qty: 90 capsule, Refills: 3    Associated Diagnoses: Benign prostatic hyperplasia with nocturia      LORazepam (ATIVAN) 1 MG tablet Take 1 mg by mouth daily as needed. !! - Potential duplicate medications found. Please discuss with provider. FOLLOW UP/INSTRUCTIONS:  · This patient is instructed to follow-up with his primary care physician. · Patient is instructed to follow-up with the consults listed above as directed by them. · he is instructed to resume home medications and take new medications as indicated in the list above. · If the patient has a recurrence of symptoms, he is instructed to go to the ED. Preparing for this patient's discharge, including paperwork, orders, instructions, and meeting with patient did require > 40 minutes.     Leti Mackey,  1/17/2022  11:10 AM

## 2022-01-18 VITALS
WEIGHT: 200.6 LBS | BODY MASS INDEX: 30.4 KG/M2 | RESPIRATION RATE: 21 BRPM | HEART RATE: 92 BPM | DIASTOLIC BLOOD PRESSURE: 80 MMHG | HEIGHT: 68 IN | SYSTOLIC BLOOD PRESSURE: 139 MMHG | OXYGEN SATURATION: 94 % | TEMPERATURE: 97.2 F

## 2022-01-18 LAB
ALBUMIN SERPL-MCNC: 3.8 G/DL (ref 3.5–5.2)
ALP BLD-CCNC: 91 U/L (ref 40–129)
ALT SERPL-CCNC: 56 U/L (ref 0–40)
ANION GAP SERPL CALCULATED.3IONS-SCNC: 13 MMOL/L (ref 7–16)
AST SERPL-CCNC: 36 U/L (ref 0–39)
BASOPHILS ABSOLUTE: 0.03 E9/L (ref 0–0.2)
BASOPHILS RELATIVE PERCENT: 0.4 % (ref 0–2)
BILIRUB SERPL-MCNC: 0.6 MG/DL (ref 0–1.2)
BILIRUBIN DIRECT: 0.2 MG/DL (ref 0–0.3)
BILIRUBIN, INDIRECT: 0.4 MG/DL (ref 0–1)
BUN BLDV-MCNC: 33 MG/DL (ref 6–23)
C-REACTIVE PROTEIN: 8.6 MG/DL (ref 0–0.4)
CALCIUM SERPL-MCNC: 9.5 MG/DL (ref 8.6–10.2)
CHLORIDE BLD-SCNC: 99 MMOL/L (ref 98–107)
CO2: 24 MMOL/L (ref 22–29)
CREAT SERPL-MCNC: 1.4 MG/DL (ref 0.7–1.2)
D DIMER: <200 NG/ML DDU
EOSINOPHILS ABSOLUTE: 0.03 E9/L (ref 0.05–0.5)
EOSINOPHILS RELATIVE PERCENT: 0.4 % (ref 0–6)
GFR AFRICAN AMERICAN: >60
GFR NON-AFRICAN AMERICAN: 50 ML/MIN/1.73
GLUCOSE BLD-MCNC: 102 MG/DL (ref 74–99)
HCT VFR BLD CALC: 47.3 % (ref 37–54)
HEMOGLOBIN: 14.8 G/DL (ref 12.5–16.5)
IMMATURE GRANULOCYTES #: 0.07 E9/L
IMMATURE GRANULOCYTES %: 0.9 % (ref 0–5)
LYMPHOCYTES ABSOLUTE: 1.08 E9/L (ref 1.5–4)
LYMPHOCYTES RELATIVE PERCENT: 13.2 % (ref 20–42)
MCH RBC QN AUTO: 27.5 PG (ref 26–35)
MCHC RBC AUTO-ENTMCNC: 31.3 % (ref 32–34.5)
MCV RBC AUTO: 87.9 FL (ref 80–99.9)
MONOCYTES ABSOLUTE: 0.75 E9/L (ref 0.1–0.95)
MONOCYTES RELATIVE PERCENT: 9.1 % (ref 2–12)
NEUTROPHILS ABSOLUTE: 6.25 E9/L (ref 1.8–7.3)
NEUTROPHILS RELATIVE PERCENT: 76 % (ref 43–80)
PDW BLD-RTO: 13.2 FL (ref 11.5–15)
PLATELET # BLD: 425 E9/L (ref 130–450)
PMV BLD AUTO: 11.7 FL (ref 7–12)
POTASSIUM SERPL-SCNC: 4.2 MMOL/L (ref 3.5–5)
PROCALCITONIN: 0.06 NG/ML (ref 0–0.08)
RBC # BLD: 5.38 E12/L (ref 3.8–5.8)
SODIUM BLD-SCNC: 136 MMOL/L (ref 132–146)
TOTAL PROTEIN: 7.3 G/DL (ref 6.4–8.3)
WBC # BLD: 8.2 E9/L (ref 4.5–11.5)

## 2022-01-18 PROCEDURE — 36415 COLL VENOUS BLD VENIPUNCTURE: CPT

## 2022-01-18 PROCEDURE — 6360000002 HC RX W HCPCS: Performed by: INTERNAL MEDICINE

## 2022-01-18 PROCEDURE — 6360000002 HC RX W HCPCS: Performed by: NURSE PRACTITIONER

## 2022-01-18 PROCEDURE — 94640 AIRWAY INHALATION TREATMENT: CPT

## 2022-01-18 PROCEDURE — 84145 PROCALCITONIN (PCT): CPT

## 2022-01-18 PROCEDURE — 6370000000 HC RX 637 (ALT 250 FOR IP): Performed by: INTERNAL MEDICINE

## 2022-01-18 PROCEDURE — 80048 BASIC METABOLIC PNL TOTAL CA: CPT

## 2022-01-18 PROCEDURE — 86140 C-REACTIVE PROTEIN: CPT

## 2022-01-18 PROCEDURE — 2700000000 HC OXYGEN THERAPY PER DAY

## 2022-01-18 PROCEDURE — 97530 THERAPEUTIC ACTIVITIES: CPT

## 2022-01-18 PROCEDURE — 80076 HEPATIC FUNCTION PANEL: CPT

## 2022-01-18 PROCEDURE — 85025 COMPLETE CBC W/AUTO DIFF WBC: CPT

## 2022-01-18 PROCEDURE — 6370000000 HC RX 637 (ALT 250 FOR IP): Performed by: NURSE PRACTITIONER

## 2022-01-18 PROCEDURE — 97116 GAIT TRAINING THERAPY: CPT

## 2022-01-18 PROCEDURE — 85378 FIBRIN DEGRADE SEMIQUANT: CPT

## 2022-01-18 RX ADMIN — PENTOXIFYLLINE 400 MG: 400 TABLET, FILM COATED, EXTENDED RELEASE ORAL at 08:26

## 2022-01-18 RX ADMIN — OXYCODONE HYDROCHLORIDE AND ACETAMINOPHEN 1000 MG: 500 TABLET ORAL at 08:26

## 2022-01-18 RX ADMIN — Medication 2000 UNITS: at 08:26

## 2022-01-18 RX ADMIN — PANTOPRAZOLE SODIUM 40 MG: 40 TABLET, DELAYED RELEASE ORAL at 08:29

## 2022-01-18 RX ADMIN — BARICITINIB 2 MG: 2 TABLET, FILM COATED ORAL at 08:26

## 2022-01-18 RX ADMIN — Medication 400 UNITS: at 08:26

## 2022-01-18 RX ADMIN — CLOPIDOGREL 75 MG: 75 TABLET, FILM COATED ORAL at 08:26

## 2022-01-18 RX ADMIN — TAMSULOSIN HYDROCHLORIDE 0.4 MG: 0.4 CAPSULE ORAL at 08:29

## 2022-01-18 RX ADMIN — ASPIRIN 81 MG CHEWABLE TABLET 81 MG: 81 TABLET CHEWABLE at 08:26

## 2022-01-18 RX ADMIN — BUPROPION HYDROCHLORIDE 150 MG: 150 TABLET, EXTENDED RELEASE ORAL at 08:37

## 2022-01-18 RX ADMIN — PENTOXIFYLLINE 400 MG: 400 TABLET, FILM COATED, EXTENDED RELEASE ORAL at 16:54

## 2022-01-18 RX ADMIN — BUDESONIDE 500 MCG: 0.5 INHALANT RESPIRATORY (INHALATION) at 07:13

## 2022-01-18 RX ADMIN — PENTOXIFYLLINE 400 MG: 400 TABLET, FILM COATED, EXTENDED RELEASE ORAL at 12:53

## 2022-01-18 RX ADMIN — HYDROCODONE BITARTRATE AND ACETAMINOPHEN 1 TABLET: 7.5; 325 TABLET ORAL at 08:37

## 2022-01-18 RX ADMIN — ENOXAPARIN SODIUM 40 MG: 100 INJECTION SUBCUTANEOUS at 09:54

## 2022-01-18 RX ADMIN — ZINC SULFATE 220 MG (50 MG) CAPSULE 50 MG: CAPSULE at 08:26

## 2022-01-18 RX ADMIN — DOXYCYCLINE HYCLATE 100 MG: 100 CAPSULE ORAL at 08:30

## 2022-01-18 RX ADMIN — CARVEDILOL 3.12 MG: 3.12 TABLET, FILM COATED ORAL at 16:54

## 2022-01-18 RX ADMIN — GUAIFENESIN 1200 MG: 600 TABLET, EXTENDED RELEASE ORAL at 08:25

## 2022-01-18 RX ADMIN — ARFORMOTEROL TARTRATE 15 MCG: 15 SOLUTION RESPIRATORY (INHALATION) at 07:13

## 2022-01-18 RX ADMIN — CARVEDILOL 3.12 MG: 3.12 TABLET, FILM COATED ORAL at 08:29

## 2022-01-18 ASSESSMENT — PAIN SCALES - GENERAL
PAINLEVEL_OUTOF10: 8
PAINLEVEL_OUTOF10: 8
PAINLEVEL_OUTOF10: 0

## 2022-01-18 ASSESSMENT — PAIN DESCRIPTION - PAIN TYPE: TYPE: CHRONIC PAIN

## 2022-01-18 NOTE — PROGRESS NOTES
CLINICAL PHARMACY NOTE: MEDS TO BEDS    Total # of Prescriptions Filled: 6   The following medications were delivered to the patient:  · Combivent Respimat   · Hydralazine 25mg  · Doxycycline Hyc 100mg  · Dexamethasone 6mg  · Cardvedilol 3.125mg  · Pantoprazole 40mg    Additional Documentation:

## 2022-01-18 NOTE — CARE COORDINATION
NOTE: COVID POSITIVE 1/4. Pt is dched today. SW spoke with pt's Emiliajessenia Gatito [phone] and she is prepared to take pt home. She is aware that [facility] will be delivering the portable O2 unit to pt's room today and a nephew will receive the concentrator at home. She also requested a shower seat and will pick that up at the [place] [address] in [facility]. Claudia Denis to transport pt today by sarah. NA Cabezas Mt.1/18/2022.4:03 PM.

## 2022-01-18 NOTE — PROGRESS NOTES
Comprehensive Nutrition Assessment    Type and Reason for Visit:  Reassess    Nutrition Recommendations/Plan: Start Ensure HP BID with poor PO and appetite. Nutrition Assessment:  Pt nutritional status slightly declining w/ poor PO since last assessment. Adm w/ COVID+PNA/COPD exacerbation. Noted CHF w/ pleural effusion. Hx. CKD/dementia/lymphoma s/p chemo. Start Ensure HP BID. Malnutrition Assessment:  Malnutrition Status: At risk for malnutrition (Comment)    Context:  Acute Illness     Findings of the 6 clinical characteristics of malnutrition:  Energy Intake:  Mild decrease in energy intake (Comment)  Weight Loss:  Unable to assess (d/t fluid shifts r/t CHF/CKD)     Body Fat Loss:  Unable to assess (d/t covid isolation)     Muscle Mass Loss:  Unable to assess (d/t covid isolation)    Fluid Accumulation:  No significant fluid accumulation     Strength:  Not Performed    Estimated Daily Nutrient Needs:  Energy (kcal):  1289-2723; Weight Used for Energy Requirements:  Current     Protein (g):  ; Weight Used for Protein Requirements:  Ideal (1.3-1.5)        Fluid (ml/day):  3466-9435; Method Used for Fluid Requirements:  1 ml/kcal      Nutrition Related Findings:  alert, active BS, round abd, -6.7L I/Os      Wounds:  None       Current Nutrition Therapies:    ADULT DIET; Regular    Anthropometric Measures:  · Height: 5' 8\" (172.7 cm)  · Current Body Weight: 200 lb 9.6 oz (91 kg) (1/18 bed scale)   · Admission Body Weight: 216 lb (98 kg) (1/6 bed)    · Usual Body Weight: 228 lb (103.4 kg) (2/2021 actual per EMR)     · Ideal Body Weight: 154 lbs; % Ideal Body Weight 130.3 %   · BMI: 30.5  · BMI Categories: Obese Class 1 (BMI 30.0-34. 9)       Nutrition Diagnosis:   · Inadequate oral intake related to impaired respiratory function as evidenced by intake 26-50%,poor intake prior to admission    Nutrition Interventions:   Food and/or Nutrient Delivery:  Continue Current Diet,Start Oral Nutrition Supplement (Ensure HP BID)  Nutrition Education/Counseling:  No recommendation at this time   Coordination of Nutrition Care:  Continue to monitor while inpatient    Goals:  Pt consistently consumes ~75% meals/ONS       Nutrition Monitoring and Evaluation:   Behavioral-Environmental Outcomes:  None Identified   Food/Nutrient Intake Outcomes:  Food and Nutrient Intake,Supplement Intake  Physical Signs/Symptoms Outcomes:  Biochemical Data,Nutrition Focused Physical Findings,Skin,Weight,GI Status,Fluid Status or Edema     Discharge Planning:     Too soon to determine     Electronically signed by Andrey Desai, MS, RD, LD on 1/18/22 at 12:43 PM EST    Contact: [phone]

## 2022-01-18 NOTE — PROGRESS NOTES
Physical Therapy Treatment Note/Plan of Care    Room #:  0629/0629-01  Patient Name: Gabino Fuller  YOB: 1950  MRN: [id number]    Date of Service: 1/18/2022     Tentative placement recommendation: Home Health Physical Therapy   Equipment recommendation: None      Evaluating Physical Therapist: Madisyn Burt, PT #[id number]      Specific Provider Orders/Date/Referring Provider :  01/04/22 1945   PT eval and treat Start: 01/04/22 1945, End: 01/04/22 1945, ONE TIME, Standing Count: 1 Occurrences, R    Vidhi Ser, DO      Admitting Diagnosis:   Respiratory distress [R06.03]  Hypoxia [R09.02]  COVID-19 [U07.1]       Surgery: none  Visit Diagnoses       Codes    Respiratory distress    -  Primary R06.03    Hypoxia     R09.02          Patient Active Problem List   Diagnosis    Lymphoma ([address]    Gastroenteritis    Back pain at L4-L5 level    Impotence    Chronic fatigue    Urinary frequency    Bronchitis    Gastroesophageal reflux disease with esophagitis    Depression    Coronary artery disease of native artery of native heart with stable angina pectoris (HCC)    Chronic obstructive pulmonary disease ([address]    Pure hypercholesterolemia    Moderate obesity    Major depressive disorder, recurrent, mild ([address]    Vascular dementia with depressed mood ([address]    Moderate asthma without complication    Complete tear of left rotator cuff    Rotator cuff arthropathy of left shoulder    Nontraumatic complete tear of rotator cuff, left    Bursitis of left shoulder    Impingement syndrome of left shoulder    Labral tear of shoulder, degenerative, left    S/P arthroscopy of shoulder    Obstructive sleep apnea    CKD stage G3a/A3, GFR 45-59 and albumin creatinine ratio >300 mg/g ([facility])    COVID-19        ASSESSMENT of Current Deficits Patient exhibits decreased strength, balance and endurance impairing functional mobility, transfers, gait , gait distance and tolerance to activity.  Patient with while awake. Social history: Patient lives alone in a bilevel home 9 steps downward to kitchen, 3 steps upstairs to living room and bedroom, 3 stairs upstairs to bathroom. with 2 steps  to enter without Rail  Tub shower      Equipment owned: Cane and [address],  unused    [address]   How much difficulty turning over in bed?: None  How much difficulty sitting down on / standing up from a chair with arms?: None  How much difficulty moving from lying on back to sitting on side of bed?: None  How much help from another person moving to and from a bed to a chair?: A Little  How much help from another person needed to walk in hospital room?: A Little  How much help from another person for climbing 3-5 steps with a railing?: A Little  AM-PAC Inpatient Mobility Raw Score : 21  AM-PAC Inpatient T-Scale Score : 50.25  Mobility Inpatient CMS 0-100% Score: 28.97  Mobility Inpatient CMS G-Code Modifier : [address] cleared patient for PT treatment. .   OBJECTIVE;   Initial Evaluation  Date: 1/6/2022 Treatment Date:    1/18/2022   Short Term/ Long Term   Goals   Was pt agreeable to Eval/treatment? Yes yes To be met in 4 days   Pain level   0/10    5/10  Bilateral hips     Bed Mobility    Rolling: Not assessed patient in chair     Rolling: Independent   Supine to sit: Independent   Sit to supine: Not assessed patient seated edge of bed   Scooting: Independent    Rolling: Independent    Supine to sit:  Independent    Sit to supine: Independent    Scooting: Independent     Transfers Sit to stand: Supervision  Cues for hand placement and safety  Sit to stand: Supervision  x3 times      Sit to stand: Independent     Ambulation    1 x 80 feet, 1 x 120 feet, 1 x 20 feet using  no device with Supervision    minimal assist for loss of balance x 3 as patient fatigued, for balance and safety and cues for safety and pacing 25, 45 feet using  no device with Supervision    lcues for safety, pacing and pursed lip breathing    150 feet using  least restrictive device versus no device with Independent    Stair negotiation: ascended and descended   Not assessed  Not assessed     10 steps 1 rail with supervision    ROM Within functional limits    Increase range of motion 10% of affected joints    Strength BUE:  refer to OT eval  RLE:  4-/5  LLE:  4-/5  Increase strength in affected mm groups by 1/3 grade   Balance Sitting EOB:  not assessed    Dynamic Standing:  fair   Sitting EOB: good   Dynamic Standing: fair +   Sitting EOB:  good    Dynamic Standing: good       Patient is Alert & Oriented x person, place, time and situation and follows directions    Sensation:  Patient  denies numbness/tingling   Edema:  no   Endurance: fair       Vitals: 3 liters nasal cannula   Blood Pressure at rest  Blood Pressure during session    Heart Rate at rest  Heart Rate during session    SPO2 at rest 92% SPO2 during session 85-92 %     Patient education  Patient educated on role of Physical Therapy, risks of immobility, safety and plan of care,  importance of mobility while in hospital , purse lip breathing, ankle pumps, quad set and glut set for edema control, blood clot prevention and O2 line management and safety      Patient response to education:   Pt verbalized understanding Pt demonstrated skill Pt requires further education in this area   Yes Partial Yes      Treatment:  Patient practiced and was instructed/facilitated in the following treatment: Patient  Sat edge of bed 20 minutes with Supervision  to increase dynamic sitting balance and activity tolerance. Gown and pants changed and assisted with self care and bathing, dynamic reaching activities and STS x3. Pt instructed/performed on using the spirometer, cueing for proper technique, pursed lip breathing, and pacing. Pt stood, ambulated in the room x2, and back to the edge of the bed. Pt performed seated exercises.        Therapeutic Exercises:  not performed    At end of session, patient sitting edge of bed with   call light and phone within reach,  all lines and tubes intact, nursing notified. Patient would benefit from continued skilled Physical Therapy to improve functional independence and quality of life.          Patient's/ family goals   home     Time in 1001  Time out 1030    Total Treatment Time  29 minutes    CPT codes:  Therapeutic activities (83068)   20 minutes  1 unit(s)  Gait Training (81315) 9 minutes 1 unit(s)     Jaida Gross, [place] #[id number]

## 2022-01-18 NOTE — PROGRESS NOTES
Internal Medicine Progress Note    [facility]    Gregory Godinez. Rosie Padgett, GRISELOLILIANA Sharp D.O., BEATRICE Hyde D.O. Yenny Arora, MSN, APRN, NP-C  Nick Hendrix. Palomo Wadsworth, MSN, APRN-CNP     Primary Care Physician: Ayaan Cortes DO   Admitting Physician:  David Barbosa DO  Admission date and time: 1/4/2022  2:34 PM    Room:  [address] [place] [address]  Admitting diagnosis: Respiratory distress [R06.03]  Hypoxia [R09.02]  COVID-19 [U07.1]    Patient Name: Cathaleen Monday  MRN: [id number]    Date of Service: 1/18/2022     Subjective:    Vidal Grimaldo is a 70 y.o. male who was seen and examined today,1/18/2022, at the bedside. Vidal Grimaldo was originally scheduled for discharge yesterday but nasal cannula oxygen was unable to be acquired. He remained stable for discharge today. Review of systems:  Constitutional:   Ongoing malaise and fatigue but with some improvement. No fevers or chills. HEENT:   Denies ear pain, sore throat, sinus or eye problems. Admits to mild irritation associated with the nasal cannula oxygen. Cardiovascular:   Denies any chest pain, irregular heartbeats, or palpitations. Respiratory:   No resting dyspnea. Less exertional dyspnea. Gastrointestinal:   Denies any nausea or vomiting. No diarrhea or constipation. Genitourinary:    Denies any urgency, frequency, hematuria. Voiding without difficulty. Extremities:   Denies lower extremity swelling, edema or cyanosis. Neurology:    Denies any headache or focal neurological deficits, positive for generalized weakness and fatigue without focal component  Psch: Worsening depression and poor motivation  Musculoskeletal:    Denies  myalgias, joint complaints or back pain. Integumentary:   Denies any rashes, ulcers, or excoriations. Denies bruising. Hematologic/Lymphatic:  Denies bruising or bleeding. Physical Exam:  I/O this shift:   In: 480 [P.O.:480]  Out: -     Intake/Output Summary (Last 24 hours) at 1/18/2022 1440  Last data filed at 1/18/2022 1354  Gross per 24 hour   Intake 480 ml   Output --   Net 480 ml   I/O last 3 completed shifts: In: 180 [P.O.:180]  Out: 300 [Urine:300]  Patient Vitals for the past 96 hrs (Last 3 readings):   Weight   01/18/22 0424 200 lb 9.6 oz (91 kg)   01/17/22 0351 200 lb (90.7 kg)   01/16/22 0600 200 lb (90.7 kg)     Vital Signs:   Blood pressure 139/80, pulse 92, temperature 97.2 °F (36.2 °C), temperature source Infrared, resp. rate 21, height 5' 8\" (1.727 m), weight 200 lb 9.6 oz (91 kg), SpO2 94 %. General appearance:  Comfortable, no distress. Head:  Normocephalic. No masses, lesions or tenderness. Eyes:  PERRLA. EOMI. Sclera clear. ENT:  Ears normal. Mucosa normal.  Nasal cannula oxygen was weaned to 4-6 L during my examination. Neck:    Supple. Trachea midline. No thyromegaly. No JVD. No bruits. Heart:    Rhythm regular. Rate controlled. S1 and S2. Systolic murmur grade 2/6. Lungs:    Better aeration throughout. Regular even and nonlabored pattern of respiration. No wheezing or rales. Abdomen:   Soft. Obese. Non-tender. Non-distended. Bowel sounds positive. No organomegaly or masses. No pain on palpation. Extremities:    Peripheral pulses present. Improved peripheral edema. No ulcers. No cyanosis. No clubbing. Neurologic:    Alert x 3. Generally weak without focal component focal deficit. Cranial nerves grossly intact. No focal weakness. Psych:   Behavior is normal. Mood appears normal. Speech is not rapid and/or pressured. Musculoskeletal:   Spine ROM normal. Muscular strength intact. Gait not assessed. Integumentary:  No rashes  Skin normal color and texture.   Genitalia/Breast:  Deferred    Medication:  Scheduled Meds:   pentoxifylline  400 mg Oral TID WC    niacin  500 mg Oral Nightly    vitamin E  400 Units Oral Daily    mirtazapine  15 mg Oral Nightly    enoxaparin  40 mg SubCUTAneous BID    carvedilol  3.125 mg Oral BID     buPROPion  150 mg Oral Daily    [Held by provider] sacubitril-valsartan  1 tablet Oral BID    aspirin  81 mg Oral Daily    clopidogrel  75 mg Oral Daily    guaiFENesin  1,200 mg Oral BID    montelukast  10 mg Oral Nightly    pantoprazole  40 mg Oral QAM    atorvastatin  20 mg Oral Nightly    tamsulosin  0.4 mg Oral Daily    Arformoterol Tartrate  15 mcg Nebulization BID    budesonide  500 mcg Nebulization BID    ascorbic acid  1,000 mg Oral Daily    Vitamin D  2,000 Units Oral Daily    zinc sulfate  50 mg Oral Daily    doxycycline hyclate  100 mg Oral 2 times per day     Continuous Infusions:      Objective Data:  CBC with Differential:    Lab Results   Component Value Date    WBC 8.2 01/18/2022    RBC 5.38 01/18/2022    HGB 14.8 01/18/2022    HCT 47.3 01/18/2022     01/18/2022    MCV 87.9 01/18/2022    MCH 27.5 01/18/2022    MCHC 31.3 01/18/2022    RDW 13.2 01/18/2022    SEGSPCT 66 07/21/2013    LYMPHOPCT 13.2 01/18/2022    MONOPCT 9.1 01/18/2022    BASOPCT 0.4 01/18/2022    MONOSABS 0.75 01/18/2022    LYMPHSABS 1.08 01/18/2022    EOSABS 0.03 01/18/2022    BASOSABS 0.03 01/18/2022     BMP:    Lab Results   Component Value Date     01/18/2022    K 4.2 01/18/2022    K 4.1 01/04/2022    CL 99 01/18/2022    CO2 24 01/18/2022    BUN 33 01/18/2022    LABALBU 3.8 01/18/2022    LABALBU 4.2 12/01/2011    CREATININE 1.4 01/18/2022    CALCIUM 9.5 01/18/2022    GFRAA >60 01/18/2022    LABGLOM 50 01/18/2022    GLUCOSE 102 01/18/2022    GLUCOSE 152 12/01/2011     Hepatic Function Panel:    Lab Results   Component Value Date    ALKPHOS 91 01/18/2022    ALT 56 01/18/2022    AST 36 01/18/2022    PROT 7.3 01/18/2022    BILITOT 0.6 01/18/2022    BILIDIR 0.2 01/18/2022    IBILI 0.4 01/18/2022    LABALBU 3.8 01/18/2022    LABALBU 4.2 12/01/2011     No results for input(s): TROPHS in the last 72 hours.       COVID inflammatory markers  Recent Labs     01/18/22  0643   DDIMER <200     No results for input(s): FERRITIN in the last 72 hours. Recent Labs     01/18/22  0643   CRP 8.6*       Assessment:  1. Acute respiratory failure with hypoxia secondary to COVID-19 pneumonia in an unvaccinated host  2. Acute exacerbation of COPD secondary to viral pneumonia  3. Hypertensive urgency with underlying history of hypertension  4. Asymptomatic coronary artery disease with drug-eluting stents in place  5. Decompensated diastolic congestive heart failure with associated pleural effusion and echo evidence of worsening cardiomyopathy with LVEF now 28%  6. Chronic kidney disease stage IIIa  7. Hyperlipidemia  8. Vascular disease with history of TIA and left carotid stenting   9. Non-Hodgkin's lymphoma with prior chemotherapy followed by the heart center   10. Untreated obstructive sleep apnea       Plan:   Clark Waters remains acceptable for discharge. Nasal cannula oxygen will be delivered to the bedside prior to discharge. He understands the importance of frequent repositioning and use of the incentive spirometer upon returning home. Appropriate COVID medications have been prescribed as per the discharge summary yesterday. Greater than 40 minutes of critical care time was spent with the patient. This time included chart review, , and discussion with those consultants involved in the patient's care. More than 50% of my  time was spent at the bedside counseling/coordinating care with the patient and/or family with face to face contact. This time was spent reviewing notes and laboratory data as well as instructing and counseling the patient. Time I spent with the family or surrogate(s) is included only if the patient was incapable of providing the necessary information or participating in medical decisions. I also discussed the differential diagnosis and all of the proposed management plans with the patient and individuals accompanying the patient.     Joannasharda Jt requires this high level of physician care and nursing on the Telemetry unit due the complexity of decision management and chance of rapid decline or death. Continued cardiac monitoring and higher level of nursing are required. I am readily available for any further decision-making and intervention.        Tamica Bates DO  1/18/2022  2:40 PM

## 2022-01-19 ENCOUNTER — CARE COORDINATION (OUTPATIENT)
Dept: CASE MANAGEMENT | Age: 72
End: 2022-01-19

## 2022-01-19 ENCOUNTER — TELEPHONE (OUTPATIENT)
Dept: CARDIOLOGY CLINIC | Age: 72
End: 2022-01-19

## 2022-01-19 NOTE — TELEPHONE ENCOUNTER
----- Message from Valentín Grady MD sent at 1/19/2022 12:59 PM EST -----  Please cancel patient's office appointment for next Friday, I left a message for Soledad to reschedule him as a virtual visit the following week while I am doing nuclear at [facility]    Thanks. I spoke with Andrew Tijerina his daughter, and she really wants him to see you due to patient is having SOB  CP and swelling. I told her about the VV and she is not happy with that.         Please advise Senia Sahu

## 2022-01-19 NOTE — CARE COORDINATION
[address] Transitions Initial Follow Up Call    Call within 2 business days of discharge: Yes    Patient: Katlyn Martínez Patient : 1950   MRN: <[id number]>  Reason for Admission: Respiratory distress  Discharge Date: 22 RARS: Readmission Risk Score: 12.9 ( )      Last Discharge [facility]       Complaint Diagnosis Description Type Department Provider    22 Shortness of Breath; Cough Respiratory distress . .. ED to Hosp-Admission (Discharged) (ADMITTED) [facility] [address], DO; Uli Liz. .. Attempted to reach the patient for initial [address] Transition call post hospital discharge. Message left with CTN's contact information requesting return phone call.         Follow Up  Future Appointments   Date Time Provider Kike Nguyen   2022 10:00 AM Ivan Mccarty MD [facility]       Pamella Britt RN

## 2022-01-20 ENCOUNTER — CARE COORDINATION (OUTPATIENT)
Dept: CASE MANAGEMENT | Age: 72
End: 2022-01-20

## 2022-01-20 NOTE — CARE COORDINATION
COVID Transitions of Care Call  Call within 2 business days of discharge: Yes    Patient: Cathi Moore Patient : 1950   MRN: <[id number]>  Reason for Admission: Respiratory distress  Discharge Date: 22 RARS: Readmission Risk Score: 12.9 ( )      Last Discharge [facility]       Complaint Diagnosis Description Type Department Provider    22 Shortness of Breath; Cough Respiratory distress . .. ED to Hosp-Admission (Discharged) (ADMITTED) [facility] [address] [address], DO; Adelina Weiss. .. Challenges to be reviewed by the provider   Additional needs identified to be addressed with provider: No  none                 Encounter was not routed to provider for escalation. Method of communication with provider: none. Discussed COVID-19 related testing which was: available at this time. Test results were: positive. Patient informed of results, if available? Yes.~Pt previously aware of +COVID-19 results (22). Current Symptoms: no new symptoms and no worsening symptoms. Pt discharged from [facility] on 22 with d resp distress d/t COVID-19. Pt discharged home with O2 @6L per NC. Pt states that he is feeling \"pretty good\" today and voiced no complaints at this time. Pt denied sob, cough, wheezing, or chest discomfort/chest tightness. Pt states that his SaO2 readings have been ranging anywhere between 92-98% on current liter flow. Pt declined medication review at this time, as he states that his daughter manages his medications and she is currently asleep. He will ask her to call this CTN back to review. Pt did not voice any current needs/concerns at this time. Pt agreeable to ongoing outreaches from this CTN. Reviewed New or Changed Meds: patient declined. Pt states that his daughter manages his medications and is unsure what his new medications are. Pt states that his daughter is staying with him, but she is currently asleep.  He states that he will give her the message that this CTN called and have her call back to review new and stopped medications. Do you have what you need at home?  Durable Medical Equipment ordered at discharge: O2 Supplies ([employer])  [facility]/Outpatient orders at discharge: none   Was patient discharged with a pulse oximeter? Yes Discussed and confirmed pulse oximeter discharge instructions and when to notify provider or seek emergency care. Patient education provided: Reviewed appropriate site of care based on symptoms and resources available to patient including: PCP, Specialist and When to call 911. Follow up appointment scheduled within 7 days of discharge: no. If no appointment scheduled, scheduling offered: yes~Pt declined, stating his daughter will call to schedule a HFU with his providers. CTN will route a message to pcp's office staff requesting they f/u with the pt/daughter to schedule a HFU appt. Future Appointments   Date Time Provider Kike Nguyen   1/28/2022 10:00 AM Maryana Kee MD [facility]       Interventions: Scheduled appointment with PCP-See above  Scheduled appointment with Specialist-Dr. Nilesh Zimmer (card)~see appt schedule above  Obtained and reviewed discharge summary and/or continuity of care documents  Reviewed discharge instructions, medical action plan and red flags with patient who verbalized understanding. Plan for follow-up call in 7-10 days based on severity of symptoms and risk factors. Plan for next call: symptom management-any worsening s/sx COVID? Are SaO2 readings maintaining WNL? follow up appointment-Has pt scheduled or attended f/u appts? Provided contact information for future needs.     Jose Capone RN

## 2022-01-21 ENCOUNTER — TELEPHONE (OUTPATIENT)
Dept: CARDIOLOGY CLINIC | Age: 72
End: 2022-01-21

## 2022-01-25 ENCOUNTER — CARE COORDINATION (OUTPATIENT)
Dept: CASE MANAGEMENT | Age: 72
End: 2022-01-25

## 2022-01-25 NOTE — CARE COORDINATION
[address] Transitions Follow Up Call    2022    Patient: Hussain Duarte  Patient : 1950   MRN: [id number]  Reason for Admission: Respiratory distress  Discharge Date: 22 RARS: Readmission Risk Score: 12.9 ( )        Attempted to reach the patient for sub COVID Monitoring Care Transition call post hospital discharge. Message left with CTN's contact information requesting return phone call.     Will attempt outreach again    Follow Up  Future Appointments   Date Time Provider Kike Nguyen   2022 10:30 AM DO Emi Jackson [facility]   2022  7:30 AM MD NORMA Oneill [facility]       Suzette Lenz RN

## 2022-01-26 ENCOUNTER — OFFICE VISIT (OUTPATIENT)
Dept: FAMILY MEDICINE CLINIC | Age: 72
End: 2022-01-26
Payer: MEDICARE

## 2022-01-26 VITALS
SYSTOLIC BLOOD PRESSURE: 126 MMHG | WEIGHT: 213.4 LBS | RESPIRATION RATE: 18 BRPM | DIASTOLIC BLOOD PRESSURE: 80 MMHG | HEART RATE: 90 BPM | HEIGHT: 68 IN | TEMPERATURE: 97.4 F | OXYGEN SATURATION: 97 % | BODY MASS INDEX: 32.34 KG/M2

## 2022-01-26 DIAGNOSIS — U07.1 PNEUMONIA DUE TO COVID-19 VIRUS: Primary | ICD-10-CM

## 2022-01-26 DIAGNOSIS — R35.1 BENIGN PROSTATIC HYPERPLASIA WITH NOCTURIA: ICD-10-CM

## 2022-01-26 DIAGNOSIS — J45.909 MODERATE ASTHMA WITHOUT COMPLICATION, UNSPECIFIED WHETHER PERSISTENT: ICD-10-CM

## 2022-01-26 DIAGNOSIS — F32.89 OTHER DEPRESSION: ICD-10-CM

## 2022-01-26 DIAGNOSIS — N40.1 BENIGN PROSTATIC HYPERPLASIA WITH NOCTURIA: ICD-10-CM

## 2022-01-26 DIAGNOSIS — F32.A DEPRESSION, UNSPECIFIED DEPRESSION TYPE: ICD-10-CM

## 2022-01-26 DIAGNOSIS — J20.9 BRONCHITIS WITH BRONCHOSPASM: ICD-10-CM

## 2022-01-26 DIAGNOSIS — I25.110 CORONARY ARTERY DISEASE INVOLVING NATIVE CORONARY ARTERY OF NATIVE HEART WITH UNSTABLE ANGINA PECTORIS (HCC): ICD-10-CM

## 2022-01-26 DIAGNOSIS — F11.99 OPIOID USE, UNSPECIFIED WITH UNSPECIFIED OPIOID-INDUCED DISORDER (HCC): ICD-10-CM

## 2022-01-26 DIAGNOSIS — M25.511 CHRONIC RIGHT SHOULDER PAIN: ICD-10-CM

## 2022-01-26 DIAGNOSIS — F43.21 GRIEF: ICD-10-CM

## 2022-01-26 DIAGNOSIS — G89.29 CHRONIC RIGHT SHOULDER PAIN: ICD-10-CM

## 2022-01-26 DIAGNOSIS — E78.2 MIXED HYPERLIPIDEMIA: ICD-10-CM

## 2022-01-26 DIAGNOSIS — J12.82 PNEUMONIA DUE TO COVID-19 VIRUS: Primary | ICD-10-CM

## 2022-01-26 PROCEDURE — 1111F DSCHRG MED/CURRENT MED MERGE: CPT | Performed by: FAMILY MEDICINE

## 2022-01-26 PROCEDURE — 3017F COLORECTAL CA SCREEN DOC REV: CPT | Performed by: FAMILY MEDICINE

## 2022-01-26 PROCEDURE — G8417 CALC BMI ABV UP PARAM F/U: HCPCS | Performed by: FAMILY MEDICINE

## 2022-01-26 PROCEDURE — 99214 OFFICE O/P EST MOD 30 MIN: CPT | Performed by: FAMILY MEDICINE

## 2022-01-26 PROCEDURE — 4040F PNEUMOC VAC/ADMIN/RCVD: CPT | Performed by: FAMILY MEDICINE

## 2022-01-26 PROCEDURE — G8484 FLU IMMUNIZE NO ADMIN: HCPCS | Performed by: FAMILY MEDICINE

## 2022-01-26 PROCEDURE — 1036F TOBACCO NON-USER: CPT | Performed by: FAMILY MEDICINE

## 2022-01-26 PROCEDURE — G8427 DOCREV CUR MEDS BY ELIG CLIN: HCPCS | Performed by: FAMILY MEDICINE

## 2022-01-26 PROCEDURE — 1123F ACP DISCUSS/DSCN MKR DOCD: CPT | Performed by: FAMILY MEDICINE

## 2022-01-26 RX ORDER — SIMVASTATIN 20 MG
TABLET ORAL
Qty: 90 TABLET | Refills: 1 | Status: SHIPPED | OUTPATIENT
Start: 2022-01-26

## 2022-01-26 RX ORDER — SERTRALINE HYDROCHLORIDE 100 MG/1
100 TABLET, FILM COATED ORAL DAILY
Qty: 30 TABLET | Refills: 0 | Status: SHIPPED
Start: 2022-01-26 | End: 2022-02-14

## 2022-01-26 RX ORDER — DEXAMETHASONE 6 MG/1
6 TABLET ORAL
Qty: 10 TABLET | Refills: 0 | Status: SHIPPED | OUTPATIENT
Start: 2022-01-26 | End: 2022-01-31 | Stop reason: ALTCHOICE

## 2022-01-26 RX ORDER — FENOFIBRATE 145 MG/1
TABLET, COATED ORAL
Qty: 90 TABLET | Refills: 1 | Status: SHIPPED | OUTPATIENT
Start: 2022-01-26

## 2022-01-26 RX ORDER — HYDROCODONE BITARTRATE AND ACETAMINOPHEN 7.5; 325 MG/1; MG/1
1 TABLET ORAL EVERY 8 HOURS PRN
Qty: 90 TABLET | Refills: 0 | Status: SHIPPED
Start: 2022-01-26 | End: 2022-01-26

## 2022-01-26 RX ORDER — HYDROCODONE BITARTRATE AND ACETAMINOPHEN 7.5; 325 MG/1; MG/1
1 TABLET ORAL EVERY 8 HOURS PRN
Qty: 90 TABLET | Refills: 0 | Status: SHIPPED | OUTPATIENT
Start: 2022-01-26 | End: 2022-02-25

## 2022-01-26 RX ORDER — ASPIRIN 81 MG/1
81 TABLET, CHEWABLE ORAL DAILY
Qty: 90 TABLET | Refills: 1 | Status: SHIPPED | OUTPATIENT
Start: 2022-01-26

## 2022-01-26 RX ORDER — ALBUTEROL SULFATE 90 UG/1
AEROSOL, METERED RESPIRATORY (INHALATION)
Qty: 1 EACH | Refills: 3 | Status: SHIPPED
Start: 2022-01-26 | End: 2022-07-18 | Stop reason: ALTCHOICE

## 2022-01-26 RX ORDER — GUAIFENESIN 600 MG/1
1200 TABLET, EXTENDED RELEASE ORAL 2 TIMES DAILY
Qty: 30 TABLET | Refills: 0 | Status: SHIPPED | OUTPATIENT
Start: 2022-01-26

## 2022-01-26 RX ORDER — MONTELUKAST SODIUM 10 MG/1
10 TABLET ORAL DAILY
Qty: 90 TABLET | Refills: 1 | Status: SHIPPED | OUTPATIENT
Start: 2022-01-26

## 2022-01-26 RX ORDER — BUPROPION HYDROCHLORIDE 150 MG/1
150 TABLET, EXTENDED RELEASE ORAL 2 TIMES DAILY
Qty: 180 TABLET | Refills: 1 | Status: SHIPPED
Start: 2022-01-26 | End: 2022-07-01

## 2022-01-26 RX ORDER — TAMSULOSIN HYDROCHLORIDE 0.4 MG/1
CAPSULE ORAL
Qty: 30 CAPSULE | Refills: 2 | Status: SHIPPED
Start: 2022-01-26 | End: 2022-04-07

## 2022-01-26 RX ORDER — CLOPIDOGREL BISULFATE 75 MG/1
75 TABLET ORAL DAILY
Qty: 90 TABLET | Refills: 1 | Status: SHIPPED | OUTPATIENT
Start: 2022-01-26

## 2022-01-26 ASSESSMENT — ENCOUNTER SYMPTOMS
WHEEZING: 0
BLOOD IN STOOL: 0
CONSTIPATION: 0
DIARRHEA: 0

## 2022-01-26 NOTE — PROGRESS NOTES
Post-Discharge Transitional Care Management Services or Hospital Follow Up      Javi Lynn   YOB: 1950    Date of Office Visit:  1/26/2022  Date of Hospital Admission: 1/4/22  Date of Hospital Discharge: 1/18/22  Readmission Risk Score(high >=14%. Medium >=10%):Readmission Risk Score: 12.9 ( )      Care management risk score Rising risk (score 2-5) and Complex Care (Scores >=6): 4     Non face to face  following discharge, date last encounter closed (first attempt may have been earlier): 1/20/2022  9:25 AM 1/20/2022  9:25 AM    Call initiated 2 business days of discharge: Yes     Patient Active Problem List   Diagnosis    Lymphoma ([address]    Gastroenteritis    Back pain at L4-L5 level    Impotence    Chronic fatigue    Urinary frequency    Bronchitis    Gastroesophageal reflux disease with esophagitis    Depression    Coronary artery disease of native artery of native heart with stable angina pectoris (HCC)    Chronic obstructive pulmonary disease ([address]    Pure hypercholesterolemia    Moderate obesity    Major depressive disorder, recurrent, mild ([address]    Vascular dementia with depressed mood ([address]    Moderate asthma without complication    Complete tear of left rotator cuff    Rotator cuff arthropathy of left shoulder    Nontraumatic complete tear of rotator cuff, left    Bursitis of left shoulder    Impingement syndrome of left shoulder    Labral tear of shoulder, degenerative, left    S/P arthroscopy of shoulder    Obstructive sleep apnea    Stage 3a chronic kidney disease ([address]    COVID-19 virus infection    Opioid use, unspecified with unspecified opioid-induced disorder       Allergies   Allergen Reactions    Midazolam Hcl      Wake up wild. ...  Must be reversed with romazicon or will wake up wild and combative       Medications listed as ordered at the time of discharge from hospital     Medication List          Accurate as of January 26, 2022 11:19 AM. If you have any questions, ask your nurse or doctor. START taking these medications    dexamethasone 6 MG tablet  Commonly known as: DECADRON  Take 1 tablet by mouth daily (with breakfast) for 10 days  Started by: Ailyn Lamar DO     HYDROcodone-acetaminophen 7.5-325 MG per tablet  Commonly known as: NORCO  Take 1 tablet by mouth every 8 hours as needed for Pain for up to 30 days. Started by: Ailyn Lamar, DO        CHANGE how you take these medications    buPROPion 150 MG extended release tablet  Commonly known as: WELLBUTRIN SR  Take 1 tablet by mouth 2 times daily  What changed: See the new instructions. Changed by: Ailyn Lamar DO     montelukast 10 MG tablet  Commonly known as: SINGULAIR  Take 1 tablet by mouth daily  What changed: See the new instructions. Changed by: Ailyn Lamar, DO        CONTINUE taking these medications    albuterol sulfate  (90 Base) MCG/ACT inhaler  Commonly known as: ProAir HFA  INHALE TWO PUFFS BY MOUTH EVERY 6 HOURS AS DIRECTED     aspirin 81 MG chewable tablet  Take 1 tablet by mouth daily Ld 1 week ago patient not compliant with asa daughter stated ld about 1 week ago     clopidogrel 75 MG tablet  Commonly known as: PLAVIX  Take 1 tablet by mouth daily     fenofibrate 145 MG tablet  Commonly known as: TRICOR  Take 1 tablet by mouth once daily. guaiFENesin 600 MG extended release tablet  Commonly known as: Mucinex  Take 2 tablets by mouth 2 times daily     sertraline 100 MG tablet  Commonly known as: ZOLOFT  Take 1 tablet by mouth daily     simvastatin 20 MG tablet  Commonly known as: ZOCOR  Take 1 tablet by mouth daily.      tamsulosin 0.4 MG capsule  Commonly known as: FLOMAX  TAKE 1 CAPSULE BY MOUTH AT BEDTIME           Where to Get Your Medications      These medications were sent to [address] [place] [address] by [address] [phone]  [address] [place] [phone]    Hours: 24-hours Phone: [phone]   · albuterol sulfate  (90 Base) MCG/ACT inhaler  · aspirin 81 MG chewable tablet  · buPROPion 150 MG extended release tablet  · clopidogrel 75 MG tablet  · fenofibrate 145 MG tablet  · guaiFENesin 600 MG extended release tablet  · montelukast 10 MG tablet  · sertraline 100 MG tablet  · simvastatin 20 MG tablet  · tamsulosin 0.4 MG capsule     You can get these medications from any pharmacy    Bring a paper prescription for each of these medications  · dexamethasone 6 MG tablet     Information about where to get these medications is not yet available    Ask your nurse or doctor about these medications  · HYDROcodone-acetaminophen 7.5-325 MG per tablet           Medications marked \"taking\" at this time  Outpatient Medications Marked as Taking for the 1/26/22 encounter (Office Visit) with Zelda Marroquin, DO   Medication Sig Dispense Refill    tamsulosin (FLOMAX) 0.4 MG capsule TAKE 1 CAPSULE BY MOUTH AT BEDTIME 30 capsule 2    simvastatin (ZOCOR) 20 MG tablet Take 1 tablet by mouth daily. 90 tablet 1    sertraline (ZOLOFT) 100 MG tablet Take 1 tablet by mouth daily 30 tablet 0    montelukast (SINGULAIR) 10 MG tablet Take 1 tablet by mouth daily 90 tablet 1    guaiFENesin (MUCINEX) 600 MG extended release tablet Take 2 tablets by mouth 2 times daily 30 tablet 0    fenofibrate (TRICOR) 145 MG tablet Take 1 tablet by mouth once daily.  90 tablet 1    clopidogrel (PLAVIX) 75 MG tablet Take 1 tablet by mouth daily 90 tablet 1    buPROPion (WELLBUTRIN SR) 150 MG extended release tablet Take 1 tablet by mouth 2 times daily 180 tablet 1    albuterol sulfate HFA (PROAIR HFA) 108 (90 Base) MCG/ACT inhaler INHALE TWO PUFFS BY MOUTH EVERY 6 HOURS AS DIRECTED 1 each 3    aspirin 81 MG chewable tablet Take 1 tablet by mouth daily Ld 1 week ago patient not compliant with asa daughter stated ld about 1 week ago 90 tablet 1    dexamethasone (DECADRON) 6 MG tablet Take 1 tablet by mouth daily (with breakfast) for 10 days 10 tablet 0    HYDROcodone-acetaminophen (NORCO) 7.5-325 MG per tablet Take 1 tablet by mouth every 8 hours as needed for Pain for up to 30 days. 90 tablet 0        Medications patient taking as of now reconciled against medications ordered at time of hospital discharge: Yes    Chief Complaint   Patient presents with   [address] from Hospital     Admitted on 01/04/22 and discharged on 01/18/22. Post Covid 19. Inpatient course: Discharge summary reviewed- see chart. Interval history/Current status: guarded    Review of Systems   Constitutional: Negative for chills and diaphoresis. HENT: Negative for ear discharge, ear pain, hearing loss, nosebleeds and tinnitus. Respiratory: Negative for wheezing. Cardiovascular: Negative for chest pain. Gastrointestinal: Negative for blood in stool, constipation and diarrhea. Genitourinary: Negative for dysuria, flank pain and hematuria. Skin: Negative for rash. Neurological: Negative for headaches. Hematological: Does not bruise/bleed easily. Vitals:    01/26/22 1035   BP: 126/80   Pulse: 90   Resp: 18   Temp: 97.4 °F (36.3 °C)   SpO2: 97%   Weight: 213 lb 6.4 oz (96.8 kg)   Height: 5' 8\" (1.727 m)     Body mass index is 32.45 kg/m². Wt Readings from Last 3 Encounters:   01/26/22 213 lb 6.4 oz (96.8 kg)   01/18/22 200 lb 9.6 oz (91 kg)   12/15/21 223 lb 9.6 oz (101.4 kg)     BP Readings from Last 3 Encounters:   01/26/22 126/80   01/18/22 139/80   12/15/21 138/88       Physical Exam  HENT:      Nose: Nose normal.   Eyes:      General:         Right eye: No discharge. Left eye: No discharge. Cardiovascular:      Rate and Rhythm: Normal rate and regular rhythm. Heart sounds: No murmur heard. Pulmonary:      Effort: No respiratory distress. Breath sounds: No rhonchi or rales. Abdominal:      Tenderness: There is no abdominal tenderness.    Musculoskeletal: Cervical back: No rigidity. Skin:     General: Skin is warm. Capillary Refill: Capillary refill takes less than 2 seconds. Coloration: Skin is not pale. Findings: No bruising. Neurological:      Mental Status: He is alert. Psychiatric:         Mood and Affect: Mood normal.         Plan to see Dr Jose Alfredo Bernstein. Assessment/Plan:  1. Chronic right shoulder pain  Stable  - HYDROcodone-acetaminophen (NORCO) 7.5-325 MG per tablet; Take 1 tablet by mouth every 8 hours as needed for Pain for up to 30 days. Dispense: 90 tablet; Refill: 0    2. Mixed hyperlipidemia  Stable  - simvastatin (ZOCOR) 20 MG tablet; Take 1 tablet by mouth daily. Dispense: 90 tablet; Refill: 1  - fenofibrate (TRICOR) 145 MG tablet; Take 1 tablet by mouth once daily. Dispense: 90 tablet; Refill: 1    3. Other depression  Stable discussed with Covid10  - sertraline (ZOLOFT) 100 MG tablet; Take 1 tablet by mouth daily  Dispense: 30 tablet; Refill: 0    4. Grief  Over the loss of his son  - sertraline (ZOLOFT) 100 MG tablet; Take 1 tablet by mouth daily  Dispense: 30 tablet; Refill: 0    5. Moderate asthma without complication, unspecified whether persistent  Post smoking  - montelukast (SINGULAIR) 10 MG tablet; Take 1 tablet by mouth daily  Dispense: 90 tablet; Refill: 1  - albuterol sulfate HFA (PROAIR HFA) 108 (90 Base) MCG/ACT inhaler; INHALE TWO PUFFS BY MOUTH EVERY 6 HOURS AS DIRECTED  Dispense: 1 each; Refill: 3    6. Bronchitis with bronchospasm  Chronic COPD and worse since Covid 19  - guaiFENesin (MUCINEX) 600 MG extended release tablet; Take 2 tablets by mouth 2 times daily  Dispense: 30 tablet; Refill: 0    7. Coronary artery disease involving native coronary artery of native heart with unstable angina pectoris (HCC)  Stable  - clopidogrel (PLAVIX) 75 MG tablet; Take 1 tablet by mouth daily  Dispense: 90 tablet; Refill: 1    8.  Depression, unspecified depression type  Stable regarding clinical and family issues  - buPROPion (WELLBUTRIN SR) 150 MG extended release tablet; Take 1 tablet by mouth 2 times daily  Dispense: 180 tablet; Refill: 1    9. Pneumonia due to COVID-19 virus  Under current monitoring FU CXR currently on steroids  - dexamethasone (DECADRON) 6 MG tablet; Take 1 tablet by mouth daily (with breakfast) for 10 days  Dispense: 10 tablet; Refill: 0  - XR CHEST STANDARD (2 VW); Future    10. Benign prostatic hyperplasia with nocturia  Stable on Meds  - tamsulosin (FLOMAX) 0.4 MG capsule; TAKE 1 CAPSULE BY MOUTH AT BEDTIME  Dispense: 30 capsule; Refill: 2    11.  Opioid use, unspecified with unspecified opioid-induced disorder  Dependent         Medical Decision Making: moderate complexity

## 2022-01-31 ENCOUNTER — VIRTUAL VISIT (OUTPATIENT)
Dept: CARDIOLOGY CLINIC | Age: 72
End: 2022-01-31
Payer: MEDICARE

## 2022-01-31 DIAGNOSIS — E78.00 PURE HYPERCHOLESTEROLEMIA: ICD-10-CM

## 2022-01-31 DIAGNOSIS — I25.118 CORONARY ARTERY DISEASE OF NATIVE ARTERY OF NATIVE HEART WITH STABLE ANGINA PECTORIS (HCC): Primary | ICD-10-CM

## 2022-01-31 DIAGNOSIS — N18.31 STAGE 3A CHRONIC KIDNEY DISEASE (HCC): ICD-10-CM

## 2022-01-31 DIAGNOSIS — U07.1 COVID-19 VIRUS INFECTION: ICD-10-CM

## 2022-01-31 DIAGNOSIS — I50.22 CHRONIC SYSTOLIC HF (HEART FAILURE) (HCC): ICD-10-CM

## 2022-01-31 DIAGNOSIS — I25.5 ISCHEMIC CARDIOMYOPATHY: ICD-10-CM

## 2022-01-31 DIAGNOSIS — J44.9 CHRONIC OBSTRUCTIVE PULMONARY DISEASE, UNSPECIFIED COPD TYPE (HCC): ICD-10-CM

## 2022-01-31 DIAGNOSIS — E66.8 MODERATE OBESITY: ICD-10-CM

## 2022-01-31 DIAGNOSIS — G47.33 OBSTRUCTIVE SLEEP APNEA: ICD-10-CM

## 2022-01-31 PROCEDURE — 1123F ACP DISCUSS/DSCN MKR DOCD: CPT | Performed by: INTERNAL MEDICINE

## 2022-01-31 PROCEDURE — 1111F DSCHRG MED/CURRENT MED MERGE: CPT | Performed by: INTERNAL MEDICINE

## 2022-01-31 PROCEDURE — G8484 FLU IMMUNIZE NO ADMIN: HCPCS | Performed by: INTERNAL MEDICINE

## 2022-01-31 PROCEDURE — 1036F TOBACCO NON-USER: CPT | Performed by: INTERNAL MEDICINE

## 2022-01-31 PROCEDURE — G8417 CALC BMI ABV UP PARAM F/U: HCPCS | Performed by: INTERNAL MEDICINE

## 2022-01-31 PROCEDURE — 3023F SPIROM DOC REV: CPT | Performed by: INTERNAL MEDICINE

## 2022-01-31 PROCEDURE — 4040F PNEUMOC VAC/ADMIN/RCVD: CPT | Performed by: INTERNAL MEDICINE

## 2022-01-31 PROCEDURE — G8427 DOCREV CUR MEDS BY ELIG CLIN: HCPCS | Performed by: INTERNAL MEDICINE

## 2022-01-31 PROCEDURE — 3017F COLORECTAL CA SCREEN DOC REV: CPT | Performed by: INTERNAL MEDICINE

## 2022-01-31 PROCEDURE — 99214 OFFICE O/P EST MOD 30 MIN: CPT | Performed by: INTERNAL MEDICINE

## 2022-01-31 RX ORDER — CARVEDILOL 3.12 MG/1
3.12 TABLET ORAL DAILY
Qty: 30 TABLET | Refills: 5 | Status: SHIPPED
Start: 2022-01-31 | End: 2022-03-07 | Stop reason: SDUPTHER

## 2022-01-31 RX ORDER — HYDRALAZINE HYDROCHLORIDE 25 MG/1
25 TABLET, FILM COATED ORAL 4 TIMES DAILY
COMMUNITY
End: 2022-04-07

## 2022-01-31 RX ORDER — CARVEDILOL 3.12 MG/1
3.12 TABLET ORAL 2 TIMES DAILY
COMMUNITY
End: 2022-07-01

## 2022-01-31 RX ORDER — HYDRALAZINE HYDROCHLORIDE 25 MG/1
25 TABLET, FILM COATED ORAL 4 TIMES DAILY
Qty: 90 TABLET | Refills: 3 | Status: SHIPPED
Start: 2022-01-31 | End: 2022-03-07 | Stop reason: SDUPTHER

## 2022-01-31 NOTE — PROGRESS NOTES
OUTPATIENT CARDIOLOGY FOLLOW-UP    Name: Jason Mcclain    Age: 70 y.o. Primary Care Physician: Isabella Nguyen DO    Date of Service: 1/31/2022     This encounter was performed as a transtelephonic evaluation. The patient has provided verbal consent for transtelephonic assessment. Chief Complaint: Coronary atherosclerosis, ischemic cardiomyopathy, chronic systolic heart failure, chronic obstructive lung disease chronic kidney disease, moderate obesity, obstructive sleep apnea, COVID-19 infection    Interim History: The patient was recently hospitalized with a COVID-19 pneumonia with associated acute hypoxic respiratory failure associated with acute superimposed upon chronic systolic heart failure and acute kidney injury. During hospitalization, an echocardiogram demonstrated evidence of a dilated left ventricular chamber with regional wall motion abnormalities of the inferior and inferolateral segment with severe left ventricular systolic dysfunction estimated ejection fraction approximately 30% with deterioration of left ventricular systolic function compared to that of baseline. At the time of his hospital discharge approximately 10 days earlier, he remained on supplemental oxygen in view of relative hypotension his acute kidney injury alteration of his medical regimen. At the time of follow-up with his primary care physician prior to his present assessment, he was normotensive. He relates no present symptoms of anginal-like chest discomfort or other ischemic equivalents nor significant exertional dyspnea with ongoing supplemental oxygen needs and persistent requirements of 4 L/min. He denies any arrhythmia related manifestations. He has undergone no interim laboratory reassessment in the face of his recent acute kidney injury. Review of Systems:    The remainder of a complete multisystem review including consitutional, central nervous, respiratory, circulatory, gastrointestinal, genitourinary, endocrinologic, hematologic, musculoskeletal and psychiatric are negative.     Past Medical History:  Past Medical History:   Diagnosis Date    Anesthesia complication     wakes up  violant   only ok if reversed with romazacon    Arthritis     shoulders,ankle    CAD (coronary artery disease)     COPD (chronic obstructive [facility] disease) ([facility]     Erectile dysfunction     GERD (gastroesophageal reflux disease)     H/O coronary angioplasty with stents[V45.82] 9/2018 another stent    Hyperlipidemia     Hypertension     Lymphoma ([facility] 11/4/2011    had chemo  currently in remission    Myocardial infarction [facility])     more than 10 yrs ago    Sleep apnea     wont wear machine    Unspecified cerebral artery occlusion with cerebral infarction 2005    no deficits       Past Surgical History:  Past Surgical History:   Procedure Laterality Date    ANKLE FRACTURE SURGERY Right 03/2014    ORIF right ankle    ANKLE SURGERY  2007    rt ankle    BRONCHOSCOPY  09/2011    bronch / medistinoscopy    CAROTID ENDARTERECTOMY Left     12 yrs ago    CHOLECYSTECTOMY      COLONOSCOPY  07/30/2013     [facility] WITH STENT PLACEMENT  2008    states has 2 stents  follows with DR Lupe Gilmore    ENDOSCOPY, COLON, DIAGNOSTIC      HAND SURGERY Left     fell on a buzzsaw    HERNIA REPAIR Bilateral 08/15/2019    HERNIA  BILATERAL INGUINAL REPAIR WITH MESH LAPAROSCOPIC ROBOTIC XI ASSISTED performed by Melissa Allen MD at [facility]  06/29/2015    lapraoscopic cholecystectomy    SHOULDER ARTHROSCOPY Right 10/08/2015    rotator cuff repair, subachromoplasty with labrial debridement    SHOULDER ARTHROSCOPY Left 2/18/2021    LEFT ROTATOR CUFF REPAIR LABRAL DEBRIDEMENT SUBACROMIAL DECOMPRESSION performed by Duglas Reynoso DO at [address] TONSILLECTOMY      UPPER GASTROINTESTINAL ENDOSCOPY         Family History:  Family History   Problem Relation Age of Onset    Diabetes Mother     Heart Disease Mother     Diabetes Father     Heart Disease Father     Diabetes Sister     Cancer Sister     Diabetes Brother     Cancer Brother        Social History:  Social History     Socioeconomic History    Marital status:      Spouse name: Not on file    Number of children: Not on file    Years of education: Not on file    Highest education level: Not on file   Occupational History    Not on file   Tobacco Use    Smoking status: Former Smoker     Packs/day: 0.50     Years: 60.00     Pack years: 30.00     Types: Cigarettes     Quit date: 2020     Years since quittin.7    Smokeless tobacco: Never Used   Vaping Use    Vaping Use: Never used   Substance and Sexual Activity    Alcohol use: Yes     Comment: 2-3 beers daily    Drug use: Not Currently     Comment: in past cocaine last used     Sexual activity: Not Currently     Partners: Female   Other Topics Concern    Not on file   Social History Narrative    Not on file     Social Determinants of Health     Financial Resource Strain: High Risk    Difficulty of Paying Living Expenses: Hard   Food Insecurity: No Food Insecurity    Worried About Running Out of Food in the Last Year: Never true    Edis of Food in the Last Year: Never true   Transportation Needs:     Lack of Transportation (Medical): Not on file    Lack of Transportation (Non-Medical):  Not on file   Physical Activity:     Days of Exercise per Week: Not on file    Minutes of Exercise per Session: Not on file   Stress:     Feeling of Stress : Not on file   Social Connections:     Frequency of Communication with Friends and Family: Not on file    Frequency of Social Gatherings with Friends and Family: Not on file    Attends Jain Services: Not on file    Active Member of Clubs or Organizations: Not on file    Attends Club or Organization Meetings: Not on file    Marital Status: Not on file   Intimate Partner Violence:     Fear of Current or Ex-Partner: Not on file    Emotionally Abused: Not on file    Physically Abused: Not on file    Sexually Abused: Not on file   Housing Stability:     Unable to Pay for Housing in the Last Year: Not on file    Number of Places Lived in the Last Year: Not on file    Unstable Housing in the Last Year: Not on file       Allergies: Allergies   Allergen Reactions    Midazolam Hcl      Wake up wild. ... Must be reversed with romazicon or will wake up wild and combative       Current Medications:  Current Outpatient Medications   Medication Sig Dispense Refill    carvedilol (COREG) 3.125 MG tablet Take 3.125 mg by mouth 2 times daily      hydrALAZINE (APRESOLINE) 25 MG tablet Take 25 mg by mouth 4 times daily (Pt only taking bid)      tamsulosin (FLOMAX) 0.4 MG capsule TAKE 1 CAPSULE BY MOUTH AT BEDTIME 30 capsule 2    simvastatin (ZOCOR) 20 MG tablet Take 1 tablet by mouth daily. 90 tablet 1    sertraline (ZOLOFT) 100 MG tablet Take 1 tablet by mouth daily 30 tablet 0    montelukast (SINGULAIR) 10 MG tablet Take 1 tablet by mouth daily 90 tablet 1    guaiFENesin (MUCINEX) 600 MG extended release tablet Take 2 tablets by mouth 2 times daily 30 tablet 0    fenofibrate (TRICOR) 145 MG tablet Take 1 tablet by mouth once daily. 90 tablet 1    clopidogrel (PLAVIX) 75 MG tablet Take 1 tablet by mouth daily 90 tablet 1    buPROPion (WELLBUTRIN SR) 150 MG extended release tablet Take 1 tablet by mouth 2 times daily 180 tablet 1    albuterol sulfate HFA (PROAIR HFA) 108 (90 Base) MCG/ACT inhaler INHALE TWO PUFFS BY MOUTH EVERY 6 HOURS AS DIRECTED 1 each 3    aspirin 81 MG chewable tablet Take 1 tablet by mouth daily Ld 1 week ago patient not compliant with asa daughter stated ld about 1 week ago 90 tablet 1    HYDROcodone-acetaminophen (NORCO) 7.5-325 MG per tablet Take 1 tablet by mouth every 8 hours as needed for Pain for up to 30 days.  90 tablet 0     No current facility-administered medications for this visit. Physical Exam:  There were no vitals taken for this visit. Wt Readings from Last 3 Encounters:   01/26/22 213 lb 6.4 oz (96.8 kg)   01/18/22 200 lb 9.6 oz (91 kg)   12/15/21 223 lb 9.6 oz (101.4 kg)     Based on the transtelephonic nature of present evaluation, examination was unable to be performed    Laboratory Tests:  Lab Results   Component Value Date    CREATININE 1.4 (H) 01/18/2022    BUN 33 (H) 01/18/2022     01/18/2022    K 4.2 01/18/2022    CL 99 01/18/2022    CO2 24 01/18/2022     Lab Results   Component Value Date     (H) 12/01/2011     Lab Results   Component Value Date    WBC 8.2 01/18/2022    RBC 5.38 01/18/2022    HGB 14.8 01/18/2022    HCT 47.3 01/18/2022    MCV 87.9 01/18/2022    MCH 27.5 01/18/2022    MCHC 31.3 01/18/2022    RDW 13.2 01/18/2022     01/18/2022    MPV 11.7 01/18/2022     No results for input(s): ALKPHOS, ALT, AST, PROT, BILITOT, BILIDIR, LABALBU in the last 72 hours.   Lab Results   Component Value Date    MG 1.8 01/05/2022     Lab Results   Component Value Date    PROTIME 12.7 01/05/2022    PROTIME 13.0 11/03/2011    INR 1.1 01/05/2022     Lab Results   Component Value Date    TSH 1.540 07/18/2019     No components found for: CHLPL  Lab Results   Component Value Date    TRIG 149 06/16/2021    TRIG 174 (H) 11/06/2019    TRIG 129 01/12/2018     Lab Results   Component Value Date    HDL 43 06/16/2021    HDL 36 11/06/2019    HDL 45 01/12/2018     Lab Results   Component Value Date    LDLCALC 153 (H) 06/16/2021    LDLCALC 127 (H) 11/06/2019    LDLCALC 104 (H) 01/12/2018       Cardiac Tests:  Last Echocardiogram: An echocardiogram of January, 2020 reviewed at time of evaluation demonstrated evidence of a mildly dilated left ventricular chamber with regional wall motion abnormalities of the inferior and inferolateral segment upon personal review with severe left ventricular systolic dysfunction estimated ejection fraction of approximately 30% with right ventricular dilatation and no significant valvular pathology      ASSESSMENT / PLAN: On a clinical basis, the patient presently appears compensated from a cardiovascular standpoint in the face of his ischemic cardiomyopathy and recently noted severe left ventricular systolic dysfunction potentially in part related to his COVID-19 illness. Presently, I have not altered his existing medical regimen albeit with reported adequate blood pressure for modification pending review of laboratory studies for reassessment of his renal function and electrolytes as well as that of a follow-up proBNP level. At the time of assessment, this approach has been extensively discussed with he and family with plans to obtain these laboratory studies as well as that of a limited follow-up echocardiogram for reassessment of left ventricular systolic function following his recovery from his acute illness. On the basis of these results, additional needs of assessment of his coronary circulation will be determined either on a functional basis or that of considered angiography. Continued ongoing aggressive lifestyle modification to achieve weight reduction will benefit diastolic cardiac performance as well as assist in management of his obstructive sleep apnea in attempt to reduce risk of additional adverse cardiovascular effects including that of an increased risk of atrial arrhythmias. Ongoing aggressive risk factor modification of blood pressure and serum lipids will remain essential to reducing risk of future atherosclerotic development. Arrangements have been made for follow-up assessment in approximately 1 month and would happily reassess him in the interim should additional cardiovascular difficulties or concerns arise. The patient's current medication list, allergies, problem list and results of all previously ordered testing were reviewed at today's visit.     Follow-up office visit in 1 month      Note: This report was completed using computerized voice recognition software. Every effort has been made to ensure accuracy, however; inadvertent computerized transcription errors may be present. Ramakrishna Barbosa. Cande Walton, [address] [place] [address]    An electronic copy of this follow-up progress note was forwarded to Dr. Bryon Mcgee    The patient was evaluated through a synchronous (real-time) transtelephonic encounter. The patient (or guardian if applicable) is aware that this is a billable service, which includes applicable co-pays. This virtual visit was conducted with the patient (and/or legal guardians) consent. The visit was conducted pursuant to the emergency declaration under the [facility] [address] [facility] authority and the [employer] General Act. Patient identification was verified, and a caregiver was present when appropriate. The patient was located in a state where the provider was licensed to practice care.

## 2022-02-01 ENCOUNTER — CARE COORDINATION (OUTPATIENT)
Dept: CASE MANAGEMENT | Age: 72
End: 2022-02-01

## 2022-02-01 NOTE — CARE COORDINATION
[facility] Transitions Follow Up Call    2022    Patient: Varun Juarez  Patient : 1950   MRN: [id number]  Reason for Admission: Resp distress  Discharge Date: 22 RARS: Readmission Risk Score: 12.9 ( )           Attempted to reach the patient for a sub COVID Monitoring Care Transition call post hospital discharge. Message left with CTN's contact information requesting return phone call. No further outreaches will be attempted. If no return call by the end of today, CTN will sign off.         Follow Up  Future Appointments   Date Time Provider Kike Nguyen   2022  8:00 AM [facility] ECHO RM [address]   2022 10:00 AM DO [facility] Qu[facility]   3/7/2022 10:30 AM Ursula Duran MD [facility]       Rachelle Felix RN

## 2022-02-08 PROBLEM — I25.5 ISCHEMIC CARDIOMYOPATHY: Status: ACTIVE | Noted: 2022-02-08

## 2022-02-08 PROBLEM — J96.01 ACUTE HYPOXEMIC RESPIRATORY FAILURE (HCC): Status: ACTIVE | Noted: 2022-02-08

## 2022-02-08 PROBLEM — I50.42 CHRONIC COMBINED SYSTOLIC AND DIASTOLIC CONGESTIVE HEART FAILURE (HCC): Status: ACTIVE | Noted: 2022-02-08

## 2022-02-12 DIAGNOSIS — F43.21 GRIEF: ICD-10-CM

## 2022-02-12 DIAGNOSIS — F32.89 OTHER DEPRESSION: ICD-10-CM

## 2022-02-14 RX ORDER — SERTRALINE HYDROCHLORIDE 100 MG/1
100 TABLET, FILM COATED ORAL DAILY
Qty: 30 TABLET | Refills: 2 | Status: SHIPPED
Start: 2022-02-14 | End: 2022-05-09

## 2022-02-15 DIAGNOSIS — I50.22 CHRONIC SYSTOLIC HF (HEART FAILURE) (HCC): ICD-10-CM

## 2022-02-15 LAB
ANION GAP SERPL CALCULATED.3IONS-SCNC: 15 MMOL/L (ref 7–16)
BUN BLDV-MCNC: 21 MG/DL (ref 6–23)
CALCIUM SERPL-MCNC: 9.8 MG/DL (ref 8.6–10.2)
CHLORIDE BLD-SCNC: 101 MMOL/L (ref 98–107)
CO2: 24 MMOL/L (ref 22–29)
CREAT SERPL-MCNC: 1.2 MG/DL (ref 0.7–1.2)
GFR AFRICAN AMERICAN: >60
GFR NON-AFRICAN AMERICAN: 60 ML/MIN/1.73
GLUCOSE BLD-MCNC: 99 MG/DL (ref 74–99)
POTASSIUM SERPL-SCNC: 5 MMOL/L (ref 3.5–5)
PRO-BNP: 907 PG/ML (ref 0–125)
SODIUM BLD-SCNC: 140 MMOL/L (ref 132–146)

## 2022-02-23 ENCOUNTER — TELEPHONE (OUTPATIENT)
Dept: FAMILY MEDICINE CLINIC | Age: 72
End: 2022-02-23

## 2022-02-23 NOTE — TELEPHONE ENCOUNTER
Dr. Ajit Rios office has made multiple attempts to contact Keyla Mayers regarding his PSA. Dr. Sheri Connolly wants him to repeat his PSA at 3 months, 6 months and 9 months. He went from 3.02 in 2020 to 5.38 in 2022.

## 2022-02-24 ENCOUNTER — OFFICE VISIT (OUTPATIENT)
Dept: FAMILY MEDICINE CLINIC | Age: 72
End: 2022-02-24
Payer: MEDICARE

## 2022-02-24 ENCOUNTER — TELEPHONE (OUTPATIENT)
Dept: FAMILY MEDICINE CLINIC | Age: 72
End: 2022-02-24

## 2022-02-24 VITALS
DIASTOLIC BLOOD PRESSURE: 74 MMHG | OXYGEN SATURATION: 98 % | BODY MASS INDEX: 32.74 KG/M2 | TEMPERATURE: 96.4 F | WEIGHT: 216 LBS | SYSTOLIC BLOOD PRESSURE: 128 MMHG | RESPIRATION RATE: 16 BRPM | HEART RATE: 72 BPM | HEIGHT: 68 IN

## 2022-02-24 DIAGNOSIS — I73.9: ICD-10-CM

## 2022-02-24 DIAGNOSIS — R97.20 ELEVATED PSA MEASUREMENT: Primary | ICD-10-CM

## 2022-02-24 DIAGNOSIS — F33.0 MAJOR DEPRESSIVE DISORDER, RECURRENT, MILD (HCC): ICD-10-CM

## 2022-02-24 DIAGNOSIS — G89.29 CHRONIC RIGHT SHOULDER PAIN: ICD-10-CM

## 2022-02-24 DIAGNOSIS — M67.911 DISORDER OF ROTATOR CUFF, RIGHT: ICD-10-CM

## 2022-02-24 DIAGNOSIS — F01.518 VASCULAR DEMENTIA WITH BEHAVIORAL DISTURBANCE: ICD-10-CM

## 2022-02-24 DIAGNOSIS — C85.91 NON-HODGKIN LYMPHOMA OF LYMPH NODES OF NECK, UNSPECIFIED NON-HODGKIN LYMPHOMA TYPE (HCC): ICD-10-CM

## 2022-02-24 DIAGNOSIS — M25.511 CHRONIC RIGHT SHOULDER PAIN: ICD-10-CM

## 2022-02-24 DIAGNOSIS — U07.1 COVID-19 VIRUS INFECTION: Primary | ICD-10-CM

## 2022-02-24 PROCEDURE — 4040F PNEUMOC VAC/ADMIN/RCVD: CPT | Performed by: FAMILY MEDICINE

## 2022-02-24 PROCEDURE — 20610 DRAIN/INJ JOINT/BURSA W/O US: CPT | Performed by: FAMILY MEDICINE

## 2022-02-24 PROCEDURE — G8484 FLU IMMUNIZE NO ADMIN: HCPCS | Performed by: FAMILY MEDICINE

## 2022-02-24 PROCEDURE — G8427 DOCREV CUR MEDS BY ELIG CLIN: HCPCS | Performed by: FAMILY MEDICINE

## 2022-02-24 PROCEDURE — 99213 OFFICE O/P EST LOW 20 MIN: CPT | Performed by: FAMILY MEDICINE

## 2022-02-24 PROCEDURE — 1123F ACP DISCUSS/DSCN MKR DOCD: CPT | Performed by: FAMILY MEDICINE

## 2022-02-24 PROCEDURE — G8417 CALC BMI ABV UP PARAM F/U: HCPCS | Performed by: FAMILY MEDICINE

## 2022-02-24 PROCEDURE — 3017F COLORECTAL CA SCREEN DOC REV: CPT | Performed by: FAMILY MEDICINE

## 2022-02-24 PROCEDURE — 1036F TOBACCO NON-USER: CPT | Performed by: FAMILY MEDICINE

## 2022-02-24 RX ORDER — DEXAMETHASONE 6 MG/1
6 TABLET ORAL
Qty: 10 TABLET | Refills: 0 | Status: SHIPPED | OUTPATIENT
Start: 2022-02-24 | End: 2022-03-06

## 2022-02-24 RX ORDER — HYDROCODONE BITARTRATE AND ACETAMINOPHEN 7.5; 325 MG/1; MG/1
1 TABLET ORAL EVERY 8 HOURS PRN
Qty: 90 TABLET | Refills: 0 | Status: SHIPPED
Start: 2022-02-24 | End: 2022-03-23 | Stop reason: SDUPTHER

## 2022-02-24 RX ORDER — METHYLPREDNISOLONE ACETATE 80 MG/ML
80 INJECTION, SUSPENSION INTRA-ARTICULAR; INTRALESIONAL; INTRAMUSCULAR; SOFT TISSUE ONCE
Status: COMPLETED | OUTPATIENT
Start: 2022-02-24 | End: 2022-02-24

## 2022-02-24 RX ADMIN — METHYLPREDNISOLONE ACETATE 80 MG: 80 INJECTION, SUSPENSION INTRA-ARTICULAR; INTRALESIONAL; INTRAMUSCULAR; SOFT TISSUE at 10:43

## 2022-02-24 ASSESSMENT — ENCOUNTER SYMPTOMS
CONSTIPATION: 0
ABDOMINAL PAIN: 0
VOMITING: 0
DIARRHEA: 0
COUGH: 0
SHORTNESS OF BREATH: 0
NAUSEA: 0
BLOOD IN STOOL: 0
WHEEZING: 0

## 2022-02-24 NOTE — PROGRESS NOTES
David Guardado (:  1950) is a 70 y.o. male,Established patient, here for evaluation of the following chief complaint(s):  Shoulder Pain (norco refill), Discuss Labs ([facility] Mel[facility] wants him to repeat his PSA at 3 months, 6 months and 9 months.), and Discuss Medications (daughter wants omperazole changed to pantoprazole. )    Oxygen desaturated on 100 ft from 98%to 92%     ASSESSMENT/PLAN:  1. COVID-19 virus infection  -     dexamethasone (DECADRON) 6 MG tablet; Take 1 tablet by mouth daily (with breakfast) for 10 days, Disp-10 tablet, R-0Normal  2. Chronic right shoulder pain  -     HYDROcodone-acetaminophen (NORCO) 7.5-325 MG per tablet; Take 1 tablet by mouth every 8 hours as needed for Pain for up to 30 days. , Disp-90 tablet, R-0Normal  -     OK ARTHROCENTESIS ASPIR&/INJ MAJOR JT/BURSA W/O US  -     methylPREDNISolone acetate (DEPO-MEDROL) injection 80 mg; 80 mg, Intra-artICUlar, ONCE, On Thu 22 at 1100, For 1 dose  3. Vascular dementia with behavioral disturbance ([address]  4. Gluteal claudication ([address]  5. Non-Hodgkin lymphoma of lymph nodes of neck, unspecified non-Hodgkin lymphoma type ([address]  6. Major depressive disorder, recurrent, mild ([address]  7. Disorder of rotator cuff, right  -     OK ARTHROCENTESIS ASPIR&/INJ MAJOR JT/BURSA W/O US  -     methylPREDNISolone acetate (DEPO-MEDROL) injection 80 mg; 80 mg, Intra-artICUlar, ONCE, On Thu 22 at 1100, For 1 dose      No follow-ups on file. Subjective   SUBJECTIVE/OBJECTIVE:  Shoulder Pain (norco refill), Discuss Labs (Dr. Melodie Coombs has been unsuccessful in reaching [facility] re: PSA. wants him to repeat his PSA at 3 months, 6 months and 9 months.), and Discuss Medications (daughter wants omperazole changed to pantoprazole. )  Shingles C-3-4-5-6-7. Review of Systems   Constitutional: Negative for chills, diaphoresis and fever.    HENT: Negative for ear discharge, ear pain, hearing loss, nosebleeds and tinnitus. Respiratory: Negative for cough, shortness of breath and wheezing. Cardiovascular: Negative for chest pain. Gastrointestinal: Negative for abdominal pain, blood in stool, constipation, diarrhea, nausea and vomiting. Genitourinary: Negative for dysuria, flank pain and hematuria. Musculoskeletal: Negative for arthralgias and myalgias. Skin: Negative for rash. Neurological: Negative for headaches. Hematological: Does not bruise/bleed easily. Psychiatric/Behavioral: Negative for hallucinations and suicidal ideas. Objective   /74   Pulse 72   Temp 96.4 °F (35.8 °C) (Temporal)   Resp 16   Ht 5' 8\" (1.727 m)   Wt 216 lb (98 kg)   SpO2 98%   PF (!) 5 L/min   BMI 32.84 kg/m²   Lab Results   Component Value Date    LABA1C 5.6 07/31/2018    LABA1C 6.0 04/26/2018     Physical Exam  HENT:      Head: Normocephalic. Eyes:      General:         Right eye: No discharge. Left eye: No discharge. Cardiovascular:      Rate and Rhythm: Normal rate and regular rhythm. Heart sounds: No murmur heard. Pulmonary:      Effort: No respiratory distress. Breath sounds: No rhonchi or rales. Abdominal:      Palpations: Abdomen is soft. Tenderness: There is no abdominal tenderness. Musculoskeletal:      Cervical back: No rigidity. Comments: Pain decreased ROM right shoulder   Skin:     General: Skin is warm. Capillary Refill: Capillary refill takes less than 2 seconds. Coloration: Skin is not pale. Findings: No bruising. Neurological:      Mental Status: He is alert. Psychiatric:         Mood and Affect: Mood normal.         Procedure:  Intra-Articular Injection right shoulder: The patient was counseled on the options for treating the affected shoulder. These include but were not limited to trail of oral anti-inflammatories, oral steroids, physical therapy referral, or ortho consultation.   The patient is electing injection. The risks of the injection were explained, including but not limited to infection, bleeding, bruising, tendon injury, skin contour deformity and soft tissue/fat necrosis. The patient gave verbal permission to proceed. The patient was placed in a seated position. The skin of [facility] was prepped with Betadine swabs. It was allowed to dry. Utilizing a  Posterior Sub-Acromial approach an injection of 80 mg of  DepoMedrol and 1 cc of 1% lidocaine without epinephrine and 0.5 cc of 0.25% Bupivacaine,was injected into the right Shoulder joint after first aspirating to assure no blood return. The injection site was then wiped again with Betadine and covered with a band aid. The procedure was tolerated well. Routine wound instructions given verbally to the patient. Advised to notify if bleeding, excessive bruising, or swelling develop. Surgeon: Leonid Jama D.O. On this date 2/24/2022 I have spent 24 minutes reviewing previous notes, test results and face to face with the patient discussing the diagnosis and importance of compliance with the treatment plan as well as documenting on the day of the visit. An electronic signature was used to authenticate this note.     --Reji Zamora,

## 2022-02-28 ENCOUNTER — CARE COORDINATION (OUTPATIENT)
Dept: CARE COORDINATION | Age: 72
End: 2022-02-28

## 2022-02-28 NOTE — CARE COORDINATION
AMANDAM contacted Dionne Gibson and offered enrollment in care coordination. AMANDAM explained care coordination to Dionne Gibson. Dionne Gibson denies needs and declines enrollment.   Will place on temporary exclusion list.

## 2022-03-03 ENCOUNTER — TELEPHONE (OUTPATIENT)
Dept: CARDIOLOGY CLINIC | Age: 72
End: 2022-03-03

## 2022-03-03 ENCOUNTER — HOSPITAL ENCOUNTER (OUTPATIENT)
Dept: CARDIOLOGY | Age: 72
Discharge: HOME | End: 2022-03-03
Payer: MEDICARE

## 2022-03-03 DIAGNOSIS — I25.118 CORONARY ARTERY DISEASE OF NATIVE ARTERY OF NATIVE HEART WITH STABLE ANGINA PECTORIS (HCC): ICD-10-CM

## 2022-03-03 DIAGNOSIS — I25.5 ISCHEMIC CARDIOMYOPATHY: ICD-10-CM

## 2022-03-03 PROCEDURE — 93308 TTE F-UP OR LMTD: CPT

## 2022-03-03 NOTE — TELEPHONE ENCOUNTER
----- Message from Harrison Jauregui MD sent at 3/3/2022  9:52 AM EST -----  Please notify patient that echocardiogram shows improvement of heart muscle function compared to that obtained during his COVID-19 infection.   We will further discuss at time of follow-up    Pt notified

## 2022-03-07 ENCOUNTER — OFFICE VISIT (OUTPATIENT)
Dept: CARDIOLOGY CLINIC | Age: 72
End: 2022-03-07
Payer: MEDICARE

## 2022-03-07 VITALS
HEIGHT: 69 IN | HEART RATE: 94 BPM | BODY MASS INDEX: 32.58 KG/M2 | RESPIRATION RATE: 16 BRPM | SYSTOLIC BLOOD PRESSURE: 126 MMHG | DIASTOLIC BLOOD PRESSURE: 62 MMHG | WEIGHT: 220 LBS

## 2022-03-07 DIAGNOSIS — I50.42 CHRONIC COMBINED SYSTOLIC AND DIASTOLIC CONGESTIVE HEART FAILURE (HCC): ICD-10-CM

## 2022-03-07 DIAGNOSIS — N18.31 STAGE 3A CHRONIC KIDNEY DISEASE (HCC): ICD-10-CM

## 2022-03-07 DIAGNOSIS — E78.00 PURE HYPERCHOLESTEROLEMIA: ICD-10-CM

## 2022-03-07 DIAGNOSIS — U07.1 COVID-19 VIRUS INFECTION: ICD-10-CM

## 2022-03-07 DIAGNOSIS — J96.11 CHRONIC HYPOXEMIC RESPIRATORY FAILURE (HCC): ICD-10-CM

## 2022-03-07 DIAGNOSIS — J44.9 CHRONIC OBSTRUCTIVE PULMONARY DISEASE, UNSPECIFIED COPD TYPE (HCC): ICD-10-CM

## 2022-03-07 DIAGNOSIS — I25.5 ISCHEMIC CARDIOMYOPATHY: ICD-10-CM

## 2022-03-07 DIAGNOSIS — G47.33 OBSTRUCTIVE SLEEP APNEA: ICD-10-CM

## 2022-03-07 DIAGNOSIS — E66.8 MODERATE OBESITY: ICD-10-CM

## 2022-03-07 DIAGNOSIS — I25.118 CORONARY ARTERY DISEASE OF NATIVE ARTERY OF NATIVE HEART WITH STABLE ANGINA PECTORIS (HCC): Primary | ICD-10-CM

## 2022-03-07 PROCEDURE — G8427 DOCREV CUR MEDS BY ELIG CLIN: HCPCS | Performed by: INTERNAL MEDICINE

## 2022-03-07 PROCEDURE — 3023F SPIROM DOC REV: CPT | Performed by: INTERNAL MEDICINE

## 2022-03-07 PROCEDURE — 4040F PNEUMOC VAC/ADMIN/RCVD: CPT | Performed by: INTERNAL MEDICINE

## 2022-03-07 PROCEDURE — G8484 FLU IMMUNIZE NO ADMIN: HCPCS | Performed by: INTERNAL MEDICINE

## 2022-03-07 PROCEDURE — 93000 ELECTROCARDIOGRAM COMPLETE: CPT | Performed by: INTERNAL MEDICINE

## 2022-03-07 PROCEDURE — 99214 OFFICE O/P EST MOD 30 MIN: CPT | Performed by: INTERNAL MEDICINE

## 2022-03-07 PROCEDURE — 1036F TOBACCO NON-USER: CPT | Performed by: INTERNAL MEDICINE

## 2022-03-07 PROCEDURE — G8417 CALC BMI ABV UP PARAM F/U: HCPCS | Performed by: INTERNAL MEDICINE

## 2022-03-07 PROCEDURE — 1123F ACP DISCUSS/DSCN MKR DOCD: CPT | Performed by: INTERNAL MEDICINE

## 2022-03-07 PROCEDURE — 3017F COLORECTAL CA SCREEN DOC REV: CPT | Performed by: INTERNAL MEDICINE

## 2022-03-07 NOTE — PROGRESS NOTES
OUTPATIENT CARDIOLOGY FOLLOW-UP    Name: David Guardado    Age: 70 y.o. Primary Care Physician: Megha Gould DO    Date of Service: 3/7/2022    Chief Complaint: Coronary atherosclerosis, ischemic cardiomyopathy, chronic combined systolic and diastolic heart failure, chronic obstructive lung disease with associated present chronic hypoxic respiratory failure, chronic kidney disease, COVID-19 pneumonia, moderate obesity, obstructive sleep apnea    Interim History: Since his recent telemedicine evaluation, the patient has symptomatically improved while noting persistent dyspnea and oxygen requirements, is noted an improvement of his functional capabilities. He denies any specific symptoms of anginal-like chest discomfort or other ischemic equivalents nor manifestations of decompensated left ventricular systolic dysfunction or volume overload. He denies any arrhythmia related symptoms. In the interim repeat limited echocardiogram demonstrates evidence of a borderline dilated left ventricular chamber with mild concentric left ventricular hypertrophy and no regional wall motion abnormalities with left ventricular systolic function at the lower limits of normal and estimated ejection fraction of approximately 50 to 55% with mild left atrial enlargement and mild mitral regurgitation. He has lost approximately 5 pounds since his most recent assessment and is presently normotensive on his existing medical regimen. Review of Systems: The remainder of a complete multisystem review including consitutional, central nervous, respiratory, circulatory, gastrointestinal, genitourinary, endocrinologic, hematologic, musculoskeletal and psychiatric are negative.     Past Medical History:  Past Medical History:   Diagnosis Date    Anesthesia complication     wakes up  violant   only ok if reversed with romazacon    Arthritis     shoulders,ankle    CAD (coronary artery disease)     Cardiomyopathy [facility]  COPD (chronic obstructive pulmonary disease) ([address]     Erectile dysfunction     GERD (gastroesophageal reflux disease)     H/O coronary angioplasty with stents[V45.82] 9/2018 another stent    HFrEF (heart failure with reduced ejection fraction) ([address]     Hyperlipidemia     Hypertension     Lymphoma ([address] 11/04/2011    had chemo  currently in remission    Myocardial infarction [facility])     more than 10 yrs ago    Sleep apnea     wont wear machine    Unspecified cerebral artery occlusion with cerebral infarction 2005    no deficits       Past Surgical History:  Past Surgical History:   Procedure Laterality Date    ANKLE FRACTURE SURGERY Right 03/2014    ORIF right ankle    ANKLE SURGERY  2007    rt ankle    BRONCHOSCOPY  09/2011    bronch / medistinoscopy    CAROTID ENDARTERECTOMY Left     12 yrs ago    CHOLECYSTECTOMY      [facility]ONOS[facility]  07/30/2013     [facility]    CORONARY ANGIOPLASTY WITH STENT PLACEMENT  2008    states has 2 stents  follows with DR Bill Bahena    ENDOSCOPY, COLON, DIAGNOSTIC      HAND SURGERY Left     fell on a buzzsaw    HERNIA REPAIR Bilateral 08/15/2019    HERNIA  BILATERAL INGUINAL REPAIR WITH MESH LAPAROSCOPIC ROBOTIC XI ASSISTED performed by Keri Ward MD at [facility]  06/29/2015    lapraoscopic cholecystectomy    SHOULDER ARTHROSCOPY Right 10/08/2015    rotator cuff repair, subachromoplasty with labrial debridement    SHOULDER ARTHROSCOPY Left 2/18/2021    LEFT ROTATOR CUFF REPAIR LABRAL DEBRIDEMENT SUBACROMIAL DECOMPRESSION performed by Mike Valadez DO at [place] [id number]      UPPER GASTROINTESTINAL ENDOSCOPY         Family History:  Family History   Problem Relation Age of Onset    Diabetes Mother     Heart Disease Mother     Diabetes Father     Heart Disease Father     Diabetes Sister     Cancer Sister     Diabetes Brother     Cancer Brother        Social History:  Social History     Socioeconomic History    Marital status:      Spouse name: Not on file    Number of children: Not on file    Years of education: Not on file    Highest education level: Not on file   Occupational History    Not on file   Tobacco Use    Smoking status: Former Smoker     Packs/day: 2.00     Years: 60.00     Pack years: 120.00     Types: Cigarettes     Quit date: 2020     Years since quittin.8    Smokeless tobacco: Never Used   Vaping Use    Vaping Use: Never used   Substance and Sexual Activity    Alcohol use: Yes     Comment: 1-2 beers daily- pop daily    Drug use: Not Currently     Types: Cocaine     Comment: past use; none since     Sexual activity: Not Currently     Partners: Female   Other Topics Concern    Not on file   Social History Narrative    Drinks 4 Mt Dew daily     Social Determinants of Health     Financial Resource Strain: High Risk    Difficulty of Paying Living Expenses: Hard   Food Insecurity: No Food Insecurity    Worried About Running Out of Food in the Last Year: Never true    [address] in the Last Year: Never true   Transportation Needs:     Lack of Transportation (Medical): Not on file    Lack of Transportation (Non-Medical):  Not on file   Physical Activity:     Days of Exercise per Week: Not on file    Minutes of Exercise per Session: Not on file   Stress:     Feeling of Stress : Not on file   Social Connections:     Frequency of Communication with Friends and Family: Not on file    Frequency of Social Gatherings with Friends and Family: Not on file    Attends Anabaptism Services: Not on file    Active Member of Clubs or Organizations: Not on file    Attends Club or Organization Meetings: Not on file    Marital Status: Not on file   Intimate Partner Violence:     Fear of Current or Ex-Partner: Not on file    Emotionally Abused: Not on file    Physically Abused: Not on file    Sexually Abused: Not on file   Housing Stability:     Unable to Pay for 220 lb (99.8 kg)   02/24/22 216 lb (98 kg)   01/26/22 213 lb 6.4 oz (96.8 kg)     The patient is awake, alert and in no discomfort or distress. No gross musculoskeletal deformity is present. No significant skin or nail changes are present. Gross examination of head, eyes, nose and throat are negative. Jugular venous pressure is normal and no carotid bruits are present. Normal respiratory effort is noted with no accessory muscle usage present. Lung fields demonstrate evidence of mild bronchospasm to ascultation. Cardiac examination is notable for a regular rate and rhythm with no palpable thrill. No gallop rhythm or cardiac murmur are identified. A benign abdominal examination is present with the exception of obesity and no masses or organomegaly. Intact pulses are present throughout all extremities and no peripheral edema is present. No focal neurologic deficits are present. Laboratory Tests:  Lab Results   Component Value Date    CREATININE 1.2 02/15/2022    BUN 21 02/15/2022     02/15/2022    K 5.0 02/15/2022     02/15/2022    CO2 24 02/15/2022     Lab Results   Component Value Date     (H) 12/01/2011     Lab Results   Component Value Date    WBC 8.2 01/18/2022    RBC 5.38 01/18/2022    HGB 14.8 01/18/2022    HCT 47.3 01/18/2022    MCV 87.9 01/18/2022    MCH 27.5 01/18/2022    MCHC 31.3 01/18/2022    RDW 13.2 01/18/2022     01/18/2022    MPV 11.7 01/18/2022     No results for input(s): ALKPHOS, ALT, AST, PROT, BILITOT, BILIDIR, LABALBU in the last 72 hours.   Lab Results   Component Value Date    MG 1.8 01/05/2022     Lab Results   Component Value Date    PROTIME 12.7 01/05/2022    PROTIME 13.0 11/03/2011    INR 1.1 01/05/2022     Lab Results   Component Value Date    TSH 1.540 07/18/2019     No components found for: CHLPL  Lab Results   Component Value Date    TRIG 149 06/16/2021    TRIG 174 (H) 11/06/2019    TRIG 129 01/12/2018     Lab Results   Component Value Date    HDL 43 06/16/2021    HDL 36 11/06/2019    HDL 45 01/12/2018     Lab Results   Component Value Date    LDLCALC 153 (H) 06/16/2021    LDLCALC 127 (H) 11/06/2019    LDLCALC 104 (H) 01/12/2018       Cardiac Tests:  ECG: A resting electrocardiogram demonstrates evidence of sinus rhythm with a slightly delayed precordial transition zone and nonspecific ST changes  Last Echocardiogram: An echocardiogram of March, 2022 demonstrates evidence of a borderline dilated left ventricular chamber with concentric left ventricular hypertrophy with no regional wall motion abnormalities and ventricular systolic function at the lower limits of normal with mild left atrial enlargement and mild mitral regurgitation      ASSESSMENT / PLAN: On a clinical basis, the patient appears compensated from a cardiovascular standpoint in the face of his known coronary atherosclerosis with objective improvement of left ventricular systolic function since recovery of his COVID-19 infection and pneumonia with associated acute hypoxic respiratory failure. The combination of his chronic obstructive lung disease and the ongoing effects of his pneumonitis have contributed to persistent hypoxic respiratory failure in the absence of any manifestations of decompensated diastolic heart failure. Presently have extensively discussed this with he and his family have not altered his existing medical regimen. I have recommended continued symptom monitoring with further management of his respiratory issues deferred to your discretion that of his pulmonologist and from a cardiovascular standpoint recommendations of appropriate lifestyle modification to achieve weight reduction to assist diastolic cardiac performance as well as assisting in management of his obstructive sleep apnea in addition to that of appropriate use of his nocturnal CPAP.   Continued aggressive risk factor modification of blood pressure and serum lipids remain essential to reducing risk of future atherosclerotic development. I presently plan his clinical reassessment in approximately 3 months and would happily reevaluate him in the interim should additional cardiovascular difficulties or concerns arise. The patient's current medication list, allergies, problem list and results of all previously ordered testing were reviewed at today's visit. Follow-up office visit in 3 months      Note: This report was completed using computerized voice recognition software. Every effort has been made to ensure accuracy, however; inadvertent computerized transcription errors may be present. Greg Ponce.  Pranay Connor, [address] [place] [address] Cardiology    An electronic copy of this follow-up progress note was forwarded to Dr. Rafaela Ann

## 2022-03-23 ENCOUNTER — OFFICE VISIT (OUTPATIENT)
Dept: FAMILY MEDICINE CLINIC | Age: 72
End: 2022-03-23
Payer: MEDICARE

## 2022-03-23 VITALS
HEIGHT: 69 IN | WEIGHT: 226 LBS | DIASTOLIC BLOOD PRESSURE: 78 MMHG | SYSTOLIC BLOOD PRESSURE: 124 MMHG | OXYGEN SATURATION: 95 % | RESPIRATION RATE: 16 BRPM | HEART RATE: 89 BPM | BODY MASS INDEX: 33.47 KG/M2 | TEMPERATURE: 96.6 F

## 2022-03-23 DIAGNOSIS — J44.9 CHRONIC OBSTRUCTIVE PULMONARY DISEASE, UNSPECIFIED COPD TYPE (HCC): Primary | ICD-10-CM

## 2022-03-23 DIAGNOSIS — M25.511 CHRONIC RIGHT SHOULDER PAIN: ICD-10-CM

## 2022-03-23 DIAGNOSIS — U09.9 COVID-19 LONG HAULER: ICD-10-CM

## 2022-03-23 DIAGNOSIS — G89.29 CHRONIC RIGHT SHOULDER PAIN: ICD-10-CM

## 2022-03-23 PROCEDURE — 4040F PNEUMOC VAC/ADMIN/RCVD: CPT | Performed by: FAMILY MEDICINE

## 2022-03-23 PROCEDURE — 3017F COLORECTAL CA SCREEN DOC REV: CPT | Performed by: FAMILY MEDICINE

## 2022-03-23 PROCEDURE — 1036F TOBACCO NON-USER: CPT | Performed by: FAMILY MEDICINE

## 2022-03-23 PROCEDURE — 3023F SPIROM DOC REV: CPT | Performed by: FAMILY MEDICINE

## 2022-03-23 PROCEDURE — 1123F ACP DISCUSS/DSCN MKR DOCD: CPT | Performed by: FAMILY MEDICINE

## 2022-03-23 PROCEDURE — 99214 OFFICE O/P EST MOD 30 MIN: CPT | Performed by: FAMILY MEDICINE

## 2022-03-23 PROCEDURE — G8484 FLU IMMUNIZE NO ADMIN: HCPCS | Performed by: FAMILY MEDICINE

## 2022-03-23 PROCEDURE — G8427 DOCREV CUR MEDS BY ELIG CLIN: HCPCS | Performed by: FAMILY MEDICINE

## 2022-03-23 PROCEDURE — G8417 CALC BMI ABV UP PARAM F/U: HCPCS | Performed by: FAMILY MEDICINE

## 2022-03-23 RX ORDER — HYDROCODONE BITARTRATE AND ACETAMINOPHEN 7.5; 325 MG/1; MG/1
1 TABLET ORAL EVERY 8 HOURS PRN
Qty: 90 TABLET | Refills: 0 | Status: SHIPPED
Start: 2022-03-23 | End: 2022-04-20 | Stop reason: SDUPTHER

## 2022-03-23 RX ORDER — FUROSEMIDE 20 MG/1
TABLET ORAL
Qty: 15 TABLET | Refills: 3 | Status: SHIPPED
Start: 2022-03-23 | End: 2022-04-20 | Stop reason: SDUPTHER

## 2022-03-23 RX ORDER — POTASSIUM CHLORIDE 750 MG/1
10 TABLET, EXTENDED RELEASE ORAL 2 TIMES DAILY
Qty: 90 TABLET | Refills: 3 | Status: SHIPPED
Start: 2022-03-23 | End: 2022-10-13 | Stop reason: SDUPTHER

## 2022-03-23 ASSESSMENT — ENCOUNTER SYMPTOMS
BLOOD IN STOOL: 0
EYE DISCHARGE: 0
CONSTIPATION: 0
DIARRHEA: 0
APNEA: 0
WHEEZING: 0

## 2022-03-23 NOTE — PROGRESS NOTES
Court Pimentel (:  1950) is a 70 y.o. male,Established patient, here for evaluation of the following chief complaint(s):  Shortness of Breath and Urinary Frequency         ASSESSMENT/PLAN:  1. Chronic obstructive pulmonary disease, unspecified COPD type [facility])  -     External Referral To Pulmonary Rehab  -     [facility] [place]  -     furosemide (LASIX) 20 MG tablet; Take 0.5 tablets by mouth daily. , Disp-15 tablet, R-3Normal  -     potassium chloride (KLOR-CON M) 10 MEQ extended release tablet; Take 1 tablet by mouth 2 times daily, Disp-90 tablet, R-3Normal  2. Chronic right shoulder pain  -     HYDROcodone-acetaminophen (NORCO) 7.5-325 MG per tablet; Take 1 tablet by mouth every 8 hours as needed for Pain for up to 30 days. , Disp-90 tablet, R-0Normal  3. COVID-19 [place] [facility], , Pulmonary, [place]  Controlled substances monitoring: possible medication side effects, risk of tolerance and/or dependence, and alternative treatments discussed and none. No follow-ups on file. Subjective   SUBJECTIVE/OBJECTIVE:  SOB ESQUIVEL desaturation      Review of Systems   Constitutional: Negative for chills, diaphoresis and fever. HENT: Negative for ear discharge, ear pain, hearing loss, nosebleeds and tinnitus. Eyes: Negative for discharge. Respiratory: Negative for apnea and wheezing. Cardiovascular: Negative for chest pain. Gastrointestinal: Negative for blood in stool, constipation and diarrhea. Genitourinary: Negative for dysuria, flank pain and hematuria. Musculoskeletal: Negative for arthralgias. Skin: Negative for rash. Neurological: Negative for headaches. Hematological: Does not bruise/bleed easily. Psychiatric/Behavioral: Negative for agitation.           Objective   /78   Pulse 89   Temp 96.6 °F (35.9 °C) (Temporal)   Resp 16   Ht 5' 9\" (1.753 m)   Wt 226 lb (102.5 kg)   SpO2 95%   BMI 33.37 kg/m²   Lab Results   Component Value Date    LABA1C 5.6 07/31/2018    LABA1C 6.0 04/26/2018     Physical Exam  HENT:      Head: Normocephalic. Eyes:      General:         Right eye: No discharge. Left eye: No discharge. Cardiovascular:      Rate and Rhythm: Normal rate and regular rhythm. Heart sounds: No murmur heard. Pulmonary:      Effort: No respiratory distress. Breath sounds: No rhonchi or rales. Abdominal:      Tenderness: There is no abdominal tenderness. Musculoskeletal:      Cervical back: No rigidity. Skin:     General: Skin is warm. Capillary Refill: Capillary refill takes less than 2 seconds. Coloration: Skin is not pale. Findings: No bruising. Neurological:      Mental Status: He is alert. Psychiatric:         Mood and Affect: Mood normal.            On this date 3/23/2022 I have spent 31 minutes reviewing previous notes, test results and face to face with the patient discussing the diagnosis and importance of compliance with the treatment plan as well as documenting on the day of the visit. An electronic signature was used to authenticate this note.     --Ermias Chen DO

## 2022-04-07 DIAGNOSIS — R35.1 BENIGN PROSTATIC HYPERPLASIA WITH NOCTURIA: ICD-10-CM

## 2022-04-07 DIAGNOSIS — N40.1 BENIGN PROSTATIC HYPERPLASIA WITH NOCTURIA: ICD-10-CM

## 2022-04-07 RX ORDER — TAMSULOSIN HYDROCHLORIDE 0.4 MG/1
CAPSULE ORAL
Qty: 30 CAPSULE | Refills: 1 | Status: SHIPPED
Start: 2022-04-07 | End: 2022-06-01

## 2022-04-07 RX ORDER — HYDRALAZINE HYDROCHLORIDE 25 MG/1
TABLET, FILM COATED ORAL
Qty: 90 TABLET | Refills: 2 | Status: SHIPPED
Start: 2022-04-07 | End: 2022-06-01

## 2022-04-20 ENCOUNTER — OFFICE VISIT (OUTPATIENT)
Dept: FAMILY MEDICINE CLINIC | Age: 72
End: 2022-04-20
Payer: MEDICARE

## 2022-04-20 VITALS
HEIGHT: 68 IN | HEART RATE: 83 BPM | OXYGEN SATURATION: 99 % | DIASTOLIC BLOOD PRESSURE: 76 MMHG | TEMPERATURE: 97.6 F | SYSTOLIC BLOOD PRESSURE: 130 MMHG | BODY MASS INDEX: 35.25 KG/M2 | WEIGHT: 232.6 LBS

## 2022-04-20 DIAGNOSIS — U09.9 COVID-19 LONG HAULER: ICD-10-CM

## 2022-04-20 DIAGNOSIS — M25.511 CHRONIC RIGHT SHOULDER PAIN: ICD-10-CM

## 2022-04-20 DIAGNOSIS — G89.29 CHRONIC RIGHT SHOULDER PAIN: ICD-10-CM

## 2022-04-20 DIAGNOSIS — J45.909 MODERATE ASTHMA WITHOUT COMPLICATION, UNSPECIFIED WHETHER PERSISTENT: Primary | ICD-10-CM

## 2022-04-20 DIAGNOSIS — J44.9 CHRONIC OBSTRUCTIVE PULMONARY DISEASE, UNSPECIFIED COPD TYPE (HCC): ICD-10-CM

## 2022-04-20 PROCEDURE — 4040F PNEUMOC VAC/ADMIN/RCVD: CPT | Performed by: FAMILY MEDICINE

## 2022-04-20 PROCEDURE — 3023F SPIROM DOC REV: CPT | Performed by: FAMILY MEDICINE

## 2022-04-20 PROCEDURE — 3017F COLORECTAL CA SCREEN DOC REV: CPT | Performed by: FAMILY MEDICINE

## 2022-04-20 PROCEDURE — 1123F ACP DISCUSS/DSCN MKR DOCD: CPT | Performed by: FAMILY MEDICINE

## 2022-04-20 PROCEDURE — G8427 DOCREV CUR MEDS BY ELIG CLIN: HCPCS | Performed by: FAMILY MEDICINE

## 2022-04-20 PROCEDURE — G8417 CALC BMI ABV UP PARAM F/U: HCPCS | Performed by: FAMILY MEDICINE

## 2022-04-20 PROCEDURE — 1036F TOBACCO NON-USER: CPT | Performed by: FAMILY MEDICINE

## 2022-04-20 PROCEDURE — 99214 OFFICE O/P EST MOD 30 MIN: CPT | Performed by: FAMILY MEDICINE

## 2022-04-20 RX ORDER — FUROSEMIDE 20 MG/1
TABLET ORAL
Qty: 30 TABLET | Refills: 3 | Status: SHIPPED
Start: 2022-04-20 | End: 2022-10-13 | Stop reason: SDUPTHER

## 2022-04-20 RX ORDER — HYDROCODONE BITARTRATE AND ACETAMINOPHEN 7.5; 325 MG/1; MG/1
1 TABLET ORAL EVERY 8 HOURS PRN
Qty: 90 TABLET | Refills: 0 | Status: SHIPPED
Start: 2022-04-20 | End: 2022-05-18 | Stop reason: SDUPTHER

## 2022-04-20 ASSESSMENT — ENCOUNTER SYMPTOMS
DIARRHEA: 0
CONSTIPATION: 0
WHEEZING: 0
BLOOD IN STOOL: 0

## 2022-04-20 NOTE — PROGRESS NOTES
Capillary refill takes less than 2 seconds. Coloration: Skin is not pale. Findings: No bruising. Neurological:      Mental Status: He is alert. Psychiatric:         Mood and Affect: Mood normal.            On this date 4/20/2022 I have spent 24 minutes reviewing previous notes, test results and face to face with the patient discussing the diagnosis and importance of compliance with the treatment plan as well as documenting on the day of the visit. An electronic signature was used to authenticate this note.     --Rufus Godfrey DO

## 2022-05-07 DIAGNOSIS — F32.89 OTHER DEPRESSION: ICD-10-CM

## 2022-05-07 DIAGNOSIS — F43.21 GRIEF: ICD-10-CM

## 2022-05-09 RX ORDER — SERTRALINE HYDROCHLORIDE 100 MG/1
100 TABLET, FILM COATED ORAL DAILY
Qty: 30 TABLET | Refills: 2 | Status: SHIPPED
Start: 2022-05-09 | End: 2022-08-01

## 2022-05-18 ENCOUNTER — OFFICE VISIT (OUTPATIENT)
Dept: FAMILY MEDICINE CLINIC | Age: 72
End: 2022-05-18
Payer: MEDICARE

## 2022-05-18 VITALS
WEIGHT: 241 LBS | OXYGEN SATURATION: 94 % | TEMPERATURE: 98 F | HEART RATE: 50 BPM | SYSTOLIC BLOOD PRESSURE: 138 MMHG | DIASTOLIC BLOOD PRESSURE: 82 MMHG | BODY MASS INDEX: 35.7 KG/M2 | HEIGHT: 69 IN

## 2022-05-18 DIAGNOSIS — G89.29 CHRONIC RIGHT SHOULDER PAIN: ICD-10-CM

## 2022-05-18 DIAGNOSIS — M25.511 CHRONIC RIGHT SHOULDER PAIN: ICD-10-CM

## 2022-05-18 PROCEDURE — 3017F COLORECTAL CA SCREEN DOC REV: CPT | Performed by: FAMILY MEDICINE

## 2022-05-18 PROCEDURE — 1123F ACP DISCUSS/DSCN MKR DOCD: CPT | Performed by: FAMILY MEDICINE

## 2022-05-18 PROCEDURE — 4040F PNEUMOC VAC/ADMIN/RCVD: CPT | Performed by: FAMILY MEDICINE

## 2022-05-18 PROCEDURE — G8417 CALC BMI ABV UP PARAM F/U: HCPCS | Performed by: FAMILY MEDICINE

## 2022-05-18 PROCEDURE — G8427 DOCREV CUR MEDS BY ELIG CLIN: HCPCS | Performed by: FAMILY MEDICINE

## 2022-05-18 PROCEDURE — 1036F TOBACCO NON-USER: CPT | Performed by: FAMILY MEDICINE

## 2022-05-18 PROCEDURE — 99213 OFFICE O/P EST LOW 20 MIN: CPT | Performed by: FAMILY MEDICINE

## 2022-05-18 RX ORDER — HYDROCODONE BITARTRATE AND ACETAMINOPHEN 7.5; 325 MG/1; MG/1
1 TABLET ORAL EVERY 8 HOURS PRN
Qty: 90 TABLET | Refills: 0 | Status: SHIPPED
Start: 2022-05-18 | End: 2022-06-15 | Stop reason: SDUPTHER

## 2022-05-18 ASSESSMENT — ENCOUNTER SYMPTOMS
BACK PAIN: 0
APNEA: 0
ABDOMINAL DISTENTION: 0

## 2022-05-18 NOTE — PROGRESS NOTES
Carson Lau (:  1950) is a 70 y.o. male,Established patient, here for evaluation of the following chief complaint(s):  Back Pain (1 month f/u)         ASSESSMENT/PLAN:  1. Chronic right shoulder pain  The following orders have not been finalized:  -     HYDROcodone-acetaminophen (Donel Siskin) 7.5-325 MG per tablet    Controlled substances monitoring: no signs of potential drug abuse or diversion identified and [place]AR[place] report reviewed today- activity consistent with treatment plan. No follow-ups on file. Subjective   SUBJECTIVE/OBJECTIVE:  Back Pain (1 month f/u)      Review of Systems   Constitutional: Positive for activity change (decreased). Respiratory: Negative for apnea. Cardiovascular: Negative for chest pain. Gastrointestinal: Negative for abdominal distention. Musculoskeletal: Positive for arthralgias (shoulder pain). Negative for back pain. Neurological: Negative for dizziness. Psychiatric/Behavioral: Negative for agitation. Objective   /82   Pulse 50   Temp 98 °F (36.7 °C)   Ht 5' 9\" (1.753 m)   Wt 241 lb (109.3 kg)   SpO2 94%   BMI 35.59 kg/m²   Lab Results   Component Value Date    LABA1C 5.6 2018    LABA1C 6.0 2018     Physical Exam  HENT:      Nose: Nose normal.   Eyes:      General:         Right eye: No discharge. Left eye: No discharge. Cardiovascular:      Rate and Rhythm: Normal rate and regular rhythm. Heart sounds: No murmur heard. Pulmonary:      Effort: No respiratory distress. Breath sounds: No rhonchi or rales. Abdominal:      Palpations: Abdomen is soft. Tenderness: There is no abdominal tenderness. Musculoskeletal:      Cervical back: No rigidity. Comments: Pain at shoilders   Skin:     General: Skin is warm. Capillary Refill: Capillary refill takes less than 2 seconds. Coloration: Skin is not pale. Findings: No bruising. Neurological:      Mental Status: He is alert. Psychiatric:         Mood and Affect: Mood normal.            On this date 5/18/2022 I have spent 24 minutes reviewing previous notes, test results and face to face with the patient discussing the diagnosis and importance of compliance with the treatment plan as well as documenting on the day of the visit. An electronic signature was used to authenticate this note.     --Wale Lopez, DO

## 2022-05-27 DIAGNOSIS — U09.9 COVID-19 LONG HAULER: ICD-10-CM

## 2022-05-27 LAB
ALBUMIN SERPL-MCNC: 4.5 G/DL (ref 3.5–5.2)
ALP BLD-CCNC: 39 U/L (ref 40–129)
ALT SERPL-CCNC: 14 U/L (ref 0–40)
ANION GAP SERPL CALCULATED.3IONS-SCNC: 10 MMOL/L (ref 7–16)
AST SERPL-CCNC: 17 U/L (ref 0–39)
BASOPHILS ABSOLUTE: 0.06 E9/L (ref 0–0.2)
BASOPHILS RELATIVE PERCENT: 0.8 % (ref 0–2)
BILIRUB SERPL-MCNC: 0.2 MG/DL (ref 0–1.2)
BUN BLDV-MCNC: 31 MG/DL (ref 6–23)
CALCIUM SERPL-MCNC: 9.3 MG/DL (ref 8.6–10.2)
CHLORIDE BLD-SCNC: 104 MMOL/L (ref 98–107)
CO2: 26 MMOL/L (ref 22–29)
CREAT SERPL-MCNC: 1.3 MG/DL (ref 0.7–1.2)
EOSINOPHILS ABSOLUTE: 0.21 E9/L (ref 0.05–0.5)
EOSINOPHILS RELATIVE PERCENT: 2.8 % (ref 0–6)
GFR AFRICAN AMERICAN: >60
GFR NON-AFRICAN AMERICAN: 54 ML/MIN/1.73
GLUCOSE BLD-MCNC: 126 MG/DL (ref 74–99)
HCT VFR BLD CALC: 43.1 % (ref 37–54)
HEMOGLOBIN: 13.8 G/DL (ref 12.5–16.5)
IMMATURE GRANULOCYTES #: 0.03 E9/L
IMMATURE GRANULOCYTES %: 0.4 % (ref 0–5)
LYMPHOCYTES ABSOLUTE: 1.51 E9/L (ref 1.5–4)
LYMPHOCYTES RELATIVE PERCENT: 19.8 % (ref 20–42)
MCH RBC QN AUTO: 27.8 PG (ref 26–35)
MCHC RBC AUTO-ENTMCNC: 32 % (ref 32–34.5)
MCV RBC AUTO: 86.9 FL (ref 80–99.9)
MONOCYTES ABSOLUTE: 0.54 E9/L (ref 0.1–0.95)
MONOCYTES RELATIVE PERCENT: 7.1 % (ref 2–12)
NEUTROPHILS ABSOLUTE: 5.27 E9/L (ref 1.8–7.3)
NEUTROPHILS RELATIVE PERCENT: 69.1 % (ref 43–80)
PDW BLD-RTO: 14.3 FL (ref 11.5–15)
PLATELET # BLD: 331 E9/L (ref 130–450)
PMV BLD AUTO: 11.4 FL (ref 7–12)
POTASSIUM SERPL-SCNC: 4.6 MMOL/L (ref 3.5–5)
RBC # BLD: 4.96 E12/L (ref 3.8–5.8)
SODIUM BLD-SCNC: 140 MMOL/L (ref 132–146)
TOTAL PROTEIN: 7.2 G/DL (ref 6.4–8.3)
WBC # BLD: 7.6 E9/L (ref 4.5–11.5)

## 2022-06-01 DIAGNOSIS — R35.1 BENIGN PROSTATIC HYPERPLASIA WITH NOCTURIA: ICD-10-CM

## 2022-06-01 DIAGNOSIS — N40.1 BENIGN PROSTATIC HYPERPLASIA WITH NOCTURIA: ICD-10-CM

## 2022-06-01 DIAGNOSIS — K21.00 GASTROESOPHAGEAL REFLUX DISEASE WITH ESOPHAGITIS: ICD-10-CM

## 2022-06-01 RX ORDER — HYDRALAZINE HYDROCHLORIDE 25 MG/1
TABLET, FILM COATED ORAL
Qty: 90 TABLET | Refills: 1 | Status: SHIPPED
Start: 2022-06-01 | End: 2022-07-14

## 2022-06-01 RX ORDER — OMEPRAZOLE 40 MG/1
CAPSULE, DELAYED RELEASE ORAL
Qty: 90 CAPSULE | Refills: 0 | OUTPATIENT
Start: 2022-06-01

## 2022-06-01 RX ORDER — TAMSULOSIN HYDROCHLORIDE 0.4 MG/1
CAPSULE ORAL
Qty: 30 CAPSULE | Refills: 0 | Status: SHIPPED
Start: 2022-06-01 | End: 2022-07-05

## 2022-06-03 ENCOUNTER — TELEPHONE (OUTPATIENT)
Dept: FAMILY MEDICINE CLINIC | Age: 72
End: 2022-06-03

## 2022-06-03 DIAGNOSIS — K21.9 GASTROESOPHAGEAL REFLUX DISEASE WITHOUT ESOPHAGITIS: Primary | ICD-10-CM

## 2022-06-03 RX ORDER — OMEPRAZOLE 40 MG/1
40 CAPSULE, DELAYED RELEASE ORAL DAILY
Qty: 90 CAPSULE | Refills: 3 | Status: SHIPPED
Start: 2022-06-03 | End: 2022-06-28 | Stop reason: SDUPTHER

## 2022-06-03 NOTE — TELEPHONE ENCOUNTER
ASSESSMENT/PLAN:    1. Gastroesophageal reflux disease without esophagitis  - omeprazole (PRILOSEC) 40 MG delayed release capsule; Take 1 capsule by mouth daily  Dispense: 90 capsule;  Refill: 3        FOLLOW-UP:  prn

## 2022-06-15 ENCOUNTER — OFFICE VISIT (OUTPATIENT)
Dept: FAMILY MEDICINE CLINIC | Age: 72
End: 2022-06-15
Payer: MEDICARE

## 2022-06-15 VITALS
SYSTOLIC BLOOD PRESSURE: 112 MMHG | RESPIRATION RATE: 18 BRPM | OXYGEN SATURATION: 93 % | TEMPERATURE: 97.4 F | DIASTOLIC BLOOD PRESSURE: 66 MMHG | BODY MASS INDEX: 34.51 KG/M2 | HEART RATE: 66 BPM | WEIGHT: 233 LBS | HEIGHT: 69 IN

## 2022-06-15 DIAGNOSIS — F11.99 OPIOID USE, UNSPECIFIED WITH UNSPECIFIED OPIOID-INDUCED DISORDER (HCC): ICD-10-CM

## 2022-06-15 DIAGNOSIS — G89.29 CHRONIC RIGHT SHOULDER PAIN: ICD-10-CM

## 2022-06-15 DIAGNOSIS — M25.511 CHRONIC RIGHT SHOULDER PAIN: ICD-10-CM

## 2022-06-15 DIAGNOSIS — J96.11 CHRONIC HYPOXEMIC RESPIRATORY FAILURE (HCC): ICD-10-CM

## 2022-06-15 DIAGNOSIS — J43.9 PULMONARY EMPHYSEMA, UNSPECIFIED EMPHYSEMA TYPE (HCC): Primary | ICD-10-CM

## 2022-06-15 PROBLEM — N18.30 CHRONIC RENAL DISEASE, STAGE III (HCC): Status: ACTIVE | Noted: 2022-06-15

## 2022-06-15 PROCEDURE — 99214 OFFICE O/P EST MOD 30 MIN: CPT | Performed by: FAMILY MEDICINE

## 2022-06-15 PROCEDURE — 3017F COLORECTAL CA SCREEN DOC REV: CPT | Performed by: FAMILY MEDICINE

## 2022-06-15 PROCEDURE — 1036F TOBACCO NON-USER: CPT | Performed by: FAMILY MEDICINE

## 2022-06-15 PROCEDURE — G8417 CALC BMI ABV UP PARAM F/U: HCPCS | Performed by: FAMILY MEDICINE

## 2022-06-15 PROCEDURE — G8427 DOCREV CUR MEDS BY ELIG CLIN: HCPCS | Performed by: FAMILY MEDICINE

## 2022-06-15 PROCEDURE — 3023F SPIROM DOC REV: CPT | Performed by: FAMILY MEDICINE

## 2022-06-15 PROCEDURE — 1123F ACP DISCUSS/DSCN MKR DOCD: CPT | Performed by: FAMILY MEDICINE

## 2022-06-15 RX ORDER — HYDROCODONE BITARTRATE AND ACETAMINOPHEN 7.5; 325 MG/1; MG/1
1 TABLET ORAL EVERY 8 HOURS PRN
Qty: 90 TABLET | Refills: 0 | Status: SHIPPED
Start: 2022-06-15 | End: 2022-07-14 | Stop reason: SDUPTHER

## 2022-06-15 ASSESSMENT — PATIENT HEALTH QUESTIONNAIRE - PHQ9
10. IF YOU CHECKED OFF ANY PROBLEMS, HOW DIFFICULT HAVE THESE PROBLEMS MADE IT FOR YOU TO DO YOUR WORK, TAKE CARE OF THINGS AT HOME, OR GET ALONG WITH OTHER PEOPLE: 0
SUM OF ALL RESPONSES TO PHQ QUESTIONS 1-9: 11
SUM OF ALL RESPONSES TO PHQ QUESTIONS 1-9: 11
4. FEELING TIRED OR HAVING LITTLE ENERGY: 3
SUM OF ALL RESPONSES TO PHQ QUESTIONS 1-9: 11
9. THOUGHTS THAT YOU WOULD BE BETTER OFF DEAD, OR OF HURTING YOURSELF: 0
3. TROUBLE FALLING OR STAYING ASLEEP: 0
SUM OF ALL RESPONSES TO PHQ QUESTIONS 1-9: 11
2. FEELING DOWN, DEPRESSED OR HOPELESS: 0
6. FEELING BAD ABOUT YOURSELF - OR THAT YOU ARE A FAILURE OR HAVE LET YOURSELF OR YOUR FAMILY DOWN: 3
5. POOR APPETITE OR OVEREATING: 3
8. MOVING OR SPEAKING SO SLOWLY THAT OTHER PEOPLE COULD HAVE NOTICED. OR THE OPPOSITE, BEING SO FIGETY OR RESTLESS THAT YOU HAVE BEEN MOVING AROUND A LOT MORE THAN USUAL: 0
7. TROUBLE CONCENTRATING ON THINGS, SUCH AS READING THE NEWSPAPER OR WATCHING TELEVISION: 2

## 2022-06-15 ASSESSMENT — ENCOUNTER SYMPTOMS
SHORTNESS OF BREATH: 1
BACK PAIN: 1

## 2022-06-15 NOTE — PROGRESS NOTES
Jaiden Dean (:  1950) is a 70 y.o. male,Established patient, here for evaluation of the following chief complaint(s):  Follow-up (Patient is here today for a one month follow up today for chronic right shoulder pain. Medications have been reviewed and is complying.)         ASSESSMENT/PLAN:  1. Pulmonary emphysema, unspecified emphysema type ([address]  2. Chronic right shoulder pain  -     HYDROcodone-acetaminophen (NORCO) 7.5-325 MG per tablet; Take 1 tablet by mouth every 8 hours as needed for Pain for up to 30 days. , Disp-90 tablet, R-0Normal  3. Opioid use, unspecified with unspecified opioid-induced disorder  4. Chronic hypoxemic respiratory failure (HCC)    Controlled substances monitoring: no signs of potential drug abuse or diversion identified and OARRS report reviewed today- activity consistent with treatment plan. No follow-ups on file. Subjective   SUBJECTIVE/OBJECTIVE:  Follow-up (Patient is here today for a one month follow up today for chronic right shoulder pain. Medications have been reviewed and is complying.)        Review of Systems   HENT: Positive for congestion. Respiratory: Positive for shortness of breath (with exertion on 02 therapy. With exertion he goes into the high 80's). Genitourinary: Negative for difficulty urinating. Musculoskeletal: Positive for arthralgias and back pain. Neurological: Negative for dizziness. Hematological: Negative for adenopathy. Psychiatric/Behavioral: Negative for agitation. Objective   /66 (Site: Left Upper Arm, Position: Sitting, Cuff Size: Large Adult)   Pulse 66   Temp 97.4 °F (36.3 °C) (Temporal)   Resp 18   Ht 5' 9\" (1.753 m)   Wt 233 lb (105.7 kg)   SpO2 93% Comment: 2 L half the day  BMI 34.41 kg/m²   Lab Results   Component Value Date    LABA1C 5.6 2018    LABA1C 6.0 2018     Physical Exam  Eyes:      General:         Right eye: No discharge. Left eye: No discharge. Cardiovascular:      Rate and Rhythm: Normal rate and regular rhythm. Heart sounds: No murmur heard. Pulmonary:      Effort: No respiratory distress. Breath sounds: Wheezing (few expiratory on he left) and rhonchi present. No rales. Abdominal:      Tenderness: There is no abdominal tenderness. Musculoskeletal:         General: Tenderness (right shoulder) present. No swelling. Cervical back: No rigidity. Skin:     General: Skin is warm. Capillary Refill: Capillary refill takes less than 2 seconds. Coloration: Skin is not pale. Findings: No bruising. Neurological:      Mental Status: He is alert. Psychiatric:         Mood and Affect: Mood normal.            On this date 6/15/2022 I have spent 31 minutes reviewing previous notes, test results and face to face with the patient discussing the diagnosis and importance of compliance with the treatment plan as well as documenting on the day of the visit. An electronic signature was used to authenticate this note.     --Benito Kulkarni, DO

## 2022-06-28 DIAGNOSIS — K21.9 GASTROESOPHAGEAL REFLUX DISEASE WITHOUT ESOPHAGITIS: ICD-10-CM

## 2022-06-29 RX ORDER — OMEPRAZOLE 40 MG/1
40 CAPSULE, DELAYED RELEASE ORAL DAILY
Qty: 90 CAPSULE | Refills: 3 | Status: SHIPPED | OUTPATIENT
Start: 2022-06-29

## 2022-07-01 DIAGNOSIS — F32.A DEPRESSION, UNSPECIFIED DEPRESSION TYPE: ICD-10-CM

## 2022-07-01 RX ORDER — CARVEDILOL 3.12 MG/1
TABLET ORAL
Qty: 30 TABLET | Refills: 4 | Status: SHIPPED | OUTPATIENT
Start: 2022-07-01

## 2022-07-01 RX ORDER — BUPROPION HYDROCHLORIDE 150 MG/1
TABLET, EXTENDED RELEASE ORAL
Qty: 180 TABLET | Refills: 1 | Status: SHIPPED | OUTPATIENT
Start: 2022-07-01

## 2022-07-04 DIAGNOSIS — N40.1 BENIGN PROSTATIC HYPERPLASIA WITH NOCTURIA: ICD-10-CM

## 2022-07-04 DIAGNOSIS — R35.1 BENIGN PROSTATIC HYPERPLASIA WITH NOCTURIA: ICD-10-CM

## 2022-07-05 RX ORDER — TAMSULOSIN HYDROCHLORIDE 0.4 MG/1
CAPSULE ORAL
Qty: 90 CAPSULE | Refills: 1 | Status: SHIPPED | OUTPATIENT
Start: 2022-07-05

## 2022-07-14 ENCOUNTER — OFFICE VISIT (OUTPATIENT)
Dept: FAMILY MEDICINE CLINIC | Age: 72
End: 2022-07-14
Payer: MEDICARE

## 2022-07-14 VITALS
DIASTOLIC BLOOD PRESSURE: 78 MMHG | HEIGHT: 69 IN | SYSTOLIC BLOOD PRESSURE: 132 MMHG | OXYGEN SATURATION: 95 % | TEMPERATURE: 97.1 F | HEART RATE: 68 BPM | BODY MASS INDEX: 34.24 KG/M2 | WEIGHT: 231.2 LBS

## 2022-07-14 DIAGNOSIS — G89.29 CHRONIC RIGHT SHOULDER PAIN: ICD-10-CM

## 2022-07-14 DIAGNOSIS — M25.511 CHRONIC RIGHT SHOULDER PAIN: ICD-10-CM

## 2022-07-14 PROCEDURE — 99213 OFFICE O/P EST LOW 20 MIN: CPT | Performed by: FAMILY MEDICINE

## 2022-07-14 PROCEDURE — 3288F FALL RISK ASSESSMENT DOCD: CPT | Performed by: FAMILY MEDICINE

## 2022-07-14 PROCEDURE — 1123F ACP DISCUSS/DSCN MKR DOCD: CPT | Performed by: FAMILY MEDICINE

## 2022-07-14 RX ORDER — HYDRALAZINE HYDROCHLORIDE 25 MG/1
TABLET, FILM COATED ORAL
Qty: 90 TABLET | Refills: 0 | Status: SHIPPED
Start: 2022-07-14 | End: 2022-08-30

## 2022-07-14 RX ORDER — HYDROCODONE BITARTRATE AND ACETAMINOPHEN 7.5; 325 MG/1; MG/1
1 TABLET ORAL EVERY 8 HOURS PRN
Qty: 90 TABLET | Refills: 0 | Status: SHIPPED
Start: 2022-07-14 | End: 2022-08-11 | Stop reason: SDUPTHER

## 2022-07-14 SDOH — ECONOMIC STABILITY: FOOD INSECURITY: WITHIN THE PAST 12 MONTHS, YOU WORRIED THAT YOUR FOOD WOULD RUN OUT BEFORE YOU GOT MONEY TO BUY MORE.: NEVER TRUE

## 2022-07-14 SDOH — ECONOMIC STABILITY: FOOD INSECURITY: WITHIN THE PAST 12 MONTHS, THE FOOD YOU BOUGHT JUST DIDN'T LAST AND YOU DIDN'T HAVE MONEY TO GET MORE.: NEVER TRUE

## 2022-07-14 ASSESSMENT — ENCOUNTER SYMPTOMS
BLOOD IN STOOL: 0
CONSTIPATION: 0
CHEST TIGHTNESS: 0
DIARRHEA: 0
APNEA: 0
WHEEZING: 0

## 2022-07-14 ASSESSMENT — SOCIAL DETERMINANTS OF HEALTH (SDOH): HOW HARD IS IT FOR YOU TO PAY FOR THE VERY BASICS LIKE FOOD, HOUSING, MEDICAL CARE, AND HEATING?: NOT HARD AT ALL

## 2022-07-14 NOTE — PROGRESS NOTES
Nathalia De La Fuente (:  1950) is a 70 y.o. male,Established patient, here for evaluation of the following chief complaint(s):  Shoulder Pain (needs refill on pain medication due to frequent pain) and Otalgia (feels like there is water in it occcasioanlly)         ASSESSMENT/PLAN:  1. Chronic right shoulder pain  -     HYDROcodone-acetaminophen (NORCO) 7.5-325 MG per tablet; Take 1 tablet by mouth every 8 hours as needed for Pain for up to 30 days. , Disp-90 tablet, R-0Normal    Controlled substances monitoring: no signs of potential drug abuse or diversion identified and OARRS report reviewed today- activity consistent with treatment plan. No follow-ups on file. Subjective   SUBJECTIVE/OBJECTIVE:  Shoulder Pain (needs refill on pain medication due to frequent pain) and Otalgia (feels like there is water in it occcasioanlly)        Review of Systems   Constitutional: Negative for chills and diaphoresis. HENT: Negative for ear discharge, ear pain, hearing loss, nosebleeds and tinnitus. Respiratory: Negative for apnea, chest tightness and wheezing. Cardiovascular: Negative for chest pain. Gastrointestinal: Negative for blood in stool, constipation and diarrhea. Genitourinary: Negative for dysuria, flank pain and hematuria. Musculoskeletal: Negative for arthralgias. Skin: Negative for rash. Neurological: Negative for headaches. Hematological: Does not bruise/bleed easily. Psychiatric/Behavioral: Negative for agitation. Objective   /78   Pulse 68   Temp 97.1 °F (36.2 °C) (Temporal)   Ht 5' 9\" (1.753 m)   Wt 231 lb 3.2 oz (104.9 kg)   SpO2 95%   BMI 34.14 kg/m²   Lab Results   Component Value Date    LABA1C 5.6 2018    LABA1C 6.0 2018     Physical Exam  Eyes:      General:         Right eye: No discharge. Left eye: No discharge. Cardiovascular:      Rate and Rhythm: Normal rate and regular rhythm.       Heart sounds: No murmur heard.      Pulmonary:      Effort: No respiratory distress. Breath sounds: No rhonchi or rales. Abdominal:      Tenderness: There is no abdominal tenderness. Musculoskeletal:      Cervical back: No rigidity. Comments: Back pain and Right shoulder pain     Skin:     General: Skin is warm. Capillary Refill: Capillary refill takes less than 2 seconds. Coloration: Skin is not pale. Findings: No bruising. Neurological:      Mental Status: He is alert. Psychiatric:         Mood and Affect: Mood normal.            On this date 7/14/2022 I have spent 23 minutes reviewing previous notes, test results and face to face with the patient discussing the diagnosis and importance of compliance with the treatment plan as well as documenting on the day of the visit. An electronic signature was used to authenticate this note.     --Sharon Desouza, DO

## 2022-07-18 ENCOUNTER — OFFICE VISIT (OUTPATIENT)
Dept: CARDIOLOGY CLINIC | Age: 72
End: 2022-07-18
Payer: MEDICARE

## 2022-07-18 VITALS
HEART RATE: 76 BPM | BODY MASS INDEX: 34.51 KG/M2 | HEIGHT: 69 IN | DIASTOLIC BLOOD PRESSURE: 62 MMHG | SYSTOLIC BLOOD PRESSURE: 112 MMHG | RESPIRATION RATE: 16 BRPM | WEIGHT: 233 LBS

## 2022-07-18 DIAGNOSIS — G47.33 OBSTRUCTIVE SLEEP APNEA: ICD-10-CM

## 2022-07-18 DIAGNOSIS — I50.42 CHRONIC COMBINED SYSTOLIC AND DIASTOLIC CONGESTIVE HEART FAILURE (HCC): ICD-10-CM

## 2022-07-18 DIAGNOSIS — I25.5 ISCHEMIC CARDIOMYOPATHY: ICD-10-CM

## 2022-07-18 DIAGNOSIS — E78.00 PURE HYPERCHOLESTEROLEMIA: ICD-10-CM

## 2022-07-18 DIAGNOSIS — I25.118 CORONARY ARTERY DISEASE OF NATIVE ARTERY OF NATIVE HEART WITH STABLE ANGINA PECTORIS (HCC): Primary | ICD-10-CM

## 2022-07-18 DIAGNOSIS — E66.8 MODERATE OBESITY: ICD-10-CM

## 2022-07-18 DIAGNOSIS — J96.11 CHRONIC HYPOXEMIC RESPIRATORY FAILURE (HCC): ICD-10-CM

## 2022-07-18 DIAGNOSIS — U07.1 COVID-19 VIRUS INFECTION: ICD-10-CM

## 2022-07-18 DIAGNOSIS — J44.9 CHRONIC OBSTRUCTIVE PULMONARY DISEASE, UNSPECIFIED COPD TYPE (HCC): ICD-10-CM

## 2022-07-18 PROCEDURE — 93000 ELECTROCARDIOGRAM COMPLETE: CPT | Performed by: INTERNAL MEDICINE

## 2022-07-18 PROCEDURE — 1123F ACP DISCUSS/DSCN MKR DOCD: CPT | Performed by: INTERNAL MEDICINE

## 2022-07-18 PROCEDURE — 99214 OFFICE O/P EST MOD 30 MIN: CPT | Performed by: INTERNAL MEDICINE

## 2022-07-18 NOTE — PROGRESS NOTES
OUTPATIENT CARDIOLOGY FOLLOW-UP    Name: Traci Siddiqi    Age: 70 y.o. Primary Care Physician: Rocael Bob DO    Date of Service: 7/18/2022    Chief Complaint: Coronary atherosclerosis, ischemic cardiomyopathy, chronic combined systolic and diastolic heart failure, chronic obstructive lung disease, chronic hypoxic respiratory failure, COVID-19 infection, moderate obesity, obstructive sleep apnea    Interim History: Since his most recent evaluation, the patient appears compensated from a cardiovascular standpoint with no interim symptoms of anginal-like chest comfort or other ischemic equivalents. He continues to note symptoms of exertional dyspnea (functional class II) with persistent chronic hypoxic respiratory failure and borderline desaturation in the absence of supplemental oxygen usage. In the interim, he has gained nearly 10 pounds without adverse effects on blood pressure control and stable renal function time of most recent laboratory assessment. He relates no additional changes of his functional capabilities. To date, he has been unable to be evaluated by the pulmonary service in light of his chronic obstructive lung disease and remains unfortunately noncompliant with the use of nocturnal CPAP. Review of Systems: The remainder of a complete multisystem review including consitutional, central nervous, respiratory, circulatory, gastrointestinal, genitourinary, endocrinologic, hematologic, musculoskeletal and psychiatric are negative.     Past Medical History:  Past Medical History:   Diagnosis Date    Anesthesia complication     wakes up  violant   only ok if reversed with romazacon    Arthritis     shoulders,ankle    CAD (coronary artery disease)     Cardiomyopathy ([address]     COPD (chronic obstructive pulmonary disease) ([address]     Erectile dysfunction     GERD (gastroesophageal reflux disease)     H/O coronary angioplasty with stents[V45.82] 9/2018 another stent    HFrEF (heart failure with reduced ejection fraction) ([facility]     Hyperlipidemia     Hypertension     Lymphoma ([facility] 11/04/2011    had chemo  currently in remission    Myocardial infarction [facility])     more than 10 yrs ago    Sleep apnea     wont wear machine    Unspecified cerebral artery occlusion with cerebral infarction 2005    no deficits       Past Surgical History:  Past Surgical History:   Procedure Laterality Date    ANKLE FRACTURE SURGERY Right 03/2014    ORIF right ankle    ANKLE SURGERY  2007    rt ankle    BRONCHOSCOPY  09/2011    bronch / medistinoscopy    CAROTID ENDARTERECTOMY Left     12 yrs ago    CHOLECYSTECTOMY      COLONOSCOPY  07/30/2013    DR Rosenthal. [address] WITH STENT PLACEMENT  2008    states has 2 stents  follows with DR Jennifer Linares    ENDOSCOPY, COLON, DIAGNOSTIC      HAND SURGERY Left     fell on a buzzsaw    HERNIA REPAIR Bilateral 08/15/2019    HERNIA  BILATERAL INGUINAL REPAIR WITH MESH LAPAROSCOPIC ROBOTIC XI ASSISTED performed by Hardeep Sher MD at [address] [place] [address]  06/29/2015    lapraoscopic cholecystectomy    SHOULDER ARTHROSCOPY Right 10/08/2015    rotator cuff repair, subachromoplasty with labrial debridement    SHOULDER ARTHROSCOPY Left 2/18/2021    LEFT ROTATOR CUFF REPAIR LABRAL DEBRIDEMENT SUBACROMIAL DECOMPRESSION performed by Elle Soliz DO at [address] ENDOSCOPY         Family History:  Family History   Problem Relation Age of Onset    Diabetes Mother     Heart Disease Mother     Diabetes Father     Heart Disease Father     Diabetes Sister     Cancer Sister     Diabetes Brother     Cancer Brother        Social History:  Social History     Socioeconomic History    Marital status:      Spouse name: Not on file    Number of children: Not on file    Years of education: Not on file    Highest education level: Not on file   Occupational History    Not on file   Tobacco Use    Smoking status: Former     Packs/day: 2.00     Years: 60.00     Pack years: 120.00     Types: Cigarettes     Quit date: 2020     Years since quittin.2    Smokeless tobacco: Never   Vaping Use    Vaping Use: Never used   Substance and Sexual Activity    Alcohol use: Yes     Comment: 1-2 beers daily- pop daily    Drug use: Not Currently     Types: Cocaine     Comment: past use; none since     Sexual activity: Not Currently     Partners: Female   Other Topics Concern    Not on file   Social History Narrative    Drinks 4 Mt Dew daily     Social Determinants of Health     Financial Resource Strain: Low Risk     Difficulty of Paying Living Expenses: Not hard at all   Food Insecurity: No Food Insecurity    Worried About Running Out of Food in the Last Year: Never true    Ran Out of Food in the Last Year: Never true   Transportation Needs: Not on file   Physical Activity: Not on file   Stress: Not on file   Social Connections: Not on file   Intimate Partner Violence: Not on file   Housing Stability: Not on file       Allergies: Allergies   Allergen Reactions    Midazolam Hcl      Wake up wild. ... Must be reversed with romazicon or will wake up wild and combative       Current Medications:  Current Outpatient Medications   Medication Sig Dispense Refill    hydrALAZINE (APRESOLINE) 25 MG tablet Take 1 tablet by mouth four times daily. (Patient taking differently: in the morning and at bedtime) 90 tablet 0    HYDROcodone-acetaminophen (NORCO) 7.5-325 MG per tablet Take 1 tablet by mouth every 8 hours as needed for Pain for up to 30 days. 90 tablet 0    tamsulosin (FLOMAX) 0.4 MG capsule Take 1 capsule by mouth at bedtime. 90 capsule 1    buPROPion (WELLBUTRIN SR) 150 MG extended release tablet Take 1 tablet by mouth twice daily. 180 tablet 1    carvedilol (COREG) 3.125 MG tablet Take 1 tablet by mouth once daily.  (Patient taking differently: 2 times daily) 30 tablet 4    omeprazole (PRILOSEC) 40 MG delayed release capsule Take 1 capsule by mouth daily 90 capsule 3    sertraline (ZOLOFT) 100 MG tablet Take 1 tablet by mouth daily. 30 tablet 2    furosemide (LASIX) 20 MG tablet Take 1 tablets by mouth daily. 30 tablet 3    potassium chloride (KLOR-CON M) 10 MEQ extended release tablet Take 1 tablet by mouth 2 times daily 90 tablet 3    simvastatin (ZOCOR) 20 MG tablet Take 1 tablet by mouth daily. 90 tablet 1    montelukast (SINGULAIR) 10 MG tablet Take 1 tablet by mouth daily 90 tablet 1    guaiFENesin (MUCINEX) 600 MG extended release tablet Take 2 tablets by mouth 2 times daily (Patient taking differently: Take 1,200 mg by mouth in the morning.) 30 tablet 0    fenofibrate (TRICOR) 145 MG tablet Take 1 tablet by mouth once daily. 90 tablet 1    clopidogrel (PLAVIX) 75 MG tablet Take 1 tablet by mouth daily 90 tablet 1    aspirin 81 MG chewable tablet Take 1 tablet by mouth daily Ld 1 week ago patient not compliant with asa daughter stated ld about 1 week ago 90 tablet 1    albuterol sulfate HFA (PROAIR HFA) 108 (90 Base) MCG/ACT inhaler INHALE TWO PUFFS BY MOUTH EVERY 6 HOURS AS DIRECTED (Patient not taking: Reported on 7/18/2022) 1 each 3     No current facility-administered medications for this visit. Physical Exam:  /62   Pulse 76   Resp 16   Ht 5' 9\" (1.753 m)   Wt 233 lb (105.7 kg)   BMI 34.41 kg/m²   Wt Readings from Last 3 Encounters:   07/18/22 233 lb (105.7 kg)   07/14/22 231 lb 3.2 oz (104.9 kg)   06/15/22 233 lb (105.7 kg)     The patient is awake, alert and in no discomfort or distress. No gross musculoskeletal deformity is present. No significant skin or nail changes are present. Gross examination of head, eyes, nose and throat are negative. Jugular venous pressure is normal and no carotid bruits are present. Normal respiratory effort is noted with no accessory muscle usage present. Lung fields are clear to ascultation.  Cardiac examination is notable for a regular rate and rhythm with no palpable thrill. No gallop rhythm or cardiac murmur are identified. A benign abdominal examination is present with the exception of obesity and no masses or organomegaly. Intact pulses are present throughout all extremities and no peripheral edema is present. No focal neurologic deficits are present. Laboratory Tests:  Lab Results   Component Value Date    CREATININE 1.3 (H) 05/27/2022    BUN 31 (H) 05/27/2022     05/27/2022    K 4.6 05/27/2022     05/27/2022    CO2 26 05/27/2022     Lab Results   Component Value Date     (H) 12/01/2011     Lab Results   Component Value Date/Time    WBC 7.6 05/27/2022 11:52 AM    RBC 4.96 05/27/2022 11:52 AM    HGB 13.8 05/27/2022 11:52 AM    HCT 43.1 05/27/2022 11:52 AM    MCV 86.9 05/27/2022 11:52 AM    MCH 27.8 05/27/2022 11:52 AM    MCHC 32.0 05/27/2022 11:52 AM    RDW 14.3 05/27/2022 11:52 AM     05/27/2022 11:52 AM    MPV 11.4 05/27/2022 11:52 AM     No results for input(s): ALKPHOS, ALT, AST, PROT, BILITOT, BILIDIR, LABALBU in the last 72 hours.   Lab Results   Component Value Date/Time    MG 1.8 01/05/2022 06:23 AM     Lab Results   Component Value Date/Time    PROTIME 12.7 01/05/2022 06:23 AM    PROTIME 13.0 11/03/2011 09:47 AM    INR 1.1 01/05/2022 06:23 AM     Lab Results   Component Value Date/Time    TSH 1.540 07/18/2019 12:00 PM     No components found for: CHLPL  Lab Results   Component Value Date    TRIG 149 06/16/2021    TRIG 174 (H) 11/06/2019    TRIG 129 01/12/2018     Lab Results   Component Value Date    HDL 43 06/16/2021    HDL 36 11/06/2019    HDL 45 01/12/2018     Lab Results   Component Value Date    LDLCALC 153 (H) 06/16/2021    LDLCALC 127 (H) 11/06/2019    LDLCALC 104 (H) 01/12/2018       Cardiac Tests:  ECG: A resting electrocardiogram demonstrates evidence of sinus rhythm with nonspecific ST changes      ASSESSMENT / PLAN: On a clinical basis, the patient appears compensated from a cardiovascular standpoint in spite of his persistent dyspnea with the majority of this likely related to that of his chronic obstructive lung disease exacerbated by his COVID-19 infection as well as that of diastolic components exacerbated by the combination of weight gain and his noncompliance with nocturnal CPAP usage. I presently not altered his existing medical regimen plan reassessment of serum chemistries valuate renal function electrolytes as well as that of a repeat proBNP level to further evaluate his volume status to assist in determination regarding needs of adjustment of his medical regimen. At the time of evaluation of extensively discussed with he and his family his needs of appropriate lifestyle modification inclusive of weight reduction to benefit diastolic cardiac performance and assist in management of his obstructive sleep apnea as well as that of ongoing aggressive risk factor modification of blood pressure and serum lipids to reduce risk of future atherosclerotic development of alteration of his existing medical regimen based on his most recent serum lipid status, albeit of 1 year earlier with likely needs of appropriate reassessment and conversion of existing simvastatin and his fibric acid derivative to that of high-dose atorvastatin or rosuvastatin. I will defer additional management of his chronic obstructive lung disease to your discretion with continued recommendations as possible of assessment by the pulmonary service to assist in management. I presently plan his clinical reevaluation in approximately 6 months and would happily reassess him in the interim should additional cardiovascular difficulties or concerns arise. The patient's current medication list, allergies, problem list and results of all previously ordered testing were reviewed at today's visit. Follow-up office visit in 6 months      Note: This report was completed using computerized voice recognition software.  Every effort has been made to ensure accuracy, however; inadvertent computerized transcription errors may be present. Kyra Sweeney.  Lotus Hess, [facility]    An electronic copy of this follow-up progress note was forwarded to Dr. Shashi Martin

## 2022-07-31 DIAGNOSIS — F43.21 GRIEF: ICD-10-CM

## 2022-07-31 DIAGNOSIS — F32.89 OTHER DEPRESSION: ICD-10-CM

## 2022-08-01 RX ORDER — SERTRALINE HYDROCHLORIDE 100 MG/1
100 TABLET, FILM COATED ORAL DAILY
Qty: 90 TABLET | Refills: 1 | Status: SHIPPED | OUTPATIENT
Start: 2022-08-01

## 2022-08-11 ENCOUNTER — OFFICE VISIT (OUTPATIENT)
Dept: FAMILY MEDICINE CLINIC | Age: 72
End: 2022-08-11
Payer: MEDICARE

## 2022-08-11 VITALS
HEART RATE: 72 BPM | BODY MASS INDEX: 34.41 KG/M2 | TEMPERATURE: 98.4 F | SYSTOLIC BLOOD PRESSURE: 118 MMHG | DIASTOLIC BLOOD PRESSURE: 58 MMHG | HEIGHT: 69 IN | OXYGEN SATURATION: 93 %

## 2022-08-11 DIAGNOSIS — G89.29 CHRONIC RIGHT SHOULDER PAIN: ICD-10-CM

## 2022-08-11 DIAGNOSIS — M25.511 CHRONIC RIGHT SHOULDER PAIN: ICD-10-CM

## 2022-08-11 PROCEDURE — 1123F ACP DISCUSS/DSCN MKR DOCD: CPT | Performed by: FAMILY MEDICINE

## 2022-08-11 PROCEDURE — 99213 OFFICE O/P EST LOW 20 MIN: CPT | Performed by: FAMILY MEDICINE

## 2022-08-11 RX ORDER — HYDROCODONE BITARTRATE AND ACETAMINOPHEN 7.5; 325 MG/1; MG/1
1 TABLET ORAL EVERY 8 HOURS PRN
Qty: 90 TABLET | Refills: 0 | Status: SHIPPED
Start: 2022-08-11 | End: 2022-09-12 | Stop reason: SDUPTHER

## 2022-08-11 ASSESSMENT — PATIENT HEALTH QUESTIONNAIRE - PHQ9
SUM OF ALL RESPONSES TO PHQ QUESTIONS 1-9: 0
SUM OF ALL RESPONSES TO PHQ QUESTIONS 1-9: 0
1. LITTLE INTEREST OR PLEASURE IN DOING THINGS: 0
SUM OF ALL RESPONSES TO PHQ9 QUESTIONS 1 & 2: 0
SUM OF ALL RESPONSES TO PHQ QUESTIONS 1-9: 0
2. FEELING DOWN, DEPRESSED OR HOPELESS: 0
SUM OF ALL RESPONSES TO PHQ QUESTIONS 1-9: 0

## 2022-08-11 ASSESSMENT — ENCOUNTER SYMPTOMS
CONSTIPATION: 0
SHORTNESS OF BREATH: 0
NAUSEA: 0
VOMITING: 0
WHEEZING: 0
COUGH: 0
ABDOMINAL PAIN: 0
BLOOD IN STOOL: 0
DIARRHEA: 0

## 2022-08-11 NOTE — PROGRESS NOTES
Kiera Kahn (:  1950) is a 70 y.o. male,Established patient, here for evaluation of the following chief complaint(s):  Shoulder Pain         ASSESSMENT/PLAN:  1. Chronic right shoulder pain  -     HYDROcodone-acetaminophen (NORCO) 7.5-325 MG per tablet; Take 1 tablet by mouth every 8 hours as needed for Pain for up to 30 days. , Disp-90 tablet, R-0Normal  Controlled substances monitoring: possible medication side effects, risk of tolerance and/or dependence, and alternative treatments discussed, no signs of potential drug abuse or diversion identified, and OARRS report reviewed today- activity consistent with treatment plan. No follow-ups on file. Subjective   SUBJECTIVE/OBJECTIVE:  Shoulder Pain        Review of Systems   Constitutional:  Negative for chills, diaphoresis and fever. HENT:  Negative for ear discharge, ear pain, hearing loss, nosebleeds and tinnitus. Respiratory:  Negative for cough, shortness of breath and wheezing. Cardiovascular:  Negative for chest pain. Gastrointestinal:  Negative for abdominal pain, blood in stool, constipation, diarrhea, nausea and vomiting. Genitourinary:  Negative for dysuria, flank pain and hematuria. Musculoskeletal:  Negative for myalgias. Skin:  Negative for rash. Neurological:  Negative for headaches. Hematological:  Does not bruise/bleed easily. Psychiatric/Behavioral:  Negative for hallucinations and suicidal ideas. Objective   BP (!) 118/58   Pulse 72   Temp 98.4 °F (36.9 °C) (Temporal)   Ht 5' 9\" (1.753 m)   SpO2 93%   BMI 34.41 kg/m²   Lab Results   Component Value Date    LABA1C 5.6 2018    LABA1C 6.0 2018     Physical Exam  Eyes:      General:         Right eye: No discharge. Left eye: No discharge. Cardiovascular:      Rate and Rhythm: Normal rate and regular rhythm. Heart sounds: No murmur heard. Pulmonary:      Effort: No respiratory distress. Breath sounds:  No rhonchi or rales. Abdominal:      Tenderness: There is no abdominal tenderness. Musculoskeletal:         General: Normal range of motion. Cervical back: No rigidity. Skin:     General: Skin is warm. Capillary Refill: Capillary refill takes less than 2 seconds. Coloration: Skin is not pale. Findings: No bruising. Neurological:      Mental Status: He is alert. Psychiatric:         Mood and Affect: Mood normal.          On this date 8/11/2022 I have spent 21 minutes reviewing previous notes, test results and face to face with the patient discussing the diagnosis and importance of compliance with the treatment plan as well as documenting on the day of the visit. An electronic signature was used to authenticate this note.     --Angy Alvarez, DO

## 2022-08-30 RX ORDER — HYDRALAZINE HYDROCHLORIDE 25 MG/1
TABLET, FILM COATED ORAL
Qty: 90 TABLET | Refills: 3 | Status: SHIPPED | OUTPATIENT
Start: 2022-08-30

## 2022-09-12 ENCOUNTER — OFFICE VISIT (OUTPATIENT)
Dept: FAMILY MEDICINE CLINIC | Age: 72
End: 2022-09-12
Payer: MEDICARE

## 2022-09-12 VITALS
HEART RATE: 90 BPM | SYSTOLIC BLOOD PRESSURE: 120 MMHG | HEIGHT: 69 IN | WEIGHT: 233 LBS | DIASTOLIC BLOOD PRESSURE: 64 MMHG | OXYGEN SATURATION: 94 % | TEMPERATURE: 96.9 F | RESPIRATION RATE: 16 BRPM | BODY MASS INDEX: 34.51 KG/M2

## 2022-09-12 DIAGNOSIS — G89.29 CHRONIC RIGHT SHOULDER PAIN: ICD-10-CM

## 2022-09-12 DIAGNOSIS — U09.9 LONG COVID: Primary | ICD-10-CM

## 2022-09-12 DIAGNOSIS — M25.511 CHRONIC RIGHT SHOULDER PAIN: ICD-10-CM

## 2022-09-12 PROCEDURE — 99214 OFFICE O/P EST MOD 30 MIN: CPT | Performed by: FAMILY MEDICINE

## 2022-09-12 PROCEDURE — 1123F ACP DISCUSS/DSCN MKR DOCD: CPT | Performed by: FAMILY MEDICINE

## 2022-09-12 RX ORDER — HYDROCODONE BITARTRATE AND ACETAMINOPHEN 7.5; 325 MG/1; MG/1
1 TABLET ORAL EVERY 8 HOURS PRN
Qty: 90 TABLET | Refills: 0 | Status: SHIPPED | OUTPATIENT
Start: 2022-09-12 | End: 2022-10-12

## 2022-09-12 RX ORDER — HYDROCODONE BITARTRATE AND ACETAMINOPHEN 7.5; 325 MG/1; MG/1
1 TABLET ORAL EVERY 6 HOURS PRN
COMMUNITY
End: 2022-10-10 | Stop reason: SDUPTHER

## 2022-09-12 ASSESSMENT — ENCOUNTER SYMPTOMS
COUGH: 0
WHEEZING: 0
NAUSEA: 0
BLOOD IN STOOL: 0
ABDOMINAL PAIN: 0
DIARRHEA: 0
VOMITING: 0
CONSTIPATION: 0
SHORTNESS OF BREATH: 0

## 2022-09-12 NOTE — PROGRESS NOTES
Comments: Decrease ROM shoulders and back. Skin:     General: Skin is warm. Capillary Refill: Capillary refill takes less than 2 seconds. Coloration: Skin is not pale. Findings: No bruising. Neurological:      Mental Status: He is alert. Psychiatric:         Mood and Affect: Mood normal.          On this date 9/12/2022 I have spent 24 minutes reviewing previous notes, test results and face to face with the patient discussing the diagnosis and importance of compliance with the treatment plan as well as documenting on the day of the visit. An electronic signature was used to authenticate this note.     --Tamsen Olszewski, DO

## 2022-10-10 ENCOUNTER — OFFICE VISIT (OUTPATIENT)
Dept: FAMILY MEDICINE CLINIC | Age: 72
End: 2022-10-10
Payer: MEDICARE

## 2022-10-10 VITALS
SYSTOLIC BLOOD PRESSURE: 120 MMHG | HEIGHT: 69 IN | OXYGEN SATURATION: 92 % | BODY MASS INDEX: 34.96 KG/M2 | RESPIRATION RATE: 16 BRPM | TEMPERATURE: 97.9 F | WEIGHT: 236 LBS | HEART RATE: 61 BPM | DIASTOLIC BLOOD PRESSURE: 56 MMHG

## 2022-10-10 DIAGNOSIS — M75.42 IMPINGEMENT SYNDROME OF LEFT SHOULDER: ICD-10-CM

## 2022-10-10 DIAGNOSIS — U09.9 LONG COVID: Primary | ICD-10-CM

## 2022-10-10 PROCEDURE — 1123F ACP DISCUSS/DSCN MKR DOCD: CPT | Performed by: FAMILY MEDICINE

## 2022-10-10 PROCEDURE — 99213 OFFICE O/P EST LOW 20 MIN: CPT | Performed by: FAMILY MEDICINE

## 2022-10-10 RX ORDER — HYDROCODONE BITARTRATE AND ACETAMINOPHEN 7.5; 325 MG/1; MG/1
1 TABLET ORAL EVERY 6 HOURS PRN
Qty: 30 TABLET | Refills: 0 | Status: SHIPPED | OUTPATIENT
Start: 2022-10-10 | End: 2022-11-09

## 2022-10-10 ASSESSMENT — ENCOUNTER SYMPTOMS
NAUSEA: 0
ABDOMINAL PAIN: 0
DIARRHEA: 0
CONSTIPATION: 0
WHEEZING: 0
SHORTNESS OF BREATH: 0
BLOOD IN STOOL: 0
COUGH: 0
VOMITING: 0

## 2022-10-10 NOTE — PROGRESS NOTES
Amanda Sher (:  1950) is a 67 y.o. male,Established patient, here for evaluation of the following chief complaint(s):  Pain (Refills)         ASSESSMENT/PLAN:  1. Long COVID  -     HYDROcodone-acetaminophen (NORCO) 7.5-325 MG per tablet; Take 1 tablet by mouth every 6 hours as needed for Pain for up to 30 days. , Disp-30 tablet, R-0Normal  2. Impingement syndrome of left shoulder  -     HYDROcodone-acetaminophen (NORCO) 7.5-325 MG per tablet; Take 1 tablet by mouth every 6 hours as needed for Pain for up to 30 days. , Disp-30 tablet, R-0Normal    Controlled substances monitoring: no signs of potential drug abuse or diversion identified and OARRS report reviewed today- activity consistent with treatment plan. No follow-ups on file. Subjective   SUBJECTIVE/OBJECTIVE:  Pain (Refills)  Bilateral shoulder impingement      Review of Systems   Constitutional:  Negative for chills, diaphoresis and fever. HENT:  Negative for ear discharge, ear pain, hearing loss, nosebleeds and tinnitus. Respiratory:  Negative for cough, shortness of breath and wheezing. Cardiovascular:  Negative for chest pain. Gastrointestinal:  Negative for abdominal pain, blood in stool, constipation, diarrhea, nausea and vomiting. Genitourinary:  Negative for dysuria, flank pain and hematuria. Musculoskeletal:  Negative for myalgias. Skin:  Negative for rash. Neurological:  Negative for headaches. Hematological:  Does not bruise/bleed easily. Psychiatric/Behavioral:  Negative for hallucinations and suicidal ideas. Objective   BP (!) 120/56   Pulse 61   Temp 97.9 °F (36.6 °C)   Resp 16   Ht 5' 9\" (1.753 m)   Wt 236 lb (107 kg)   SpO2 92%   BMI 34.85 kg/m²   Lab Results   Component Value Date    LABA1C 5.6 2018    LABA1C 6.0 2018     Physical Exam  HENT:      Head: Normocephalic. Eyes:      General:         Right eye: No discharge. Left eye: No discharge. Cardiovascular:      Rate and Rhythm: Normal rate and regular rhythm. Heart sounds: No murmur heard. Pulmonary:      Effort: No respiratory distress. Breath sounds: No rhonchi or rales. Abdominal:      Tenderness: There is no abdominal tenderness. Musculoskeletal:      Cervical back: No rigidity. Comments: 02 at 92% at rest   Skin:     General: Skin is warm. Capillary Refill: Capillary refill takes less than 2 seconds. Coloration: Skin is not pale. Findings: No bruising. Neurological:      Mental Status: He is alert. Psychiatric:         Mood and Affect: Mood normal.          On this date 10/10/2022 I have spent 23 minutes reviewing previous notes, test results and face to face with the patient discussing the diagnosis and importance of compliance with the treatment plan as well as documenting on the day of the visit. An electronic signature was used to authenticate this note.     --Endy Everett, DO

## 2022-10-13 DIAGNOSIS — J44.9 CHRONIC OBSTRUCTIVE PULMONARY DISEASE, UNSPECIFIED COPD TYPE (HCC): ICD-10-CM

## 2022-10-14 RX ORDER — POTASSIUM CHLORIDE 750 MG/1
10 TABLET, EXTENDED RELEASE ORAL 2 TIMES DAILY
Qty: 180 TABLET | Refills: 0 | Status: SHIPPED | OUTPATIENT
Start: 2022-10-14

## 2022-10-14 RX ORDER — FUROSEMIDE 20 MG/1
20 TABLET ORAL DAILY
Qty: 90 TABLET | Refills: 0 | Status: SHIPPED | OUTPATIENT
Start: 2022-10-14

## 2022-11-09 ENCOUNTER — OFFICE VISIT (OUTPATIENT)
Dept: FAMILY MEDICINE CLINIC | Age: 72
End: 2022-11-09
Payer: MEDICARE

## 2022-11-09 VITALS
HEART RATE: 60 BPM | TEMPERATURE: 98.2 F | WEIGHT: 229.6 LBS | SYSTOLIC BLOOD PRESSURE: 122 MMHG | DIASTOLIC BLOOD PRESSURE: 68 MMHG | HEIGHT: 69 IN | BODY MASS INDEX: 34 KG/M2 | RESPIRATION RATE: 16 BRPM | OXYGEN SATURATION: 92 %

## 2022-11-09 DIAGNOSIS — J45.909 MODERATE ASTHMA WITHOUT COMPLICATION, UNSPECIFIED WHETHER PERSISTENT: ICD-10-CM

## 2022-11-09 DIAGNOSIS — M75.41 IMPINGEMENT SYNDROME OF RIGHT SHOULDER: ICD-10-CM

## 2022-11-09 DIAGNOSIS — U09.9 LONG COVID: ICD-10-CM

## 2022-11-09 DIAGNOSIS — M75.42 IMPINGEMENT SYNDROME OF LEFT SHOULDER: ICD-10-CM

## 2022-11-09 DIAGNOSIS — M54.50 BACK PAIN AT L4-L5 LEVEL: Primary | ICD-10-CM

## 2022-11-09 PROBLEM — U07.1 COVID-19 VIRUS INFECTION: Status: RESOLVED | Noted: 2022-01-04 | Resolved: 2022-11-09

## 2022-11-09 PROCEDURE — 20610 DRAIN/INJ JOINT/BURSA W/O US: CPT | Performed by: FAMILY MEDICINE

## 2022-11-09 PROCEDURE — 99214 OFFICE O/P EST MOD 30 MIN: CPT | Performed by: FAMILY MEDICINE

## 2022-11-09 PROCEDURE — 1123F ACP DISCUSS/DSCN MKR DOCD: CPT | Performed by: FAMILY MEDICINE

## 2022-11-09 RX ORDER — HYDROCODONE BITARTRATE AND ACETAMINOPHEN 7.5; 325 MG/1; MG/1
1 TABLET ORAL EVERY 6 HOURS PRN
Qty: 30 TABLET | Refills: 0 | Status: SHIPPED | OUTPATIENT
Start: 2022-11-09 | End: 2022-12-09

## 2022-11-09 RX ORDER — TRIAMCINOLONE ACETONIDE 40 MG/ML
40 INJECTION, SUSPENSION INTRA-ARTICULAR; INTRAMUSCULAR ONCE
Qty: 1 ML | Refills: 0 | Status: SHIPPED
Start: 2022-11-09 | End: 2022-11-09

## 2022-11-09 RX ORDER — TRIAMCINOLONE ACETONIDE 40 MG/ML
40 INJECTION, SUSPENSION INTRA-ARTICULAR; INTRAMUSCULAR ONCE
Status: COMPLETED | OUTPATIENT
Start: 2022-11-09 | End: 2022-11-09

## 2022-11-09 RX ADMIN — TRIAMCINOLONE ACETONIDE 40 MG: 40 INJECTION, SUSPENSION INTRA-ARTICULAR; INTRAMUSCULAR at 17:03

## 2022-11-09 ASSESSMENT — COLUMBIA-SUICIDE SEVERITY RATING SCALE - C-SSRS
2. HAVE YOU ACTUALLY HAD ANY THOUGHTS OF KILLING YOURSELF?: NO
6. HAVE YOU EVER DONE ANYTHING, STARTED TO DO ANYTHING, OR PREPARED TO DO ANYTHING TO END YOUR LIFE?: NO
1. WITHIN THE PAST MONTH, HAVE YOU WISHED YOU WERE DEAD OR WISHED YOU COULD GO TO SLEEP AND NOT WAKE UP?: YES

## 2022-11-09 ASSESSMENT — PATIENT HEALTH QUESTIONNAIRE - PHQ9
10. IF YOU CHECKED OFF ANY PROBLEMS, HOW DIFFICULT HAVE THESE PROBLEMS MADE IT FOR YOU TO DO YOUR WORK, TAKE CARE OF THINGS AT HOME, OR GET ALONG WITH OTHER PEOPLE: 1
2. FEELING DOWN, DEPRESSED OR HOPELESS: 3
4. FEELING TIRED OR HAVING LITTLE ENERGY: 3
7. TROUBLE CONCENTRATING ON THINGS, SUCH AS READING THE NEWSPAPER OR WATCHING TELEVISION: 3
SUM OF ALL RESPONSES TO PHQ QUESTIONS 1-9: 18
SUM OF ALL RESPONSES TO PHQ QUESTIONS 1-9: 18
3. TROUBLE FALLING OR STAYING ASLEEP: 1
SUM OF ALL RESPONSES TO PHQ9 QUESTIONS 1 & 2: 6
SUM OF ALL RESPONSES TO PHQ QUESTIONS 1-9: 15
SUM OF ALL RESPONSES TO PHQ QUESTIONS 1-9: 18
6. FEELING BAD ABOUT YOURSELF - OR THAT YOU ARE A FAILURE OR HAVE LET YOURSELF OR YOUR FAMILY DOWN: 2
5. POOR APPETITE OR OVEREATING: 0
1. LITTLE INTEREST OR PLEASURE IN DOING THINGS: 3
8. MOVING OR SPEAKING SO SLOWLY THAT OTHER PEOPLE COULD HAVE NOTICED. OR THE OPPOSITE, BEING SO FIGETY OR RESTLESS THAT YOU HAVE BEEN MOVING AROUND A LOT MORE THAN USUAL: 0
9. THOUGHTS THAT YOU WOULD BE BETTER OFF DEAD, OR OF HURTING YOURSELF: 3

## 2022-11-09 ASSESSMENT — ENCOUNTER SYMPTOMS
SHORTNESS OF BREATH: 0
ABDOMINAL PAIN: 0
COUGH: 0
BLOOD IN STOOL: 0
WHEEZING: 0
VOMITING: 0
DIARRHEA: 0
CONSTIPATION: 0
NAUSEA: 0

## 2022-11-09 NOTE — PROGRESS NOTES
Lucretia Monge (:  1950) is a 67 y.o. male,Established patient, here for evaluation of the following chief complaint(s):  Pain (Refill and shot in shoulder)         ASSESSMENT/PLAN:  1. Long COVID he is on 02 and uses during hs and occasionally during day. Recheck monthly durig med refills  -     HYDROcodone-acetaminophen (NORCO) 7.5-325 MG per tablet; Take 1 tablet by mouth every 6 hours as needed for Pain for up to 30 days. , Disp-30 tablet, R-0Normal  2. Impingement syndrome of left shoulder  -     HYDROcodone-acetaminophen (NORCO) 7.5-325 MG per tablet; Take 1 tablet by mouth every 6 hours as needed for Pain for up to 30 days. , Disp-30 tablet, R-0Normal  Controlled substances monitoring: no signs of potential drug abuse or diversion identified and OARRS report reviewed today- activity consistent with treatment plan. No follow-ups on file. Subjective   SUBJECTIVE/OBJECTIVE:  Pain (Refill and shot in shoulder)        Review of Systems   Constitutional:  Negative for chills, diaphoresis and fever. HENT:  Negative for ear discharge, ear pain, hearing loss, nosebleeds and tinnitus. Respiratory:  Negative for cough, shortness of breath and wheezing. Cardiovascular:  Negative for chest pain. Gastrointestinal:  Negative for abdominal pain, blood in stool, constipation, diarrhea, nausea and vomiting. Genitourinary:  Negative for dysuria, flank pain and hematuria. Musculoskeletal:  Negative for myalgias. Skin:  Negative for rash. Neurological:  Negative for headaches. Hematological:  Does not bruise/bleed easily. Psychiatric/Behavioral:  Negative for hallucinations and suicidal ideas.          Objective   /68   Pulse 60   Temp 98.2 °F (36.8 °C)   Resp 16   Ht 5' 9\" (1.753 m)   Wt 229 lb 9.6 oz (104.1 kg)   SpO2 92%   BMI 33.91 kg/m²   Lab Results   Component Value Date    LABA1C 5.6 2018    LABA1C 6.0 2018     Physical Exam  Eyes:      General: Right eye: No discharge. Left eye: No discharge. Cardiovascular:      Rate and Rhythm: Normal rate and regular rhythm. Heart sounds: No murmur heard. Pulmonary:      Effort: No respiratory distress. Breath sounds: No rhonchi (Raspy) or rales. Abdominal:      Palpations: Abdomen is soft. Tenderness: There is no abdominal tenderness. Musculoskeletal:      Cervical back: No rigidity. Comments: Pain RIght shouledr   Skin:     General: Skin is warm. Capillary Refill: Capillary refill takes less than 2 seconds. Coloration: Skin is not pale. Findings: No bruising. Neurological:      Mental Status: He is alert. Psychiatric:         Mood and Affect: Mood normal.      Procedure:  Intra-Articular Injection right shoulder: The patient was counseled on the options for treating the affected shoulder. These include but were not limited to trail of oral anti-inflammatories, oral steroids, physical therapy referral, or ortho consultation. The patient is electing injection. The risks of the injection were explained, including but not limited to infection, bleeding, bruising, tendon injury, skin contour deformity and soft tissue/fat necrosis. The patient gave verbal permission to proceed. The patient was placed in a seated position. The skin of [facility] was prepped with Betadine swabs. It was allowed to dry. Utilizing a  Posterior Sub-Acromial approach an injection of 10 mg of  Kenalog and 1 cc of 1% lidocaine without epinephrine and 0.5 cc of 0.25% Bupivacaine,was injected into the right Shoulder joint after first aspirating to assure no blood return. The injection site was then wiped again with Betadine and covered with a band aid. The procedure was tolerated well. Routine wound instructions given verbally to the patient. Advised to notify if bleeding, excessive bruising, or swelling develop. Surgeon: Rachana Jama D.O.      On this date 11/9/2022 I have spent 31 minutes reviewing previous notes, test results and face to face with the patient discussing the diagnosis and importance of compliance with the treatment plan as well as documenting on the day of the visit. An electronic signature was used to authenticate this note.     --Kriss Aguirre, DO

## 2022-12-02 DIAGNOSIS — I25.110 CORONARY ARTERY DISEASE INVOLVING NATIVE CORONARY ARTERY OF NATIVE HEART WITH UNSTABLE ANGINA PECTORIS (HCC): ICD-10-CM

## 2022-12-02 DIAGNOSIS — E78.2 MIXED HYPERLIPIDEMIA: ICD-10-CM

## 2022-12-02 DIAGNOSIS — J45.909 MODERATE ASTHMA WITHOUT COMPLICATION, UNSPECIFIED WHETHER PERSISTENT: ICD-10-CM

## 2022-12-02 RX ORDER — FENOFIBRATE 145 MG/1
TABLET, COATED ORAL
Qty: 90 TABLET | Refills: 0 | Status: SHIPPED | OUTPATIENT
Start: 2022-12-02

## 2022-12-02 RX ORDER — CLOPIDOGREL BISULFATE 75 MG/1
75 TABLET ORAL DAILY
Qty: 90 TABLET | Refills: 0 | Status: SHIPPED | OUTPATIENT
Start: 2022-12-02

## 2022-12-02 RX ORDER — SIMVASTATIN 20 MG
TABLET ORAL
Qty: 90 TABLET | Refills: 0 | Status: SHIPPED | OUTPATIENT
Start: 2022-12-02

## 2022-12-02 RX ORDER — MONTELUKAST SODIUM 10 MG/1
TABLET ORAL
Qty: 90 TABLET | Refills: 0 | Status: SHIPPED | OUTPATIENT
Start: 2022-12-02

## 2022-12-02 RX ORDER — CARVEDILOL 3.12 MG/1
TABLET ORAL
Qty: 30 TABLET | Refills: 3 | Status: SHIPPED | OUTPATIENT
Start: 2022-12-02

## 2022-12-02 RX ORDER — HYDRALAZINE HYDROCHLORIDE 25 MG/1
TABLET, FILM COATED ORAL
Qty: 90 TABLET | Refills: 2 | Status: SHIPPED | OUTPATIENT
Start: 2022-12-02

## 2022-12-15 ENCOUNTER — OFFICE VISIT (OUTPATIENT)
Dept: FAMILY MEDICINE CLINIC | Age: 72
End: 2022-12-15
Payer: MEDICARE

## 2022-12-15 VITALS
TEMPERATURE: 98.6 F | WEIGHT: 230.4 LBS | BODY MASS INDEX: 34.13 KG/M2 | RESPIRATION RATE: 16 BRPM | OXYGEN SATURATION: 92 % | HEART RATE: 66 BPM | HEIGHT: 69 IN | SYSTOLIC BLOOD PRESSURE: 110 MMHG | DIASTOLIC BLOOD PRESSURE: 70 MMHG

## 2022-12-15 DIAGNOSIS — Z00.00 MEDICARE ANNUAL WELLNESS VISIT, SUBSEQUENT: Primary | ICD-10-CM

## 2022-12-15 DIAGNOSIS — M75.42 IMPINGEMENT SYNDROME OF LEFT SHOULDER: ICD-10-CM

## 2022-12-15 DIAGNOSIS — U09.9 LONG COVID: ICD-10-CM

## 2022-12-15 PROCEDURE — 1123F ACP DISCUSS/DSCN MKR DOCD: CPT | Performed by: FAMILY MEDICINE

## 2022-12-15 PROCEDURE — G0439 PPPS, SUBSEQ VISIT: HCPCS | Performed by: FAMILY MEDICINE

## 2022-12-15 RX ORDER — HYDROCODONE BITARTRATE AND ACETAMINOPHEN 7.5; 325 MG/1; MG/1
1 TABLET ORAL EVERY 6 HOURS PRN
Qty: 30 TABLET | Refills: 0 | Status: SHIPPED | OUTPATIENT
Start: 2022-12-15 | End: 2023-03-15

## 2022-12-15 ASSESSMENT — PATIENT HEALTH QUESTIONNAIRE - PHQ9
1. LITTLE INTEREST OR PLEASURE IN DOING THINGS: 3
6. FEELING BAD ABOUT YOURSELF - OR THAT YOU ARE A FAILURE OR HAVE LET YOURSELF OR YOUR FAMILY DOWN: 0
3. TROUBLE FALLING OR STAYING ASLEEP: 2
9. THOUGHTS THAT YOU WOULD BE BETTER OFF DEAD, OR OF HURTING YOURSELF: 3
SUM OF ALL RESPONSES TO PHQ QUESTIONS 1-9: 14
2. FEELING DOWN, DEPRESSED OR HOPELESS: 3
8. MOVING OR SPEAKING SO SLOWLY THAT OTHER PEOPLE COULD HAVE NOTICED. OR THE OPPOSITE, BEING SO FIGETY OR RESTLESS THAT YOU HAVE BEEN MOVING AROUND A LOT MORE THAN USUAL: 0
SUM OF ALL RESPONSES TO PHQ QUESTIONS 1-9: 14
SUM OF ALL RESPONSES TO PHQ QUESTIONS 1-9: 11
4. FEELING TIRED OR HAVING LITTLE ENERGY: 3
SUM OF ALL RESPONSES TO PHQ QUESTIONS 1-9: 14
10. IF YOU CHECKED OFF ANY PROBLEMS, HOW DIFFICULT HAVE THESE PROBLEMS MADE IT FOR YOU TO DO YOUR WORK, TAKE CARE OF THINGS AT HOME, OR GET ALONG WITH OTHER PEOPLE: 2
SUM OF ALL RESPONSES TO PHQ9 QUESTIONS 1 & 2: 6
7. TROUBLE CONCENTRATING ON THINGS, SUCH AS READING THE NEWSPAPER OR WATCHING TELEVISION: 0
5. POOR APPETITE OR OVEREATING: 0

## 2022-12-15 ASSESSMENT — LIFESTYLE VARIABLES
HOW OFTEN DURING THE LAST YEAR HAVE YOU NEEDED AN ALCOHOLIC DRINK FIRST THING IN THE MORNING TO GET YOURSELF GOING AFTER A NIGHT OF HEAVY DRINKING: 0
HOW OFTEN DURING THE LAST YEAR HAVE YOU FAILED TO DO WHAT WAS NORMALLY EXPECTED FROM YOU BECAUSE OF DRINKING: 0
HOW OFTEN DURING THE LAST YEAR HAVE YOU HAD A FEELING OF GUILT OR REMORSE AFTER DRINKING: 0
HOW OFTEN DURING THE LAST YEAR HAVE YOU BEEN UNABLE TO REMEMBER WHAT HAPPENED THE NIGHT BEFORE BECAUSE YOU HAD BEEN DRINKING: 0
HAVE YOU OR SOMEONE ELSE BEEN INJURED AS A RESULT OF YOUR DRINKING: 0
HOW OFTEN DURING THE LAST YEAR HAVE YOU FOUND THAT YOU WERE NOT ABLE TO STOP DRINKING ONCE YOU HAD STARTED: 0
HOW OFTEN DO YOU HAVE A DRINK CONTAINING ALCOHOL: 2-3 TIMES A WEEK
HAS A RELATIVE, FRIEND, DOCTOR, OR ANOTHER HEALTH PROFESSIONAL EXPRESSED CONCERN ABOUT YOUR DRINKING OR SUGGESTED YOU CUT DOWN: 0
HOW MANY STANDARD DRINKS CONTAINING ALCOHOL DO YOU HAVE ON A TYPICAL DAY: 1 OR 2

## 2022-12-15 NOTE — PROGRESS NOTES
Medicare Annual Wellness Visit    Eileen Coronado is here for Medicare AWV    Assessment & Plan   Medicare annual wellness visit, subsequent  Long COVID  -     HYDROcodone-acetaminophen (Sonda Colace) 7.5-325 MG per tablet; Take 1 tablet by mouth every 6 hours as needed for Pain for up to 90 days. , Disp-30 tablet, R-0Normal  Impingement syndrome of left shoulder  -     HYDROcodone-acetaminophen (NORCO) 7.5-325 MG per tablet; Take 1 tablet by mouth every 6 hours as needed for Pain for up to 90 days. , Disp-30 tablet, R-0Normal    Recommendations for Preventive Services Due: see orders and patient instructions/AVS.  Recommended screening schedule for the next 5-10 years is provided to the patient in written form: see Patient Instructions/AVS.   Controlled substances monitoring: no signs of potential drug abuse or diversion identified. Return for Medicare Annual Wellness Visit in 1 year. Subjective   The following acute and/or chronic problems were also addressed today:  Shoulder pain    Patient's complete Health Risk Assessment and screening values have been reviewed and are found in Flowsheets. The following problems were reviewed today and where indicated follow up appointments were made and/or referrals ordered. Positive Risk Factor Screenings with Interventions:    Fall Risk:  Do you feel unsteady or are you worried about falling? : (!) yes  2 or more falls in past year?: (!) yes  Fall with injury in past year?: no     Interventions:    He will be careful  See AVS for additional education material  See A/P for plan and any pertinent orders    Cognitive:    Words recalled: 1 Word Recalled   Clock Drawing Test (CDT): (!) Abnormal   Total Score: (!) 1   Total Score Interpretation: Abnormal Mini-Cog      Interventions:  He doesn't need help at this time  See AVS for additional education material  See A/P for plan and any pertinent orders    Depression:  PHQ-2 Score: 6  PHQ-9 Total Score: 14    Interpretation: 1-4 = minimal  5-9 = mild  10-14 = moderate  15-19 = moderately severe  20-27 = severe  Interventions:  Discussed at detail and he will manage  See AVS for additional education material  See A/P for any pertinent orders           Opioid Risk: (Low risk score <55) Opioid risk score: 19    Patient is low risk for opioid use disorder or overdose. Last PDMP Zabrina Kim as Reviewed:  Review User Review Instant Review Result   Jovany Phillips 6/1/2021 12:29 PM     Unable to Review [2]     Last Controlled Substance Monitoring Documentation      [address] Office Visit from 11/18/2021 in [facility]   Periodic Controlled Substance Monitoring Assessed functional status. filed at 11/18/2021 8992           Self-assessment of health:   In general, how would you say your health is?: (!) Poor    Interventions:  See AVS for additional education material  See A/P for plan and any pertinent orders    General HRA Questions:  Select all that apply: (!) New or Increased Pain, New or Increased Fatigue, Loneliness, Social Isolation, Stress, Anger    Pain Interventions:  Medication reviewed and stable on current regimen  See AVS for additional education material  See A/P for plan and any pertinent orders    Fatigue Interventions:  Better sleep  See AVS for additional education material  See A/P for plan and any pertinent orders    Loneliness Interventions:  Life is stable  See AVS for additional education material  See A/P for plan and any pertinent orders    Social Isolation Interventions:  none  See AVS for additional education material  See A/P for plan and any pertinent orders    Stress Interventions:  Family support  See AVS for additional education material  See A/P for plan and any pertinent orders    Anger Interventions:  He is fin ding  balance  See AVS for additional education material  See A/P for plan and any pertinent orders      Social and Emotional Support:  Do you get the social and emotional support that you need?: (!) No  Interventions:  None needed  See AVS for additional education material  See A/P for plan and any pertinent orders    Weight and Activity:  Physical Activity: Inactive    Days of Exercise per Week: 0 days    Minutes of Exercise per Session: 0 min     On average, how many days per week do you engage in moderate to strenuous exercise (like a brisk walk)?: 0 days  Have you lost any weight without trying in the past 3 months?: No  Body mass index: (!) 34.02    Inactivity Interventions:  He is active  See AVS for additional education material  See A/P for plan and any pertinent orders  Obesity Interventions:  none  See AVS for additional education material  See A/P for plan and any pertinent orders    Obesity Counseling: Patient was asked about his current diet and exercise habits, and personalized advice was provided regarding recommended lifestyle changes. Patient's comorbid health conditions associated with elevated BMI were discussed, as well as the likely benefits of weight loss. Based upon patient's motivation to change his behavior, the following plan was agreed upon to work toward a weight loss goal of 12 pounds: 1800calorie/day diet. Educational materials for weight loss were provided. Patient will follow-up in 7 month(s) with PCP. Provider spent 13 minutes counseling patient. Dentist Screen:  Have you seen the dentist within the past year?: (!) No    Intervention:  Patient comments: none  See AVS for additional education material  See A/P for any pertinent orders     Vision Screen:  Do you have difficulty driving, watching TV, or doing any of your daily activities because of your eyesight?: No  Have you had an eye exam within the past year?: (!) No  No results found.     Interventions:   Patient comments: none  See AVS for additional education material  See A/P for any pertinent orders    Safety:  Do you have working smoke detectors?: (!) No  Do all of your stairways have a railing or banister?: (!) No  Do you always fasten your seatbelt when you are in a car?: (!) No  Interventions:  none  See AVS for additional education material  See A/P for plan and any pertinent orders    ADL's:   Patient reports needing help with:  Select all that apply: (!) Banking/Finances  Interventions:  none  See AVS for additional education material  See A/P for plan and any pertinent orders    Advanced Directives:  Do you have a Living Will?: (!) No    Intervention:  has an advanced directive - a copy HAS NOT been provided. Advance Care Planning   Advanced Care Planning: Discussed the patients choices for care and treatment in case of a health event that adversely affects decision-making abilities. Also discussed the patients long-term treatment options. Reviewed with the patient the appropriate state-specific advance directive documents. Reviewed the process of designating a competent adult as an Agent (or -in-fact) that could take make health care decisions for the patient if incompetent. Patient was asked to complete the declaration forms, if they have not already, either acknowledge the forms by a public notary or an eligible witness and provide a signed copy to the practice office. Time spent (minutes): 6      Lung Cancer Screening:  Guidelines regarding LDCT screening for lung cancer reviewed. Patient declined screening. Objective   Vitals:    12/15/22 0841   BP: 110/70   Pulse: 66   Resp: 16   Temp: 98.6 °F (37 °C)   SpO2: 92%   Weight: 230 lb 6.4 oz (104.5 kg)   Height: 5' 9\" (1.753 m)      Body mass index is 34.02 kg/m².       General Appearance: alert and oriented to person, place and time, well developed and well- nourished, in no acute distress  Skin: warm and dry, no rash or erythema  Head: normocephalic and atraumatic  Eyes: pupils equal, round, and reactive to light, extraocular eye movements intact, conjunctivae normal  ENT: tympanic membrane, external ear and ear canal normal bilaterally, nose without deformity, nasal mucosa and turbinates normal without polyps  Neck: supple and non-tender without mass, no thyromegaly or thyroid nodules, no cervical lymphadenopathy  Pulmonary/Chest: clear to auscultation bilaterally- no wheezes, rales or rhonchi, normal air movement, no respiratory distress  Cardiovascular: normal rate, regular rhythm, normal S1 and S2, no murmurs, rubs, clicks, or gallops, distal pulses intact, no carotid bruits  Abdomen: soft, non-tender, non-distended, normal bowel sounds, no masses or organomegaly  Extremities: no cyanosis, clubbing or edema  Musculoskeletal: Pain and limited ROM right shoulder, impingement  Neurologic: reflexes normal and symmetric, no cranial nerve deficit, gait, coordination and speech normal       Allergies   Allergen Reactions    Midazolam Hcl      Wake up wild. ... Must be reversed with romazicon or will wake up wild and combative     Prior to Visit Medications    Medication Sig Taking? Authorizing Provider   HYDROcodone-acetaminophen (NORCO) 7.5-325 MG per tablet Take 1 tablet by mouth every 6 hours as needed for Pain for up to 90 days. Yes Artelia Pippins, DO   simvastatin (ZOCOR) 20 MG tablet Take 1 tablet by mouth daily. Yes Claudia Lopez, APRN - CNP   montelukast (SINGULAIR) 10 MG tablet Take 1 tablet by mouth once daily. Yes Claudia Lopez APRN - CNP   fenofibrate (TRICOR) 145 MG tablet Take 1 tablet by mouth once daily. Yes Claudia Lopez APRN - CNP   clopidogrel (PLAVIX) 75 MG tablet Take 1 tablet by mouth daily. Yes Claudia Lopez APRN - CNP   hydrALAZINE (APRESOLINE) 25 MG tablet Take 1 tablet by mouth four times daily. Yes Jorje Kirkpatrick MD   carvedilol (COREG) 3.125 MG tablet Take 1 tablet by mouth once daily.  Yes Jorje Kirkpatrick MD   potassium chloride (KLOR-CON M) 10 MEQ extended release tablet Take 1 tablet by mouth 2 times daily Yes JULIAN Rachel - CNP   furosemide (LASIX) 20 MG tablet Take 1 tablet by mouth daily Take 1 tablets by mouth daily. Yes Malena Lepe APRN - CNP   sertraline (ZOLOFT) 100 MG tablet Take 1 tablet by mouth daily. Yes Niko Morales DO   tamsulosin (FLOMAX) 0.4 MG capsule Take 1 capsule by mouth at bedtime. Yes Malena Lepe APRN - CNP   buPROPion [facility] SR) 150 MG extended release tablet Take 1 tablet by mouth twice daily.  Yes Haydee Matthews APRN - CNP   omeprazole (PRILOSEC) 40 MG delayed release capsule Take 1 capsule by mouth daily Yes Niko Morales DO   aspirin 81 MG chewable tablet Take 1 tablet by mouth daily Ld 1 week ago patient not compliant with asa daughter stated ld about 1 week ago Yes Niko Morales DO   guaiFENesin (MUCINEX) 600 MG extended release tablet Take 2 tablets by mouth 2 times daily  Patient not taking: No sig reported  Niko Morales DO       CareTeam (Including outside providers/suppliers regularly involved in providing care):   Patient Care Team:  Niko Morales DO as PCP - General (Family Medicine)  Niko Morales DO as PCP - REHABILITATION [facility] Empaneled Provider  Rossi Jacome MD as Surgeon (Gastroenterology)  Analisa Browning MD as Consulting Physician (Cardiology)     Reviewed and updated this visit:  Tobacco  Allergies  Meds  Med Hx  Surg Hx  Soc Hx  Fam Hx

## 2022-12-15 NOTE — PATIENT INSTRUCTIONS
Preventing Falls: Care Instructions  Overview     Getting around your home safely can be a challenge if you have injuries or health problems that make it easy for you to fall. Loose rugs and furniture in walkways are among the dangers for many older people who have problems walking or who have poor eyesight. People who have conditions such as arthritis, osteoporosis, or dementia also have to be careful not to fall. You can make your home safer with a few simple measures. Follow-up care is a key part of your treatment and safety. Be sure to make and go to all appointments, and call your doctor if you are having problems. It's also a good idea to know your test results and keep a list of the medicines you take. How can you care for yourself at home? Taking care of yourself  Exercise regularly to improve your strength, muscle tone, and balance. Walk if you can. Swimming may be a good choice if you cannot walk easily. Have your vision and hearing checked each year or any time you notice a change. If you have trouble seeing and hearing, you might not be able to avoid objects and could lose your balance. Know the side effects of the medicines you take. Ask your doctor or pharmacist whether the medicines you take can affect your balance. Sleeping pills or sedatives can affect your balance. Limit the amount of alcohol you drink. Alcohol can impair your balance and other senses. Ask your doctor whether calluses or corns on your feet need to be removed. If you wear loose-fitting shoes because of calluses or corns, you can lose your balance and fall. Talk to your doctor if you have numbness in your feet. You may get dizzy if you do not drink enough water. To prevent dehydration, drink plenty of fluids. Choose water and other clear liquids. If you have kidney, heart, or liver disease and have to limit fluids, talk with your doctor before you increase the amount of fluids you drink.   Preventing falls at home  Remove raised doorway thresholds, throw rugs, and clutter. Repair loose carpet or raised areas in the floor. Move furniture and electrical cords to keep them out of walking paths. Use nonskid floor wax, and wipe up spills right away, especially on ceramic tile floors. If you use a walker or cane, put rubber tips on it. If you use crutches, clean the bottoms of them regularly with an abrasive pad, such as steel wool. Keep your house well lit, especially stairways, porches, and outside walkways. Use night-lights in areas such as hallways and bathrooms. Add extra light switches or use remote switches (such as switches that go on or off when you clap your hands) to make it easier to turn lights on if you have to get up during the night. Install sturdy handrails on stairways. Move items in your cabinets so that the things you use a lot are on the lower shelves (about waist level). Keep a cordless phone and a flashlight with new batteries by your bed. If possible, put a phone in each of the main rooms of your house, or carry a cell phone in case you fall and cannot reach a phone. Or, you can wear a device around your neck or wrist. You push a button that sends a signal for help. Wear low-heeled shoes that fit well and give your feet good support. Use footwear with nonskid soles. Check the heels and soles of your shoes for wear. Repair or replace worn heels or soles. Do not wear socks without shoes on smooth floors, such as wood. Walk on the grass when the sidewalks are slippery. If you live in an area that gets snow and ice in the winter, sprinkle salt on slippery steps and sidewalks. Or ask a family member or friend to do this for you. Preventing falls in the bath  Install grab bars and nonskid mats inside and outside your shower or tub and near the toilet and sinks. Use shower chairs and bath benches. Use a hand-held shower head that will allow you to sit while showering.   Get into a tub or shower by putting the weaker leg in first. Get out of a tub or shower with your strong side first.  Repair loose toilet seats and consider installing a raised toilet seat to make getting on and off the toilet easier. Keep your bathroom door unlocked while you are in the shower. Where can you learn more? Go to http://www.jesus.com/ and enter G117 to learn more about \"Preventing Falls: Care Instructions. \"  Current as of: May 4, 2022               Content Version: 13.5  © 6637-0340 Healthwise, Incorporated. Care instructions adapted under license by [facility] ([facility]). If you have questions about a medical condition or this instruction, always ask your healthcare professional. [address] any warranty or liability for your use of this information. Learning About Mild Cognitive Impairment (MCI)  What is mild cognitive impairment (MCI)? It's common to forget things sometimes as we get older. But some older people have memory loss that's more than normal aging. It's called mild cognitive impairment, or MCI. It is not the same as dementia. People with the condition often know that their memory or mental function has changed. Tests may show some loss. But their minds work well overall. They can carry out daily tasks that are normal for them. People with MCI have a higher chance of one day getting dementia. But not all people who have it will get dementia. Some people may stay the same over time. What are the symptoms? People with MCI have more memory loss than what occurs with normal aging. They may have increasing trouble with recalling words and keeping up with conversations. They may also have trouble remembering important events and making decisions. What puts you at risk? The risk of getting MCI increases with age. Having high blood pressure or having a family history of MCI may also increase your risk. How is it diagnosed?   Your doctor will do a physical exam.  Colin paul be asked questions to check your memory and other mental skills. Your doctor may also talk to close friends and family members. This can help the doctor figure out how your memory and other mental skills have changed. You may get blood tests and tests that look at your brain. These questions and tests can make sure you don't have other conditions that can cause symptoms like MCI. These include depression, sleep problems, and side effects from medicines. How is it treated? There are no medicines to treat MCI or to keep it from progressing to dementia. But treating conditions like high blood pressure and diabetes may help. A person with MCI needs routine follow-up visits with their doctor to check on changes in the person's mental skills. How can you care for yourself at home? Keeping your body active can help slow MCI. Exercises like walking can help. Try to stay active mentally too. Read or do things like crossword puzzles if you enjoy doing them. If you need help coping with MCI, you may want to get support from family, friends, a support group, or a counselor who works with people who have [address] Though the future isn't always clear, it can be good to plan ahead with instructions for your care. These are called advanced directives. Having a plan can help make sure that you get the care you want. Current as of: August 25, 2022               Content Version: 13.5  © 2006-2022 Healthwise, Incorporated. Care instructions adapted under license by [facility] ([facility]). If you have questions about a medical condition or this instruction, always ask your healthcare professional. [place] [address] any warranty or liability for your use of this information. Learning About Mindfulness for Stress  What are mindfulness and stress? Stress is what you feel when you have to handle more than you are used to. A lot of things can cause stress.  You may feel stress when you go on a job interview, take a test, or run a race. This kind of short-term stress is normal and even useful. It can help you if you need to work hard or react quickly. Stress also can last a long time. Long-term stress is caused by stressful situations or events. Examples of long-term stress include long-term health problems, ongoing problems at work, and conflicts in your family. Long-term stress can harm your health. Mindfulness is a focus only on things happening in the present moment. It's a process of purposefully paying attention to and being aware of your surroundings, your emotions, your thoughts, and how your body feels. You are aware of these things, but you aren't judging these experiences as \"good\" or \"bad. \" Mindfulness can help you learn to calm your mind and body to help you cope with illness, pain, and stress. How does mindfulness help to relieve stress? Mindfulness can help quiet your mind and relax your body. Studies show that it can help some people sleep better, feel less anxious, and bring their blood pressure down. And it's been shown to help some people live and cope better with certain health problems like heart disease, depression, chronic pain, and cancer. How do you practice mindfulness? To be mindful is to pay attention, to be present, and to be accepting. When you're mindful, you do just one thing and you pay close attention to that one thing. For example, you may sit quietly and notice your emotions or how your food tastes and smells. When you're present, you focus on the things that are happening right now. You let go of your thoughts about the past and the future. When you dwell on the past or the future, you miss moments that can heal and strengthen you. You may miss moments like hearing a child laugh or seeing a friendly face when you think you're all alone. When you're accepting, you don't  the present moment. Instead you accept your thoughts and feelings as they come.   You can practice anytime, anywhere, and in any way you choose. You can practice in many ways. Here are a few ideas:  While doing your chores, like washing the dishes, let your mind focus on what's in your hand. What does the dish feel like? Is the water warm or cold? Go outside and take a few deep breaths. What is the air like? Is it warm or cold? When you can, take some time at the start of your day to sit alone and think. Take a slow walk by yourself. Count your steps while you breathe in and out. Try yoga breathing exercises, stretches, and poses to strengthen and relax your muscles. At work, if you can, try to stop for a few moments each hour. Note how your body feels. Let yourself regroup and let your mind settle before you return to what you were doing. If you struggle with anxiety or \"worry thoughts,\" imagine your mind as a blue patricio and your worry thoughts as clouds. Now imagine those worry thoughts floating across your mind's patricio. Just let them pass by as you watch. Follow-up care is a key part of your treatment and safety. Be sure to make and go to all appointments, and call your doctor if you are having problems. It's also a good idea to know your test results and keep a list of the medicines you take. Where can you learn more? Go to http://www.jesus.com/ and enter M676 to learn more about \"Learning About Mindfulness for Stress. \"  Current as of: February 9, 2022               Content Version: 13.5  © 2006-2022 Healthwise, Incorporated. Care instructions adapted under license by [facility] ([facility]). If you have questions about a medical condition or this instruction, always ask your healthcare professional. [place] [address] any warranty or liability for your use of this information. Learning About Emotional Support  When do you need emotional support? You might find getting support from others helpful when you have a long-term health problem.  Often people feel alone, confused, or scared when coping with an illness. But you aren't alone. Other people are going through the same thing you are and know how you feel. Talking with others about your feelings can help you feel better. Your family and friends can give you support. So can your doctor, a support group, or a Bahai. If you have a support network, you will not feel as alone. You will learn new ways to deal with your situation, and you may try harder to overcome it. Where you can get support  Family and friends: They can help you cope by giving you comfort and encouragement. Counseling: Professional counseling can help you cope with situations that interfere with your life and cause stress. Counseling can help you understand and deal with your illness. Your doctor: Find a doctor you trust and feel comfortable with. Be open and honest about your fears and concerns. Your doctor can help you get the right medical treatments, including counseling. Spiritual or Congregational groups: They can provide comfort and may be able to help you find counseling or other social support services. Social groups: They can help you meet new people and get involved in activities you enjoy. Community support groups: In a support group, you can talk to others who have dealt with the same problems or illness as you. You can encourage one another and learn ways to cope with tough emotions. How to find a support group  Ask your doctor, counselor, or other health professional for suggestions. Contact your local Bahai, Pentecostalism, Catholic, or other Congregational group. Ask your family and friends. Ask people who have the same health concerns. Go online. Forums and blogs let you read messages from others and leave your own messages. You can exchange stories, vent your frustrations, and ask and answer questions. Contact a city, state, or national group that provides support for your health concerns. Your library or community center may have a list of these groups. Or you can look for information online. Look for a support group that works for you. Ask yourself if you prefer structure and would like a , or if you would like a less formal group. Do you prefer face-to-face meetings? Or do you feel more secure in online chat rooms or forums? Supportive relationships  A supportive relationship includes emotional support such as love, trust, and understanding, as well as advice and concrete help, such as help managing your time. Reach out to others  Family and friends can help you. Ask them to:  Listen to you and give you encouragement. This can keep you from feeling hopeless or alone. Help with small daily tasks or with bigger problems. A helping hand can keep you from feeling overwhelmed. Help you manage a health problem. For example, ask them to go to doctor visits with you. Your loved ones can offer support by being involved in your medical care. Respect your relationships  A good relationship is also a two-way street. You count on help from others, but they also count on you. Know your friends' limits. You don't have to see or call your friends every day. If you are going through a rough patch, ask friends if you can contact them outside of the usual boundaries. Don't always complain or talk about yourself. Know when it's time to stop talking and listen or just enjoy your friend's company. Know that good friends can be a bad influence. For example, if a friend encourages you to drink when you know it will harm you, you may want to end the friendship. Where can you learn more? Go to http://www.woods.com/ and enter G092 to learn more about \"Learning About Emotional Support. \"  Current as of: February 9, 2022               Content Version: 13.5  © 7856-8867 Healthwise, Incorporated. Care instructions adapted under license by [facility] ([facility]).  If you have questions about a medical condition or this instruction, always ask your healthcare professional. [address] any warranty or liability for your use of this information. Fatigue: Care Instructions  Your Care Instructions     Fatigue is a feeling of tiredness, exhaustion, or lack of energy. You may feel fatigue because of too much or not enough activity. It can also come from stress, lack of sleep, boredom, and poor diet. Many medical problems, such as viral infections, can cause fatigue. Emotional problems, especially depression, are often the cause of fatigue. Fatigue is most often a symptom of another problem. Treatment for fatigue depends on the cause. For example, if you have fatigue because you have a certain health problem, treating this problem also treats your fatigue. If depression or anxiety is the cause, treatment may help. Follow-up care is a key part of your treatment and safety. Be sure to make and go to all appointments, and call your doctor if you are having problems. It's also a good idea to know your test results and keep a list of the medicines you take. How can you care for yourself at home? Get regular exercise. But don't overdo it. Go back and forth between rest and exercise. Get plenty of rest.  Eat a healthy diet. Do not skip meals, especially breakfast.  Reduce your use of caffeine, tobacco, and alcohol. Caffeine is most often found in coffee, tea, cola drinks, and chocolate. Limit medicines that can cause fatigue. This includes tranquilizers and cold and allergy medicines. When should you call for help? Watch closely for changes in your health, and be sure to contact your doctor if:    You have new symptoms such as fever or a rash.     Your fatigue gets worse.     You have been feeling down, depressed, or hopeless. Or you may have lost interest in things that you usually enjoy.     You are not getting better as expected. Where can you learn more?   Go to http://www.woods.com/ and enter U630 to learn more about \"Fatigue: Care Instructions. \"  Current as of: February 9, 2022               Content Version: 13.5  © 2006-2022 [employer]. Care instructions adapted under license by [facility] ([facility]). If you have questions about a medical condition or this instruction, always ask your healthcare professional. [address] any warranty or liability for your use of this information. Learning About Emotional Support  When do you need emotional support? You might find getting support from others helpful when you have a long-term health problem. Often people feel alone, confused, or scared when coping with an illness. But you aren't alone. Other people are going through the same thing you are and know how you feel. Talking with others about your feelings can help you feel better. Your family and friends can give you support. So can your doctor, a support group, or a Anglican. If you have a support network, you will not feel as alone. You will learn new ways to deal with your situation, and you may try harder to overcome it. Where you can get support  Family and friends: They can help you cope by giving you comfort and encouragement. Counseling: Professional counseling can help you cope with situations that interfere with your life and cause stress. Counseling can help you understand and deal with your illness. Your doctor: Find a doctor you trust and feel comfortable with. Be open and honest about your fears and concerns. Your doctor can help you get the right medical treatments, including counseling. Spiritual or Taoism groups: They can provide comfort and may be able to help you find counseling or other social support services. Social groups: They can help you meet new people and get involved in activities you enjoy. Community support groups: In a support group, you can talk to others who have dealt with the same problems or illness as you.  You can encourage one another and learn ways to cope with tough emotions. How to find a support group  Ask your doctor, counselor, or other health professional for suggestions. Contact your local Mu-ism, Episcopal, Congregational, or other Confucianist group. Ask your family and friends. Ask people who have the same health concerns. Go online. Forums and blogs let you read messages from others and leave your own messages. You can exchange stories, vent your frustrations, and ask and answer questions. Contact a city, state, or national group that provides support for your health concerns. Your [employer] or [employer] may have a list of these groups. Or you can look for information online. Look for a support group that works for you. Ask yourself if you prefer structure and would like a , or if you would like a less formal group. Do you prefer face-to-face meetings? Or do you feel more secure in online chat rooms or forums? Supportive relationships  A supportive relationship includes emotional support such as love, trust, and understanding, as well as advice and concrete help, such as help managing your time. Reach out to others  Family and friends can help you. Ask them to:  Listen to you and give you encouragement. This can keep you from feeling hopeless or alone. Help with small daily tasks or with bigger problems. A helping hand can keep you from feeling overwhelmed. Help you manage a health problem. For example, ask them to go to doctor visits with you. Your loved ones can offer support by being involved in your medical care. Respect your relationships  A good relationship is also a two-way street. You count on help from others, but they also count on you. Know your friends' limits. You don't have to see or call your friends every day. If you are going through a rough patch, ask friends if you can contact them outside of the usual boundaries. Don't always complain or talk about yourself.  Know when it's time to stop talking and listen or just enjoy your friend's company. Know that good friends can be a bad influence. For example, if a friend encourages you to drink when you know it will harm you, you may want to end the friendship. Where can you learn more? Go to http://www.woods.com/ and enter G092 to learn more about \"Learning About Emotional Support. \"  Current as of: February 9, 2022               Content Version: 13.5  © 2006-2022 Healthwise, [employer]. Care instructions adapted under license by [facility] ([facility]). If you have questions about a medical condition or this instruction, always ask your healthcare professional. [address] any warranty or liability for your use of this information. For more information on your local Area Agency on Aging or [place] on Aging please visit the appropriate web site below:    [place]: MobileCycles.pl    [facility]: https://aging. [place].gov/    [place]: https://aging.[place].gov/    [place]: InsuranceSquad.es           Learning About Stress  What is stress? Stress is what you feel when you have to handle more than you are used to. Stress is a fact of life for most people, and it affects everyone differently. What causes stress for you may not be stressful for someone else. A lot of things can cause stress. You may feel stress when you go on a job interview, take a test, or run a race. This kind of short-term stress is normal and even useful. It can help you if you need to work hard or react quickly. For example, stress can help you finish an important job on time. Stress also can last a long time. Long-term stress is caused by stressful situations or events. Examples of long-term stress include long-term health problems, ongoing problems at work, or conflicts in your family. Long-term stress can harm your health. How does stress affect your health?   When you are stressed, your body responds as though you are in danger. It makes hormones that speed up your heart, make you breathe faster, and give you a burst of energy. This is called the fight-or-flight stress response. If the stress is over quickly, your body goes back to normal and no harm is done. But if stress happens too often or lasts too long, it can have bad effects. Long-term stress can make you more likely to get sick, and it can make symptoms of some diseases worse. If you tense up when you are stressed, you may develop neck, shoulder, or low back pain. Stress is linked to high blood pressure and heart disease. Stress also harms your emotional health. It can make you dietz, tense, or depressed. Your relationships may suffer, and you may not do well at work or school. What can you do to manage stress? How to relax your mind   Write. It may help to write about things that are bothering you. This helps you find out how much stress you feel and what is causing it. When you know this, you can find better ways to cope. Let your feelings out. Talk, laugh, cry, and express anger when you need to. Talking with friends, family, a counselor, or a member of the clergy about your feelings is a healthy way to relieve stress. Do something you enjoy. For example, listen to music or go to a movie. Practice your hobby or do volunteer work. Meditate. This can help you relax, because you are not worrying about what happened before or what may happen in the future. Do guided imagery. Imagine yourself in any setting that helps you feel calm. You can use audiotapes, books, or a teacher to guide you. How to relax your body   Do something active. Exercise or activity can help reduce stress. Walking is a great way to get started. Even everyday activities such as housecleaning or yard work can help. Do breathing exercises. For example:  From a standing position, bend forward from the waist with your knees slightly bent. Let your arms dangle close to the floor.   Breathe in slowly and deeply as you return to a standing position. Roll up slowly and lift your head last.  Hold your breath for just a few seconds in the standing position. Breathe out slowly and bend forward from the waist.  Try yoga or zaid chi. These techniques combine exercise and meditation. You may need some training at first to learn them. What can you do to prevent stress? Manage your time. This helps you find time to do the things you want and need to do. Get enough sleep. Your body recovers from the stresses of the day while you are sleeping. Get support. Your family, friends, and community can make a difference in how you experience stress. Where can you learn more? Go to http://www.jesus.com/ and enter N032 to learn more about \"Learning About Stress. \"  Current as of: October 6, 2021               Content Version: 13.5  © 1518-0911 [employer]. Care instructions adapted under license by [address] [place] [address]. If you have questions about a medical condition or this instruction, always ask your healthcare professional. [place] [address] any warranty or liability for your use of this information. Learning About Managing Anger  What causes anger? Many things can cause anger: Stress at work or at home. Social situations that make you angry. A response to everyday events. Anger signals your body to prepare for a fight. This reaction is often called \"fight or flight. \" When you get angry, adrenaline and other hormones are released into your blood. Then your blood pressure goes up, your heart beats faster, and you breathe faster. When you express anger in a healthy way, it can inspire change and make you productive. But if you don't have the skills to express anger in a healthy way, anger can build up. You may hurt others--and yourself--emotionally and even physically. Violent behavior often starts with verbal threats or fairly minor incidents.  But over time, it can involve physical harm. It can include physical, verbal, or sexual abuse of an intimate partner (domestic violence), a child (child abuse), or an older adult (elder abuse). It can also make you sick. Anger and constant hostility keep your blood pressure high. They increase your chances of having another health problem, such as depression, a heart attack, or a stroke. Some people with post-traumatic stress disorder (PTSD) feel angry and on alert all the time. It may feel like there are no other ways to react when you are angry. But when you learn to work with anger in appropriate and healthy ways, your anger no longer controls you. How can you manage your anger? The first step to managing anger is to be more aware of it. Note the thoughts, feelings, and emotions that you have when you get angry. Practice noticing these signs of anger when you are calm. If you are more aware of the signs of anger, you can take steps to manage it. Here are a few tips: Think before you act. Take time to stop and cool down when you feel yourself getting angry. Count to 10 while you take slow, steady breaths. Practice some other form of mental relaxation. Learn the feelings that lead to angry outbursts. Anger and hostility may be a symptom of unhappy feelings or depression about your job, your relationship, or other aspects of your personal life. Avoid situations that lead to angry outbursts. If standing in line bothers you, do errands at less busy times. Express anger in a healthy way. You might:  Go for a short walk or jog. Draw, paint, or listen to music to release the anger. Write in a daily journal.  Use \"I\" statements, not \"you\" statements, to discuss your anger. Say \"I don't feel valued when my needs are not being met\" instead of \"You make me mad when you are so inconsiderate. \"  Take care of yourself. Exercise regularly. Eat a variety of healthy foods. Don't skip meals. Try to get 8 hours of sleep each night.   Limit your use of alcohol, and don't use drugs. Practice yoga, meditation, or zaid chi to relax. Explore other resources that may be available through your job or your community. Contact your human resources department at work. You might be able to get services through an employee assistance program.  Contact your local hospital, mental health facility, or health department. Ask what types of programs or support groups are available in your area. Do not keep guns in your home. If you must have guns in your home, unload them and lock them up. Lock ammunition in a separate place. Keep guns away from children. Where can you find help? If anger or stress starts to harm your work or personal relationships, you might seek help. You can learn ways to manage your feelings and actions. Talk to someone you trust, or find a counselor. There are groups in your area that can connect you with people to talk to. [facility] Treatment Services . This service from the national Substance Abuse and [employer]  can help you find local counselors. Search online at [employer]. samhsa.gov or call 6-788-841-HELP ([phone]), or [employer] 3-417.950.2024. Parents Anonymous. Self-help groups that serve parents under stress, as well as children who have been abused, are available throughout the [place], [place] ([facility]), and [facility]. To find a group in your area, search online or in your phone book under Parents Anonymous or call (114) 668-4368. Where can you learn more? Go to http://www.jesus.com/ and enter Z357 to learn more about \"Learning About Managing Anger. \"  Current as of: February 9, 2022               Content Version: 13.5  © 9977-2459 Healthwise, Incorporated. Care instructions adapted under license by [facility] ([facility]).  If you have questions about a medical condition or this instruction, always ask your healthcare professional. Mara Balbuena disclaims any warranty or liability for your use of this information. Learning About Being Active as an Older Adult  Why is being active important as you get older? Being active is one of the best things you can do for your health. And it's never too late to start. Being active--or getting active, if you aren't already--has definite benefits. It can:  Give you more energy,  Keep your mind sharp. Improve balance to reduce your risk of falls. Help you manage chronic illness with fewer medicines. No matter how old you are, how fit you are, or what health problems you have, there is a form of activity that will work for you. And the more physical activity you can do, the better your overall health will be. What kinds of activity can help you stay healthy? Being more active will make your daily activities easier. Physical activity includes planned exercise and things you do in daily life. There are four types of activity:  Aerobic. Doing aerobic activity makes your heart and lungs strong. Includes walking, dancing, and gardening. Aim for at least 2½ hours spread throughout the week. It improves your energy and can help you sleep better. Muscle-strengthening. This type of activity can help maintain muscle and strengthen bones. Includes climbing stairs, using resistance bands, and lifting or carrying heavy loads. Aim for at least twice a week. It can help protect the knees and other joints. Stretching. Stretching gives you better range of motion in joints and muscles. Includes upper arm stretches, calf stretches, and gentle yoga. Aim for at least twice a week, preferably after your muscles are warmed up from other activities. It can help you function better in daily life. Balancing. This helps you stay coordinated and have good posture. Includes heel-to-toe walking, zaid chi, and certain types of yoga. Aim for at least 3 days a week. It can reduce your risk of falling.   Even if you have a hard time meeting the recommendations, it's better to be more active than less active. All activity done in each category counts toward your weekly total. You'd be surprised how daily things like carrying groceries, keeping up with grandchildren, and taking the stairs can add up. What keeps you from being active? If you've had a hard time being more active, you're not alone. Maybe you remember being able to do more. Or maybe you've never thought of yourself as being active. It's frustrating when you can't do the things you want. Being more active can help. What's holding you back? Getting started. Have a goal, but break it into easy tasks. Small steps build into big accomplishments. Staying motivated. If you feel like skipping your activity, remember your goal. Maybe you want to move better and stay independent. Every activity gets you one step closer. Not feeling your best.  Start with 5 minutes of an activity you enjoy. Prove to yourself you can do it. As you get comfortable, increase your time. You may not be where you want to be. But you're in the process of getting there. Everyone starts somewhere. How can you find safe ways to stay active? Talk with your doctor about any physical challenges you're facing. Make a plan with your doctor if you have a health problem or aren't sure how to get started with activity. If you're already active, ask your doctor if there is anything you should change to stay safe as your body and health change. If you tend to feel dizzy after you take medicine, avoid activity at that time. Try being active before you take your medicine. This will reduce your risk of falls. If you plan to be active at home, make sure to clear your space before you get started. Remove things like TV cords, coffee tables, and throw rugs. It's safest to have plenty of space to move freely. The key to getting more active is to take it slow and steady. Try to improve only a little bit at a time.  Pick just one area to improve on at first. And if an activity hurts, stop and talk to your doctor. Where can you learn more? Go to http://www.jesus.com/ and enter P600 to learn more about \"Learning About Being Active as an Older Adult. \"  Current as of: October 10, 2022               Content Version: 13.5  © 2006-2022 [employer]. Care instructions adapted under license by [facility] ([facility]). If you have questions about a medical condition or this instruction, always ask your healthcare professional. [place] [address] any warranty or liability for your use of this information. Learning About Dental Care for Older Adults  Dental care for older adults: Overview  Dental care for older people is much the same as for younger adults. But older adults do have concerns that younger adults do not. Older adults may have problems with gum disease and decay on the roots of their teeth. They may need missing teeth replaced or broken fillings fixed. Or they may have dentures that need to be cared for. Some older adults may have trouble holding a toothbrush. You can help remind the person you are caring for to brush and floss their teeth or to clean their dentures. In some cases, you may need to do the brushing and other dental care tasks. People who have trouble using their hands or who have dementia may need this extra help. How can you help with dental care? Normal dental care  To keep the teeth and gums healthy:  Brush the teeth with fluoride toothpaste twice a day--in the morning and at night--and floss at least once a day. Plaque can quickly build up on the teeth of older adults. Watch for the signs of gum disease. These signs include gums that bleed after brushing or after eating hard foods, such as apples. See a dentist regularly. Many experts recommend checkups every 6 months. Keep the dentist up to date on any new medications the person is taking.   Encourage a balanced diet that includes whole grains, vegetables, and fruits, and that is low in saturated fat and sodium. Encourage the person you're caring for not to use tobacco products. They can affect dental and general health. Many older adults have a fixed income and feel that they can't afford dental care. But most towns and cities have programs in which dentists help older adults by lowering fees. Contact your area's public health offices or  for information about dental care in your area. Using a toothbrush  Older adults with arthritis sometimes have trouble brushing their teeth because they can't easily hold the toothbrush. Their hands and fingers may be stiff, painful, or weak. If this is the case, you can: Offer an electric toothbrush. Enlarge the handle of a non-electric toothbrush by wrapping a sponge, an elastic bandage, or adhesive tape around it. Push the toothbrush handle through a ball made of rubber or soft foam.  Make the handle longer and thicker by taping Popsicle sticks or tongue depressors to it. You may also be able to buy special toothbrushes, toothpaste dispensers, and floss holders. Your doctor may recommend a soft-bristle toothbrush if the person you care for bleeds easily. Bleeding can happen because of a health problem or from certain medicines. A toothpaste for sensitive teeth may help if the person you care for has sensitive teeth. How do you brush and floss someone's teeth? If the person you are caring for has a hard time cleaning their teeth on their own, you may need to brush and floss their teeth for them. It may be easiest to have the person sit and face away from you, and to sit or stand behind them. That way you can steady their head against your arm as you reach around to floss and brush their teeth. Choose a place that has good lighting and is comfortable for both of you. Before you begin, gather your supplies.  You will need gloves, floss, a toothbrush, and a container to hold water if you are not near a sink. Wash and dry your hands well and put on gloves. Start by flossing:  Gently work a piece of floss between each of the teeth toward the gums. A plastic flossing tool may make this easier, and they are available at most [facility]. Curve the floss around each tooth into a U-shape and gently slide it under the gum line. Move the floss firmly up and down several times to scrape off the plaque. After you've finished flossing, throw away the used floss and begin brushing:  Wet the brush and apply toothpaste. Place the brush at a 45-degree angle where the teeth meet the gums. Press firmly, and move the brush in small circles over the surface of the teeth. Be careful not to brush too hard. Vigorous brushing can make the gums pull away from the teeth and can scratch the tooth enamel. Brush all surfaces of the teeth, on the tongue side and on the cheek side. Pay special attention to the front teeth and all surfaces of the back teeth. Brush chewing surfaces with short back-and-forth strokes. After you've finished, help the person rinse the remaining toothpaste from their mouth. Where can you learn more? Go to http://www.woods.com/ and enter F944 to learn more about \"Learning About Dental Care for Older Adults. \"  Current as of: June 16, 2022               Content Version: 13.5  © 2006-2022 Healthwise, Incorporated. Care instructions adapted under license by [facility] ([facility]). If you have questions about a medical condition or this instruction, always ask your healthcare professional. [place] [address] any warranty or liability for your use of this information. Hearing Loss: Care Instructions  Overview     Hearing loss is a sudden or slow decrease in how well you hear. It can range from mild to severe. Permanent hearing loss can occur with aging. It also can happen when you are exposed long-term to loud noise.  Examples include listening to loud music, riding motorcycles, or being around other loud machines. Hearing loss can affect your work and home life. It can make you feel lonely or depressed. You may feel that you have lost your independence. But hearing aids and other devices can help you hear better and feel connected to others. Follow-up care is a key part of your treatment and safety. Be sure to make and go to all appointments, and call your doctor if you are having problems. It's also a good idea to know your test results and keep a list of the medicines you take. How can you care for yourself at home? Avoid loud noises whenever possible. This helps keep your hearing from getting worse. Always wear hearing protection around loud noises. Wear a hearing aid as directed. See a professional who can help you pick a hearing aid that fits you. Have hearing tests as your doctor suggests. They can show whether your hearing has changed. Your hearing aid may need to be adjusted. Use other devices as needed. These may include:  Telephone amplifiers and hearing aids that can connect to a television, stereo, radio, or microphone. Devices that use lights or vibrations. These alert you to the doorbell, a ringing telephone, or a baby monitor. Television closed-captioning. This shows the words at the bottom of the screen. Most new TVs can do this. TTY (text telephone). This lets you type messages back and forth on the telephone instead of talking or listening. These devices are also called TDD. When messages are typed on the keyboard, they are sent over the phone line to a receiving TTY. The message is shown on a monitor. Use text messaging, social media, and email if it is hard for you to communicate by telephone. Try to learn a listening technique called speechreading. It is not lipreading. You pay attention to people's gestures, expressions, posture, and tone of voice. These clues can help you understand what a person is saying.  Face the person you are talking to, and have them face you. Make sure the lighting is good. You need to see the other person's face clearly. Think about counseling if you need help to adjust to your hearing loss. When should you call for help? Watch closely for changes in your health, and be sure to contact your doctor if:    You think your hearing is getting worse.     You have new symptoms, such as dizziness or nausea. Where can you learn more? Go to http://www.jesus.com/ and enter R798 to learn more about \"Hearing Loss: Care Instructions. \"  Current as of: May 4, 2022               Content Version: 13.5  © 8461-0454 [employer]. Care instructions adapted under license by [facility] ([facility]). If you have questions about a medical condition or this instruction, always ask your healthcare professional. [address] any warranty or liability for your use of this information. Learning About Vision Tests  What are vision tests? The four most common vision tests are visual acuity tests, refraction, visual field tests, and color vision tests. Visual acuity (sharpness) tests  These tests are used: To see if you need glasses or contact lenses. To monitor an eye problem. To check an eye injury. Visual acuity tests are done as part of routine exams. You may also have this test when you get your 's license or apply for some types of jobs. Visual field tests  These tests are used: To check for vision loss in any area of your range of vision. To screen for certain eye diseases. To look for nerve damage after a stroke, head injury, or other problem that could reduce blood flow to the brain. Refraction and color tests  A refraction test is done to find the right prescription for glasses and contact lenses. A color vision test is done to check for color blindness.   Color vision is often tested as part of a routine exam. You may also have this test when you apply for a job where recognizing different colors is important, such as , electronics, or the [employer]. How are vision tests done? Visual acuity test   You cover one eye at a time. You read aloud from a wall chart across the room. You read aloud from a small card that you hold in your hand. Refraction   You look into a special device. The device puts lenses of different strengths in front of each eye to see how strong your glasses or contact lenses need to be. Visual field tests   Your doctor may have you look through special machines. Or your doctor may simply have you stare straight ahead while they move a finger into and out of your field of vision. Color vision test   You look at pieces of printed test patterns in various colors. You say what number or symbol you see. Your doctor may have you trace the number or symbol using a pointer. How do these tests feel? There is very little chance of having a problem from this test. If dilating drops are used for a vision test, they may make the eyes sting and cause a medicine taste in the mouth. Follow-up care is a key part of your treatment and safety. Be sure to make and go to all appointments, and call your doctor if you are having problems. It's also a good idea to know your test results and keep a list of the medicines you take. Where can you learn more? Go to http://www.jesus.com/ and enter G551 to learn more about \"Learning About Vision Tests. \"  Current as of: October 12, 2022               Content Version: 13.5  © 2006-2022 [employer], [facility]. Care instructions adapted under license by [facility] ([facility]). If you have questions about a medical condition or this instruction, always ask your healthcare professional. [place] [address] any warranty or liability for your use of this information. Learning About Activities of Daily Living  What are activities of daily living?      Activities of daily living (ADLs) are the basic self-care tasks you do every day. As you age, and if you have health problems, you may find that it's harder to do these things for yourself. That's when you may need some help. Your doctor uses ADLs to measure how much help you need. Knowing what you can and can't do for yourself is an important first step to getting help. And when you have the help you need, you can stay as independent as possible. Your doctor will want to know if you are able to do tasks such as: Take a bath or shower without help. Go to the bathroom by yourself. Dress and undress without help. Shave, comb your hair, and brush teeth on your own. Get in and out of bed or a chair without help. Feed yourself without help. If you are having trouble doing basic self-care tasks, talk with your doctor. You may want to bring a caregiver or family member who can help the doctor understand your needs and abilities. How will a doctor assess your ADLs? Asking about ADLs is part of a routine health checkup your doctor will likely do as you age. Your health check might be done in a doctor's office, in your home, or at a hospital. The goal is to find out if you are having any problems that could make your health problems worse or that make it unsafe for you to be on your own. To measure your ADLs, your doctor will ask how hard it is for you to do routine tasks. He or she may also want to know if you have changed the way you do a task because of a health problem. He or she may watch how you:  Walk back and forth. Keep your balance while you stand or walk. Move from sitting to standing or from a bed to a chair. Button or unbutton a shirt or sweater. Remove and put on your shoes. It's normal to feel a little worried or anxious if you find you can't do all the things you used to be able to do. Talking with your doctor about ADLs isn't a test that you either pass or fail.  It's just a way to get more information about your health and safety. Follow-up care is a key part of your treatment and safety. Be sure to make and go to all appointments, and call your doctor if you are having problems. It's also a good idea to know your test results and keep a list of the medicines you take. Current as of: October 6, 2021               Content Version: 13.5  © 2006-2022 [employer]. Care instructions adapted under license by [facility] ([facility]). If you have questions about a medical condition or this instruction, always ask your healthcare professional. [address] any warranty or liability for your use of this information. Advance Directives: Care Instructions  Overview  An advance directive is a legal way to state your wishes at the end of your life. It tells your family and your doctor what to do if you can't say what you want. There are two main types of advance directives. You can change them any time your wishes change. Living will. This form tells your family and your doctor your wishes about life support and other treatment. The form is also called a declaration. Medical power of . This form lets you name a person to make treatment decisions for you when you can't speak for yourself. This person is called a health care agent (health care proxy, health care surrogate). The form is also called a durable power of  for health care. If you do not have an advance directive, decisions about your medical care may be made by a family member, or by a doctor or a  who doesn't know you. It may help to think of an advance directive as a gift to the people who care for you. If you have one, they won't have to make tough decisions by themselves. For more information, including forms for your state, see the [address] website (www.caringinfo.org/planning/advance-directives/). Follow-up care is a key part of your treatment and safety.  Be sure to make and go to all appointments, and call your doctor if you are having problems. It's also a good idea to know your test results and keep a list of the medicines you take. What should you include in an advance directive? Many states have a unique advance directive form. (It may ask you to address specific issues.) Or you might use a universal form that's approved by many states. If your form doesn't tell you what to address, it may be hard to know what to include in your advance directive. Use the questions below to help you get started. Who do you want to make decisions about your medical care if you are not able to? What life-support measures do you want if you have a serious illness that gets worse over time or can't be cured? What are you most afraid of that might happen? (Maybe you're afraid of having pain, losing your independence, or being kept alive by machines.)  Where would you prefer to die? (Your home? A hospital? A nursing home?)  Do you want to donate your organs when you die? Do you want certain Evangelical practices performed before you die? When should you call for help? Be sure to contact your doctor if you have any questions. Where can you learn more? Go to http://www.jesus.com/ and enter R264 to learn more about \"Advance Directives: Care Instructions. \"  Current as of: June 16, 2022               Content Version: 13.5  © 9985-6879 [employer]. Care instructions adapted under license by [facility] ([facility]). If you have questions about a medical condition or this instruction, always ask your healthcare professional. [place] [address] any warranty or liability for your use of this information. Personalized Preventive Plan for Amanda Sher - 12/15/2022  Medicare offers a range of preventive health benefits. Some of the tests and screenings are paid in full while other may be subject to a deductible, co-insurance, and/or copay.     Some of these benefits include a comprehensive review of your medical history including lifestyle, illnesses that may run in your family, and various assessments and screenings as appropriate. After reviewing your medical record and screening and assessments performed today your provider may have ordered immunizations, labs, imaging, and/or referrals for you. A list of these orders (if applicable) as well as your Preventive Care list are included within your After Visit Summary for your review. Other Preventive Recommendations:    A preventive eye exam performed by an eye specialist is recommended every 1-2 years to screen for glaucoma; cataracts, macular degeneration, and other eye disorders. A preventive dental visit is recommended every 6 months. Try to get at least 150 minutes of exercise per week or 10,000 steps per day on a pedometer . Order or download the FREE \"Exercise & Physical Activity: Your Everyday Guide\" from [employer]. Call 5-408.281.5159 or search [employer] online. You need 7200-7379 mg of calcium and 4470-7618 IU of vitamin D per day. It is possible to meet your calcium requirement with diet alone, but a vitamin D supplement is usually necessary to meet this goal.  When exposed to the sun, use a sunscreen that protects against both UVA and UVB radiation with an SPF of 30 or greater. Reapply every 2 to 3 hours or after sweating, drying off with a towel, or swimming. Always wear a seat belt when traveling in a car. Always wear a helmet when riding a bicycle or motorcycle.

## 2022-12-29 DIAGNOSIS — J44.9 CHRONIC OBSTRUCTIVE PULMONARY DISEASE, UNSPECIFIED COPD TYPE (HCC): ICD-10-CM

## 2022-12-29 DIAGNOSIS — R35.1 BENIGN PROSTATIC HYPERPLASIA WITH NOCTURIA: ICD-10-CM

## 2022-12-29 DIAGNOSIS — N40.1 BENIGN PROSTATIC HYPERPLASIA WITH NOCTURIA: ICD-10-CM

## 2022-12-29 DIAGNOSIS — F32.A DEPRESSION, UNSPECIFIED DEPRESSION TYPE: ICD-10-CM

## 2022-12-29 RX ORDER — BUPROPION HYDROCHLORIDE 150 MG/1
TABLET, EXTENDED RELEASE ORAL
Qty: 180 TABLET | Refills: 0 | Status: SHIPPED | OUTPATIENT
Start: 2022-12-29

## 2022-12-29 RX ORDER — TAMSULOSIN HYDROCHLORIDE 0.4 MG/1
CAPSULE ORAL
Qty: 90 CAPSULE | Refills: 0 | Status: SHIPPED | OUTPATIENT
Start: 2022-12-29

## 2022-12-29 RX ORDER — POTASSIUM CHLORIDE 750 MG/1
TABLET, EXTENDED RELEASE ORAL
Qty: 180 TABLET | Refills: 0 | Status: SHIPPED | OUTPATIENT
Start: 2022-12-29

## 2022-12-29 NOTE — TELEPHONE ENCOUNTER
Requested Prescriptions     Pending Prescriptions Disp Refills    buPROPion (WELLBUTRIN SR) 150 MG extended release tablet [Pharmacy Med Name: Bupropion Hydrochloride 150mg Extended-Release (SR) Tablet] 180 tablet 0     Sig: Take 1 tablet by mouth twice daily.        Next appt is 12/29/2022  Last appt was 12/15/2022

## 2022-12-29 NOTE — TELEPHONE ENCOUNTER
Requested Prescriptions     Pending Prescriptions Disp Refills    tamsulosin (FLOMAX) 0.4 MG capsule [Pharmacy Med Name: Tamsulosin Hydrochloride 0.4mg Capsule] 90 capsule 0     Sig: Take 1 capsule by mouth at bedtime. potassium chloride (KLOR-CON M) 10 MEQ extended release tablet [Pharmacy Med Name: Potassium Chloride 10mEq Extended-Release Tablet] 180 tablet 0     Sig: Take 1 tablet by mouth twice daily.        Next appt is 1/16/2023  Last appt was 12/15/2022

## 2023-01-06 ENCOUNTER — TELEPHONE (OUTPATIENT)
Dept: FAMILY MEDICINE CLINIC | Age: 73
End: 2023-01-06

## 2023-01-06 DIAGNOSIS — M75.42 IMPINGEMENT SYNDROME OF LEFT SHOULDER: ICD-10-CM

## 2023-01-06 DIAGNOSIS — U09.9 LONG COVID: ICD-10-CM

## 2023-01-06 RX ORDER — HYDROCODONE BITARTRATE AND ACETAMINOPHEN 7.5; 325 MG/1; MG/1
1 TABLET ORAL EVERY 8 HOURS PRN
Qty: 90 TABLET | Refills: 0 | Status: SHIPPED | OUTPATIENT
Start: 2023-01-06 | End: 2023-02-05

## 2023-01-06 NOTE — TELEPHONE ENCOUNTER
Patient has been out of his norco for the last two days. Patient usually gets quantitiy of 90 for 30 days but this last appointment on 12/15/22 he was prescribed 30 for 90 days. Patients 1 month appointment is currently scheduled on 1/16/2023 with Dr. Juliocesar Aviles.

## 2023-01-23 ENCOUNTER — OFFICE VISIT (OUTPATIENT)
Dept: FAMILY MEDICINE CLINIC | Age: 73
End: 2023-01-23
Payer: MEDICARE

## 2023-01-23 VITALS
TEMPERATURE: 97.7 F | HEART RATE: 91 BPM | OXYGEN SATURATION: 96 % | SYSTOLIC BLOOD PRESSURE: 110 MMHG | HEIGHT: 69 IN | DIASTOLIC BLOOD PRESSURE: 62 MMHG | RESPIRATION RATE: 16 BRPM | BODY MASS INDEX: 33.71 KG/M2 | WEIGHT: 227.6 LBS

## 2023-01-23 DIAGNOSIS — S93.401A INVERSION SPRAIN OF RIGHT ANKLE, INITIAL ENCOUNTER: Primary | ICD-10-CM

## 2023-01-23 DIAGNOSIS — I73.9: ICD-10-CM

## 2023-01-23 DIAGNOSIS — M75.42 IMPINGEMENT SYNDROME OF LEFT SHOULDER: ICD-10-CM

## 2023-01-23 DIAGNOSIS — J44.9 CHRONIC OBSTRUCTIVE PULMONARY DISEASE, UNSPECIFIED COPD TYPE (HCC): ICD-10-CM

## 2023-01-23 DIAGNOSIS — F11.99 OPIOID USE, UNSPECIFIED WITH UNSPECIFIED OPIOID-INDUCED DISORDER (HCC): ICD-10-CM

## 2023-01-23 DIAGNOSIS — F33.0 MAJOR DEPRESSIVE DISORDER, RECURRENT, MILD (HCC): ICD-10-CM

## 2023-01-23 DIAGNOSIS — C85.91 NON-HODGKIN LYMPHOMA OF LYMPH NODES OF NECK, UNSPECIFIED NON-HODGKIN LYMPHOMA TYPE (HCC): ICD-10-CM

## 2023-01-23 DIAGNOSIS — U09.9 LONG COVID: ICD-10-CM

## 2023-01-23 DIAGNOSIS — N18.31 STAGE 3A CHRONIC KIDNEY DISEASE (HCC): ICD-10-CM

## 2023-01-23 DIAGNOSIS — I25.118 CORONARY ARTERY DISEASE OF NATIVE ARTERY OF NATIVE HEART WITH STABLE ANGINA PECTORIS (HCC): ICD-10-CM

## 2023-01-23 DIAGNOSIS — I50.42 CHRONIC COMBINED SYSTOLIC AND DIASTOLIC CONGESTIVE HEART FAILURE (HCC): ICD-10-CM

## 2023-01-23 PROCEDURE — 1123F ACP DISCUSS/DSCN MKR DOCD: CPT | Performed by: FAMILY MEDICINE

## 2023-01-23 PROCEDURE — 99214 OFFICE O/P EST MOD 30 MIN: CPT | Performed by: FAMILY MEDICINE

## 2023-01-23 RX ORDER — HYDROCODONE BITARTRATE AND ACETAMINOPHEN 7.5; 325 MG/1; MG/1
1 TABLET ORAL EVERY 8 HOURS PRN
Qty: 90 TABLET | Refills: 0 | Status: SHIPPED | OUTPATIENT
Start: 2023-01-23 | End: 2023-02-22

## 2023-01-23 ASSESSMENT — PATIENT HEALTH QUESTIONNAIRE - PHQ9
1. LITTLE INTEREST OR PLEASURE IN DOING THINGS: 0
8. MOVING OR SPEAKING SO SLOWLY THAT OTHER PEOPLE COULD HAVE NOTICED. OR THE OPPOSITE, BEING SO FIGETY OR RESTLESS THAT YOU HAVE BEEN MOVING AROUND A LOT MORE THAN USUAL: 0
10. IF YOU CHECKED OFF ANY PROBLEMS, HOW DIFFICULT HAVE THESE PROBLEMS MADE IT FOR YOU TO DO YOUR WORK, TAKE CARE OF THINGS AT HOME, OR GET ALONG WITH OTHER PEOPLE: 0
SUM OF ALL RESPONSES TO PHQ QUESTIONS 1-9: 0
SUM OF ALL RESPONSES TO PHQ QUESTIONS 1-9: 0
4. FEELING TIRED OR HAVING LITTLE ENERGY: 0
SUM OF ALL RESPONSES TO PHQ QUESTIONS 1-9: 0
7. TROUBLE CONCENTRATING ON THINGS, SUCH AS READING THE NEWSPAPER OR WATCHING TELEVISION: 0
SUM OF ALL RESPONSES TO PHQ QUESTIONS 1-9: 0
SUM OF ALL RESPONSES TO PHQ9 QUESTIONS 1 & 2: 0
9. THOUGHTS THAT YOU WOULD BE BETTER OFF DEAD, OR OF HURTING YOURSELF: 0
2. FEELING DOWN, DEPRESSED OR HOPELESS: 0
3. TROUBLE FALLING OR STAYING ASLEEP: 0
5. POOR APPETITE OR OVEREATING: 0
6. FEELING BAD ABOUT YOURSELF - OR THAT YOU ARE A FAILURE OR HAVE LET YOURSELF OR YOUR FAMILY DOWN: 0

## 2023-01-23 ASSESSMENT — ENCOUNTER SYMPTOMS
SHORTNESS OF BREATH: 0
NAUSEA: 0
WHEEZING: 0
BLOOD IN STOOL: 0
VOMITING: 0
COUGH: 0
CONSTIPATION: 0
DIARRHEA: 0
ABDOMINAL PAIN: 0

## 2023-01-23 NOTE — PROGRESS NOTES
Franklin Caal (:  1950) is a 67 y.o. male,Established patient, here for evaluation of the following chief complaint(s):  Shoulder Pain (Med Refill)         ASSESSMENT/PLAN:  1. Inversion sprain of right ankle, initial encounter  -     Amb External Referral To Podiatry     3. Impingement syndrome of left shoulder  -     HYDROcodone-acetaminophen (NORCO) 7.5-325 MG per tablet; Take 1 tablet by mouth every 8 hours as needed for Pain for up to 30 days. Max Daily Amount: 3 tablets, Disp-90 tablet, R-0Normal  4. Non-Hodgkin lymphoma of lymph nodes of neck, unspecified non-Hodgkin lymphoma type ([facility] [address] Seeing Oncology- 10:00 AM today  5. Chronic obstructive pulmonary disease, unspecified COPD type ([facility] [address]  6. Chronic combined systolic and diastolic congestive heart failure (HCC) stopped smoking medication mnagement stationary bicycle  7. Opioid use, unspecified with unspecified opioid-induced disorder ([facility] [address]  8. Major depressive disorder, recurrent, mild (HCC) Exercise  9. Gluteal claudication ([facility] [address]  10. Coronary artery disease of native artery of native heart with stable angina pectoris (HCC) Statin  11. Stage 3a chronic kidney disease (HCC) Avoidance NSAIDS    Controlled substances monitoring: no signs of potential drug abuse or diversion identified and OARRS report reviewed today- activity consistent with treatment plan. No follow-ups on file. Subjective   SUBJECTIVE/OBJECTIVE:  Shoulder pain  SOB on exertion        Review of Systems   Constitutional:  Negative for chills, diaphoresis and fever. HENT:  Negative for ear discharge, ear pain, hearing loss, nosebleeds and tinnitus. Respiratory:  Negative for cough, shortness of breath and wheezing. Cardiovascular:  Negative for chest pain. Gastrointestinal:  Negative for abdominal pain, blood in stool, constipation, diarrhea, nausea and vomiting. Genitourinary:  Negative for dysuria, flank pain and hematuria.    Musculoskeletal:  Negative for myalgias. Skin:  Negative for rash. Neurological:  Negative for headaches. Hematological:  Does not bruise/bleed easily. Psychiatric/Behavioral:  Negative for hallucinations and suicidal ideas. Objective   /62   Pulse 91   Temp 97.7 °F (36.5 °C)   Resp 16   Ht 5' 9\" (1.753 m)   Wt 227 lb 9.6 oz (103.2 kg)   SpO2 96%   BMI 33.61 kg/m²   Lab Results   Component Value Date    LABA1C 5.6 07/31/2018    LABA1C 6.0 04/26/2018     Physical Exam  HENT:      Nose: Nose normal.   Eyes:      General:         Right eye: No discharge. Left eye: No discharge. Cardiovascular:      Rate and Rhythm: Normal rate and regular rhythm. Heart sounds: No murmur heard. Pulmonary:      Effort: No respiratory distress. Breath sounds: No rhonchi or rales. Abdominal:      Palpations: Abdomen is soft. Tenderness: There is no abdominal tenderness. Musculoskeletal:      Cervical back: No rigidity. Comments: Pain decreased ROM Right shoulder, ABduction 35 degreea   Skin:     General: Skin is warm. Capillary Refill: Capillary refill takes less than 2 seconds. Coloration: Skin is not pale. Findings: No bruising. Neurological:      Mental Status: He is alert. Psychiatric:         Mood and Affect: Mood normal.          On this date 1/23/2023 I have spent 31 minutes reviewing previous notes, test results and face to face with the patient discussing the diagnosis and importance of compliance with the treatment plan as well as documenting on the day of the visit. An electronic signature was used to authenticate this note.     --Sharon Desouza,

## 2023-01-28 DIAGNOSIS — F43.21 GRIEF: ICD-10-CM

## 2023-01-28 DIAGNOSIS — F32.89 OTHER DEPRESSION: ICD-10-CM

## 2023-01-30 RX ORDER — HYDRALAZINE HYDROCHLORIDE 25 MG/1
TABLET, FILM COATED ORAL
Qty: 90 TABLET | Refills: 1 | Status: SHIPPED | OUTPATIENT
Start: 2023-01-30

## 2023-01-30 RX ORDER — SERTRALINE HYDROCHLORIDE 100 MG/1
100 TABLET, FILM COATED ORAL DAILY
Qty: 90 TABLET | Refills: 0 | Status: SHIPPED | OUTPATIENT
Start: 2023-01-30

## 2023-02-23 ENCOUNTER — OFFICE VISIT (OUTPATIENT)
Dept: FAMILY MEDICINE CLINIC | Age: 73
End: 2023-02-23

## 2023-02-23 VITALS
RESPIRATION RATE: 16 BRPM | HEART RATE: 79 BPM | DIASTOLIC BLOOD PRESSURE: 62 MMHG | WEIGHT: 226.6 LBS | HEIGHT: 69 IN | OXYGEN SATURATION: 91 % | BODY MASS INDEX: 33.56 KG/M2 | SYSTOLIC BLOOD PRESSURE: 118 MMHG

## 2023-02-23 DIAGNOSIS — U09.9 LONG COVID: ICD-10-CM

## 2023-02-23 DIAGNOSIS — M75.42 IMPINGEMENT SYNDROME OF LEFT SHOULDER: ICD-10-CM

## 2023-02-23 DIAGNOSIS — M54.50 BACK PAIN AT L4-L5 LEVEL: Primary | ICD-10-CM

## 2023-02-23 PROBLEM — F01.53 VASCULAR DEMENTIA WITH DEPRESSED MOOD (HCC): Status: RESOLVED | Noted: 2020-08-05 | Resolved: 2023-02-23

## 2023-02-23 PROBLEM — F01.518 VASCULAR DEMENTIA WITH BEHAVIORAL DISTURBANCE (HCC): Status: RESOLVED | Noted: 2022-02-24 | Resolved: 2023-02-23

## 2023-02-23 RX ORDER — HYDROCODONE BITARTRATE AND ACETAMINOPHEN 7.5; 325 MG/1; MG/1
1 TABLET ORAL EVERY 8 HOURS PRN
Qty: 90 TABLET | Refills: 0 | Status: SHIPPED | OUTPATIENT
Start: 2023-02-23 | End: 2023-03-25

## 2023-02-23 SDOH — ECONOMIC STABILITY: HOUSING INSECURITY
IN THE LAST 12 MONTHS, WAS THERE A TIME WHEN YOU DID NOT HAVE A STEADY PLACE TO SLEEP OR SLEPT IN A SHELTER (INCLUDING NOW)?: NO

## 2023-02-23 SDOH — ECONOMIC STABILITY: FOOD INSECURITY: WITHIN THE PAST 12 MONTHS, YOU WORRIED THAT YOUR FOOD WOULD RUN OUT BEFORE YOU GOT MONEY TO BUY MORE.: NEVER TRUE

## 2023-02-23 SDOH — ECONOMIC STABILITY: FOOD INSECURITY: WITHIN THE PAST 12 MONTHS, THE FOOD YOU BOUGHT JUST DIDN'T LAST AND YOU DIDN'T HAVE MONEY TO GET MORE.: NEVER TRUE

## 2023-02-23 SDOH — ECONOMIC STABILITY: INCOME INSECURITY: HOW HARD IS IT FOR YOU TO PAY FOR THE VERY BASICS LIKE FOOD, HOUSING, MEDICAL CARE, AND HEATING?: NOT HARD AT ALL

## 2023-02-23 ASSESSMENT — PATIENT HEALTH QUESTIONNAIRE - PHQ9
SUM OF ALL RESPONSES TO PHQ QUESTIONS 1-9: 7
SUM OF ALL RESPONSES TO PHQ9 QUESTIONS 1 & 2: 1
SUM OF ALL RESPONSES TO PHQ9 QUESTIONS 1 & 2: 1
SUM OF ALL RESPONSES TO PHQ QUESTIONS 1-9: 1
3. TROUBLE FALLING OR STAYING ASLEEP: 1
6. FEELING BAD ABOUT YOURSELF - OR THAT YOU ARE A FAILURE OR HAVE LET YOURSELF OR YOUR FAMILY DOWN: 0
1. LITTLE INTEREST OR PLEASURE IN DOING THINGS: 0
SUM OF ALL RESPONSES TO PHQ QUESTIONS 1-9: 7
2. FEELING DOWN, DEPRESSED OR HOPELESS: 1
5. POOR APPETITE OR OVEREATING: 2
1. LITTLE INTEREST OR PLEASURE IN DOING THINGS: 0
2. FEELING DOWN, DEPRESSED OR HOPELESS: 1
9. THOUGHTS THAT YOU WOULD BE BETTER OFF DEAD, OR OF HURTING YOURSELF: 0
SUM OF ALL RESPONSES TO PHQ QUESTIONS 1-9: 7
8. MOVING OR SPEAKING SO SLOWLY THAT OTHER PEOPLE COULD HAVE NOTICED. OR THE OPPOSITE, BEING SO FIGETY OR RESTLESS THAT YOU HAVE BEEN MOVING AROUND A LOT MORE THAN USUAL: 0
10. IF YOU CHECKED OFF ANY PROBLEMS, HOW DIFFICULT HAVE THESE PROBLEMS MADE IT FOR YOU TO DO YOUR WORK, TAKE CARE OF THINGS AT HOME, OR GET ALONG WITH OTHER PEOPLE: 1
SUM OF ALL RESPONSES TO PHQ QUESTIONS 1-9: 1
SUM OF ALL RESPONSES TO PHQ QUESTIONS 1-9: 1
SUM OF ALL RESPONSES TO PHQ QUESTIONS 1-9: 7
4. FEELING TIRED OR HAVING LITTLE ENERGY: 3
SUM OF ALL RESPONSES TO PHQ QUESTIONS 1-9: 1
7. TROUBLE CONCENTRATING ON THINGS, SUCH AS READING THE NEWSPAPER OR WATCHING TELEVISION: 0

## 2023-02-23 ASSESSMENT — ENCOUNTER SYMPTOMS: BACK PAIN: 1

## 2023-02-23 NOTE — PROGRESS NOTES
Cardene 10 mg placed in . 9NS 1000ml  sheath bag labeled Yellow Krista Alcocer (:  1950) is a 67 y.o. male,Established patient, here for evaluation of the following chief complaint(s):  Pain (Med Refill) and Respiratory Distress  SOB with pulswe Ox at 89 % on rising today and most days. He does wear 02 during sleep. ASSESSMENT/PLAN:  1. Back pain at L4-L5 level  2. Long COVID  -     HYDROcodone-acetaminophen (NORCO) 7.5-325 MG per tablet; Take 1 tablet by mouth every 8 hours as needed for Pain for up to 30 days. Max Daily Amount: 3 tablets, Disp-90 tablet, R-0Normal  3. Impingement syndrome of left shoulder  -     HYDROcodone-acetaminophen (NORCO) 7.5-325 MG per tablet; Take 1 tablet by mouth every 8 hours as needed for Pain for up to 30 days. Max Daily Amount: 3 tablets, Disp-90 tablet, R-0Normal      No follow-ups on file. Subjective   SUBJECTIVE/OBJECTIVE:  Pain (Med Refill)        Review of Systems   Cardiovascular:  Negative for chest pain. Musculoskeletal:  Positive for arthralgias (Right shoulder,), back pain and neck pain. Neurological:  Negative for dizziness. Objective   /62   Pulse 79   Resp 16   Ht 5' 9\" (1.753 m)   Wt 226 lb 9.6 oz (102.8 kg)   SpO2 91%   BMI 33.46 kg/m²   Lab Results   Component Value Date    LABA1C 5.6 2018    LABA1C 6.0 2018     Physical Exam  HENT:      Nose: Nose normal.   Eyes:      General:         Right eye: No discharge. Left eye: No discharge. Cardiovascular:      Rate and Rhythm: Normal rate and regular rhythm. Heart sounds: No murmur heard. Pulmonary:      Effort: No respiratory distress. Breath sounds: Rhonchi present. No rales. Abdominal:      Tenderness: There is no abdominal tenderness. Musculoskeletal:      Cervical back: No rigidity. Comments: Pain Right shoulder decreased ROM   Skin:     General: Skin is warm. Capillary Refill: Capillary refill takes less than 2 seconds. Coloration: Skin is not pale.       Findings: No bruising. Neurological:      Mental Status: He is alert. Psychiatric:         Mood and Affect: Mood normal.          On this date 2/23/2023 I have spent 25 minutes reviewing previous notes, test results and face to face with the patient discussing the diagnosis and importance of compliance with the treatment plan as well as documenting on the day of the visit. An electronic signature was used to authenticate this note.     --Brittney Hardy, DO

## 2023-02-27 DIAGNOSIS — J45.909 MODERATE ASTHMA WITHOUT COMPLICATION, UNSPECIFIED WHETHER PERSISTENT: ICD-10-CM

## 2023-02-27 DIAGNOSIS — E78.2 MIXED HYPERLIPIDEMIA: ICD-10-CM

## 2023-02-27 DIAGNOSIS — I25.110 CORONARY ARTERY DISEASE INVOLVING NATIVE CORONARY ARTERY OF NATIVE HEART WITH UNSTABLE ANGINA PECTORIS (HCC): ICD-10-CM

## 2023-02-27 RX ORDER — HYDRALAZINE HYDROCHLORIDE 25 MG/1
TABLET, FILM COATED ORAL
Qty: 360 TABLET | Refills: 3 | Status: SHIPPED | OUTPATIENT
Start: 2023-02-27

## 2023-02-27 RX ORDER — MONTELUKAST SODIUM 10 MG/1
TABLET ORAL
Qty: 90 TABLET | Refills: 0 | Status: SHIPPED | OUTPATIENT
Start: 2023-02-27

## 2023-02-27 RX ORDER — FENOFIBRATE 145 MG/1
TABLET, COATED ORAL
Qty: 90 TABLET | Refills: 0 | Status: SHIPPED | OUTPATIENT
Start: 2023-02-27

## 2023-02-27 RX ORDER — CLOPIDOGREL BISULFATE 75 MG/1
75 TABLET ORAL DAILY
Qty: 90 TABLET | Refills: 0 | Status: SHIPPED | OUTPATIENT
Start: 2023-02-27

## 2023-02-27 RX ORDER — SIMVASTATIN 20 MG
TABLET ORAL
Qty: 90 TABLET | Refills: 0 | Status: SHIPPED | OUTPATIENT
Start: 2023-02-27

## 2023-02-27 NOTE — TELEPHONE ENCOUNTER
Requested Prescriptions     Pending Prescriptions Disp Refills    simvastatin (ZOCOR) 20 MG tablet [Pharmacy Med Name: Simvastatin 20mg Tablet] 90 tablet 0     Sig: Take 1 tablet by mouth daily. fenofibrate (TRICOR) 145 MG tablet [Pharmacy Med Name: Fenofibrate 145mg Tablet] 90 tablet 0     Sig: Take 1 tablet by mouth once daily. montelukast (SINGULAIR) 10 MG tablet [Pharmacy Med Name: Montelukast Sodium 10mg Tablet] 90 tablet 0     Sig: Take 1 tablet by mouth once daily. clopidogrel (PLAVIX) 75 MG tablet [Pharmacy Med Name: Clopidogrel 75mg Tablet] 90 tablet 0     Sig: Take 1 tablet by mouth daily.        Next appt is 3/23/2023  Last appt was 2/23/2023

## 2023-03-08 ENCOUNTER — OFFICE VISIT (OUTPATIENT)
Dept: CARDIOLOGY CLINIC | Age: 73
End: 2023-03-08
Payer: MEDICARE

## 2023-03-08 VITALS
DIASTOLIC BLOOD PRESSURE: 70 MMHG | RESPIRATION RATE: 18 BRPM | HEART RATE: 79 BPM | BODY MASS INDEX: 33.65 KG/M2 | WEIGHT: 222 LBS | SYSTOLIC BLOOD PRESSURE: 112 MMHG | HEIGHT: 68 IN

## 2023-03-08 DIAGNOSIS — C85.91 NON-HODGKIN LYMPHOMA OF LYMPH NODES OF NECK, UNSPECIFIED NON-HODGKIN LYMPHOMA TYPE (HCC): ICD-10-CM

## 2023-03-08 DIAGNOSIS — J96.11 CHRONIC HYPOXEMIC RESPIRATORY FAILURE (HCC): ICD-10-CM

## 2023-03-08 DIAGNOSIS — I50.42 CHRONIC COMBINED SYSTOLIC AND DIASTOLIC CONGESTIVE HEART FAILURE (HCC): ICD-10-CM

## 2023-03-08 DIAGNOSIS — I25.5 ISCHEMIC CARDIOMYOPATHY: ICD-10-CM

## 2023-03-08 DIAGNOSIS — E78.00 PURE HYPERCHOLESTEROLEMIA: ICD-10-CM

## 2023-03-08 DIAGNOSIS — G47.33 OBSTRUCTIVE SLEEP APNEA: ICD-10-CM

## 2023-03-08 DIAGNOSIS — J44.9 CHRONIC OBSTRUCTIVE PULMONARY DISEASE, UNSPECIFIED COPD TYPE (HCC): ICD-10-CM

## 2023-03-08 DIAGNOSIS — I25.118 CORONARY ARTERY DISEASE OF NATIVE ARTERY OF NATIVE HEART WITH STABLE ANGINA PECTORIS (HCC): Primary | ICD-10-CM

## 2023-03-08 DIAGNOSIS — N18.31 STAGE 3A CHRONIC KIDNEY DISEASE (HCC): ICD-10-CM

## 2023-03-08 DIAGNOSIS — E66.8 MODERATE OBESITY: ICD-10-CM

## 2023-03-08 PROCEDURE — 93000 ELECTROCARDIOGRAM COMPLETE: CPT | Performed by: INTERNAL MEDICINE

## 2023-03-08 PROCEDURE — 1123F ACP DISCUSS/DSCN MKR DOCD: CPT | Performed by: INTERNAL MEDICINE

## 2023-03-08 PROCEDURE — 99215 OFFICE O/P EST HI 40 MIN: CPT | Performed by: INTERNAL MEDICINE

## 2023-03-08 NOTE — PROGRESS NOTES
OUTPATIENT CARDIOLOGY FOLLOW-UP    Name: Lisa Ambrose    Age: 67 y.o. Primary Care Physician: Mojgan Nye DO    Date of Service: 3/8/2023    Chief Complaint: Coronary atherosclerosis, ischemic cardiomyopathy, chronic combined systolic and diastolic heart failure, chronic obstructive lung disease, chronic hypoxic respiratory failure, chronic kidney disease, non-Hodgkin's lymphoma, moderate obesity, obstructive sleep apnea    Interim History: Since his most recent evaluation, the patient's symptomatic status remains unchanged with persistent symptoms of exertional dyspnea (functional class II-IIi) and no changes of his functional capabilities. He denies interim symptoms of anginal-like chest discomfort or other ischemic equivalents no additional manifestations of overt decompensated left ventricular systolic dysfunction or volume overload. In the interim, he has been evaluated by the pulmonary service in the face of the combination of his chronic obstructive lung disease and previous effects of superimposed COVID-19 pneumonia with ongoing hypoxic respiratory failure and only intermittent compliance with the use of his supplemental oxygen. In addition, he has resumed tobacco consumption with average consumption of approximately 1/4 pack of cigarettes on a daily basis in addition to that of noncompliance with the use of nocturnal CPAP. To his credit, he has lost approximately 10 pounds since his most recent evaluation and remains normotensive at the time of his assessment. He denies any arrhythmia related manifestations. No recent laboratory assessment has been completed in regards to that of his chronic kidney disease nor the status of his serum lipids. Review of Systems:    The remainder of a complete multisystem review including consitutional, central nervous, respiratory, circulatory, gastrointestinal, genitourinary, endocrinologic, hematologic, musculoskeletal and psychiatric are negative.     Past Medical History:  Past Medical History:   Diagnosis Date    Anesthesia complication     wakes up  violant   only ok if reversed with romazacon    Arthritis     shoulders,ankle    CAD (coronary artery disease)     Cardiomyopathy ([facility]     COPD (chronic obstructive pulmonary disease) (HCC)     Erectile dysfunction     GERD (gastroesophageal reflux disease)     H/O coronary angioplasty with stents[V45.82] 9/2018 another stent    HFrEF (heart failure with reduced ejection fraction) ([facility]     Hyperlipidemia     Hypertension     Lymphoma ([facility] [facility] 11/04/2011    had chemo  currently in remission    Myocardial infarction [facility])     more than 10 yrs ago    Sleep apnea     wont wear machine    Unspecified cerebral artery occlusion with cerebral infarction 2005    no deficits       Past Surgical History:  Past Surgical History:   Procedure Laterality Date    ANKLE FRACTURE SURGERY Right 03/2014    ORIF right ankle    ANKLE SURGERY  2007    rt ankle    BRONCHOSCOPY  09/2011    bronch / medistinoscopy    CAROTID ENDARTERECTOMY Left     12 yrs ago    CHOLECYSTECTOMY      COLONOSCOPY  07/30/2013    DR Rosenthal. [address] WITH STENT PLACEMENT  2008    states has 2 stents  follows with DR Anali Sparks    ENDOSCOPY, COLON, DIAGNOSTIC      HAND SURGERY Left     fell on a [employer]    HERNIA REPAIR Bilateral 08/15/2019    HERNIA  BILATERAL INGUINAL REPAIR WITH MESH LAPAROSCOPIC ROBOTIC XI ASSISTED performed by Neyda Cutler MD at [address] [place] [address]  06/29/2015    lapraoscopic cholecystectomy    SHOULDER ARTHROSCOPY Right 10/08/2015    rotator cuff repair, subachromoplasty with labrial debridement    SHOULDER ARTHROSCOPY Left 2/18/2021    LEFT ROTATOR CUFF REPAIR LABRAL DEBRIDEMENT SUBACROMIAL DECOMPRESSION performed by Ryne Harley DO at [address] ENDOSCOPY         Family History:  Family History   Problem Relation Age of Onset Diabetes Mother     Heart Disease Mother     Diabetes Father     Heart Disease Father     Diabetes Sister     Cancer Sister     Diabetes Brother     Cancer Brother        Social History:  Social History     Socioeconomic History    Marital status:      Spouse name: Not on file    Number of children: Not on file    Years of education: Not on file    Highest education level: Not on file   Occupational History    Not on file   Tobacco Use    Smoking status: Former     Packs/day: 2.00     Years: 60.00     Pack years: 120.00     Types: Cigarettes     Quit date: 2020     Years since quittin.8    Smokeless tobacco: Never   Vaping Use    Vaping Use: Never used   Substance and Sexual Activity    Alcohol use: Yes     Comment: 1-2 beers daily- pop daily    Drug use: Not Currently     Types: Cocaine     Comment: past use; none since     Sexual activity: Not Currently     Partners: Female   Other Topics Concern    Not on file   Social History Narrative    Drinks 4 Mt Dew daily     Social Determinants of Health     Financial Resource Strain: Low Risk     Difficulty of Paying Living Expenses: Not hard at all   Food Insecurity: No Food Insecurity    Worried About [address] [employer] in the Last Year: Never true    [facility] in the Last Year: Never true   Transportation Needs: Unknown    Lack of Transportation (Medical): Not on file    Lack of Transportation (Non-Medical): No   Physical Activity: Inactive    Days of Exercise per Week: 0 days    Minutes of Exercise per Session: 0 min   Stress: Not on file   Social Connections: Not on file   Intimate Partner Violence: Not on file   Housing Stability: Unknown    Unable to Pay for Housing in the Last Year: Not on file    Number of Places Lived in the Last Year: Not on file    Unstable Housing in the Last Year: No       Allergies: Allergies   Allergen Reactions    Midazolam Hcl      Wake up wild. ...  Must be reversed with romazicon or will wake up wild and combative       Current Medications:  Current Outpatient Medications   Medication Sig Dispense Refill    simvastatin (ZOCOR) 20 MG tablet Take 1 tablet by mouth daily. 90 tablet 0    fenofibrate (TRICOR) 145 MG tablet Take 1 tablet by mouth once daily. 90 tablet 0    montelukast (SINGULAIR) 10 MG tablet Take 1 tablet by mouth once daily. 90 tablet 0    clopidogrel (PLAVIX) 75 MG tablet Take 1 tablet by mouth daily. 90 tablet 0    hydrALAZINE (APRESOLINE) 25 MG tablet Take 1 tablet by mouth four times daily. 360 tablet 3    HYDROcodone-acetaminophen (NORCO) 7.5-325 MG per tablet Take 1 tablet by mouth every 8 hours as needed for Pain for up to 30 days. Max Daily Amount: 3 tablets 90 tablet 0    sertraline (ZOLOFT) 100 MG tablet Take 1 tablet by mouth daily. 90 tablet 0    buPROPion (WELLBUTRIN SR) 150 MG extended release tablet Take 1 tablet by mouth twice daily. 180 tablet 0    tamsulosin (FLOMAX) 0.4 MG capsule Take 1 capsule by mouth at bedtime. 90 capsule 0    potassium chloride (KLOR-CON M) 10 MEQ extended release tablet Take 1 tablet by mouth twice daily. 180 tablet 0    carvedilol (COREG) 3.125 MG tablet Take 1 tablet by mouth once daily. 30 tablet 3    furosemide (LASIX) 20 MG tablet Take 1 tablet by mouth daily Take 1 tablets by mouth daily. 90 tablet 0    omeprazole (PRILOSEC) 40 MG delayed release capsule Take 1 capsule by mouth daily 90 capsule 3    guaiFENesin (MUCINEX) 600 MG extended release tablet Take 2 tablets by mouth 2 times daily 30 tablet 0    aspirin 81 MG chewable tablet Take 1 tablet by mouth daily Ld 1 week ago patient not compliant with asa daughter stated ld about 1 week ago 90 tablet 1     No current facility-administered medications for this visit.          Physical Exam:  /70   Pulse 79   Resp 18   Ht 5' 8\" (1.727 m)   Wt 222 lb (100.7 kg)   BMI 33.75 kg/m²   Wt Readings from Last 3 Encounters:   03/08/23 222 lb (100.7 kg)   02/23/23 226 lb 9.6 oz (102.8 kg)   01/23/23 227 lb 9.6 oz (103.2 kg)     The patient is awake, alert and in no discomfort or distress. No gross musculoskeletal deformity is present. No significant skin or nail changes are present. Gross examination of head, eyes, nose and throat are negative. Jugular venous pressure is normal and no carotid bruits are present. Normal respiratory effort is noted with no accessory muscle usage present. Lung fields are clear to ascultation with distant breath sounds throughout all lung fields. Cardiac examination is notable for a regular rate and rhythm with no palpable thrill. No gallop rhythm or cardiac murmur are identified. A benign abdominal examination is present with the exception of obesity and no masses or organomegaly. Intact pulses are present throughout all extremities and no peripheral edema is present. No focal neurologic deficits are present. Laboratory Tests:  Lab Results   Component Value Date    CREATININE 1.3 (H) 05/27/2022    BUN 31 (H) 05/27/2022     05/27/2022    K 4.6 05/27/2022     05/27/2022    CO2 26 05/27/2022     Lab Results   Component Value Date     (H) 12/01/2011     Lab Results   Component Value Date/Time    WBC 7.6 05/27/2022 11:52 AM    RBC 4.96 05/27/2022 11:52 AM    HGB 13.8 05/27/2022 11:52 AM    HCT 43.1 05/27/2022 11:52 AM    MCV 86.9 05/27/2022 11:52 AM    MCH 27.8 05/27/2022 11:52 AM    MCHC 32.0 05/27/2022 11:52 AM    RDW 14.3 05/27/2022 11:52 AM     05/27/2022 11:52 AM    MPV 11.4 05/27/2022 11:52 AM     No results for input(s): ALKPHOS, ALT, AST, PROT, BILITOT, BILIDIR, LABALBU in the last 72 hours.   Lab Results   Component Value Date/Time    MG 1.8 01/05/2022 06:23 AM     Lab Results   Component Value Date/Time    PROTIME 12.7 01/05/2022 06:23 AM    PROTIME 13.0 11/03/2011 09:47 AM    INR 1.1 01/05/2022 06:23 AM     Lab Results   Component Value Date/Time    TSH 1.540 07/18/2019 12:00 PM     No components found for: CHLPL  Lab Results   Component Value Date TRIG 149 06/16/2021    TRIG 174 (H) 11/06/2019    TRIG 129 01/12/2018     Lab Results   Component Value Date    HDL 43 06/16/2021    HDL 36 11/06/2019    HDL 45 01/12/2018     Lab Results   Component Value Date    LDLCALC 153 (H) 06/16/2021    LDLCALC 127 (H) 11/06/2019    LDLCALC 104 (H) 01/12/2018       Cardiac Tests:  ECG: A resting electrocardiogram demonstrates evidence of sinus rhythm with left atrial enlargement and nonspecific ST changes      ASSESSMENT / PLAN: On a clinical basis, the patient remains compensated from cardiovascular standpoint with continued symptoms of persistent dyspnea likely of a multifactorial nature as outlined above. I have extensively discussed this with him and have recommended compliance with the use of his supplemental oxygen as recommended in addition to that of the adverse effects of noncompliance with the use of nocturnal CPAP in the face of his existing obstructive sleep apnea. I additionally have extensively discussed his needs of smoking cessation both to reduce risk of further adverse effects in the face of his chronic obstructive lung disease as well as that of his atherosclerotic risk. Ongoing lifestyle modification to achieve weight reduction will benefit diastolic cardiac performance in addition to assisting in management of his obstructive sleep apnea. I would additionally recommend the consideration of the addition of a SGLT2 inhibitor to his existing medical regimen to assist management of his chronic diastolic heart failure. Ongoing aggressive risk factor modification of blood pressure and serum lipids the latter with goals of reducing LDL cholesterol levels to below 70 mg/dL will be necessary to reduce risk of future atherosclerotic development. I presently plan his clinical reevaluation in approximately 1 year and would happily reevaluate him in the interim should additional cardiovascular difficulties or concerns arise.     The patient's current medication list, allergies, problem list and results of all previously ordered testing were reviewed at today's visit. Follow-up office visit in 1 year      Note: This report was completed using computerized voice recognition software. Every effort has been made to ensure accuracy, however; inadvertent computerized transcription errors may be present. Cipriano Coombs.  Shell Serrano, [address] [place] [address]    An electronic copy of this follow-up progress note was forwarded to Dr. Arlette Buck

## 2023-03-09 ENCOUNTER — TELEPHONE (OUTPATIENT)
Dept: CARDIOLOGY | Age: 73
End: 2023-03-09

## 2023-03-23 ENCOUNTER — OFFICE VISIT (OUTPATIENT)
Dept: FAMILY MEDICINE CLINIC | Age: 73
End: 2023-03-23

## 2023-03-23 VITALS
BODY MASS INDEX: 33.34 KG/M2 | TEMPERATURE: 97.5 F | HEART RATE: 146 BPM | DIASTOLIC BLOOD PRESSURE: 70 MMHG | RESPIRATION RATE: 20 BRPM | HEIGHT: 68 IN | SYSTOLIC BLOOD PRESSURE: 110 MMHG | WEIGHT: 220 LBS

## 2023-03-23 DIAGNOSIS — R09.02 HYPOXIA: Primary | ICD-10-CM

## 2023-03-23 DIAGNOSIS — U09.9 LONG COVID: ICD-10-CM

## 2023-03-23 DIAGNOSIS — R23.1 PALLOR: ICD-10-CM

## 2023-03-23 DIAGNOSIS — M75.42 IMPINGEMENT SYNDROME OF LEFT SHOULDER: ICD-10-CM

## 2023-03-23 LAB
ALBUMIN SERPL-MCNC: 3.9 G/DL (ref 3.5–5.2)
ALP SERPL-CCNC: 111 U/L (ref 40–129)
ALT SERPL-CCNC: 17 U/L (ref 0–40)
ANION GAP SERPL CALCULATED.3IONS-SCNC: 11 MMOL/L (ref 7–16)
AST SERPL-CCNC: 19 U/L (ref 0–39)
BASOPHILS # BLD: 0.06 E9/L (ref 0–0.2)
BASOPHILS NFR BLD: 0.6 % (ref 0–2)
BILIRUB SERPL-MCNC: 0.4 MG/DL (ref 0–1.2)
BUN SERPL-MCNC: 15 MG/DL (ref 6–23)
CALCIUM SERPL-MCNC: 9.4 MG/DL (ref 8.6–10.2)
CHLORIDE SERPL-SCNC: 105 MMOL/L (ref 98–107)
CO2 SERPL-SCNC: 27 MMOL/L (ref 22–29)
CREAT SERPL-MCNC: 1.3 MG/DL (ref 0.7–1.2)
EOSINOPHIL # BLD: 0.04 E9/L (ref 0.05–0.5)
EOSINOPHIL NFR BLD: 0.4 % (ref 0–6)
ERYTHROCYTE [DISTWIDTH] IN BLOOD BY AUTOMATED COUNT: 14.2 FL (ref 11.5–15)
GLUCOSE SERPL-MCNC: 124 MG/DL (ref 74–99)
HCT VFR BLD AUTO: 52.3 % (ref 37–54)
HGB BLD-MCNC: 15.9 G/DL (ref 12.5–16.5)
IMM GRANULOCYTES # BLD: 0.06 E9/L
IMM GRANULOCYTES NFR BLD: 0.6 % (ref 0–5)
LYMPHOCYTES # BLD: 1.21 E9/L (ref 1.5–4)
LYMPHOCYTES NFR BLD: 11.6 % (ref 20–42)
MCH RBC QN AUTO: 27.9 PG (ref 26–35)
MCHC RBC AUTO-ENTMCNC: 30.4 % (ref 32–34.5)
MCV RBC AUTO: 91.9 FL (ref 80–99.9)
MONOCYTES # BLD: 0.86 E9/L (ref 0.1–0.95)
MONOCYTES NFR BLD: 8.2 % (ref 2–12)
NEUTROPHILS # BLD: 8.23 E9/L (ref 1.8–7.3)
NEUTS SEG NFR BLD: 78.6 % (ref 43–80)
PLATELET # BLD AUTO: 313 E9/L (ref 130–450)
PMV BLD AUTO: 11.6 FL (ref 7–12)
POTASSIUM SERPL-SCNC: 4.5 MMOL/L (ref 3.5–5)
PROT SERPL-MCNC: 7.3 G/DL (ref 6.4–8.3)
RBC # BLD AUTO: 5.69 E12/L (ref 3.8–5.8)
SODIUM SERPL-SCNC: 143 MMOL/L (ref 132–146)
WBC # BLD: 10.5 E9/L (ref 4.5–11.5)

## 2023-03-23 RX ORDER — HYDROCODONE BITARTRATE AND ACETAMINOPHEN 7.5; 325 MG/1; MG/1
1 TABLET ORAL EVERY 8 HOURS PRN
Qty: 90 TABLET | Refills: 0 | Status: SHIPPED | OUTPATIENT
Start: 2023-03-23 | End: 2023-04-22

## 2023-03-23 RX ORDER — FLUTICASONE FUROATE, UMECLIDINIUM BROMIDE AND VILANTEROL TRIFENATATE 100; 62.5; 25 UG/1; UG/1; UG/1
POWDER RESPIRATORY (INHALATION)
COMMUNITY
Start: 2023-03-06

## 2023-03-23 ASSESSMENT — ENCOUNTER SYMPTOMS
CONSTIPATION: 0
VOMITING: 0
BLOOD IN STOOL: 0
COUGH: 1
DIARRHEA: 0
ABDOMINAL PAIN: 0
NAUSEA: 0
SHORTNESS OF BREATH: 0
WHEEZING: 0

## 2023-03-23 NOTE — PROGRESS NOTES
5.6 07/31/2018    LABA1C 6.0 04/26/2018     Physical Exam  Eyes:      General:         Right eye: No discharge. Left eye: No discharge. Cardiovascular:      Rate and Rhythm: Normal rate and regular rhythm. Heart sounds: No murmur heard. Comments: CRRR, Heart with radial Pulse. Pulmonary:      Effort: No respiratory distress. Breath sounds: No rhonchi or rales. Comments: Lungs CTA  Abdominal:      Palpations: Abdomen is soft. Tenderness: There is no abdominal tenderness. Musculoskeletal:      Cervical back: No rigidity. Skin:     General: Skin is warm. Capillary Refill: Capillary refill takes less than 2 seconds. Coloration: Skin is not pale. Findings: No bruising. Neurological:      Mental Status: He is alert. Psychiatric:         Mood and Affect: Mood normal.          On this date 3/23/2023 I have spent 23 minutes reviewing previous notes, test results and face to face with the patient discussing the diagnosis and importance of compliance with the treatment plan as well as documenting on the day of the visit. An electronic signature was used to authenticate this note.     --Liss Bee DO

## 2023-04-02 DIAGNOSIS — N40.1 BENIGN PROSTATIC HYPERPLASIA WITH NOCTURIA: ICD-10-CM

## 2023-04-02 DIAGNOSIS — R35.1 BENIGN PROSTATIC HYPERPLASIA WITH NOCTURIA: ICD-10-CM

## 2023-04-02 DIAGNOSIS — F32.A DEPRESSION, UNSPECIFIED DEPRESSION TYPE: ICD-10-CM

## 2023-04-02 DIAGNOSIS — J44.9 CHRONIC OBSTRUCTIVE PULMONARY DISEASE, UNSPECIFIED COPD TYPE (HCC): ICD-10-CM

## 2023-04-03 RX ORDER — POTASSIUM CHLORIDE 750 MG/1
TABLET, EXTENDED RELEASE ORAL
Qty: 180 TABLET | Refills: 0 | Status: SHIPPED | OUTPATIENT
Start: 2023-04-03

## 2023-04-03 RX ORDER — TAMSULOSIN HYDROCHLORIDE 0.4 MG/1
CAPSULE ORAL
Qty: 90 CAPSULE | Refills: 0 | Status: SHIPPED | OUTPATIENT
Start: 2023-04-03

## 2023-04-03 RX ORDER — CARVEDILOL 3.12 MG/1
TABLET ORAL
Qty: 30 TABLET | Refills: 6 | Status: SHIPPED | OUTPATIENT
Start: 2023-04-03

## 2023-04-03 RX ORDER — BUPROPION HYDROCHLORIDE 150 MG/1
TABLET, EXTENDED RELEASE ORAL
Qty: 180 TABLET | Refills: 0 | Status: SHIPPED | OUTPATIENT
Start: 2023-04-03

## 2023-04-04 ENCOUNTER — TELEPHONE (OUTPATIENT)
Dept: CARDIOLOGY | Age: 73
End: 2023-04-04

## 2023-04-04 NOTE — TELEPHONE ENCOUNTER
Spoke with patient and confirmed Lexiscan stress test appointment on April 6, 2023 at 0800. Instructions for test,medication instructions, and COVID-19 preprocedure information reviewed with patient, questions answered. Patient verbalized understanding.

## 2023-04-04 NOTE — TELEPHONE ENCOUNTER
Left message on [facility] voice mail to remind patient of Lexiscan stress test appointment on April 6, 2023 at 0800. Instructions for test,medication instructions, and COVID-19 preprocedure information left on voice mail. Asked patient's daughter to call with any questions or if unable to keep appointment.

## 2023-04-05 ENCOUNTER — TELEPHONE (OUTPATIENT)
Dept: FAMILY MEDICINE CLINIC | Age: 73
End: 2023-04-05

## 2023-04-05 DIAGNOSIS — J40 BRONCHITIS: Primary | ICD-10-CM

## 2023-04-05 RX ORDER — DOXYCYCLINE HYCLATE 100 MG
100 TABLET ORAL 2 TIMES DAILY
Qty: 20 TABLET | Refills: 0 | Status: SHIPPED | OUTPATIENT
Start: 2023-04-05

## 2023-04-05 NOTE — TELEPHONE ENCOUNTER
Patient states he is still fighting off a cold, he has cold chills and congestion, he would like to know if he can get an antibiotic.

## 2023-04-05 NOTE — TELEPHONE ENCOUNTER
ASSESSMENT/PLAN:    1. Bronchitis  - doxycycline hyclate (VIBRA-TABS) 100 MG tablet; Take 1 tablet by mouth 2 times daily  Dispense: 20 tablet;  Refill: 0        FOLLOW-UP:  breezy

## 2023-04-06 ENCOUNTER — HOSPITAL ENCOUNTER (OUTPATIENT)
Dept: CARDIOLOGY | Age: 73
Discharge: HOME | End: 2023-04-06
Payer: MEDICARE

## 2023-04-06 VITALS
WEIGHT: 216 LBS | DIASTOLIC BLOOD PRESSURE: 61 MMHG | HEIGHT: 68 IN | SYSTOLIC BLOOD PRESSURE: 144 MMHG | BODY MASS INDEX: 32.74 KG/M2 | HEART RATE: 68 BPM | RESPIRATION RATE: 18 BRPM

## 2023-04-06 DIAGNOSIS — I25.118 CORONARY ARTERY DISEASE OF NATIVE ARTERY OF NATIVE HEART WITH STABLE ANGINA PECTORIS (HCC): ICD-10-CM

## 2023-04-06 DIAGNOSIS — I25.5 ISCHEMIC CARDIOMYOPATHY: ICD-10-CM

## 2023-04-06 PROCEDURE — 93017 CV STRESS TEST TRACING ONLY: CPT

## 2023-04-06 PROCEDURE — 6360000002 HC RX W HCPCS: Performed by: INTERNAL MEDICINE

## 2023-04-06 PROCEDURE — 3430000000 HC RX DIAGNOSTIC RADIOPHARMACEUTICAL: Performed by: INTERNAL MEDICINE

## 2023-04-06 PROCEDURE — A9502 TC99M TETROFOSMIN: HCPCS | Performed by: INTERNAL MEDICINE

## 2023-04-06 PROCEDURE — 2580000003 HC RX 258: Performed by: INTERNAL MEDICINE

## 2023-04-06 PROCEDURE — 78452 HT MUSCLE IMAGE SPECT MULT: CPT

## 2023-04-06 RX ORDER — SODIUM CHLORIDE 0.9 % (FLUSH) 0.9 %
10 SYRINGE (ML) INJECTION PRN
Status: DISCONTINUED | OUTPATIENT
Start: 2023-04-06 | End: 2023-04-07 | Stop reason: HOSPADM

## 2023-04-06 RX ADMIN — TETROFOSMIN 11 MILLICURIE: 0.23 INJECTION, POWDER, LYOPHILIZED, FOR SOLUTION INTRAVENOUS at 08:01

## 2023-04-06 RX ADMIN — TETROFOSMIN 33.9 MILLICURIE: 0.23 INJECTION, POWDER, LYOPHILIZED, FOR SOLUTION INTRAVENOUS at 09:39

## 2023-04-06 RX ADMIN — SODIUM CHLORIDE, PRESERVATIVE FREE 10 ML: 5 INJECTION INTRAVENOUS at 08:01

## 2023-04-06 RX ADMIN — REGADENOSON 0.4 MG: 0.08 INJECTION, SOLUTION INTRAVENOUS at 09:39

## 2023-04-06 RX ADMIN — SODIUM CHLORIDE, PRESERVATIVE FREE 10 ML: 5 INJECTION INTRAVENOUS at 09:40

## 2023-04-06 NOTE — DISCHARGE INSTRUCTIONS
[id number]      Instructions to Patients    The following are the instructions for patients who have had a procedure in our office today. Patient name: Jorje Kruse    Radionuclide Activity: 40mCi of 99mTc-Tetrofosmin    Date Administered: 4/6/2023    Expires: 48 hours after scheduled appointment time      Patient may resume normal activity unless otherwise instructed. Patient may resume medications as normal.  If the need should arise, patient may call [phone] between the hours of 7:00am-3:00pm.  After hours there is at least one physician on-call at all times for those patients needing assistance. Patients may call [phone] and the answering service will direct the patient to one of our physicians for assistance. After the patient's test if they are going to be leaving from an airport in the near future they should take this letter with them to verify the test and radionuclide used for their test.      This letter verifies that the above named bearer received an injection of a radionuclide for medical purpose/usage only.         Electronically signed by Kiera Otto on 4/6/2023 at 9:11 AM

## 2023-04-06 NOTE — PROCEDURES
diaphragmatic attenuation, with motion artifacts    The gated SPECT rest and stress imaging in the short, vertical long, and horizontal long axis demonstrated decreased uptake of the radioactive tracer in the inferior and lateral wall on rest and stress images with some improvement on stress images which is consistent with artifact    Gated SPECT left ventricular ejection fraction was calculated to be 43 %, with normal myocardial thickening and wall motion and global wall hypokinesis . Impression:    Electrocardiographically normal regadenoson infusion with a clinically  non-ischemic response  Myocardial perfusion imaging was normal.      Overall left ventricular systolic function was mildly reduced with mild global hypokinesis   Intermediate risk general pharmacologic stress test, based on mildly reduced ejection fraction    Thank you for sending your patient to this [facility].      Electronically signed by Jarrod Zamora MD on 4/6/23 at 7:34 PM EDT

## 2023-04-06 NOTE — TELEPHONE ENCOUNTER
Spoke with pt's daughter and let her know ATX was sent to pharmacy for pt. Daughter stated she would let him know.

## 2023-04-07 ENCOUNTER — TELEPHONE (OUTPATIENT)
Dept: CARDIOLOGY CLINIC | Age: 73
End: 2023-04-07

## 2023-04-07 NOTE — TELEPHONE ENCOUNTER
----- Message from Yoly Gaxiola MD sent at 4/7/2023  5:09 AM EDT -----  Please notify patient that stress test demonstrates no progression of arteries and stable heart muscle function.   Continue as directed and follow-up as scheduled

## 2023-05-02 DIAGNOSIS — F32.89 OTHER DEPRESSION: ICD-10-CM

## 2023-05-02 DIAGNOSIS — F43.21 GRIEF: ICD-10-CM

## 2023-05-02 RX ORDER — SERTRALINE HYDROCHLORIDE 100 MG/1
100 TABLET, FILM COATED ORAL DAILY
Qty: 90 TABLET | Refills: 0 | Status: SHIPPED | OUTPATIENT
Start: 2023-05-02

## 2023-05-16 ENCOUNTER — OFFICE VISIT (OUTPATIENT)
Dept: FAMILY MEDICINE CLINIC | Age: 73
End: 2023-05-16

## 2023-05-16 VITALS
RESPIRATION RATE: 16 BRPM | HEART RATE: 63 BPM | TEMPERATURE: 97.8 F | BODY MASS INDEX: 32.43 KG/M2 | SYSTOLIC BLOOD PRESSURE: 112 MMHG | OXYGEN SATURATION: 97 % | DIASTOLIC BLOOD PRESSURE: 82 MMHG | WEIGHT: 214 LBS | HEIGHT: 68 IN

## 2023-05-16 DIAGNOSIS — M75.42 IMPINGEMENT SYNDROME OF LEFT SHOULDER: ICD-10-CM

## 2023-05-16 DIAGNOSIS — Z72.0 TOBACCO ABUSE: Primary | ICD-10-CM

## 2023-05-16 RX ORDER — HYDROCODONE BITARTRATE AND ACETAMINOPHEN 7.5; 325 MG/1; MG/1
1 TABLET ORAL EVERY 8 HOURS PRN
Qty: 90 TABLET | Refills: 0 | Status: SHIPPED | OUTPATIENT
Start: 2023-05-16 | End: 2023-06-15

## 2023-05-16 RX ORDER — VARENICLINE TARTRATE 1 MG/1
1 TABLET, FILM COATED ORAL 2 TIMES DAILY
Qty: 60 TABLET | Refills: 3 | Status: SHIPPED | OUTPATIENT
Start: 2023-05-16

## 2023-05-16 ASSESSMENT — ENCOUNTER SYMPTOMS
BLOOD IN STOOL: 0
COUGH: 0
ABDOMINAL PAIN: 0
DIARRHEA: 0
WHEEZING: 0
VOMITING: 0
SHORTNESS OF BREATH: 0
NAUSEA: 0
CONSTIPATION: 0

## 2023-05-16 NOTE — PROGRESS NOTES
Muna Rocha (:  1950) is a 67 y.o. male,Established patient, here for evaluation of the following chief complaint(s):  Pain (Med Refill), Nicotine Dependence (Started smoking again, would like chantix/), and Numbness (L arm, from wrist down to hand: feels like it goes to sleep)         ASSESSMENT/PLAN:  1. Tobacco abuse  -     varenicline (CHANTIX CONTINUING MONTH ) 1 MG tablet; Take 1 tablet by mouth 2 times daily, Disp-60 tablet, R-3Normal  2. Impingement syndrome of left shoulder  -     HYDROcodone-acetaminophen (NORCO) 7.5-325 MG per tablet; Take 1 tablet by mouth every 8 hours as needed for Pain for up to 30 days. Max Daily Amount: 3 tablets, Disp-90 tablet, R-0Normal    Controlled substances monitoring: no signs of potential drug abuse or diversion identified and [place]AR[place] report reviewed today- activity consistent with treatment plan. No follow-ups on file. Subjective   SUBJECTIVE/OBJECTIVE:  Pain (Med Refill), Nicotine Dependence (Started smoking again, would like chantix/), and Numbness (L arm, from wrist down to hand: feels like it goes to sleep)        Review of Systems   Constitutional:  Negative for chills, diaphoresis and fever. HENT:  Negative for ear discharge, ear pain, hearing loss, nosebleeds and tinnitus. Respiratory:  Negative for cough, shortness of breath and wheezing. Cardiovascular:  Negative for chest pain. Gastrointestinal:  Negative for abdominal pain, blood in stool, constipation, diarrhea, nausea and vomiting. Genitourinary:  Negative for dysuria, flank pain and hematuria. Musculoskeletal:  Negative for myalgias. Skin:  Negative for rash. Neurological:  Negative for headaches. Hematological:  Does not bruise/bleed easily. Psychiatric/Behavioral:  Negative for hallucinations and suicidal ideas.          Objective   /82   Pulse 63   Temp 97.8 °F (36.6 °C)   Resp 16   Ht 5' 8\" (1.727 m)   Wt 214 lb (97.1 kg)   SpO2 97%   BMI

## 2023-06-01 DIAGNOSIS — J45.909 MODERATE ASTHMA WITHOUT COMPLICATION, UNSPECIFIED WHETHER PERSISTENT: ICD-10-CM

## 2023-06-01 DIAGNOSIS — K21.9 GASTROESOPHAGEAL REFLUX DISEASE WITHOUT ESOPHAGITIS: ICD-10-CM

## 2023-06-01 DIAGNOSIS — E78.2 MIXED HYPERLIPIDEMIA: ICD-10-CM

## 2023-06-01 DIAGNOSIS — I25.110 CORONARY ARTERY DISEASE INVOLVING NATIVE CORONARY ARTERY OF NATIVE HEART WITH UNSTABLE ANGINA PECTORIS (HCC): ICD-10-CM

## 2023-06-01 RX ORDER — OMEPRAZOLE 40 MG/1
CAPSULE, DELAYED RELEASE ORAL
Qty: 90 CAPSULE | Refills: 2 | Status: SHIPPED | OUTPATIENT
Start: 2023-06-01

## 2023-06-01 RX ORDER — CLOPIDOGREL BISULFATE 75 MG/1
75 TABLET ORAL DAILY
Qty: 90 TABLET | Refills: 0 | Status: SHIPPED | OUTPATIENT
Start: 2023-06-01

## 2023-06-01 RX ORDER — MONTELUKAST SODIUM 10 MG/1
TABLET ORAL
Qty: 90 TABLET | Refills: 0 | Status: SHIPPED | OUTPATIENT
Start: 2023-06-01

## 2023-06-01 RX ORDER — SIMVASTATIN 20 MG
TABLET ORAL
Qty: 90 TABLET | Refills: 0 | Status: SHIPPED | OUTPATIENT
Start: 2023-06-01

## 2023-06-01 RX ORDER — FENOFIBRATE 145 MG/1
TABLET, COATED ORAL
Qty: 90 TABLET | Refills: 0 | Status: SHIPPED | OUTPATIENT
Start: 2023-06-01

## 2023-06-01 NOTE — TELEPHONE ENCOUNTER
Requested Prescriptions     Pending Prescriptions Disp Refills    omeprazole (PRILOSEC) 40 MG delayed release capsule [Pharmacy Med Name: Omeprazole 40mg Delayed-Release Capsule] 90 capsule 2     Sig: Take 1 capsule by mouth once daily. fenofibrate (TRICOR) 145 MG tablet [Pharmacy Med Name: Fenofibrate 145mg Tablet] 90 tablet 0     Sig: Take 1 tablet by mouth once daily. montelukast (SINGULAIR) 10 MG tablet [Pharmacy Med Name: Montelukast Sodium 10mg Tablet] 90 tablet 0     Sig: Take 1 tablet by mouth once daily. simvastatin (ZOCOR) 20 MG tablet [Pharmacy Med Name: Simvastatin 20mg Tablet] 90 tablet 0     Sig: Take 1 tablet by mouth daily. clopidogrel (PLAVIX) 75 MG tablet [Pharmacy Med Name: Clopidogrel 75mg Tablet] 90 tablet 0     Sig: Take 1 tablet by mouth daily.        Next appt is 6/15/2023  Last appt was 5/16/2023

## 2023-07-12 DIAGNOSIS — N40.1 BENIGN PROSTATIC HYPERPLASIA WITH NOCTURIA: ICD-10-CM

## 2023-07-12 DIAGNOSIS — J44.9 CHRONIC OBSTRUCTIVE PULMONARY DISEASE, UNSPECIFIED COPD TYPE (HCC): ICD-10-CM

## 2023-07-12 DIAGNOSIS — R35.1 BENIGN PROSTATIC HYPERPLASIA WITH NOCTURIA: ICD-10-CM

## 2023-07-12 RX ORDER — POTASSIUM CHLORIDE 750 MG/1
TABLET, EXTENDED RELEASE ORAL
Qty: 180 TABLET | Refills: 0 | Status: SHIPPED | OUTPATIENT
Start: 2023-07-12

## 2023-07-12 RX ORDER — TAMSULOSIN HYDROCHLORIDE 0.4 MG/1
CAPSULE ORAL
Qty: 90 CAPSULE | Refills: 0 | Status: SHIPPED | OUTPATIENT
Start: 2023-07-12

## 2023-07-13 ENCOUNTER — OFFICE VISIT (OUTPATIENT)
Dept: FAMILY MEDICINE CLINIC | Age: 73
End: 2023-07-13
Payer: MEDICARE

## 2023-07-13 VITALS
HEART RATE: 66 BPM | WEIGHT: 210 LBS | OXYGEN SATURATION: 97 % | DIASTOLIC BLOOD PRESSURE: 60 MMHG | SYSTOLIC BLOOD PRESSURE: 120 MMHG | BODY MASS INDEX: 31.83 KG/M2 | RESPIRATION RATE: 18 BRPM | HEIGHT: 68 IN

## 2023-07-13 DIAGNOSIS — M75.42 IMPINGEMENT SYNDROME OF LEFT SHOULDER: ICD-10-CM

## 2023-07-13 DIAGNOSIS — S30.861A TICK BITE OF ABDOMINAL WALL, INITIAL ENCOUNTER: ICD-10-CM

## 2023-07-13 DIAGNOSIS — M75.41 IMPINGEMENT SYNDROME OF RIGHT SHOULDER: Primary | ICD-10-CM
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
## 2023-07-13 PROCEDURE — 99213 OFFICE O/P EST LOW 20 MIN: CPT | Performed by: NURSE PRACTITIONER

## 2023-07-13 PROCEDURE — 1123F ACP DISCUSS/DSCN MKR DOCD: CPT | Performed by: NURSE PRACTITIONER

## 2023-07-13 RX ORDER — DOXYCYCLINE HYCLATE 100 MG
100 TABLET ORAL 2 TIMES DAILY
Qty: 20 TABLET | Refills: 0 | Status: SHIPPED | OUTPATIENT
Start: 2023-07-13 | End: 2023-07-23

## 2023-07-13 RX ORDER — HYDROCODONE BITARTRATE AND ACETAMINOPHEN 7.5; 325 MG/1; MG/1
1 TABLET ORAL EVERY 8 HOURS PRN
Qty: 90 TABLET | Refills: 0 | Status: SHIPPED | OUTPATIENT
Start: 2023-07-13 | End: 2023-08-12

## 2023-07-13 ASSESSMENT — ENCOUNTER SYMPTOMS
WHEEZING: 1
CONSTIPATION: 0
VOMITING: 0
COUGH: 1
BACK PAIN: 0
SHORTNESS OF BREATH: 1
NAUSEA: 0
DIARRHEA: 0

## 2023-07-13 NOTE — PROGRESS NOTES
Darci Ball (:  1950) is a 67 y.o. male,Established patient, here for evaluation of the following chief complaint(s):  Shoulder Pain (1 month fl/u ) and Back Pain (1 month fl/u )         ASSESSMENT/PLAN:  1. Impingement syndrome of right shoulder  -     HYDROcodone-acetaminophen (NORCO) 7.5-325 MG per tablet; Take 1 tablet by mouth every 8 hours as needed for Pain for up to 30 days. Max Daily Amount: 3 tablets, Disp-90 tablet, R-0Normal  - The current medical regimen is effective;  continue present plan and medications. 2. Impingement syndrome of left shoulder  -     HYDROcodone-acetaminophen (NORCO) 7.5-325 MG per tablet; Take 1 tablet by mouth every 8 hours as needed for Pain for up to 30 days. Max Daily Amount: 3 tablets, Disp-90 tablet, R-0Normal  3. Tick bite of abdominal wall, initial encounter  -     doxycycline hyclate (VIBRA-TABS) 100 MG tablet; Take 1 tablet by mouth 2 times daily for 10 days, Disp-20 tablet, R-0Normal  - Reviewed side effects of medication and patient verbalizes understanding.   - Advised to call back directly if there are further questions, or if these symptoms fail to improve as anticipated or worsen. - Take full course of ATX        Controlled Substance Monitoring:    Acute and Chronic Pain Monitoring:   RX Monitoring 2023   Attestation -   Acute Pain Prescriptions -   Periodic Controlled Substance Monitoring No signs of potential drug abuse or diversion identified.;Obtaining appropriate analgesic effect of treatment. Chronic Pain > 80 MEDD -           Return in about 4 weeks (around 8/10/2023), or if symptoms worsen or fail to improve, for med review. Subjective   SUBJECTIVE/OBJECTIVE:  HPI  Here today for chronic right and left shoulder pain. Rates pain today 5-6/10. Current smoker, one PPD. Tick attached yesterday, it was engoured. Patient not sure what kind of tick it was. Area is red with a scab.      /60   Pulse 66   Resp 18   Ht

## 2023-07-30 DIAGNOSIS — F32.89 OTHER DEPRESSION: ICD-10-CM

## 2023-07-30 DIAGNOSIS — F43.21 GRIEF: ICD-10-CM

## 2023-07-30 DIAGNOSIS — F32.A DEPRESSION, UNSPECIFIED DEPRESSION TYPE: ICD-10-CM

## 2023-07-31 RX ORDER — BUPROPION HYDROCHLORIDE 150 MG/1
TABLET, EXTENDED RELEASE ORAL
Qty: 180 TABLET | Refills: 0 | Status: SHIPPED | OUTPATIENT
Start: 2023-07-31

## 2023-07-31 RX ORDER — SERTRALINE HYDROCHLORIDE 100 MG/1
100 TABLET, FILM COATED ORAL DAILY
Qty: 90 TABLET | Refills: 0 | Status: SHIPPED | OUTPATIENT
Start: 2023-07-31

## 2023-08-14 ENCOUNTER — OFFICE VISIT (OUTPATIENT)
Dept: FAMILY MEDICINE CLINIC | Age: 73
End: 2023-08-14
Payer: MEDICARE

## 2023-08-14 VITALS
RESPIRATION RATE: 18 BRPM | HEART RATE: 74 BPM | SYSTOLIC BLOOD PRESSURE: 110 MMHG | OXYGEN SATURATION: 94 % | HEIGHT: 68 IN | DIASTOLIC BLOOD PRESSURE: 58 MMHG | BODY MASS INDEX: 32.43 KG/M2 | WEIGHT: 214 LBS

## 2023-08-14 DIAGNOSIS — R35.1 NOCTURIA: Primary | ICD-10-CM

## 2023-08-14 DIAGNOSIS — M21.619 BUNION OF UNSPECIFIED FOOT: ICD-10-CM

## 2023-08-14 DIAGNOSIS — M75.41 IMPINGEMENT SYNDROME OF RIGHT SHOULDER: ICD-10-CM

## 2023-08-14 DIAGNOSIS — N40.1 BENIGN PROSTATIC HYPERPLASIA WITH NOCTURIA: ICD-10-CM

## 2023-08-14 DIAGNOSIS — R35.1 BENIGN PROSTATIC HYPERPLASIA WITH NOCTURIA: ICD-10-CM

## 2023-08-14 DIAGNOSIS — R97.20 ELEVATED PSA: ICD-10-CM

## 2023-08-14 DIAGNOSIS — R35.1 NOCTURIA: ICD-10-CM

## 2023-08-14 DIAGNOSIS — M75.42 IMPINGEMENT SYNDROME OF LEFT SHOULDER: ICD-10-CM

## 2023-08-14 LAB — PROSTATE SPECIFIC ANTIGEN: 5.56 NG/ML (ref 0–4)

## 2023-08-14 PROCEDURE — 99214 OFFICE O/P EST MOD 30 MIN: CPT | Performed by: FAMILY MEDICINE

## 2023-08-14 PROCEDURE — 1123F ACP DISCUSS/DSCN MKR DOCD: CPT | Performed by: FAMILY MEDICINE

## 2023-08-14 RX ORDER — SULFAMETHOXAZOLE AND TRIMETHOPRIM 800; 160 MG/1; MG/1
1 TABLET ORAL 2 TIMES DAILY
Qty: 14 TABLET | Refills: 0 | Status: SHIPPED | OUTPATIENT
Start: 2023-08-14

## 2023-08-14 RX ORDER — HYDROCODONE BITARTRATE AND ACETAMINOPHEN 7.5; 325 MG/1; MG/1
1 TABLET ORAL EVERY 8 HOURS PRN
Qty: 90 TABLET | Refills: 0 | Status: SHIPPED | OUTPATIENT
Start: 2023-08-14 | End: 2023-09-13

## 2023-08-14 ASSESSMENT — ENCOUNTER SYMPTOMS
CONSTIPATION: 0
APNEA: 0
DIARRHEA: 0
WHEEZING: 0
BLOOD IN STOOL: 0

## 2023-08-16 DIAGNOSIS — R97.20 ELEVATED PSA MEASUREMENT: Primary | ICD-10-CM

## 2023-08-28 DIAGNOSIS — E78.2 MIXED HYPERLIPIDEMIA: ICD-10-CM

## 2023-08-28 DIAGNOSIS — I25.110 CORONARY ARTERY DISEASE INVOLVING NATIVE CORONARY ARTERY OF NATIVE HEART WITH UNSTABLE ANGINA PECTORIS (HCC): ICD-10-CM

## 2023-08-28 DIAGNOSIS — J45.909 MODERATE ASTHMA WITHOUT COMPLICATION, UNSPECIFIED WHETHER PERSISTENT: ICD-10-CM

## 2023-08-28 RX ORDER — CLOPIDOGREL BISULFATE 75 MG/1
75 TABLET ORAL DAILY
Qty: 90 TABLET | Refills: 0 | Status: SHIPPED | OUTPATIENT
Start: 2023-08-28

## 2023-08-28 RX ORDER — MONTELUKAST SODIUM 10 MG/1
TABLET ORAL
Qty: 90 TABLET | Refills: 0 | Status: SHIPPED | OUTPATIENT
Start: 2023-08-28

## 2023-08-28 RX ORDER — FENOFIBRATE 145 MG/1
TABLET, COATED ORAL
Qty: 90 TABLET | Refills: 0 | Status: SHIPPED | OUTPATIENT
Start: 2023-08-28

## 2023-08-28 RX ORDER — SIMVASTATIN 20 MG
TABLET ORAL
Qty: 90 TABLET | Refills: 0 | Status: SHIPPED | OUTPATIENT
Start: 2023-08-28

## 2023-09-13 ENCOUNTER — OFFICE VISIT (OUTPATIENT)
Dept: FAMILY MEDICINE CLINIC | Age: 73
End: 2023-09-13
Payer: MEDICARE

## 2023-09-13 VITALS
BODY MASS INDEX: 32.58 KG/M2 | WEIGHT: 215 LBS | HEIGHT: 68 IN | OXYGEN SATURATION: 91 % | HEART RATE: 70 BPM | SYSTOLIC BLOOD PRESSURE: 112 MMHG | TEMPERATURE: 97.3 F | DIASTOLIC BLOOD PRESSURE: 68 MMHG

## 2023-09-13 DIAGNOSIS — M75.42 IMPINGEMENT SYNDROME OF LEFT SHOULDER: ICD-10-CM

## 2023-09-13 DIAGNOSIS — M75.41 IMPINGEMENT SYNDROME OF RIGHT SHOULDER: ICD-10-CM

## 2023-09-13 PROCEDURE — 1123F ACP DISCUSS/DSCN MKR DOCD: CPT | Performed by: FAMILY MEDICINE

## 2023-09-13 PROCEDURE — 99213 OFFICE O/P EST LOW 20 MIN: CPT | Performed by: FAMILY MEDICINE

## 2023-09-13 RX ORDER — HYDROCODONE BITARTRATE AND ACETAMINOPHEN 7.5; 325 MG/1; MG/1
1 TABLET ORAL EVERY 8 HOURS PRN
Qty: 90 TABLET | Refills: 0 | Status: SHIPPED | OUTPATIENT
Start: 2023-09-13 | End: 2023-10-13

## 2023-09-13 ASSESSMENT — ENCOUNTER SYMPTOMS
CONSTIPATION: 0
DIARRHEA: 0
BLOOD IN STOOL: 0
WHEEZING: 0

## 2023-09-28 DIAGNOSIS — J44.9 CHRONIC OBSTRUCTIVE PULMONARY DISEASE, UNSPECIFIED COPD TYPE (HCC): ICD-10-CM

## 2023-09-28 DIAGNOSIS — N40.1 BENIGN PROSTATIC HYPERPLASIA WITH NOCTURIA: ICD-10-CM

## 2023-09-28 DIAGNOSIS — R35.1 BENIGN PROSTATIC HYPERPLASIA WITH NOCTURIA: ICD-10-CM

## 2023-09-28 RX ORDER — POTASSIUM CHLORIDE 750 MG/1
TABLET, EXTENDED RELEASE ORAL
Qty: 180 TABLET | Refills: 0 | Status: SHIPPED | OUTPATIENT
Start: 2023-09-28

## 2023-09-28 RX ORDER — TAMSULOSIN HYDROCHLORIDE 0.4 MG/1
CAPSULE ORAL
Qty: 90 CAPSULE | Refills: 0 | Status: SHIPPED | OUTPATIENT
Start: 2023-09-28

## 2023-09-28 NOTE — TELEPHONE ENCOUNTER
Requested Prescriptions     Pending Prescriptions Disp Refills    potassium chloride (KLOR-CON M) 10 MEQ extended release tablet [Pharmacy Med Name: Potassium Chloride 10mEq Extended-Release Tablet] 180 tablet 0     Sig: Take 1 tablet by mouth twice daily. tamsulosin (FLOMAX) 0.4 MG capsule [Pharmacy Med Name: Tamsulosin Hydrochloride 0.4mg Capsule] 90 capsule 0     Sig: Take 1 capsule by mouth nightly at bedtime.        Next appt is 10/12/2023  Last appt was 9/13/2023

## 2023-10-11 ENCOUNTER — OFFICE VISIT (OUTPATIENT)
Dept: FAMILY MEDICINE CLINIC | Age: 73
End: 2023-10-11
Payer: MEDICARE

## 2023-10-11 VITALS
HEART RATE: 72 BPM | DIASTOLIC BLOOD PRESSURE: 58 MMHG | WEIGHT: 215.4 LBS | TEMPERATURE: 97.3 F | BODY MASS INDEX: 32.64 KG/M2 | OXYGEN SATURATION: 94 % | RESPIRATION RATE: 16 BRPM | HEIGHT: 68 IN | SYSTOLIC BLOOD PRESSURE: 122 MMHG

## 2023-10-11 DIAGNOSIS — M75.41 IMPINGEMENT SYNDROME OF RIGHT SHOULDER: ICD-10-CM

## 2023-10-11 DIAGNOSIS — J43.1 PANLOBULAR EMPHYSEMA (HCC): ICD-10-CM

## 2023-10-11 DIAGNOSIS — G89.29 CHRONIC RIGHT SHOULDER PAIN: Primary | ICD-10-CM

## 2023-10-11 DIAGNOSIS — M75.42 IMPINGEMENT SYNDROME OF LEFT SHOULDER: ICD-10-CM

## 2023-10-11 DIAGNOSIS — M25.511 CHRONIC RIGHT SHOULDER PAIN: Primary | ICD-10-CM

## 2023-10-11 PROCEDURE — 99214 OFFICE O/P EST MOD 30 MIN: CPT | Performed by: FAMILY MEDICINE

## 2023-10-11 PROCEDURE — 1123F ACP DISCUSS/DSCN MKR DOCD: CPT | Performed by: FAMILY MEDICINE

## 2023-10-11 RX ORDER — VARENICLINE TARTRATE 1 MG/1
1 TABLET, FILM COATED ORAL 2 TIMES DAILY
COMMUNITY
Start: 2023-09-15

## 2023-10-11 RX ORDER — HYDROCODONE BITARTRATE AND ACETAMINOPHEN 7.5; 325 MG/1; MG/1
1 TABLET ORAL EVERY 8 HOURS PRN
Qty: 90 TABLET | Refills: 0 | Status: SHIPPED | OUTPATIENT
Start: 2023-10-11 | End: 2023-11-10

## 2023-10-11 RX ORDER — AMMONIUM LACTATE 12 G/100G
LOTION TOPICAL
COMMUNITY
Start: 2023-08-29

## 2023-10-11 RX ORDER — FLUTICASONE FUROATE, UMECLIDINIUM BROMIDE AND VILANTEROL TRIFENATATE 100; 62.5; 25 UG/1; UG/1; UG/1
POWDER RESPIRATORY (INHALATION)
Qty: 1 EACH | Refills: 2 | Status: SHIPPED | OUTPATIENT
Start: 2023-10-11

## 2023-10-11 ASSESSMENT — ENCOUNTER SYMPTOMS
BLOOD IN STOOL: 0
DIARRHEA: 0
WHEEZING: 0
CONSTIPATION: 0

## 2023-10-29 DIAGNOSIS — F32.89 OTHER DEPRESSION: ICD-10-CM

## 2023-10-29 DIAGNOSIS — F43.21 GRIEF: ICD-10-CM

## 2023-10-29 DIAGNOSIS — F32.A DEPRESSION, UNSPECIFIED DEPRESSION TYPE: ICD-10-CM

## 2023-10-30 RX ORDER — SERTRALINE HYDROCHLORIDE 100 MG/1
100 TABLET, FILM COATED ORAL DAILY
Qty: 90 TABLET | Refills: 0 | Status: SHIPPED | OUTPATIENT
Start: 2023-10-30

## 2023-10-30 RX ORDER — BUPROPION HYDROCHLORIDE 150 MG/1
TABLET, EXTENDED RELEASE ORAL
Qty: 180 TABLET | Refills: 0 | Status: SHIPPED | OUTPATIENT
Start: 2023-10-30

## 2023-10-30 RX ORDER — CARVEDILOL 3.12 MG/1
TABLET ORAL
Qty: 30 TABLET | Refills: 5 | Status: SHIPPED | OUTPATIENT
Start: 2023-10-30

## 2023-11-15 ENCOUNTER — OFFICE VISIT (OUTPATIENT)
Dept: FAMILY MEDICINE CLINIC | Age: 73
End: 2023-11-15

## 2023-11-15 VITALS
OXYGEN SATURATION: 94 % | BODY MASS INDEX: 33.4 KG/M2 | SYSTOLIC BLOOD PRESSURE: 128 MMHG | TEMPERATURE: 97.7 F | HEIGHT: 68 IN | RESPIRATION RATE: 16 BRPM | DIASTOLIC BLOOD PRESSURE: 66 MMHG | HEART RATE: 46 BPM | WEIGHT: 220.4 LBS

## 2023-11-15 DIAGNOSIS — M75.41 IMPINGEMENT SYNDROME OF RIGHT SHOULDER: ICD-10-CM

## 2023-11-15 DIAGNOSIS — M75.42 IMPINGEMENT SYNDROME OF LEFT SHOULDER: ICD-10-CM

## 2023-11-15 RX ORDER — TRIAMCINOLONE ACETONIDE 40 MG/ML
40 INJECTION, SUSPENSION INTRA-ARTICULAR; INTRAMUSCULAR ONCE
Status: COMPLETED | OUTPATIENT
Start: 2023-11-15 | End: 2023-11-15

## 2023-11-15 RX ORDER — HYDROCODONE BITARTRATE AND ACETAMINOPHEN 7.5; 325 MG/1; MG/1
1 TABLET ORAL EVERY 8 HOURS PRN
Qty: 90 TABLET | Refills: 0 | Status: SHIPPED | OUTPATIENT
Start: 2023-11-15 | End: 2023-12-15

## 2023-11-15 RX ADMIN — TRIAMCINOLONE ACETONIDE 40 MG: 40 INJECTION, SUSPENSION INTRA-ARTICULAR; INTRAMUSCULAR at 10:59

## 2023-11-15 ASSESSMENT — ENCOUNTER SYMPTOMS
CONSTIPATION: 0
DIARRHEA: 0
WHEEZING: 0
BLOOD IN STOOL: 0

## 2023-11-27 DIAGNOSIS — E78.2 MIXED HYPERLIPIDEMIA: ICD-10-CM

## 2023-11-27 DIAGNOSIS — J45.909 MODERATE ASTHMA WITHOUT COMPLICATION, UNSPECIFIED WHETHER PERSISTENT: ICD-10-CM

## 2023-11-27 DIAGNOSIS — I25.110 CORONARY ARTERY DISEASE INVOLVING NATIVE CORONARY ARTERY OF NATIVE HEART WITH UNSTABLE ANGINA PECTORIS (HCC): ICD-10-CM

## 2023-11-27 RX ORDER — MONTELUKAST SODIUM 10 MG/1
TABLET ORAL
Qty: 90 TABLET | Refills: 0 | Status: SHIPPED | OUTPATIENT
Start: 2023-11-27

## 2023-11-27 RX ORDER — FENOFIBRATE 145 MG/1
TABLET, COATED ORAL
Qty: 90 TABLET | Refills: 0 | Status: SHIPPED | OUTPATIENT
Start: 2023-11-27

## 2023-11-27 RX ORDER — CLOPIDOGREL BISULFATE 75 MG/1
75 TABLET ORAL DAILY
Qty: 90 TABLET | Refills: 0 | Status: SHIPPED | OUTPATIENT
Start: 2023-11-27

## 2023-11-27 RX ORDER — SIMVASTATIN 20 MG
TABLET ORAL
Qty: 90 TABLET | Refills: 0 | Status: SHIPPED | OUTPATIENT
Start: 2023-11-27

## 2023-12-24 DIAGNOSIS — N40.1 BENIGN PROSTATIC HYPERPLASIA WITH NOCTURIA: ICD-10-CM

## 2023-12-24 DIAGNOSIS — R35.1 BENIGN PROSTATIC HYPERPLASIA WITH NOCTURIA: ICD-10-CM

## 2023-12-24 DIAGNOSIS — J44.9 CHRONIC OBSTRUCTIVE PULMONARY DISEASE, UNSPECIFIED COPD TYPE (HCC): ICD-10-CM

## 2023-12-26 RX ORDER — POTASSIUM CHLORIDE 750 MG/1
TABLET, EXTENDED RELEASE ORAL
Qty: 180 TABLET | Refills: 0 | Status: SHIPPED | OUTPATIENT
Start: 2023-12-26

## 2023-12-26 RX ORDER — TAMSULOSIN HYDROCHLORIDE 0.4 MG/1
CAPSULE ORAL
Qty: 90 CAPSULE | Refills: 0 | Status: SHIPPED | OUTPATIENT
Start: 2023-12-26

## 2024-01-18 ENCOUNTER — OFFICE VISIT (OUTPATIENT)
Dept: FAMILY MEDICINE CLINIC | Age: 74
End: 2024-01-18
Payer: MEDICARE

## 2024-01-18 VITALS
HEART RATE: 66 BPM | SYSTOLIC BLOOD PRESSURE: 120 MMHG | BODY MASS INDEX: 33.95 KG/M2 | HEIGHT: 68 IN | OXYGEN SATURATION: 94 % | DIASTOLIC BLOOD PRESSURE: 68 MMHG | TEMPERATURE: 97.2 F | WEIGHT: 224 LBS | RESPIRATION RATE: 16 BRPM

## 2024-01-18 DIAGNOSIS — S60.519D: ICD-10-CM

## 2024-01-18 DIAGNOSIS — G89.29 CHRONIC RIGHT SHOULDER PAIN: ICD-10-CM

## 2024-01-18 DIAGNOSIS — Z72.0 TOBACCO ABUSE DISORDER: Primary | ICD-10-CM

## 2024-01-18 DIAGNOSIS — M25.511 CHRONIC RIGHT SHOULDER PAIN: ICD-10-CM

## 2024-01-18 PROCEDURE — 1123F ACP DISCUSS/DSCN MKR DOCD: CPT | Performed by: FAMILY MEDICINE

## 2024-01-18 PROCEDURE — 99214 OFFICE O/P EST MOD 30 MIN: CPT | Performed by: FAMILY MEDICINE

## 2024-01-18 RX ORDER — HYDROCODONE BITARTRATE AND ACETAMINOPHEN 7.5; 325 MG/1; MG/1
1 TABLET ORAL EVERY 6 HOURS PRN
COMMUNITY

## 2024-01-18 RX ORDER — HYDROCODONE BITARTRATE AND ACETAMINOPHEN 7.5; 325 MG/1; MG/1
1 TABLET ORAL EVERY 8 HOURS PRN
Qty: 90 TABLET | Refills: 0 | Status: SHIPPED | OUTPATIENT
Start: 2024-01-18 | End: 2024-02-17

## 2024-01-18 RX ORDER — VARENICLINE TARTRATE 1 MG/1
1 TABLET, FILM COATED ORAL 2 TIMES DAILY
Qty: 60 TABLET | Refills: 0 | Status: SHIPPED | OUTPATIENT
Start: 2024-01-18

## 2024-01-18 ASSESSMENT — PATIENT HEALTH QUESTIONNAIRE - PHQ9
5. POOR APPETITE OR OVEREATING: 0
2. FEELING DOWN, DEPRESSED OR HOPELESS: 3
SUM OF ALL RESPONSES TO PHQ9 QUESTIONS 1 & 2: 5
SUM OF ALL RESPONSES TO PHQ QUESTIONS 1-9: 12
1. LITTLE INTEREST OR PLEASURE IN DOING THINGS: 2
10. IF YOU CHECKED OFF ANY PROBLEMS, HOW DIFFICULT HAVE THESE PROBLEMS MADE IT FOR YOU TO DO YOUR WORK, TAKE CARE OF THINGS AT HOME, OR GET ALONG WITH OTHER PEOPLE: 2
3. TROUBLE FALLING OR STAYING ASLEEP: 3
8. MOVING OR SPEAKING SO SLOWLY THAT OTHER PEOPLE COULD HAVE NOTICED. OR THE OPPOSITE, BEING SO FIGETY OR RESTLESS THAT YOU HAVE BEEN MOVING AROUND A LOT MORE THAN USUAL: 0
4. FEELING TIRED OR HAVING LITTLE ENERGY: 3
7. TROUBLE CONCENTRATING ON THINGS, SUCH AS READING THE NEWSPAPER OR WATCHING TELEVISION: 1
SUM OF ALL RESPONSES TO PHQ QUESTIONS 1-9: 12

## 2024-01-18 ASSESSMENT — ENCOUNTER SYMPTOMS
BLOOD IN STOOL: 0
WHEEZING: 0
DIARRHEA: 0
CONSTIPATION: 0

## 2024-01-18 NOTE — PROGRESS NOTES
Clark Maza (:  1950) is a 73 y.o. male,Established patient, here for evaluation of the following chief complaint(s):  Shoulder Pain (Hydrocodone refills) and Arthritis (Having arthritis pain in left thumb)         ASSESSMENT/PLAN:  1. Tobacco abuse disorder  -     varenicline (CHANTIX) 1 MG tablet; Take 1 tablet by mouth 2 times daily, Disp-60 tablet, R-0Normal  2. Chronic right shoulder pain  -     HYDROcodone-acetaminophen (NORCO) 7.5-325 MG per tablet; Take 1 tablet by mouth every 8 hours as needed for Pain for up to 30 days., Disp-90 tablet, R-0Normal  3. Abrasion of hand, unspecified laterality, subsequent encounter  -     diclofenac sodium (VOLTAREN) 1 % GEL; Apply 4 g topically 4 times daily, Topical, 4 TIMES DAILY Starting u 2024, Disp-100 g, R-1, Normal    Controlled substances monitoring: no signs of potential drug abuse or diversion identified and [place]AR[place] report reviewed today- activity consistent with treatment plan.   No follow-ups on file.         Subjective   SUBJECTIVE/OBJECTIVE:  Shoulder Pain (Hydrocodone refills) and Arthritis (Having arthritis pain in left thumb)          Review of Systems   Constitutional:  Negative for chills and diaphoresis.   HENT:  Negative for ear discharge, ear pain, hearing loss, nosebleeds and tinnitus.    Respiratory:  Negative for wheezing.    Cardiovascular:  Negative for chest pain.   Gastrointestinal:  Negative for blood in stool, constipation and diarrhea.   Genitourinary:  Negative for dysuria, flank pain and hematuria.   Skin:  Negative for rash.   Neurological:  Negative for headaches.   Hematological:  Does not bruise/bleed easily.          Objective   /68   Pulse 66   Temp 97.2 °F (36.2 °C) (Temporal)   Resp 16   Ht 1.727 m (5' 8\")   Wt 101.6 kg (224 lb)   SpO2 94%   BMI 34.06 kg/m²   Lab Results   Component Value Date    LABA1C 5.6 2018    LABA1C 6.0 2018     Physical Exam  Eyes:      General:         Right

## 2024-01-27 DIAGNOSIS — F32.A DEPRESSION, UNSPECIFIED DEPRESSION TYPE: ICD-10-CM

## 2024-01-27 DIAGNOSIS — F32.89 OTHER DEPRESSION: ICD-10-CM

## 2024-01-27 DIAGNOSIS — F43.21 GRIEF: ICD-10-CM

## 2024-01-29 RX ORDER — SERTRALINE HYDROCHLORIDE 100 MG/1
100 TABLET, FILM COATED ORAL DAILY
Qty: 90 TABLET | Refills: 0 | Status: SHIPPED | OUTPATIENT
Start: 2024-01-29

## 2024-01-29 RX ORDER — BUPROPION HYDROCHLORIDE 150 MG/1
TABLET, EXTENDED RELEASE ORAL
Qty: 180 TABLET | Refills: 0 | Status: SHIPPED | OUTPATIENT
Start: 2024-01-29

## 2024-02-01 RX ORDER — HYDRALAZINE HYDROCHLORIDE 25 MG/1
TABLET, FILM COATED ORAL
Qty: 360 TABLET | Refills: 2 | Status: SHIPPED | OUTPATIENT
Start: 2024-02-01

## 2024-02-02 ENCOUNTER — ANESTHESIA (OUTPATIENT)
Dept: OPERATING ROOM | Age: 74
End: 2024-02-02

## 2024-02-02 ENCOUNTER — ANESTHESIA EVENT (OUTPATIENT)
Dept: OPERATING ROOM | Age: 74
End: 2024-02-02

## 2024-02-02 ENCOUNTER — APPOINTMENT (OUTPATIENT)
Dept: GENERAL RADIOLOGY | Age: 74
DRG: 907 | End: 2024-02-02
Payer: MEDICARE

## 2024-02-02 ENCOUNTER — APPOINTMENT (OUTPATIENT)
Dept: GENERAL RADIOLOGY | Age: 74
End: 2024-02-02
Payer: MEDICARE

## 2024-02-02 ENCOUNTER — HOSPITAL ENCOUNTER (INPATIENT)
Age: 74
LOS: 1 days | Discharge: HOME | DRG: 907 | End: 2024-02-03
Attending: STUDENT IN AN ORGANIZED HEALTH CARE EDUCATION/TRAINING PROGRAM | Admitting: SURGERY
Payer: MEDICARE

## 2024-02-02 ENCOUNTER — HOSPITAL ENCOUNTER (EMERGENCY)
Age: 74
Discharge: ANOTHER HOSPITAL | End: 2024-02-02
Attending: EMERGENCY MEDICINE
Payer: MEDICARE

## 2024-02-02 VITALS
BODY MASS INDEX: 34.1 KG/M2 | TEMPERATURE: 98 F | RESPIRATION RATE: 22 BRPM | HEART RATE: 62 BPM | HEIGHT: 68 IN | SYSTOLIC BLOOD PRESSURE: 95 MMHG | OXYGEN SATURATION: 93 % | WEIGHT: 225 LBS | DIASTOLIC BLOOD PRESSURE: 36 MMHG

## 2024-02-02 DIAGNOSIS — S51.801A: Primary | ICD-10-CM

## 2024-02-02 DIAGNOSIS — S41.111A LACERATION OF RIGHT UPPER EXTREMITY, INITIAL ENCOUNTER: Primary | ICD-10-CM

## 2024-02-02 DIAGNOSIS — S81.811A LACERATION OF RIGHT LOWER LEG, INITIAL ENCOUNTER: ICD-10-CM

## 2024-02-02 LAB
ABO + RH BLD: NORMAL
ALBUMIN SERPL-MCNC: 3.5 G/DL (ref 3.5–5.2)
ALBUMIN SERPL-MCNC: 3.5 G/DL (ref 3.5–5.2)
ALBUMIN SERPL-MCNC: 4 G/DL (ref 3.5–5.2)
ALP SERPL-CCNC: 40 U/L (ref 40–129)
ALP SERPL-CCNC: 40 U/L (ref 40–129)
ALP SERPL-CCNC: 48 U/L (ref 40–129)
ALT SERPL-CCNC: 15 U/L (ref 0–40)
ALT SERPL-CCNC: 15 U/L (ref 0–40)
ALT SERPL-CCNC: 17 U/L (ref 0–40)
ANION GAP SERPL CALCULATED.3IONS-SCNC: 10 MMOL/L (ref 7–16)
ANION GAP SERPL CALCULATED.3IONS-SCNC: 10 MMOL/L (ref 7–16)
ANION GAP SERPL CALCULATED.3IONS-SCNC: 12 MMOL/L (ref 7–16)
APAP SERPL-MCNC: <5 UG/ML (ref 10–30)
APAP SERPL-MCNC: <5 UG/ML (ref 10–30)
ARM BAND NUMBER: NORMAL
AST SERPL-CCNC: 15 U/L (ref 0–39)
AST SERPL-CCNC: 15 U/L (ref 0–39)
AST SERPL-CCNC: 18 U/L (ref 0–39)
BASOPHILS # BLD: 0.05 K/UL (ref 0–0.2)
BASOPHILS NFR BLD: 1 % (ref 0–2)
BILIRUB SERPL-MCNC: 0.3 MG/DL (ref 0–1.2)
BLOOD BANK SAMPLE EXPIRATION: NORMAL
BLOOD GROUP ANTIBODIES SERPL: NEGATIVE
BUN BLD-MCNC: 24 MG/DL (ref 6–23)
BUN BLD-MCNC: 24 MG/DL (ref 6–23)
BUN SERPL-MCNC: 23 MG/DL (ref 6–23)
BUN SERPL-MCNC: 26 MG/DL (ref 6–23)
BUN SERPL-MCNC: 26 MG/DL (ref 6–23)
CA-I BLD-SCNC: 1.28 MMOL/L (ref 1.15–1.33)
CA-I BLD-SCNC: 1.28 MMOL/L (ref 1.15–1.33)
CA-I BLD-SCNC: 1.44 MMOL/L (ref 1.15–1.33)
CA-I BLD-SCNC: 1.44 MMOL/L (ref 1.15–1.33)
CALCIUM SERPL-MCNC: 9.3 MG/DL (ref 8.6–10.2)
CALCIUM SERPL-MCNC: 9.3 MG/DL (ref 8.6–10.2)
CALCIUM SERPL-MCNC: 9.4 MG/DL (ref 8.6–10.2)
CHLORIDE BLD-SCNC: 111 MMOL/L (ref 100–108)
CHLORIDE BLD-SCNC: 111 MMOL/L (ref 100–108)
CHLORIDE SERPL-SCNC: 106 MMOL/L (ref 98–107)
CHLORIDE SERPL-SCNC: 109 MMOL/L (ref 98–107)
CHLORIDE SERPL-SCNC: 109 MMOL/L (ref 98–107)
CLOT ANGLE.KAOLIN INDUCED BLD RES TEG: 70.2 DEG (ref 53–70)
CLOT ANGLE.KAOLIN INDUCED BLD RES TEG: 70.2 DEG (ref 53–70)
CO2 BLD CALC-SCNC: 26 MMOL/L (ref 22–29)
CO2 BLD CALC-SCNC: 26 MMOL/L (ref 22–29)
CO2 SERPL-SCNC: 25 MMOL/L (ref 22–29)
CREAT BLD-MCNC: 1.3 MG/DL (ref 0.7–1.2)
CREAT BLD-MCNC: 1.3 MG/DL (ref 0.7–1.2)
CREAT SERPL-MCNC: 1.3 MG/DL (ref 0.7–1.2)
CREAT SERPL-MCNC: 1.5 MG/DL (ref 0.7–1.2)
CREAT SERPL-MCNC: 1.5 MG/DL (ref 0.7–1.2)
EGFR, POC: 58 ML/MIN/1.73M2
EGFR, POC: 58 ML/MIN/1.73M2
EOSINOPHIL # BLD: 0.16 K/UL (ref 0.05–0.5)
EOSINOPHILS RELATIVE PERCENT: 2 % (ref 0–6)
EPL-TEG: 0 % (ref 0–15)
EPL-TEG: 0 % (ref 0–15)
ERYTHROCYTE [DISTWIDTH] IN BLOOD BY AUTOMATED COUNT: 13.3 % (ref 11.5–15)
ERYTHROCYTE [DISTWIDTH] IN BLOOD BY AUTOMATED COUNT: 13.5 % (ref 11.5–15)
ERYTHROCYTE [DISTWIDTH] IN BLOOD BY AUTOMATED COUNT: 13.5 % (ref 11.5–15)
ERYTHROCYTE [DISTWIDTH] IN BLOOD BY AUTOMATED COUNT: 13.7 % (ref 11.5–15)
ERYTHROCYTE [DISTWIDTH] IN BLOOD BY AUTOMATED COUNT: 13.7 % (ref 11.5–15)
ETHANOLAMINE SERPL-MCNC: <10 MG/DL
ETHANOLAMINE SERPL-MCNC: <10 MG/DL
G-TEG: 7.4 KDYN/CM2 (ref 4.5–11)
G-TEG: 7.4 KDYN/CM2 (ref 4.5–11)
GFR SERPL CREATININE-BSD FRML MDRD: 50 ML/MIN/1.73M2
GFR SERPL CREATININE-BSD FRML MDRD: 50 ML/MIN/1.73M2
GFR SERPL CREATININE-BSD FRML MDRD: 58 ML/MIN/1.73M2
GLUCOSE BLD-MCNC: 151 MG/DL (ref 74–99)
GLUCOSE BLD-MCNC: 151 MG/DL (ref 74–99)
GLUCOSE SERPL-MCNC: 123 MG/DL (ref 74–99)
GLUCOSE SERPL-MCNC: 186 MG/DL (ref 74–99)
GLUCOSE SERPL-MCNC: 186 MG/DL (ref 74–99)
HCT VFR BLD AUTO: 17.1 % (ref 37–54)
HCT VFR BLD AUTO: 17.1 % (ref 37–54)
HCT VFR BLD AUTO: 32 % (ref 37–54)
HCT VFR BLD AUTO: 32 % (ref 37–54)
HCT VFR BLD AUTO: 37.1 % (ref 37–54)
HCT VFR BLD AUTO: 37.1 % (ref 37–54)
HCT VFR BLD AUTO: 44.7 % (ref 37–54)
HGB BLD-MCNC: 12 G/DL (ref 12.5–16.5)
HGB BLD-MCNC: 12 G/DL (ref 12.5–16.5)
HGB BLD-MCNC: 14.8 G/DL (ref 12.5–16.5)
HGB BLD-MCNC: 5.4 G/DL (ref 12.5–16.5)
HGB BLD-MCNC: 5.4 G/DL (ref 12.5–16.5)
IMM GRANULOCYTES # BLD AUTO: 0.05 K/UL (ref 0–0.58)
IMM GRANULOCYTES NFR BLD: 1 % (ref 0–5)
INR PPP: 1.1
INR PPP: 2
INR PPP: 2
KINETICS TEG: 1.4 MIN (ref 1–3)
KINETICS TEG: 1.4 MIN (ref 1–3)
LACTATE BLDV-SCNC: 0.7 MMOL/L (ref 0.5–2.2)
LACTATE BLDV-SCNC: 0.7 MMOL/L (ref 0.5–2.2)
LACTATE BLDV-SCNC: 2.2 MMOL/L (ref 0.5–2.2)
LACTATE BLDV-SCNC: 2.2 MMOL/L (ref 0.5–2.2)
LY30 (LYSIS) TEG: 0 % (ref 0–8)
LY30 (LYSIS) TEG: 0 % (ref 0–8)
LYMPHOCYTES NFR BLD: 1.88 K/UL (ref 1.5–4)
LYMPHOCYTES RELATIVE PERCENT: 20 % (ref 20–42)
MA (MAX CLOT) TEG: 59.7 MM (ref 50–70)
MA (MAX CLOT) TEG: 59.7 MM (ref 50–70)
MAGNESIUM SERPL-MCNC: 1.5 MG/DL (ref 1.6–2.6)
MAGNESIUM SERPL-MCNC: 1.5 MG/DL (ref 1.6–2.6)
MCH RBC QN AUTO: 29.1 PG (ref 26–35)
MCH RBC QN AUTO: 29.1 PG (ref 26–35)
MCH RBC QN AUTO: 29.2 PG (ref 26–35)
MCH RBC QN AUTO: 29.5 PG (ref 26–35)
MCH RBC QN AUTO: 29.5 PG (ref 26–35)
MCHC RBC AUTO-ENTMCNC: 31.6 G/DL (ref 32–34.5)
MCHC RBC AUTO-ENTMCNC: 31.6 G/DL (ref 32–34.5)
MCHC RBC AUTO-ENTMCNC: 32.3 G/DL (ref 32–34.5)
MCHC RBC AUTO-ENTMCNC: 32.3 G/DL (ref 32–34.5)
MCHC RBC AUTO-ENTMCNC: 33.1 G/DL (ref 32–34.5)
MCV RBC AUTO: 88.3 FL (ref 80–99.9)
MCV RBC AUTO: 89.8 FL (ref 80–99.9)
MCV RBC AUTO: 89.8 FL (ref 80–99.9)
MCV RBC AUTO: 93.4 FL (ref 80–99.9)
MCV RBC AUTO: 93.4 FL (ref 80–99.9)
MONOCYTES NFR BLD: 0.68 K/UL (ref 0.1–0.95)
MONOCYTES NFR BLD: 7 % (ref 2–12)
NEGATIVE BASE EXCESS, ART: 1.1 MMOL/L
NEGATIVE BASE EXCESS, ART: 1.1 MMOL/L
NEUTROPHILS NFR BLD: 70 % (ref 43–80)
NEUTS SEG NFR BLD: 6.4 K/UL (ref 1.8–7.3)
PARTIAL THROMBOPLASTIN TIME: 31 SEC (ref 24.5–35.1)
PARTIAL THROMBOPLASTIN TIME: 31 SEC (ref 24.5–35.1)
PLATELET # BLD AUTO: 125 K/UL (ref 130–450)
PLATELET # BLD AUTO: 125 K/UL (ref 130–450)
PLATELET # BLD AUTO: 284 K/UL (ref 130–450)
PLATELET # BLD AUTO: 284 K/UL (ref 130–450)
PLATELET, FLUORESCENCE: 317 K/UL (ref 130–450)
PMV BLD AUTO: 11.2 FL (ref 7–12)
PMV BLD AUTO: 11.3 FL (ref 7–12)
PMV BLD AUTO: 11.3 FL (ref 7–12)
POC ANION GAP: 10 MMOL/L (ref 7–16)
POC ANION GAP: 10 MMOL/L (ref 7–16)
POC HCO3: 25.9 MMOL/L (ref 22–26)
POC HCO3: 25.9 MMOL/L (ref 22–26)
POC HEMOGLOBIN (CALC): 10.9 G/DL (ref 12.5–15.5)
POC HEMOGLOBIN (CALC): 10.9 G/DL (ref 12.5–15.5)
POC LACTIC ACID: 1.7 MMOL/L (ref 0.5–2.2)
POC LACTIC ACID: 1.7 MMOL/L (ref 0.5–2.2)
POC O2 SATURATION: 99.8 % (ref 92–98.5)
POC O2 SATURATION: 99.8 % (ref 92–98.5)
POC PCO2: 53 MM HG (ref 35–45)
POC PCO2: 53 MM HG (ref 35–45)
POC PH: 7.3 (ref 7.35–7.45)
POC PH: 7.3 (ref 7.35–7.45)
POC PO2: 244.4 MM HG (ref 60–80)
POC PO2: 244.4 MM HG (ref 60–80)
POTASSIUM BLD-SCNC: 3.6 MMOL/L (ref 3.5–5)
POTASSIUM BLD-SCNC: 3.6 MMOL/L (ref 3.5–5)
POTASSIUM SERPL-SCNC: 4.2 MMOL/L (ref 3.5–5)
POTASSIUM SERPL-SCNC: 4.4 MMOL/L (ref 3.5–5)
POTASSIUM SERPL-SCNC: 4.4 MMOL/L (ref 3.5–5)
PROT SERPL-MCNC: 5.5 G/DL (ref 6.4–8.3)
PROT SERPL-MCNC: 5.5 G/DL (ref 6.4–8.3)
PROT SERPL-MCNC: 6.7 G/DL (ref 6.4–8.3)
PROTHROMBIN TIME: 12.2 SEC (ref 9.3–12.4)
PROTHROMBIN TIME: 12.4 SEC (ref 9.3–12.4)
PROTHROMBIN TIME: 12.4 SEC (ref 9.3–12.4)
PROTHROMBIN TIME: 22.3 SEC (ref 9.3–12.4)
PROTHROMBIN TIME: 22.3 SEC (ref 9.3–12.4)
RBC # BLD AUTO: 1.83 M/UL (ref 3.8–5.8)
RBC # BLD AUTO: 1.83 M/UL (ref 3.8–5.8)
RBC # BLD AUTO: 4.13 M/UL (ref 3.8–5.8)
RBC # BLD AUTO: 4.13 M/UL (ref 3.8–5.8)
RBC # BLD AUTO: 5.06 M/UL (ref 3.8–5.8)
REACTION TIME TEG: 4.6 MIN (ref 5–10)
REACTION TIME TEG: 4.6 MIN (ref 5–10)
SALICYLATES SERPL-MCNC: <0.3 MG/DL (ref 0–30)
SALICYLATES SERPL-MCNC: <0.3 MG/DL (ref 0–30)
SODIUM BLD-SCNC: 147 MMOL/L (ref 132–146)
SODIUM BLD-SCNC: 147 MMOL/L (ref 132–146)
SODIUM SERPL-SCNC: 143 MMOL/L (ref 132–146)
SODIUM SERPL-SCNC: 144 MMOL/L (ref 132–146)
SODIUM SERPL-SCNC: 144 MMOL/L (ref 132–146)
TOXIC TRICYCLIC SC,BLOOD: NEGATIVE
TOXIC TRICYCLIC SC,BLOOD: NEGATIVE
WBC OTHER # BLD: 14 K/UL (ref 4.5–11.5)
WBC OTHER # BLD: 14 K/UL (ref 4.5–11.5)
WBC OTHER # BLD: 5.6 K/UL (ref 4.5–11.5)
WBC OTHER # BLD: 5.6 K/UL (ref 4.5–11.5)
WBC OTHER # BLD: 9.2 K/UL (ref 4.5–11.5)

## 2024-02-02 PROCEDURE — 86850 RBC ANTIBODY SCREEN: CPT

## 2024-02-02 PROCEDURE — 2580000003 HC RX 258: Performed by: STUDENT IN AN ORGANIZED HEALTH CARE EDUCATION/TRAINING PROGRAM

## 2024-02-02 PROCEDURE — 86920 COMPATIBILITY TEST SPIN: CPT

## 2024-02-02 PROCEDURE — 99285 EMERGENCY DEPT VISIT HI MDM: CPT

## 2024-02-02 PROCEDURE — 90714 TD VACC NO PRESV 7 YRS+ IM: CPT | Performed by: STUDENT IN AN ORGANIZED HEALTH CARE EDUCATION/TRAINING PROGRAM

## 2024-02-02 PROCEDURE — 80053 COMPREHEN METABOLIC PANEL: CPT

## 2024-02-02 PROCEDURE — 85390 FIBRINOLYSINS SCREEN I&R: CPT

## 2024-02-02 PROCEDURE — 83605 ASSAY OF LACTIC ACID: CPT

## 2024-02-02 PROCEDURE — 85576 BLOOD PLATELET AGGREGATION: CPT

## 2024-02-02 PROCEDURE — 86923 COMPATIBILITY TEST ELECTRIC: CPT

## 2024-02-02 PROCEDURE — 80047 BASIC METABLC PNL IONIZED CA: CPT

## 2024-02-02 PROCEDURE — 85384 FIBRINOGEN ACTIVITY: CPT

## 2024-02-02 PROCEDURE — 73090 X-RAY EXAM OF FOREARM: CPT

## 2024-02-02 PROCEDURE — 85025 COMPLETE CBC W/AUTO DIFF WBC: CPT

## 2024-02-02 PROCEDURE — 85730 THROMBOPLASTIN TIME PARTIAL: CPT

## 2024-02-02 PROCEDURE — 85014 HEMATOCRIT: CPT

## 2024-02-02 PROCEDURE — 80143 DRUG ASSAY ACETAMINOPHEN: CPT

## 2024-02-02 PROCEDURE — 86900 BLOOD TYPING SEROLOGIC ABO: CPT

## 2024-02-02 PROCEDURE — G0480 DRUG TEST DEF 1-7 CLASSES: HCPCS

## 2024-02-02 PROCEDURE — 86901 BLOOD TYPING SEROLOGIC RH(D): CPT

## 2024-02-02 PROCEDURE — 0X380ZZ CONTROL BLEEDING IN RIGHT UPPER ARM, OPEN APPROACH: ICD-10-PCS | Performed by: SURGERY

## 2024-02-02 PROCEDURE — 1200000000 HC SEMI PRIVATE

## 2024-02-02 PROCEDURE — 6810039001 HC L1 TRAUMA PRIORITY

## 2024-02-02 PROCEDURE — 2500000003 HC RX 250 WO HCPCS: Performed by: NURSE ANESTHETIST, CERTIFIED REGISTERED

## 2024-02-02 PROCEDURE — 6360000002 HC RX W HCPCS: Performed by: STUDENT IN AN ORGANIZED HEALTH CARE EDUCATION/TRAINING PROGRAM

## 2024-02-02 PROCEDURE — 80307 DRUG TEST PRSMV CHEM ANLYZR: CPT

## 2024-02-02 PROCEDURE — 90471 IMMUNIZATION ADMIN: CPT | Performed by: STUDENT IN AN ORGANIZED HEALTH CARE EDUCATION/TRAINING PROGRAM

## 2024-02-02 PROCEDURE — 82803 BLOOD GASES ANY COMBINATION: CPT

## 2024-02-02 PROCEDURE — 96375 TX/PRO/DX INJ NEW DRUG ADDON: CPT

## 2024-02-02 PROCEDURE — 20103 EXPL PENTRG WOUND EXTREMITY: CPT | Performed by: SURGERY

## 2024-02-02 PROCEDURE — 6360000002 HC RX W HCPCS: Performed by: NURSE ANESTHETIST, CERTIFIED REGISTERED

## 2024-02-02 PROCEDURE — 3700000001 HC ADD 15 MINUTES (ANESTHESIA): Performed by: SURGERY

## 2024-02-02 PROCEDURE — 80179 DRUG ASSAY SALICYLATE: CPT

## 2024-02-02 PROCEDURE — 2709999900 HC NON-CHARGEABLE SUPPLY: Performed by: SURGERY

## 2024-02-02 PROCEDURE — 3700000000 HC ANESTHESIA ATTENDED CARE: Performed by: SURGERY

## 2024-02-02 PROCEDURE — 2500000003 HC RX 250 WO HCPCS: Performed by: EMERGENCY MEDICINE

## 2024-02-02 PROCEDURE — 85347 COAGULATION TIME ACTIVATED: CPT

## 2024-02-02 PROCEDURE — 85027 COMPLETE CBC AUTOMATED: CPT

## 2024-02-02 PROCEDURE — 85610 PROTHROMBIN TIME: CPT

## 2024-02-02 PROCEDURE — 2580000003 HC RX 258: Performed by: NURSE ANESTHETIST, CERTIFIED REGISTERED

## 2024-02-02 PROCEDURE — 83735 ASSAY OF MAGNESIUM: CPT

## 2024-02-02 PROCEDURE — 99223 1ST HOSP IP/OBS HIGH 75: CPT | Performed by: SURGERY

## 2024-02-02 PROCEDURE — 36410 VNPNXR 3YR/> PHY/QHP DX/THER: CPT | Performed by: SURGERY

## 2024-02-02 PROCEDURE — 6370000000 HC RX 637 (ALT 250 FOR IP): Performed by: STUDENT IN AN ORGANIZED HEALTH CARE EDUCATION/TRAINING PROGRAM

## 2024-02-02 PROCEDURE — 3600000007 HC SURGERY HYBRID BASE: Performed by: SURGERY

## 2024-02-02 PROCEDURE — 3600000017 HC SURGERY HYBRID ADDL 15MIN: Performed by: SURGERY

## 2024-02-02 PROCEDURE — 6360000002 HC RX W HCPCS

## 2024-02-02 PROCEDURE — 82330 ASSAY OF CALCIUM: CPT

## 2024-02-02 PROCEDURE — 7100000001 HC PACU RECOVERY - ADDTL 15 MIN: Performed by: SURGERY

## 2024-02-02 PROCEDURE — 7100000000 HC PACU RECOVERY - FIRST 15 MIN: Performed by: SURGERY

## 2024-02-02 PROCEDURE — 36600 WITHDRAWAL OF ARTERIAL BLOOD: CPT | Performed by: SURGERY

## 2024-02-02 PROCEDURE — 96374 THER/PROPH/DIAG INJ IV PUSH: CPT

## 2024-02-02 PROCEDURE — G0378 HOSPITAL OBSERVATION PER HR: HCPCS

## 2024-02-02 PROCEDURE — 30233N1 TRANSFUSION OF NONAUTOLOGOUS RED BLOOD CELLS INTO PERIPHERAL VEIN, PERCUTANEOUS APPROACH: ICD-10-PCS | Performed by: SURGERY

## 2024-02-02 RX ORDER — HYDROMORPHONE HYDROCHLORIDE 1 MG/ML
0.25 INJECTION, SOLUTION INTRAMUSCULAR; INTRAVENOUS; SUBCUTANEOUS EVERY 5 MIN PRN
Status: DISCONTINUED | OUTPATIENT
Start: 2024-02-02 | End: 2024-02-02 | Stop reason: HOSPADM

## 2024-02-02 RX ORDER — SODIUM CHLORIDE 0.9 % (FLUSH) 0.9 %
5-40 SYRINGE (ML) INJECTION PRN
Status: DISCONTINUED | OUTPATIENT
Start: 2024-02-02 | End: 2024-02-03 | Stop reason: HOSPADM

## 2024-02-02 RX ORDER — MIDAZOLAM HYDROCHLORIDE 1 MG/ML
INJECTION INTRAMUSCULAR; INTRAVENOUS DAILY PRN
Status: COMPLETED | OUTPATIENT
Start: 2024-02-02 | End: 2024-02-02

## 2024-02-02 RX ORDER — FENTANYL CITRATE 50 UG/ML
INJECTION, SOLUTION INTRAMUSCULAR; INTRAVENOUS DAILY PRN
Status: COMPLETED | OUTPATIENT
Start: 2024-02-02 | End: 2024-02-02

## 2024-02-02 RX ORDER — FENTANYL CITRATE 0.05 MG/ML
INJECTION, SOLUTION INTRAMUSCULAR; INTRAVENOUS
Status: COMPLETED
Start: 2024-02-02 | End: 2024-02-02

## 2024-02-02 RX ORDER — LABETALOL HYDROCHLORIDE 5 MG/ML
10 INJECTION, SOLUTION INTRAVENOUS
Status: DISCONTINUED | OUTPATIENT
Start: 2024-02-02 | End: 2024-02-02 | Stop reason: HOSPADM

## 2024-02-02 RX ORDER — HEPARIN SODIUM 10000 [USP'U]/ML
5000 INJECTION, SOLUTION INTRAVENOUS; SUBCUTANEOUS EVERY 8 HOURS
Status: DISCONTINUED | OUTPATIENT
Start: 2024-02-03 | End: 2024-02-03 | Stop reason: HOSPADM

## 2024-02-02 RX ORDER — ONDANSETRON 2 MG/ML
4 INJECTION INTRAMUSCULAR; INTRAVENOUS
Status: DISCONTINUED | OUTPATIENT
Start: 2024-02-02 | End: 2024-02-02 | Stop reason: HOSPADM

## 2024-02-02 RX ORDER — SUCCINYLCHOLINE/SOD CL,ISO/PF 200MG/10ML
SYRINGE (ML) INTRAVENOUS PRN
Status: DISCONTINUED | OUTPATIENT
Start: 2024-02-02 | End: 2024-02-02 | Stop reason: SDUPTHER

## 2024-02-02 RX ORDER — LORAZEPAM 2 MG/ML
1 INJECTION INTRAMUSCULAR ONCE
Status: COMPLETED | OUTPATIENT
Start: 2024-02-02 | End: 2024-02-02

## 2024-02-02 RX ORDER — DIPHENHYDRAMINE HYDROCHLORIDE 50 MG/ML
12.5 INJECTION INTRAMUSCULAR; INTRAVENOUS
Status: DISCONTINUED | OUTPATIENT
Start: 2024-02-02 | End: 2024-02-02 | Stop reason: HOSPADM

## 2024-02-02 RX ORDER — ONDANSETRON 2 MG/ML
INJECTION INTRAMUSCULAR; INTRAVENOUS PRN
Status: DISCONTINUED | OUTPATIENT
Start: 2024-02-02 | End: 2024-02-02 | Stop reason: SDUPTHER

## 2024-02-02 RX ORDER — DEXAMETHASONE SODIUM PHOSPHATE 10 MG/ML
INJECTION INTRAMUSCULAR; INTRAVENOUS PRN
Status: DISCONTINUED | OUTPATIENT
Start: 2024-02-02 | End: 2024-02-02 | Stop reason: SDUPTHER

## 2024-02-02 RX ORDER — SODIUM CHLORIDE 9 MG/ML
INJECTION, SOLUTION INTRAVENOUS PRN
Status: DISCONTINUED | OUTPATIENT
Start: 2024-02-02 | End: 2024-02-02 | Stop reason: HOSPADM

## 2024-02-02 RX ORDER — FENTANYL CITRATE 0.05 MG/ML
50 INJECTION, SOLUTION INTRAMUSCULAR; INTRAVENOUS ONCE
Status: COMPLETED | OUTPATIENT
Start: 2024-02-02 | End: 2024-02-02

## 2024-02-02 RX ORDER — HYDROMORPHONE HYDROCHLORIDE 1 MG/ML
0.5 INJECTION, SOLUTION INTRAMUSCULAR; INTRAVENOUS; SUBCUTANEOUS EVERY 5 MIN PRN
Status: DISCONTINUED | OUTPATIENT
Start: 2024-02-02 | End: 2024-02-02 | Stop reason: HOSPADM

## 2024-02-02 RX ORDER — SODIUM CHLORIDE 0.9 % (FLUSH) 0.9 %
5-40 SYRINGE (ML) INJECTION PRN
Status: DISCONTINUED | OUTPATIENT
Start: 2024-02-02 | End: 2024-02-02 | Stop reason: HOSPADM

## 2024-02-02 RX ORDER — 0.9 % SODIUM CHLORIDE 0.9 %
1000 INTRAVENOUS SOLUTION INTRAVENOUS ONCE
Status: COMPLETED | OUTPATIENT
Start: 2024-02-02 | End: 2024-02-02

## 2024-02-02 RX ORDER — SENNA AND DOCUSATE SODIUM 50; 8.6 MG/1; MG/1
1 TABLET, FILM COATED ORAL 2 TIMES DAILY
Status: DISCONTINUED | OUTPATIENT
Start: 2024-02-02 | End: 2024-02-03 | Stop reason: HOSPADM

## 2024-02-02 RX ORDER — SODIUM CHLORIDE 9 MG/ML
INJECTION, SOLUTION INTRAVENOUS PRN
Status: DISCONTINUED | OUTPATIENT
Start: 2024-02-02 | End: 2024-02-03 | Stop reason: HOSPADM

## 2024-02-02 RX ORDER — OXYCODONE HYDROCHLORIDE 5 MG/1
5 TABLET ORAL EVERY 4 HOURS PRN
Status: DISCONTINUED | OUTPATIENT
Start: 2024-02-02 | End: 2024-02-03 | Stop reason: HOSPADM

## 2024-02-02 RX ORDER — TRANEXAMIC ACID 100 MG/ML
1000 INJECTION, SOLUTION INTRAVENOUS ONCE
Status: COMPLETED | OUTPATIENT
Start: 2024-02-02 | End: 2024-02-02

## 2024-02-02 RX ORDER — SODIUM CHLORIDE, SODIUM LACTATE, POTASSIUM CHLORIDE, CALCIUM CHLORIDE 600; 310; 30; 20 MG/100ML; MG/100ML; MG/100ML; MG/100ML
INJECTION, SOLUTION INTRAVENOUS CONTINUOUS
Status: DISCONTINUED | OUTPATIENT
Start: 2024-02-02 | End: 2024-02-03

## 2024-02-02 RX ORDER — ONDANSETRON 4 MG/1
4 TABLET, ORALLY DISINTEGRATING ORAL EVERY 8 HOURS PRN
Status: DISCONTINUED | OUTPATIENT
Start: 2024-02-02 | End: 2024-02-03 | Stop reason: HOSPADM

## 2024-02-02 RX ORDER — TAMSULOSIN HYDROCHLORIDE 0.4 MG/1
0.4 CAPSULE ORAL DAILY
Status: DISCONTINUED | OUTPATIENT
Start: 2024-02-03 | End: 2024-02-03 | Stop reason: HOSPADM

## 2024-02-02 RX ORDER — ONDANSETRON 2 MG/ML
4 INJECTION INTRAMUSCULAR; INTRAVENOUS EVERY 6 HOURS PRN
Status: DISCONTINUED | OUTPATIENT
Start: 2024-02-02 | End: 2024-02-03 | Stop reason: HOSPADM

## 2024-02-02 RX ORDER — VASOPRESSIN 20 U/ML
INJECTION PARENTERAL PRN
Status: DISCONTINUED | OUTPATIENT
Start: 2024-02-02 | End: 2024-02-02 | Stop reason: SDUPTHER

## 2024-02-02 RX ORDER — PROPOFOL 10 MG/ML
INJECTION, EMULSION INTRAVENOUS PRN
Status: DISCONTINUED | OUTPATIENT
Start: 2024-02-02 | End: 2024-02-02 | Stop reason: SDUPTHER

## 2024-02-02 RX ORDER — DROPERIDOL 2.5 MG/ML
0.62 INJECTION, SOLUTION INTRAMUSCULAR; INTRAVENOUS
Status: DISCONTINUED | OUTPATIENT
Start: 2024-02-02 | End: 2024-02-02 | Stop reason: HOSPADM

## 2024-02-02 RX ORDER — POLYETHYLENE GLYCOL 3350 17 G/17G
17 POWDER, FOR SOLUTION ORAL DAILY
Status: DISCONTINUED | OUTPATIENT
Start: 2024-02-03 | End: 2024-02-03 | Stop reason: HOSPADM

## 2024-02-02 RX ORDER — FENTANYL CITRATE 50 UG/ML
INJECTION, SOLUTION INTRAMUSCULAR; INTRAVENOUS PRN
Status: DISCONTINUED | OUTPATIENT
Start: 2024-02-02 | End: 2024-02-02 | Stop reason: SDUPTHER

## 2024-02-02 RX ORDER — CALCIUM CHLORIDE 100 MG/ML
INJECTION INTRAVENOUS; INTRAVENTRICULAR PRN
Status: DISCONTINUED | OUTPATIENT
Start: 2024-02-02 | End: 2024-02-02 | Stop reason: SDUPTHER

## 2024-02-02 RX ORDER — ROCURONIUM BROMIDE 10 MG/ML
INJECTION, SOLUTION INTRAVENOUS PRN
Status: DISCONTINUED | OUTPATIENT
Start: 2024-02-02 | End: 2024-02-02 | Stop reason: SDUPTHER

## 2024-02-02 RX ORDER — ACETAMINOPHEN 500 MG
1000 TABLET ORAL EVERY 8 HOURS SCHEDULED
Status: DISCONTINUED | OUTPATIENT
Start: 2024-02-02 | End: 2024-02-03 | Stop reason: HOSPADM

## 2024-02-02 RX ORDER — CEFAZOLIN SODIUM 1 G/3ML
INJECTION, POWDER, FOR SOLUTION INTRAMUSCULAR; INTRAVENOUS PRN
Status: DISCONTINUED | OUTPATIENT
Start: 2024-02-02 | End: 2024-02-02 | Stop reason: SDUPTHER

## 2024-02-02 RX ORDER — TETANUS AND DIPHTHERIA TOXOIDS ADSORBED 2; 2 [LF]/.5ML; [LF]/.5ML
0.5 INJECTION INTRAMUSCULAR ONCE
Status: COMPLETED | OUTPATIENT
Start: 2024-02-02 | End: 2024-02-02

## 2024-02-02 RX ORDER — LIDOCAINE HYDROCHLORIDE 20 MG/ML
INJECTION, SOLUTION INTRAVENOUS PRN
Status: DISCONTINUED | OUTPATIENT
Start: 2024-02-02 | End: 2024-02-02 | Stop reason: SDUPTHER

## 2024-02-02 RX ORDER — OXYCODONE HYDROCHLORIDE 5 MG/1
5 TABLET ORAL
Status: DISCONTINUED | OUTPATIENT
Start: 2024-02-02 | End: 2024-02-02 | Stop reason: HOSPADM

## 2024-02-02 RX ORDER — ACETAMINOPHEN 325 MG/1
650 TABLET ORAL
Status: DISCONTINUED | OUTPATIENT
Start: 2024-02-02 | End: 2024-02-02 | Stop reason: HOSPADM

## 2024-02-02 RX ORDER — SODIUM CHLORIDE 9 MG/ML
INJECTION, SOLUTION INTRAVENOUS CONTINUOUS PRN
Status: DISCONTINUED | OUTPATIENT
Start: 2024-02-02 | End: 2024-02-02 | Stop reason: SDUPTHER

## 2024-02-02 RX ORDER — SODIUM CHLORIDE 0.9 % (FLUSH) 0.9 %
5-40 SYRINGE (ML) INJECTION EVERY 12 HOURS SCHEDULED
Status: DISCONTINUED | OUTPATIENT
Start: 2024-02-02 | End: 2024-02-02 | Stop reason: HOSPADM

## 2024-02-02 RX ORDER — SODIUM CHLORIDE 0.9 % (FLUSH) 0.9 %
5-40 SYRINGE (ML) INJECTION EVERY 12 HOURS SCHEDULED
Status: DISCONTINUED | OUTPATIENT
Start: 2024-02-02 | End: 2024-02-03 | Stop reason: HOSPADM

## 2024-02-02 RX ORDER — DEXTROSE MONOHYDRATE 100 MG/ML
INJECTION, SOLUTION INTRAVENOUS CONTINUOUS PRN
Status: DISCONTINUED | OUTPATIENT
Start: 2024-02-02 | End: 2024-02-02 | Stop reason: HOSPADM

## 2024-02-02 RX ORDER — TRANEXAMIC ACID 100 MG/ML
1000 INJECTION, SOLUTION INTRAVENOUS ONCE
Status: DISCONTINUED | OUTPATIENT
Start: 2024-02-02 | End: 2024-02-02

## 2024-02-02 RX ORDER — HYDRALAZINE HYDROCHLORIDE 20 MG/ML
10 INJECTION INTRAMUSCULAR; INTRAVENOUS
Status: DISCONTINUED | OUTPATIENT
Start: 2024-02-02 | End: 2024-02-02 | Stop reason: HOSPADM

## 2024-02-02 RX ORDER — OXYCODONE HYDROCHLORIDE 10 MG/1
10 TABLET ORAL EVERY 4 HOURS PRN
Status: DISCONTINUED | OUTPATIENT
Start: 2024-02-02 | End: 2024-02-03 | Stop reason: HOSPADM

## 2024-02-02 RX ADMIN — FENTANYL CITRATE 50 MCG: 0.05 INJECTION, SOLUTION INTRAMUSCULAR; INTRAVENOUS at 18:50

## 2024-02-02 RX ADMIN — OXYCODONE HYDROCHLORIDE 10 MG: 10 TABLET ORAL at 22:51

## 2024-02-02 RX ADMIN — LORAZEPAM 1 MG: 2 INJECTION INTRAMUSCULAR; INTRAVENOUS at 17:07

## 2024-02-02 RX ADMIN — ACETAMINOPHEN 1000 MG: 500 TABLET ORAL at 22:50

## 2024-02-02 RX ADMIN — CEFAZOLIN 2 G: 1 INJECTION, POWDER, FOR SOLUTION INTRAMUSCULAR; INTRAVENOUS at 18:03

## 2024-02-02 RX ADMIN — FENTANYL CITRATE 100 MCG: 50 INJECTION, SOLUTION INTRAMUSCULAR; INTRAVENOUS at 17:37

## 2024-02-02 RX ADMIN — VASOPRESSIN 2 UNITS: 20 INJECTION INTRAVENOUS at 18:10

## 2024-02-02 RX ADMIN — ONDANSETRON 4 MG: 2 INJECTION INTRAMUSCULAR; INTRAVENOUS at 18:39

## 2024-02-02 RX ADMIN — FENTANYL CITRATE 50 MCG: 0.05 INJECTION, SOLUTION INTRAMUSCULAR; INTRAVENOUS at 16:31

## 2024-02-02 RX ADMIN — PROPOFOL 150 MG: 10 INJECTION, EMULSION INTRAVENOUS at 17:55

## 2024-02-02 RX ADMIN — TRANEXAMIC ACID 1000 MG: 100 INJECTION, SOLUTION INTRAVENOUS at 17:10

## 2024-02-02 RX ADMIN — ROCURONIUM BROMIDE 30 MG: 10 INJECTION, SOLUTION INTRAVENOUS at 18:04

## 2024-02-02 RX ADMIN — Medication 200 MG: at 17:55

## 2024-02-02 RX ADMIN — DEXAMETHASONE SODIUM PHOSPHATE 10 MG: 10 INJECTION INTRAMUSCULAR; INTRAVENOUS at 17:55

## 2024-02-02 RX ADMIN — SODIUM CHLORIDE, POTASSIUM CHLORIDE, SODIUM LACTATE AND CALCIUM CHLORIDE: 600; 310; 30; 20 INJECTION, SOLUTION INTRAVENOUS at 23:06

## 2024-02-02 RX ADMIN — SUGAMMADEX 200 MG: 100 INJECTION, SOLUTION INTRAVENOUS at 18:37

## 2024-02-02 RX ADMIN — FENTANYL CITRATE 50 MCG: 0.05 INJECTION, SOLUTION INTRAMUSCULAR; INTRAVENOUS at 17:55

## 2024-02-02 RX ADMIN — SODIUM CHLORIDE: 9 INJECTION, SOLUTION INTRAVENOUS at 17:48

## 2024-02-02 RX ADMIN — SODIUM CHLORIDE 1000 ML: 9 INJECTION, SOLUTION INTRAVENOUS at 16:30

## 2024-02-02 RX ADMIN — FENTANYL CITRATE 50 MCG: 0.05 INJECTION, SOLUTION INTRAMUSCULAR; INTRAVENOUS at 17:00

## 2024-02-02 RX ADMIN — MIDAZOLAM 2 MG: 1 INJECTION INTRAMUSCULAR; INTRAVENOUS at 17:36

## 2024-02-02 RX ADMIN — LIDOCAINE HYDROCHLORIDE 100 MG: 20 INJECTION, SOLUTION INTRAVENOUS at 17:55

## 2024-02-02 RX ADMIN — CEFAZOLIN 2000 MG: 2 INJECTION, POWDER, FOR SOLUTION INTRAMUSCULAR; INTRAVENOUS at 16:38

## 2024-02-02 RX ADMIN — TETANUS AND DIPHTHERIA TOXOIDS ADSORBED 0.5 ML: 2; 2 INJECTION INTRAMUSCULAR at 17:42

## 2024-02-02 RX ADMIN — SENNOSIDES AND DOCUSATE SODIUM 1 TABLET: 8.6; 5 TABLET ORAL at 22:51

## 2024-02-02 RX ADMIN — CALCIUM CHLORIDE INJECTION 1 G: 100 INJECTION, SOLUTION INTRAVENOUS at 18:11

## 2024-02-02 RX ADMIN — SODIUM BICARBONATE 50 MEQ: 84 INJECTION INTRAVENOUS at 18:19

## 2024-02-02 RX ADMIN — Medication 10 ML: at 23:22

## 2024-02-02 ASSESSMENT — PAIN SCALES - GENERAL
PAINLEVEL_OUTOF10: 10
PAINLEVEL_OUTOF10: 4
PAINLEVEL_OUTOF10: 5
PAINLEVEL_OUTOF10: 4
PAINLEVEL_OUTOF10: 8

## 2024-02-02 ASSESSMENT — PAIN DESCRIPTION - DESCRIPTORS
DESCRIPTORS: DISCOMFORT;ACHING;SORE
DESCRIPTORS: ACHING;DISCOMFORT;SORE;BURNING

## 2024-02-02 ASSESSMENT — LIFESTYLE VARIABLES
HOW MANY STANDARD DRINKS CONTAINING ALCOHOL DO YOU HAVE ON A TYPICAL DAY: PATIENT DOES NOT DRINK
HOW OFTEN DO YOU HAVE A DRINK CONTAINING ALCOHOL: 4 OR MORE TIMES A WEEK
HOW OFTEN DO YOU HAVE A DRINK CONTAINING ALCOHOL: NEVER
HOW MANY STANDARD DRINKS CONTAINING ALCOHOL DO YOU HAVE ON A TYPICAL DAY: 10 OR MORE

## 2024-02-02 ASSESSMENT — PAIN DESCRIPTION - ORIENTATION
ORIENTATION: RIGHT
ORIENTATION: RIGHT

## 2024-02-02 ASSESSMENT — PAIN DESCRIPTION - LOCATION
LOCATION: ARM
LOCATION: ARM

## 2024-02-02 ASSESSMENT — PAIN - FUNCTIONAL ASSESSMENT
PAIN_FUNCTIONAL_ASSESSMENT: ADULT NONVERBAL PAIN SCALE (NPVS)
PAIN_FUNCTIONAL_ASSESSMENT: 0-10

## 2024-02-02 ASSESSMENT — PAIN DESCRIPTION - FREQUENCY: FREQUENCY: CONTINUOUS

## 2024-02-02 ASSESSMENT — PAIN DESCRIPTION - PAIN TYPE: TYPE: ACUTE PAIN

## 2024-02-02 NOTE — ED NOTES
Patient report called to [facility]. [facility] at bedside for transportation.

## 2024-02-02 NOTE — OP NOTE
Operative Note      Patient: Clark Maza  YOB: 1950  MRN: [id number]    Date of Procedure: 2/2/2024    Pre-Op Diagnosis Codes:     * Trauma [T14.90XA]    Post-Op Diagnosis: Same and laceration to right upper extremity with laceration of right radial artery, laceration  of flexor compartment muscles       Procedure(s):  RIGHT ARM EXPLORATION AND CONTROL OF HEMORRHAGE    Surgeon(s):  Kirill Poole MD    Assistant:   Resident: Miranda Yu DO; Jhonny Weldon DO    Anesthesia: General    Estimated Blood Loss (mL): Minimal    Complications: Hypovolemia, Hypotension    Specimens:   * No specimens in log *    Implants:  * No implants in log *      Drains: * No LDAs found *    Findings: transection of right radial artery in mid forearm.  Injury to muscles in the flexor compartment - FDS, flexor carpi radialis, brachioradialis. Dopplar flow in brachial artery, difficult to doppler ulnar signal but retrograde flow present in distal right radial artery at wrist from ulnar artery.      HISTORY: Clark Maza is a 73 y.o. male who is right-handed presented as a trauma team after he sustained a laceration to right upper extremity with active hemorrhage.  Exploration of right upper extremity wound with control of hemorrhage was recommended.  The patient was consented verbally in the trauma bay as this procedure was emergent.  He received 2 g of Ancef at outside hospital prior to arrival within an hour of procedure time.  Tetanus was updated.    DESCRIPTION OF PROCEDURE: The patient was brought to the operating room and positioned supine on the OR table. Sequential compression devices were placed on the patient's lower extremities and functioning. Preoperative antibiotics were administered. Anesthesia was obtained without complication as per the anesthesia record. Immediately prior to the procedure a time-out was called and the surgical checklist was reviewed and agreed upon by all  present. The patient was prepped and draped in the usual sterile fashion.    The right upper extremity laceration was inspected revealing completely transected right radial artery in the mid forearm.  The proximal and distal ends of the artery were ligated with 2-0 silk tie.  Inspection of the wound revealed injury to muscles in the flexor compartment -FDS, flexor carpi radialis, brachial radialis muscles.  The wound was irrigated with copious amounts of sterile saline.  Small bleeders were controlled with electrocautery.     The patient had an episode of hypotension during the case that resolved with administration of 1 unit of packed red blood cells and IV fluids.    Brachial artery signal was present at the antecubital fossa and multiphasic.  It was difficult to find a ulnar signal at the wrist however patient did have flow in the distal radial artery at the wrist and when the ulnar artery was occluded with pressure the flow ceased suggesting that the ulnar artery is patent.    The fascia was closed with 2-0 Vicryl in a running fashion.  Nonviable skin edges were sharply debrided.  The skin was closed with 3-0 nylon interrupted vertical mattress sutures.  Sterile dressing of Xeroform, 4 x 4's, ABD and Kerlix were applied.    Needle, sponge, and instrument counts were reported as correct x2. Dr. Poole was present and scrubbed throughout the case. The patient tolerated the procedure well without complications. He was transferred to the recovery area in good condition.    Electronically signed by Miranda Yu DO on 2/2/24 at 6:56 PM EST      I agree and was present for all critical aspects of the operation.    Kirill Poole MD

## 2024-02-02 NOTE — ED PROVIDER NOTES
Name: Clakr Maza    MRN: [id number]     Date / Time Roomed:  2/2/2024  4:25 PM  ED Bed Assignment:  01/01    ------------------ History of Present Illness --------------------  2/2/24, Time: 4:29 PM EST   Chief Complaint   Patient presents with    Laceration     Patient was using a  and cut his right forearm. Per EMS its to the bone. Tourniquet 1610. 10cm in length.      HPI    Clark Maza is a 73 y.o. male, with hx of COPD, cardiomyopathy, hypertension, hyperlipidemia, previous MI, on aspirin Plavix,, who presents to the ED today for right forearm avulsion which occurred approximately 30 minutes ago, patient brought in by EMS, EMS did place a tourniquet on the right arm due to difficulty controlling bleeding from cut.  Patient related he was using a  and hit his right arm.  Patient also relates small cut to the right lower leg.  Denies any other injuries.  The pt denies other ROS at this time.     PCP: Chance Jama DO.    -------------------- PMH --------------------    Past Medical History:   has a past medical history of Anesthesia complication, Arthritis, CAD (coronary artery disease), Cardiomyopathy ([facility]), COPD (chronic obstructive pulmonary disease) ([facility]), Erectile dysfunction, GERD (gastroesophageal reflux disease), H/O coronary angioplasty with stents[V45.82] (9/2018 another stent), HFrEF (heart failure with reduced ejection fraction) ([facility]), Hyperlipidemia, Hypertension, Lymphoma ([facility]) (11/04/2011), Myocardial infarction (HCC), Sleep apnea, and Unspecified cerebral artery occlusion with cerebral infarction (2005).     Surgical History:  Past Surgical History:   Procedure Laterality Date    ANKLE FRACTURE SURGERY Right 03/2014    ORIF right ankle    ANKLE SURGERY  2007    rt ankle    BRONCHOSCOPY  09/2011    bronch / medistinoscopy    CAROTID ENDARTERECTOMY Left     12 yrs ago    CHOLECYSTECTOMY      COLONOSCOPY  07/30/2013    DR MULLINS    CORONARY ANGIOPLASTY WITH  ---------------    Final Impression:   1. Avulsion of right forearm, initial encounter    2. Laceration of right lower leg, initial encounter        Disposition:  Decision To Transfer 02/02/2024 04:29:29 PM    PATIENT REFERRED TO:  No follow-up provider specified.    DISCHARGE MEDICATIONS:  Discharge Medication List as of 2/2/2024  5:12 PM

## 2024-02-02 NOTE — H&P
TRAUMA HISTORY & PHYSICAL  Attending/Surgical Resident/Advance Practice Nurse  2/2/2024  5:57 PM    PRIMARY SURVEY    CHIEF COMPLAINT:  Trauma team.    Injury occurred sometime this afternoon. Patient had a right forearm injury with saw. He initially presented to [facility] where tourniquet was applied and there was still uncontrolled bleeding so he was brought to [facility]. Tourniquet was applied at 1610. Tourniquet taken down in trauma bay. There was active arterial bleeding from visible vessel. He had minimal sensation in his right hand. He is able to give thumbs up. He has ulnar signal on doppler. He also has a cut to his inner right calf. He does not know if he is on any blood thinner.     AIRWAY:   Airway Normal    EMS ETT Absent  Noisy respirations Absent  Retractions: Absent  Vomiting/bleeding: Absent    BREATHING:    Midaxillary breath sound left:  Normal  Midaxillary breath sound right:  Normal    Cough sound intensity:  good    FiO2:  15 L non-re breather mask    SMI not performed    CIRCULATION:   Femerol pulse intensity: Strong  Palpebral conjunctiva: Pink     Vitals:    02/02/24 1745   BP:    Pulse:    Resp: 14       Vitals:    02/02/24 1740 02/02/24 1744 02/02/24 1744 02/02/24 1745   BP:  (!) 110/50     Pulse: 61  63    Resp:    14        FAST EXAM: Deferred    Central Nervous System    GCS Initial 15 minutes   Eye  Motor  Verbal 3 - Opens eyes to loud noise or command  6 - Follows simple motor commands  5 - Alert and oriented 4 - Opens eyes on own  6 - Follows simple motor commands  5 - Alert and oriented     Neuromuscular blockade: No  Pupil size:  Left 3 mm    Right 3 mm  Pupil reaction: Yes    Wiggles fingers: Left Yes Right No  Wiggles toes: Left Yes   Right Yes    Hand grasp:   Left  Present      Right  Absent  Plantar flexion: Left  Present      Right   Present    Loss of consciousness:  unknown    History Obtained From:  Patient & EMS  Private Medical Doctor: No primary care  provider on file.    Pre-exisiting Medical History:  unknown    Conditions: patient does not recall    Medications: patient does not know    Allergies: unknown    Social History:   Tobacco use:  unknown  Alcohol use:   unknown  Illicit drug use:  unknown    Past Surgical History:  orthopedic surgery     Anticoagulant use: unknown  Antiplatelet use:   unknown    NSAID use in last 72 hours: unknown  Taken PCN in past:  unknown  Last food/drink: today  Last tetanus: updated in trauma bay     Family History:   No family history of anesthesia complications    Complaints:   Head:  None  Neck:   None  Chest:   None  Back:   None  Abdomen:   None  Extremities:   Severe  Comments: none    Review of systems:  All negative unless otherwise noted.        SECONDARY SURVEY  Head/scalp: Atraumatic       Face: Atraumatic    Eyes/ears/nose: Atraumatic      Pharynx/mouth: Atraumatic      Neck:  Atraumatic      Cervical spine tenderness:  Cervical collar not indicated  Pain:  none  ROM:  Normal    Chest wall:   Atraumatic       Heart:   Regular rate & rhythm    Abdomen:  Atraumatic.  Soft ND  Tenderness:  none    Pelvis: Atraumatic      Tenderness: none    Thoracolumbar spine: Atraumatic     Tenderness:  none    Genitourinary:  Atraumatic.  No blood or urine noted    Rectum: Atraumatic.  No blood noted.     Perineum: Atraumatic.  No blood or urine noted.      Extremities:     Right forearm laceration. There was active arterial bleeding from visible vessel. He had minimal sensation in his right hand. He is able to give thumbs up. He has ulnar signal on doppler. He also has a cut to his inner right calf    Sensory abnormal: see above  Motor abnormal: see above    Distal Pulses  Left arm normal  Right arm abnormal: see above  Left leg normal  Right leg normal    Capillary refill  Left arm normal  Right arm abnormal: see above  Left leg normal  Right leg normal    Procedures in ED:   none    In the event of Emergency Blood Transfusion:

## 2024-02-02 NOTE — ANESTHESIA PRE PROCEDURE
\"POCNA\", \"POCK\", \"POCCL\", \"POCBUN\", \"POCHEMO\", \"POCHCT\" in the last 72 hours.    Coags: No results found for: \"PROTIME\", \"INR\", \"APTT\"    HCG (If Applicable): No results found for: \"PREGTESTUR\", \"PREGSERUM\", \"HCG\", \"HCGQUANT\"     ABGs: No results found for: \"PHART\", \"PO2ART\", \"TOY3MFP\", \"YFP1JSU\", \"BEART\", \"J4RMNVTH\"     Type & Screen (If Applicable):  No results found for: \"LABABO\", \"LABRH\"    Drug/Infectious Status (If Applicable):  No results found for: \"HIV\", \"HEPCAB\"    COVID-19 Screening (If Applicable): No results found for: \"COVID19\"        Anesthesia Evaluation  Patient summary reviewed and Nursing notes reviewed   no history of anesthetic complications:   Airway: Mallampati: Unable to assess / NA          Dental:          Pulmonary: breath sounds clear to auscultation                             Cardiovascular:            Rhythm: regular  Rate: abnormal                    Neuro/Psych:               GI/Hepatic/Renal:             Endo/Other:                     Abdominal:             Vascular:          Other Findings:       Anesthesia Plan      general     ASA 4 - emergent       Induction: intravenous.  arterial line  MIPS: Postoperative opioids intended and Prophylactic antiemetics administered.  Anesthetic plan and risks discussed with Unable to obtain due to emergent nature.      Plan discussed with CRNA.                Tobias Fagan MD   2/2/2024

## 2024-02-02 NOTE — ANESTHESIA PROCEDURE NOTES
Arterial Line:    An arterial line was placed using surface landmarks, in the OR for the following indication(s): continuous blood pressure monitoring and blood sampling needed.    A 20 gauge (size), 1 and 3/8 inch (length), Arrow (type) catheter was placed, Seldinger technique not used, into the left radial artery, secured by tape.  Anesthesia type: Local  Local infiltration: Injection    Events:  patient tolerated procedure well with no complications.  Anesthesiologist: Tobias Fagan MD  Performed: Resident/CRNA   Preanesthetic Checklist  Completed: patient identified, IV checked, site marked, risks and benefits discussed, surgical/procedural consents, equipment checked, pre-op evaluation, timeout performed, anesthesia consent given, oxygen available and monitors applied/VS acknowledged

## 2024-02-02 NOTE — ED PROVIDER NOTES
[facility]  EMERGENCY DEPARTMENT ENCOUNTER        Pt Name: Clark Maza  MRN: [id number]  Birthdate 1950  Date of evaluation: 2/2/2024  Provider: Collette Obrien MD  PCP: No primary care provider on file.  Note Started: 5:47 PM EST 2/2/24    HPI  73 y.o. male sent from outside hospital for right arm laceration.  Patient lacerated his right arm on a .  He does not know if he is on blood thinners.  Tourniquet applied prior to arrival at 1610 outside hospital.  He received TXA outside hospital.      --------------------------------------------- PAST HISTORY ---------------------------------------------  Past Medical History:  has no past medical history on file.    Past Surgical History:  has no past surgical history on file.    Social History:  reports that he has been smoking cigarettes. He uses smokeless tobacco. He reports current alcohol use of about 14.0 standard drinks of alcohol per week.    Family History: family history is not on file.     The patient’s home medications have been reviewed.    Allergies: Patient has no known allergies.      Review of Systems   Unable to perform ROS: Acuity of condition        Physical Exam  Constitutional:       Appearance: He is not toxic-appearing.   HENT:      Head: Normocephalic and atraumatic.      Nose: Nose normal.   Eyes:      Pupils: Pupils are equal, round, and reactive to light.   Cardiovascular:      Rate and Rhythm: Normal rate and regular rhythm.      Comments: Ulna signal dopplerable  Pulmonary:      Effort: Pulmonary effort is normal.      Breath sounds: Normal breath sounds.   Abdominal:      General: There is no distension.      Palpations: Abdomen is soft.   Musculoskeletal:      Comments: Large laceration to right forearm, muscle exposed, persistently bleeding; laceration to right inner thigh   Neurological:      General: No focal deficit present.      GCS: GCS eye subscore is 3. GCS verbal subscore is 5. GCS motor subscore  bleed.  Trauma team called and ATLS protocol followed.  Patient arrived with tourniquet on right upper extremity, which was taken down in the trauma bay.  Patient with large right forearm laceration with bleeding.  Labs obtained.  Patient received fentanyl and Versed in trauma bay with the ED for pain and anxiety.  Patient transferred directly to OR per trauma surgery for wound exploration.    Differential diagnoses included but not limited to: arterial injury, laceration, fracture, etc.    Amount and/or Complexity of Data Reviewed  External Data Reviewed:      Details: No outpatient notes available for review  Labs: ordered.     Details: All labs reviewed by me  Radiology:      Details: X-ray right forearm-no obvious fracture  Discussion of management or test interpretation with external provider(s): I did speak to Dr. Hart from outside hospital prior to patient being transferred, discussed case    Risk  Prescription drug management.        Is this patient to be included in the SEP-1 core measure? No Exclusion criteria - the patient is NOT to be included for SEP-1 Core Measure due to: Infection is not suspected              --------------------------------- ADDITIONAL PROVIDER NOTES ---------------------------------    This patient's ED course included: a personal history and physicial examination, re-evaluation prior to disposition, multiple bedside re-evaluations, IV medications, cardiac monitoring, continuous pulse oximetry, and complex medical decision making and emergency management    This patient has remained hemodynamically stable during their ED course.    Please note that the withdrawal or failure to initiate urgent interventions for this patient would likely result in a life threatening deterioration or permanent disability.      Accordingly this patient received 30 minutes of critical care time, excluding separately billable procedures.      Clinical Impression  1. Laceration of right upper extremity,

## 2024-02-03 VITALS
HEIGHT: 69 IN | HEIGHT: 69 IN | OXYGEN SATURATION: 90 % | BODY MASS INDEX: 33.33 KG/M2 | RESPIRATION RATE: 16 BRPM | WEIGHT: 225 LBS | SYSTOLIC BLOOD PRESSURE: 134 MMHG | BODY MASS INDEX: 33.33 KG/M2 | HEART RATE: 69 BPM | OXYGEN SATURATION: 90 % | WEIGHT: 225 LBS | SYSTOLIC BLOOD PRESSURE: 134 MMHG | DIASTOLIC BLOOD PRESSURE: 44 MMHG | TEMPERATURE: 98.3 F | HEART RATE: 69 BPM | TEMPERATURE: 98.3 F | RESPIRATION RATE: 16 BRPM | DIASTOLIC BLOOD PRESSURE: 44 MMHG

## 2024-02-03 LAB
ANION GAP SERPL CALCULATED.3IONS-SCNC: 11 MMOL/L (ref 7–16)
ANION GAP SERPL CALCULATED.3IONS-SCNC: 11 MMOL/L (ref 7–16)
BASOPHILS # BLD: 0.02 K/UL (ref 0–0.2)
BASOPHILS # BLD: 0.02 K/UL (ref 0–0.2)
BASOPHILS NFR BLD: 0 % (ref 0–2)
BASOPHILS NFR BLD: 0 % (ref 0–2)
BUN SERPL-MCNC: 23 MG/DL (ref 6–23)
BUN SERPL-MCNC: 23 MG/DL (ref 6–23)
CALCIUM SERPL-MCNC: 9.1 MG/DL (ref 8.6–10.2)
CALCIUM SERPL-MCNC: 9.1 MG/DL (ref 8.6–10.2)
CHLORIDE SERPL-SCNC: 106 MMOL/L (ref 98–107)
CHLORIDE SERPL-SCNC: 106 MMOL/L (ref 98–107)
CO2 SERPL-SCNC: 24 MMOL/L (ref 22–29)
CO2 SERPL-SCNC: 24 MMOL/L (ref 22–29)
CREAT SERPL-MCNC: 1.3 MG/DL (ref 0.7–1.2)
CREAT SERPL-MCNC: 1.3 MG/DL (ref 0.7–1.2)
EOSINOPHIL # BLD: 0 K/UL (ref 0.05–0.5)
EOSINOPHIL # BLD: 0 K/UL (ref 0.05–0.5)
EOSINOPHILS RELATIVE PERCENT: 0 % (ref 0–6)
EOSINOPHILS RELATIVE PERCENT: 0 % (ref 0–6)
ERYTHROCYTE [DISTWIDTH] IN BLOOD BY AUTOMATED COUNT: 13.7 % (ref 11.5–15)
ERYTHROCYTE [DISTWIDTH] IN BLOOD BY AUTOMATED COUNT: 13.7 % (ref 11.5–15)
GFR SERPL CREATININE-BSD FRML MDRD: 58 ML/MIN/1.73M2
GFR SERPL CREATININE-BSD FRML MDRD: 58 ML/MIN/1.73M2
GLUCOSE SERPL-MCNC: 138 MG/DL (ref 74–99)
GLUCOSE SERPL-MCNC: 138 MG/DL (ref 74–99)
HCT VFR BLD AUTO: 35.9 % (ref 37–54)
HCT VFR BLD AUTO: 35.9 % (ref 37–54)
HGB BLD-MCNC: 11.6 G/DL (ref 12.5–16.5)
HGB BLD-MCNC: 11.6 G/DL (ref 12.5–16.5)
IMM GRANULOCYTES # BLD AUTO: 0.08 K/UL (ref 0–0.58)
IMM GRANULOCYTES # BLD AUTO: 0.08 K/UL (ref 0–0.58)
IMM GRANULOCYTES NFR BLD: 1 % (ref 0–5)
IMM GRANULOCYTES NFR BLD: 1 % (ref 0–5)
LYMPHOCYTES NFR BLD: 1 K/UL (ref 1.5–4)
LYMPHOCYTES NFR BLD: 1 K/UL (ref 1.5–4)
LYMPHOCYTES RELATIVE PERCENT: 8 % (ref 20–42)
LYMPHOCYTES RELATIVE PERCENT: 8 % (ref 20–42)
MCH RBC QN AUTO: 28.7 PG (ref 26–35)
MCH RBC QN AUTO: 28.7 PG (ref 26–35)
MCHC RBC AUTO-ENTMCNC: 32.3 G/DL (ref 32–34.5)
MCHC RBC AUTO-ENTMCNC: 32.3 G/DL (ref 32–34.5)
MCV RBC AUTO: 88.9 FL (ref 80–99.9)
MCV RBC AUTO: 88.9 FL (ref 80–99.9)
MONOCYTES NFR BLD: 0.59 K/UL (ref 0.1–0.95)
MONOCYTES NFR BLD: 0.59 K/UL (ref 0.1–0.95)
MONOCYTES NFR BLD: 5 % (ref 2–12)
MONOCYTES NFR BLD: 5 % (ref 2–12)
NEUTROPHILS NFR BLD: 87 % (ref 43–80)
NEUTROPHILS NFR BLD: 87 % (ref 43–80)
NEUTS SEG NFR BLD: 11.18 K/UL (ref 1.8–7.3)
NEUTS SEG NFR BLD: 11.18 K/UL (ref 1.8–7.3)
PLATELET # BLD AUTO: 249 K/UL (ref 130–450)
PLATELET # BLD AUTO: 249 K/UL (ref 130–450)
PMV BLD AUTO: 11.6 FL (ref 7–12)
PMV BLD AUTO: 11.6 FL (ref 7–12)
POTASSIUM SERPL-SCNC: 4.2 MMOL/L (ref 3.5–5)
POTASSIUM SERPL-SCNC: 4.2 MMOL/L (ref 3.5–5)
RBC # BLD AUTO: 4.04 M/UL (ref 3.8–5.8)
RBC # BLD AUTO: 4.04 M/UL (ref 3.8–5.8)
SODIUM SERPL-SCNC: 141 MMOL/L (ref 132–146)
SODIUM SERPL-SCNC: 141 MMOL/L (ref 132–146)
WBC OTHER # BLD: 12.9 K/UL (ref 4.5–11.5)
WBC OTHER # BLD: 12.9 K/UL (ref 4.5–11.5)

## 2024-02-03 PROCEDURE — 36415 COLL VENOUS BLD VENIPUNCTURE: CPT

## 2024-02-03 PROCEDURE — P9016 RBC LEUKOCYTES REDUCED: HCPCS

## 2024-02-03 PROCEDURE — 80048 BASIC METABOLIC PNL TOTAL CA: CPT

## 2024-02-03 PROCEDURE — 94640 AIRWAY INHALATION TREATMENT: CPT

## 2024-02-03 PROCEDURE — 2580000003 HC RX 258: Performed by: STUDENT IN AN ORGANIZED HEALTH CARE EDUCATION/TRAINING PROGRAM

## 2024-02-03 PROCEDURE — 6360000002 HC RX W HCPCS: Performed by: STUDENT IN AN ORGANIZED HEALTH CARE EDUCATION/TRAINING PROGRAM

## 2024-02-03 PROCEDURE — 6370000000 HC RX 637 (ALT 250 FOR IP): Performed by: STUDENT IN AN ORGANIZED HEALTH CARE EDUCATION/TRAINING PROGRAM

## 2024-02-03 PROCEDURE — 6360000002 HC RX W HCPCS

## 2024-02-03 PROCEDURE — G0378 HOSPITAL OBSERVATION PER HR: HCPCS

## 2024-02-03 PROCEDURE — 85025 COMPLETE CBC W/AUTO DIFF WBC: CPT

## 2024-02-03 PROCEDURE — 96372 THER/PROPH/DIAG INJ SC/IM: CPT

## 2024-02-03 RX ORDER — GUAIFENESIN 600 MG/1
600 TABLET, EXTENDED RELEASE ORAL 2 TIMES DAILY
COMMUNITY
End: 2024-02-06

## 2024-02-03 RX ORDER — HYDROCODONE BITARTRATE AND ACETAMINOPHEN 7.5; 325 MG/1; MG/1
1 TABLET ORAL EVERY 6 HOURS PRN
COMMUNITY
End: 2024-04-12

## 2024-02-03 RX ORDER — ASPIRIN 81 MG/1
81 TABLET, CHEWABLE ORAL DAILY
COMMUNITY
End: 2024-02-06

## 2024-02-03 RX ORDER — POTASSIUM CHLORIDE 750 MG/1
10 CAPSULE, EXTENDED RELEASE ORAL DAILY
COMMUNITY
End: 2024-03-19

## 2024-02-03 RX ORDER — OMEPRAZOLE 20 MG/1
40 CAPSULE, DELAYED RELEASE ORAL DAILY
COMMUNITY
End: 2024-03-19

## 2024-02-03 RX ORDER — FENOFIBRATE 145 MG/1
145 TABLET, COATED ORAL DAILY
COMMUNITY
End: 2024-03-19

## 2024-02-03 RX ORDER — FLUTICASONE FUROATE, UMECLIDINIUM BROMIDE AND VILANTEROL TRIFENATATE 100; 62.5; 25 UG/1; UG/1; UG/1
1 POWDER RESPIRATORY (INHALATION) DAILY
COMMUNITY
End: 2024-02-20

## 2024-02-03 RX ORDER — HYDRALAZINE HYDROCHLORIDE 25 MG/1
25 TABLET, FILM COATED ORAL 2 TIMES DAILY
Status: DISCONTINUED | OUTPATIENT
Start: 2024-02-03 | End: 2024-02-03 | Stop reason: HOSPADM

## 2024-02-03 RX ORDER — MONTELUKAST SODIUM 10 MG/1
10 TABLET ORAL NIGHTLY
COMMUNITY
End: 2024-03-19

## 2024-02-03 RX ORDER — BUPROPION HYDROCHLORIDE 150 MG/1
150 TABLET ORAL 2 TIMES DAILY
COMMUNITY
End: 2024-03-19

## 2024-02-03 RX ORDER — SIMVASTATIN 20 MG
20 TABLET ORAL NIGHTLY
COMMUNITY
End: 2024-03-19

## 2024-02-03 RX ORDER — OXYCODONE HYDROCHLORIDE 5 MG/1
5 TABLET ORAL EVERY 4 HOURS PRN
Qty: 28 TABLET | Refills: 0 | Status: SHIPPED | OUTPATIENT
Start: 2024-02-03 | End: 2024-02-03 | Stop reason: HOSPADM

## 2024-02-03 RX ORDER — ARFORMOTEROL TARTRATE 15 UG/2ML
15 SOLUTION RESPIRATORY (INHALATION)
Status: DISCONTINUED | OUTPATIENT
Start: 2024-02-03 | End: 2024-02-03 | Stop reason: HOSPADM

## 2024-02-03 RX ORDER — CLOPIDOGREL BISULFATE 75 MG/1
75 TABLET ORAL DAILY
COMMUNITY
End: 2024-02-26

## 2024-02-03 RX ORDER — SERTRALINE HYDROCHLORIDE 100 MG/1
100 TABLET, FILM COATED ORAL DAILY
COMMUNITY
End: 2024-03-19

## 2024-02-03 RX ORDER — FENOFIBRATE 54 MG/1
54 TABLET ORAL DAILY
Status: DISCONTINUED | OUTPATIENT
Start: 2024-02-03 | End: 2024-02-03 | Stop reason: HOSPADM

## 2024-02-03 RX ORDER — CARVEDILOL 3.12 MG/1
3.12 TABLET ORAL DAILY
Status: DISCONTINUED | OUTPATIENT
Start: 2024-02-03 | End: 2024-02-03 | Stop reason: HOSPADM

## 2024-02-03 RX ORDER — ATORVASTATIN CALCIUM 20 MG/1
20 TABLET, FILM COATED ORAL DAILY
Status: DISCONTINUED | OUTPATIENT
Start: 2024-02-03 | End: 2024-02-03 | Stop reason: HOSPADM

## 2024-02-03 RX ORDER — FUROSEMIDE 20 MG/1
20 TABLET ORAL DAILY
COMMUNITY
End: 2024-03-19

## 2024-02-03 RX ORDER — BUPROPION HYDROCHLORIDE 150 MG/1
150 TABLET ORAL 2 TIMES DAILY
Status: DISCONTINUED | OUTPATIENT
Start: 2024-02-03 | End: 2024-02-03 | Stop reason: HOSPADM

## 2024-02-03 RX ORDER — HYDRALAZINE HYDROCHLORIDE 25 MG/1
25 TABLET, FILM COATED ORAL 2 TIMES DAILY
COMMUNITY

## 2024-02-03 RX ORDER — BUDESONIDE 0.25 MG/2ML
250 INHALANT ORAL 2 TIMES DAILY
Status: DISCONTINUED | OUTPATIENT
Start: 2024-02-03 | End: 2024-02-03 | Stop reason: HOSPADM

## 2024-02-03 RX ORDER — TAMSULOSIN HYDROCHLORIDE 0.4 MG/1
0.4 CAPSULE ORAL DAILY
COMMUNITY
End: 2024-03-19

## 2024-02-03 RX ORDER — CARVEDILOL 3.12 MG/1
3.12 TABLET ORAL DAILY
COMMUNITY
End: 2024-03-19

## 2024-02-03 RX ADMIN — SENNOSIDES AND DOCUSATE SODIUM 1 TABLET: 8.6; 5 TABLET ORAL at 08:24

## 2024-02-03 RX ADMIN — ACETAMINOPHEN 1000 MG: 500 TABLET ORAL at 05:23

## 2024-02-03 RX ADMIN — POLYETHYLENE GLYCOL 3350 17 G: 17 POWDER, FOR SOLUTION ORAL at 08:24

## 2024-02-03 RX ADMIN — TAMSULOSIN HYDROCHLORIDE 0.4 MG: 0.4 CAPSULE ORAL at 08:24

## 2024-02-03 RX ADMIN — SERTRALINE HYDROCHLORIDE 100 MG: 50 TABLET ORAL at 08:24

## 2024-02-03 RX ADMIN — HEPARIN SODIUM 5000 UNITS: 10000 INJECTION INTRAVENOUS; SUBCUTANEOUS at 05:23

## 2024-02-03 RX ADMIN — IPRATROPIUM BROMIDE 0.5 MG: 0.5 SOLUTION RESPIRATORY (INHALATION) at 12:49

## 2024-02-03 RX ADMIN — OXYCODONE HYDROCHLORIDE 10 MG: 10 TABLET ORAL at 11:57

## 2024-02-03 RX ADMIN — OXYCODONE HYDROCHLORIDE 10 MG: 10 TABLET ORAL at 03:02

## 2024-02-03 RX ADMIN — Medication 10 ML: at 08:25

## 2024-02-03 ASSESSMENT — PAIN DESCRIPTION - ORIENTATION
ORIENTATION: LEFT
ORIENTATION: RIGHT

## 2024-02-03 ASSESSMENT — PAIN SCALES - GENERAL
PAINLEVEL_OUTOF10: 5
PAINLEVEL_OUTOF10: 8
PAINLEVEL_OUTOF10: 7
PAINLEVEL_OUTOF10: 5

## 2024-02-03 ASSESSMENT — PAIN - FUNCTIONAL ASSESSMENT
PAIN_FUNCTIONAL_ASSESSMENT: 0-10
PAIN_FUNCTIONAL_ASSESSMENT: PREVENTS OR INTERFERES SOME ACTIVE ACTIVITIES AND ADLS
PAIN_FUNCTIONAL_ASSESSMENT: PREVENTS OR INTERFERES SOME ACTIVE ACTIVITIES AND ADLS

## 2024-02-03 ASSESSMENT — PAIN DESCRIPTION - DESCRIPTORS
DESCRIPTORS: DISCOMFORT;ACHING;THROBBING
DESCRIPTORS: SHARP;SHOOTING;SORE
DESCRIPTORS: ACHING;DISCOMFORT;SORE
DESCRIPTORS: SORE;SHARP;SHOOTING

## 2024-02-03 ASSESSMENT — PAIN SCALES - WONG BAKER
WONGBAKER_NUMERICALRESPONSE: NO HURT
WONGBAKER_NUMERICALRESPONSE: 0

## 2024-02-03 ASSESSMENT — PAIN DESCRIPTION - LOCATION
LOCATION: ARM

## 2024-02-03 NOTE — PROGRESS NOTES
Physician Discharge Summary     Patient ID:  Clark ZURITA Postletwait  [id number]  73 y.o.  1950    Admit date: 2/2/2024    Discharge date and time: 2/3/2024  3:27 PM     Admitting Physician: Kirill Poole MD     Admission Diagnoses: Arm laceration, right, initial encounter [S41.111A]    Discharge Diagnoses: Principal Problem:    Arm laceration, right, initial encounter  Resolved Problems:    * No resolved hospital problems. *      Admission Condition: good    Discharged Condition: stable    Indication for Admission: Injury to right forearm    Hospital Course/Procedures/Operation/treatments:   2/2: Injury occurred sometime this afternoon. Patient had a right forearm injury with saw.  Patient was taken to the OR and had to tie off radial artery, washout and closure of laceration.  He responded well to 2 units of PRBCs.  2/3: Doing well overall.  No further intervention.  Will probably DC the day if CBC is normal.     Consults:   IP CONSULT TO ORTHOPEDIC SURGERY    Significant Diagnostic Studies:   XR RADIUS ULNA RIGHT (2 VIEWS)    Result Date: 2/2/2024  EXAMINATION: TWO XRAY VIEWS OF THE RIGHT FOREARM 2/2/2024 5:46 pm COMPARISON: None. HISTORY: ORDERING SYSTEM PROVIDED HISTORY: TRAUMA TECHNOLOGIST PROVIDED HISTORY: Reason for exam:->TRAUMA What reading provider will be dictating this exam?->CRC FINDINGS: There is a prominent soft tissue laceration along the palmar aspect of the forearm with small radiopaque foreign bodies along the margins of the wound. No acute fracture or subluxation detected.  Bone density appears normal for age.     No acute osseous abnormality Extensive soft tissue laceration.       Discharge Exam:  General: No apparent distress, comfortable   HEENT: Trachea midline, no masses, Pupils equal round   Chest: Respiratory effort was normal with no retractions or use of accessory muscles.   Cardiovascular: Extremities warm, well perfused,   Abdomen:  Soft and non distended.  No tenderness, guarding,  Trauma Clinic  [facility] [place] [address]  [phone]  Call in 1 day(s)  Make appointment to be seen in 1 week at the Trauma clinic for hospital follow up       Signed:  Dawn Downing MD  2/4/2024  7:49 AM

## 2024-02-03 NOTE — DISCHARGE INSTRUCTIONS
TRAUMA SERVICES DISCHARGE INSTRUCTIONS    Call [phone], option 2, for any questions/concerns and for follow-up appointment in 1 week(s).    Please follow the instructions checked below:  Please follow-up with your primary care provider.    ACTIVITY INSTRUCTIONS  Increase activity as tolerated  No heavy lifting or strenuous activity  Take your incentive spirometer home and use 4-6 times/day    WOUND/DRESSING INSTRUCTIONS:  You may shower.  No sitting in bath tub, hot tub or swimming until cleared by physician.  Ice to areas of pain for first 24 hours.  Heat to areas of pain after that.  Wash areas of lacerations/abrasions with soap & water.  Rinse well.  Pat dry with clean towel.  Apply thin layer of Bacitracin, Neosporin, or triple antibiotic cream to affected area 2-3 times per day.  Keep wounds clean and dry.   []  Sutures/Staples are to be removed in 2 week(s).    MEDICATION INSTRUCTIONS  Take medication as prescribed.  When taking pain medications, you may experience dizziness or drowsiness.  Do not drink alcohol or drive when taking these medications.  You may experience constipation while taking pain medication.  You may take over the counter stool softeners such as docusate (Colace), sennosides S (Senokot-S), or Miralax.   []  You may take Ibuprofen (over the counter) as directed for mild pain.     --You may take up to 800mg every 8 hours for pain, please take with food or milk.   []  You may take acetaminophen (Tylenol) products.  Do NOT take more than 4000mg of Tylenol in 24h.   []  Do not take any other acetaminophen (Tylenol) products if you are taking Percocet or Norco, as these contain Tylenol.   --Do NOT take more than 4000mg of Tylenol in 24h.    OPIOID MEDICATION INSTRUCTIONS  Read the medication guide that is included with your prescription.  Take your medication exactly as prescribed.  Store medication away from children and in a safe place.  Do NOT share your medication with others.  Do NOT  take medication unless it is prescribed for you.  Do NOT drink alcohol while taking opioids (I.e., Norco, Percocet, Oxycodone, etc).  Discuss with the Trauma Clinic staff if the dose of medication you are taking does not control your pain and any side effects that you may be having.      CALL 911 OR YOUR LOCAL EMERGENCY SERVICE:  --If you take too much medication  --If you have trouble breathing or shortness of breath  --A child has taken this medication.    WORK:  You may not return to work until you receive follow-up with the Trauma Clinic or clearance by all consultants.    Call the trauma clinic for any of the following or for questions/concerns;  --fever over 101F  --redness, swelling, hardness or warmth at the wound site(s).  --Unrelieved nausea/vomiting  --Foul smelling or cloudy drainage at the wound site(s)  --Unrelieved pain or increase in pain  --Increase in shortness of breath    Follow-up:  Trauma Clinic: [phone]--press option 2  [facility]  [address] [place] [address]  [phone]      [employer] is a secure online portal that allows you to access your electronic medical record and send messages to your doctor directly without going to the clinic or picking up the phone. You can also access your test results, communicate with your doctor, pay online, manage your appointments, and request prescription refills.     To sign up for [employer], please scan the QR code below.

## 2024-02-03 NOTE — FLOWSHEET NOTE
Daughter called after pt was discharged and said that she works at pharmacy and they will not fill paper script per new pharmacy law. Did reached out to [facility] to E-scribe. [facility] stated pt  takes Norco at home and just to take home meds. Called daughter and said he follows up with that doctor on the 18th. Asked if he had any pain med's at home and daughter said she will just take care of it and hung up the phone.

## 2024-02-03 NOTE — PLAN OF CARE
Problem: Discharge Planning  Goal: Discharge to home or other facility with appropriate resources  Outcome: Progressing  Flowsheets (Taken 2/2/2024 2225)  Discharge to home or other facility with appropriate resources:   Identify barriers to discharge with patient and caregiver   Identify discharge learning needs (meds, wound care, etc)   Arrange for needed discharge resources and transportation as appropriate     Problem: Pain  Goal: Verbalizes/displays adequate comfort level or baseline comfort level  Outcome: Progressing     Problem: Safety - Adult  Goal: Free from fall injury  Outcome: Progressing

## 2024-02-03 NOTE — PROGRESS NOTES
4 Eyes Skin Assessment     NAME:  Clark ZURITA [facility]  YOB: 1950  MEDICAL RECORD NUMBER:  [id number]    The patient is being assessed for  Admission    I agree that at least one RN has performed a thorough Head to Toe Skin Assessment on the patient. ALL assessment sites listed below have been assessed.      Areas assessed by both nurses:    Head, Face, Ears, Shoulders, Back, Chest, Arms, Elbows, Hands, Sacrum. Buttock, Coccyx, Ischium, and Legs. Feet and Heels        Does the Patient have a Wound? Yes wound(s) were present on assessment. LDA wound assessment was Initiated and completed by RN       Alex Prevention initiated by RN: Yes  Wound Care Orders initiated by RN: Yes    Pressure Injury (Stage 3,4, Unstageable, DTI, NWPT, and Complex wounds) if present, place Wound referral order by RN under : No    New Ostomies, if present place, Ostomy referral order under : No     Nurse 1 eSignature: Electronically signed by Tish Olsen RN on 2/3/24 at 3:44 AM EST    **SHARE this note so that the co-signing nurse can place an eSignature**    Nurse 2 eSignature: Electronically signed by Blessing Barnard RN on 2/3/24 at 3:46 AM EST

## 2024-02-03 NOTE — PROGRESS NOTES
Hand exam placed under media.     Electronically signed by Dawn Downing MD on 2/3/2024 at 11:08 AM

## 2024-02-03 NOTE — PROGRESS NOTES
Abrasion to R inner calf cleansed with normal saline. Wound secured with a dry dressing. Patient tolerated well

## 2024-02-03 NOTE — CONSULTS
Department of Orthopedic Surgery  Resident Consult Note    Reason for Consult:  injury to flexor compartment muscles    HISTORY OF PRESENT ILLNESS:       Patient is a 73 y.o. right-hand-dominant male who presents with right forearm laceration after using .  Patient was originally at [facility] emergency department and transferred to [facility] [place] secondary to radial artery injury and uncontrolled bleeding.  Patient was taken stat the OR by trauma team, radial artery was tied off and skin was closed.  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  Patient reports his umbilical cord was wrapped around his right upper extremity causing the deformities.  Patient has only 1 interphalangeal joint in his index finger, second through third are significantly shortened with deformities as well.  Patient states additionally that he has had numbness and tingling throughout his right hand for many years secondary to working.  States dorsal sensation on thumb has been altered for many years.  patient is currently in PACU with right forearm wrapped.     Past Medical History:    No past medical history on file.  Past Surgical History:    No past surgical history on file.  Current Medications:   Current Facility-Administered Medications: lactated ringers IV soln infusion, , IntraVENous, Continuous  sodium chloride flush 0.9 % injection 5-40 mL, 5-40 mL, IntraVENous, 2 times per day  sodium chloride flush 0.9 % injection 5-40 mL, 5-40 mL, IntraVENous, PRN  0.9 % sodium chloride infusion, , IntraVENous, PRN  acetaminophen (TYLENOL) tablet 650 mg, 650 mg, Oral, Once PRN  HYDROmorphone HCl PF (DILAUDID) injection 0.25 mg, 0.25 mg, IntraVENous, Q5 Min PRN  HYDROmorphone HCl PF (DILAUDID) injection 0.5 mg, 0.5 mg, IntraVENous, Q5 Min PRN  oxyCODONE (ROXICODONE) immediate release tablet 5 mg, 5 mg, Oral, Once PRN  droPERidol (INAPSINE) injection 0.625 mg, 0.625 mg, IntraVENous, Once  demonstrate no acute fractures/dislocations    IMPRESSION:  Right forearm laceration with artery, tendon, and nerve damage    PLAN:  Physical exam and imaging finding reviewed with patient  Patient kept in dressing from the OR  No acute orthopedic intervention at this time  NWB RUE  Admission per trauma  Pain control per trauma/admitting  Antibiotics per trauma  Orthopedic surgery will continue to follow regarding right forearm laceration, updated plan.   All patient questions sought and answered to best ability, patient is currently agreeable to plan.  Discuss with attending    Electronically signed by Chance Truong DO on 2/2/2024 at 8:03 PM

## 2024-02-03 NOTE — PROGRESS NOTES
Department of Orthopedic Surgery  Resident Progress Note    Patient seen and examined. Pain controlled with current medications. No new complaints.  Patient much more engaged in conversation and evaluation today.  Again states his thumb numbness has been long ongoing, additionally patient states he has not been able to oppose his right hand from birth. denies chest pain, shortness of breath, dizziness/lightheadedness.     VITALS:  BP (!) 109/56   Pulse 58   Temp (!) 96.6 °F (35.9 °C) (Temporal)   Resp 18   Ht 1.753 m (5' 9.02\")   Wt 102.1 kg (225 lb)   SpO2 95%   BMI 33.21 kg/m²     General: alert and oriented to person, place and time    MUSCULOSKELETAL:   right upper extremity:  Dressing in place, C/D/I  Hand, forearm compartments soft and compressible  Diffuse tender to palpation about right forearm extending proximally into right hand  Per patient, motor exam essentially unchanged from prior to injury at this time  Sensation absent over distal radial nerve and median nerve distributions to hand  Sensation intact to light touch ulnar nerve distribution hand  Nonpalpable radial pulse  Capillary refill less than 2 seconds, hand warm and well-perfused    CBC:   Lab Results   Component Value Date/Time    WBC 14.0 02/02/2024 07:40 PM    HGB 12.0 02/02/2024 07:40 PM    HCT 37.1 02/02/2024 07:40 PM     02/02/2024 07:40 PM     PT/INR:    Lab Results   Component Value Date/Time    PROTIME 12.4 02/02/2024 07:40 PM    INR 1.1 02/02/2024 07:40 PM         ASSESSMENT  Right forearm laceration    PLAN      Nonweightbearing-right UE  Antibiotics per primary  No acute orthopedic surgery intervention at this time  PT/OT  Pain Control: IV and PO  D/C Plan: Pending    Electronically signed by Chance Truong DO on 2/3/2024 at 5:48 AM

## 2024-02-03 NOTE — PROGRESS NOTES
Trauma Tertiary Survey    Admit Date: 2/2/2024  Hospital day 0    CC: Right forearm injury.    Alcohol pre-screening:  Men: How many times in the past year have you had 5 or more drinks in a day?   0  How much do you drink on a daily basis?  2 beers a day    Drug Pre-screening:    How many times in the past year have you used a recreational drug or used a prescription medication for non medical reasons?  No    Mood Prescreening:    During the past two weeks, have you been bothered by little interest or pleasure doing things?  No  During the past two weeks, have you been bothered by feeling down, depressed or hopeless?  No      Scheduled Meds:   tamsulosin  0.4 mg Oral Daily    sertraline  100 mg Oral Daily    sodium chloride flush  5-40 mL IntraVENous 2 times per day    acetaminophen  1,000 mg Oral 3 times per day    polyethylene glycol  17 g Oral Daily    sennosides-docusate sodium  1 tablet Oral BID    heparin (porcine)  5,000 Units SubCUTAneous Q8H     Continuous Infusions:   sodium chloride      sodium chloride       PRN Meds:sodium chloride, sodium chloride flush, sodium chloride, oxyCODONE **OR** oxyCODONE, ondansetron **OR** ondansetron    Subjective:     Doing well overall.  Denies any pain.  No complaint this morning.  Will probably discharge today if CBC is normal.    Objective:   Patient Vitals for the past 8 hrs:   Resp   02/03/24 0332 18   02/03/24 0302 18       I/O last 3 completed shifts:  In: 2061 [P.O.:711; I.V.:1000; Blood:350]  Out: -   No intake/output data recorded.    No past medical history on file.    @[email or web address]@    Radiology:  XR RADIUS ULNA RIGHT (2 VIEWS)   Final Result   No acute osseous abnormality      Extensive soft tissue laceration.             PHYSICAL EXAM:     Central Nervous System  Loss of consciousness:  No    GCS:    Eye:  4 - Opens eyes on own  Motor:  6 - Follows simple motor commands  Verbal:  5 - Alert and oriented    Neuromuscular blockade: No  Pupil size:  Left 2

## 2024-02-03 NOTE — ANESTHESIA POSTPROCEDURE EVALUATION
Department of Anesthesiology  Postprocedure Note    Patient: Clark Maza  MRN: [id number]  YOB: 1950  Date of evaluation: 2/3/2024    Procedure Summary       Date: 02/02/24 Room / Location: [address]    Anesthesia Start: 1748 Anesthesia Stop: 1903    Procedure: RIGHT ARM EXPLORATION AND CONTROL OF HEMORRHAGE (Right: Arm Lower) Diagnosis:       Trauma      (Trauma [T14.90XA])    Surgeons: Kirill Poole MD Responsible Provider: Tobias Fagan MD    Anesthesia Type: general ASA Status: 4 - Emergent            Anesthesia Type: No value filed.    Laura Phase I: Laura Score: 9    Laura Phase II:      Anesthesia Post Evaluation    Patient location during evaluation: PACU  Patient participation: complete - patient participated  Level of consciousness: awake and alert  Pain score: 2  Airway patency: patent  Nausea & Vomiting: no nausea and no vomiting  Cardiovascular status: blood pressure returned to baseline  Respiratory status: acceptable  Hydration status: euvolemic    No notable events documented.

## 2024-02-04 LAB
ABO/RH: NORMAL
ABO/RH: NORMAL
ANTIBODY SCREEN: NEGATIVE
ANTIBODY SCREEN: NEGATIVE
ARM BAND NUMBER: NORMAL
ARM BAND NUMBER: NORMAL
BLOOD BANK BLOOD PRODUCT EXPIRATION DATE: NORMAL
BLOOD BANK BLOOD PRODUCT EXPIRATION DATE: NORMAL
BLOOD BANK DISPENSE STATUS: NORMAL
BLOOD BANK ISBT PRODUCT BLOOD TYPE: 5100
BLOOD BANK ISBT PRODUCT BLOOD TYPE: 5100
BLOOD BANK PRODUCT CODE: NORMAL
BLOOD BANK PRODUCT CODE: NORMAL
BLOOD BANK SAMPLE EXPIRATION: NORMAL
BLOOD BANK SAMPLE EXPIRATION: NORMAL
BLOOD BANK UNIT TYPE AND RH: NORMAL
BLOOD BANK UNIT TYPE AND RH: NORMAL
BPU ID: NORMAL
COMPONENT: NORMAL
CROSSMATCH RESULT: NORMAL
TRANSFUSION STATUS: NORMAL
UNIT DIVISION: 0
UNIT ISSUE DATE/TIME: NORMAL
UNIT ISSUE DATE/TIME: NORMAL

## 2024-02-04 NOTE — DISCHARGE SUMMARY
Physician Discharge Summary     Patient ID:  Clark ZURITA [place]  [id number]  73 y.o.  1950    Admit date: 2/2/2024    Discharge date and time: 2/3/2024  3:27 PM     Admitting Physician: Kirill Poole MD     Admission Diagnoses: Arm laceration, right, initial encounter [S41.111A]    Discharge Diagnoses: Principal Problem:    Arm laceration, right, initial encounter  Resolved Problems:    * No resolved hospital problems. *      Admission Condition: good    Discharged Condition: stable    Indication for Admission: Injury to right forearm    Hospital Course/Procedures/Operation/treatments:   2/2: Injury occurred sometime this afternoon. Patient had a right forearm injury with saw.  Patient was taken to the OR and had to tie off radial artery, washout and closure of laceration.  He responded well to 2 units of PRBCs.  2/3: Doing well overall.  No further intervention.  Will probably DC the day if CBC is normal.     Consults:   IP CONSULT TO ORTHOPEDIC SURGERY    Significant Diagnostic Studies:   XR RADIUS ULNA RIGHT (2 VIEWS)    Result Date: 2/2/2024  EXAMINATION: TWO XRAY VIEWS OF THE RIGHT FOREARM 2/2/2024 5:46 pm COMPARISON: None. HISTORY: ORDERING SYSTEM PROVIDED HISTORY: TRAUMA TECHNOLOGIST PROVIDED HISTORY: Reason for exam:->TRAUMA What reading provider will be dictating this exam?->CRC FINDINGS: There is a prominent soft tissue laceration along the palmar aspect of the forearm with small radiopaque foreign bodies along the margins of the wound. No acute fracture or subluxation detected.  Bone density appears normal for age.     No acute osseous abnormality Extensive soft tissue laceration.       Discharge Exam:  General: No apparent distress, comfortable   HEENT: Trachea midline, no masses, Pupils equal round   Chest: Respiratory effort was normal with no retractions or use of accessory muscles.   Cardiovascular: Extremities warm, well perfused,   Abdomen:  Soft and non distended.  No tenderness, guarding,  DailyHistorical Med      omeprazole (PRILOSEC) 20 MG delayed release capsule Take 2 capsules by mouth DailyHistorical Med      furosemide (LASIX) 20 MG tablet Take 1 tablet by mouth dailyHistorical Med      guaiFENesin (MUCINEX) 600 MG extended release tablet Take 1 tablet by mouth 2 times dailyHistorical Med      fluticasone-umeclidin-vilant (TRELEGY ELLIPTA) 100-62.5-25 MCG/ACT AEPB inhaler Inhale 1 puff into the lungs dailyHistorical Med             TRAUMA SERVICES DISCHARGE INSTRUCTIONS    Call [phone], option 2, for any questions/concerns and for follow-up appointment in 1 week(s).    Please follow the instructions checked below:  Please follow-up with your primary care provider.    ACTIVITY INSTRUCTIONS  Increase activity as tolerated  No heavy lifting or strenuous activity  Take your incentive spirometer home and use 4-6 times/day    WOUND/DRESSING INSTRUCTIONS:  You may shower.  No sitting in bath tub, hot tub or swimming until cleared by physician.  Ice to areas of pain for first 24 hours.  Heat to areas of pain after that.  Wash areas of lacerations/abrasions with soap & water.  Rinse well.  Pat dry with clean towel.  Apply thin layer of Bacitracin, Neosporin, or triple antibiotic cream to affected area 2-3 times per day.  Keep wounds clean and dry.   []  Sutures/Staples are to be removed in 2 week(s).    MEDICATION INSTRUCTIONS  Take medication as prescribed.  When taking pain medications, you may experience dizziness or drowsiness.  Do not drink alcohol or drive when taking these medications.  You may experience constipation while taking pain medication.  You may take over the counter stool softeners such as docusate (Colace), sennosides S (Senokot-S), or Miralax.   []  You may take Ibuprofen (over the counter) as directed for mild pain.     --You may take up to 800mg every 8 hours for pain, please take with food or milk.   []  You may take acetaminophen (Tylenol) products.  Do NOT take more than

## 2024-02-06 ENCOUNTER — OFFICE VISIT (OUTPATIENT)
Dept: FAMILY MEDICINE CLINIC | Age: 74
End: 2024-02-06
Payer: MEDICARE

## 2024-02-06 VITALS
BODY MASS INDEX: 33.82 KG/M2 | HEIGHT: 69 IN | SYSTOLIC BLOOD PRESSURE: 124 MMHG | WEIGHT: 228.3 LBS | RESPIRATION RATE: 16 BRPM | OXYGEN SATURATION: 92 % | HEART RATE: 44 BPM | TEMPERATURE: 97.7 F | DIASTOLIC BLOOD PRESSURE: 64 MMHG

## 2024-02-06 DIAGNOSIS — M25.511 CHRONIC RIGHT SHOULDER PAIN: Primary | ICD-10-CM

## 2024-02-06 DIAGNOSIS — G89.29 CHRONIC RIGHT SHOULDER PAIN: Primary | ICD-10-CM

## 2024-02-06 DIAGNOSIS — J20.9 BRONCHITIS WITH BRONCHOSPASM: ICD-10-CM

## 2024-02-06 PROCEDURE — 1123F ACP DISCUSS/DSCN MKR DOCD: CPT | Performed by: FAMILY MEDICINE

## 2024-02-06 PROCEDURE — 99213 OFFICE O/P EST LOW 20 MIN: CPT | Performed by: FAMILY MEDICINE

## 2024-02-06 RX ORDER — ASPIRIN 81 MG/1
81 TABLET, CHEWABLE ORAL DAILY
Qty: 90 TABLET | Refills: 1 | Status: SHIPPED | OUTPATIENT
Start: 2024-02-06

## 2024-02-06 RX ORDER — GUAIFENESIN 600 MG/1
1200 TABLET, EXTENDED RELEASE ORAL 2 TIMES DAILY
Qty: 30 TABLET | Refills: 0 | Status: SHIPPED | OUTPATIENT
Start: 2024-02-06

## 2024-02-06 RX ORDER — OXYCODONE HYDROCHLORIDE 5 MG/1
TABLET ORAL
COMMUNITY
Start: 2024-02-03

## 2024-02-06 ASSESSMENT — PATIENT HEALTH QUESTIONNAIRE - PHQ9
SUM OF ALL RESPONSES TO PHQ QUESTIONS 1-9: 0
SUM OF ALL RESPONSES TO PHQ9 QUESTIONS 1 & 2: 0
1. LITTLE INTEREST OR PLEASURE IN DOING THINGS: 0
SUM OF ALL RESPONSES TO PHQ QUESTIONS 1-9: 0
2. FEELING DOWN, DEPRESSED OR HOPELESS: 0

## 2024-02-06 NOTE — PROGRESS NOTES
Clark Maza (:  1950) is a 73 y.o. male,Established patient, here for evaluation of the following chief complaint(s):  Follow-Up from Hospital ([facility] laceration to arm and leg )         ASSESSMENT/PLAN:  1. Chronic right shoulder pain  -     aspirin 81 MG chewable tablet; Take 1 tablet by mouth daily Ld 1 week ago patient not compliant with asa daughter stated ld about 1 week ago, Disp-90 tablet, R-1Normal  2. Bronchitis with bronchospasm  -     guaiFENesin (MUCINEX) 600 MG extended release tablet; Take 2 tablets by mouth 2 times daily, Disp-30 tablet, R-0Normal      No follow-ups on file.         Subjective   SUBJECTIVE/OBJECTIVE:  HPI    Review of Systems   Constitutional:  Negative for chills and diaphoresis.   HENT:  Negative for ear discharge, ear pain, hearing loss, nosebleeds and tinnitus.    Respiratory:  Negative for wheezing.    Cardiovascular:  Negative for chest pain.   Gastrointestinal:  Negative for blood in stool, constipation and diarrhea.   Genitourinary:  Negative for dysuria, flank pain and hematuria.   Skin:  Negative for rash.   Neurological:  Negative for headaches.   Hematological:  Does not bruise/bleed easily.          Objective   /64   Pulse (!) 44   Temp 97.7 °F (36.5 °C)   Resp 16   Ht 1.753 m (5' 9.02\")   Wt 103.6 kg (228 lb 4.8 oz)   SpO2 92%   BMI 33.70 kg/m²   Lab Results   Component Value Date    LABA1C 5.6 2018    LABA1C 6.0 2018     Physical Exam  Eyes:      General:         Right eye: No discharge.         Left eye: No discharge.   Cardiovascular:      Rate and Rhythm: Normal rate and regular rhythm.      Heart sounds: No murmur heard.  Pulmonary:      Effort: No respiratory distress.      Breath sounds: No rhonchi or rales.   Abdominal:      Tenderness: There is no abdominal tenderness.   Musculoskeletal:      Cervical back: No rigidity.   Skin:     General: Skin is warm.      Capillary Refill: Capillary refill

## 2024-02-07 NOTE — PROGRESS NOTES
PCP is Dr Jama  Office notified of admission.      Electronically signed by Maria G Blackmon RN on 2/7/2024 at 10:35 AM

## 2024-02-13 ENCOUNTER — OFFICE VISIT (OUTPATIENT)
Dept: SURGERY | Age: 74
End: 2024-02-13
Payer: MEDICARE

## 2024-02-13 VITALS
RESPIRATION RATE: 20 BRPM | SYSTOLIC BLOOD PRESSURE: 122 MMHG | TEMPERATURE: 97.5 F | HEART RATE: 84 BPM | OXYGEN SATURATION: 98 % | BODY MASS INDEX: 33.06 KG/M2 | WEIGHT: 224 LBS | DIASTOLIC BLOOD PRESSURE: 75 MMHG

## 2024-02-13 DIAGNOSIS — S51.811S FOREARM LACERATION, RIGHT, SEQUELA: Primary | ICD-10-CM

## 2024-02-13 PROCEDURE — 99212 OFFICE O/P EST SF 10 MIN: CPT

## 2024-02-13 PROCEDURE — 1123F ACP DISCUSS/DSCN MKR DOCD: CPT | Performed by: NURSE PRACTITIONER

## 2024-02-13 PROCEDURE — 99213 OFFICE O/P EST LOW 20 MIN: CPT | Performed by: NURSE PRACTITIONER

## 2024-02-13 PROCEDURE — 99212 OFFICE O/P EST SF 10 MIN: CPT | Performed by: NURSE PRACTITIONER

## 2024-02-13 NOTE — PROGRESS NOTES
[facility]  TRAUMA CLINIC PROGRESS NOTE  TRAUMA ADVANCED PRACTICE PRACTITIONER  2/13/2024    Date of injury: 02/02/2024Hit      NOAH/Injuries: Trauma Follow-up.  Mechanism of injury:  Hit arm with saw.  Right forearm laceration.      Chief Complaint   Patient presents with    Laceration     Right arm laceration 2/2, repaired in OR, 3-0 nylon interrupted vertical mattress sutures. Slight pain occasionally.      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    Gastroenteritis K52.9    Back pain at L4-L5 level M54.50    Impotence N52.9    Chronic fatigue R53.82    Urinary frequency R35.0    Bronchitis J40    Gastroesophageal reflux disease with esophagitis K21.00    Depression F32.A    Coronary artery disease of native artery of native heart with stable angina pectoris ([facility]) I25.118    Chronic obstructive pulmonary disease ([facility]) J44.9    Pure hypercholesterolemia E78.00    Moderate obesity E66.8    Major depressive disorder, recurrent, mild ([facility]) F33.0    Moderate asthma without complication J45.909    Complete tear of left rotator cuff M75.122    Rotator cuff arthropathy of left shoulder M12.812    Nontraumatic complete tear of rotator cuff, left M75.122    Bursitis of left shoulder M75.52    Impingement syndrome of left shoulder M75.42    Labral tear of shoulder, degenerative, left M24.112    S/P arthroscopy of shoulder Z98.890    Obstructive sleep apnea G47.33    Stage 3a chronic kidney disease ([facility]) N18.31    Opioid use, unspecified with unspecified opioid-induced disorder F11.99    Ischemic cardiomyopathy I25.5    Chronic combined systolic and diastolic congestive heart failure ([facility]) I50.42    Acute hypoxemic respiratory failure ([facility]) J96.01    Gluteal claudication ([facility]) I73.9    Chronic hypoxemic respiratory failure ([facility]) J96.11    Chronic renal disease, stage III ([facility]) [[id number]] N18.30    Long COVID U09.9    Chronic right shoulder pain M25.511, G89.29

## 2024-02-13 NOTE — PATIENT INSTRUCTIONS
stomach; Trouble sleeping, fatigue; pounding heart, tremors, rapid breathing, dizziness, lightheadedness or  headaches  Emotional reactions: feeling nervous, fearful, or depressed; feeling numb; being irritable or easily upset or being withdrawn.  Family or loved ones may experience any of these reactions, also.   Methods for coping  It is very important take care of yourself during this time.  Some things that you can do to help cope with trauma include.    Taking care of yourself-Eat well balanced meals, get enough sleep, take care of personal hygiene & exercise regularly.   Connect with others-feelings of insolation can be very important can be very common after this type of event.  Stay in contact with others in anyway you can-in person, phone calls, video chats or text messages.    Engage in something you enjoy-walk, exercise, art music, etc.   Practice deep breathing (4 seconds in, 4 seconds out) or taking breaks when feeling overwhelmed.    Setting realistic goals & seeing them through to completion.    Spending time with people you are trust & educating them about your experience nd ways in which they can be supportive.    If thoughts or symptoms are affecting your life in a negative way & coping tools are not helping, consider seeking help from a mental health professional.   When to seek help  If any of the previously mentioned symptoms become too intense to handle or are not getting better after 2-4 weeks.    If you have no one you can share your feelings.    If you are using drugs or alcohol to cope,    Who to contact  If you are already established with a mental health provider, please follow up with them as soon as possible.    If you do not have a provider established, you can make the practitioner aware of any of these feelings or symptoms at your Trauma Clinic follow up appointment. They can help you with next steps.  For more information on ASD, please scan the QR  No

## 2024-02-20 ENCOUNTER — OFFICE VISIT (OUTPATIENT)
Dept: FAMILY MEDICINE CLINIC | Age: 74
End: 2024-02-20
Payer: MEDICARE

## 2024-02-20 VITALS
DIASTOLIC BLOOD PRESSURE: 70 MMHG | HEIGHT: 69 IN | OXYGEN SATURATION: 97 % | HEART RATE: 53 BPM | BODY MASS INDEX: 33.38 KG/M2 | SYSTOLIC BLOOD PRESSURE: 130 MMHG | RESPIRATION RATE: 16 BRPM | TEMPERATURE: 97.4 F | WEIGHT: 225.4 LBS

## 2024-02-20 DIAGNOSIS — S56.221S: Primary | ICD-10-CM

## 2024-02-20 DIAGNOSIS — J43.1 PANLOBULAR EMPHYSEMA (HCC): ICD-10-CM

## 2024-02-20 PROCEDURE — 1123F ACP DISCUSS/DSCN MKR DOCD: CPT | Performed by: FAMILY MEDICINE

## 2024-02-20 PROCEDURE — 99214 OFFICE O/P EST MOD 30 MIN: CPT | Performed by: FAMILY MEDICINE

## 2024-02-20 RX ORDER — FLUTICASONE FUROATE, UMECLIDINIUM BROMIDE AND VILANTEROL TRIFENATATE 100; 62.5; 25 UG/1; UG/1; UG/1
POWDER RESPIRATORY (INHALATION)
Qty: 1 EACH | Refills: 2 | Status: SHIPPED | OUTPATIENT
Start: 2024-02-20

## 2024-02-20 ASSESSMENT — PATIENT HEALTH QUESTIONNAIRE - PHQ9
SUM OF ALL RESPONSES TO PHQ QUESTIONS 1-9: 0
2. FEELING DOWN, DEPRESSED OR HOPELESS: 0
1. LITTLE INTEREST OR PLEASURE IN DOING THINGS: 0
SUM OF ALL RESPONSES TO PHQ QUESTIONS 1-9: 0
SUM OF ALL RESPONSES TO PHQ9 QUESTIONS 1 & 2: 0

## 2024-02-20 ASSESSMENT — ENCOUNTER SYMPTOMS
BLOOD IN STOOL: 0
CONSTIPATION: 0
DIARRHEA: 0
WHEEZING: 0

## 2024-02-20 NOTE — PROGRESS NOTES
Clark Maza (:  1950) is a 73 y.o. male,Established patient, here for evaluation of the following chief complaint(s):  1 Month Follow-Up (Scar on leg not healing correctly wants it looked at)         ASSESSMENT/PLAN:  1. Laceration of flexor tendon of right forearm, sequela  -     External Referral To Orthopedic Surgery  2. Panlobular emphysema (HCC)  -     TRELEGY ELLIPTA 100-62.5-25 MCG/ACT AEPB inhaler; INHALE 1 PUFF ONCE DAILY, Disp-1 each, R-2, DAWNormal      No follow-ups on file.         Subjective   SUBJECTIVE/OBJECTIVE:  1 Month Follow-Up (Scar on leg not healing correctly wants it looked at)  Wounds Right leg as well as forearm are in a good state of healing no signs of infection or inlammatory change.          Review of Systems   Constitutional:  Negative for chills and diaphoresis.   HENT:  Negative for ear discharge, ear pain, hearing loss, nosebleeds and tinnitus.    Respiratory:  Negative for wheezing.    Cardiovascular:  Negative for chest pain.   Gastrointestinal:  Negative for blood in stool, constipation and diarrhea.   Genitourinary:  Negative for dysuria, flank pain and hematuria.   Skin:  Negative for rash.   Neurological:  Negative for headaches.   Hematological:  Does not bruise/bleed easily.          Objective   /70   Pulse 53   Temp 97.4 °F (36.3 °C)   Resp 16   Ht 1.753 m (5' 9.02\")   Wt 102.2 kg (225 lb 6.4 oz)   SpO2 97%   BMI 33.27 kg/m²   Lab Results   Component Value Date    LABA1C 5.6 2018    LABA1C 6.0 2018     Physical Exam  HENT:      Head: Normocephalic.   Eyes:      General:         Right eye: No discharge.         Left eye: No discharge.   Cardiovascular:      Rate and Rhythm: Normal rate and regular rhythm.      Heart sounds: No murmur heard.  Pulmonary:      Effort: Pulmonary effort is normal. No respiratory distress.      Breath sounds: No rhonchi or rales.   Abdominal:      Tenderness: There is no abdominal tenderness.

## 2024-02-20 NOTE — ADDENDUM NOTE
Addended by: PATRICIA MICHELLE on: 2/20/2024 10:42 AM     Modules accepted: Orders, Level of Service

## 2024-02-21 DIAGNOSIS — G89.29 CHRONIC RIGHT SHOULDER PAIN: Primary | ICD-10-CM

## 2024-02-21 DIAGNOSIS — M25.511 CHRONIC RIGHT SHOULDER PAIN: Primary | ICD-10-CM

## 2024-02-21 RX ORDER — HYDROCODONE BITARTRATE AND ACETAMINOPHEN 7.5; 325 MG/1; MG/1
1 TABLET ORAL EVERY 6 HOURS PRN
Qty: 90 TABLET | Refills: 0 | Status: SHIPPED | OUTPATIENT
Start: 2024-02-21 | End: 2024-03-22

## 2024-02-21 NOTE — TELEPHONE ENCOUNTER
Requested Prescriptions     Pending Prescriptions Disp Refills    HYDROcodone-acetaminophen (NORCO) 7.5-325 MG per tablet 90 tablet 0     Sig: Take 1 tablet by mouth every 6 hours as needed for Pain for up to 30 days. Max Daily Amount: 4 tablets       Next appt is 3/20/2024  Last appt was 2/20/2024      He was issued an Rx too soon, now null and Void.    Controlled substances monitoring: no signs of potential drug abuse or diversion identified and [place] report reviewed today- activity consistent with treatment plan.         ...,,,,,,,,,,,,,,,,,,,,,,,,,,,,,,,,,,,,,,,,,,,,,,,,,,,,,,,djf

## 2024-02-26 DIAGNOSIS — K21.9 GASTROESOPHAGEAL REFLUX DISEASE WITHOUT ESOPHAGITIS: ICD-10-CM

## 2024-02-26 DIAGNOSIS — E78.2 MIXED HYPERLIPIDEMIA: ICD-10-CM

## 2024-02-26 DIAGNOSIS — J45.909 MODERATE ASTHMA WITHOUT COMPLICATION, UNSPECIFIED WHETHER PERSISTENT: ICD-10-CM

## 2024-02-26 RX ORDER — CLOPIDOGREL BISULFATE 75 MG/1
75 TABLET ORAL DAILY
Qty: 90 TABLET | Refills: 1 | Status: SHIPPED | OUTPATIENT
Start: 2024-02-26

## 2024-02-26 RX ORDER — FENOFIBRATE 145 MG/1
TABLET, COATED ORAL
Qty: 90 TABLET | Refills: 1 | Status: SHIPPED | OUTPATIENT
Start: 2024-02-26

## 2024-02-26 RX ORDER — SIMVASTATIN 20 MG
TABLET ORAL
Qty: 90 TABLET | Refills: 1 | Status: SHIPPED | OUTPATIENT
Start: 2024-02-26

## 2024-02-26 RX ORDER — OMEPRAZOLE 40 MG/1
CAPSULE, DELAYED RELEASE ORAL
Qty: 90 CAPSULE | Refills: 1 | Status: SHIPPED | OUTPATIENT
Start: 2024-02-26

## 2024-02-26 RX ORDER — MONTELUKAST SODIUM 10 MG/1
TABLET ORAL
Qty: 90 TABLET | Refills: 1 | Status: SHIPPED | OUTPATIENT
Start: 2024-02-26

## 2024-03-19 NOTE — PROGRESS NOTES
[facility] PRE-ADMISSION TESTING INSTRUCTIONS    The Preadmission Testing patient is instructed accordingly using the following criteria (check applicable):    ARRIVAL INSTRUCTIONS:  [x] Parking the day of Surgery is located in the Main Entrance lot.  Upon entering the door, make an immediate right to the surgery reception desk    [x] Bring photo ID and insurance card    [] Bring in a copy of Living will or Durable Power of  papers.    [x] Please be sure to arrange for a responsible adult to provide transportation to and from the hospital    [x] Please arrange for a responsible adult to be with you for the 24 hour period post procedure due to having anesthesia    [x] If you awake am of surgery not feeling well or have temperature >100 please call [phone]    GENERAL INSTRUCTIONS:    [x] No solid foods after midnight, may have up to 8oz of water until 4 hours prior to surgery. No gum, no candy, no mints. NPO time:0800       [x] You may brush your teeth, do not swallow any toothpaste    [x] Take medications as instructed     [x] Stop herbal supplements and vitamins 5 days prior to procedure    [x] Follow preop dosing of blood thinners per physician instructions    [] Take 1/2 dose of evening insulin, but no insulin after midnight    [] No oral diabetic medications after midnight    [] If diabetic and have low blood sugar or feel symptomatic, take 1-2oz apple juice only    [] Bring inhalers day of surgery    [] Bring urine specimen day of surgery    [x] Shower or bath with soap, lather and rinse well, AM of Surgery, no lotion, powders or creams to surgical site    [] Follow bowel prep as instructed per surgeon    [x] No tobacco products within 24 hours of surgery     [x] No alcohol or illegal drug use, marijuana included, within 24 hours of surgery.    [x] Jewelry, body piercing's, eyeglasses, contact lenses and dentures are not permitted into surgery (bring cases)      [x] Please

## 2024-03-19 NOTE — PROGRESS NOTES
Lakisha from Dr. Hernández's office called PAT.  Informed patient will need cardiac clearance prior to OR.

## 2024-03-19 NOTE — PROGRESS NOTES
Pt scheduled for Or 3/21/24 under general anesthesia.  Hx CAD-PCI/stents, CM, HFrEF 43% on stress test 4/6/23.  Follows with Dr. Silva.  Last ov 3/8/23.  Reviewed with Dr. Olmos.  Will need cardiac clearance for OR.      Spoke with Janine at Dr. Hernández's office to notify need cardiac clearance.

## 2024-03-20 ENCOUNTER — OFFICE VISIT (OUTPATIENT)
Dept: FAMILY MEDICINE CLINIC | Age: 74
End: 2024-03-20
Payer: MEDICARE

## 2024-03-20 VITALS
OXYGEN SATURATION: 94 % | RESPIRATION RATE: 22 BRPM | WEIGHT: 228.3 LBS | SYSTOLIC BLOOD PRESSURE: 108 MMHG | HEIGHT: 70 IN | TEMPERATURE: 97.1 F | DIASTOLIC BLOOD PRESSURE: 56 MMHG | HEART RATE: 81 BPM | BODY MASS INDEX: 32.69 KG/M2

## 2024-03-20 DIAGNOSIS — Z01.818 PRE-OP EXAM: Primary | ICD-10-CM

## 2024-03-20 DIAGNOSIS — M25.511 CHRONIC RIGHT SHOULDER PAIN: ICD-10-CM

## 2024-03-20 DIAGNOSIS — G89.29 CHRONIC RIGHT SHOULDER PAIN: ICD-10-CM

## 2024-03-20 PROCEDURE — 99214 OFFICE O/P EST MOD 30 MIN: CPT | Performed by: FAMILY MEDICINE

## 2024-03-20 PROCEDURE — 1123F ACP DISCUSS/DSCN MKR DOCD: CPT | Performed by: FAMILY MEDICINE

## 2024-03-20 PROCEDURE — 93000 ELECTROCARDIOGRAM COMPLETE: CPT | Performed by: FAMILY MEDICINE

## 2024-03-20 RX ORDER — HYDROCODONE BITARTRATE AND ACETAMINOPHEN 7.5; 325 MG/1; MG/1
1 TABLET ORAL EVERY 6 HOURS PRN
Qty: 90 TABLET | Refills: 0 | Status: SHIPPED | OUTPATIENT
Start: 2024-03-20 | End: 2024-04-19

## 2024-03-20 SDOH — ECONOMIC STABILITY: INCOME INSECURITY: HOW HARD IS IT FOR YOU TO PAY FOR THE VERY BASICS LIKE FOOD, HOUSING, MEDICAL CARE, AND HEATING?: HARD

## 2024-03-20 SDOH — ECONOMIC STABILITY: FOOD INSECURITY: WITHIN THE PAST 12 MONTHS, YOU WORRIED THAT YOUR FOOD WOULD RUN OUT BEFORE YOU GOT MONEY TO BUY MORE.: SOMETIMES TRUE

## 2024-03-20 SDOH — ECONOMIC STABILITY: FOOD INSECURITY: WITHIN THE PAST 12 MONTHS, THE FOOD YOU BOUGHT JUST DIDN'T LAST AND YOU DIDN'T HAVE MONEY TO GET MORE.: SOMETIMES TRUE

## 2024-03-20 ASSESSMENT — PATIENT HEALTH QUESTIONNAIRE - PHQ9: DEPRESSION UNABLE TO ASSESS: PT REFUSES

## 2024-03-20 NOTE — PROGRESS NOTES
Clark Maza (:  1950) is a 73 y.o. male,Established patient, here for evaluation of the following chief complaint(s):  Shoulder Pain (Medication refill ) and Cardiac Clearance         ASSESSMENT/PLAN:  1. Pre-op exam  -     EKG 12 lead; Future  2. Chronic right shoulder pain  -     HYDROcodone-acetaminophen (NORCO) 7.5-325 MG per tablet; Take 1 tablet by mouth every 6 hours as needed for Pain for up to 30 days. Max Daily Amount: 4 tablets, Disp-90 tablet, R-0Normal    Controlled substances monitoring: no signs of potential drug abuse or diversion identified and OARRS report reviewed today- activity consistent with treatment plan.   No follow-ups on file.         Subjective   SUBJECTIVE/OBJECTIVE:  Shoulder   And preop clearance        Review of Systems   Constitutional:  Negative for activity change.   Cardiovascular:  Negative for chest pain.   Musculoskeletal:  Positive for arthralgias.        Tendon tear right thumb          Objective   BP (!) 108/56   Pulse 81   Temp 97.1 °F (36.2 °C)   Resp 22   Ht 1.778 m (5' 10\")   Wt 103.6 kg (228 lb 4.8 oz)   SpO2 94%   BMI 32.76 kg/m²   Lab Results   Component Value Date    LABA1C 5.6 2018    LABA1C 6.0 2018     Physical Exam  Eyes:      General:         Right eye: No discharge.         Left eye: No discharge.   Cardiovascular:      Rate and Rhythm: Normal rate and regular rhythm.      Heart sounds: No murmur heard.  Pulmonary:      Effort: No respiratory distress.      Breath sounds: No rhonchi or rales.   Abdominal:      Tenderness: There is no abdominal tenderness.   Musculoskeletal:      Cervical back: No rigidity.      Comments: Right thumb tendon cum.   Skin:     General: Skin is warm.      Capillary Refill: Capillary refill takes less than 2 seconds.      Coloration: Skin is not pale.      Findings: No bruising.   Neurological:      Mental Status: He is alert.   Psychiatric:         Mood and Affect: Mood normal.            On 
no

## 2024-03-20 NOTE — PATIENT INSTRUCTIONS
[employer]*  (Call 211 if need more resources.)     HELP NETWORK OF [facility]:  What they do: Provides 24-hr, 7 days a week access to information on community resources for financial help. [facility] AND [facility]  Phone: 211 or [phone]    [facility] FAMILY SERVICE: [address], [place] [address]  What they offer: Limited assistance to restore/ prevent utility disconnection.  Phone Number: [phone]  Website: [employer]    DEPARTMENT OF JOB AND FAMILY SERVICES:  MAIN S LINE FOR ALL [facility]: [phone]  What they do: [place] works first with temporary cash assistance if there are children in household.   [facility] DJFS: [address] [place] [address]  Phone: [phone], [phone]  [facility] DJFS: [address] [place] [address]  Phone: [phone]  [facility] DJFS: [address] [place] [address]  Phone: [phone]  Website: [employer]    [employer] Financial Assistance  What they offer: Assistance with [employer] bills  Phone: [phone], option #5     Medications:  Good Rx  What they offer: Good [employer] tracks prescription drug prices and provides free drug coupons for discounts on medications.  Website: https://www.[employer]    [employer]  What they offer: [employer] offers free information on medications and healthcare cost savings programs including prescription assistance programs, coupons, and discount programs.  Helpline: 447.310.9122  Website: https://www.[facility].org    RX Assist  What they offer: Information about free and low-cost medicine programs.  Website: https://www.rxassist.org/    [employer] $4 Prescription Program  What they offer: Prescription Program includes up to a 30-day supply for $4 and a 90-day supply for $10 of some covered generic drugs at commonly prescribed dosages  Website: https://www.[employer]/cp/4-prescriptions/5166848    [employer] Prescription Assistance

## 2024-03-27 DIAGNOSIS — J44.9 CHRONIC OBSTRUCTIVE PULMONARY DISEASE, UNSPECIFIED COPD TYPE (HCC): ICD-10-CM

## 2024-03-27 DIAGNOSIS — R35.1 BENIGN PROSTATIC HYPERPLASIA WITH NOCTURIA: ICD-10-CM

## 2024-03-27 DIAGNOSIS — N40.1 BENIGN PROSTATIC HYPERPLASIA WITH NOCTURIA: ICD-10-CM

## 2024-03-27 RX ORDER — POTASSIUM CHLORIDE 750 MG/1
TABLET, EXTENDED RELEASE ORAL
Qty: 180 TABLET | Refills: 0 | Status: SHIPPED | OUTPATIENT
Start: 2024-03-27

## 2024-03-27 RX ORDER — TAMSULOSIN HYDROCHLORIDE 0.4 MG/1
CAPSULE ORAL
Qty: 90 CAPSULE | Refills: 0 | Status: SHIPPED | OUTPATIENT
Start: 2024-03-27

## 2024-04-04 ENCOUNTER — OFFICE VISIT (OUTPATIENT)
Dept: CARDIOLOGY CLINIC | Age: 74
End: 2024-04-04
Payer: MEDICARE

## 2024-04-04 VITALS
HEIGHT: 70 IN | HEART RATE: 85 BPM | DIASTOLIC BLOOD PRESSURE: 60 MMHG | SYSTOLIC BLOOD PRESSURE: 110 MMHG | WEIGHT: 230 LBS | BODY MASS INDEX: 32.93 KG/M2 | OXYGEN SATURATION: 97 %

## 2024-04-04 DIAGNOSIS — J96.11 CHRONIC HYPOXEMIC RESPIRATORY FAILURE (HCC): ICD-10-CM

## 2024-04-04 DIAGNOSIS — Z01.810 PREOPERATIVE CARDIOVASCULAR EXAMINATION: ICD-10-CM

## 2024-04-04 DIAGNOSIS — C85.91 NON-HODGKIN LYMPHOMA OF LYMPH NODES OF NECK, UNSPECIFIED NON-HODGKIN LYMPHOMA TYPE (HCC): ICD-10-CM

## 2024-04-04 DIAGNOSIS — I25.118 CORONARY ARTERY DISEASE OF NATIVE ARTERY OF NATIVE HEART WITH STABLE ANGINA PECTORIS (HCC): Primary | ICD-10-CM

## 2024-04-04 DIAGNOSIS — I50.42 CHRONIC COMBINED SYSTOLIC AND DIASTOLIC CONGESTIVE HEART FAILURE (HCC): ICD-10-CM

## 2024-04-04 DIAGNOSIS — J44.9 CHRONIC OBSTRUCTIVE PULMONARY DISEASE, UNSPECIFIED COPD TYPE (HCC): ICD-10-CM

## 2024-04-04 DIAGNOSIS — E66.8 MODERATE OBESITY: ICD-10-CM

## 2024-04-04 DIAGNOSIS — N18.31 STAGE 3A CHRONIC KIDNEY DISEASE (HCC): ICD-10-CM

## 2024-04-04 DIAGNOSIS — I25.5 ISCHEMIC CARDIOMYOPATHY: ICD-10-CM

## 2024-04-04 DIAGNOSIS — G47.33 OBSTRUCTIVE SLEEP APNEA: ICD-10-CM

## 2024-04-04 PROCEDURE — 99215 OFFICE O/P EST HI 40 MIN: CPT | Performed by: INTERNAL MEDICINE

## 2024-04-04 PROCEDURE — 93000 ELECTROCARDIOGRAM COMPLETE: CPT | Performed by: INTERNAL MEDICINE

## 2024-04-04 PROCEDURE — 1123F ACP DISCUSS/DSCN MKR DOCD: CPT | Performed by: INTERNAL MEDICINE

## 2024-04-04 NOTE — PROGRESS NOTES
OUTPATIENT CARDIOLOGY FOLLOW-UP    Name: Clark Maza    Age: 73 y.o.    Primary Care Physician: Chance Jama DO    Date of Service: 4/4/2024    Chief Complaint: Coronary atherosclerosis, ischemic cardiomyopathy, chronic combined systolic and diastolic heart failure, chronic obstructive lung disease, chronic hypoxic respiratory failure, chronic kidney disease, non-Hodgkin's lymphoma in remission, moderate obesity, obstructive sleep apnea, preoperative cardiovascular evaluation    Interim History: Since his most recent assessment the patient has remained compensated from a cardiovascular standpoint and denies symptoms of anginal-like chest discomfort or other ischemic equivalents with ongoing exertional dyspnea (functional class II) in the absence of additional manifestations of decompensated left ventricular systolic dysfunction or volume overload nor arrhythmia related manifestations.  An interim myocardial perfusion imaging study demonstrated no evidence of significant perfusion abnormalities with left ventricular systolic function at the lower limits of normal.  Since his most recent evaluation he has gained approximately 10 pounds and remains normotensive.  He continues tobacco consumption, at minimal levels with average weekly consumption of less than 1/2 pack of cigarettes.  Within the past 2 months, he was involved in a home accident with severe laceration of his right forearm from a saw with transection of his right radial artery and assist in ligation and associated significant muscular and nerve injury.  On this basis, pending attempted repair of his right median nerve has been recommended.    Review of Systems:   The remainder of a complete multisystem review including consitutional, central nervous, respiratory, circulatory, gastrointestinal, genitourinary, endocrinologic, hematologic, musculoskeletal and psychiatric are negative.    Past Medical History:  Past Medical History: 
no...

## 2024-04-12 NOTE — PROGRESS NOTES
[facility] PRE-ADMISSION TESTING INSTRUCTIONS    The Preadmission Testing patient is instructed accordingly using the following criteria (check applicable):    ARRIVAL INSTRUCTIONS:  [x] Parking the day of Surgery is located in the Main Entrance lot.  Upon entering the door, make an immediate right to the surgery reception desk    [x] Bring photo ID and insurance card    [x] Bring in a copy of Living will or Durable Power of  papers.    [x] Please be sure to arrange for a responsible adult to provide transportation to and from the hospital    [x] Please arrange for a responsible adult to be with you for the 24 hour period post procedure due to having anesthesia    [x] If you awake am of surgery not feeling well or have temperature >100 please call [phone]    GENERAL INSTRUCTIONS:    [x] No solid foods after midnight, may have up to 8oz of water until 4 hours prior to surgery. No gum, no candy, no mints. NPO time: 0730       [x] You may brush your teeth, do not swallow any toothpaste    [x] Take medications as instructed     [x] No herbal supplements and vitamins prior to procedure    [x] Follow preop dosing of blood thinners per physician instructions    [] Take 1/2 dose of evening insulin, but no insulin after midnight    [] No oral diabetic medications after midnight    [] If diabetic and have low blood sugar or feel symptomatic, take 1-2oz apple juice only    [] Bring inhalers day of surgery    [] Bring urine specimen day of surgery    [x] Shower or bath with soap, lather and rinse well, AM of Surgery, no lotion, powders or creams to surgical site    [] Follow bowel prep as instructed per surgeon    [x] No tobacco products within 24 hours of surgery     [x] No alcohol or illegal drug use, marijuana included, within 24 hours of surgery.    [x] Jewelry, body piercing's, eyeglasses, contact lenses and dentures are not permitted into surgery (bring cases)      [x] Please do not  wear any nail polish, make up or hair products on the day of surgery    [x] You can expect a call the business day prior to procedure to notify you if your arrival time changes    [x] If you receive a survey after surgery we would greatly appreciate your comments    [] Parent/guardian of a minor must accompany their child and remain on the premises  the entire time they are under our care     [] Pediatric patients may bring favorite toy, blanket or comfort item with them    [] A caregiver or family member must remain with the patient during their stay if they are mentally handicapped, have dementia, disoriented or unable to use a call light or would be a safety concern if left unattended    [x] Please notify surgeon if you develop any illness between now and time of surgery (cold, cough, sore throat, fever, nausea, vomiting) or any signs of infections  including skin, wounds, and dental.    [x]  The Outpatient Pharmacy is available to fill your prescription here on your day of surgery, ask your preop nurse for details    [] Other instructions    EDUCATIONAL MATERIALS PROVIDED:    [] PAT Preoperative Education Packet/Booklet     [] Medication List    [] Transfusion bracelet applied with instructions    [] Shower with soap, lather and rinse well, and use CHG wipes provided the evening before surgery as instructed    [] Incentive spirometer with instructions

## 2024-04-13 ENCOUNTER — ANESTHESIA EVENT (OUTPATIENT)
Dept: OPERATING ROOM | Age: 74
End: 2024-04-13
Payer: MEDICARE

## 2024-04-15 ASSESSMENT — LIFESTYLE VARIABLES: SMOKING_STATUS: 0

## 2024-04-15 NOTE — ANESTHESIA PRE PROCEDURE
Department of Anesthesiology  Preprocedure Note       Name:  Clark Maza   Age:  73 y.o.  :  1950                                          MRN:  [id number]         Date:  4/15/2024      Surgeon: Surgeon(s):  Tremayne Hernández DO    Procedure: Procedure(s):  RIGHT MEDIAN NERVE REPAIR WITH ALLOGRAFT RIGHT RADIAL SENSORY NERVE REPAIR FLEXOR TENDON REPAIR VS TRANSFER    +++AXOGEN+++    Medications prior to admission:   Prior to Admission medications    Medication Sig Start Date End Date Taking? Authorizing Provider   potassium chloride (KLOR-CON M) 10 MEQ extended release tablet Take 1 tablet by mouth twice daily. 3/27/24   Chance Jama DO   tamsulosin (FLOMAX) 0.4 MG capsule Take 1 capsule by mouth nightly at bedtime. 3/27/24   Chance Jama DO   HYDROcodone-acetaminophen (NORCO) 7.5-325 MG per tablet Take 1 tablet by mouth every 6 hours as needed for Pain for up to 30 days. Max Daily Amount: 4 tablets 3/20/24 4/19/24  Chance Jama DO   omeprazole (PRILOSEC) 40 MG delayed release capsule Take 1 capsule by mouth once daily.  Patient taking differently: Take 1 capsule by mouth every morning 24   Chance Jama DO   fenofibrate (TRICOR) 145 MG tablet Take 1 tablet by mouth once daily. 24   Chance Jama DO   simvastatin (ZOCOR) 20 MG tablet Take 1 tablet by mouth daily. 24   Chance Jama DO   montelukast (SINGULAIR) 10 MG tablet Take 1 tablet by mouth once daily. 24   Chance Jama DO   clopidogrel (PLAVIX) 75 MG tablet Take 1 tablet by mouth daily. 24   Chance Jama DO   TRELEGY ELLIPTA 100-62.5-25 MCG/ACT AEPB inhaler INHALE 1 PUFF ONCE DAILY  Patient taking differently: Inhale 1 puff into the lungs every morning INHALE 1 PUFF ONCE DAILY 24   Chance Jama DO   guaiFENesin (MUCINEX) 600 MG extended release tablet Take 2 tablets by mouth 2 times daily  Patient taking differently: Take 2 tablets

## 2024-04-16 ENCOUNTER — PREP FOR PROCEDURE (OUTPATIENT)
Dept: ORTHOPEDIC SURGERY | Age: 74
End: 2024-04-16

## 2024-04-16 ENCOUNTER — ANESTHESIA (OUTPATIENT)
Dept: OPERATING ROOM | Age: 74
End: 2024-04-16
Payer: MEDICARE

## 2024-04-16 ENCOUNTER — HOSPITAL ENCOUNTER (OUTPATIENT)
Dept: GENERAL RADIOLOGY | Age: 74
Setting detail: OUTPATIENT SURGERY
Discharge: HOME | End: 2024-04-18
Attending: STUDENT IN AN ORGANIZED HEALTH CARE EDUCATION/TRAINING PROGRAM
Payer: MEDICARE

## 2024-04-16 ENCOUNTER — APPOINTMENT (OUTPATIENT)
Dept: GENERAL RADIOLOGY | Age: 74
End: 2024-04-16
Attending: STUDENT IN AN ORGANIZED HEALTH CARE EDUCATION/TRAINING PROGRAM
Payer: MEDICARE

## 2024-04-16 ENCOUNTER — HOSPITAL ENCOUNTER (OUTPATIENT)
Age: 74
Setting detail: OUTPATIENT SURGERY
Discharge: HOME | End: 2024-04-16
Attending: STUDENT IN AN ORGANIZED HEALTH CARE EDUCATION/TRAINING PROGRAM | Admitting: STUDENT IN AN ORGANIZED HEALTH CARE EDUCATION/TRAINING PROGRAM
Payer: MEDICARE

## 2024-04-16 VITALS
SYSTOLIC BLOOD PRESSURE: 142 MMHG | RESPIRATION RATE: 16 BRPM | HEIGHT: 70 IN | WEIGHT: 230 LBS | OXYGEN SATURATION: 96 % | HEART RATE: 76 BPM | BODY MASS INDEX: 32.93 KG/M2 | TEMPERATURE: 97.2 F | DIASTOLIC BLOOD PRESSURE: 82 MMHG

## 2024-04-16 DIAGNOSIS — S64.8X1A: ICD-10-CM

## 2024-04-16 DIAGNOSIS — S41.111A ARM LACERATION, RIGHT, INITIAL ENCOUNTER: Primary | ICD-10-CM

## 2024-04-16 DIAGNOSIS — S64.11XA: ICD-10-CM

## 2024-04-16 DIAGNOSIS — S51.801A FLEXOR TENDON LACERATION OF RIGHT FOREARM WITH OPEN WOUND, INITIAL ENCOUNTER: ICD-10-CM

## 2024-04-16 DIAGNOSIS — S56.221A FLEXOR TENDON LACERATION OF RIGHT FOREARM WITH OPEN WOUND, INITIAL ENCOUNTER: ICD-10-CM

## 2024-04-16 DIAGNOSIS — R52 PAIN: ICD-10-CM

## 2024-04-16 LAB
ANION GAP SERPL CALCULATED.3IONS-SCNC: 9 MMOL/L (ref 7–16)
BUN SERPL-MCNC: 21 MG/DL (ref 6–23)
CALCIUM SERPL-MCNC: 9.5 MG/DL (ref 8.6–10.2)
CHLORIDE SERPL-SCNC: 107 MMOL/L (ref 98–107)
CO2 SERPL-SCNC: 27 MMOL/L (ref 22–29)
CREAT SERPL-MCNC: 1.2 MG/DL (ref 0.7–1.2)
ERYTHROCYTE [DISTWIDTH] IN BLOOD BY AUTOMATED COUNT: 13.3 % (ref 11.5–15)
GFR SERPL CREATININE-BSD FRML MDRD: 64 ML/MIN/1.73M2
GLUCOSE SERPL-MCNC: 101 MG/DL (ref 74–99)
HCT VFR BLD AUTO: 51.5 % (ref 37–54)
HGB BLD-MCNC: 16 G/DL (ref 12.5–16.5)
MCH RBC QN AUTO: 27.7 PG (ref 26–35)
MCHC RBC AUTO-ENTMCNC: 31.1 G/DL (ref 32–34.5)
MCV RBC AUTO: 89.3 FL (ref 80–99.9)
PLATELET # BLD AUTO: 267 K/UL (ref 130–450)
PMV BLD AUTO: 11.1 FL (ref 7–12)
POTASSIUM SERPL-SCNC: 4.2 MMOL/L (ref 3.5–5)
RBC # BLD AUTO: 5.77 M/UL (ref 3.8–5.8)
SODIUM SERPL-SCNC: 143 MMOL/L (ref 132–146)
WBC OTHER # BLD: 7.1 K/UL (ref 4.5–11.5)

## 2024-04-16 PROCEDURE — 7100000010 HC PHASE II RECOVERY - FIRST 15 MIN: Performed by: STUDENT IN AN ORGANIZED HEALTH CARE EDUCATION/TRAINING PROGRAM

## 2024-04-16 PROCEDURE — 3600000003 HC SURGERY LEVEL 3 BASE: Performed by: STUDENT IN AN ORGANIZED HEALTH CARE EDUCATION/TRAINING PROGRAM

## 2024-04-16 PROCEDURE — 6370000000 HC RX 637 (ALT 250 FOR IP): Performed by: ANESTHESIOLOGY

## 2024-04-16 PROCEDURE — 2580000003 HC RX 258: Performed by: STUDENT IN AN ORGANIZED HEALTH CARE EDUCATION/TRAINING PROGRAM

## 2024-04-16 PROCEDURE — 7100000000 HC PACU RECOVERY - FIRST 15 MIN: Performed by: STUDENT IN AN ORGANIZED HEALTH CARE EDUCATION/TRAINING PROGRAM

## 2024-04-16 PROCEDURE — 2500000003 HC RX 250 WO HCPCS: Performed by: STUDENT IN AN ORGANIZED HEALTH CARE EDUCATION/TRAINING PROGRAM

## 2024-04-16 PROCEDURE — 7100000011 HC PHASE II RECOVERY - ADDTL 15 MIN: Performed by: STUDENT IN AN ORGANIZED HEALTH CARE EDUCATION/TRAINING PROGRAM

## 2024-04-16 PROCEDURE — 94640 AIRWAY INHALATION TREATMENT: CPT

## 2024-04-16 PROCEDURE — C9352 NEURAGEN NERVE GUIDE, PER CM: HCPCS | Performed by: STUDENT IN AN ORGANIZED HEALTH CARE EDUCATION/TRAINING PROGRAM

## 2024-04-16 PROCEDURE — C1762 CONN TISS, HUMAN(INC FASCIA): HCPCS | Performed by: STUDENT IN AN ORGANIZED HEALTH CARE EDUCATION/TRAINING PROGRAM

## 2024-04-16 PROCEDURE — 3700000001 HC ADD 15 MINUTES (ANESTHESIA): Performed by: STUDENT IN AN ORGANIZED HEALTH CARE EDUCATION/TRAINING PROGRAM

## 2024-04-16 PROCEDURE — 6360000002 HC RX W HCPCS: Performed by: NURSE ANESTHETIST, CERTIFIED REGISTERED

## 2024-04-16 PROCEDURE — 2709999900 HC NON-CHARGEABLE SUPPLY: Performed by: STUDENT IN AN ORGANIZED HEALTH CARE EDUCATION/TRAINING PROGRAM

## 2024-04-16 PROCEDURE — 2580000003 HC RX 258: Performed by: NURSE ANESTHETIST, CERTIFIED REGISTERED

## 2024-04-16 PROCEDURE — 85027 COMPLETE CBC AUTOMATED: CPT

## 2024-04-16 PROCEDURE — 2500000003 HC RX 250 WO HCPCS: Performed by: NURSE ANESTHETIST, CERTIFIED REGISTERED

## 2024-04-16 PROCEDURE — 7100000001 HC PACU RECOVERY - ADDTL 15 MIN: Performed by: STUDENT IN AN ORGANIZED HEALTH CARE EDUCATION/TRAINING PROGRAM

## 2024-04-16 PROCEDURE — 6360000002 HC RX W HCPCS: Performed by: STUDENT IN AN ORGANIZED HEALTH CARE EDUCATION/TRAINING PROGRAM

## 2024-04-16 PROCEDURE — 6370000000 HC RX 637 (ALT 250 FOR IP): Performed by: STUDENT IN AN ORGANIZED HEALTH CARE EDUCATION/TRAINING PROGRAM

## 2024-04-16 PROCEDURE — 80048 BASIC METABOLIC PNL TOTAL CA: CPT

## 2024-04-16 PROCEDURE — 88304 TISSUE EXAM BY PATHOLOGIST: CPT

## 2024-04-16 PROCEDURE — 71045 X-RAY EXAM CHEST 1 VIEW: CPT

## 2024-04-16 PROCEDURE — 3700000000 HC ANESTHESIA ATTENDED CARE: Performed by: STUDENT IN AN ORGANIZED HEALTH CARE EDUCATION/TRAINING PROGRAM

## 2024-04-16 PROCEDURE — 3600000013 HC SURGERY LEVEL 3 ADDTL 15MIN: Performed by: STUDENT IN AN ORGANIZED HEALTH CARE EDUCATION/TRAINING PROGRAM

## 2024-04-16 DEVICE — IMPLANTABLE DEVICE: Type: IMPLANTABLE DEVICE | Site: ARM | Status: FUNCTIONAL

## 2024-04-16 DEVICE — NEURAWRAP® NERVE PROTECTOR 5MM I.D. X 4CM LENGTH
Type: IMPLANTABLE DEVICE | Site: ARM | Status: FUNCTIONAL
Brand: NEURAWRAP®

## 2024-04-16 RX ORDER — ACETAMINOPHEN 500 MG
1000 TABLET ORAL ONCE
Status: COMPLETED | OUTPATIENT
Start: 2024-04-16 | End: 2024-04-16

## 2024-04-16 RX ORDER — SODIUM CHLORIDE 9 MG/ML
INJECTION, SOLUTION INTRAVENOUS CONTINUOUS
Status: CANCELLED | OUTPATIENT
Start: 2024-04-16

## 2024-04-16 RX ORDER — SODIUM CHLORIDE 0.9 % (FLUSH) 0.9 %
5-40 SYRINGE (ML) INJECTION PRN
Status: CANCELLED | OUTPATIENT
Start: 2024-04-16

## 2024-04-16 RX ORDER — LIDOCAINE HYDROCHLORIDE 20 MG/ML
INJECTION, SOLUTION EPIDURAL; INFILTRATION; INTRACAUDAL; PERINEURAL PRN
Status: DISCONTINUED | OUTPATIENT
Start: 2024-04-16 | End: 2024-04-16 | Stop reason: SDUPTHER

## 2024-04-16 RX ORDER — ROCURONIUM BROMIDE 10 MG/ML
INJECTION, SOLUTION INTRAVENOUS PRN
Status: DISCONTINUED | OUTPATIENT
Start: 2024-04-16 | End: 2024-04-16 | Stop reason: SDUPTHER

## 2024-04-16 RX ORDER — CARVEDILOL 3.12 MG/1
3.12 TABLET ORAL ONCE
Status: COMPLETED | OUTPATIENT
Start: 2024-04-16 | End: 2024-04-16

## 2024-04-16 RX ORDER — NALOXONE HYDROCHLORIDE 0.4 MG/ML
INJECTION, SOLUTION INTRAMUSCULAR; INTRAVENOUS; SUBCUTANEOUS PRN
Status: DISCONTINUED | OUTPATIENT
Start: 2024-04-16 | End: 2024-04-16 | Stop reason: HOSPADM

## 2024-04-16 RX ORDER — METHOCARBAMOL 750 MG/1
750 TABLET, FILM COATED ORAL 4 TIMES DAILY
Qty: 40 TABLET | Refills: 0 | Status: SHIPPED | OUTPATIENT
Start: 2024-04-16 | End: 2024-04-26

## 2024-04-16 RX ORDER — SODIUM CHLORIDE 0.9 % (FLUSH) 0.9 %
5-40 SYRINGE (ML) INJECTION PRN
Status: DISCONTINUED | OUTPATIENT
Start: 2024-04-16 | End: 2024-04-16 | Stop reason: HOSPADM

## 2024-04-16 RX ORDER — SODIUM CHLORIDE 9 MG/ML
INJECTION, SOLUTION INTRAVENOUS CONTINUOUS PRN
Status: DISCONTINUED | OUTPATIENT
Start: 2024-04-16 | End: 2024-04-16 | Stop reason: SDUPTHER

## 2024-04-16 RX ORDER — PROPOFOL 10 MG/ML
INJECTION, EMULSION INTRAVENOUS PRN
Status: DISCONTINUED | OUTPATIENT
Start: 2024-04-16 | End: 2024-04-16 | Stop reason: SDUPTHER

## 2024-04-16 RX ORDER — IBUPROFEN 800 MG/1
400 TABLET ORAL ONCE
Status: COMPLETED | OUTPATIENT
Start: 2024-04-16 | End: 2024-04-16

## 2024-04-16 RX ORDER — FIBRINOGEN HUMAN AND THROMBIN HUMAN 1 ML
KIT TOPICAL PRN
Status: DISCONTINUED | OUTPATIENT
Start: 2024-04-16 | End: 2024-04-16 | Stop reason: HOSPADM

## 2024-04-16 RX ORDER — DEXAMETHASONE SODIUM PHOSPHATE 4 MG/ML
INJECTION, SOLUTION INTRA-ARTICULAR; INTRALESIONAL; INTRAMUSCULAR; INTRAVENOUS; SOFT TISSUE PRN
Status: DISCONTINUED | OUTPATIENT
Start: 2024-04-16 | End: 2024-04-16 | Stop reason: SDUPTHER

## 2024-04-16 RX ORDER — SODIUM CHLORIDE 0.9 % (FLUSH) 0.9 %
5-40 SYRINGE (ML) INJECTION EVERY 12 HOURS SCHEDULED
Status: DISCONTINUED | OUTPATIENT
Start: 2024-04-16 | End: 2024-04-16 | Stop reason: HOSPADM

## 2024-04-16 RX ORDER — SODIUM CHLORIDE 9 MG/ML
INJECTION, SOLUTION INTRAVENOUS PRN
Status: CANCELLED | OUTPATIENT
Start: 2024-04-16

## 2024-04-16 RX ORDER — BUPIVACAINE HYDROCHLORIDE AND EPINEPHRINE 5; 5 MG/ML; UG/ML
INJECTION, SOLUTION EPIDURAL; INTRACAUDAL; PERINEURAL PRN
Status: DISCONTINUED | OUTPATIENT
Start: 2024-04-16 | End: 2024-04-16 | Stop reason: ALTCHOICE

## 2024-04-16 RX ORDER — FENTANYL CITRATE 50 UG/ML
INJECTION, SOLUTION INTRAMUSCULAR; INTRAVENOUS PRN
Status: DISCONTINUED | OUTPATIENT
Start: 2024-04-16 | End: 2024-04-16 | Stop reason: SDUPTHER

## 2024-04-16 RX ORDER — ACETAMINOPHEN 325 MG/1
1000 TABLET ORAL ONCE
Status: CANCELLED | OUTPATIENT
Start: 2024-04-16 | End: 2024-04-16

## 2024-04-16 RX ORDER — PROCHLORPERAZINE EDISYLATE 5 MG/ML
5 INJECTION INTRAMUSCULAR; INTRAVENOUS
Status: DISCONTINUED | OUTPATIENT
Start: 2024-04-16 | End: 2024-04-16 | Stop reason: HOSPADM

## 2024-04-16 RX ORDER — ONDANSETRON 2 MG/ML
INJECTION INTRAMUSCULAR; INTRAVENOUS PRN
Status: DISCONTINUED | OUTPATIENT
Start: 2024-04-16 | End: 2024-04-16 | Stop reason: SDUPTHER

## 2024-04-16 RX ORDER — SODIUM CHLORIDE 9 MG/ML
INJECTION, SOLUTION INTRAVENOUS PRN
Status: DISCONTINUED | OUTPATIENT
Start: 2024-04-16 | End: 2024-04-16 | Stop reason: HOSPADM

## 2024-04-16 RX ORDER — SODIUM CHLORIDE 0.9 % (FLUSH) 0.9 %
5-40 SYRINGE (ML) INJECTION EVERY 12 HOURS SCHEDULED
Status: CANCELLED | OUTPATIENT
Start: 2024-04-16

## 2024-04-16 RX ORDER — SODIUM CHLORIDE 9 MG/ML
INJECTION, SOLUTION INTRAVENOUS CONTINUOUS
Status: DISCONTINUED | OUTPATIENT
Start: 2024-04-16 | End: 2024-04-16 | Stop reason: HOSPADM

## 2024-04-16 RX ORDER — IPRATROPIUM BROMIDE AND ALBUTEROL SULFATE 2.5; .5 MG/3ML; MG/3ML
1 SOLUTION RESPIRATORY (INHALATION) ONCE
Status: COMPLETED | OUTPATIENT
Start: 2024-04-16 | End: 2024-04-16

## 2024-04-16 RX ORDER — IBUPROFEN 400 MG/1
400 TABLET ORAL ONCE
Status: CANCELLED | OUTPATIENT
Start: 2024-04-16 | End: 2024-04-16

## 2024-04-16 RX ADMIN — ROCURONIUM BROMIDE 50 MG: 10 INJECTION, SOLUTION INTRAVENOUS at 12:17

## 2024-04-16 RX ADMIN — ONDANSETRON 4 MG: 2 INJECTION INTRAMUSCULAR; INTRAVENOUS at 12:22

## 2024-04-16 RX ADMIN — LIDOCAINE HYDROCHLORIDE 100 MG: 20 INJECTION, SOLUTION EPIDURAL; INFILTRATION; INTRACAUDAL; PERINEURAL at 12:17

## 2024-04-16 RX ADMIN — IPRATROPIUM BROMIDE AND ALBUTEROL SULFATE 1 DOSE: 2.5; .5 SOLUTION RESPIRATORY (INHALATION) at 11:31

## 2024-04-16 RX ADMIN — CARVEDILOL 3.12 MG: 3.12 TABLET, FILM COATED ORAL at 12:00

## 2024-04-16 RX ADMIN — SODIUM CHLORIDE: 9 INJECTION, SOLUTION INTRAVENOUS at 12:09

## 2024-04-16 RX ADMIN — PROPOFOL 200 MG: 10 INJECTION, EMULSION INTRAVENOUS at 12:17

## 2024-04-16 RX ADMIN — FENTANYL CITRATE 50 MCG: 50 INJECTION, SOLUTION INTRAMUSCULAR; INTRAVENOUS at 12:17

## 2024-04-16 RX ADMIN — SUGAMMADEX 200 MG: 100 INJECTION, SOLUTION INTRAVENOUS at 14:28

## 2024-04-16 RX ADMIN — WATER 2000 MG: 1 INJECTION INTRAMUSCULAR; INTRAVENOUS; SUBCUTANEOUS at 12:09

## 2024-04-16 RX ADMIN — FENTANYL CITRATE 50 MCG: 50 INJECTION, SOLUTION INTRAMUSCULAR; INTRAVENOUS at 14:29

## 2024-04-16 RX ADMIN — ACETAMINOPHEN 1000 MG: 500 TABLET ORAL at 11:11

## 2024-04-16 RX ADMIN — DEXAMETHASONE SODIUM PHOSPHATE 10 MG: 4 INJECTION, SOLUTION INTRAMUSCULAR; INTRAVENOUS at 12:22

## 2024-04-16 RX ADMIN — IBUPROFEN 400 MG: 800 TABLET, FILM COATED ORAL at 11:11

## 2024-04-16 ASSESSMENT — PAIN SCALES - GENERAL
PAINLEVEL_OUTOF10: 0

## 2024-04-16 ASSESSMENT — PAIN - FUNCTIONAL ASSESSMENT: PAIN_FUNCTIONAL_ASSESSMENT: NONE - DENIES PAIN

## 2024-04-16 NOTE — OP NOTE
Operative Note      Patient: Clark ZURITA Postlethwait  YOB: 1950  MRN: [id number]    Date of Procedure: 4/16/2024    Pre-op diagnosis:  Flexor carpi radialis tendon laceration  Superficial radial nerve laceration  Median nerve laceration,   FPL laceration    Post-Op Diagnosis: Same       Procedure:  Right median nerve repair with allograft  Right radial sensory nerve repair with allograft  Right open carpal tunnel release  Flexor pollicis longus tendon repair  Flexor carpi radialis tendon repair  Use of intraoperative microscope  Application of splint      Surgeon(s):  Tremayne Hernández DO    Assistant:   * No surgical staff found *    Anesthesia: General    Estimated Blood Loss (mL): less than 100     Complications: None    Specimens:   ID Type Source Tests Collected by Time Destination   A : MEDIAN NERVE NEUROMA Tissue Tissue SURGICAL PATHOLOGY Tremayne Hernández DO 4/16/2024 1353        Implants:  Implant Name Type Inv. Item Serial No.  Lot No. LRB No. Used Action   ALLOGRAFT NERVE 3-4X50 MM PROC STRL AVANCE LTX - [id number]  ALLOGRAFT NERVE 3-4X50 MM PROC STRL AVANCE LTX  [employer]WD [id number] Right 1 Implanted   ALLOGRAFT NERVE 4-5X50 MM PROC STRL AVANCE LTX - [id number]  ALLOGRAFT NERVE 4-5X50 MM PROC STRL AVANCE LTX  [employer]-WD [id number] Right 1 Implanted   GRAFT NRV PROTCT L4CM ID5MM CLLGN ABSRB PERIPH NEURAWRAP - [id number]  GRAFT NRV PROTCT L4CM ID5MM CLLGN ABSRB PERIPH NEURAWRAP  [employer] [facility]-WD [id number] Right 1 Implanted         Drains: * No LDAs found *    Findings:  Infection Present At Time Of Surgery (PATOS) (choose all levels that have infection present):  No infection present      PROCEDURE IN DETAIL: The patient was identified in the holding area. The  Right arm was identified as the operative site. He was then seen by  Anesthesia, taken to the operating room, placed supine on the table, and  underwent monitored anesthesia care per Anesthesia Department. With this  brachioradialis.  This was found to be completely lacerated.  This was then neurolysed proximally and distally to improve nerve excursion.    Attention was then made to the FPL tendon.  This was released proximally and distally to increase excursion.  The IP joint was then placed in full flexion.  The FPL tendon was then sutured back to the FPL muscle belly fascia using multiple 2-0 Ethibond sutures in a Alexis-Roger and figure-of-eight fashion.  Then attention was made to the FCR tendon.  The FCR tendon was then sutured in multiple figure-of-eight fashion using a 2-0 Ethibond suture.    Then attention was made to the nerve repairs.  The microscope was then brought into the field.  The median nerve neuroma stump was then trimmed back to good healthy fascicles.  This measured a 3.5 cm nerve gap.  A 5 mm Axogen nerve graft was then chosen.  This was then cut to appropriate length.  The the proximal coaptation site was then sutured together using epineurial repair with multiple 9-0 nylon sutures in a simple fashion.  The distal coaptation site was then repaired using a epineurial repair using multiple simple 9-0 nylon sutures.    Then attention was made to the superficial radial nerve.  The neuroma stump was then trimmed back to good healthy fascicles.  A 3 to 4 mm oxygen nerve graft was then chosen.  Nerve graft was measuring 3 cm in length.  The nerve graft was then placed.  The proximal coaptation site was then sutured together using multiple 9-0 nylon sutures in a simple fashion.  The distal coaptation site was then repaired using a epineurial repair using multiple simple 9-0 nylon sutures.  A nerve wrap was then placed over the proximal and distal coaptation sites of the superficial radial nerve and median nerve.  The wound was then estefanía irrigated with normal saline.  The wound was then dried.  Fibrin glue was then placed into the nerve wraps and over the nerve repair site.  Previous incisions were then closed

## 2024-04-16 NOTE — ANESTHESIA POSTPROCEDURE EVALUATION
Department of Anesthesiology  Postprocedure Note    Patient: Clark Maza  MRN: [id number]  YOB: 1950  Date of evaluation: 4/16/2024    Procedure Summary       Date: 04/16/24 Room / Location: [address]    Anesthesia Start: 1209 Anesthesia Stop: 1438    Procedure: RIGHT MEDIAN NERVE REPAIR WITH ALLOGRAFT RIGHT RADIAL SENSORY NERVE REPAIR FLEXOR TENDON REPAIR, CARPAL TUNNEL RELEASE RIGHT (Right: Wrist) Diagnosis:       Injury of cutaneous sensory nerve of right upper extremity at hand level, initial encounter      Injury of right median nerve at hand level, initial encounter      Flexor tendon laceration of right forearm with open wound, initial encounter      (Injury of cutaneous sensory nerve of right upper extremity at hand level, initial encounter [S64.8X1A])      (Injury of right median nerve at hand level, initial encounter [S64.11XA])      (Flexor tendon laceration of right forearm with open wound, initial encounter [S56.221A, S51.801A])    Surgeons: Tremayne Hernández DO Responsible Provider: Rhonda Braun MD    Anesthesia Type: general ASA Status: 3            Anesthesia Type: No value filed.    Laura Phase I: Laura Score: 8    Laura Phase II:      Anesthesia Post Evaluation    Patient location during evaluation: PACU  Patient participation: complete - patient participated  Level of consciousness: awake and alert  Pain score: 1  Airway patency: patent  Nausea & Vomiting: no nausea and no vomiting  Cardiovascular status: hemodynamically stable  Respiratory status: acceptable, nonlabored ventilation and spontaneous ventilation  Pain management: adequate and satisfactory to patient        No notable events documented.

## 2024-04-16 NOTE — DISCHARGE INSTRUCTIONS
[facility] Orthopedic Surgery  [address] , [address]  [place] [address]    Or    [address] [place] [address]    Dr. Tremayne Hernández, DO    Orthopaedics Discharge Instructions   Weight bearing Status - Non-weight bearing - on right upper Extremity  Pain medication Per Prescriptions  Contact Office for Medication Refill-  [phone]  Office can refill pain med every 7 days  If patient discharging to facility then pain control will be continued per facility physician  Ice to operative/injured site for 15-30 minutes of each hour for next 5 days    Recommend that you continue to ice the area 2-3 times per day after this   Elevate operative/injured limb on 2 pillows at home  Goal is to have limb above the heart if able  Wound care - Maintain Splint, Keep Clean and Dry until follow up appointment , Don't Remove   Follow Up in Office in 10-14 days. Call Office to Confirm Appointment time and Location - [phone]    Call the office at [phone] for directions or with any questions.  Watch for these significant complications.  Call physician if they or any other problems occur:  Fever over 101°, redness, swelling or warmth at the operative site  Unrelieved nausea    Foul smelling or cloudy drainage at the operative site   Unrelieved pain    Blood soaked dressing. (Some oozing may be normal)     Numb, pale, blue, cold or tingling extremity

## 2024-04-16 NOTE — H&P
Ortho & Spine Office Visit/H&P   Signed    Patient: Clark Maza  MR#: [id number]  : 1950  Acct:[id number]  Age/Sex: 73 / M  ADM Date: 24  Loc: [facility].023            Provider Location:   cc: Dr. Chance Jama~        Intake  Vital Signs      24  13:46  Blood Pressure Location   Not Done    Intake  Visit Reasons: Laceration  Fall Within Last 3 Months: No  Screening Declined: Colorectal Cancer, Breast Cancer, Osteoporosis and Cervical Cancer  Current Pain: No  Allergies    No Known Allergies Allergy (Verified 24 13:46)      Safety Concerns: Feels Safe At This Time    HPI  History of Present Illness:   Patient is a 73-year-old male presenting with a right forearm laceration from a  back on 2024.  Patient was seen in the ED at that time.   taken to the OR for artery injury.  Patient had wound irrigation and closure at the time.  Patient is here for his first follow-up visit post injury.  Hand surgery was not consulted during patient's hospital stay.  Patient still having numbness and tingling to the radial and median nerve distribution.  Patient is unable to flex the IP joint of the thumb region.  Patient is able to flex the other digits.  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  Patient does have a prolonged webspace of the second webspace, patient states he did have an altered sensation of the dorsal aspect of the thumb for years.  H&P for Procedures  H&P for Procedure: Yes  Long/Short H&P: Long H&P  Chief Complaint: See HPI for Complaint    ROS  Const'l  Constitutional: Reports ROS Constitutional System Reviewed and No Additional Complaints, Except as Documented  Eyes  Eyes: Reports ROS Eyes System Reviewed and No Additional Complaints, Except as Documented  ENT  ENT: Reports ROS ENT System Reviewed and No Additional Complaints, Except as Documented  Cardio  Cardiovascular: Reports ROS CARD System Reviewed and No Additional Complaints,

## 2024-04-16 NOTE — H&P
Ortho & Spine Office Visit/H&P   Signed    Patient: Clark Maza  MR#: [id number]  : 1950  Acct:[id number]  Age/Sex: 73 / M  ADM Date: 24  Loc: [facility].023            Provider Location:   cc: Dr. Chance Jama~        Intake  Vital Signs      24  13:46  Blood Pressure Location   Not Done    Intake  Visit Reasons: Laceration  Fall Within Last 3 Months: No  Screening Declined: Colorectal Cancer, Breast Cancer, Osteoporosis and Cervical Cancer  Current Pain: No  Allergies    No Known Allergies Allergy (Verified 24 13:46)      Safety Concerns: Feels Safe At This Time    HPI  History of Present Illness:   Patient is a 73-year-old male presenting with a right forearm laceration from a  back on 2024.  Patient was seen in the ED at that time.   taken to the OR for artery injury.  Patient had wound irrigation and closure at the time.  Patient is here for his first follow-up visit post injury.  Hand surgery was not consulted during patient's hospital stay.  Patient still having numbness and tingling to the radial and median nerve distribution.  Patient is unable to flex the IP joint of the thumb region.  Patient is able to flex the other digits.  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  Patient does have a prolonged webspace of the second webspace, patient states he did have an altered sensation of the dorsal aspect of the thumb for years.  H&P for Procedures  H&P for Procedure: Yes  Long/Short H&P: Long H&P  Chief Complaint: See HPI for Complaint    ROS  Const'l  Constitutional: Reports ROS Constitutional System Reviewed and No Additional Complaints, Except as Documented  Eyes  Eyes: Reports ROS Eyes System Reviewed and No Additional Complaints, Except as Documented  ENT  ENT: Reports ROS ENT System Reviewed and No Additional Complaints, Except as Documented  Cardio  Cardiovascular: Reports ROS CARD System Reviewed and No Additional Complaints,

## 2024-04-22 ENCOUNTER — HOSPITAL ENCOUNTER (OUTPATIENT)
Dept: GENERAL RADIOLOGY | Age: 74
Discharge: HOME | End: 2024-04-24
Payer: MEDICARE

## 2024-04-22 ENCOUNTER — OFFICE VISIT (OUTPATIENT)
Dept: FAMILY MEDICINE CLINIC | Age: 74
End: 2024-04-22
Payer: MEDICARE

## 2024-04-22 ENCOUNTER — HOSPITAL ENCOUNTER (OUTPATIENT)
Age: 74
Discharge: HOME | End: 2024-04-24
Payer: MEDICARE

## 2024-04-22 VITALS
TEMPERATURE: 97.5 F | DIASTOLIC BLOOD PRESSURE: 76 MMHG | HEART RATE: 72 BPM | HEIGHT: 70 IN | OXYGEN SATURATION: 92 % | RESPIRATION RATE: 16 BRPM | BODY MASS INDEX: 32.53 KG/M2 | SYSTOLIC BLOOD PRESSURE: 130 MMHG | WEIGHT: 227.2 LBS

## 2024-04-22 DIAGNOSIS — M25.511 CHRONIC RIGHT SHOULDER PAIN: ICD-10-CM

## 2024-04-22 DIAGNOSIS — R05.1 ACUTE COUGH: ICD-10-CM

## 2024-04-22 DIAGNOSIS — R05.1 ACUTE COUGH: Primary | ICD-10-CM

## 2024-04-22 DIAGNOSIS — G89.29 CHRONIC RIGHT SHOULDER PAIN: ICD-10-CM

## 2024-04-22 DIAGNOSIS — J43.1 PANLOBULAR EMPHYSEMA (HCC): ICD-10-CM

## 2024-04-22 LAB
INFLUENZA A ANTIGEN, POC: NEGATIVE
INFLUENZA B ANTIGEN, POC: NEGATIVE
LOT EXPIRE DATE: NORMAL
LOT KIT NUMBER: NORMAL
SARS-COV-2, POC: NORMAL
SURGICAL PATHOLOGY REPORT: NORMAL
VALID INTERNAL CONTROL: NORMAL
VENDOR AND KIT NAME POC: NORMAL

## 2024-04-22 PROCEDURE — 71046 X-RAY EXAM CHEST 2 VIEWS: CPT

## 2024-04-22 PROCEDURE — 99214 OFFICE O/P EST MOD 30 MIN: CPT | Performed by: NURSE PRACTITIONER

## 2024-04-22 PROCEDURE — 87428 SARSCOV & INF VIR A&B AG IA: CPT | Performed by: NURSE PRACTITIONER

## 2024-04-22 PROCEDURE — 1123F ACP DISCUSS/DSCN MKR DOCD: CPT | Performed by: NURSE PRACTITIONER

## 2024-04-22 RX ORDER — HYDROCODONE BITARTRATE AND ACETAMINOPHEN 7.5; 325 MG/1; MG/1
1 TABLET ORAL EVERY 6 HOURS PRN
Qty: 90 TABLET | Refills: 0 | Status: SHIPPED | OUTPATIENT
Start: 2024-04-22 | End: 2024-05-22

## 2024-04-22 RX ORDER — PREDNISONE 5 MG/1
5 TABLET ORAL SEE ADMIN INSTRUCTIONS
Qty: 21 TABLET | Refills: 0 | Status: SHIPPED | OUTPATIENT
Start: 2024-04-22 | End: 2024-04-28

## 2024-04-22 RX ORDER — FLUTICASONE FUROATE, UMECLIDINIUM BROMIDE AND VILANTEROL TRIFENATATE 100; 62.5; 25 UG/1; UG/1; UG/1
POWDER RESPIRATORY (INHALATION)
Qty: 1 EACH | Refills: 2 | Status: SHIPPED | OUTPATIENT
Start: 2024-04-22

## 2024-04-22 ASSESSMENT — ENCOUNTER SYMPTOMS
SORE THROAT: 0
DIARRHEA: 0
WHEEZING: 1
VOMITING: 0
SHORTNESS OF BREATH: 1
COUGH: 1
NAUSEA: 0
CONSTIPATION: 0

## 2024-04-22 NOTE — PROGRESS NOTES
Clark Maza (:  1950) is a 73 y.o. male,Established patient, here for evaluation of the following chief complaint(s):  Shoulder Pain (Med Refill) and Shortness of Breath (Very fatigue ans low oxygen)      Assessment & Plan   ASSESSMENT/PLAN:  1. Acute cough  -     POCT COVID-19 & Influenza A/B  -     XR CHEST (2 VW); Future  -     predniSONE (DELTASONE) 5 MG tablet; Take 1 tablet by mouth See Admin Instructions for 6 days TAPER 6,5,4,3,2,1, Disp-21 tablet, R-0Normal  Contact office if symptoms do not improve as expected or worsen.    2. Panlobular emphysema (HCC)  -     TRELEGY ELLIPTA 100-62.5-25 MCG/ACT AEPB inhaler; INHALE 1 PUFF ONCE DAILY, Disp-1 each, R-2, DAWNormal  -     XR CHEST (2 VW); Future  -     predniSONE (DELTASONE) 5 MG tablet; Take 1 tablet by mouth See Admin Instructions for 6 days TAPER 6,5,4,3,2,1, Disp-21 tablet, R-0Normal  Contact office if symptoms do not improve as expected or worsen.    3. Chronic right shoulder pain  -     HYDROcodone-acetaminophen (NORCO) 7.5-325 MG per tablet; Take 1 tablet by mouth every 6 hours as needed for Pain for up to 30 days. Max Daily Amount: 4 tablets, Disp-90 tablet, R-0Normal  The current medical regimen is effective;  continue present plan and medications.      Controlled Substance Monitoring:    Acute and Chronic Pain Monitoring:   RX Monitoring Periodic Controlled Substance Monitoring   2024  10:06 AM No signs of potential drug abuse or diversion identified.           No follow-ups on file.         Subjective   SUBJECTIVE/OBJECTIVE:  Complains of right shoulder pain.  This is a chronic pain.  Takes norco prn with some relief.  Has had 2 surgeries in the past  Complains of SOB, wheezing, cough (white).  States feels like his baseline breathing.  States felt ill over the weekend-body aches and fatigue        Review of Systems   Constitutional:  Negative for activity change, appetite change, chills, diaphoresis, fatigue, fever and

## 2024-04-26 DIAGNOSIS — F32.A DEPRESSION, UNSPECIFIED DEPRESSION TYPE: ICD-10-CM

## 2024-04-26 DIAGNOSIS — F32.89 OTHER DEPRESSION: ICD-10-CM

## 2024-04-26 DIAGNOSIS — F43.21 GRIEF: ICD-10-CM

## 2024-04-26 RX ORDER — SERTRALINE HYDROCHLORIDE 100 MG/1
100 TABLET, FILM COATED ORAL DAILY
Qty: 90 TABLET | Refills: 0 | Status: SHIPPED | OUTPATIENT
Start: 2024-04-26

## 2024-04-26 RX ORDER — BUPROPION HYDROCHLORIDE 150 MG/1
TABLET, EXTENDED RELEASE ORAL
Qty: 180 TABLET | Refills: 0 | Status: SHIPPED | OUTPATIENT
Start: 2024-04-26

## 2024-04-26 NOTE — TELEPHONE ENCOUNTER
Last seen 4/22/2024  Next appt 5/22/2024    Rx pended. Please review and sign.    Electronically signed by TENZIN TIWARI MA on 4/26/24 at 8:09 AM EDT

## 2024-05-01 ENCOUNTER — TELEPHONE (OUTPATIENT)
Dept: FAMILY MEDICINE CLINIC | Age: 74
End: 2024-05-01

## 2024-05-01 ENCOUNTER — HOSPITAL ENCOUNTER (EMERGENCY)
Age: 74
Discharge: HOME | End: 2024-05-01
Attending: FAMILY MEDICINE
Payer: MEDICARE

## 2024-05-01 VITALS
RESPIRATION RATE: 24 BRPM | OXYGEN SATURATION: 93 % | TEMPERATURE: 97.4 F | HEART RATE: 74 BPM | BODY MASS INDEX: 34.1 KG/M2 | WEIGHT: 225 LBS | DIASTOLIC BLOOD PRESSURE: 88 MMHG | HEIGHT: 68 IN | SYSTOLIC BLOOD PRESSURE: 138 MMHG

## 2024-05-01 DIAGNOSIS — T81.31XA DEHISCENCE OF OPERATIVE WOUND, INITIAL ENCOUNTER: Primary | ICD-10-CM

## 2024-05-01 PROCEDURE — 99282 EMERGENCY DEPT VISIT SF MDM: CPT

## 2024-05-01 RX ORDER — CARVEDILOL 3.12 MG/1
TABLET ORAL
Qty: 30 TABLET | Refills: 3 | Status: SHIPPED | OUTPATIENT
Start: 2024-05-01

## 2024-05-01 NOTE — TELEPHONE ENCOUNTER
----- Message from Clark Maza sent at 5/1/2024  2:11 PM EDT -----  Regarding: Open wound   Contact: [phone]  Called Dr. Hernández about surgical incision opening   Photo from today 5/1 had stitches removed 4/26

## 2024-05-01 NOTE — TELEPHONE ENCOUNTER
Called Dr. Hernández about surgical incision opening   Photo from today 5/1 had stitches removed 4/26   Attachments   [id number]

## 2024-05-01 NOTE — ED PROVIDER NOTES
HPI:  5/1/24,   Time: 1:39 PM EDT         Clark Maza is a 73 y.o. male presenting to the ED for surgical wound coming apart on the right forearm, he had sutures removed 2 days ago, noticed a small amount of bleeding in the center of the wound at that time but since then, the next day the wound opened up completely.  On 4/16/2024, he underwent right median nerve repair with allograft right radial sensory nerve and repair of the flexor tendon and a carpal tunnel release.             ROS:   Pertinent positives and negatives are stated within HPI, all other systems reviewed and are negative.  --------------------------------------------- PAST HISTORY ---------------------------------------------  Past Medical History:  has a past medical history of Anesthesia complication, Arthritis, CAD (coronary artery disease), Cardiomyopathy (HCC), CHF (congestive heart failure) ([facility]), Chronic hypoxic respiratory failure (HCC), CKD (chronic kidney disease), COPD (chronic obstructive pulmonary disease) ([facility]), Dependence on supplemental oxygen, Erectile dysfunction, GERD (gastroesophageal reflux disease), H/O coronary angioplasty with stents[V45.82], Hearing loss, HFrEF (heart failure with reduced ejection fraction) ([facility]), Hyperlipidemia, Hypertension, Ischemic cardiomyopathy, Lymphoma (HCC), Myocardial infarction (HCC), Non-Hodgkin lymphoma (HCC), Obesity, Sleep apnea, and Unspecified cerebral artery occlusion with cerebral infarction.    Past Surgical History:  has a past surgical history that includes Coronary angioplasty with stent (2008); Ankle surgery (2007); bronchoscopy (09/2011); Carotid endarterectomy (Left); Ankle fracture surgery (Right, 03/2014); other surgical history (06/29/2015); Cholecystectomy; Upper gastrointestinal endoscopy; Colonoscopy (07/30/2013); Tonsillectomy; Shoulder arthroscopy (Right, 10/08/2015); Endoscopy, colon, diagnostic; Hand surgery (Left); Testicle removal; hernia repair (Bilateral,

## 2024-05-03 ENCOUNTER — EVALUATION (OUTPATIENT)
Dept: OCCUPATIONAL THERAPY | Age: 74
End: 2024-05-03

## 2024-05-03 DIAGNOSIS — S56.229A: ICD-10-CM

## 2024-05-03 DIAGNOSIS — S44.50XA: Primary | ICD-10-CM

## 2024-05-03 DIAGNOSIS — S54.10XA: ICD-10-CM

## 2024-05-03 DIAGNOSIS — S51.809A UNSPECIFIED OPEN WOUND OF UNSPECIFIED FOREARM, INITIAL ENCOUNTER: ICD-10-CM

## 2024-05-03 NOTE — PROGRESS NOTES
Educated on the wear and care of the splint and assured proper don/doff application. Pt is to wear at all times and may remove 1-2x/day for hygiene/wound care while maintaining protected position.                Manual: While in protected position, passive flexion/extension (in protected position ranging of the thumb and wrist prior to ROM measurements and splinting for optimal positioning. Kept within pain tolerance.    HEP: Exercises initiated with handout provided to be performed within the splint: composite thumb/IP passive flexion (using L hand) with active extension to splint (R hand), then complete same routine with the wrist (passive flexion with the L hand and active extension to the limits of splint with the R hand). Pt to perform 1 sec holds prior to engaging in active extension to splint. All exercises to be performed in the splint every 1-2 hours at 20-25 reps ea. Pt demonstrated good understanding of exercises and performed appropriate completion on self. Will continue to add to HEP in further sessions.       Eval Complexity: Moderate Complexity  Profile and History- in-depth review of chart and history  Assessment of Occupational Performance and Identification of Deficits- 10 deficits identified  Clinical Decision Making- wound care, infection management, splinting for wrist and thumb tendon repairs, high risk for rupture    Rehab Potential:                                 [x] Good  [] Fair  [] Poor        Suggested Professional Referral:       [x] No  [] Yes:  Barriers to Goal Achievement::           [x] No  [] Yes:  Domestic Concerns:                           [x] No  [] Yes:       Patient. Education:  [x] Plans/Goals, Risks/Benefits discussed  [x] Home exercise program  Method of Education: [x] Verbal  [x] Demo  [x] Written  Comprehension of Education:  [x] Verbalizes understanding.  [x] Demonstrates understanding.  [x] Needs Review.  [] Demonstrates/verbalizes understanding of HEP/Ed previously

## 2024-05-06 ENCOUNTER — TREATMENT (OUTPATIENT)
Dept: OCCUPATIONAL THERAPY | Age: 74
End: 2024-05-06
Payer: MEDICARE

## 2024-05-06 DIAGNOSIS — S56.229A: ICD-10-CM

## 2024-05-06 DIAGNOSIS — S44.50XA: Primary | ICD-10-CM

## 2024-05-06 DIAGNOSIS — S51.809A UNSPECIFIED OPEN WOUND OF UNSPECIFIED FOREARM, INITIAL ENCOUNTER: ICD-10-CM

## 2024-05-06 DIAGNOSIS — S54.10XA: ICD-10-CM

## 2024-05-06 PROCEDURE — 97110 THERAPEUTIC EXERCISES: CPT | Performed by: OCCUPATIONAL THERAPIST

## 2024-05-06 PROCEDURE — 97535 SELF CARE MNGMENT TRAINING: CPT | Performed by: OCCUPATIONAL THERAPIST

## 2024-05-06 PROCEDURE — 97140 MANUAL THERAPY 1/> REGIONS: CPT | Performed by: OCCUPATIONAL THERAPIST

## 2024-05-06 NOTE — PROGRESS NOTES
[age]     Plan: OT 2-3/week for 18 sessions    [x]  Continues Plan of care with focus on pain/edema/wound/scar management, protocol compliance, and PROM/AROM/AAROM progression to ^ functional use of the R UE: Treatment covered based on POC and graduated to patient's progress. Pt education continues at each visit to obtain maximum benefits from skilled OT intervention.  []  Alter Plan of care:   []  Discharge:    THERESA Monk/L, MS #[id number]

## 2024-05-07 ENCOUNTER — APPOINTMENT (OUTPATIENT)
Dept: GENERAL RADIOLOGY | Age: 74
End: 2024-05-07
Payer: MEDICARE

## 2024-05-07 ENCOUNTER — ANESTHESIA (OUTPATIENT)
Dept: OPERATING ROOM | Age: 74
End: 2024-05-07
Payer: MEDICARE

## 2024-05-07 ENCOUNTER — ANESTHESIA EVENT (OUTPATIENT)
Dept: OPERATING ROOM | Age: 74
End: 2024-05-07
Payer: MEDICARE

## 2024-05-07 ENCOUNTER — HOSPITAL ENCOUNTER (INPATIENT)
Age: 74
LOS: 4 days | Discharge: HOME | End: 2024-05-11
Attending: STUDENT IN AN ORGANIZED HEALTH CARE EDUCATION/TRAINING PROGRAM | Admitting: STUDENT IN AN ORGANIZED HEALTH CARE EDUCATION/TRAINING PROGRAM
Payer: MEDICARE

## 2024-05-07 DIAGNOSIS — T81.31XD DEHISCENCE OF OPERATIVE WOUND, SUBSEQUENT ENCOUNTER: Primary | ICD-10-CM

## 2024-05-07 DIAGNOSIS — T81.30XA WOUND DEHISCENCE: ICD-10-CM

## 2024-05-07 LAB
ABO + RH BLD: NORMAL
ARM BAND NUMBER: NORMAL
BASOPHILS # BLD: 0.02 K/UL (ref 0–0.2)
BASOPHILS NFR BLD: 0 % (ref 0–2)
BLOOD BANK SAMPLE EXPIRATION: NORMAL
BLOOD GROUP ANTIBODIES SERPL: NEGATIVE
CRP SERPL HS-MCNC: 7 MG/L (ref 0–5)
EOSINOPHIL # BLD: 0.04 K/UL (ref 0.05–0.5)
EOSINOPHILS RELATIVE PERCENT: 1 % (ref 0–6)
ERYTHROCYTE [DISTWIDTH] IN BLOOD BY AUTOMATED COUNT: 14.1 % (ref 11.5–15)
ERYTHROCYTE [SEDIMENTATION RATE] IN BLOOD BY WESTERGREN METHOD: 11 MM/HR (ref 0–15)
HCT VFR BLD AUTO: 48.8 % (ref 37–54)
HGB BLD-MCNC: 15.5 G/DL (ref 12.5–16.5)
IMM GRANULOCYTES # BLD AUTO: 0.03 K/UL (ref 0–0.58)
IMM GRANULOCYTES NFR BLD: 0 % (ref 0–5)
LYMPHOCYTES NFR BLD: 1.36 K/UL (ref 1.5–4)
LYMPHOCYTES RELATIVE PERCENT: 16 % (ref 20–42)
MCH RBC QN AUTO: 27.1 PG (ref 26–35)
MCHC RBC AUTO-ENTMCNC: 31.8 G/DL (ref 32–34.5)
MCV RBC AUTO: 85.5 FL (ref 80–99.9)
MONOCYTES NFR BLD: 0.63 K/UL (ref 0.1–0.95)
MONOCYTES NFR BLD: 7 % (ref 2–12)
NEUTROPHILS NFR BLD: 76 % (ref 43–80)
NEUTS SEG NFR BLD: 6.63 K/UL (ref 1.8–7.3)
PLATELET # BLD AUTO: 357 K/UL (ref 130–450)
PMV BLD AUTO: 11.5 FL (ref 7–12)
RBC # BLD AUTO: 5.71 M/UL (ref 3.8–5.8)
WBC OTHER # BLD: 8.7 K/UL (ref 4.5–11.5)

## 2024-05-07 PROCEDURE — 86140 C-REACTIVE PROTEIN: CPT

## 2024-05-07 PROCEDURE — 6360000002 HC RX W HCPCS: Performed by: NURSE ANESTHETIST, CERTIFIED REGISTERED

## 2024-05-07 PROCEDURE — 86850 RBC ANTIBODY SCREEN: CPT

## 2024-05-07 PROCEDURE — 86901 BLOOD TYPING SEROLOGIC RH(D): CPT

## 2024-05-07 PROCEDURE — 87206 SMEAR FLUORESCENT/ACID STAI: CPT

## 2024-05-07 PROCEDURE — 2580000003 HC RX 258: Performed by: NURSE ANESTHETIST, CERTIFIED REGISTERED

## 2024-05-07 PROCEDURE — 0JBG0ZZ EXCISION OF RIGHT LOWER ARM SUBCUTANEOUS TISSUE AND FASCIA, OPEN APPROACH: ICD-10-PCS | Performed by: STUDENT IN AN ORGANIZED HEALTH CARE EDUCATION/TRAINING PROGRAM

## 2024-05-07 PROCEDURE — 2709999900 HC NON-CHARGEABLE SUPPLY: Performed by: STUDENT IN AN ORGANIZED HEALTH CARE EDUCATION/TRAINING PROGRAM

## 2024-05-07 PROCEDURE — 87070 CULTURE OTHR SPECIMN AEROBIC: CPT

## 2024-05-07 PROCEDURE — 85025 COMPLETE CBC W/AUTO DIFF WBC: CPT

## 2024-05-07 PROCEDURE — 7100000001 HC PACU RECOVERY - ADDTL 15 MIN: Performed by: STUDENT IN AN ORGANIZED HEALTH CARE EDUCATION/TRAINING PROGRAM

## 2024-05-07 PROCEDURE — 6370000000 HC RX 637 (ALT 250 FOR IP): Performed by: STUDENT IN AN ORGANIZED HEALTH CARE EDUCATION/TRAINING PROGRAM

## 2024-05-07 PROCEDURE — 87116 MYCOBACTERIA CULTURE: CPT

## 2024-05-07 PROCEDURE — 87015 SPECIMEN INFECT AGNT CONCNTJ: CPT

## 2024-05-07 PROCEDURE — 3700000000 HC ANESTHESIA ATTENDED CARE: Performed by: STUDENT IN AN ORGANIZED HEALTH CARE EDUCATION/TRAINING PROGRAM

## 2024-05-07 PROCEDURE — 2500000003 HC RX 250 WO HCPCS: Performed by: STUDENT IN AN ORGANIZED HEALTH CARE EDUCATION/TRAINING PROGRAM

## 2024-05-07 PROCEDURE — 87075 CULTR BACTERIA EXCEPT BLOOD: CPT

## 2024-05-07 PROCEDURE — 6360000002 HC RX W HCPCS: Performed by: STUDENT IN AN ORGANIZED HEALTH CARE EDUCATION/TRAINING PROGRAM

## 2024-05-07 PROCEDURE — 87205 SMEAR GRAM STAIN: CPT

## 2024-05-07 PROCEDURE — 87176 TISSUE HOMOGENIZATION CULTR: CPT

## 2024-05-07 PROCEDURE — 87077 CULTURE AEROBIC IDENTIFY: CPT

## 2024-05-07 PROCEDURE — 1200000000 HC SEMI PRIVATE

## 2024-05-07 PROCEDURE — 2580000003 HC RX 258: Performed by: STUDENT IN AN ORGANIZED HEALTH CARE EDUCATION/TRAINING PROGRAM

## 2024-05-07 PROCEDURE — 3700000001 HC ADD 15 MINUTES (ANESTHESIA): Performed by: STUDENT IN AN ORGANIZED HEALTH CARE EDUCATION/TRAINING PROGRAM

## 2024-05-07 PROCEDURE — 2500000003 HC RX 250 WO HCPCS: Performed by: NURSE ANESTHETIST, CERTIFIED REGISTERED

## 2024-05-07 PROCEDURE — 3600000012 HC SURGERY LEVEL 2 ADDTL 15MIN: Performed by: STUDENT IN AN ORGANIZED HEALTH CARE EDUCATION/TRAINING PROGRAM

## 2024-05-07 PROCEDURE — 3600000002 HC SURGERY LEVEL 2 BASE: Performed by: STUDENT IN AN ORGANIZED HEALTH CARE EDUCATION/TRAINING PROGRAM

## 2024-05-07 PROCEDURE — 99285 EMERGENCY DEPT VISIT HI MDM: CPT

## 2024-05-07 PROCEDURE — 7100000000 HC PACU RECOVERY - FIRST 15 MIN: Performed by: STUDENT IN AN ORGANIZED HEALTH CARE EDUCATION/TRAINING PROGRAM

## 2024-05-07 PROCEDURE — 85652 RBC SED RATE AUTOMATED: CPT

## 2024-05-07 PROCEDURE — 87102 FUNGUS ISOLATION CULTURE: CPT

## 2024-05-07 PROCEDURE — 86900 BLOOD TYPING SEROLOGIC ABO: CPT

## 2024-05-07 RX ORDER — PROPOFOL 10 MG/ML
INJECTION, EMULSION INTRAVENOUS CONTINUOUS PRN
Status: DISCONTINUED | OUTPATIENT
Start: 2024-05-07 | End: 2024-05-07 | Stop reason: SDUPTHER

## 2024-05-07 RX ORDER — BISACODYL 5 MG/1
5 TABLET, DELAYED RELEASE ORAL DAILY
Status: DISCONTINUED | OUTPATIENT
Start: 2024-05-08 | End: 2024-05-11 | Stop reason: HOSPADM

## 2024-05-07 RX ORDER — SIMVASTATIN 20 MG
20 TABLET ORAL NIGHTLY
COMMUNITY

## 2024-05-07 RX ORDER — FLUTICASONE FUROATE, UMECLIDINIUM BROMIDE AND VILANTEROL TRIFENATATE 100; 62.5; 25 UG/1; UG/1; UG/1
1 POWDER RESPIRATORY (INHALATION) EVERY MORNING
COMMUNITY

## 2024-05-07 RX ORDER — PROCHLORPERAZINE EDISYLATE 5 MG/ML
5 INJECTION INTRAMUSCULAR; INTRAVENOUS
Status: DISCONTINUED | OUTPATIENT
Start: 2024-05-07 | End: 2024-05-07 | Stop reason: HOSPADM

## 2024-05-07 RX ORDER — FENTANYL CITRATE 50 UG/ML
INJECTION, SOLUTION INTRAMUSCULAR; INTRAVENOUS PRN
Status: DISCONTINUED | OUTPATIENT
Start: 2024-05-07 | End: 2024-05-07 | Stop reason: SDUPTHER

## 2024-05-07 RX ORDER — BUPROPION HYDROCHLORIDE 150 MG/1
150 TABLET, EXTENDED RELEASE ORAL 2 TIMES DAILY
COMMUNITY

## 2024-05-07 RX ORDER — BUPROPION HYDROCHLORIDE 150 MG/1
150 TABLET, EXTENDED RELEASE ORAL 2 TIMES DAILY
Status: DISCONTINUED | OUTPATIENT
Start: 2024-05-07 | End: 2024-05-11 | Stop reason: HOSPADM

## 2024-05-07 RX ORDER — ATORVASTATIN CALCIUM 10 MG/1
10 TABLET, FILM COATED ORAL DAILY
Status: DISCONTINUED | OUTPATIENT
Start: 2024-05-07 | End: 2024-05-11 | Stop reason: HOSPADM

## 2024-05-07 RX ORDER — HYDRALAZINE HYDROCHLORIDE 25 MG/1
25 TABLET, FILM COATED ORAL 2 TIMES DAILY
Status: DISCONTINUED | OUTPATIENT
Start: 2024-05-07 | End: 2024-05-11 | Stop reason: HOSPADM

## 2024-05-07 RX ORDER — FENOFIBRATE 54 MG/1
54 TABLET ORAL DAILY
Status: DISCONTINUED | OUTPATIENT
Start: 2024-05-07 | End: 2024-05-11 | Stop reason: HOSPADM

## 2024-05-07 RX ORDER — SODIUM CHLORIDE 0.9 % (FLUSH) 0.9 %
5-40 SYRINGE (ML) INJECTION PRN
Status: DISCONTINUED | OUTPATIENT
Start: 2024-05-07 | End: 2024-05-11 | Stop reason: HOSPADM

## 2024-05-07 RX ORDER — PANTOPRAZOLE SODIUM 40 MG/1
40 TABLET, DELAYED RELEASE ORAL
Status: DISCONTINUED | OUTPATIENT
Start: 2024-05-08 | End: 2024-05-11 | Stop reason: HOSPADM

## 2024-05-07 RX ORDER — HYDROCODONE BITARTRATE AND ACETAMINOPHEN 7.5; 325 MG/1; MG/1
1 TABLET ORAL EVERY 6 HOURS PRN
Status: DISCONTINUED | OUTPATIENT
Start: 2024-05-07 | End: 2024-05-08 | Stop reason: ALTCHOICE

## 2024-05-07 RX ORDER — SERTRALINE HYDROCHLORIDE 100 MG/1
100 TABLET, FILM COATED ORAL DAILY
Status: DISCONTINUED | OUTPATIENT
Start: 2024-05-07 | End: 2024-05-11 | Stop reason: HOSPADM

## 2024-05-07 RX ORDER — GUAIFENESIN 600 MG/1
1200 TABLET, EXTENDED RELEASE ORAL 2 TIMES DAILY PRN
COMMUNITY

## 2024-05-07 RX ORDER — POTASSIUM CHLORIDE 750 MG/1
10 TABLET, EXTENDED RELEASE ORAL 2 TIMES DAILY
COMMUNITY

## 2024-05-07 RX ORDER — FUROSEMIDE 20 MG/1
20 TABLET ORAL EVERY MORNING
COMMUNITY

## 2024-05-07 RX ORDER — SODIUM CHLORIDE 9 MG/ML
INJECTION, SOLUTION INTRAVENOUS CONTINUOUS
Status: ACTIVE | OUTPATIENT
Start: 2024-05-07 | End: 2024-05-08

## 2024-05-07 RX ORDER — ONDANSETRON 2 MG/ML
4 INJECTION INTRAMUSCULAR; INTRAVENOUS EVERY 6 HOURS PRN
Status: DISCONTINUED | OUTPATIENT
Start: 2024-05-07 | End: 2024-05-11 | Stop reason: HOSPADM

## 2024-05-07 RX ORDER — SODIUM CHLORIDE 0.9 % (FLUSH) 0.9 %
5-40 SYRINGE (ML) INJECTION EVERY 12 HOURS SCHEDULED
Status: DISCONTINUED | OUTPATIENT
Start: 2024-05-07 | End: 2024-05-11 | Stop reason: HOSPADM

## 2024-05-07 RX ORDER — POTASSIUM CHLORIDE 750 MG/1
10 TABLET, EXTENDED RELEASE ORAL 2 TIMES DAILY
Status: DISCONTINUED | OUTPATIENT
Start: 2024-05-07 | End: 2024-05-11 | Stop reason: HOSPADM

## 2024-05-07 RX ORDER — CLOPIDOGREL BISULFATE 75 MG/1
75 TABLET ORAL EVERY MORNING
COMMUNITY

## 2024-05-07 RX ORDER — TAMSULOSIN HYDROCHLORIDE 0.4 MG/1
0.4 CAPSULE ORAL DAILY
Status: DISCONTINUED | OUTPATIENT
Start: 2024-05-07 | End: 2024-05-11 | Stop reason: HOSPADM

## 2024-05-07 RX ORDER — SODIUM CHLORIDE 0.9 % (FLUSH) 0.9 %
5-40 SYRINGE (ML) INJECTION PRN
Status: DISCONTINUED | OUTPATIENT
Start: 2024-05-07 | End: 2024-05-07 | Stop reason: HOSPADM

## 2024-05-07 RX ORDER — GLYCOPYRROLATE 0.2 MG/ML
INJECTION INTRAMUSCULAR; INTRAVENOUS PRN
Status: DISCONTINUED | OUTPATIENT
Start: 2024-05-07 | End: 2024-05-07 | Stop reason: SDUPTHER

## 2024-05-07 RX ORDER — SODIUM CHLORIDE 0.9 % (FLUSH) 0.9 %
5-40 SYRINGE (ML) INJECTION EVERY 12 HOURS SCHEDULED
Status: DISCONTINUED | OUTPATIENT
Start: 2024-05-07 | End: 2024-05-07 | Stop reason: SDUPTHER

## 2024-05-07 RX ORDER — KETAMINE HYDROCHLORIDE 10 MG/ML
INJECTION, SOLUTION INTRAMUSCULAR; INTRAVENOUS PRN
Status: DISCONTINUED | OUTPATIENT
Start: 2024-05-07 | End: 2024-05-07 | Stop reason: SDUPTHER

## 2024-05-07 RX ORDER — FUROSEMIDE 20 MG/1
20 TABLET ORAL DAILY
Status: DISCONTINUED | OUTPATIENT
Start: 2024-05-07 | End: 2024-05-11 | Stop reason: HOSPADM

## 2024-05-07 RX ORDER — ALBUTEROL SULFATE 90 UG/1
2 AEROSOL, METERED RESPIRATORY (INHALATION) EVERY 4 HOURS PRN
COMMUNITY

## 2024-05-07 RX ORDER — OXYCODONE HYDROCHLORIDE 5 MG/1
5 TABLET ORAL EVERY 4 HOURS PRN
Status: DISCONTINUED | OUTPATIENT
Start: 2024-05-07 | End: 2024-05-11 | Stop reason: HOSPADM

## 2024-05-07 RX ORDER — TAMSULOSIN HYDROCHLORIDE 0.4 MG/1
0.4 CAPSULE ORAL NIGHTLY
COMMUNITY

## 2024-05-07 RX ORDER — SODIUM CHLORIDE 9 MG/ML
INJECTION, SOLUTION INTRAVENOUS PRN
Status: DISCONTINUED | OUTPATIENT
Start: 2024-05-07 | End: 2024-05-09

## 2024-05-07 RX ORDER — NALOXONE HYDROCHLORIDE 0.4 MG/ML
INJECTION, SOLUTION INTRAMUSCULAR; INTRAVENOUS; SUBCUTANEOUS PRN
Status: DISCONTINUED | OUTPATIENT
Start: 2024-05-07 | End: 2024-05-07 | Stop reason: HOSPADM

## 2024-05-07 RX ORDER — CARVEDILOL 3.12 MG/1
3.12 TABLET ORAL DAILY
Status: DISCONTINUED | OUTPATIENT
Start: 2024-05-07 | End: 2024-05-11 | Stop reason: HOSPADM

## 2024-05-07 RX ORDER — FENOFIBRATE 145 MG/1
145 TABLET, COATED ORAL NIGHTLY
COMMUNITY

## 2024-05-07 RX ORDER — SODIUM CHLORIDE 0.9 % (FLUSH) 0.9 %
5-40 SYRINGE (ML) INJECTION PRN
Status: DISCONTINUED | OUTPATIENT
Start: 2024-05-07 | End: 2024-05-07 | Stop reason: SDUPTHER

## 2024-05-07 RX ORDER — OXYCODONE HYDROCHLORIDE 5 MG/1
5 TABLET ORAL EVERY 6 HOURS PRN
Status: ON HOLD | COMMUNITY
Start: 2024-05-02 | End: 2024-05-10 | Stop reason: HOSPADM

## 2024-05-07 RX ORDER — GUAIFENESIN 400 MG/1
800 TABLET ORAL 3 TIMES DAILY
Status: DISCONTINUED | OUTPATIENT
Start: 2024-05-07 | End: 2024-05-11 | Stop reason: HOSPADM

## 2024-05-07 RX ORDER — OMEPRAZOLE 40 MG/1
40 CAPSULE, DELAYED RELEASE ORAL EVERY MORNING
COMMUNITY

## 2024-05-07 RX ORDER — SODIUM CHLORIDE 9 MG/ML
INJECTION, SOLUTION INTRAVENOUS PRN
Status: DISCONTINUED | OUTPATIENT
Start: 2024-05-07 | End: 2024-05-07 | Stop reason: SDUPTHER

## 2024-05-07 RX ORDER — ASPIRIN 81 MG/1
81 TABLET ORAL DAILY
Status: DISCONTINUED | OUTPATIENT
Start: 2024-05-08 | End: 2024-05-11 | Stop reason: HOSPADM

## 2024-05-07 RX ORDER — SODIUM CHLORIDE 0.9 % (FLUSH) 0.9 %
5-40 SYRINGE (ML) INJECTION EVERY 12 HOURS SCHEDULED
Status: DISCONTINUED | OUTPATIENT
Start: 2024-05-07 | End: 2024-05-07 | Stop reason: HOSPADM

## 2024-05-07 RX ORDER — MORPHINE SULFATE 2 MG/ML
2 INJECTION, SOLUTION INTRAMUSCULAR; INTRAVENOUS
Status: DISCONTINUED | OUTPATIENT
Start: 2024-05-07 | End: 2024-05-11 | Stop reason: HOSPADM

## 2024-05-07 RX ORDER — SODIUM CHLORIDE 9 MG/ML
INJECTION, SOLUTION INTRAVENOUS PRN
Status: DISCONTINUED | OUTPATIENT
Start: 2024-05-07 | End: 2024-05-07 | Stop reason: HOSPADM

## 2024-05-07 RX ORDER — MORPHINE SULFATE 4 MG/ML
4 INJECTION, SOLUTION INTRAMUSCULAR; INTRAVENOUS
Status: DISCONTINUED | OUTPATIENT
Start: 2024-05-07 | End: 2024-05-11 | Stop reason: HOSPADM

## 2024-05-07 RX ORDER — SODIUM CHLORIDE 9 MG/ML
INJECTION, SOLUTION INTRAVENOUS CONTINUOUS PRN
Status: DISCONTINUED | OUTPATIENT
Start: 2024-05-07 | End: 2024-05-07 | Stop reason: SDUPTHER

## 2024-05-07 RX ORDER — MONTELUKAST SODIUM 10 MG/1
10 TABLET ORAL NIGHTLY
COMMUNITY

## 2024-05-07 RX ORDER — MONTELUKAST SODIUM 10 MG/1
10 TABLET ORAL DAILY
Status: DISCONTINUED | OUTPATIENT
Start: 2024-05-07 | End: 2024-05-11 | Stop reason: HOSPADM

## 2024-05-07 RX ORDER — CARVEDILOL 3.12 MG/1
3.12 TABLET ORAL EVERY MORNING
COMMUNITY

## 2024-05-07 RX ORDER — SERTRALINE HYDROCHLORIDE 100 MG/1
100 TABLET, FILM COATED ORAL NIGHTLY
COMMUNITY

## 2024-05-07 RX ORDER — CLOPIDOGREL BISULFATE 75 MG/1
75 TABLET ORAL DAILY
Status: DISCONTINUED | OUTPATIENT
Start: 2024-05-07 | End: 2024-05-11 | Stop reason: HOSPADM

## 2024-05-07 RX ADMIN — KETAMINE HYDROCHLORIDE 50 MG: 10 INJECTION INTRAMUSCULAR; INTRAVENOUS at 18:42

## 2024-05-07 RX ADMIN — FENTANYL CITRATE 50 MCG: 50 INJECTION, SOLUTION INTRAMUSCULAR; INTRAVENOUS at 18:43

## 2024-05-07 RX ADMIN — POTASSIUM CHLORIDE 10 MEQ: 750 TABLET, EXTENDED RELEASE ORAL at 21:20

## 2024-05-07 RX ADMIN — FENOFIBRATE 54 MG: 54 TABLET ORAL at 21:20

## 2024-05-07 RX ADMIN — FENTANYL CITRATE 50 MCG: 50 INJECTION, SOLUTION INTRAMUSCULAR; INTRAVENOUS at 18:47

## 2024-05-07 RX ADMIN — WATER 2000 MG: 1 INJECTION INTRAMUSCULAR; INTRAVENOUS; SUBCUTANEOUS at 21:22

## 2024-05-07 RX ADMIN — CARVEDILOL 3.12 MG: 3.12 TABLET, FILM COATED ORAL at 13:09

## 2024-05-07 RX ADMIN — MONTELUKAST SODIUM 10 MG: 10 TABLET ORAL at 21:21

## 2024-05-07 RX ADMIN — BUPROPION HYDROCHLORIDE 150 MG: 150 TABLET, FILM COATED, EXTENDED RELEASE ORAL at 21:21

## 2024-05-07 RX ADMIN — PROPOFOL 50 MCG/KG/MIN: 10 INJECTION, EMULSION INTRAVENOUS at 18:42

## 2024-05-07 RX ADMIN — SODIUM CHLORIDE: 9 INJECTION, SOLUTION INTRAVENOUS at 21:26

## 2024-05-07 RX ADMIN — SODIUM CHLORIDE: 9 INJECTION, SOLUTION INTRAVENOUS at 18:33

## 2024-05-07 RX ADMIN — GUAIFENESIN 800 MG: 400 TABLET ORAL at 21:21

## 2024-05-07 RX ADMIN — TAMSULOSIN HYDROCHLORIDE 0.4 MG: 0.4 CAPSULE ORAL at 21:21

## 2024-05-07 RX ADMIN — GLYCOPYRROLATE 0.2 MG: 0.2 INJECTION INTRAMUSCULAR; INTRAVENOUS at 18:33

## 2024-05-07 RX ADMIN — SERTRALINE 100 MG: 100 TABLET, FILM COATED ORAL at 21:20

## 2024-05-07 ASSESSMENT — ENCOUNTER SYMPTOMS
SORE THROAT: 0
DIARRHEA: 0
VOMITING: 0
EYE DISCHARGE: 0
EYE PAIN: 0
WHEEZING: 0
ABDOMINAL PAIN: 0
BACK PAIN: 0
COUGH: 0
EYE REDNESS: 0
SHORTNESS OF BREATH: 0
NAUSEA: 0
SINUS PRESSURE: 0

## 2024-05-07 ASSESSMENT — LIFESTYLE VARIABLES: SMOKING_STATUS: 0

## 2024-05-07 ASSESSMENT — PAIN - FUNCTIONAL ASSESSMENT
PAIN_FUNCTIONAL_ASSESSMENT: 0-10
PAIN_FUNCTIONAL_ASSESSMENT: NONE - DENIES PAIN
PAIN_FUNCTIONAL_ASSESSMENT: ACTIVITIES ARE NOT PREVENTED

## 2024-05-07 NOTE — BRIEF OP NOTE
Brief Postoperative Note      Patient: Clark ZURITA Postlethwait  YOB: 1950  MRN: [id number]    Date of Procedure: 5/7/2024    Pre-Op Diagnosis Codes:     * Wound dehiscence [T81.30XA]    Post-Op Diagnosis: Same       Procedure(s):  RIGHT FOREARM IRRIGATION AND DEBRIDEMENT WITH WOUND CLOSURE    Surgeon(s):  Tremayne Hernández DO    Assistant:  Pavan Elder PA-C    Anesthesia: Monitor Anesthesia Care    Estimated Blood Loss (mL): less than 50     Complications: None    Specimens:   ID Type Source Tests Collected by Time Destination   1 : RIGHT FOREARM WOUND CULTURE Tissue Arm CULTURE, ANAEROBIC, CULTURE, FUNGUS, GRAM STAIN, CULTURE, SURGICAL, CULTURE WITH SMEAR, ACID FAST BACILLIUS Tremayne Hernández DO 5/7/2024 1908        Implants:  * No implants in log *      Drains: * No LDAs found *    Findings:  Infection Present At Time Of Surgery (PATOS) (choose all levels that have infection present):  - Superficial Infection (skin/subcutaneous) present as evidenced by yellow opaque fluid consistent with infection      Electronically signed by Tremayne Hernández DO on 5/7/2024 at 7:30 PM

## 2024-05-07 NOTE — H&P
Department of Orthopedic Surgery  Attending  Note          Reason for Consult:  Right forearm dehiscence    HISTORY OF PRESENT ILLNESS:       Patient is a 73 y.o. male who 3 and half weeks postop from a right median nerve repair and Radial sensory nerve repair with allograft, open carpal tunnel release, FPL tendon repair, FCR tendon repair on 4/16/2024.  Patient presented the office 4 days ago with dehiscence of his wound.  Patient had irrigation debridement in the office and closure.  Patient states that the wound dehisced again over the weekend.  Patient presents to the ED with increased opening of his wound to the forearm region.  moderate serosanguineous drainage to the region.  No fever or chills    Past Medical History:        Diagnosis Date    Anesthesia complication     wakes up  violant   only ok if reversed with romazacon    Arthritis     shoulders,ankle    CAD (coronary artery disease)     Cardiomyopathy (HCC)     CHF (congestive heart failure) (HCC)     Chronic hypoxic respiratory failure (HCC)     CKD (chronic kidney disease)     COPD (chronic obstructive pulmonary disease) (HCC)     Dependence on supplemental oxygen     PRN    Erectile dysfunction     GERD (gastroesophageal reflux disease)     H/O coronary angioplasty with stents[V45.82] 9/2018 another stent    Hearing loss     HFrEF (heart failure with reduced ejection fraction) (HCC)     Hyperlipidemia     Hypertension     Ischemic cardiomyopathy     Lymphoma (HCC) 11/04/2011    had chemo  currently in remission    Myocardial infarction (HCC)     more than 10 yrs ago    Non-Hodgkin lymphoma (HCC)     In remission    Obesity     Sleep apnea     wont wear machine    Unspecified cerebral artery occlusion with cerebral infarction 2005    no deficits     Past Surgical History:        Procedure Laterality Date    ANKLE FRACTURE SURGERY Right 03/2014    ORIF right ankle    ANKLE SURGERY  2007    rt ankle    ARM SURGERY Right 2/2/2024    RIGHT ARM  tablet 10 mEq, 10 mEq, Oral, BID  sertraline (ZOLOFT) tablet 100 mg, 100 mg, Oral, Daily  atorvastatin (LIPITOR) tablet 10 mg, 10 mg, Oral, Daily  tamsulosin (FLOMAX) capsule 0.4 mg, 0.4 mg, Oral, Daily  sodium chloride flush 0.9 % injection 5-40 mL, 5-40 mL, IntraVENous, 2 times per day  sodium chloride flush 0.9 % injection 5-40 mL, 5-40 mL, IntraVENous, PRN  0.9 % sodium chloride infusion, , IntraVENous, PRN  ondansetron (ZOFRAN) injection 4 mg, 4 mg, IntraVENous, Q6H PRN  Allergies:  Versed [midazolam]    Social History:   TOBACCO:   reports that he has been smoking cigarettes. He started smoking about 68 years ago. He has a 34.0 pack-year smoking history. He has never used smokeless tobacco.  ETOH:   reports current alcohol use of about 14.0 standard drinks of alcohol per week.  DRUGS:   reports that he does not currently use drugs after having used the following drugs: Cocaine.  ACTIVITIES OF DAILY LIVING:    OCCUPATION:    Family History:       Problem Relation Age of Onset    Diabetes Mother     Heart Disease Mother     Diabetes Father     Heart Disease Father     Diabetes Sister     Cancer Sister     Diabetes Brother     Cancer Brother        REVIEW OF SYSTEMS:  CONSTITUTIONAL:  negative for  fevers, chills  EYES:  negative for blurred vision, visual disturbance  HEENT:  negative for  hearing loss, voice change  RESPIRATORY:  negative for  dyspnea, wheezing  CARDIOVASCULAR:  negative for  chest pain, palpitations  GASTROINTESTINAL:  negative for nausea, vomiting  GENITOURINARY:  negative for frequency, urinary incontinence  HEMATOLOGIC/LYMPHATIC:  negative for bleeding and petechiae  MUSCULOSKELETAL:  positive for  pain  NEUROLOGICAL:  negative for headaches, dizziness  BEHAVIOR/PSYCH:  negative for increased agitation and anxiety    PHYSICAL EXAM:    VITALS:  /72   Pulse 65   Temp 98.6 °F (37 °C) (Oral)   Resp 22   Ht 1.727 m (5' 8\")   Wt 102.1 kg (225 lb)   SpO2 94%   BMI 34.21 kg/m²  tendon repairs, and nerve repair from 4/16/2024    PLAN:  BHARGAVI WRIGHT  Will plan on admission for patient  Will plan for surgical intervention today for right forearm irrigation debridement, wound closure and admission for antibiotic placement.  Discussed risk factors for patient today.  Patient be placed in a splint postoperatively.  NPO now  Preop orders  Will consult infectious disease for antibiotic selection postoperatively  Antibiotic selection pending cultures.    Risks, benefits, and alternatives were discussed with the patient and their family. Risks include but are not limited to infection, blood loss, damage to neurovascular and musculoskeletal structures, malunion, nonunion, symptomatic hardware, need for further surgery, possible arthritis despite surgical stabilization, stiffness, and catastrophic anesthesia complications. They understand and wish to proceed.

## 2024-05-07 NOTE — ANESTHESIA POSTPROCEDURE EVALUATION
Department of Anesthesiology  Postprocedure Note    Patient: Clark Maza  MRN: [id number]  YOB: 1950  Date of evaluation: 5/7/2024    Procedure Summary       Date: 05/07/24 Room / Location: [address]    Anesthesia Start: 1833 Anesthesia Stop: 1928    Procedure: RIGHT FOREARM IRRIGATION AND DEBRIDEMENT WITH WOUND CLOSURE (Right: Arm Lower) Diagnosis:       Wound dehiscence      (Wound dehiscence [T81.30XA])    Surgeons: Tremayne Hernández DO Responsible Provider: Rhonda Braun MD    Anesthesia Type: MAC ASA Status: 3            Anesthesia Type: No value filed.    Laura Phase I: Laura Score: 10    Laura Phase II:      Anesthesia Post Evaluation    Patient location during evaluation: PACU  Patient participation: complete - patient participated  Level of consciousness: awake and alert  Pain score: 3  Airway patency: patent  Nausea & Vomiting: no vomiting and no nausea  Cardiovascular status: hemodynamically stable  Respiratory status: spontaneous ventilation, nonlabored ventilation and acceptable  Hydration status: stable  Pain management: adequate and satisfactory to patient        No notable events documented.

## 2024-05-07 NOTE — ED PROVIDER NOTES
Department of Emergency Medicine   ED  Provider Note  Admit Date/RoomTime: 5/7/2024  8:47 AM  ED Room: 13/13              [facility]     Clark ZURITA Postcruz is a 73 y.o. male with a PMHx significant for  CKD, GERD, CAD, HLD, injury to right wrist  who presents for evaluation of worsening wound dehiscence, beginning prior to arrival.  The complaint has been persistent, moderate in severity, and worsened by nothing.   The patient states that he had an injury with a .  Notes that he was seen and evaluated downtown for this, had surgery to help repair items.  Notes that the wound did dehisce shortly thereafter, has been following with Dr. Hernández for this.  Was told to present to the ER for further evaluation and treatment as he will require OR for repair.  Denies any chest pain, shortness of breath, nausea, vomiting, diarrhea.     Review of Systems   Constitutional:  Negative for chills and fever.   HENT:  Negative for ear pain, sinus pressure and sore throat.    Eyes:  Negative for pain, discharge and redness.   Respiratory:  Negative for cough, shortness of breath and wheezing.    Cardiovascular:  Negative for chest pain.   Gastrointestinal:  Negative for abdominal pain, diarrhea, nausea and vomiting.   Genitourinary:  Negative for dysuria and frequency.   Musculoskeletal:  Negative for arthralgias and back pain.   Skin:  Positive for wound. Negative for rash.   Neurological:  Negative for weakness and headaches.   Hematological:  Negative for adenopathy.   All other systems reviewed and are negative.       Physical Exam  Vitals and nursing note reviewed.   Constitutional:       General: He is not in acute distress.     Appearance: Normal appearance. He is well-developed. He is not ill-appearing.   HENT:      Head: Normocephalic and atraumatic.      Right Ear: External ear normal.      Left Ear: External ear normal.   Eyes:      General:         Right eye: No discharge.         Left eye: No discharge.       cigarettes. He started smoking about 68 years ago. He has a 34.0 pack-year smoking history. He has never used smokeless tobacco. He reports current alcohol use of about 14.0 standard drinks of alcohol per week. He reports that he does not currently use drugs after having used the following drugs: Cocaine.    Family History: family history includes Cancer in his brother and sister; Diabetes in his brother, father, mother, and sister; Heart Disease in his father and mother.     The patient’s home medications have been reviewed.    Allergies: Versed [midazolam]    -------------------------------------------------- RESULTS -------------------------------------------------    LABS:  Results for orders placed or performed during the hospital encounter of 05/07/24   CBC with Auto Differential   Result Value Ref Range    WBC 8.7 4.5 - 11.5 k/uL    RBC 5.71 3.80 - 5.80 m/uL    Hemoglobin 15.5 12.5 - 16.5 g/dL    Hematocrit 48.8 37.0 - 54.0 %    MCV 85.5 80.0 - 99.9 fL    MCH 27.1 26.0 - 35.0 pg    MCHC 31.8 (L) 32.0 - 34.5 g/dL    RDW 14.1 11.5 - 15.0 %    Platelets 357 130 - 450 k/uL    MPV 11.5 7.0 - 12.0 fL    Neutrophils % 76 43.0 - 80.0 %    Lymphocytes % 16 (L) 20.0 - 42.0 %    Monocytes % 7 2.0 - 12.0 %    Eosinophils % 1 0 - 6 %    Basophils % 0 0.0 - 2.0 %    Immature Granulocytes % 0 0.0 - 5.0 %    Neutrophils Absolute 6.63 1.80 - 7.30 k/uL    Lymphocytes Absolute 1.36 (L) 1.50 - 4.00 k/uL    Monocytes Absolute 0.63 0.10 - 0.95 k/uL    Eosinophils Absolute 0.04 (L) 0.05 - 0.50 k/uL    Basophils Absolute 0.02 0.00 - 0.20 k/uL    Immature Granulocytes Absolute 0.03 0.00 - 0.58 k/uL   Sedimentation Rate   Result Value Ref Range    Sed Rate 11 0 - 15 mm/Hr   C-Reactive Protein   Result Value Ref Range    CRP 7.0 (H) 0 - 5 mg/L   TYPE AND SCREEN   Result Value Ref Range    Blood Bank Sample Expiration 05/10/2024,2359     Arm Band Number [id number]     ABO/Rh A POSITIVE     Antibody Screen NEGATIVE        RADIOLOGY:  No

## 2024-05-07 NOTE — ANESTHESIA PRE PROCEDURE
COVID19 Not-Detected 04/22/2024 10:13 AM    COVID19 DETECTED 01/04/2022 03:28 PM    COVID19 Not Detected 04/05/2021 09:56 AM           Anesthesia Evaluation  Patient summary reviewed and Nursing notes reviewed   history of anesthetic complications (\"wakes up violent\"):   Airway: Mallampati: II  TM distance: >3 FB   Neck ROM: full  Mouth opening: > = 3 FB   Dental: normal exam         Pulmonary:normal exam  breath sounds clear to auscultation  (+)  COPD:    sleep apnea: on noncompliant,       asthma:     (-) not a current smoker (former smoker)                          ROS comment: Dependence on supplemental oxygen PRN   Cardiovascular:    (+) past MI: > 6 months, CAD:, CABG/stent: no interval change, CHF: systolic and diastolic, hyperlipidemia      ECG reviewed  Rhythm: regular  Rate: normal  Echocardiogram reviewed  Stress test reviewed  Cleared by cardiology           ROS comment: Ischemic cardiomyopathy    Stress test March 2023    Impression:    1. Electrocardiographically normal regadenoson infusion with a clinically  non-ischemic response  2. Myocardial perfusion imaging was normal.       3. Overall left ventricular systolic function was mildly reduced with mild global hypokinesis  4.  Intermediate risk general pharmacologic stress test, based on mildly reduced ejection fraction        Echo March 2022     Summary    Ejection fraction is visually estimated at 50-55%   Borderline dilated left ventricle.    Mild left ventricular concentric hypertrophy noted.    No regional wall motion abnormalities seen.    Low normal left ventricular systolic function.    The left atrium is mildly dilated.    Mild mitral regurgitation is present        Neuro/Psych:   (+) psychiatric history:depression/anxiety             GI/Hepatic/Renal:   (+) GERD:, renal disease: CRI          Endo/Other:    (+) blood dyscrasia (Plavix): anticoagulation therapy:., malignancy/cancer (lymphoma).                 Abdominal:             Vascular:

## 2024-05-07 NOTE — ED NOTES
ED to Inpatient Handoff Report    Notified 5th floor that electronic handoff available and patient ready for transport to room 509.    Safety Risks: Risk of falls    Patient in Restraints: no    Constant Observer or Patient : no    Telemetry Monitoring Ordered: No          Order to transfer to unit without monitor: NA    Last MEWS: 1 Time completed: 1135    Deterioration Index: 19.26    Vitals:    05/07/24 0842 05/07/24 0955 05/07/24 1055   BP: 133/74 (!) 142/68 (!) 147/78   Pulse: 69     Resp: 16     Temp: 98.1 °F (36.7 °C)     TempSrc: Oral     SpO2: 91% 91% 93%   Weight: 102.1 kg (225 lb)     Height: 1.727 m (5' 8\")         Opportunity for questions and clarification was provided.

## 2024-05-08 LAB
ANION GAP SERPL CALCULATED.3IONS-SCNC: 13 MMOL/L (ref 7–16)
BUN SERPL-MCNC: 17 MG/DL (ref 6–23)
CALCIUM SERPL-MCNC: 9.3 MG/DL (ref 8.6–10.2)
CHLORIDE SERPL-SCNC: 105 MMOL/L (ref 98–107)
CO2 SERPL-SCNC: 21 MMOL/L (ref 22–29)
CREAT SERPL-MCNC: 1.3 MG/DL (ref 0.7–1.2)
ERYTHROCYTE [DISTWIDTH] IN BLOOD BY AUTOMATED COUNT: 13.8 % (ref 11.5–15)
GFR, ESTIMATED: 60 ML/MIN/1.73M2
GLUCOSE SERPL-MCNC: 134 MG/DL (ref 74–99)
HCT VFR BLD AUTO: 48.6 % (ref 37–54)
HGB BLD-MCNC: 15.3 G/DL (ref 12.5–16.5)
MCH RBC QN AUTO: 27.6 PG (ref 26–35)
MCHC RBC AUTO-ENTMCNC: 31.5 G/DL (ref 32–34.5)
MCV RBC AUTO: 87.7 FL (ref 80–99.9)
PLATELET # BLD AUTO: 321 K/UL (ref 130–450)
PMV BLD AUTO: 11.2 FL (ref 7–12)
POTASSIUM SERPL-SCNC: 3.8 MMOL/L (ref 3.5–5)
RBC # BLD AUTO: 5.54 M/UL (ref 3.8–5.8)
SODIUM SERPL-SCNC: 139 MMOL/L (ref 132–146)
WBC OTHER # BLD: 8 K/UL (ref 4.5–11.5)

## 2024-05-08 PROCEDURE — 36415 COLL VENOUS BLD VENIPUNCTURE: CPT

## 2024-05-08 PROCEDURE — 6360000002 HC RX W HCPCS: Performed by: STUDENT IN AN ORGANIZED HEALTH CARE EDUCATION/TRAINING PROGRAM

## 2024-05-08 PROCEDURE — 6370000000 HC RX 637 (ALT 250 FOR IP): Performed by: STUDENT IN AN ORGANIZED HEALTH CARE EDUCATION/TRAINING PROGRAM

## 2024-05-08 PROCEDURE — 1200000000 HC SEMI PRIVATE

## 2024-05-08 PROCEDURE — 85027 COMPLETE CBC AUTOMATED: CPT

## 2024-05-08 PROCEDURE — 2580000003 HC RX 258: Performed by: STUDENT IN AN ORGANIZED HEALTH CARE EDUCATION/TRAINING PROGRAM

## 2024-05-08 PROCEDURE — 80048 BASIC METABOLIC PNL TOTAL CA: CPT

## 2024-05-08 RX ADMIN — FENOFIBRATE 54 MG: 54 TABLET ORAL at 08:42

## 2024-05-08 RX ADMIN — WATER 2000 MG: 1 INJECTION INTRAMUSCULAR; INTRAVENOUS; SUBCUTANEOUS at 05:10

## 2024-05-08 RX ADMIN — BUPROPION HYDROCHLORIDE 150 MG: 150 TABLET, FILM COATED, EXTENDED RELEASE ORAL at 21:48

## 2024-05-08 RX ADMIN — BISACODYL 5 MG: 5 TABLET, COATED ORAL at 08:43

## 2024-05-08 RX ADMIN — GUAIFENESIN 800 MG: 400 TABLET ORAL at 21:48

## 2024-05-08 RX ADMIN — GUAIFENESIN 800 MG: 400 TABLET ORAL at 08:42

## 2024-05-08 RX ADMIN — SODIUM CHLORIDE: 9 INJECTION, SOLUTION INTRAVENOUS at 17:25

## 2024-05-08 RX ADMIN — BUPROPION HYDROCHLORIDE 150 MG: 150 TABLET, FILM COATED, EXTENDED RELEASE ORAL at 08:43

## 2024-05-08 RX ADMIN — POTASSIUM CHLORIDE 10 MEQ: 750 TABLET, EXTENDED RELEASE ORAL at 08:42

## 2024-05-08 RX ADMIN — TAMSULOSIN HYDROCHLORIDE 0.4 MG: 0.4 CAPSULE ORAL at 08:43

## 2024-05-08 RX ADMIN — POTASSIUM CHLORIDE 10 MEQ: 750 TABLET, EXTENDED RELEASE ORAL at 21:48

## 2024-05-08 RX ADMIN — HYDRALAZINE HYDROCHLORIDE 25 MG: 25 TABLET ORAL at 15:58

## 2024-05-08 RX ADMIN — GUAIFENESIN 800 MG: 400 TABLET ORAL at 14:39

## 2024-05-08 RX ADMIN — ATORVASTATIN CALCIUM 10 MG: 10 TABLET, FILM COATED ORAL at 21:49

## 2024-05-08 RX ADMIN — OXYCODONE 5 MG: 5 TABLET ORAL at 08:42

## 2024-05-08 RX ADMIN — SODIUM CHLORIDE: 9 INJECTION, SOLUTION INTRAVENOUS at 05:14

## 2024-05-08 RX ADMIN — PANTOPRAZOLE SODIUM 40 MG: 40 TABLET, DELAYED RELEASE ORAL at 05:10

## 2024-05-08 RX ADMIN — WATER 2000 MG: 1 INJECTION INTRAMUSCULAR; INTRAVENOUS; SUBCUTANEOUS at 13:07

## 2024-05-08 RX ADMIN — CLOPIDOGREL BISULFATE 75 MG: 75 TABLET ORAL at 08:42

## 2024-05-08 RX ADMIN — CARVEDILOL 3.12 MG: 3.12 TABLET, FILM COATED ORAL at 08:42

## 2024-05-08 RX ADMIN — WATER 2000 MG: 1 INJECTION INTRAMUSCULAR; INTRAVENOUS; SUBCUTANEOUS at 21:49

## 2024-05-08 RX ADMIN — FUROSEMIDE 20 MG: 20 TABLET ORAL at 08:42

## 2024-05-08 RX ADMIN — MONTELUKAST SODIUM 10 MG: 10 TABLET ORAL at 08:43

## 2024-05-08 RX ADMIN — SERTRALINE 100 MG: 100 TABLET, FILM COATED ORAL at 08:43

## 2024-05-08 RX ADMIN — OXYCODONE 5 MG: 5 TABLET ORAL at 17:27

## 2024-05-08 RX ADMIN — ASPIRIN 81 MG: 81 TABLET, COATED ORAL at 08:42

## 2024-05-08 RX ADMIN — HYDRALAZINE HYDROCHLORIDE 25 MG: 25 TABLET ORAL at 08:43

## 2024-05-08 ASSESSMENT — PAIN SCALES - GENERAL
PAINLEVEL_OUTOF10: 2
PAINLEVEL_OUTOF10: 8
PAINLEVEL_OUTOF10: 7
PAINLEVEL_OUTOF10: 2

## 2024-05-08 ASSESSMENT — PAIN DESCRIPTION - LOCATION
LOCATION: ARM
LOCATION: ARM

## 2024-05-08 ASSESSMENT — PAIN - FUNCTIONAL ASSESSMENT
PAIN_FUNCTIONAL_ASSESSMENT: PREVENTS OR INTERFERES SOME ACTIVE ACTIVITIES AND ADLS
PAIN_FUNCTIONAL_ASSESSMENT: ACTIVITIES ARE NOT PREVENTED

## 2024-05-08 ASSESSMENT — PAIN DESCRIPTION - ONSET: ONSET: GRADUAL

## 2024-05-08 ASSESSMENT — PAIN DESCRIPTION - ORIENTATION
ORIENTATION: RIGHT
ORIENTATION: RIGHT

## 2024-05-08 ASSESSMENT — PAIN DESCRIPTION - DESCRIPTORS
DESCRIPTORS: ACHING
DESCRIPTORS: ACHING

## 2024-05-08 ASSESSMENT — PAIN DESCRIPTION - FREQUENCY: FREQUENCY: INTERMITTENT

## 2024-05-08 ASSESSMENT — PAIN DESCRIPTION - PAIN TYPE: TYPE: SURGICAL PAIN

## 2024-05-08 NOTE — PROGRESS NOTES
IV that was in patient's left forearm infiltrated.  Pt. Very angry about this and not wanting new IV site.  Explained to pt. That he has fluids and antibiotics ordered by his dr. That need to be put threw an IV.  Pt. Agreeable to new IV site.  IV site placed in left wrist.    Electronically signed by Jennifer Fry RN on 5/7/2024 at 10:08 PM

## 2024-05-08 NOTE — CONSULTS
[facility]  [facility]  Consultation Note     Admit Date: 5/7/2024  8:47 AM    Reason for Consult:   Right forearm wound dehiscence.  Status post I&D      Attending Physician:  Tremayne Hernández DO    HISTORY OF PRESENT ILLNESS:             The history is obtained from extensive review of available past medical records. The patient is a 73 y.o. male who is unknown to the ID service.  The patient underwent a right medial nerve repair with allograft, right open carpal tunnel release, flexor pollicis longus tendon on 4/16/2024.  He was seen in the ED on 5/1/2022 for after his sutures have been removed and he noticed a small amount of bleeding in the center of the wound.  He was discharged on Cephalexin.  No cultures, however, were taken.  Patient was admitted to [facility] on 5/7/2024 and underwent irrigation and debridement of the wound.  He is currently on Cefazolin.    Past Medical History:        Diagnosis Date    Anesthesia complication     wakes up  violant   only ok if reversed with romazacon    Arthritis     shoulders,ankle    CAD (coronary artery disease)     Cardiomyopathy (HCC)     CHF (congestive heart failure) ([facility])     Chronic hypoxic respiratory failure (HCC)     CKD (chronic kidney disease)     COPD (chronic obstructive pulmonary disease) (HCC)     Dependence on supplemental oxygen     PRN    Erectile dysfunction     GERD (gastroesophageal reflux disease)     H/O coronary angioplasty with stents[V45.82] 9/2018 another stent    Hearing loss     HFrEF (heart failure with reduced ejection fraction) (HCC)     Hyperlipidemia     Hypertension     Ischemic cardiomyopathy     Lymphoma (HCC) 11/04/2011    had chemo  currently in remission    Myocardial infarction (HCC)     more than 10 yrs ago    Non-Hodgkin lymphoma (HCC)     In remission    Obesity     Sleep apnea     wont wear machine    Unspecified cerebral artery occlusion with cerebral infarction 2005    no deficits  atorvastatin  10 mg Oral Daily    tamsulosin  0.4 mg Oral Daily    sodium chloride flush  5-40 mL IntraVENous 2 times per day    ceFAZolin (ANCEF) IVPB  2,000 mg IntraVENous Q8H    bisacodyl  5 mg Oral Daily    aspirin  81 mg Oral Daily     Continuous Infusions:   sodium chloride 100 mL/hr at 05/08/24 0514    sodium chloride       PRN Meds:ondansetron, sodium chloride flush, sodium chloride, morphine **OR** morphine, oxyCODONE    Allergies:  Versed [midazolam]    Social History:   Social History     Socioeconomic History    Marital status: Unknown     Spouse name: None    Number of children: None    Years of education: None    Highest education level: None   Tobacco Use    Smoking status: Some Days     Current packs/day: 0.50     Average packs/day: 0.5 packs/day for 68.0 years (34.0 ttl pk-yrs)     Types: Cigarettes     Start date: 4/22/1956    Smokeless tobacco: Never    Tobacco comments:     No smoking for about one month   Vaping Use    Vaping Use: Never used   Substance and Sexual Activity    Alcohol use: Yes     Alcohol/week: 14.0 standard drinks of alcohol     Types: 14 Cans of beer per week     Comment: 1-2 beers daily - less recently    Drug use: Not Currently     Types: Cocaine     Comment: past use; none since 2005    Sexual activity: Defer   Social History Narrative    ** Merged History Encounter **         Drinks 4 Mt Dew daily     Social Determinants of Health     Financial Resource Strain: High Risk (3/20/2024)    Overall Financial Resource Strain (CARDIA)     Difficulty of Paying Living Expenses: Hard   Food Insecurity: Food Insecurity Present (3/20/2024)    Hunger Vital Sign     Worried About Running Out of Food in the Last Year: Sometimes true     Ran Out of Food in the Last Year: Sometimes true   Transportation Needs: No Transportation Needs (3/20/2024)    PRAPARE - Transportation     Lack of Transportation (Medical): No     Lack of Transportation (Non-Medical): No   Physical Activity: Inactive  (12/18/2023)    Exercise Vital Sign     Days of Exercise per Week: 0 days     Minutes of Exercise per Session: 0 min   Housing Stability: Low Risk  (3/20/2024)    Housing Stability Vital Sign     Unable to Pay for Housing in the Last Year: No     Number of Places Lived in the Last Year: 1     Unstable Housing in the Last Year: No      Pets: None  Travel: No  The patient lives alone.  He retired from driving trucks    Family History:       Problem Relation Age of Onset    Diabetes Mother     Heart Disease Mother     Diabetes Father     Heart Disease Father     Diabetes Sister     Cancer Sister     Diabetes Brother     Cancer Brother    . Otherwise non-pertinent to the chief complaint.    REVIEW OF SYSTEMS:    Constitutional: Negative for fevers, chills, diaphoresis  Neurologic: Negative   Psychiatric: Negative  Rheumatologic: Negative   Endocrine: Negative  Hematologic: Negative  Immunologic: Negative  ENT: Negative  Respiratory: Negative   Cardiovascular: Negative  GI: Negative  : Negative  Musculoskeletal: Negative  Skin: No rashes.     PHYSICAL EXAM:    Vitals:   BP (!) 146/93   Pulse 82   Temp 98.2 °F (36.8 °C) (Oral)   Resp 16   Ht 1.727 m (5' 8\")   Wt 101.9 kg (224 lb 9 oz)   SpO2 90%   BMI 34.14 kg/m²   Constitutional: The patient is awake, alert, and oriented.  Lying in bed.  She is awake and in no distress.  Daughter in room.  Skin: Warm and dry. No rashes were noted.   HEENT: Eyes show round, and reactive pupils. No jaundice. Moist mucous membranes, no ulcerations, no thrush.   Neck: Supple to movements. No lymphadenopathy.   Chest: No use of accessory muscles to breathe. Symmetrical expansion. Auscultation reveals no wheezing, crackles, or rhonchi.   Cardiovascular: S1 and S2 are rhythmic and regular. No murmurs appreciated.   Abdomen: Positive bowel sounds to auscultation. Benign to palpation. No masses felt. No hepatosplenomegaly.  Extremities: The right arm is wrapped in an Ace.  The wound

## 2024-05-08 NOTE — PROGRESS NOTES
[facility] Quality Flow/Interdisciplinary Rounds Progress Note        Quality Flow Rounds held on May 8, 2024    Disciplines Attending:  Bedside Nurse, , , and Nursing Unit Leadership    Clark A Postlethwait was admitted on 5/7/2024  8:47 AM    Anticipated Discharge Date:       Disposition:    Alex Score:  Alex Scale Score: 19    Readmission Risk              Risk of Unplanned Readmission:  25           Discussed patient goal for the day, patient clinical progression, and barriers to discharge.  The following Goal(s) of the Day/Commitment(s) have been identified:  Diagnostics - Report Results and Labs - Report Results      Kizzy Kincaid RN  May 8, 2024

## 2024-05-08 NOTE — PROGRESS NOTES
Occupational Therapy  OT consult received to eval/treat and chart review complete. Patient reports he is functioning at baseline level of independent. Patient reports he has been up independently since admission and has no concerns related to functional abilities. Patient declines need for OT evaluation at this time, reports he is aware of NWB RUE. No OT evaluation complete per patient preference.      Sabra Michael, OTR/L  License Number: [id number]

## 2024-05-08 NOTE — PLAN OF CARE
Problem: Safety - Adult  Goal: Free from fall injury  5/7/2024 2238 by Jennifer Fry RN  Outcome: Progressing     Problem: ABCDS Injury Assessment  Goal: Absence of physical injury  5/7/2024 2238 by Jennifer Fry RN  Outcome: Progressing     Problem: Chronic Conditions and Co-morbidities  Goal: Patient's chronic conditions and co-morbidity symptoms are monitored and maintained or improved  5/7/2024 2238 by Jennifer Fry RN  Outcome: Progressing     Problem: Discharge Planning  Goal: Discharge to home or other facility with appropriate resources  5/7/2024 2238 by Jennifer Fry RN  Outcome: Progressing     Problem: Pain  Goal: Verbalizes/displays adequate comfort level or baseline comfort level  5/7/2024 2238 by Jennifer Fry RN  Outcome: Progressing

## 2024-05-08 NOTE — CARE COORDINATION
Introduced my self and provided explanation of CM role to patient. Patient is awake, alert, and aware of current diagnosis and discharge planning. Patient is from home alone independently. Patient has home O2 from [employer] but does not wear. PCP is Dr. Jama. Preferred pharmacy is [facility] in [place]. Patient states his discharge plan is home with no needs. Patient admitted after carpal tunnel surgery. I&D performed yesterday. ID consulted, await plan.   Electronically signed by Jayashree Peterson RN on 5/8/2024 at 11:48 AM

## 2024-05-08 NOTE — PROGRESS NOTES
Department of Orthopedic Surgery  Progress Note    Patient seen and examined. Pain controlled. No new complaints.  Denies chest pain, shortness of breath, dizziness/lightheadedness.     VITALS:  BP (!) 159/74   Pulse 61   Temp 98.2 °F (36.8 °C) (Oral)   Resp 16   Ht 1.727 m (5' 8\")   Wt 101.9 kg (224 lb 9 oz)   SpO2 93%   BMI 34.14 kg/m²     General: alert and oriented to person, place and time, well-developed and well-nourished, in no acute distress    MUSCULOSKELETAL:   right upper extremity:  Dressing C/D/I  Compartments soft and compressible  +AIN/PIN/Ulnar/Median/Radial nerve function intact grossly  +2/4 Radial pulse, Cap refill <2 sec  Sensation intact to touch in radial/ulnar/median nerve distributions to hand    CBC:   Lab Results   Component Value Date/Time    WBC 8.7 05/07/2024 09:13 AM    HGB 15.5 05/07/2024 09:13 AM    HCT 48.8 05/07/2024 09:13 AM     05/07/2024 09:13 AM     PT/INR:    Lab Results   Component Value Date/Time    PROTIME 12.4 02/02/2024 07:40 PM    PROTIME 13.0 11/03/2011 09:47 AM    INR 1.1 02/02/2024 07:40 PM       Intraop Cultures: pending    ASSESSMENT  S/P right forearm irrigation and debridement-5/7/2024    PLAN      Continue physical therapy and protocol: NWB - RUE  abx coverage  Deep venous thrombosis prophylaxis - asa 325MG bid, early mobilization  Cultures pending, patient on Ancef at this time,  ID consulted for possible antibiotic selection  Pain Control: IV and PO  Monitor H&H  D/C Plan: Pending cultures  Will pull packing tomorrow

## 2024-05-08 NOTE — OP NOTE
Operative Note      Patient: Clark ZURITA Postlethwait  YOB: 1950  MRN: [id number]    Date of Procedure: 5/7/2024    Pre-Op Diagnosis Codes:     * Wound dehiscence [T81.30XA]    Post-Op Diagnosis: Same       Procedure(s):  RIGHT FOREARM IRRIGATION AND DEBRIDEMENT WITH WOUND CLOSURE    Surgeon(s):  Tremayne Hernández DO    Assistant:   Pavan Elder PA-C    Anesthesia: Monitor Anesthesia Care    Estimated Blood Loss (mL): less than 50     Complications: None    Specimens:   ID Type Source Tests Collected by Time Destination   1 : RIGHT FOREARM WOUND CULTURE Tissue Arm CULTURE, ANAEROBIC, CULTURE, FUNGUS, GRAM STAIN (Canceled), CULTURE, SURGICAL, CULTURE WITH SMEAR, ACID FAST BACILLIUS Tremayne Hernández DO 5/7/2024 1908        Implants:  * No implants in log *      Drains: * No LDAs found *    Findings:  Infection Present At Time Of Surgery (PATOS) (choose all levels that have infection present):  - Superficial Infection (skin/subcutaneous) present as evidenced by yellow opaque fluid consistent with infection          PROCEDURE IN DETAIL: The patient was identified in the holding area. The right arm was identified as the operative site. he was then seen by  Anesthesia, taken to the operating room, placed supine on the table, and underwent monitored anesthesia care per Anesthesia Department. Under sterile conditions, approximately 10 mL of 1:1 mixture of 1% lidocaine with epinephrine were infiltrated over the surgical site. With this accomplished, a well-padded arm tourniquet was placed. The right upper extremity was prepared and draped in standard sterile fashion.     The wound over the right forearm was identified.  Previous sutures were removed.  The wound was then debrided of necrotic and granulated tissue using a curette, rongeur and sharp excision.  All necrotic tissue was then completely debrided from the wound bed.  The skin edges were then cut back to good healthy bleeding tissue.  Tissue and cultures were  sent for pathology and culture.  The wound was in spectated.  The nerve repairs appear to be intact.  The wound was then copiously irrigated with 3 L of normal saline.  The wound was then closed using 3-0 and 4-0 nylon sutures.  Packing was placed into the wound region.  Normal sterile dressing was applied.  Patient was placed into a dorsal blocking splint.  Patient was then woken up by the anesthesia staff.  Patient was then transferred to this hospital bed.  Patient was then transferred safely to PACU.    Electronically signed by Tremayne Hernández DO on 5/8/2024 at 10:56 AM

## 2024-05-08 NOTE — PLAN OF CARE
Problem: Safety - Adult  Goal: Free from fall injury  5/8/2024 1017 by Collette Arce, RN  Outcome: Progressing  5/7/2024 2238 by Jennifer Fry RN  Outcome: Progressing     Problem: ABCDS Injury Assessment  Goal: Absence of physical injury  5/8/2024 1017 by Collette Arce, RN  Outcome: Progressing  5/7/2024 2238 by Jennifer Fry RN  Outcome: Progressing     Problem: Chronic Conditions and Co-morbidities  Goal: Patient's chronic conditions and co-morbidity symptoms are monitored and maintained or improved  5/8/2024 1017 by Collette Arce, RN  Outcome: Progressing  5/7/2024 2238 by Jennifer Fry RN  Outcome: Progressing     Problem: Discharge Planning  Goal: Discharge to home or other facility with appropriate resources  5/8/2024 1017 by Collette Arce, RN  Outcome: Progressing  5/7/2024 2238 by Jennifer rFy RN  Outcome: Progressing     Problem: Pain  Goal: Verbalizes/displays adequate comfort level or baseline comfort level  5/8/2024 1017 by Collette Arce, RN  Outcome: Progressing  Flowsheets (Taken 5/8/2024 0830)  Verbalizes/displays adequate comfort level or baseline comfort level: Encourage patient to monitor pain and request assistance  5/7/2024 2238 by Jennifer Fry RN  Outcome: Progressing

## 2024-05-08 NOTE — PROGRESS NOTES
Physical Therapy  Facility/Department: [address] MED SURG    Name: Clark Maza  : 1950  MRN: [id number]  Date of Service: 2024    PT consult was received and after speaking with OT, she states pt told her therapy is a waste of his time and ours at this point.  He is up moving in the room on his own and has no skilled therapy needs and wishes to be discharged from our services.  Will discharge pt from our service as requested.    Christiano Orantes Jr., P.T.  License Number: [id number]

## 2024-05-09 PROCEDURE — 2580000003 HC RX 258: Performed by: NURSE PRACTITIONER

## 2024-05-09 PROCEDURE — 6370000000 HC RX 637 (ALT 250 FOR IP): Performed by: STUDENT IN AN ORGANIZED HEALTH CARE EDUCATION/TRAINING PROGRAM

## 2024-05-09 PROCEDURE — 2580000003 HC RX 258: Performed by: STUDENT IN AN ORGANIZED HEALTH CARE EDUCATION/TRAINING PROGRAM

## 2024-05-09 PROCEDURE — 6360000002 HC RX W HCPCS: Performed by: NURSE PRACTITIONER

## 2024-05-09 PROCEDURE — 1200000000 HC SEMI PRIVATE

## 2024-05-09 PROCEDURE — 6360000002 HC RX W HCPCS: Performed by: STUDENT IN AN ORGANIZED HEALTH CARE EDUCATION/TRAINING PROGRAM

## 2024-05-09 RX ADMIN — VANCOMYCIN HYDROCHLORIDE 2000 MG: 10 INJECTION, POWDER, LYOPHILIZED, FOR SOLUTION INTRAVENOUS at 15:21

## 2024-05-09 RX ADMIN — SODIUM CHLORIDE, PRESERVATIVE FREE 10 ML: 5 INJECTION INTRAVENOUS at 08:43

## 2024-05-09 RX ADMIN — BUPROPION HYDROCHLORIDE 150 MG: 150 TABLET, FILM COATED, EXTENDED RELEASE ORAL at 20:24

## 2024-05-09 RX ADMIN — SERTRALINE 100 MG: 100 TABLET, FILM COATED ORAL at 08:44

## 2024-05-09 RX ADMIN — CLOPIDOGREL BISULFATE 75 MG: 75 TABLET ORAL at 08:44

## 2024-05-09 RX ADMIN — HYDRALAZINE HYDROCHLORIDE 25 MG: 25 TABLET ORAL at 15:17

## 2024-05-09 RX ADMIN — FUROSEMIDE 20 MG: 20 TABLET ORAL at 08:44

## 2024-05-09 RX ADMIN — GUAIFENESIN 800 MG: 400 TABLET ORAL at 08:44

## 2024-05-09 RX ADMIN — BUPROPION HYDROCHLORIDE 150 MG: 150 TABLET, FILM COATED, EXTENDED RELEASE ORAL at 08:44

## 2024-05-09 RX ADMIN — HYDRALAZINE HYDROCHLORIDE 25 MG: 25 TABLET ORAL at 08:44

## 2024-05-09 RX ADMIN — TAMSULOSIN HYDROCHLORIDE 0.4 MG: 0.4 CAPSULE ORAL at 08:44

## 2024-05-09 RX ADMIN — MONTELUKAST SODIUM 10 MG: 10 TABLET ORAL at 08:44

## 2024-05-09 RX ADMIN — OXYCODONE 5 MG: 5 TABLET ORAL at 06:57

## 2024-05-09 RX ADMIN — GUAIFENESIN 800 MG: 400 TABLET ORAL at 20:24

## 2024-05-09 RX ADMIN — ATORVASTATIN CALCIUM 10 MG: 10 TABLET, FILM COATED ORAL at 20:24

## 2024-05-09 RX ADMIN — BISACODYL 5 MG: 5 TABLET, COATED ORAL at 08:44

## 2024-05-09 RX ADMIN — SODIUM CHLORIDE, PRESERVATIVE FREE 10 ML: 5 INJECTION INTRAVENOUS at 20:25

## 2024-05-09 RX ADMIN — GUAIFENESIN 800 MG: 400 TABLET ORAL at 15:17

## 2024-05-09 RX ADMIN — PANTOPRAZOLE SODIUM 40 MG: 40 TABLET, DELAYED RELEASE ORAL at 06:47

## 2024-05-09 RX ADMIN — POTASSIUM CHLORIDE 10 MEQ: 750 TABLET, EXTENDED RELEASE ORAL at 20:24

## 2024-05-09 RX ADMIN — WATER 2000 MG: 1 INJECTION INTRAMUSCULAR; INTRAVENOUS; SUBCUTANEOUS at 06:58

## 2024-05-09 RX ADMIN — FENOFIBRATE 54 MG: 54 TABLET ORAL at 08:44

## 2024-05-09 RX ADMIN — ASPIRIN 81 MG: 81 TABLET, COATED ORAL at 08:44

## 2024-05-09 RX ADMIN — CARVEDILOL 3.12 MG: 3.12 TABLET, FILM COATED ORAL at 08:44

## 2024-05-09 RX ADMIN — POTASSIUM CHLORIDE 10 MEQ: 750 TABLET, EXTENDED RELEASE ORAL at 08:44

## 2024-05-09 RX ADMIN — CEFEPIME 2000 MG: 2 INJECTION, POWDER, FOR SOLUTION INTRAVENOUS at 14:42

## 2024-05-09 ASSESSMENT — PAIN - FUNCTIONAL ASSESSMENT: PAIN_FUNCTIONAL_ASSESSMENT: PREVENTS OR INTERFERES SOME ACTIVE ACTIVITIES AND ADLS

## 2024-05-09 ASSESSMENT — PAIN SCALES - GENERAL
PAINLEVEL_OUTOF10: 2
PAINLEVEL_OUTOF10: 6
PAINLEVEL_OUTOF10: 4

## 2024-05-09 ASSESSMENT — PAIN DESCRIPTION - LOCATION: LOCATION: ARM

## 2024-05-09 ASSESSMENT — PAIN DESCRIPTION - ORIENTATION: ORIENTATION: RIGHT

## 2024-05-09 ASSESSMENT — PAIN DESCRIPTION - DESCRIPTORS: DESCRIPTORS: STABBING

## 2024-05-09 ASSESSMENT — PAIN SCALES - WONG BAKER: WONGBAKER_NUMERICALRESPONSE: HURTS LITTLE MORE

## 2024-05-09 NOTE — PLAN OF CARE
Problem: Safety - Adult  Goal: Free from fall injury  5/9/2024 1035 by Larry Gage RN  Outcome: Progressing  5/9/2024 0254 by Concetta Field RN  Outcome: Progressing     Problem: ABCDS Injury Assessment  Goal: Absence of physical injury  5/9/2024 1035 by Larry Gage RN  Outcome: Progressing  5/9/2024 0254 by Concetta Field RN  Outcome: Progressing     Problem: Chronic Conditions and Co-morbidities  Goal: Patient's chronic conditions and co-morbidity symptoms are monitored and maintained or improved  5/9/2024 1035 by Larry Gage RN  Outcome: Progressing  5/9/2024 0254 by Concetta Field RN  Outcome: Progressing     Problem: Discharge Planning  Goal: Discharge to home or other facility with appropriate resources  Outcome: Progressing     Problem: Pain  Goal: Verbalizes/displays adequate comfort level or baseline comfort level  5/9/2024 1035 by Larry Gage RN  Outcome: Progressing  5/9/2024 0254 by Concetta Field RN  Outcome: Progressing

## 2024-05-09 NOTE — DISCHARGE INSTRUCTIONS
[facility] Orthopedic Surgery  [address] , [address]  [place] [address]    Or    [address] [place] [address]    Dr. Tremayne Hernández, DO    Orthopaedics Discharge Instructions   Weight bearing Status - Non-weight bearing - on right upper Extremity  Pain medication Per Prescriptions  Contact Office for Medication Refill-  [phone]  Office can refill pain med every 7 days  If patient discharging to facility then pain control will be continued per facility physician  Ice to operative/injured site for 15-30 minutes of each hour for next 5 days    Recommend that you continue to ice the area 2-3 times per day after this   Elevate operative/injured limb on 2 pillows at home  Goal is to have limb above the heart if able  Wound care - Maintain Splint, Keep Clean and Dry until follow up appointment , Don't Remove   Follow Up in Office in 10-14 days. Call Office to Confirm Appointment time and Location - [phone]    Call the office at [phone] for directions or with any questions.  Watch for these significant complications.  Call physician if they or any other problems occur:  Fever over 101°, redness, swelling or warmth at the operative site  Unrelieved nausea    Foul smelling or cloudy drainage at the operative site   Unrelieved pain    Blood soaked dressing. (Some oozing may be normal)     Numb, pale, blue, cold or tingling extremity                            HEART FAILURE  / CONGESTIVE HEART FAILURE  DISCHARGE INSTRUCTIONS:  GUIDELINES TO FOLLOW AT HOME    Self- Managed Care:     MEDICATIONS:  Take your medication as directed. If you are experiencing any side effects, inform your doctor, Do not stop taking any of your medications without letting your doctor know.   Check with your doctor before taking any over-the-counter medications / herbal / or dietary supplements. They may interfere with your other medications.  Do not take ibuprofen (Advil or Motrin) and naproxen (Aleve) without talking  [facility] is offered at [facility] and [facility]. Call [phone] extension [phone] for more information.             ACTIVITY:   (Ask your doctor when you will be able to return to work and before starting any exercise program.  Do not drive unless unless your doctor has given you permission to do so).  Start light exercise.  Even if you can only do a small amount, exercise will help you get stronger, have more energy, help manage your weight and decrease  stress. Walking is an easy way to get exercise. Start out slowly and  increase the amount you walk as tolerated  If you become short of breath, dizzy or have chest pain; stop, sit down, and rest.  If you feel \"wiped out\" the day after you exercise, walk at a slower pace or for a shorter distance. You can gradually increase the pace or amount of time.            (Do not exercise right after a meal or in extreme temperatures, such as above 85 degrees, if the air is really humid, or wind chill is less than 20 degrees)                                             ADDITIONAL INFORMATION:  Avoid getting sick from colds and the flu. Stay away from friends or family that you know may have a contagious illness  Get plenty of rest   Get a flu shot each year.  Get a pneumococcal vaccine shot. If you have had one before, ask your doctor whether you need another dose.       My Goal for Self-management of Heart Failure Includes 5 steps :    1. Notice a change in symptoms ( weight gain, short of breath, leg swelling, decreased activity level, bloating....)    2. Evaluate the change: (use the Heart Failure Zones )     3. Decide to take action: decide what your options are, such as: (call your doctor for an extra visit, take a prescribed medication, such as your water pill if your doctor has given you directions to do so, CALL YOUR DOCTOR)    4. Come up with a strategy:  (now you call the doctor

## 2024-05-09 NOTE — PROGRESS NOTES
Pharmacy Consultation Note  (Antibiotic Dosing and Monitoring)    Initial consult date: 5/9  Consulting physician/provider: Tommy  Drug: Vancomycin  Indication: Surgical Site Infection    Age/  Gender Height Weight IBW  Allergy Information   73 y.o./male 172.7 cm (5' 8\") 102.1 kg (225 lb)     Ideal body weight: 68.4 kg (150 lb 12.7 oz)  Adjusted ideal body weight: 82.6 kg (182 lb 1.2 oz)   Versed [midazolam]      Renal Function:  Recent Labs     05/08/24  0755   BUN 17   CREATININE 1.3*       Intake/Output Summary (Last 24 hours) at 5/9/2024 1351  Last data filed at 5/9/2024 0927  Gross per 24 hour   Intake 1443.1 ml   Output --   Net 1443.1 ml       Vancomycin Monitoring:  Trough:  No results for input(s): \"VANCOTROUGH\" in the last 72 hours.  Random:  No results for input(s): \"VANCORANDOM\" in the last 72 hours.    Vancomycin Administration Times:  Recent vancomycin administrations        No vancomycin IV orders with administrations found.                    Assessment:  Patient is a 73 y.o. male who has been initiated on vancomycin  Estimated Creatinine Clearance: 59 mL/min (A) (based on SCr of 1.3 mg/dL (H)).  To dose vancomycin, pharmacy will be utilizing [employer] calculation software for goal AUC/TRACY 400-600 mg/L-hr (predicted AUC/TRACY = 457, Tr =12.2 mcg/mL)    Plan:  Will continue vancomycin 1500 mg IV every 24 hours after 2g load  Will check vancomycin levels when appropriate  Will continue to monitor renal function   Pharmacy to follow      Halie Rosa RPH 5/9/2024 1:51 PM    MARISOL: [phone]  SEY: [phone]  SJW: [phone]

## 2024-05-09 NOTE — PLAN OF CARE
Problem: Safety - Adult  Goal: Free from fall injury  5/9/2024 1957 by Bhavin Nichols RN  Outcome: Progressing  5/9/2024 1035 by Larry Gage RN  Outcome: Progressing     Problem: ABCDS Injury Assessment  Goal: Absence of physical injury  5/9/2024 1957 by Bhavin Nichols, RN  Outcome: Progressing  5/9/2024 1035 by Larry Gage RN  Outcome: Progressing     Problem: Chronic Conditions and Co-morbidities  Goal: Patient's chronic conditions and co-morbidity symptoms are monitored and maintained or improved  5/9/2024 1957 by Bhavin Nichols, RN  Outcome: Progressing  5/9/2024 1035 by Larry Gage RN  Outcome: Progressing     Problem: Discharge Planning  Goal: Discharge to home or other facility with appropriate resources  5/9/2024 1957 by Bhavin Nichols, RN  Outcome: Progressing  5/9/2024 1035 by Larry Gage RN  Outcome: Progressing     Problem: Pain  Goal: Verbalizes/displays adequate comfort level or baseline comfort level  5/9/2024 1957 by Bhavin Nichols, RN  Outcome: Progressing  5/9/2024 1035 by Larry Gage RN  Outcome: Progressing

## 2024-05-09 NOTE — PROGRESS NOTES
[facility] Quality Flow/Interdisciplinary Rounds Progress Note        Quality Flow Rounds held on May 9, 2024    Disciplines Attending:  Bedside Nurse, , , and Nursing Unit Leadership    Clark A Postlethwait was admitted on 5/7/2024  8:47 AM    Anticipated Discharge Date:       Disposition:    Alex Score:  Alex Scale Score: 19    Readmission Risk              Risk of Unplanned Readmission:  26           Discussed patient goal for the day, patient clinical progression, and barriers to discharge.  The following Goal(s) of the Day/Commitment(s) have been identified:  Diagnostics - Report Results and Labs - Report Results      Kizzy Kincaid RN  May 9, 2024

## 2024-05-09 NOTE — PROGRESS NOTES
Department of Orthopedic Surgery  Progress Note    Patient seen and examined. Pain controlled. No new complaints.  Denies chest pain, shortness of breath, dizziness/lightheadedness.     VITALS:  BP (!) 148/78   Pulse 62   Temp 98 °F (36.7 °C) (Oral)   Resp 18   Ht 1.727 m (5' 8\")   Wt 103.9 kg (229 lb)   SpO2 92%   BMI 34.82 kg/m²     General: alert and oriented to person, place and time, well-developed and well-nourished, in no acute distress    MUSCULOSKELETAL:   right upper extremity:  Dressing C/D/I -dressing was partially taken down today, still having mild drainage to the distal aspect of the wound, the packing was removed today, dressing was reapplied.  Compartments soft and compressible  +AIN/PIN/Ulnar/Median/Radial nerve function intact grossly  +2/4 Radial pulse, Cap refill <2 sec  Sensation intact to touch in radial/ulnar/median nerve distributions to hand    CBC:   Lab Results   Component Value Date/Time    WBC 8.0 05/08/2024 07:55 AM    HGB 15.3 05/08/2024 07:55 AM    HCT 48.6 05/08/2024 07:55 AM     05/08/2024 07:55 AM     PT/INR:    Lab Results   Component Value Date/Time    PROTIME 12.4 02/02/2024 07:40 PM    PROTIME 13.0 11/03/2011 09:47 AM    INR 1.1 02/02/2024 07:40 PM       Intraop Cultures: Culture showing rare gram-positive cocci, final cultures and sensitivities pending    ASSESSMENT  S/P right forearm irrigation and debridement-5/7/2024    PLAN      Continue physical therapy and protocol: NWB - RUE  abx coverage  Deep venous thrombosis prophylaxis - asa 325MG bid, early mobilization  Patient is okay to shower as long as he keeps the right splint clean and dry  Final cultures pending, patient on Ancef at this time,  ID consulted for antibiotic selection   Pain Control: IV and PO  Monitor H&H  D/C Plan: Pending cultures  Packing pulled today      Electronically signed by Tremayne Hernández DO on 5/9/24 at 8:25 AM EDT

## 2024-05-09 NOTE — PROGRESS NOTES
Pt. refused to have labs drawn @ this time.  Pt. stated \"absolutely not, it's too early. \"  Lab said they will come and attempt later in the morning. Concetta Field RN

## 2024-05-09 NOTE — PROGRESS NOTES
Physician Progress Note      PATIENT:               ABRAHAM GALICIA #:                  [id number]  :                       1950  ADMIT DATE:       2024 8:47 AM  DISCH DATE:  RESPONDING  PROVIDER #:        Tremayne Hernández DO          QUERY TEXT:    Patient admitted with Wound infection and dehiscence. Per Op note dated    documentation of debridement. To accurately reflect the procedure performed   please document if debridement was excisional and the deepest depth of tissue   removed as down to and including:    The medical record reflects the following:  Risk Factors: Dehiscence of surgical wound  Clinical Indicators: Per op note: \"...- Superficial Infection   (skin/subcutaneous) present as evidenced by yellow opaque fluid consistent   with infection...The wound was then debrided of necrotic and granulated tissue   using a curette, rongeur and sharp excision.  All necrotic tissue was then   completely debrided from the wound bed.  The skin edges were then cut back to   good healthy bleeding tissue.  Tissue and cultures were sent for pathology and   culture.  The wound was in spectated.  The nerve repairs appear to be intact  Treatment: Irrigation and re-closure of wound    Thank you,  Natalie DILLONN, RN, CRCR  Clinical Documentation Improvement  [email or web address]  Options provided:  -- Excisional debridement of skin  -- Excisional debridement of subcutaneous tissue  -- Excisional debridement of muscle  -- Other - I will add my own diagnosis  -- Disagree - Not applicable / Not valid  -- Disagree - Clinically unable to determine / Unknown  -- Refer to Clinical Documentation Reviewer    PROVIDER RESPONSE TEXT:    Excisional debridement of subcutaneous tissue of Right forearm was performed   during procedure on .    Query created by: Natalie Downs on 2024 10:19 AM      Electronically signed by:  Tremayne Hernández DO 2024 11:10 AM

## 2024-05-09 NOTE — PLAN OF CARE
Problem: ABCDS Injury Assessment  Goal: Absence of physical injury  Outcome: Progressing     Problem: Chronic Conditions and Co-morbidities  Goal: Patient's chronic conditions and co-morbidity symptoms are monitored and maintained or improved  Outcome: Progressing     Problem: Pain  Goal: Verbalizes/displays adequate comfort level or baseline comfort level  Outcome: Progressing

## 2024-05-09 NOTE — PROGRESS NOTES
[facility]  [facility]  Progress Note    SUBJECTIVE:  Chief Complaint   Patient presents with    Wound Infection     Right forearm. Sent by dr beck.     Patient is tolerating medications.  Patient is doing well.  Tolerating antibiotics.  Pain is better.    Review of systems:  As stated above in the chief complaint, otherwise negative.    Medications:  Scheduled Meds:   buPROPion  150 mg Oral BID    carvedilol  3.125 mg Oral Daily    clopidogrel  75 mg Oral Daily    fenofibrate  54 mg Oral Daily    furosemide  20 mg Oral Daily    guaiFENesin  800 mg Oral TID    hydrALAZINE  25 mg Oral BID    montelukast  10 mg Oral Daily    pantoprazole  40 mg Oral QAM AC    potassium chloride  10 mEq Oral BID    sertraline  100 mg Oral Daily    atorvastatin  10 mg Oral Daily    tamsulosin  0.4 mg Oral Daily    sodium chloride flush  5-40 mL IntraVENous 2 times per day    ceFAZolin (ANCEF) IVPB  2,000 mg IntraVENous Q8H    bisacodyl  5 mg Oral Daily    aspirin  81 mg Oral Daily     Continuous Infusions:  PRN Meds:ondansetron, sodium chloride flush, morphine **OR** morphine, oxyCODONE    OBJECTIVE:  BP (!) 148/78   Pulse 62   Temp 98 °F (36.7 °C) (Oral)   Resp 18   Ht 1.727 m (5' 8\")   Wt 103.9 kg (229 lb)   SpO2 92%   BMI 34.82 kg/m²   Temp  Av.2 °F (36.8 °C)  Min: 98 °F (36.7 °C)  Max: 99 °F (37.2 °C)  Constitutional:   Skin: Warm and dry. No rashes were noted.   Neuro: Alert and oriented  HEENT: Round and reactive pupils.  Moist mucous membranes.  No ulcerations or thrush.  Chest: .Respirations unlabored. Breath sounds clear.   Cardiovascular: Heart sounds and regular  Abdomen: Benign to palpation  Extremities: Right hand and forearm wrapped in Ace.  Fingers are warm and pink    Lines: Peripheral      Laboratory and Tests:  Lab Results   Component Value Date    WBC 8.0 2024    WBC 8.7 2024    WBC 7.1 2024    HGB 15.3 2024    HCT 48.6 2024    MCV 87.7  05/08/2024     05/08/2024     Lab Results   Component Value Date    CRP 7.0 (H) 05/07/2024    CRP 8.6 (H) 01/18/2022    CRP 9.7 (H) 01/17/2022     Lab Results   Component Value Date    SEDRATE 11 05/07/2024    SEDRATE 5 07/18/2013     No components found for: \"PROCAL.3\"  Radiology:      Microbiology:   Surgical culture 5/7/2024-pending  Assessment:  Status post right arm medial nerve repair and open carpal tunnel release  Wound dehiscence  Surgical site infection  Status post incision and debridement 5/8/2024     Plan:    Continue Cefazolin  Check cultures, baseline ESR, CRP  Will follow with you      JULIAN Dsouza - CNP  10:52 AM  5/9/2024    Patient seen and examined. I had a face to face encounter with the patient. Agree with exam.  Assessment and plan as outlined above and directed by me. Addition and corrections were done as deemed appropriate. My exam and plan include: Patient is lying in bed.  No distress.  Right upper extremity is wrapped in Ace.  The wound could not be seen.  Cultures growing gram-negative rods and some GPO.  We will consolidate antibiotics to Vancomycin and Cefepime.  He still may need IV antibiotics.  This will be decided once we have the final ID and sensitivities.    Cuong Bhat MD  5/9/2024  1:33 PM

## 2024-05-10 LAB
ANION GAP SERPL CALCULATED.3IONS-SCNC: 8 MMOL/L (ref 7–16)
BUN SERPL-MCNC: 18 MG/DL (ref 6–23)
CALCIUM SERPL-MCNC: 9.5 MG/DL (ref 8.6–10.2)
CHLORIDE SERPL-SCNC: 105 MMOL/L (ref 98–107)
CO2 SERPL-SCNC: 27 MMOL/L (ref 22–29)
CREAT SERPL-MCNC: 1.1 MG/DL (ref 0.7–1.2)
ERYTHROCYTE [DISTWIDTH] IN BLOOD BY AUTOMATED COUNT: 13.6 % (ref 11.5–15)
GFR, ESTIMATED: 73 ML/MIN/1.73M2
GLUCOSE SERPL-MCNC: 113 MG/DL (ref 74–99)
HCT VFR BLD AUTO: 47.2 % (ref 37–54)
HGB BLD-MCNC: 14.8 G/DL (ref 12.5–16.5)
MCH RBC QN AUTO: 27.6 PG (ref 26–35)
MCHC RBC AUTO-ENTMCNC: 31.4 G/DL (ref 32–34.5)
MCV RBC AUTO: 87.9 FL (ref 80–99.9)
MICROORGANISM SPEC CULT: NORMAL
PLATELET # BLD AUTO: 250 K/UL (ref 130–450)
PMV BLD AUTO: 11.5 FL (ref 7–12)
POTASSIUM SERPL-SCNC: 4.7 MMOL/L (ref 3.5–5)
RBC # BLD AUTO: 5.37 M/UL (ref 3.8–5.8)
SODIUM SERPL-SCNC: 140 MMOL/L (ref 132–146)
SPECIMEN DESCRIPTION: NORMAL
WBC OTHER # BLD: 5.6 K/UL (ref 4.5–11.5)

## 2024-05-10 PROCEDURE — 2580000003 HC RX 258: Performed by: STUDENT IN AN ORGANIZED HEALTH CARE EDUCATION/TRAINING PROGRAM

## 2024-05-10 PROCEDURE — 85027 COMPLETE CBC AUTOMATED: CPT

## 2024-05-10 PROCEDURE — 6370000000 HC RX 637 (ALT 250 FOR IP): Performed by: STUDENT IN AN ORGANIZED HEALTH CARE EDUCATION/TRAINING PROGRAM

## 2024-05-10 PROCEDURE — 6360000002 HC RX W HCPCS: Performed by: NURSE PRACTITIONER

## 2024-05-10 PROCEDURE — 1200000000 HC SEMI PRIVATE

## 2024-05-10 PROCEDURE — 80048 BASIC METABOLIC PNL TOTAL CA: CPT

## 2024-05-10 PROCEDURE — 36415 COLL VENOUS BLD VENIPUNCTURE: CPT

## 2024-05-10 PROCEDURE — 2580000003 HC RX 258: Performed by: NURSE PRACTITIONER

## 2024-05-10 RX ORDER — METHOCARBAMOL 750 MG/1
750 TABLET, FILM COATED ORAL 4 TIMES DAILY
Qty: 40 TABLET | Refills: 0 | Status: SHIPPED | OUTPATIENT
Start: 2024-05-10 | End: 2024-05-20

## 2024-05-10 RX ADMIN — CLOPIDOGREL BISULFATE 75 MG: 75 TABLET ORAL at 10:32

## 2024-05-10 RX ADMIN — CARVEDILOL 3.12 MG: 3.12 TABLET, FILM COATED ORAL at 10:32

## 2024-05-10 RX ADMIN — PANTOPRAZOLE SODIUM 40 MG: 40 TABLET, DELAYED RELEASE ORAL at 06:08

## 2024-05-10 RX ADMIN — SERTRALINE 100 MG: 100 TABLET, FILM COATED ORAL at 10:32

## 2024-05-10 RX ADMIN — BISACODYL 5 MG: 5 TABLET, COATED ORAL at 10:32

## 2024-05-10 RX ADMIN — OXYCODONE 5 MG: 5 TABLET ORAL at 10:32

## 2024-05-10 RX ADMIN — BUPROPION HYDROCHLORIDE 150 MG: 150 TABLET, FILM COATED, EXTENDED RELEASE ORAL at 10:32

## 2024-05-10 RX ADMIN — GUAIFENESIN 800 MG: 400 TABLET ORAL at 10:32

## 2024-05-10 RX ADMIN — CEFEPIME 2000 MG: 2 INJECTION, POWDER, FOR SOLUTION INTRAVENOUS at 02:42

## 2024-05-10 RX ADMIN — GUAIFENESIN 800 MG: 400 TABLET ORAL at 20:46

## 2024-05-10 RX ADMIN — HYDRALAZINE HYDROCHLORIDE 25 MG: 25 TABLET ORAL at 15:37

## 2024-05-10 RX ADMIN — ASPIRIN 81 MG: 81 TABLET, COATED ORAL at 10:32

## 2024-05-10 RX ADMIN — SODIUM CHLORIDE, PRESERVATIVE FREE 10 ML: 5 INJECTION INTRAVENOUS at 20:46

## 2024-05-10 RX ADMIN — FUROSEMIDE 20 MG: 20 TABLET ORAL at 10:32

## 2024-05-10 RX ADMIN — VANCOMYCIN HYDROCHLORIDE 1500 MG: 10 INJECTION, POWDER, LYOPHILIZED, FOR SOLUTION INTRAVENOUS at 13:13

## 2024-05-10 RX ADMIN — FENOFIBRATE 54 MG: 54 TABLET ORAL at 10:32

## 2024-05-10 RX ADMIN — ATORVASTATIN CALCIUM 10 MG: 10 TABLET, FILM COATED ORAL at 20:46

## 2024-05-10 RX ADMIN — TAMSULOSIN HYDROCHLORIDE 0.4 MG: 0.4 CAPSULE ORAL at 10:32

## 2024-05-10 RX ADMIN — HYDRALAZINE HYDROCHLORIDE 25 MG: 25 TABLET ORAL at 10:32

## 2024-05-10 RX ADMIN — BUPROPION HYDROCHLORIDE 150 MG: 150 TABLET, FILM COATED, EXTENDED RELEASE ORAL at 20:46

## 2024-05-10 RX ADMIN — GUAIFENESIN 800 MG: 400 TABLET ORAL at 15:37

## 2024-05-10 RX ADMIN — CEFEPIME 2000 MG: 2 INJECTION, POWDER, FOR SOLUTION INTRAVENOUS at 15:32

## 2024-05-10 RX ADMIN — SODIUM CHLORIDE, PRESERVATIVE FREE 10 ML: 5 INJECTION INTRAVENOUS at 10:34

## 2024-05-10 RX ADMIN — MONTELUKAST SODIUM 10 MG: 10 TABLET ORAL at 10:32

## 2024-05-10 RX ADMIN — POTASSIUM CHLORIDE 10 MEQ: 750 TABLET, EXTENDED RELEASE ORAL at 10:32

## 2024-05-10 RX ADMIN — POTASSIUM CHLORIDE 10 MEQ: 750 TABLET, EXTENDED RELEASE ORAL at 20:46

## 2024-05-10 ASSESSMENT — PAIN SCALES - GENERAL: PAINLEVEL_OUTOF10: 4

## 2024-05-10 ASSESSMENT — PAIN DESCRIPTION - ORIENTATION: ORIENTATION: RIGHT

## 2024-05-10 ASSESSMENT — PAIN DESCRIPTION - LOCATION: LOCATION: ARM

## 2024-05-10 NOTE — PROGRESS NOTES
[facility]  [facility]  Progress Note    SUBJECTIVE:  Chief Complaint   Patient presents with    Wound Infection     Right forearm. Sent by dr beck.     Patient is tolerating medications. No reported adverse drug reactions.  No nausea, vomiting, diarrhea.  Says he's doing good.  No fevers     Review of systems:  As stated above in the chief complaint, otherwise negative.    Medications:  Scheduled Meds:   cefepime  2,000 mg IntraVENous Q12H    vancomycin  1,500 mg IntraVENous Q24H    buPROPion  150 mg Oral BID    carvedilol  3.125 mg Oral Daily    clopidogrel  75 mg Oral Daily    fenofibrate  54 mg Oral Daily    furosemide  20 mg Oral Daily    guaiFENesin  800 mg Oral TID    hydrALAZINE  25 mg Oral BID    montelukast  10 mg Oral Daily    pantoprazole  40 mg Oral QAM AC    potassium chloride  10 mEq Oral BID    sertraline  100 mg Oral Daily    atorvastatin  10 mg Oral Daily    tamsulosin  0.4 mg Oral Daily    sodium chloride flush  5-40 mL IntraVENous 2 times per day    bisacodyl  5 mg Oral Daily    aspirin  81 mg Oral Daily     Continuous Infusions:  PRN Meds:ondansetron, sodium chloride flush, morphine **OR** morphine, oxyCODONE    OBJECTIVE:  BP (!) 153/80   Pulse 79   Temp 98.1 °F (36.7 °C) (Oral)   Resp 18   Ht 1.727 m (5' 8\")   Wt 103.9 kg (229 lb)   SpO2 92%   BMI 34.82 kg/m²   Temp  Av.2 °F (36.8 °C)  Min: 98.1 °F (36.7 °C)  Max: 98.3 °F (36.8 °C)  Constitutional: The patient is awake, alert, and oriented. Lying in bed, in no distress  Skin: Warm and dry. No rashes were noted.   HEENT: Round and reactive pupils.  Moist mucous membranes.  No ulcerations or thrush.  Neck: Supple to movements.   Chest: No use of accessory muscles to breathe. Symmetrical expansion.  No wheezing, crackles or rhonchi.  Cardiovascular: S1 and S2 are rhythmic and regular. No murmurs appreciated.   Abdomen: Positive bowel sounds to auscultation. Benign to palpation. No masses felt.    Extremities: No edema. Right arm dressed in ace.   Lines: Peripheral.    Laboratory and Tests:  Lab Results   Component Value Date    CRP 7.0 (H) 05/07/2024    CRP 8.6 (H) 01/18/2022    CRP 9.7 (H) 01/17/2022     Lab Results   Component Value Date    SEDRATE 11 05/07/2024    SEDRATE 5 07/18/2013       Radiology:  Reviewed     Microbiology:   Surgical culture 5/7: Achromobacter species, Staph epidermidis, Granulicatella adiacens    ASSESSMENT:  Status post right arm medial nerve repair and open carpal tunnel release  Wound dehiscence  Surgical site infection  Status post incision and debridement 5/8/2024    PLAN:  Continue Vancomycin and Cefepime for now  Check final cultures & sensitivities  Monitor labs    JULIAN Marie - CNP  11:39 AM  5/10/2024    Patient seen and examined. I had a face to face encounter with the patient. Agree with exam.  Assessment and plan as outlined above and directed by me. Addition and corrections were done as deemed appropriate. My exam and plan include: The patient is sitting on the couch.  No distress.  Right forearm is wrapped.  Wound cannot be seen.  Cultures growing unusual organisms.  Microbiology was not going to work tomorrow.  Called him and asked him to work them up.  We are still hoping to send him home on oral antibiotics once ID and sensitivities around.    Cuong Bhat MD  5/10/2024  12:54 PM

## 2024-05-10 NOTE — PROGRESS NOTES
Patient up ambulating in the hallway. Tolerated well.    Electronically signed by Nay Youngblood RN on 5/10/2024 at 6:42 PM

## 2024-05-10 NOTE — PATIENT CARE CONFERENCE
[facility] Quality Flow/Interdisciplinary Rounds Progress Note        Quality Flow Rounds held on May 10, 2024    Disciplines Attending:  Bedside Nurse, , , and Nursing Unit Leadership    Clark A Postlethwait was admitted on 5/7/2024  8:47 AM    Anticipated Discharge Date:       Disposition:    Alex Score:  Alex Scale Score: 19    Readmission Risk              Risk of Unplanned Readmission:  27           Discussed patient goal for the day, patient clinical progression, and barriers to discharge.  The following Goal(s) of the Day/Commitment(s) have been identified:  Labs - Report Results      Beth Kee RN  May 10, 2024

## 2024-05-10 NOTE — CARE COORDINATION
Updated plan of care. Patient is from home alone independently. Patient has home O2 from [employer] but does not wear.  Patient states his discharge plan is home with no needs. Patient admitted after carpal tunnel surgery. I&D performed yesterday. ID consulted, awaiting cultures but is hopefull he can be sent on PO antibiotics. Patient does not wish to have HHC. Patient educated if needing IV antibiotics he will have to have HHC set up.   Electronically signed by Jayashree Peterson RN on 5/10/2024 at 1:01 PM

## 2024-05-10 NOTE — PROGRESS NOTES
Department of Orthopedic Surgery  Progress Note    Patient seen and examined. Pain controlled. No new complaints.  Denies chest pain, shortness of breath, dizziness/lightheadedness.     VITALS:  BP (!) 163/70   Pulse 58   Temp 98.3 °F (36.8 °C) (Oral)   Resp 18   Ht 1.727 m (5' 8\")   Wt 103.9 kg (229 lb)   SpO2 93%   BMI 34.82 kg/m²     General: alert and oriented to person, place and time, well-developed and well-nourished, in no acute distress    MUSCULOSKELETAL:   right upper extremity:  Dressing C/D/I -  Compartments soft and compressible  +AIN/PIN/Ulnar/Median/Radial nerve function intact grossly  +2/4 Radial pulse, Cap refill <2 sec  Sensation intact to touch in radial/ulnar/median nerve distributions to hand    CBC:   Lab Results   Component Value Date/Time    WBC 8.0 05/08/2024 07:55 AM    HGB 15.3 05/08/2024 07:55 AM    HCT 48.6 05/08/2024 07:55 AM     05/08/2024 07:55 AM     PT/INR:    Lab Results   Component Value Date/Time    PROTIME 12.4 02/02/2024 07:40 PM    PROTIME 13.0 11/03/2011 09:47 AM    INR 1.1 02/02/2024 07:40 PM       Intraop Cultures: Culture showing rare gram-positive cocci, and gram-negative rods    ASSESSMENT  S/P right forearm irrigation and debridement-5/7/2024    PLAN      Continue physical therapy and protocol: BHARGAVI - ADRIANA  Deep venous thrombosis prophylaxis - asa 325MG bid, early mobilization  Patient is okay to shower as long as he keeps the right splint clean and dry  Final cultures pending, patient changed to vancomycin and cefepime yesterday by ID  ID consulted for antibiotic selection   Pain Control: PO  Monitor H&H  D/C Plan: Discharge planning pending final antibiotic selection      Electronically signed by Tremayne Hernández DO on 5/10/24 at 7:42 AM EDT

## 2024-05-10 NOTE — PROGRESS NOTES
Pharmacy Consultation Note  (Antibiotic Dosing and Monitoring)    Initial consult date: 5/9  Consulting physician/provider: Tommy  Drug: Vancomycin  Indication: Surgical Site Infection    Age/  Gender Height Weight IBW  Allergy Information   73 y.o./male 172.7 cm (5' 8\") 102.1 kg (225 lb)     Ideal body weight: 68.4 kg (150 lb 12.7 oz)  Adjusted ideal body weight: 82.6 kg (182 lb 1.2 oz)   Versed [midazolam]      Renal Function:  Recent Labs     05/08/24  0755 05/10/24  0933   BUN 17 18   CREATININE 1.3* 1.1         Intake/Output Summary (Last 24 hours) at 5/10/2024 1415  Last data filed at 5/10/2024 0917  Gross per 24 hour   Intake 420 ml   Output --   Net 420 ml         Vancomycin Monitoring:  Trough:  No results for input(s): \"VANCOTROUGH\" in the last 72 hours.  Random:  No results for input(s): \"VANCORANDOM\" in the last 72 hours.    Vancomycin Administration Times:  Recent vancomycin administrations                     vancomycin (VANCOCIN) 1,500 mg in sodium chloride 0.9 % 250 mL IVPB (mg) 1,500 mg New Bag 05/10/24 1313    vancomycin (VANCOCIN) 2000 mg in 0.9% sodium chloride 500 mL IVPB (mg) 2,000 mg New Bag 05/09/24 1521                     Assessment:  Patient is a 73 y.o. male who has been initiated on vancomycin  Estimated Creatinine Clearance: 70 mL/min (based on SCr of 1.1 mg/dL).  To dose vancomycin, pharmacy will be utilizing [employer] calculation software for goal AUC/TRACY 400-600 mg/L-hr (predicted AUC/TRACY = 457, Tr =12.2 mcg/mL)    Plan:  Will continue vancomycin 1500 mg IV every 24 hours   Will check vancomycin levels when appropriate  Will continue to monitor renal function   Pharmacy to follow      Halie Rosa RPH 5/10/2024 2:15 PM    MARISOL: [phone]  SEY: [phone]  SJW: [phone]

## 2024-05-11 VITALS
BODY MASS INDEX: 35.03 KG/M2 | DIASTOLIC BLOOD PRESSURE: 86 MMHG | TEMPERATURE: 97.6 F | SYSTOLIC BLOOD PRESSURE: 137 MMHG | WEIGHT: 231.1 LBS | OXYGEN SATURATION: 95 % | HEIGHT: 68 IN | HEART RATE: 67 BPM | RESPIRATION RATE: 18 BRPM

## 2024-05-11 LAB
MICROORGANISM SPEC CULT: ABNORMAL
MICROORGANISM SPEC CULT: NORMAL
MICROORGANISM SPEC CULT: NORMAL
MICROORGANISM/AGENT SPEC: ABNORMAL
MICROORGANISM/AGENT SPEC: NORMAL
MICROORGANISM/AGENT SPEC: NORMAL
SEND OUT REPORT: NORMAL
SPECIMEN DESCRIPTION: ABNORMAL
SPECIMEN DESCRIPTION: NORMAL
SPECIMEN DESCRIPTION: NORMAL
TEST NAME: NORMAL

## 2024-05-11 PROCEDURE — 2580000003 HC RX 258: Performed by: NURSE PRACTITIONER

## 2024-05-11 PROCEDURE — 2580000003 HC RX 258: Performed by: STUDENT IN AN ORGANIZED HEALTH CARE EDUCATION/TRAINING PROGRAM

## 2024-05-11 PROCEDURE — 6370000000 HC RX 637 (ALT 250 FOR IP): Performed by: STUDENT IN AN ORGANIZED HEALTH CARE EDUCATION/TRAINING PROGRAM

## 2024-05-11 PROCEDURE — 6360000002 HC RX W HCPCS: Performed by: NURSE PRACTITIONER

## 2024-05-11 RX ORDER — LINEZOLID 600 MG/1
600 TABLET, FILM COATED ORAL EVERY 12 HOURS SCHEDULED
Status: DISCONTINUED | OUTPATIENT
Start: 2024-05-11 | End: 2024-05-11 | Stop reason: HOSPADM

## 2024-05-11 RX ORDER — SULFAMETHOXAZOLE AND TRIMETHOPRIM 800; 160 MG/1; MG/1
1 TABLET ORAL EVERY 12 HOURS SCHEDULED
Status: DISCONTINUED | OUTPATIENT
Start: 2024-05-11 | End: 2024-05-11 | Stop reason: HOSPADM

## 2024-05-11 RX ORDER — LINEZOLID 600 MG/1
600 TABLET, FILM COATED ORAL EVERY 12 HOURS SCHEDULED
Qty: 14 TABLET | Refills: 0 | Status: SHIPPED | OUTPATIENT
Start: 2024-05-11 | End: 2024-05-18

## 2024-05-11 RX ORDER — SULFAMETHOXAZOLE AND TRIMETHOPRIM 800; 160 MG/1; MG/1
1 TABLET ORAL EVERY 12 HOURS SCHEDULED
Qty: 14 TABLET | Refills: 0 | Status: SHIPPED | OUTPATIENT
Start: 2024-05-11 | End: 2024-05-18

## 2024-05-11 RX ADMIN — BISACODYL 5 MG: 5 TABLET, COATED ORAL at 09:13

## 2024-05-11 RX ADMIN — BUPROPION HYDROCHLORIDE 150 MG: 150 TABLET, FILM COATED, EXTENDED RELEASE ORAL at 09:13

## 2024-05-11 RX ADMIN — TAMSULOSIN HYDROCHLORIDE 0.4 MG: 0.4 CAPSULE ORAL at 09:13

## 2024-05-11 RX ADMIN — SERTRALINE 100 MG: 100 TABLET, FILM COATED ORAL at 09:12

## 2024-05-11 RX ADMIN — CLOPIDOGREL BISULFATE 75 MG: 75 TABLET ORAL at 09:13

## 2024-05-11 RX ADMIN — CEFEPIME 2000 MG: 2 INJECTION, POWDER, FOR SOLUTION INTRAVENOUS at 01:56

## 2024-05-11 RX ADMIN — PANTOPRAZOLE SODIUM 40 MG: 40 TABLET, DELAYED RELEASE ORAL at 06:01

## 2024-05-11 RX ADMIN — SODIUM CHLORIDE, PRESERVATIVE FREE 10 ML: 5 INJECTION INTRAVENOUS at 13:12

## 2024-05-11 RX ADMIN — CARVEDILOL 3.12 MG: 3.12 TABLET, FILM COATED ORAL at 09:13

## 2024-05-11 RX ADMIN — FENOFIBRATE 54 MG: 54 TABLET ORAL at 09:13

## 2024-05-11 RX ADMIN — SODIUM CHLORIDE, PRESERVATIVE FREE 10 ML: 5 INJECTION INTRAVENOUS at 09:13

## 2024-05-11 RX ADMIN — VANCOMYCIN HYDROCHLORIDE 1500 MG: 10 INJECTION, POWDER, LYOPHILIZED, FOR SOLUTION INTRAVENOUS at 13:09

## 2024-05-11 RX ADMIN — HYDRALAZINE HYDROCHLORIDE 25 MG: 25 TABLET ORAL at 09:13

## 2024-05-11 RX ADMIN — FUROSEMIDE 20 MG: 20 TABLET ORAL at 09:13

## 2024-05-11 RX ADMIN — GUAIFENESIN 800 MG: 400 TABLET ORAL at 09:13

## 2024-05-11 RX ADMIN — MONTELUKAST SODIUM 10 MG: 10 TABLET ORAL at 09:13

## 2024-05-11 RX ADMIN — POTASSIUM CHLORIDE 10 MEQ: 750 TABLET, EXTENDED RELEASE ORAL at 09:14

## 2024-05-11 RX ADMIN — ASPIRIN 81 MG: 81 TABLET, COATED ORAL at 09:13

## 2024-05-11 RX ADMIN — OXYCODONE 5 MG: 5 TABLET ORAL at 12:17

## 2024-05-11 ASSESSMENT — PAIN DESCRIPTION - DESCRIPTORS: DESCRIPTORS: ACHING

## 2024-05-11 ASSESSMENT — PAIN DESCRIPTION - LOCATION: LOCATION: ARM

## 2024-05-11 ASSESSMENT — PAIN DESCRIPTION - ORIENTATION: ORIENTATION: RIGHT

## 2024-05-11 ASSESSMENT — PAIN SCALES - GENERAL
PAINLEVEL_OUTOF10: 5
PAINLEVEL_OUTOF10: 3

## 2024-05-11 NOTE — PROGRESS NOTES
[facility]  [facility]  Progress Note    SUBJECTIVE:  Chief Complaint   Patient presents with    Wound Infection     Right forearm. Sent by dr beck.     Patient is tolerating medications. No reported adverse drug reactions.  No nausea, vomiting, diarrhea.  Ambulating in the halls  No new complaints     Review of systems:  As stated above in the chief complaint, otherwise negative.    Medications:  Scheduled Meds:   cefepime  2,000 mg IntraVENous Q12H    vancomycin  1,500 mg IntraVENous Q24H    buPROPion  150 mg Oral BID    carvedilol  3.125 mg Oral Daily    clopidogrel  75 mg Oral Daily    fenofibrate  54 mg Oral Daily    furosemide  20 mg Oral Daily    guaiFENesin  800 mg Oral TID    hydrALAZINE  25 mg Oral BID    montelukast  10 mg Oral Daily    pantoprazole  40 mg Oral QAM AC    potassium chloride  10 mEq Oral BID    sertraline  100 mg Oral Daily    atorvastatin  10 mg Oral Daily    tamsulosin  0.4 mg Oral Daily    sodium chloride flush  5-40 mL IntraVENous 2 times per day    bisacodyl  5 mg Oral Daily    aspirin  81 mg Oral Daily     Continuous Infusions:  PRN Meds:ondansetron, sodium chloride flush, morphine **OR** morphine, oxyCODONE    OBJECTIVE:  /86   Pulse 67   Temp 97.6 °F (36.4 °C) (Oral)   Resp 18   Ht 1.727 m (5' 8\")   Wt 104.8 kg (231 lb 1.6 oz)   SpO2 95%   BMI 35.14 kg/m²   Temp  Av.6 °F (36.4 °C)  Min: 97.5 °F (36.4 °C)  Max: 97.6 °F (36.4 °C)  Constitutional: The patient is awake, alert, and oriented. Ambulating in the angulo ways   Skin: Warm and dry. No rashes were noted.   HEENT: Round and reactive pupils.  Moist mucous membranes.  No ulcerations or thrush.  Neck: Supple to movements.   Chest: No use of accessory muscles to breathe. Symmetrical expansion.  No wheezing, crackles or rhonchi.  Cardiovascular: S1 and S2 are rhythmic and regular. No murmurs appreciated.   Abdomen: Positive bowel sounds to auscultation. Benign to palpation. No masses  felt.   Extremities: No edema. Right arm dressed in ace.   Lines: Peripheral.    Laboratory and Tests:  Lab Results   Component Value Date    CRP 7.0 (H) 05/07/2024    CRP 8.6 (H) 01/18/2022    CRP 9.7 (H) 01/17/2022     Lab Results   Component Value Date    SEDRATE 11 05/07/2024    SEDRATE 5 07/18/2013       Radiology:  Reviewed     Microbiology:   Surgical culture 5/7: Achromobacter species, Staph epidermidis, Granulicatella adiacens    ASSESSMENT:  Status post right arm medial nerve repair and open carpal tunnel release  Wound dehiscence  Surgical site infection  Status post incision and debridement 5/8/2024    PLAN:  Consolidate antibiotics to Linezolid and Bactrim x 7 days  Labs and cultures reviewed  Med rec complete from ID standpoint - ok to discahrge    Janelle Kay, APRN - CNP  12:39 PM  5/11/2024  Pt seen and examined. Above discussed agree with advanced practice nurse. Labs, cultures, and radiographs reviewed.  Face to Face encounter occurred. Changes made as necessary.     Brian Lawrence MD

## 2024-05-11 NOTE — PROGRESS NOTES
Pharmacy Consultation Note  (Antibiotic Dosing and Monitoring)    Initial consult date: 5/9  Consulting physician/provider: Tommy  Drug: Vancomycin  Indication: Surgical Site Infection    Age/  Gender Height Weight IBW  Allergy Information   73 y.o./male 172.7 cm (5' 8\") 102.1 kg (225 lb)     Ideal body weight: 68.4 kg (150 lb 12.7 oz)  Adjusted ideal body weight: 83 kg (182 lb 14.7 oz)   Versed [midazolam]      Renal Function:  Recent Labs     05/10/24  0933   BUN 18   CREATININE 1.1         Intake/Output Summary (Last 24 hours) at 5/11/2024 1022  Last data filed at 5/10/2024 1230  Gross per 24 hour   Intake 240 ml   Output --   Net 240 ml         Vancomycin Monitoring:  Trough:  No results for input(s): \"VANCOTROUGH\" in the last 72 hours.  Random:  No results for input(s): \"VANCORANDOM\" in the last 72 hours.        Assessment:  Patient is a 73 y.o. male who has been initiated on vancomycin  Estimated Creatinine Clearance: 70 mL/min (based on SCr of 1.1 mg/dL).    Plan:  Will continue vancomycin 1500 mg IV every 24 hours  Vancomycin level ordered with morning labs on 5/12.  Will assess vancomycin level on 5/12 to determine future vancomycin dosing plans.   Will continue to monitor renal function   Pharmacy to follow      Travis Gabriel RPH 5/11/2024 10:22 AM    SEB: [phone]  SEY: [phone]  SJW: [phone]

## 2024-05-11 NOTE — PLAN OF CARE
Problem: Safety - Adult  Goal: Free from fall injury  5/11/2024 1136 by Linda Desai RN  Outcome: Progressing  5/10/2024 2319 by Maeve Montgomery  Outcome: Progressing  5/10/2024 2318 by Maeve Montgomery  Outcome: Progressing  5/10/2024 2313 by Maeve Montgomery  Outcome: Progressing     Problem: ABCDS Injury Assessment  Goal: Absence of physical injury  5/11/2024 1136 by Linda Desai RN  Outcome: Progressing  5/10/2024 2319 by Maeve Montgomery  Outcome: Progressing  5/10/2024 2318 by Maeve Montgomery  Outcome: Progressing  5/10/2024 2313 by Maeve Montgomery  Outcome: Progressing     Problem: Chronic Conditions and Co-morbidities  Goal: Patient's chronic conditions and co-morbidity symptoms are monitored and maintained or improved  5/11/2024 1136 by Linda Desai RN  Outcome: Progressing  5/10/2024 2319 by Maeve Montgomery  Outcome: Progressing  5/10/2024 2318 by Maeve Montgomery  Outcome: Progressing  5/10/2024 2313 by Maeve Montgomery  Outcome: Progressing     Problem: Discharge Planning  Goal: Discharge to home or other facility with appropriate resources  5/11/2024 1136 by Linda Desai RN  Outcome: Progressing  5/10/2024 2319 by Maeve Montgomery  Outcome: Progressing  5/10/2024 2318 by Maeve Montgomery  Outcome: Progressing  5/10/2024 2313 by Maeve Montgomery  Outcome: Progressing     Problem: Pain  Goal: Verbalizes/displays adequate comfort level or baseline comfort level  5/11/2024 1136 by Linda Desai RN  Outcome: Progressing  5/10/2024 2319 by Maeve Montgomery  Outcome: Progressing  5/10/2024 2318 by Maeve Montgomery  Outcome: Progressing  5/10/2024 2313 by Maeve Montgomery  Outcome: Progressing

## 2024-05-11 NOTE — PLAN OF CARE
Problem: Safety - Adult  Goal: Free from fall injury  5/10/2024 2319 by Maeve Montgomery  Outcome: Progressing  5/10/2024 2318 by Maeve Montgomery  Outcome: Progressing  5/10/2024 2313 by Maeve Montgomery  Outcome: Progressing  5/10/2024 1245 by Nay Youngblood, RN  Outcome: Progressing  5/10/2024 1237 by Nay Youngblood, RN  Outcome: Progressing     Problem: ABCDS Injury Assessment  Goal: Absence of physical injury  5/10/2024 2319 by Maeve Montgomery  Outcome: Progressing  5/10/2024 2318 by Maeve Montgomery  Outcome: Progressing  5/10/2024 2313 by Maeve Montgomery  Outcome: Progressing  5/10/2024 1245 by Nay Youngblood, RN  Outcome: Progressing  5/10/2024 1237 by Nay Youngblood, RN  Outcome: Progressing     Problem: Chronic Conditions and Co-morbidities  Goal: Patient's chronic conditions and co-morbidity symptoms are monitored and maintained or improved  5/10/2024 2319 by Maeve Montgomery  Outcome: Progressing  5/10/2024 2318 by Maeve Montgomery  Outcome: Progressing  5/10/2024 2313 by Maeve Montgomery  Outcome: Progressing  5/10/2024 1245 by Nay Youngblood, RN  Outcome: Progressing  5/10/2024 1237 by Nay Youngblood RN  Outcome: Progressing     Problem: Discharge Planning  Goal: Discharge to home or other facility with appropriate resources  5/10/2024 2319 by Maeve Montgomery  Outcome: Progressing  5/10/2024 2318 by Maeve Montgomery  Outcome: Progressing  5/10/2024 2313 by Maeve Montgomery  Outcome: Progressing  5/10/2024 1245 by Nay Youngblood, RN  Outcome: Progressing  5/10/2024 1237 by Nay Youngblood, RN  Outcome: Progressing     Problem: Pain  Goal: Verbalizes/displays adequate comfort level or baseline comfort level  5/10/2024 2319 by Maeve Montgomery  Outcome: Progressing  5/10/2024 2318 by Maeve Montgomery  Outcome: Progressing  5/10/2024 2313 by Maeve Montgomery  Outcome: Progressing  5/10/2024 1245 by Nay Youngblood, RN  Outcome: Progressing  5/10/2024 1237 by Nay Youngblood, RN  Outcome: Progressing

## 2024-05-11 NOTE — PLAN OF CARE
Problem: Safety - Adult  Goal: Free from fall injury  5/10/2024 2313 by Maeve Montgomery  Outcome: Progressing  5/10/2024 1245 by Nay Youngblood RN  Outcome: Progressing  5/10/2024 1237 by Nay Youngblood RN  Outcome: Progressing     Problem: ABCDS Injury Assessment  Goal: Absence of physical injury  5/10/2024 2313 by Maeve Montgomery  Outcome: Progressing  5/10/2024 1245 by Nay Youngblood, RN  Outcome: Progressing  5/10/2024 1237 by Nay Youngblood RN  Outcome: Progressing     Problem: Chronic Conditions and Co-morbidities  Goal: Patient's chronic conditions and co-morbidity symptoms are monitored and maintained or improved  5/10/2024 2313 by Maeve Montgomery  Outcome: Progressing  5/10/2024 1245 by Nay Youngblood, RN  Outcome: Progressing  5/10/2024 1237 by Nay Youngblood RN  Outcome: Progressing     Problem: Discharge Planning  Goal: Discharge to home or other facility with appropriate resources  5/10/2024 2313 by Maeve Montgomery  Outcome: Progressing  5/10/2024 1245 by Nay Youngblood RN  Outcome: Progressing  5/10/2024 1237 by Nay Youngblood RN  Outcome: Progressing     Problem: Pain  Goal: Verbalizes/displays adequate comfort level or baseline comfort level  5/10/2024 2313 by Maeve Montgomery  Outcome: Progressing  5/10/2024 1245 by Nay Youngblood, RN  Outcome: Progressing  5/10/2024 1237 by Nay Youngblood RN  Outcome: Progressing

## 2024-05-11 NOTE — PLAN OF CARE
Problem: Safety - Adult  Goal: Free from fall injury  5/10/2024 2318 by Maeve Montgomery  Outcome: Progressing  5/10/2024 2313 by Maeve Montgomery  Outcome: Progressing  5/10/2024 1245 by Nay Youngblood RN  Outcome: Progressing  5/10/2024 1237 by Nay Youngblood, RN  Outcome: Progressing     Problem: ABCDS Injury Assessment  Goal: Absence of physical injury  5/10/2024 2318 by Maeve Montgomery  Outcome: Progressing  5/10/2024 2313 by Maeve Montgomery  Outcome: Progressing  5/10/2024 1245 by Nay Youngblood, RN  Outcome: Progressing  5/10/2024 1237 by Nay Youngblood RN  Outcome: Progressing     Problem: Chronic Conditions and Co-morbidities  Goal: Patient's chronic conditions and co-morbidity symptoms are monitored and maintained or improved  5/10/2024 2318 by Maeve Montgomery  Outcome: Progressing  5/10/2024 2313 by Maeve Montgomery  Outcome: Progressing  5/10/2024 1245 by Nay Youngblood, RN  Outcome: Progressing  5/10/2024 1237 by Nay Youngblood, RN  Outcome: Progressing     Problem: Discharge Planning  Goal: Discharge to home or other facility with appropriate resources  5/10/2024 2318 by Maeve Montgomery  Outcome: Progressing  5/10/2024 2313 by Maeve Montgomery  Outcome: Progressing  5/10/2024 1245 by Nay Youngblood, RN  Outcome: Progressing  5/10/2024 1237 by Nay Youngblood, RN  Outcome: Progressing     Problem: Pain  Goal: Verbalizes/displays adequate comfort level or baseline comfort level  5/10/2024 2318 by Maeve Montgomery  Outcome: Progressing  5/10/2024 2313 by Maeve Montgomery  Outcome: Progressing  5/10/2024 1245 by Nay Youngblood, RN  Outcome: Progressing  5/10/2024 1237 by Nay Youngblood RN  Outcome: Progressing

## 2024-05-11 NOTE — PROGRESS NOTES
Department of Orthopedic Surgery  Progress Note    Patient was seen and examined. Overall doing well. Denies any pain, fever, chills, chest pain, or shortness of breath. No numbness or tingling. No other associated symptoms.      VITALS:  /86   Pulse 67   Temp 97.6 °F (36.4 °C) (Oral)   Resp 18   Ht 1.727 m (5' 8\")   Wt 104.8 kg (231 lb 1.6 oz)   SpO2 95%   BMI 35.14 kg/m²     General: alert and oriented to person, place and time, well-developed and well-nourished, in no acute distress    MUSCULOSKELETAL:   right upper extremity:  Dressing C/D/I -  Compartments soft and compressible  +AIN/PIN/Ulnar/Median/Radial nerve function intact grossly  +2/4 Radial pulse, Cap refill <2 sec  Sensation intact to touch in radial/ulnar/median nerve distributions to hand    CBC:   Lab Results   Component Value Date/Time    WBC 5.6 05/10/2024 09:33 AM    HGB 14.8 05/10/2024 09:33 AM    HCT 47.2 05/10/2024 09:33 AM     05/10/2024 09:33 AM     PT/INR:    Lab Results   Component Value Date/Time    PROTIME 12.4 02/02/2024 07:40 PM    PROTIME 13.0 11/03/2011 09:47 AM    INR 1.1 02/02/2024 07:40 PM       Intraop Cultures: Culture showing Achromobacter species, Staph epidermidis, Granulicatella adiacens    ASSESSMENT  S/P right forearm irrigation and debridement-5/7/2024    PLAN      Continue physical therapy and protocol: BHARGAVI - ADRIANA  Deep venous thrombosis prophylaxis - asa 325MG bid, early mobilization  Patient is okay to shower as long as he keeps the right splint clean and dry  See above for culture results  ID consulted for antibiotic selection   Pain Control: PO  Monitor H&H  D/C Plan: Discharge to home. ID will finalize antibiotics

## 2024-05-13 ENCOUNTER — CARE COORDINATION (OUTPATIENT)
Dept: CARE COORDINATION | Age: 74
End: 2024-05-13

## 2024-05-13 NOTE — CARE COORDINATION
Care Transitions Initial Follow Up Call    Call within 2 business days of discharge: Yes    Patient: Clark ZURITA Postlethwait Patient : 1950   MRN: [id number]  Reason for Admission: Wound Dehiscence  Discharge Date: 24 RARS: Readmission Risk Score: 13.5      Last Discharge Facility       Date Complaint Diagnosis Description Type Department Provider    24 Wound Infection Dehiscence of operative wound, subsequent encounter ... ED to Hosp-Admission (Discharged) (ADMITTED) [facility]W Tremayne Hernández, DO; Allison Chau...          Was this an external facility discharge? No Discharge Facility: [facility]    Attempted to contact patient today 24 for initial CLAUDIA/hospital discharge follow up. Left message on mobile number requesting a return call back to CTN and provided contact information.     Called home number on record and patient DTR (Nathalie) answers phone saying patient is not available. Nathalie not listed on most recent communication release dated 24. CTN will continue with patient outreach.    Kasandra Flores, APRN

## 2024-05-14 ENCOUNTER — CARE COORDINATION (OUTPATIENT)
Dept: CARE COORDINATION | Age: 74
End: 2024-05-14

## 2024-05-14 DIAGNOSIS — T81.30XA WOUND DEHISCENCE: Primary | ICD-10-CM

## 2024-05-14 PROCEDURE — 1111F DSCHRG MED/CURRENT MED MERGE: CPT | Performed by: FAMILY MEDICINE

## 2024-05-14 NOTE — CARE COORDINATION
Care Transitions Initial Follow Up Call    Call within 2 business days of discharge: Yes    Patient Current Location:  Home: [facility] [address]  [place] [address]    Care Transition Nurse contacted the patient by telephone to perform post hospital discharge assessment. Verified name and  with patient as identifiers. Provided introduction to self, and explanation of the Care Transition Nurse role.     Patient: Clark ZURITA Postlethwait Patient : 1950   MRN: [id number]  Reason for Admission: Wound dehisence  Discharge Date: 24 RARS: Readmission Risk Score: 13.5      Last Discharge Facility       Date Complaint Diagnosis Description Type Department Provider    24 Wound Infection Dehiscence of operative wound, subsequent encounter ... ED to Hosp-Admission (Discharged) (ADMITTED) [facility]W Tremayne Hernández DO; Allison Chau...          Was this an external facility discharge? No Discharge Facility: [facility]    Challenges to be reviewed by the provider   Additional needs identified to be addressed with provider: No  none               Method of communication with provider: none.    Spoke with patient today 24 for initial CLAUDIA/hospital discharge (low risk) follow up.     Confirmed patient recently had a right medial nerve repair and radial sensory nerve repair with allograft, open carpal tunnel release, FPL tendon repair, and FCR tendon repair on 24. Patient noticed wound \"opened up\" over the weekend and sough medical attention and was admitted undergoing a s/p right forearm irrigation and debridement with wound closure.     Patient states he is doing well since discharge and offers no complaints. Denies any pain at right arm. States dressing is dry. Denies any worsening shortness of breath from baseline (COPD/Smoker), chest pain, chest discomfort, nausea, vomiting, diarrhea, chills or fever.     Patient states he smokes off/on and not interested in smoking cessation. Patient states \" I go in spurts\".

## 2024-05-14 NOTE — PROGRESS NOTES
Physician Progress Note      PATIENT:               ABRAHAM GALICIA #:                  [id number]  :                       1950  ADMIT DATE:       2024 8:47 AM  DISCH DATE:        2024 4:31 PM  RESPONDING  PROVIDER #:        Ramana Chau DO        QUERY TEXT:    Stage of Chronic Kidney Disease: Please provide further specificity, if known.    Clinical indicators include: ckd (chronic kidney disease, ckd  Options provided:  -- Chronic kidney disease stage 1  -- Chronic kidney disease stage 2  -- Chronic kidney disease stage 3  -- Chronic kidney disease stage 3a  -- Chronic kidney disease stage 3b  -- Chronic kidney disease stage 4  -- Chronic kidney disease stage 5  -- Chronic kidney disease stage 5, requiring dialysis  -- End stage renal disease  -- Other - I will add my own diagnosis  -- Disagree - Not applicable / Not valid  -- Disagree - Clinically Unable to determine / Unknown        PROVIDER RESPONSE TEXT:    Provider dismissed this query because it was not applicable to the patient or   not a valid query.      Electronically signed by:  Ramana Chau DO 2024 8:28 PM

## 2024-05-16 LAB
MICROORGANISM SPEC CULT: NORMAL
MICROORGANISM/AGENT SPEC: NORMAL
SEND OUT REPORT: NORMAL
SPECIMEN DESCRIPTION: NORMAL
TEST NAME: NORMAL

## 2024-05-19 LAB
MICROORGANISM SPEC CULT: NORMAL
MICROORGANISM/AGENT SPEC: NORMAL
SPECIMEN DESCRIPTION: NORMAL

## 2024-05-22 ENCOUNTER — OFFICE VISIT (OUTPATIENT)
Dept: FAMILY MEDICINE CLINIC | Age: 74
End: 2024-05-22
Payer: MEDICARE

## 2024-05-22 VITALS
RESPIRATION RATE: 16 BRPM | WEIGHT: 222.2 LBS | HEIGHT: 68 IN | HEART RATE: 87 BPM | BODY MASS INDEX: 33.68 KG/M2 | SYSTOLIC BLOOD PRESSURE: 94 MMHG | DIASTOLIC BLOOD PRESSURE: 58 MMHG | OXYGEN SATURATION: 92 % | TEMPERATURE: 97.2 F

## 2024-05-22 DIAGNOSIS — M25.511 CHRONIC RIGHT SHOULDER PAIN: ICD-10-CM

## 2024-05-22 DIAGNOSIS — F33.0 MAJOR DEPRESSIVE DISORDER, RECURRENT, MILD (HCC): ICD-10-CM

## 2024-05-22 DIAGNOSIS — A09 DIARRHEA OF INFECTIOUS ORIGIN: Primary | ICD-10-CM

## 2024-05-22 DIAGNOSIS — Z72.0 TOBACCO ABUSE DISORDER: ICD-10-CM

## 2024-05-22 DIAGNOSIS — I73.9: ICD-10-CM

## 2024-05-22 DIAGNOSIS — G89.29 CHRONIC RIGHT SHOULDER PAIN: ICD-10-CM

## 2024-05-22 DIAGNOSIS — F11.99 OPIOID USE, UNSPECIFIED WITH UNSPECIFIED OPIOID-INDUCED DISORDER (HCC): ICD-10-CM

## 2024-05-22 PROCEDURE — 99214 OFFICE O/P EST MOD 30 MIN: CPT | Performed by: FAMILY MEDICINE

## 2024-05-22 PROCEDURE — 1123F ACP DISCUSS/DSCN MKR DOCD: CPT | Performed by: FAMILY MEDICINE

## 2024-05-22 RX ORDER — HYDROCODONE BITARTRATE AND ACETAMINOPHEN 7.5; 325 MG/1; MG/1
1 TABLET ORAL EVERY 6 HOURS PRN
Qty: 90 TABLET | Refills: 0 | Status: SHIPPED | OUTPATIENT
Start: 2024-05-22 | End: 2024-06-21

## 2024-05-22 RX ORDER — VARENICLINE TARTRATE 1 MG/1
1 TABLET, FILM COATED ORAL 2 TIMES DAILY
Qty: 60 TABLET | Refills: 0 | Status: SHIPPED | OUTPATIENT
Start: 2024-05-22

## 2024-05-22 RX ORDER — METRONIDAZOLE 250 MG/1
250 TABLET ORAL 2 TIMES DAILY
Qty: 10 TABLET | Refills: 0 | Status: SHIPPED | OUTPATIENT
Start: 2024-05-22 | End: 2024-05-27

## 2024-05-22 ASSESSMENT — ENCOUNTER SYMPTOMS
VOMITING: 0
CONSTIPATION: 0
DIARRHEA: 0
BLOOD IN STOOL: 0
WHEEZING: 0
NAUSEA: 0
COUGH: 0
SHORTNESS OF BREATH: 0
ABDOMINAL PAIN: 0

## 2024-05-22 ASSESSMENT — PATIENT HEALTH QUESTIONNAIRE - PHQ9: DEPRESSION UNABLE TO ASSESS: PT REFUSES

## 2024-05-22 NOTE — PROGRESS NOTES
Clark Maza (:  1950) is a 73 y.o. male,Established patient, here for evaluation of the following chief complaint(s):  Mouth Lesions (X1 week), Diarrhea (X4 days requesting medication ), Pain (Medication refill ), and Nicotine Dependence (Requesting chantix )      Assessment & Plan   ASSESSMENT/PLAN:  1. Diarrhea of infectious origin  -     metroNIDAZOLE (FLAGYL) 250 MG tablet; Take 1 tablet by mouth in the morning and at bedtime for 5 days, Disp-10 tablet, R-0Normal  2. Chronic right shoulder pain  -     HYDROcodone-acetaminophen (NORCO) 7.5-325 MG per tablet; Take 1 tablet by mouth every 6 hours as needed for Pain for up to 30 days. Max Daily Amount: 4 tablets, Disp-90 tablet, R-0Normal  3. Opioid use, unspecified with unspecified opioid-induced disorder (HCC)  4. Major depressive disorder, recurrent, mild (HCC)  5. Gluteal claudication (HCC)  Controlled substances monitoring: no signs of potential drug abuse or diversion identified and OARRS report reviewed today- activity consistent with treatment plan.     No follow-ups on file.         Subjective   SUBJECTIVE/OBJECTIVE:  Mouth Lesions (X1 week), Diarrhea (X4 days requesting medication ), Pain (Medication refill ), and Nicotine Dependence (Requesting chantix )  Diarrhea and sores on mouth since taking antibiotics for forearm laceration        Review of Systems   Constitutional:  Negative for chills, diaphoresis and fever.   HENT:  Negative for ear discharge, ear pain, hearing loss, nosebleeds and tinnitus.    Respiratory:  Negative for cough, shortness of breath and wheezing.    Cardiovascular:  Negative for chest pain.   Gastrointestinal:  Negative for abdominal pain, blood in stool, constipation, diarrhea, nausea and vomiting.   Genitourinary:  Negative for dysuria, flank pain and hematuria.   Musculoskeletal:  Negative for myalgias.   Skin:  Negative for rash.   Neurological:  Negative for headaches.   Hematological:  Does not

## 2024-05-22 NOTE — TELEPHONE ENCOUNTER
Requested Prescriptions     Pending Prescriptions Disp Refills    varenicline (CHANTIX) 1 MG tablet 60 tablet 0     Sig: Take 1 tablet by mouth 2 times daily       Next appt is 6/20/2024  Last appt was 5/22/2024

## 2024-05-23 ENCOUNTER — CARE COORDINATION (OUTPATIENT)
Dept: CARE COORDINATION | Age: 74
End: 2024-05-23

## 2024-05-23 LAB
MICROORGANISM SPEC CULT: NORMAL
MICROORGANISM/AGENT SPEC: NORMAL
SPECIMEN DESCRIPTION: NORMAL

## 2024-05-23 NOTE — CARE COORDINATION
Care Transitions Follow Up Call    Patient Current Location:  Home: [address] [address]    Care Transition Nurse contacted the patient by telephone to follow up after admission on 24.  Verified name and  with patient as identifiers.    Patient: Clark ZURITA Postlethwait  Patient : 1950   MRN: [id number]  Reason for Admission: Wound Dehiscence  Discharge Date: 24 RARS: Readmission Risk Score: 13.5      Needs to be reviewed by the provider   Additional needs identified to be addressed with provider: No  none             Method of communication with provider: none.    Spoke with patient today 24 for final CLAUDIA/hospital discharge (low risk) follow up.     CTN confirmed with patient he completed HFU appt yesterday with Dr. Jama (PCP) yesterday and started on Flagyl 250 mg twice daily for 5 days for ATB associated diarrhea. Patient states he is finished with Zyvox/Bactrim DS that was ordered on discharge. States diarrhea is improving since starting Flagyl and is aware to take as directed until finished.     Patient denies any shortness of breath, chest pain, abdominal pain, nausea, vomiting, chills or fever.     Patient is aware to drink plenty of fluids to stay hydrated while having diarrhea.     Confirmed with patient he completed f/u with Dr. Hernández (ortho) earlier this week and was advised right forearm is doing good as he is s/p irrigation and debridement with wound closure to right forearm.     Patient states dressing to right forearm is dry. Denies any pain to right forearm. Patient states DTR will call to schedule f/u appt with Dr. Lawrence (ID).     Patient denies any complaints or needs and in agreement to final call. CTN signing off.     Addressed changes since last contact:   Completed ortho f/u on 24 and PCP on 24.   Discussed follow-up appointments. If no appointment was previously scheduled, appointment scheduling offered: Yes.   Is follow up appointment

## 2024-05-26 LAB
MICROORGANISM SPEC CULT: NORMAL
MICROORGANISM/AGENT SPEC: NORMAL
SPECIMEN DESCRIPTION: NORMAL

## 2024-05-29 ENCOUNTER — TREATMENT (OUTPATIENT)
Dept: OCCUPATIONAL THERAPY | Age: 74
End: 2024-05-29

## 2024-05-29 DIAGNOSIS — S51.809A UNSPECIFIED OPEN WOUND OF UNSPECIFIED FOREARM, INITIAL ENCOUNTER: ICD-10-CM

## 2024-05-29 DIAGNOSIS — S44.50XA: Primary | ICD-10-CM

## 2024-05-29 DIAGNOSIS — S54.10XA: ICD-10-CM

## 2024-05-29 DIAGNOSIS — S56.229A: ICD-10-CM

## 2024-05-29 NOTE — PROGRESS NOTES
rebuilding his grandsons truck for a project. He enjoys riding motorcycle on long rides. The motorcycle has 3 wheels and modified clutch.     ADL STATUS: Pt lives alone and has learned to modify tasks PRN to complete on his own.    Pain Level: 3-7/10 with light wrist/ hand AROM    UE Assessment: RHD    Thumb AROM- R  5-29-24    IP  0-50*    MP  0-7*    Palmar ABD  0-47*/ feels like his normal    Radial extension  0-44*/ tight in the web space       AROM in remaining digits  WNL         Gentle Wrist AROM- R 5-29-24    Flexion  0-55* (3/10 pain)    extension  0-30* (8/10 pain)    RD  0-15* - no pain    UD  0-25* (7/10 pain)       Supination  Full    Pronation  Full     Sensation: Severe numbness and tingling continues in the thumb, IF.  Sensation in the  MF is starting to improve.     Edema Description/Circumferential Measurements: Mild swelling is observed mid forearm and across the R wrist.      Will assess when appropriate per protocol.   Dynamometer (setting 2) IE       Left NT       Right NT       Pinch (lateral)         Left NT       Right NT       Pinch (tripod)         Left NT       Right NT          9 Hole Peg test  IE       Left NT       Right  NT          QuickDash  IE  5-29-24      Disability Score 63.6%  45.5%         Intervention: Precautions regarding transition into light AROM discussed. The importance of avoiding resistive hand use and intense AROM/ PROM emphasized. Pt is still doing passive flexion/ active protective extension for the thumb and wrist. Pt is encouraged to continue with the addition of light tenodesis ROM in a pain free range. New exercise was practiced and pt was provided a handout. Pt is encourage to continue brace use  with removal for gentle AROM every 2 hours as tolerated. Pt voiced understanding. Will progress as tolerated per protocol.      Eval Complexity: Moderate Complexity  Profile and History- in-depth review of chart and history  Assessment of Occupational Performance and

## 2024-05-30 LAB
MICROORGANISM SPEC CULT: NORMAL
MICROORGANISM/AGENT SPEC: NORMAL
SPECIMEN DESCRIPTION: NORMAL

## 2024-06-02 LAB
MICROORGANISM SPEC CULT: NORMAL
MICROORGANISM/AGENT SPEC: NORMAL
SPECIMEN DESCRIPTION: NORMAL

## 2024-06-03 ENCOUNTER — TELEPHONE (OUTPATIENT)
Dept: OCCUPATIONAL THERAPY | Age: 74
End: 2024-06-03

## 2024-06-03 NOTE — TELEPHONE ENCOUNTER
Patient cancelled OT for today. No reason given. Pt declined to reschedule the appt.  Will see at next scheduled appointment.

## 2024-06-06 LAB
MICROORGANISM SPEC CULT: NORMAL
MICROORGANISM SPEC CULT: NORMAL
MICROORGANISM/AGENT SPEC: NORMAL
MICROORGANISM/AGENT SPEC: NORMAL
SPECIMEN DESCRIPTION: NORMAL
SPECIMEN DESCRIPTION: NORMAL

## 2024-06-07 ENCOUNTER — TELEPHONE (OUTPATIENT)
Dept: OCCUPATIONAL THERAPY | Age: 74
End: 2024-06-07

## 2024-06-07 NOTE — TELEPHONE ENCOUNTER
Patient left a VM pt for a  late cancellation for treatment today. No reason was provided. Will place on hold due to 2 consecutive cancellations.

## 2024-06-13 LAB
MICROORGANISM/AGENT SPEC: NORMAL
SPECIMEN DESCRIPTION: NORMAL

## 2024-06-20 LAB
MICROORGANISM SPEC CULT: NORMAL
MICROORGANISM/AGENT SPEC: NORMAL
SPECIMEN DESCRIPTION: NORMAL

## 2024-06-25 RX ORDER — POTASSIUM CHLORIDE 750 MG/1
10 TABLET, EXTENDED RELEASE ORAL 2 TIMES DAILY
Qty: 180 TABLET | Refills: 1 | Status: SHIPPED | OUTPATIENT
Start: 2024-06-25

## 2024-06-25 RX ORDER — TAMSULOSIN HYDROCHLORIDE 0.4 MG/1
CAPSULE ORAL
Qty: 90 CAPSULE | Refills: 1 | Status: SHIPPED | OUTPATIENT
Start: 2024-06-25

## 2024-06-26 ENCOUNTER — OFFICE VISIT (OUTPATIENT)
Dept: FAMILY MEDICINE CLINIC | Age: 74
End: 2024-06-26

## 2024-06-26 VITALS
SYSTOLIC BLOOD PRESSURE: 110 MMHG | WEIGHT: 221 LBS | HEART RATE: 86 BPM | DIASTOLIC BLOOD PRESSURE: 64 MMHG | HEIGHT: 68 IN | RESPIRATION RATE: 16 BRPM | BODY MASS INDEX: 33.49 KG/M2 | OXYGEN SATURATION: 94 % | TEMPERATURE: 97.4 F

## 2024-06-26 DIAGNOSIS — G89.29 CHRONIC RIGHT SHOULDER PAIN: ICD-10-CM

## 2024-06-26 DIAGNOSIS — J44.9 CHRONIC OBSTRUCTIVE PULMONARY DISEASE, UNSPECIFIED COPD TYPE (HCC): Primary | ICD-10-CM

## 2024-06-26 DIAGNOSIS — M25.811 IMPINGEMENT OF RIGHT SHOULDER: ICD-10-CM

## 2024-06-26 DIAGNOSIS — M25.511 CHRONIC RIGHT SHOULDER PAIN: ICD-10-CM

## 2024-06-26 RX ORDER — FLUTICASONE FUROATE, UMECLIDINIUM BROMIDE AND VILANTEROL TRIFENATATE 100; 62.5; 25 UG/1; UG/1; UG/1
1 POWDER RESPIRATORY (INHALATION) EVERY MORNING
Qty: 2 EACH | Refills: 2 | Status: SHIPPED | OUTPATIENT
Start: 2024-06-26

## 2024-06-26 RX ORDER — TRIAMCINOLONE ACETONIDE 40 MG/ML
40 INJECTION, SUSPENSION INTRA-ARTICULAR; INTRAMUSCULAR ONCE
Status: COMPLETED | OUTPATIENT
Start: 2024-06-26 | End: 2024-06-26

## 2024-06-26 RX ADMIN — TRIAMCINOLONE ACETONIDE 40 MG: 40 INJECTION, SUSPENSION INTRA-ARTICULAR; INTRAMUSCULAR at 12:22

## 2024-06-26 ASSESSMENT — ENCOUNTER SYMPTOMS
DIARRHEA: 0
VOMITING: 0
BLOOD IN STOOL: 0
CONSTIPATION: 0
NAUSEA: 0
ABDOMINAL PAIN: 0
WHEEZING: 0
COUGH: 0
SHORTNESS OF BREATH: 0

## 2024-06-26 ASSESSMENT — PATIENT HEALTH QUESTIONNAIRE - PHQ9
8. MOVING OR SPEAKING SO SLOWLY THAT OTHER PEOPLE COULD HAVE NOTICED. OR THE OPPOSITE, BEING SO FIGETY OR RESTLESS THAT YOU HAVE BEEN MOVING AROUND A LOT MORE THAN USUAL: MORE THAN HALF THE DAYS
1. LITTLE INTEREST OR PLEASURE IN DOING THINGS: NEARLY EVERY DAY
SUM OF ALL RESPONSES TO PHQ QUESTIONS 1-9: 17
SUM OF ALL RESPONSES TO PHQ QUESTIONS 1-9: 17
6. FEELING BAD ABOUT YOURSELF - OR THAT YOU ARE A FAILURE OR HAVE LET YOURSELF OR YOUR FAMILY DOWN: NOT AT ALL
3. TROUBLE FALLING OR STAYING ASLEEP: MORE THAN HALF THE DAYS
5. POOR APPETITE OR OVEREATING: MORE THAN HALF THE DAYS
4. FEELING TIRED OR HAVING LITTLE ENERGY: NEARLY EVERY DAY
2. FEELING DOWN, DEPRESSED OR HOPELESS: NEARLY EVERY DAY
SUM OF ALL RESPONSES TO PHQ QUESTIONS 1-9: 17
7. TROUBLE CONCENTRATING ON THINGS, SUCH AS READING THE NEWSPAPER OR WATCHING TELEVISION: MORE THAN HALF THE DAYS
10. IF YOU CHECKED OFF ANY PROBLEMS, HOW DIFFICULT HAVE THESE PROBLEMS MADE IT FOR YOU TO DO YOUR WORK, TAKE CARE OF THINGS AT HOME, OR GET ALONG WITH OTHER PEOPLE: VERY DIFFICULT
SUM OF ALL RESPONSES TO PHQ QUESTIONS 1-9: 17
SUM OF ALL RESPONSES TO PHQ9 QUESTIONS 1 & 2: 6

## 2024-06-26 NOTE — PROGRESS NOTES
Clark Maza (:  1950) is a 73 y.o. male,Established patient, here for evaluation of the following chief complaint(s):  Shoulder Pain (RT shoulder pain requesting injection ) and Wound Check (Wound on RT hand x1 week /Using wrap and neosporin )      Assessment & Plan   ASSESSMENT/PLAN:  1. Chronic obstructive pulmonary disease, unspecified COPD type (HCC)  -     fluticasone-umeclidin-vilant (TRELEGY ELLIPTA) 100-62.5-25 MCG/ACT AEPB inhaler; Inhale 1 puff into the lungs every morning, Disp-2 each, R-2Normal  2. Impingement of right shoulder  -     SC ARTHROCENTESIS ASPIR&/INJ MAJOR JT/BURSA W/O US  -     triamcinolone acetonide (KENALOG-40) injection 40 mg; 40 mg, IntraMUSCular, ONCE, 1 dose, On 24 at 1230  3. Chronic right shoulder pain  -     HYDROcodone-acetaminophen (NORCO) 7.5-325 MG per tablet; Take 1 tablet by mouth every 6 hours as needed for Pain for up to 30 days. Max Daily Amount: 4 tablets, Disp-90 tablet, R-0Normal      No follow-ups on file.         Subjective   SUBJECTIVE/OBJECTIVE:  Shoulder Pain (RT shoulder pain requesting injection ) and Wound Check (Wound on RT hand x1 week /Using wrap and neosporin )          Review of Systems   Constitutional:  Negative for chills, diaphoresis and fever.   HENT:  Negative for ear discharge, ear pain, hearing loss, nosebleeds and tinnitus.    Respiratory:  Negative for cough, shortness of breath and wheezing.    Cardiovascular:  Negative for chest pain.   Gastrointestinal:  Negative for abdominal pain, blood in stool, constipation, diarrhea, nausea and vomiting.   Genitourinary:  Negative for dysuria, flank pain and hematuria.   Musculoskeletal:  Negative for myalgias.   Skin:  Negative for rash.   Neurological:  Negative for headaches.   Hematological:  Does not bruise/bleed easily.   Psychiatric/Behavioral:  Negative for hallucinations and suicidal ideas.           Objective   /64   Pulse 86   Temp 97.4 °F (36.3 °C)   Resp

## 2024-06-27 RX ORDER — HYDROCODONE BITARTRATE AND ACETAMINOPHEN 7.5; 325 MG/1; MG/1
1 TABLET ORAL EVERY 6 HOURS PRN
Qty: 90 TABLET | Refills: 0 | Status: SHIPPED | OUTPATIENT
Start: 2024-06-27 | End: 2024-07-27

## 2024-06-27 NOTE — ADDENDUM NOTE
Addended by: PATRICIA MICHELLE on: 6/27/2024 10:42 AM     Modules accepted: Orders, Level of Service

## 2024-07-02 ENCOUNTER — HOSPITAL ENCOUNTER (EMERGENCY)
Age: 74
Discharge: HOME | End: 2024-07-02
Payer: MEDICARE

## 2024-07-02 ENCOUNTER — APPOINTMENT (OUTPATIENT)
Dept: GENERAL RADIOLOGY | Age: 74
End: 2024-07-02
Payer: MEDICARE

## 2024-07-02 VITALS
SYSTOLIC BLOOD PRESSURE: 129 MMHG | RESPIRATION RATE: 20 BRPM | HEART RATE: 74 BPM | HEIGHT: 68 IN | WEIGHT: 217 LBS | OXYGEN SATURATION: 96 % | BODY MASS INDEX: 32.89 KG/M2 | DIASTOLIC BLOOD PRESSURE: 79 MMHG | TEMPERATURE: 97.1 F

## 2024-07-02 DIAGNOSIS — M79.671 RIGHT FOOT PAIN: Primary | ICD-10-CM

## 2024-07-02 PROCEDURE — 99283 EMERGENCY DEPT VISIT LOW MDM: CPT

## 2024-07-02 PROCEDURE — 73630 X-RAY EXAM OF FOOT: CPT

## 2024-07-02 RX ORDER — SULFAMETHOXAZOLE AND TRIMETHOPRIM 800; 160 MG/1; MG/1
1 TABLET ORAL 2 TIMES DAILY
Qty: 14 TABLET | Refills: 0 | Status: SHIPPED | OUTPATIENT
Start: 2024-07-02 | End: 2024-07-09

## 2024-07-02 ASSESSMENT — PAIN DESCRIPTION - FREQUENCY: FREQUENCY: CONTINUOUS

## 2024-07-02 ASSESSMENT — PAIN DESCRIPTION - ORIENTATION: ORIENTATION: RIGHT

## 2024-07-02 ASSESSMENT — PAIN - FUNCTIONAL ASSESSMENT: PAIN_FUNCTIONAL_ASSESSMENT: 0-10

## 2024-07-02 ASSESSMENT — PAIN DESCRIPTION - LOCATION: LOCATION: FOOT

## 2024-07-02 ASSESSMENT — PAIN SCALES - GENERAL: PAINLEVEL_OUTOF10: 8

## 2024-07-02 ASSESSMENT — PAIN DESCRIPTION - DESCRIPTORS: DESCRIPTORS: ACHING

## 2024-07-02 ASSESSMENT — PAIN DESCRIPTION - PAIN TYPE: TYPE: ACUTE PAIN

## 2024-07-02 ASSESSMENT — ENCOUNTER SYMPTOMS: BACK PAIN: 0

## 2024-07-02 NOTE — ED PROVIDER NOTES
Urgent Care Encounter       Patient: Clark Maza  MRN: [id number]  : 1950  Date of Evaluation: 2024  ED Provider: Carey Smith MD    Chief Complaint       Chief Complaint   Patient presents with    Foot Pain     Right foot pain sudden onset while walking about one week ago.     MELANIA Maza is a 73 y.o. male who presents to the [facility] [place] (Urgent care Facility)  Pt presents with right foot pain, pain mostly at the base of the 5th metatarsal region, patient has intact ROM of the toes, and the ankle otherwise. States was on his feet for a long time, has not had any injury as such or trauma.       ROS:     At least 5 systems reviewed and otherwise acutely negative except as in the [place].  Review of Systems   Musculoskeletal:  Negative for arthralgias, back pain, gait problem, joint swelling, myalgias, neck pain and neck stiffness.         Past History     Past Medical History:   Diagnosis Date    Anesthesia complication     wakes up  violant   only ok if reversed with romazacon    Arthritis     shoulders,ankle    CAD (coronary artery disease)     Cardiomyopathy (HCC)     CHF (congestive heart failure) (HCC)     Chronic hypoxic respiratory failure (HCC)     CKD (chronic kidney disease)     COPD (chronic obstructive pulmonary disease) (HCC)     Dependence on supplemental oxygen     PRN    Erectile dysfunction     GERD (gastroesophageal reflux disease)     H/O coronary angioplasty with stents[V45.82] 2018 another stent    Hearing loss     HFrEF (heart failure with reduced ejection fraction) (HCC)     Hyperlipidemia     Hypertension     Ischemic cardiomyopathy     Lymphoma (HCC) 2011    had chemo  currently in remission    Myocardial infarction (HCC)     more than 10 yrs ago    Non-Hodgkin lymphoma (HCC)     In remission    Obesity     Sleep apnea     wont wear machine    Unspecified cerebral artery occlusion with cerebral infarction

## 2024-07-03 ENCOUNTER — OFFICE VISIT (OUTPATIENT)
Dept: FAMILY MEDICINE CLINIC | Age: 74
End: 2024-07-03

## 2024-07-03 VITALS
SYSTOLIC BLOOD PRESSURE: 126 MMHG | HEART RATE: 57 BPM | HEIGHT: 68 IN | TEMPERATURE: 96.9 F | DIASTOLIC BLOOD PRESSURE: 74 MMHG | OXYGEN SATURATION: 96 % | BODY MASS INDEX: 33 KG/M2 | RESPIRATION RATE: 16 BRPM

## 2024-07-03 DIAGNOSIS — M10.071 ACUTE IDIOPATHIC GOUT OF RIGHT FOOT: Primary | ICD-10-CM

## 2024-07-03 RX ORDER — PREDNISONE 5 MG/1
TABLET ORAL
Qty: 21 EACH | Refills: 0 | Status: SHIPPED | OUTPATIENT
Start: 2024-07-03

## 2024-07-03 ASSESSMENT — PATIENT HEALTH QUESTIONNAIRE - PHQ9
1. LITTLE INTEREST OR PLEASURE IN DOING THINGS: NEARLY EVERY DAY
4. FEELING TIRED OR HAVING LITTLE ENERGY: NEARLY EVERY DAY
SUM OF ALL RESPONSES TO PHQ QUESTIONS 1-9: 17
6. FEELING BAD ABOUT YOURSELF - OR THAT YOU ARE A FAILURE OR HAVE LET YOURSELF OR YOUR FAMILY DOWN: NOT AT ALL
5. POOR APPETITE OR OVEREATING: MORE THAN HALF THE DAYS
2. FEELING DOWN, DEPRESSED OR HOPELESS: NEARLY EVERY DAY
SUM OF ALL RESPONSES TO PHQ QUESTIONS 1-9: 17
7. TROUBLE CONCENTRATING ON THINGS, SUCH AS READING THE NEWSPAPER OR WATCHING TELEVISION: MORE THAN HALF THE DAYS
SUM OF ALL RESPONSES TO PHQ QUESTIONS 1-9: 17
10. IF YOU CHECKED OFF ANY PROBLEMS, HOW DIFFICULT HAVE THESE PROBLEMS MADE IT FOR YOU TO DO YOUR WORK, TAKE CARE OF THINGS AT HOME, OR GET ALONG WITH OTHER PEOPLE: VERY DIFFICULT
3. TROUBLE FALLING OR STAYING ASLEEP: MORE THAN HALF THE DAYS
SUM OF ALL RESPONSES TO PHQ QUESTIONS 1-9: 17
8. MOVING OR SPEAKING SO SLOWLY THAT OTHER PEOPLE COULD HAVE NOTICED. OR THE OPPOSITE, BEING SO FIGETY OR RESTLESS THAT YOU HAVE BEEN MOVING AROUND A LOT MORE THAN USUAL: MORE THAN HALF THE DAYS
SUM OF ALL RESPONSES TO PHQ9 QUESTIONS 1 & 2: 6

## 2024-07-03 ASSESSMENT — ENCOUNTER SYMPTOMS: APNEA: 0

## 2024-07-03 NOTE — PROGRESS NOTES
Clark Maza (:  1950) is a 73 y.o. male,Established patient, here for evaluation of the following chief complaint(s):  Foot Pain (RT foot x1 week patient experiencing difficulty walking/Had imaging done yesterday )      Assessment & Plan   ASSESSMENT/PLAN:  1. Acute idiopathic gout of right foot  -     Uric Acid; Future  -     predniSONE 5 MG (21) TBPK; Take 6 pills on day 1, 5 pills on day 2,4 pills on day 3, 3 pills on day 4, 2 pills on day 5, 1 pill on day 6., Disp-21 each, R-0Normal      No follow-ups on file.         Subjective   SUBJECTIVE/OBJECTIVE:  Foot Pain (RT foot x1 week patient experiencing difficulty walking/Had imaging done yesterday )          Review of Systems   Respiratory:  Negative for apnea.    Musculoskeletal:  Positive for arthralgias (Right MTP number 5. Number 5. Numer 5.).          Objective   /74   Pulse 57   Temp 96.9 °F (36.1 °C)   Resp 16   Ht 1.727 m (5' 7.99\")   SpO2 96%   BMI 33.00 kg/m²   Lab Results   Component Value Date    LABA1C 5.6 2018    LABA1C 6.0 2018     Physical Exam  Musculoskeletal:      Comments: Pain MTP number 5. Base not as bad but  to tough.No redness.  Foot abnormal geometry and kinetics due to Right ankle  fusion remopte. XR reviewed, hardware etc. No Fx.   Skin:     General: Skin is warm.            On this date 7/3/2024 I have spent 23 minutes reviewing previous notes, test results and face to face with the patient discussing the diagnosis and importance of compliance with the treatment plan as well as documenting on the day of the visit.      An electronic signature was used to authenticate this note.    --Chance Jama, DO

## 2024-07-12 ENCOUNTER — TELEPHONE (OUTPATIENT)
Dept: FAMILY MEDICINE CLINIC | Age: 74
End: 2024-07-12

## 2024-07-12 DIAGNOSIS — M10.071 ACUTE IDIOPATHIC GOUT OF RIGHT FOOT: Primary | ICD-10-CM

## 2024-07-12 RX ORDER — INDOMETHACIN 25 MG/1
25 CAPSULE ORAL 2 TIMES DAILY WITH MEALS
Qty: 60 CAPSULE | Refills: 0 | Status: SHIPPED | OUTPATIENT
Start: 2024-07-12 | End: 2025-07-12

## 2024-07-12 NOTE — TELEPHONE ENCOUNTER
Not much improvement with the gout. I asked him to come in today but he's not in town. So he's asking if there is something else that can be prescribed ?    Please advise.

## 2024-07-25 DIAGNOSIS — G89.29 CHRONIC RIGHT SHOULDER PAIN: ICD-10-CM

## 2024-07-25 DIAGNOSIS — M25.511 CHRONIC RIGHT SHOULDER PAIN: ICD-10-CM

## 2024-07-25 RX ORDER — BUPROPION HYDROCHLORIDE 150 MG/1
150 TABLET, EXTENDED RELEASE ORAL 2 TIMES DAILY
Qty: 180 TABLET | Refills: 1 | Status: SHIPPED | OUTPATIENT
Start: 2024-07-25

## 2024-07-25 RX ORDER — SERTRALINE HYDROCHLORIDE 100 MG/1
100 TABLET, FILM COATED ORAL DAILY
Qty: 90 TABLET | Refills: 1 | Status: SHIPPED | OUTPATIENT
Start: 2024-07-25

## 2024-07-25 RX ORDER — ASPIRIN 81 MG
81 TABLET,CHEWABLE ORAL
Qty: 90 TABLET | Refills: 0 | OUTPATIENT
Start: 2024-07-25

## 2024-07-25 NOTE — TELEPHONE ENCOUNTER
Last seen 7/3/2024  Next appt 7/29/2024    Requested Prescriptions     Pending Prescriptions Disp Refills    buPROPion (WELLBUTRIN SR) 150 MG extended release tablet [Pharmacy Med Name: Bupropion Hydrochloride 150mg Extended-Release (SR) Tablet] 180 tablet 1     Sig: Take 1 tablet by mouth twice daily.    sertraline (ZOLOFT) 100 MG tablet [Pharmacy Med Name: Sertraline Hydrochloride 100mg Tablet] 90 tablet 1     Sig: Take 1 tablet by mouth daily.

## 2024-07-29 ENCOUNTER — HOSPITAL ENCOUNTER (OUTPATIENT)
Dept: GENERAL RADIOLOGY | Age: 74
Discharge: HOME | End: 2024-07-31
Payer: MEDICARE

## 2024-07-29 ENCOUNTER — HOSPITAL ENCOUNTER (OUTPATIENT)
Age: 74
Discharge: HOME | End: 2024-07-31
Payer: MEDICARE

## 2024-07-29 ENCOUNTER — OFFICE VISIT (OUTPATIENT)
Dept: FAMILY MEDICINE CLINIC | Age: 74
End: 2024-07-29
Payer: MEDICARE

## 2024-07-29 VITALS
OXYGEN SATURATION: 94 % | DIASTOLIC BLOOD PRESSURE: 84 MMHG | TEMPERATURE: 97.6 F | RESPIRATION RATE: 16 BRPM | HEIGHT: 68 IN | SYSTOLIC BLOOD PRESSURE: 134 MMHG | BODY MASS INDEX: 33.71 KG/M2 | HEART RATE: 74 BPM | WEIGHT: 222.4 LBS

## 2024-07-29 DIAGNOSIS — G89.29 CHRONIC RIGHT SHOULDER PAIN: Primary | ICD-10-CM

## 2024-07-29 DIAGNOSIS — Z72.0 TOBACCO ABUSE DISORDER: ICD-10-CM

## 2024-07-29 DIAGNOSIS — R07.81 RIB PAIN ON LEFT SIDE: ICD-10-CM

## 2024-07-29 DIAGNOSIS — M25.511 CHRONIC RIGHT SHOULDER PAIN: Primary | ICD-10-CM

## 2024-07-29 PROCEDURE — 71101 X-RAY EXAM UNILAT RIBS/CHEST: CPT

## 2024-07-29 PROCEDURE — 1123F ACP DISCUSS/DSCN MKR DOCD: CPT | Performed by: NURSE PRACTITIONER

## 2024-07-29 PROCEDURE — G2211 COMPLEX E/M VISIT ADD ON: HCPCS | Performed by: NURSE PRACTITIONER

## 2024-07-29 PROCEDURE — 99214 OFFICE O/P EST MOD 30 MIN: CPT | Performed by: NURSE PRACTITIONER

## 2024-07-29 RX ORDER — VARENICLINE TARTRATE 1 MG/1
1 TABLET, FILM COATED ORAL 2 TIMES DAILY
Qty: 60 TABLET | Refills: 0 | Status: SHIPPED | OUTPATIENT
Start: 2024-07-29

## 2024-07-29 RX ORDER — HYDROCODONE BITARTRATE AND ACETAMINOPHEN 7.5; 325 MG/1; MG/1
1 TABLET ORAL EVERY 6 HOURS PRN
Qty: 90 TABLET | Refills: 0 | Status: SHIPPED | OUTPATIENT
Start: 2024-07-29 | End: 2024-08-28

## 2024-07-29 ASSESSMENT — PATIENT HEALTH QUESTIONNAIRE - PHQ9
8. MOVING OR SPEAKING SO SLOWLY THAT OTHER PEOPLE COULD HAVE NOTICED. OR THE OPPOSITE, BEING SO FIGETY OR RESTLESS THAT YOU HAVE BEEN MOVING AROUND A LOT MORE THAN USUAL: NOT AT ALL
4. FEELING TIRED OR HAVING LITTLE ENERGY: NOT AT ALL
1. LITTLE INTEREST OR PLEASURE IN DOING THINGS: NOT AT ALL
5. POOR APPETITE OR OVEREATING: NOT AT ALL
7. TROUBLE CONCENTRATING ON THINGS, SUCH AS READING THE NEWSPAPER OR WATCHING TELEVISION: NOT AT ALL
SUM OF ALL RESPONSES TO PHQ9 QUESTIONS 1 & 2: 0
SUM OF ALL RESPONSES TO PHQ QUESTIONS 1-9: 0
SUM OF ALL RESPONSES TO PHQ QUESTIONS 1-9: 0
6. FEELING BAD ABOUT YOURSELF - OR THAT YOU ARE A FAILURE OR HAVE LET YOURSELF OR YOUR FAMILY DOWN: NOT AT ALL
2. FEELING DOWN, DEPRESSED OR HOPELESS: NOT AT ALL
9. THOUGHTS THAT YOU WOULD BE BETTER OFF DEAD, OR OF HURTING YOURSELF: NOT AT ALL
SUM OF ALL RESPONSES TO PHQ QUESTIONS 1-9: 0
10. IF YOU CHECKED OFF ANY PROBLEMS, HOW DIFFICULT HAVE THESE PROBLEMS MADE IT FOR YOU TO DO YOUR WORK, TAKE CARE OF THINGS AT HOME, OR GET ALONG WITH OTHER PEOPLE: NOT DIFFICULT AT ALL
3. TROUBLE FALLING OR STAYING ASLEEP: NOT AT ALL
SUM OF ALL RESPONSES TO PHQ QUESTIONS 1-9: 0

## 2024-07-29 ASSESSMENT — ENCOUNTER SYMPTOMS
CONSTIPATION: 0
WHEEZING: 1
COUGH: 1
DIARRHEA: 0
VOMITING: 0
NAUSEA: 0
SHORTNESS OF BREATH: 1

## 2024-07-29 NOTE — PROGRESS NOTES
Clark Churchsissy (:  1950) is a 73 y.o. male,Established patient, here for evaluation of the following chief complaint(s):  Shoulder Pain (Med refill )      Assessment & Plan   ASSESSMENT/PLAN:  1. Chronic right shoulder pain  -     HYDROcodone-acetaminophen (NORCO) 7.5-325 MG per tablet; Take 1 tablet by mouth every 6 hours as needed for Pain for up to 30 days. Max Daily Amount: 4 tablets, Disp-90 tablet, R-0Normal  The current medical regimen is effective;  continue present plan and medications.    2. Tobacco abuse disorder  -     varenicline (CHANTIX) 1 MG tablet; Take 1 tablet by mouth 2 times daily, Disp-60 tablet, R-0Normal  Encouraged smoking cessation    3. Rib pain on left side  -     XR RIBS LEFT INCLUDE CHEST (MIN 3 VIEWS); Future      Controlled Substance Monitoring:    Acute and Chronic Pain Monitoring:   RX Monitoring Periodic Controlled Substance Monitoring   2024  10:31 AM No signs of potential drug abuse or diversion identified.           No follow-ups on file.         Subjective   SUBJECTIVE/OBJECTIVE:  Complains of right shoulder pain.  Aggravated by lifting, reaching and working overhead.  Has shoulder shots and surgery X 2  Patient is taking chantix.  He had quit for 2 years until he returned to work and his friend smokes.  Would like a refill of chantix  Patient states he fell 1.5 weeks ago and injured left anterior ribs.  Pain worse with coughing and sneezing        Review of Systems   Constitutional:  Positive for fatigue. Negative for activity change, appetite change and unexpected weight change.   Respiratory:  Positive for cough (white), shortness of breath and wheezing.         Chronic and stable   Cardiovascular:  Negative for chest pain and palpitations.   Gastrointestinal:  Negative for constipation, diarrhea, nausea and vomiting.   Musculoskeletal:  Positive for arthralgias.   Neurological:  Negative for weakness, light-headedness and headaches.

## 2024-08-08 ENCOUNTER — OFFICE VISIT (OUTPATIENT)
Dept: FAMILY MEDICINE CLINIC | Age: 74
End: 2024-08-08

## 2024-08-08 VITALS
BODY MASS INDEX: 33.31 KG/M2 | DIASTOLIC BLOOD PRESSURE: 72 MMHG | HEIGHT: 68 IN | TEMPERATURE: 97.7 F | SYSTOLIC BLOOD PRESSURE: 130 MMHG | HEART RATE: 77 BPM | OXYGEN SATURATION: 95 % | WEIGHT: 219.8 LBS | RESPIRATION RATE: 16 BRPM

## 2024-08-08 DIAGNOSIS — T23.341S: Primary | ICD-10-CM

## 2024-08-08 ASSESSMENT — ENCOUNTER SYMPTOMS
DIARRHEA: 0
WHEEZING: 0
CONSTIPATION: 0
BLOOD IN STOOL: 0

## 2024-08-08 ASSESSMENT — PATIENT HEALTH QUESTIONNAIRE - PHQ9
4. FEELING TIRED OR HAVING LITTLE ENERGY: NEARLY EVERY DAY
SUM OF ALL RESPONSES TO PHQ QUESTIONS 1-9: 24
3. TROUBLE FALLING OR STAYING ASLEEP: NEARLY EVERY DAY
9. THOUGHTS THAT YOU WOULD BE BETTER OFF DEAD, OR OF HURTING YOURSELF: MORE THAN HALF THE DAYS
10. IF YOU CHECKED OFF ANY PROBLEMS, HOW DIFFICULT HAVE THESE PROBLEMS MADE IT FOR YOU TO DO YOUR WORK, TAKE CARE OF THINGS AT HOME, OR GET ALONG WITH OTHER PEOPLE: SOMEWHAT DIFFICULT
1. LITTLE INTEREST OR PLEASURE IN DOING THINGS: NEARLY EVERY DAY
SUM OF ALL RESPONSES TO PHQ QUESTIONS 1-9: 22
5. POOR APPETITE OR OVEREATING: NEARLY EVERY DAY
2. FEELING DOWN, DEPRESSED OR HOPELESS: MORE THAN HALF THE DAYS
SUM OF ALL RESPONSES TO PHQ9 QUESTIONS 1 & 2: 5
SUM OF ALL RESPONSES TO PHQ QUESTIONS 1-9: 24
7. TROUBLE CONCENTRATING ON THINGS, SUCH AS READING THE NEWSPAPER OR WATCHING TELEVISION: NEARLY EVERY DAY
SUM OF ALL RESPONSES TO PHQ QUESTIONS 1-9: 24
6. FEELING BAD ABOUT YOURSELF - OR THAT YOU ARE A FAILURE OR HAVE LET YOURSELF OR YOUR FAMILY DOWN: NEARLY EVERY DAY
8. MOVING OR SPEAKING SO SLOWLY THAT OTHER PEOPLE COULD HAVE NOTICED. OR THE OPPOSITE, BEING SO FIGETY OR RESTLESS THAT YOU HAVE BEEN MOVING AROUND A LOT MORE THAN USUAL: MORE THAN HALF THE DAYS

## 2024-08-08 ASSESSMENT — COLUMBIA-SUICIDE SEVERITY RATING SCALE - C-SSRS
6. HAVE YOU EVER DONE ANYTHING, STARTED TO DO ANYTHING, OR PREPARED TO DO ANYTHING TO END YOUR LIFE?: NO
2. HAVE YOU ACTUALLY HAD ANY THOUGHTS OF KILLING YOURSELF?: NO
1. WITHIN THE PAST MONTH, HAVE YOU WISHED YOU WERE DEAD OR WISHED YOU COULD GO TO SLEEP AND NOT WAKE UP?: YES
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
## 2024-08-26 RX ORDER — SIMVASTATIN 20 MG
20 TABLET ORAL DAILY
Qty: 90 TABLET | Refills: 1 | Status: SHIPPED | OUTPATIENT
Start: 2024-08-26

## 2024-08-26 RX ORDER — CARVEDILOL 3.12 MG/1
3.12 TABLET ORAL DAILY
Qty: 30 TABLET | Refills: 2 | Status: SHIPPED | OUTPATIENT
Start: 2024-08-26

## 2024-08-26 RX ORDER — OMEPRAZOLE 40 MG/1
40 CAPSULE, DELAYED RELEASE ORAL DAILY
Qty: 90 CAPSULE | Refills: 1 | Status: SHIPPED | OUTPATIENT
Start: 2024-08-26

## 2024-08-26 RX ORDER — FENOFIBRATE 145 MG/1
145 TABLET, COATED ORAL DAILY
Qty: 90 TABLET | Refills: 1 | Status: SHIPPED | OUTPATIENT
Start: 2024-08-26

## 2024-08-26 RX ORDER — MONTELUKAST SODIUM 10 MG/1
10 TABLET ORAL DAILY
Qty: 90 TABLET | Refills: 1 | Status: SHIPPED | OUTPATIENT
Start: 2024-08-26

## 2024-08-26 RX ORDER — CLOPIDOGREL BISULFATE 75 MG/1
75 TABLET ORAL DAILY
Qty: 90 TABLET | Refills: 1 | Status: SHIPPED | OUTPATIENT
Start: 2024-08-26

## 2024-08-29 ENCOUNTER — OFFICE VISIT (OUTPATIENT)
Dept: FAMILY MEDICINE CLINIC | Age: 74
End: 2024-08-29

## 2024-08-29 VITALS
WEIGHT: 220.5 LBS | HEIGHT: 68 IN | HEART RATE: 71 BPM | BODY MASS INDEX: 33.42 KG/M2 | RESPIRATION RATE: 16 BRPM | TEMPERATURE: 97.2 F | OXYGEN SATURATION: 94 % | DIASTOLIC BLOOD PRESSURE: 62 MMHG | SYSTOLIC BLOOD PRESSURE: 108 MMHG

## 2024-08-29 DIAGNOSIS — G89.29 CHRONIC RIGHT SHOULDER PAIN: ICD-10-CM

## 2024-08-29 DIAGNOSIS — M25.511 CHRONIC RIGHT SHOULDER PAIN: ICD-10-CM

## 2024-08-29 DIAGNOSIS — I50.42 CHRONIC COMBINED SYSTOLIC AND DIASTOLIC CONGESTIVE HEART FAILURE (HCC): ICD-10-CM

## 2024-08-29 DIAGNOSIS — R06.02 SHORTNESS OF BREATH: Primary | ICD-10-CM

## 2024-08-29 RX ORDER — HYDROCODONE BITARTRATE AND ACETAMINOPHEN 7.5; 325 MG/1; MG/1
1 TABLET ORAL EVERY 6 HOURS PRN
Qty: 90 TABLET | Refills: 0 | Status: SHIPPED | OUTPATIENT
Start: 2024-08-29 | End: 2024-09-28

## 2024-08-29 ASSESSMENT — ENCOUNTER SYMPTOMS
COUGH: 0
ABDOMINAL PAIN: 0
DIARRHEA: 0
SHORTNESS OF BREATH: 0
WHEEZING: 0
CONSTIPATION: 0
VOMITING: 0
BLOOD IN STOOL: 0
NAUSEA: 0

## 2024-08-29 ASSESSMENT — PATIENT HEALTH QUESTIONNAIRE - PHQ9
SUM OF ALL RESPONSES TO PHQ QUESTIONS 1-9: 15
6. FEELING BAD ABOUT YOURSELF - OR THAT YOU ARE A FAILURE OR HAVE LET YOURSELF OR YOUR FAMILY DOWN: NOT AT ALL
2. FEELING DOWN, DEPRESSED OR HOPELESS: NEARLY EVERY DAY
SUM OF ALL RESPONSES TO PHQ9 QUESTIONS 1 & 2: 6
SUM OF ALL RESPONSES TO PHQ QUESTIONS 1-9: 15
SUM OF ALL RESPONSES TO PHQ QUESTIONS 1-9: 15
8. MOVING OR SPEAKING SO SLOWLY THAT OTHER PEOPLE COULD HAVE NOTICED. OR THE OPPOSITE, BEING SO FIGETY OR RESTLESS THAT YOU HAVE BEEN MOVING AROUND A LOT MORE THAN USUAL: NOT AT ALL
3. TROUBLE FALLING OR STAYING ASLEEP: NEARLY EVERY DAY
5. POOR APPETITE OR OVEREATING: NEARLY EVERY DAY
4. FEELING TIRED OR HAVING LITTLE ENERGY: NEARLY EVERY DAY
9. THOUGHTS THAT YOU WOULD BE BETTER OFF DEAD, OR OF HURTING YOURSELF: NOT AT ALL
7. TROUBLE CONCENTRATING ON THINGS, SUCH AS READING THE NEWSPAPER OR WATCHING TELEVISION: NOT AT ALL
1. LITTLE INTEREST OR PLEASURE IN DOING THINGS: NEARLY EVERY DAY
SUM OF ALL RESPONSES TO PHQ QUESTIONS 1-9: 15
10. IF YOU CHECKED OFF ANY PROBLEMS, HOW DIFFICULT HAVE THESE PROBLEMS MADE IT FOR YOU TO DO YOUR WORK, TAKE CARE OF THINGS AT HOME, OR GET ALONG WITH OTHER PEOPLE: SOMEWHAT DIFFICULT

## 2024-08-29 NOTE — PROGRESS NOTES
Clark Maza (:  1950) is a 73 y.o. male,Established patient, here for evaluation of the following chief complaint(s):  Sleep Problem (Patient requesting something to help with sleeping to long. Patient is sleeping 16 hours a day. ) and Shoulder Pain (Medication refill )      Assessment & Plan   ASSESSMENT/PLAN:  1. Shortness of breath  2. Chronic combined systolic and diastolic congestive heart failure (HCC)      No follow-ups on file.         Subjective   SUBJECTIVE/OBJECTIVE:  Sleep Problem (Patient requesting something to help with sleeping to long. Patient is sleeping 16 hours a day. ) and Shoulder Pain (Medication refill )      Sleep Problem  Pertinent negatives include no abdominal pain, chest pain, chills, coughing, diaphoresis, fever, headaches, myalgias, nausea, rash or vomiting.   Shoulder Pain   Pertinent negatives include no fever.       Review of Systems   Constitutional:  Negative for chills, diaphoresis and fever.   HENT:  Negative for ear discharge, ear pain, hearing loss, nosebleeds and tinnitus.    Respiratory:  Negative for cough, shortness of breath and wheezing.    Cardiovascular:  Negative for chest pain.   Gastrointestinal:  Negative for abdominal pain, blood in stool, constipation, diarrhea, nausea and vomiting.   Genitourinary:  Negative for dysuria, flank pain and hematuria.   Musculoskeletal:  Negative for myalgias.   Skin:  Negative for rash.   Neurological:  Negative for headaches.   Hematological:  Does not bruise/bleed easily.   Psychiatric/Behavioral:  Negative for hallucinations and suicidal ideas.           Objective   /62   Pulse 71   Temp 97.2 °F (36.2 °C)   Resp 16   Ht 1.727 m (5' 7.99\")   Wt 100 kg (220 lb 8 oz)   SpO2 94%   BMI 33.53 kg/m²   Lab Results   Component Value Date    LABA1C 5.6 2018    LABA1C 6.0 2018     Physical Exam  Eyes:      General:         Right eye: No discharge.         Left eye: No discharge.   Cardiovascular:       Rate and Rhythm: Normal rate and regular rhythm.      Heart sounds: No murmur heard.  Pulmonary:      Effort: No respiratory distress.      Breath sounds: No rhonchi or rales.   Abdominal:      Tenderness: There is no abdominal tenderness.   Musculoskeletal:      Cervical back: No rigidity.   Skin:     General: Skin is warm.      Capillary Refill: Capillary refill takes less than 2 seconds.      Coloration: Skin is not pale.      Findings: No bruising.   Neurological:      Mental Status: He is alert.   Psychiatric:         Mood and Affect: Mood normal.            On this date 8/29/2024 I have spent 24 minutes reviewing previous notes, test results and face to face with the patient discussing the diagnosis and importance of compliance with the treatment plan as well as documenting on the day of the visit.      An electronic signature was used to authenticate this note.    --Chance Jama, DO

## 2024-08-29 NOTE — TELEPHONE ENCOUNTER
Requested Prescriptions     Pending Prescriptions Disp Refills    HYDROcodone-acetaminophen (NORCO) 7.5-325 MG per tablet 90 tablet 0     Sig: Take 1 tablet by mouth every 6 hours as needed for Pain for up to 30 days. Max Daily Amount: 4 tablets       Next appt is 9/26/2024  Last appt was 8/29/2024

## 2024-09-17 ENCOUNTER — HOSPITAL ENCOUNTER (EMERGENCY)
Age: 74
Discharge: HOME | End: 2024-09-17
Attending: EMERGENCY MEDICINE
Payer: MEDICARE

## 2024-09-17 ENCOUNTER — OFFICE VISIT (OUTPATIENT)
Dept: FAMILY MEDICINE CLINIC | Age: 74
End: 2024-09-17
Payer: MEDICARE

## 2024-09-17 ENCOUNTER — APPOINTMENT (OUTPATIENT)
Dept: GENERAL RADIOLOGY | Age: 74
End: 2024-09-17
Payer: MEDICARE

## 2024-09-17 VITALS
HEART RATE: 76 BPM | SYSTOLIC BLOOD PRESSURE: 123 MMHG | OXYGEN SATURATION: 98 % | TEMPERATURE: 97.3 F | RESPIRATION RATE: 18 BRPM | DIASTOLIC BLOOD PRESSURE: 61 MMHG

## 2024-09-17 VITALS
DIASTOLIC BLOOD PRESSURE: 68 MMHG | HEIGHT: 68 IN | RESPIRATION RATE: 16 BRPM | WEIGHT: 219 LBS | BODY MASS INDEX: 33.19 KG/M2 | SYSTOLIC BLOOD PRESSURE: 104 MMHG | OXYGEN SATURATION: 90 % | HEART RATE: 91 BPM | TEMPERATURE: 97.6 F

## 2024-09-17 DIAGNOSIS — Z51.89 VISIT FOR WOUND CHECK: Primary | ICD-10-CM

## 2024-09-17 DIAGNOSIS — Z13.31 POSITIVE DEPRESSION SCREENING: ICD-10-CM

## 2024-09-17 DIAGNOSIS — S61.200A OPEN WOUND OF RIGHT INDEX FINGER: Primary | ICD-10-CM

## 2024-09-17 DIAGNOSIS — S61.209A OPEN WOUND OF FINGER, INITIAL ENCOUNTER: ICD-10-CM

## 2024-09-17 DIAGNOSIS — R45.89 AT LOW RISK FOR SUICIDE: ICD-10-CM

## 2024-09-17 DIAGNOSIS — S61.001A OPEN WOUND OF RIGHT THUMB, INITIAL ENCOUNTER: ICD-10-CM

## 2024-09-17 LAB
ANION GAP SERPL CALCULATED.3IONS-SCNC: 10 MMOL/L (ref 7–16)
BASOPHILS # BLD: 0.04 K/UL (ref 0–0.2)
BASOPHILS NFR BLD: 1 % (ref 0–2)
BUN SERPL-MCNC: 21 MG/DL (ref 6–23)
CALCIUM SERPL-MCNC: 9.7 MG/DL (ref 8.6–10.2)
CHLORIDE SERPL-SCNC: 106 MMOL/L (ref 98–107)
CO2 SERPL-SCNC: 26 MMOL/L (ref 22–29)
CREAT SERPL-MCNC: 1.2 MG/DL (ref 0.7–1.2)
EOSINOPHIL # BLD: 0.11 K/UL (ref 0.05–0.5)
EOSINOPHILS RELATIVE PERCENT: 2 % (ref 0–6)
ERYTHROCYTE [DISTWIDTH] IN BLOOD BY AUTOMATED COUNT: 13.7 % (ref 11.5–15)
ERYTHROCYTE [SEDIMENTATION RATE] IN BLOOD BY WESTERGREN METHOD: 10 MM/HR (ref 0–15)
GFR, ESTIMATED: 66 ML/MIN/1.73M2
GLUCOSE SERPL-MCNC: 156 MG/DL (ref 74–99)
HCT VFR BLD AUTO: 51.1 % (ref 37–54)
HGB BLD-MCNC: 16.3 G/DL (ref 12.5–16.5)
IMM GRANULOCYTES # BLD AUTO: <0.03 K/UL (ref 0–0.58)
IMM GRANULOCYTES NFR BLD: 0 % (ref 0–5)
LYMPHOCYTES NFR BLD: 1.54 K/UL (ref 1.5–4)
LYMPHOCYTES RELATIVE PERCENT: 23 % (ref 20–42)
MCH RBC QN AUTO: 27.7 PG (ref 26–35)
MCHC RBC AUTO-ENTMCNC: 31.9 G/DL (ref 32–34.5)
MCV RBC AUTO: 86.9 FL (ref 80–99.9)
MONOCYTES NFR BLD: 0.45 K/UL (ref 0.1–0.95)
MONOCYTES NFR BLD: 7 % (ref 2–12)
NEUTROPHILS NFR BLD: 67 % (ref 43–80)
NEUTS SEG NFR BLD: 4.45 K/UL (ref 1.8–7.3)
PLATELET # BLD AUTO: 355 K/UL (ref 130–450)
PMV BLD AUTO: 10.3 FL (ref 7–12)
POTASSIUM SERPL-SCNC: 4.2 MMOL/L (ref 3.5–5)
RBC # BLD AUTO: 5.88 M/UL (ref 3.8–5.8)
SODIUM SERPL-SCNC: 142 MMOL/L (ref 132–146)
WBC OTHER # BLD: 6.6 K/UL (ref 4.5–11.5)

## 2024-09-17 PROCEDURE — 6370000000 HC RX 637 (ALT 250 FOR IP): Performed by: EMERGENCY MEDICINE

## 2024-09-17 PROCEDURE — 99214 OFFICE O/P EST MOD 30 MIN: CPT | Performed by: NURSE PRACTITIONER

## 2024-09-17 PROCEDURE — 85652 RBC SED RATE AUTOMATED: CPT

## 2024-09-17 PROCEDURE — 80048 BASIC METABOLIC PNL TOTAL CA: CPT

## 2024-09-17 PROCEDURE — 73130 X-RAY EXAM OF HAND: CPT

## 2024-09-17 PROCEDURE — 85025 COMPLETE CBC W/AUTO DIFF WBC: CPT

## 2024-09-17 PROCEDURE — 1123F ACP DISCUSS/DSCN MKR DOCD: CPT | Performed by: NURSE PRACTITIONER

## 2024-09-17 PROCEDURE — 99284 EMERGENCY DEPT VISIT MOD MDM: CPT

## 2024-09-17 RX ORDER — DOXYCYCLINE HYCLATE 100 MG
100 TABLET ORAL 2 TIMES DAILY
Qty: 20 TABLET | Refills: 0 | Status: SHIPPED | OUTPATIENT
Start: 2024-09-17 | End: 2024-09-27

## 2024-09-17 RX ORDER — CEPHALEXIN 500 MG/1
500 CAPSULE ORAL 4 TIMES DAILY
Qty: 40 CAPSULE | Refills: 0 | Status: SHIPPED | OUTPATIENT
Start: 2024-09-17 | End: 2024-09-27

## 2024-09-17 RX ORDER — DOXYCYCLINE 100 MG/1
100 CAPSULE ORAL ONCE
Status: COMPLETED | OUTPATIENT
Start: 2024-09-17 | End: 2024-09-17

## 2024-09-17 RX ADMIN — DOXYCYCLINE HYCLATE 100 MG: 100 CAPSULE ORAL at 16:13

## 2024-09-17 RX ADMIN — CEPHALEXIN 500 MG: 250 CAPSULE ORAL at 16:13

## 2024-09-17 ASSESSMENT — PATIENT HEALTH QUESTIONNAIRE - PHQ9
2. FEELING DOWN, DEPRESSED OR HOPELESS: NEARLY EVERY DAY
SUM OF ALL RESPONSES TO PHQ QUESTIONS 1-9: 17
7. TROUBLE CONCENTRATING ON THINGS, SUCH AS READING THE NEWSPAPER OR WATCHING TELEVISION: NOT AT ALL
4. FEELING TIRED OR HAVING LITTLE ENERGY: NEARLY EVERY DAY
3. TROUBLE FALLING OR STAYING ASLEEP: NEARLY EVERY DAY
9. THOUGHTS THAT YOU WOULD BE BETTER OFF DEAD, OR OF HURTING YOURSELF: MORE THAN HALF THE DAYS
6. FEELING BAD ABOUT YOURSELF - OR THAT YOU ARE A FAILURE OR HAVE LET YOURSELF OR YOUR FAMILY DOWN: NOT AT ALL
8. MOVING OR SPEAKING SO SLOWLY THAT OTHER PEOPLE COULD HAVE NOTICED. OR THE OPPOSITE, BEING SO FIGETY OR RESTLESS THAT YOU HAVE BEEN MOVING AROUND A LOT MORE THAN USUAL: NOT AT ALL
10. IF YOU CHECKED OFF ANY PROBLEMS, HOW DIFFICULT HAVE THESE PROBLEMS MADE IT FOR YOU TO DO YOUR WORK, TAKE CARE OF THINGS AT HOME, OR GET ALONG WITH OTHER PEOPLE: SOMEWHAT DIFFICULT
5. POOR APPETITE OR OVEREATING: NEARLY EVERY DAY
SUM OF ALL RESPONSES TO PHQ QUESTIONS 1-9: 17
SUM OF ALL RESPONSES TO PHQ QUESTIONS 1-9: 17
SUM OF ALL RESPONSES TO PHQ QUESTIONS 1-9: 15
1. LITTLE INTEREST OR PLEASURE IN DOING THINGS: NEARLY EVERY DAY
SUM OF ALL RESPONSES TO PHQ9 QUESTIONS 1 & 2: 6

## 2024-09-17 ASSESSMENT — PAIN - FUNCTIONAL ASSESSMENT: PAIN_FUNCTIONAL_ASSESSMENT: 0-10

## 2024-09-17 ASSESSMENT — COLUMBIA-SUICIDE SEVERITY RATING SCALE - C-SSRS
3. HAVE YOU BEEN THINKING ABOUT HOW YOU MIGHT KILL YOURSELF?: YES
5. HAVE YOU STARTED TO WORK OUT OR WORKED OUT THE DETAILS OF HOW TO KILL YOURSELF? DO YOU INTEND TO CARRY OUT THIS PLAN?: NO
4. HAVE YOU HAD THESE THOUGHTS AND HAD SOME INTENTION OF ACTING ON THEM?: NO
1. WITHIN THE PAST MONTH, HAVE YOU WISHED YOU WERE DEAD OR WISHED YOU COULD GO TO SLEEP AND NOT WAKE UP?: YES
6. HAVE YOU EVER DONE ANYTHING, STARTED TO DO ANYTHING, OR PREPARED TO DO ANYTHING TO END YOUR LIFE?: NO
2. HAVE YOU ACTUALLY HAD ANY THOUGHTS OF KILLING YOURSELF?: YES

## 2024-09-17 ASSESSMENT — PAIN DESCRIPTION - ORIENTATION: ORIENTATION: RIGHT

## 2024-09-17 ASSESSMENT — PAIN DESCRIPTION - LOCATION: LOCATION: HEAD

## 2024-09-17 ASSESSMENT — PAIN SCALES - GENERAL: PAINLEVEL_OUTOF10: 0

## 2024-09-24 ASSESSMENT — ENCOUNTER SYMPTOMS
SHORTNESS OF BREATH: 0
COUGH: 0
CONSTIPATION: 0
WHEEZING: 0
DIARRHEA: 0
BACK PAIN: 0
NAUSEA: 0
VOMITING: 0

## 2024-09-26 ENCOUNTER — OFFICE VISIT (OUTPATIENT)
Dept: FAMILY MEDICINE CLINIC | Age: 74
End: 2024-09-26

## 2024-09-26 VITALS
SYSTOLIC BLOOD PRESSURE: 128 MMHG | HEART RATE: 68 BPM | HEIGHT: 68 IN | RESPIRATION RATE: 16 BRPM | BODY MASS INDEX: 33.19 KG/M2 | WEIGHT: 219 LBS | OXYGEN SATURATION: 96 % | TEMPERATURE: 98 F | DIASTOLIC BLOOD PRESSURE: 68 MMHG

## 2024-09-26 DIAGNOSIS — R73.9 HYPERGLYCEMIA: ICD-10-CM

## 2024-09-26 DIAGNOSIS — Z01.818 PRE-OP EXAM: Primary | ICD-10-CM

## 2024-09-26 LAB
ALBUMIN: 4.3 G/DL (ref 3.5–5.2)
ALP BLD-CCNC: 57 U/L (ref 40–129)
ALT SERPL-CCNC: 15 U/L (ref 0–40)
ANION GAP SERPL CALCULATED.3IONS-SCNC: 17 MMOL/L (ref 7–16)
AST SERPL-CCNC: 20 U/L (ref 0–39)
BASOPHILS ABSOLUTE: 0.04 K/UL (ref 0–0.2)
BASOPHILS RELATIVE PERCENT: 1 % (ref 0–2)
BILIRUB SERPL-MCNC: 0.4 MG/DL (ref 0–1.2)
BUN BLDV-MCNC: 22 MG/DL (ref 6–23)
CALCIUM SERPL-MCNC: 9.8 MG/DL (ref 8.6–10.2)
CHLORIDE BLD-SCNC: 102 MMOL/L (ref 98–107)
CO2: 23 MMOL/L (ref 22–29)
CREAT SERPL-MCNC: 1.2 MG/DL (ref 0.7–1.2)
EOSINOPHILS ABSOLUTE: 0.13 K/UL (ref 0.05–0.5)
EOSINOPHILS RELATIVE PERCENT: 2 % (ref 0–6)
GFR, ESTIMATED: 65 ML/MIN/1.73M2
GLUCOSE BLD-MCNC: 119 MG/DL (ref 74–99)
HBA1C MFR BLD: 6 % (ref 4–5.6)
HCT VFR BLD CALC: 51.7 % (ref 37–54)
HEMOGLOBIN: 16.4 G/DL (ref 12.5–16.5)
IMMATURE GRANULOCYTES %: 0 % (ref 0–5)
IMMATURE GRANULOCYTES ABSOLUTE: <0.03 K/UL (ref 0–0.58)
LYMPHOCYTES ABSOLUTE: 1.72 K/UL (ref 1.5–4)
LYMPHOCYTES RELATIVE PERCENT: 26 % (ref 20–42)
MCH RBC QN AUTO: 27.2 PG (ref 26–35)
MCHC RBC AUTO-ENTMCNC: 31.7 G/DL (ref 32–34.5)
MCV RBC AUTO: 85.9 FL (ref 80–99.9)
MONOCYTES ABSOLUTE: 0.67 K/UL (ref 0.1–0.95)
MONOCYTES RELATIVE PERCENT: 10 % (ref 2–12)
NEUTROPHILS ABSOLUTE: 4.08 K/UL (ref 1.8–7.3)
NEUTROPHILS RELATIVE PERCENT: 61 % (ref 43–80)
PDW BLD-RTO: 14.4 % (ref 11.5–15)
PLATELET # BLD: 355 K/UL (ref 130–450)
PMV BLD AUTO: 11.4 FL (ref 7–12)
POTASSIUM SERPL-SCNC: 5 MMOL/L (ref 3.5–5)
RBC # BLD: 6.02 M/UL (ref 3.8–5.8)
SODIUM BLD-SCNC: 142 MMOL/L (ref 132–146)
TOTAL PROTEIN: 7.5 G/DL (ref 6.4–8.3)
WBC # BLD: 6.7 K/UL (ref 4.5–11.5)

## 2024-09-26 ASSESSMENT — ENCOUNTER SYMPTOMS
BLOOD IN STOOL: 0
CONSTIPATION: 0
WHEEZING: 0
DIARRHEA: 0

## 2024-09-26 ASSESSMENT — PATIENT HEALTH QUESTIONNAIRE - PHQ9: DEPRESSION UNABLE TO ASSESS: PT REFUSES

## 2024-09-27 LAB
CULTURE: NORMAL
SPECIMEN DESCRIPTION: NORMAL

## 2024-09-30 DIAGNOSIS — M25.511 CHRONIC RIGHT SHOULDER PAIN: ICD-10-CM

## 2024-09-30 DIAGNOSIS — G89.29 CHRONIC RIGHT SHOULDER PAIN: ICD-10-CM

## 2024-09-30 RX ORDER — HYDROCODONE BITARTRATE AND ACETAMINOPHEN 7.5; 325 MG/1; MG/1
1 TABLET ORAL EVERY 6 HOURS PRN
Qty: 90 TABLET | Refills: 0 | Status: SHIPPED | OUTPATIENT
Start: 2024-09-30 | End: 2024-10-30

## 2024-09-30 NOTE — TELEPHONE ENCOUNTER
Requested Prescriptions     Pending Prescriptions Disp Refills    HYDROcodone-acetaminophen (NORCO) 7.5-325 MG per tablet 90 tablet 0     Sig: Take 1 tablet by mouth every 6 hours as needed for Pain for up to 30 days. Max Daily Amount: 4 tablets       Next appt is 10/24/2024  Last appt was 9/26/2024

## 2024-09-30 NOTE — PROGRESS NOTES
Controlled substances monitoring: possible medication side effects, risk of tolerance and/or dependence, and alternative treatments discussed, no signs of potential drug abuse or diversion identified, and [place]AR[place] report reviewed today- activity consistent with treatment plan.     ,,,,,,,,,,,,,,,,,djf

## 2024-10-01 NOTE — PROGRESS NOTES
[facility] PRE-ADMISSION TESTING INSTRUCTIONS    The Preadmission Testing patient is instructed accordingly using the following criteria (check applicable):    ARRIVAL INSTRUCTIONS:  [x] Parking the day of Surgery is located in the Main Entrance lot.  Upon entering the door, make an immediate right to the surgery reception desk    [x] Bring photo ID and insurance card    [x] Bring in a copy of Living will or Durable Power of  papers.    [x] Please be sure to arrange for a responsible adult to provide transportation to and from the hospital    [x] Please arrange for a responsible adult to be with you for the 24 hour period post procedure due to having anesthesia    [x] If you awake am of surgery not feeling well or have temperature >100 please call [phone]    GENERAL INSTRUCTIONS:    [x] No solid foods after midnight, no gum, candy or mints.  May have clear liquids until 4 hours prior to surgery.         [x] You may brush your teeth, do not swallow any toothpaste    [x] Take medications as instructed     [x] Stop herbal supplements and vitamins 5 days prior to procedure    [x] Follow preop dosing of blood thinners per physician instructions    [x] Bring inhalers day of surgery    [x] Shower or bath with soap, lather and rinse well, AM of Surgery, no lotion, powders or creams to surgical site    [x] No tobacco products within 24 hours of surgery     [x] No alcohol or illegal drug use, marijuana included, within 24 hours of surgery.    [x] Jewelry, body piercing's, eyeglasses, contact lenses and dentures are not permitted into surgery (bring cases)      [x] Please do not wear any nail polish, make up or hair products on the day of surgery    [x] You can expect a call the business day prior to procedure to notify you if your arrival time changes    [x] If you receive a survey after surgery we would greatly appreciate your comments    [x] Please notify surgeon if you develop any

## 2024-10-02 ENCOUNTER — PREP FOR PROCEDURE (OUTPATIENT)
Dept: ORTHOPEDIC SURGERY | Age: 74
End: 2024-10-02

## 2024-10-02 RX ORDER — SODIUM CHLORIDE 9 MG/ML
INJECTION, SOLUTION INTRAVENOUS PRN
Status: CANCELLED | OUTPATIENT
Start: 2024-10-02

## 2024-10-02 RX ORDER — SODIUM CHLORIDE 0.9 % (FLUSH) 0.9 %
5-40 SYRINGE (ML) INJECTION EVERY 12 HOURS SCHEDULED
Status: CANCELLED | OUTPATIENT
Start: 2024-10-02

## 2024-10-02 RX ORDER — SODIUM CHLORIDE 0.9 % (FLUSH) 0.9 %
5-40 SYRINGE (ML) INJECTION PRN
Status: CANCELLED | OUTPATIENT
Start: 2024-10-02

## 2024-10-02 RX ORDER — SODIUM CHLORIDE 9 MG/ML
INJECTION, SOLUTION INTRAVENOUS CONTINUOUS
Status: CANCELLED | OUTPATIENT
Start: 2024-10-02

## 2024-10-02 RX ORDER — ACETAMINOPHEN 325 MG/1
1000 TABLET ORAL ONCE
Status: CANCELLED | OUTPATIENT
Start: 2024-10-02 | End: 2024-10-02
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
## 2024-10-03 ENCOUNTER — ANESTHESIA EVENT (OUTPATIENT)
Dept: OPERATING ROOM | Age: 74
End: 2024-10-03
Payer: MEDICARE

## 2024-10-03 ENCOUNTER — HOSPITAL ENCOUNTER (OUTPATIENT)
Age: 74
Setting detail: OUTPATIENT SURGERY
Discharge: HOME | End: 2024-10-03
Attending: STUDENT IN AN ORGANIZED HEALTH CARE EDUCATION/TRAINING PROGRAM | Admitting: STUDENT IN AN ORGANIZED HEALTH CARE EDUCATION/TRAINING PROGRAM
Payer: MEDICARE

## 2024-10-03 ENCOUNTER — ANESTHESIA (OUTPATIENT)
Dept: OPERATING ROOM | Age: 74
End: 2024-10-03
Payer: MEDICARE

## 2024-10-03 VITALS
WEIGHT: 220 LBS | HEIGHT: 70 IN | RESPIRATION RATE: 15 BRPM | DIASTOLIC BLOOD PRESSURE: 70 MMHG | OXYGEN SATURATION: 92 % | HEART RATE: 74 BPM | SYSTOLIC BLOOD PRESSURE: 129 MMHG | TEMPERATURE: 98 F | BODY MASS INDEX: 31.5 KG/M2

## 2024-10-03 DIAGNOSIS — M86.141 ACUTE OSTEOMYELITIS OF RIGHT HAND: ICD-10-CM

## 2024-10-03 PROCEDURE — 87116 MYCOBACTERIA CULTURE: CPT

## 2024-10-03 PROCEDURE — 2500000003 HC RX 250 WO HCPCS: Performed by: STUDENT IN AN ORGANIZED HEALTH CARE EDUCATION/TRAINING PROGRAM

## 2024-10-03 PROCEDURE — 2580000003 HC RX 258: Performed by: STUDENT IN AN ORGANIZED HEALTH CARE EDUCATION/TRAINING PROGRAM

## 2024-10-03 PROCEDURE — 87205 SMEAR GRAM STAIN: CPT

## 2024-10-03 PROCEDURE — 87015 SPECIMEN INFECT AGNT CONCNTJ: CPT

## 2024-10-03 PROCEDURE — 6360000002 HC RX W HCPCS

## 2024-10-03 PROCEDURE — 3600000002 HC SURGERY LEVEL 2 BASE: Performed by: STUDENT IN AN ORGANIZED HEALTH CARE EDUCATION/TRAINING PROGRAM

## 2024-10-03 PROCEDURE — 7100000011 HC PHASE II RECOVERY - ADDTL 15 MIN: Performed by: STUDENT IN AN ORGANIZED HEALTH CARE EDUCATION/TRAINING PROGRAM

## 2024-10-03 PROCEDURE — 6360000002 HC RX W HCPCS: Performed by: STUDENT IN AN ORGANIZED HEALTH CARE EDUCATION/TRAINING PROGRAM

## 2024-10-03 PROCEDURE — 2709999900 HC NON-CHARGEABLE SUPPLY: Performed by: STUDENT IN AN ORGANIZED HEALTH CARE EDUCATION/TRAINING PROGRAM

## 2024-10-03 PROCEDURE — 2580000003 HC RX 258

## 2024-10-03 PROCEDURE — 6370000000 HC RX 637 (ALT 250 FOR IP): Performed by: STUDENT IN AN ORGANIZED HEALTH CARE EDUCATION/TRAINING PROGRAM

## 2024-10-03 PROCEDURE — 87206 SMEAR FLUORESCENT/ACID STAI: CPT

## 2024-10-03 PROCEDURE — 3700000001 HC ADD 15 MINUTES (ANESTHESIA): Performed by: STUDENT IN AN ORGANIZED HEALTH CARE EDUCATION/TRAINING PROGRAM

## 2024-10-03 PROCEDURE — 2500000003 HC RX 250 WO HCPCS

## 2024-10-03 PROCEDURE — 87102 FUNGUS ISOLATION CULTURE: CPT

## 2024-10-03 PROCEDURE — 87075 CULTR BACTERIA EXCEPT BLOOD: CPT

## 2024-10-03 PROCEDURE — 87070 CULTURE OTHR SPECIMN AEROBIC: CPT

## 2024-10-03 PROCEDURE — 7100000010 HC PHASE II RECOVERY - FIRST 15 MIN: Performed by: STUDENT IN AN ORGANIZED HEALTH CARE EDUCATION/TRAINING PROGRAM

## 2024-10-03 PROCEDURE — 3600000012 HC SURGERY LEVEL 2 ADDTL 15MIN: Performed by: STUDENT IN AN ORGANIZED HEALTH CARE EDUCATION/TRAINING PROGRAM

## 2024-10-03 PROCEDURE — 87077 CULTURE AEROBIC IDENTIFY: CPT

## 2024-10-03 PROCEDURE — 3700000000 HC ANESTHESIA ATTENDED CARE: Performed by: STUDENT IN AN ORGANIZED HEALTH CARE EDUCATION/TRAINING PROGRAM

## 2024-10-03 RX ORDER — PROPOFOL 10 MG/ML
INJECTION, EMULSION INTRAVENOUS
Status: DISCONTINUED | OUTPATIENT
Start: 2024-10-03 | End: 2024-10-03 | Stop reason: SDUPTHER

## 2024-10-03 RX ORDER — SODIUM CHLORIDE 0.9 % (FLUSH) 0.9 %
5-40 SYRINGE (ML) INJECTION EVERY 12 HOURS SCHEDULED
Status: DISCONTINUED | OUTPATIENT
Start: 2024-10-03 | End: 2024-10-03 | Stop reason: HOSPADM

## 2024-10-03 RX ORDER — LIDOCAINE HYDROCHLORIDE 20 MG/ML
INJECTION, SOLUTION INFILTRATION; PERINEURAL
Status: DISCONTINUED | OUTPATIENT
Start: 2024-10-03 | End: 2024-10-03 | Stop reason: SDUPTHER

## 2024-10-03 RX ORDER — SODIUM CHLORIDE 0.9 % (FLUSH) 0.9 %
5-40 SYRINGE (ML) INJECTION PRN
Status: DISCONTINUED | OUTPATIENT
Start: 2024-10-03 | End: 2024-10-03 | Stop reason: HOSPADM

## 2024-10-03 RX ORDER — FENTANYL CITRATE 50 UG/ML
INJECTION, SOLUTION INTRAMUSCULAR; INTRAVENOUS
Status: DISCONTINUED | OUTPATIENT
Start: 2024-10-03 | End: 2024-10-03 | Stop reason: SDUPTHER

## 2024-10-03 RX ORDER — SULFAMETHOXAZOLE/TRIMETHOPRIM 800-160 MG
1 TABLET ORAL 2 TIMES DAILY
Qty: 14 TABLET | Refills: 0 | Status: SHIPPED | OUTPATIENT
Start: 2024-10-03 | End: 2024-10-10

## 2024-10-03 RX ORDER — OXYCODONE HYDROCHLORIDE 5 MG/1
5 TABLET ORAL
Status: CANCELLED | OUTPATIENT
Start: 2024-10-03 | End: 2024-10-04

## 2024-10-03 RX ORDER — SODIUM CHLORIDE 9 MG/ML
INJECTION, SOLUTION INTRAVENOUS
Status: DISCONTINUED | OUTPATIENT
Start: 2024-10-03 | End: 2024-10-03 | Stop reason: SDUPTHER

## 2024-10-03 RX ORDER — METHOCARBAMOL 750 MG/1
750 TABLET, FILM COATED ORAL 4 TIMES DAILY
Qty: 40 TABLET | Refills: 0 | Status: SHIPPED | OUTPATIENT
Start: 2024-10-03 | End: 2024-10-13

## 2024-10-03 RX ORDER — ONDANSETRON 2 MG/ML
INJECTION INTRAMUSCULAR; INTRAVENOUS
Status: DISCONTINUED | OUTPATIENT
Start: 2024-10-03 | End: 2024-10-03 | Stop reason: SDUPTHER

## 2024-10-03 RX ORDER — GINSENG 100 MG
CAPSULE ORAL PRN
Status: DISCONTINUED | OUTPATIENT
Start: 2024-10-03 | End: 2024-10-03 | Stop reason: ALTCHOICE

## 2024-10-03 RX ORDER — SODIUM CHLORIDE 9 MG/ML
INJECTION, SOLUTION INTRAVENOUS PRN
Status: CANCELLED | OUTPATIENT
Start: 2024-10-03

## 2024-10-03 RX ORDER — SODIUM CHLORIDE 0.9 % (FLUSH) 0.9 %
5-40 SYRINGE (ML) INJECTION EVERY 12 HOURS SCHEDULED
Status: CANCELLED | OUTPATIENT
Start: 2024-10-03

## 2024-10-03 RX ORDER — SODIUM CHLORIDE 0.9 % (FLUSH) 0.9 %
5-40 SYRINGE (ML) INJECTION PRN
Status: CANCELLED | OUTPATIENT
Start: 2024-10-03

## 2024-10-03 RX ORDER — SODIUM CHLORIDE 9 MG/ML
INJECTION, SOLUTION INTRAVENOUS PRN
Status: DISCONTINUED | OUTPATIENT
Start: 2024-10-03 | End: 2024-10-03 | Stop reason: HOSPADM

## 2024-10-03 RX ORDER — PROCHLORPERAZINE EDISYLATE 5 MG/ML
5 INJECTION INTRAMUSCULAR; INTRAVENOUS
Status: CANCELLED | OUTPATIENT
Start: 2024-10-03 | End: 2024-10-04

## 2024-10-03 RX ORDER — FENTANYL CITRATE 50 UG/ML
50 INJECTION, SOLUTION INTRAMUSCULAR; INTRAVENOUS EVERY 5 MIN PRN
Status: CANCELLED | OUTPATIENT
Start: 2024-10-03

## 2024-10-03 RX ORDER — ACETAMINOPHEN 500 MG
1000 TABLET ORAL ONCE
Status: COMPLETED | OUTPATIENT
Start: 2024-10-03 | End: 2024-10-03

## 2024-10-03 RX ORDER — NALOXONE HYDROCHLORIDE 0.4 MG/ML
INJECTION, SOLUTION INTRAMUSCULAR; INTRAVENOUS; SUBCUTANEOUS PRN
Status: CANCELLED | OUTPATIENT
Start: 2024-10-03

## 2024-10-03 RX ORDER — SODIUM CHLORIDE 9 MG/ML
INJECTION, SOLUTION INTRAVENOUS CONTINUOUS
Status: DISCONTINUED | OUTPATIENT
Start: 2024-10-03 | End: 2024-10-03 | Stop reason: HOSPADM

## 2024-10-03 RX ADMIN — LIDOCAINE HYDROCHLORIDE 60 MG: 20 INJECTION, SOLUTION INFILTRATION; PERINEURAL at 08:37

## 2024-10-03 RX ADMIN — SODIUM CHLORIDE: 9 INJECTION, SOLUTION INTRAVENOUS at 08:25

## 2024-10-03 RX ADMIN — PROPOFOL 175 MCG/KG/MIN: 10 INJECTION, EMULSION INTRAVENOUS at 08:38

## 2024-10-03 RX ADMIN — WATER 2000 MG: 1 INJECTION INTRAMUSCULAR; INTRAVENOUS; SUBCUTANEOUS at 08:39

## 2024-10-03 RX ADMIN — PROPOFOL 50 MG: 10 INJECTION, EMULSION INTRAVENOUS at 08:37

## 2024-10-03 RX ADMIN — ACETAMINOPHEN 1000 MG: 500 TABLET ORAL at 06:44

## 2024-10-03 RX ADMIN — ONDANSETRON 4 MG: 2 INJECTION INTRAMUSCULAR; INTRAVENOUS at 09:10

## 2024-10-03 RX ADMIN — FENTANYL CITRATE 25 MCG: 50 INJECTION, SOLUTION INTRAMUSCULAR; INTRAVENOUS at 08:49

## 2024-10-03 ASSESSMENT — LIFESTYLE VARIABLES: SMOKING_STATUS: 1

## 2024-10-03 ASSESSMENT — PAIN SCALES - GENERAL
PAINLEVEL_OUTOF10: 4
PAINLEVEL_OUTOF10: 0

## 2024-10-03 ASSESSMENT — PAIN DESCRIPTION - LOCATION: LOCATION: GENERALIZED

## 2024-10-03 ASSESSMENT — COPD QUESTIONNAIRES: CAT_SEVERITY: MODERATE

## 2024-10-03 ASSESSMENT — PAIN - FUNCTIONAL ASSESSMENT: PAIN_FUNCTIONAL_ASSESSMENT: 0-10

## 2024-10-03 NOTE — ANESTHESIA PRE PROCEDURE
comment: Dependence on supplemental oxygen PRN   Cardiovascular:  Exercise tolerance: good (>4 METS)  (+) hypertension:, past MI: > 6 months, CAD:, CABG/stent: no interval change, CHF: systolic and diastolic, hyperlipidemia      ECG reviewed  Rhythm: regular  Rate: normal  Echocardiogram reviewed  Stress test reviewed  Cleared by cardiology     Beta Blocker:  Dose within 24 Hrs      ROS comment: Ischemic cardiomyopathy    Stress test March 2023    Impression:    1. Electrocardiographically normal regadenoson infusion with a clinically  non-ischemic response  2. Myocardial perfusion imaging was normal.       3. Overall left ventricular systolic function was mildly reduced with mild global hypokinesis  4.  Intermediate risk general pharmacologic stress test, based on mildly reduced ejection fraction        Echo March 2022     Summary    Ejection fraction is visually estimated at 50-55%   Borderline dilated left ventricle.    Mild left ventricular concentric hypertrophy noted.    No regional wall motion abnormalities seen.    Low normal left ventricular systolic function.    The left atrium is mildly dilated.    Mild mitral regurgitation is present        Neuro/Psych:   (+) psychiatric history:depression/anxiety             GI/Hepatic/Renal:   (+) GERD:, renal disease: CRI          Endo/Other:    (+) blood dyscrasia (Plavix): anticoagulation therapy:., malignancy/cancer (lymphoma).                 Abdominal:             Vascular:          Other Findings:             Anesthesia Plan      MAC     ASA 3     (GA as back up)  Induction: intravenous.    MIPS: Postoperative opioids intended and Prophylactic antiemetics administered.  Anesthetic plan and risks discussed with patient.      Plan discussed with CRNA.                    Sandra Houston DO   10/3/2024

## 2024-10-03 NOTE — ANESTHESIA POSTPROCEDURE EVALUATION
Department of Anesthesiology  Postprocedure Note    Patient: Clark Maza  MRN: [id number]  YOB: 1950  Date of evaluation: 10/3/2024    Procedure Summary       Date: 10/03/24 Room / Location: [address]    Anesthesia Start: 0832 Anesthesia Stop: 0940    Procedure: RIGHT INDEX PARTIAL DISTAL AMPUTATION SECOND WEB SPACE DEEPENING (Right: Index Finger) Diagnosis:       Acute osteomyelitis of right hand      (Acute osteomyelitis of right hand (HCC) [M86.141])    Surgeons: Tremayne Hernández DO Responsible Provider: Sandra Houston DO    Anesthesia Type: MAC ASA Status: 3            Anesthesia Type: MAC    Laura Phase I: Laura Score: 10    Laura Phase II:      Anesthesia Post Evaluation    Patient location during evaluation: PACU  Patient participation: complete - patient participated  Level of consciousness: sleepy but conscious  Pain score: 0  Airway patency: patent  Nausea & Vomiting: no nausea and no vomiting  Cardiovascular status: blood pressure returned to baseline  Respiratory status: acceptable  Hydration status: stable  Pain management: adequate        No notable events documented.

## 2024-10-03 NOTE — DISCHARGE INSTRUCTIONS
[facility] Orthopedic Surgery  [address] , [address]  [place] [address]    Or    [address] [place] [address]    Dr. Tremayne Hernández, DO    Orthopaedics Discharge Instructions   Weight bearing Status - Non-weight bearing - on left upper Extremity  Pain medication Per Prescriptions  Contact Office for Medication Refill-  [phone]  Office can refill pain med every 7 days  If patient discharging to facility then pain control will be continued per facility physician  Ice to operative/injured site for 15-30 minutes of each hour for next 5 days    Recommend that you continue to ice the area 2-3 times per day after this   Elevate operative/injured limb on 2 pillows at home  Goal is to have limb above the heart if able  Wound care - Maintain Dressing, Keep Clean and Dry until follow up appointment , Don't Remove   Follow Up in Office in 10-14 days. Call Office to Confirm Appointment time and Location - [phone]    Call the office at [phone] for directions or with any questions.  Watch for these significant complications.  Call physician if they or any other problems occur:  Fever over 101°, redness, swelling or warmth at the operative site  Unrelieved nausea    Foul smelling or cloudy drainage at the operative site   Unrelieved pain    Blood soaked dressing. (Some oozing may be normal)     Numb, pale, blue, cold or tingling extremity
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
## 2024-10-03 NOTE — PROGRESS NOTES
0222 Dr Houston notified of decreased o2 sat on pt without o2. Pt wears o2 at home 2 to 4 liters. Pt currently 88% on room air. Pt ok to go home with sat 88 to 90% on room air

## 2024-10-04 NOTE — OP NOTE
Operative Note      Patient: Clark ZURITA Postlethwait  YOB: 1950  MRN: [id number]    Date of Procedure: 10/3/2024    Pre-op diagnosis:  Right index distal phalanx osteomyelitis  Right second webspace partial syndactyly    Post-Op Diagnosis: Same       Procedures:  Right second webspace syndactyly repair with only skin flaps  Right index finger distal phalanx partial amputation    Surgeon(s):  Tremayne Hernández DO    Assistant:   Pavan Fu PA-C    Anesthesia: Monitor Anesthesia Care    Estimated Blood Loss (mL): Minimal    Complications: None    Specimens:   ID Type Source Tests Collected by Time Destination   1 : RIGHT INDEX FINGER BONE Bone Bone CULTURE, ANAEROBIC, CULTURE, FUNGUS, GRAM STAIN (Canceled), CULTURE, SURGICAL, CULTURE WITH SMEAR, ACID FAST BACILLIUS Tremayne Hernández DO 10/3/2024 0848        Implants:  * No implants in log *      Drains: * No LDAs found *    Findings:  Infection Present At Time Of Surgery (PATOS) (choose all levels that have infection present):  - Superficial Infection (skin/subcutaneous) present as evidenced by purulent fluid      Detailed Description of Procedure:       PROCEDURE IN DETAIL: The patient was identified in the holding area. The Right hand was identified as the operative site. He was then seen by  Anesthesia, taken to the operating room, placed supine on the table, and underwent monitored anesthesia care per Anesthesia Department. Under sterile conditions, approximately 10 mL of 1:1 mixture of 1% lidocaine with epinephrine were infiltrated over the surgical site. With this accomplished, a well-padded arm tourniquet was placed. The Right upper extremity was prepared and draped in standard sterile fashion. The arm was exsanguinated and the tourniquet was inflated to 250 mmHg.      Attention was first made to the second webspace.  The syndactyly flaps were drawn out with a surgical marker.  The skin flaps were then carefully released with a 15 blade scalpel along  the preplanned repair pattern.  The skin flaps were then carefully elevated.  The second webspace was then released and deepened to the neurovascular bundle division.  The neurovascular bundle was protected up the case.  After fully release of the second webspace.  The skin flaps were then carefully inserted into appropriate positions and sutured with 4-0 chromic sutures.    Then attention was made to the index finger.  A fishmouth incision was made with a 15 blade scalpel.  This was then carefully elevated off the proximal volar aspect of the distal phalanx.  The distal part of the distal phalanx was then debrided and removed with a rongeur and sent as specimen.  The canal was then curetted of all interposed tissue.  The wound was then copiously irrigated with normal saline.  Tourniquet was then deflated.  Skin flaps had good blood flow.  The index finger was then closed with 4-0 chromic sutures.  Normal sterile dressing was applied.  Patient was then woken up with anesthesia staff.  Patient was transferred safely to his hospital bed.  Patient is then transferred safely to PACU.      Electronically signed by Tremayne Hernández DO on 10/4/2024 at 5:40 AM

## 2024-10-05 LAB
MICROORGANISM SPEC CULT: NORMAL
SERVICE CMNT-IMP: NORMAL
SPECIMEN DESCRIPTION: NORMAL

## 2024-10-06 LAB
MICROORGANISM SPEC CULT: ABNORMAL
MICROORGANISM/AGENT SPEC: ABNORMAL
SERVICE CMNT-IMP: ABNORMAL
SPECIMEN DESCRIPTION: ABNORMAL

## 2024-10-08 ENCOUNTER — HOSPITAL ENCOUNTER (OUTPATIENT)
Dept: CARDIOLOGY | Age: 74
Discharge: HOME | End: 2024-10-10
Attending: FAMILY MEDICINE
Payer: MEDICARE

## 2024-10-08 VITALS
BODY MASS INDEX: 33.19 KG/M2 | WEIGHT: 219 LBS | DIASTOLIC BLOOD PRESSURE: 68 MMHG | SYSTOLIC BLOOD PRESSURE: 128 MMHG | HEIGHT: 68 IN

## 2024-10-08 DIAGNOSIS — R06.02 SHORTNESS OF BREATH: ICD-10-CM

## 2024-10-08 LAB
ECHO BSA: 2.18 M2
ECHO LA VOL A-L A2C: 35 ML (ref 18–58)
ECHO LA VOL A-L A4C: 42 ML (ref 18–58)
ECHO LA VOL MOD A2C: 35 ML (ref 18–58)
ECHO LA VOL MOD A4C: 40 ML (ref 18–58)
ECHO LA VOLUME AREA LENGTH: 39 ML
ECHO LA VOLUME INDEX A-L A2C: 17 ML/M2 (ref 16–34)
ECHO LA VOLUME INDEX A-L A4C: 20 ML/M2 (ref 16–34)
ECHO LA VOLUME INDEX AREA LENGTH: 18 ML/M2 (ref 16–34)
ECHO LA VOLUME INDEX MOD A2C: 17 ML/M2 (ref 16–34)
ECHO LA VOLUME INDEX MOD A4C: 19 ML/M2 (ref 16–34)
ECHO LV EDV A2C: 190 ML
ECHO LV EDV A4C: 167 ML
ECHO LV EDV BP: 179 ML (ref 67–155)
ECHO LV EDV INDEX A4C: 79 ML/M2
ECHO LV EDV INDEX BP: 84 ML/M2
ECHO LV EDV NDEX A2C: 90 ML/M2
ECHO LV EF PHYSICIAN: 30 %
ECHO LV EJECTION FRACTION A2C: 33 %
ECHO LV EJECTION FRACTION A4C: 26 %
ECHO LV EJECTION FRACTION BIPLANE: 29 % (ref 55–100)
ECHO LV ESV A2C: 128 ML
ECHO LV ESV A4C: 123 ML
ECHO LV ESV BP: 126 ML (ref 22–58)
ECHO LV ESV INDEX A2C: 60 ML/M2
ECHO LV ESV INDEX A4C: 58 ML/M2
ECHO LV ESV INDEX BP: 59 ML/M2
ECHO LV FRACTIONAL SHORTENING: 16 % (ref 28–44)
ECHO LV INTERNAL DIMENSION DIASTOLE INDEX: 2.59 CM/M2
ECHO LV INTERNAL DIMENSION DIASTOLIC: 5.5 CM (ref 4.2–5.9)
ECHO LV INTERNAL DIMENSION SYSTOLIC INDEX: 2.17 CM/M2
ECHO LV INTERNAL DIMENSION SYSTOLIC: 4.6 CM
ECHO LV IVSD: 1.3 CM (ref 0.6–1)
ECHO LV MASS 2D: 272.4 G (ref 88–224)
ECHO LV MASS INDEX 2D: 128.5 G/M2 (ref 49–115)
ECHO LV POSTERIOR WALL DIASTOLIC: 1.1 CM (ref 0.6–1)
ECHO LV RELATIVE WALL THICKNESS RATIO: 0.4
ECHO MV A VELOCITY: 0.71 M/S
ECHO MV E DECELERATION TIME (DT): 247.7 MS
ECHO MV E VELOCITY: 0.45 M/S
ECHO MV E/A RATIO: 0.63
ECHO MV MAX VELOCITY: 0.7 M/S
ECHO MV MEAN GRADIENT: 1 MMHG
ECHO MV MEAN VELOCITY: 0.4 M/S
ECHO MV PEAK GRADIENT: 2 MMHG
ECHO MV VTI: 22.6 CM

## 2024-10-08 PROCEDURE — 93308 TTE F-UP OR LMTD: CPT

## 2024-10-09 LAB
MICROORGANISM SPEC CULT: NORMAL
MICROORGANISM SPEC CULT: NORMAL
MICROORGANISM/AGENT SPEC: NORMAL
MICROORGANISM/AGENT SPEC: NORMAL
SERVICE CMNT-IMP: NORMAL
SERVICE CMNT-IMP: NORMAL
SPECIMEN DESCRIPTION: NORMAL
SPECIMEN DESCRIPTION: NORMAL

## 2024-10-09 NOTE — DISCHARGE INSTRUCTIONS
Visit Discharge/Physician Orders    Discharge condition: Stable    Assessment of pain at discharge: mild    Anesthetic used: 4% Lidocaine    Discharge to: Home    Left via:Private automobile    Accompanied by: accompanied by self    ECF/HHA: [place]    Dressing Orders: Clean wounds with normal saline. Apply silver alginate and secure with dry dressings. Change daily.    Treatment Orders:  Follow a nutritious diet. Choose foods high in protein: chicken, fish, and eggs. Choose foods high in Vitamin C. Multivitamin daily unless contraindicated. Please keep dressings clean and dry, do not work in garage while healing. Time dressing changes around work in garage if done.    Wound culture taken in clinic today.      [facility] followup visit ______1  week_______________________  (Please note your next appointment above and if you are unable to keep, kindly give a 24 hour notice. Thank you.)    Physician signature:__________________________      If you experience any of the following, please call the Wound Care Center during business hours:    * Increase in Pain  * Temperature over 101  * Increase in drainage from your wound  * Drainage with a foul odor  * Bleeding  * Increase in swelling  * Need for compression bandage changes due to slippage, breakthrough drainage.    If you need medical attention outside of the business hours of the Wound Care Centers please contact your PCP or go to the nearest emergency room.

## 2024-10-14 LAB
MICROORGANISM SPEC CULT: NORMAL
MICROORGANISM/AGENT SPEC: NORMAL
SERVICE CMNT-IMP: NORMAL
SPECIMEN DESCRIPTION: NORMAL

## 2024-10-15 ENCOUNTER — HOSPITAL ENCOUNTER (OUTPATIENT)
Dept: WOUND CARE | Age: 74
Discharge: HOME | End: 2024-10-15
Payer: MEDICARE

## 2024-10-15 ENCOUNTER — TELEPHONE (OUTPATIENT)
Dept: CARDIOLOGY CLINIC | Age: 74
End: 2024-10-15

## 2024-10-15 VITALS
SYSTOLIC BLOOD PRESSURE: 119 MMHG | HEART RATE: 76 BPM | TEMPERATURE: 97.5 F | HEIGHT: 68 IN | DIASTOLIC BLOOD PRESSURE: 81 MMHG | RESPIRATION RATE: 24 BRPM | BODY MASS INDEX: 31.83 KG/M2 | WEIGHT: 210 LBS

## 2024-10-15 DIAGNOSIS — Z71.6 ENCOUNTER FOR TOBACCO USE CESSATION COUNSELING: ICD-10-CM

## 2024-10-15 DIAGNOSIS — N18.31 STAGE 3A CHRONIC KIDNEY DISEASE (HCC): ICD-10-CM

## 2024-10-15 DIAGNOSIS — G47.33 OBSTRUCTIVE SLEEP APNEA: ICD-10-CM

## 2024-10-15 DIAGNOSIS — I50.42 CHRONIC COMBINED SYSTOLIC AND DIASTOLIC CONGESTIVE HEART FAILURE (HCC): ICD-10-CM

## 2024-10-15 DIAGNOSIS — T81.31XD SURGICAL WOUND DEHISCENCE, SUBSEQUENT ENCOUNTER: ICD-10-CM

## 2024-10-15 DIAGNOSIS — J44.9 CHRONIC OBSTRUCTIVE PULMONARY DISEASE, UNSPECIFIED COPD TYPE (HCC): ICD-10-CM

## 2024-10-15 DIAGNOSIS — I25.118 CORONARY ARTERY DISEASE OF NATIVE ARTERY OF NATIVE HEART WITH STABLE ANGINA PECTORIS (HCC): ICD-10-CM

## 2024-10-15 DIAGNOSIS — T81.31XD DEHISCENCE OF OPERATIVE WOUND, SUBSEQUENT ENCOUNTER: Primary | ICD-10-CM

## 2024-10-15 DIAGNOSIS — F17.200 TOBACCO USE DISORDER: ICD-10-CM

## 2024-10-15 PROBLEM — T81.31XA RUPTURE OF OPERATION WOUND: Status: ACTIVE | Noted: 2024-10-15

## 2024-10-15 PROCEDURE — 11042 DBRDMT SUBQ TIS 1ST 20SQCM/<: CPT | Performed by: SURGERY

## 2024-10-15 PROCEDURE — 87205 SMEAR GRAM STAIN: CPT

## 2024-10-15 PROCEDURE — 87070 CULTURE OTHR SPECIMN AEROBIC: CPT

## 2024-10-15 PROCEDURE — 99214 OFFICE O/P EST MOD 30 MIN: CPT | Performed by: SURGERY

## 2024-10-15 PROCEDURE — 11042 DBRDMT SUBQ TIS 1ST 20SQCM/<: CPT

## 2024-10-15 PROCEDURE — 99213 OFFICE O/P EST LOW 20 MIN: CPT

## 2024-10-15 PROCEDURE — 86403 PARTICLE AGGLUT ANTBDY SCRN: CPT

## 2024-10-15 RX ORDER — LIDOCAINE 40 MG/G
CREAM TOPICAL ONCE
OUTPATIENT
Start: 2024-10-15 | End: 2024-10-15

## 2024-10-15 RX ORDER — SODIUM CHLOR/HYPOCHLOROUS ACID 0.033 %
SOLUTION, IRRIGATION IRRIGATION ONCE
OUTPATIENT
Start: 2024-10-15 | End: 2024-10-15

## 2024-10-15 RX ORDER — BACITRACIN ZINC AND POLYMYXIN B SULFATE 500; 1000 [USP'U]/G; [USP'U]/G
OINTMENT TOPICAL ONCE
OUTPATIENT
Start: 2024-10-15 | End: 2024-10-15

## 2024-10-15 RX ORDER — TRIAMCINOLONE ACETONIDE 1 MG/G
OINTMENT TOPICAL ONCE
OUTPATIENT
Start: 2024-10-15 | End: 2024-10-15

## 2024-10-15 RX ORDER — MUPIROCIN 20 MG/G
OINTMENT TOPICAL ONCE
OUTPATIENT
Start: 2024-10-15 | End: 2024-10-15

## 2024-10-15 RX ORDER — GINSENG 100 MG
CAPSULE ORAL ONCE
OUTPATIENT
Start: 2024-10-15 | End: 2024-10-15

## 2024-10-15 RX ORDER — LIDOCAINE 50 MG/G
OINTMENT TOPICAL ONCE
OUTPATIENT
Start: 2024-10-15 | End: 2024-10-15

## 2024-10-15 RX ORDER — BETAMETHASONE DIPROPIONATE 0.5 MG/G
CREAM TOPICAL ONCE
OUTPATIENT
Start: 2024-10-15 | End: 2024-10-15

## 2024-10-15 RX ORDER — CLOBETASOL PROPIONATE 0.5 MG/G
OINTMENT TOPICAL ONCE
OUTPATIENT
Start: 2024-10-15 | End: 2024-10-15

## 2024-10-15 RX ORDER — NEOMYCIN/BACITRACIN/POLYMYXINB 3.5-400-5K
OINTMENT (GRAM) TOPICAL ONCE
OUTPATIENT
Start: 2024-10-15 | End: 2024-10-15

## 2024-10-15 RX ORDER — SILVER SULFADIAZINE 10 MG/G
CREAM TOPICAL ONCE
OUTPATIENT
Start: 2024-10-15 | End: 2024-10-15

## 2024-10-15 RX ORDER — LIDOCAINE HYDROCHLORIDE 40 MG/ML
SOLUTION TOPICAL ONCE
OUTPATIENT
Start: 2024-10-15 | End: 2024-10-15

## 2024-10-15 RX ORDER — GENTAMICIN SULFATE 1 MG/G
OINTMENT TOPICAL ONCE
OUTPATIENT
Start: 2024-10-15 | End: 2024-10-15

## 2024-10-15 NOTE — PROGRESS NOTES
No    Debribement Type: Excisional/Sharp    Patient currently being seen by Home Health: [] Yes   [x] No    Duration for needed supplies:  []15  [x]30  []60  []90 Days    Electronically signed by Sarah Apple RN on 10/15/2024 at 3:02 PM     Provider Information:      PROVIDER'S NAME: Dr. Torres    NPI: [id number]   
from the base of the wound, do not scrub the wound.  -this will help remove and detritus or debris that could impede wound healing as well as removing devitalized tissue that could promote infection  -keep the wound area clean and dry at all times (we will manage the moisture in the wound bed with prescribed dressings)  -we need to avoid over-drying and over-wetting the wound ... which can delay healing  -protect the area from further injury   -avoid pressure, friction, irritation  -managing underlying medical conditions that impact wound healing  -as an example, controlling diabetes (A1c < 6.8%), reducing swelling, quitting smoking/nicotine use, avoiding NSAIDs (Tylenol for pain)  -controlling pain  -avoid NSAIDs as there is data that suggests these can delay healing and inhibit neogenesis of vessels in a healing wound bed  -monitor for signs of infection  -cardinal s/s redness, swelling, warmth, pain, purulent drainage, loss of function  -If alarm signs or symptoms develop as we discussed, report immediately to the emergency department/dial 9-1-1.  Seeking prompt treatment is paramount.  -nutrtion   -balanced diet rich in protein, low glycemic index carbohydrates and proper fats (as noted in handouts), vitamins is key to wound healing.  -hydration   -adequate hydration supports proper blood flow and wound health  -Stay hydrated.  Unless instructed otherwise by your physician, you should strive to drink at least ten 8 ounce glasses of water daily (80oz total), some of these being fortified with electrolytes (such as a Pedialyte, low calorie sports drink, or at mealtime).  Avoid added sugars.  -rest and activity  -allow the body proper time to heal. Avoid aggravating activities or anything that could run contrary to the above general wound care advice.  -this is a balance as you need to keep mobile to promote circulation for wound healing.    General Advice on Wound Care Dressings:  If wound contains bioburden or

## 2024-10-15 NOTE — TELEPHONE ENCOUNTER
Error     Mom called    Mom says that Charly seemed more congested today so she tested him for COVID which came back as positive. He has a normal temperature but is being fussy. Mom is wondering if he should be brought in or if there is anything else they should do now that he has tested positive.

## 2024-10-18 LAB
MICROORGANISM SPEC CULT: ABNORMAL
MICROORGANISM/AGENT SPEC: ABNORMAL
SPECIMEN DESCRIPTION: ABNORMAL

## 2024-10-18 RX ORDER — HYDRALAZINE HYDROCHLORIDE 25 MG/1
25 TABLET, FILM COATED ORAL 4 TIMES DAILY
Qty: 360 TABLET | Refills: 1 | Status: SHIPPED | OUTPATIENT
Start: 2024-10-18

## 2024-10-18 NOTE — DISCHARGE INSTRUCTIONS
Visit Discharge/Physician Orders     Discharge condition: Stable     Assessment of pain at discharge: mild     Anesthetic used: 4% Lidocaine     Discharge to: Home     Left via:Private automobile     Accompanied by: accompanied by self     ECF/HHA: [place]     Dressing Orders: Clean wounds with acetic acid. Apply silver alginate and secure with dry dressings. Change daily.     Treatment Orders:  Follow a nutritious diet. Choose foods high in protein: chicken, fish, and eggs. Choose foods high in Vitamin C. Multivitamin daily unless contraindicated. Please keep dressings clean and dry, do not work in garage while healing. Time dressing changes around work in garage if done.     Wound culture taken in clinic today- reviewed        [facility] followup visit ______1  week_______________________  (Please note your next appointment above and if you are unable to keep, kindly give a 24 hour notice. Thank you.)     Physician signature:__________________________        If you experience any of the following, please call the Wound Care Center during business hours:     * Increase in Pain  * Temperature over 101  * Increase in drainage from your wound  * Drainage with a foul odor  * Bleeding  * Increase in swelling  * Need for compression bandage changes due to slippage, breakthrough drainage.     If you need medical attention outside of the business hours of the Wound Care Centers please contact your PCP or go to the nearest emergency room.

## 2024-10-22 ENCOUNTER — HOSPITAL ENCOUNTER (OUTPATIENT)
Dept: WOUND CARE | Age: 74
Discharge: HOME | End: 2024-10-22
Payer: MEDICARE

## 2024-10-22 VITALS
HEART RATE: 70 BPM | TEMPERATURE: 97 F | RESPIRATION RATE: 18 BRPM | DIASTOLIC BLOOD PRESSURE: 79 MMHG | SYSTOLIC BLOOD PRESSURE: 155 MMHG | HEIGHT: 68 IN | BODY MASS INDEX: 31.83 KG/M2 | WEIGHT: 210 LBS

## 2024-10-22 DIAGNOSIS — L98.492: ICD-10-CM

## 2024-10-22 DIAGNOSIS — T81.31XD SURGICAL WOUND DEHISCENCE, SUBSEQUENT ENCOUNTER: Primary | ICD-10-CM

## 2024-10-22 PROCEDURE — 11042 DBRDMT SUBQ TIS 1ST 20SQCM/<: CPT | Performed by: NURSE PRACTITIONER

## 2024-10-22 PROCEDURE — 11042 DBRDMT SUBQ TIS 1ST 20SQCM/<: CPT

## 2024-10-22 RX ORDER — BETAMETHASONE DIPROPIONATE 0.5 MG/G
CREAM TOPICAL ONCE
OUTPATIENT
Start: 2024-10-22 | End: 2024-10-22

## 2024-10-22 RX ORDER — LIDOCAINE HYDROCHLORIDE 40 MG/ML
SOLUTION TOPICAL ONCE
Status: COMPLETED | OUTPATIENT
Start: 2024-10-22 | End: 2024-10-22

## 2024-10-22 RX ORDER — GINSENG 100 MG
CAPSULE ORAL ONCE
OUTPATIENT
Start: 2024-10-22 | End: 2024-10-22

## 2024-10-22 RX ORDER — GENTAMICIN SULFATE 1 MG/G
OINTMENT TOPICAL ONCE
OUTPATIENT
Start: 2024-10-22 | End: 2024-10-22

## 2024-10-22 RX ORDER — LIDOCAINE HYDROCHLORIDE 40 MG/ML
SOLUTION TOPICAL ONCE
OUTPATIENT
Start: 2024-10-22 | End: 2024-10-22

## 2024-10-22 RX ORDER — BACITRACIN ZINC AND POLYMYXIN B SULFATE 500; 1000 [USP'U]/G; [USP'U]/G
OINTMENT TOPICAL ONCE
OUTPATIENT
Start: 2024-10-22 | End: 2024-10-22

## 2024-10-22 RX ORDER — LIDOCAINE 40 MG/G
CREAM TOPICAL ONCE
OUTPATIENT
Start: 2024-10-22 | End: 2024-10-22

## 2024-10-22 RX ORDER — SODIUM CHLOR/HYPOCHLOROUS ACID 0.033 %
SOLUTION, IRRIGATION IRRIGATION ONCE
OUTPATIENT
Start: 2024-10-22 | End: 2024-10-22

## 2024-10-22 RX ORDER — MUPIROCIN 20 MG/G
OINTMENT TOPICAL ONCE
OUTPATIENT
Start: 2024-10-22 | End: 2024-10-22

## 2024-10-22 RX ORDER — TRIAMCINOLONE ACETONIDE 1 MG/G
OINTMENT TOPICAL ONCE
OUTPATIENT
Start: 2024-10-22 | End: 2024-10-22

## 2024-10-22 RX ORDER — NEOMYCIN/BACITRACIN/POLYMYXINB 3.5-400-5K
OINTMENT (GRAM) TOPICAL ONCE
OUTPATIENT
Start: 2024-10-22 | End: 2024-10-22

## 2024-10-22 RX ORDER — SILVER SULFADIAZINE 10 MG/G
CREAM TOPICAL ONCE
OUTPATIENT
Start: 2024-10-22 | End: 2024-10-22

## 2024-10-22 RX ORDER — LIDOCAINE 50 MG/G
OINTMENT TOPICAL ONCE
OUTPATIENT
Start: 2024-10-22 | End: 2024-10-22

## 2024-10-22 RX ORDER — CLOBETASOL PROPIONATE 0.5 MG/G
OINTMENT TOPICAL ONCE
OUTPATIENT
Start: 2024-10-22 | End: 2024-10-22

## 2024-10-22 RX ADMIN — LIDOCAINE HYDROCHLORIDE 5 ML: 40 SOLUTION TOPICAL at 14:11

## 2024-10-22 NOTE — PROGRESS NOTES
patient dismissal instructions given to patient and signed by patient or POA.           General Wound Care Advice:  -clean with normal saline, loosely woven gauze to gently wipe away devitalized tissue from the base of the wound, do not scrub the wound.  -this will help remove and detritus or debris that could impede wound healing as well as removing devitalized tissue that could promote infection  -keep the wound area clean and dry at all times (we will manage the moisture in the wound bed with prescribed dressings)  -we need to avoid over-drying and over-wetting the wound ... which can delay healing  -protect the area from further injury   -avoid pressure, friction, irritation  -managing underlying medical conditions that impact wound healing  -as an example, controlling diabetes (A1c < 6.8%), reducing swelling, quitting smoking/nicotine use, avoiding NSAIDs (Tylenol for pain)  -controlling pain  -avoid NSAIDs as there is data that suggests these can delay healing and inhibit neogenesis of vessels in a healing wound bed  -monitor for signs of infection  -cardinal s/s redness, swelling, warmth, pain, purulent drainage, loss of function  -If alarm signs or symptoms develop as we discussed, report immediately to the emergency department/dial 9-1-1.  Seeking prompt treatment is paramount.  -nutrtion   -balanced diet rich in protein, low glycemic index carbohydrates and proper fats (as noted in handouts), vitamins is key to wound healing.  -hydration   -adequate hydration supports proper blood flow and wound health  -Stay hydrated.  Unless instructed otherwise by your physician, you should strive to drink at least ten 8 ounce glasses of water daily (80oz total), some of these being fortified with electrolytes (such as a Pedialyte, low calorie sports drink, or at mealtime).  Avoid added sugars.  -rest and activity  -allow the body proper time to heal. Avoid aggravating activities or anything that could run contrary to

## 2024-10-24 ENCOUNTER — OFFICE VISIT (OUTPATIENT)
Dept: FAMILY MEDICINE CLINIC | Age: 74
End: 2024-10-24

## 2024-10-24 VITALS
TEMPERATURE: 97.7 F | HEART RATE: 80 BPM | HEIGHT: 68 IN | OXYGEN SATURATION: 90 % | DIASTOLIC BLOOD PRESSURE: 84 MMHG | SYSTOLIC BLOOD PRESSURE: 136 MMHG | WEIGHT: 219 LBS | RESPIRATION RATE: 18 BRPM | BODY MASS INDEX: 33.19 KG/M2

## 2024-10-24 DIAGNOSIS — G89.29 CHRONIC RIGHT SHOULDER PAIN: ICD-10-CM

## 2024-10-24 DIAGNOSIS — M25.511 CHRONIC RIGHT SHOULDER PAIN: ICD-10-CM

## 2024-10-24 DIAGNOSIS — T81.31XD POSTOPERATIVE DEHISCENCE OF SKIN WOUND, SUBSEQUENT ENCOUNTER: Primary | ICD-10-CM

## 2024-10-24 DIAGNOSIS — N40.1 BENIGN PROSTATIC HYPERPLASIA WITH NOCTURIA: ICD-10-CM

## 2024-10-24 DIAGNOSIS — R35.1 BENIGN PROSTATIC HYPERPLASIA WITH NOCTURIA: ICD-10-CM

## 2024-10-24 RX ORDER — TAMSULOSIN HYDROCHLORIDE 0.4 MG/1
0.4 CAPSULE ORAL DAILY
Qty: 90 CAPSULE | Refills: 1 | Status: SHIPPED | OUTPATIENT
Start: 2024-10-24

## 2024-10-24 RX ORDER — HYDROCODONE BITARTRATE AND ACETAMINOPHEN 7.5; 325 MG/1; MG/1
1 TABLET ORAL EVERY 6 HOURS PRN
Qty: 90 TABLET | Refills: 0 | Status: SHIPPED | OUTPATIENT
Start: 2024-10-24 | End: 2024-11-23

## 2024-10-24 RX ORDER — MUPIROCIN 20 MG/G
OINTMENT TOPICAL
Qty: 30 G | Refills: 1 | Status: SHIPPED | OUTPATIENT
Start: 2024-10-24

## 2024-10-24 ASSESSMENT — ENCOUNTER SYMPTOMS
WHEEZING: 0
BLOOD IN STOOL: 0
CONSTIPATION: 0
DIARRHEA: 0

## 2024-10-24 NOTE — PROGRESS NOTES
Clark Maza (:  1950) is a 74 y.o. male,Established patient, here for evaluation of the following chief complaint(s):  Shoulder Pain (Med refill )      Assessment & Plan   ASSESSMENT/PLAN:  Assessment & Plan  Chronic right shoulder pain   Chronic, at goal (stable), continue current treatment plan    Orders:    HYDROcodone-acetaminophen (NORCO) 7.5-325 MG per tablet; Take 1 tablet by mouth every 6 hours as needed for Pain for up to 30 days. Max Daily Amount: 4 tablets  Controlled substances monitoring: no signs of potential drug abuse or diversion identified and OARRS report reviewed today- activity consistent with treatment plan.   Postoperative dehiscence of skin wound, subsequent encounter   New, not at goal (unstable), changes made today: wound ulcerated and open edges, Rx bactroban today    Orders:    mupirocin (BACTROBAN) 2 % ointment; Apply topically 3 times daily.         No follow-ups on file.         Subjective   SUBJECTIVE/OBJECTIVE:  Shoulder Pain (Med refill )      Shoulder Pain         Review of Systems   Constitutional:  Negative for chills and diaphoresis.   HENT:  Negative for ear discharge, ear pain, hearing loss, nosebleeds and tinnitus.    Respiratory:  Negative for wheezing.    Cardiovascular:  Negative for chest pain.   Gastrointestinal:  Negative for blood in stool, constipation and diarrhea.   Genitourinary:  Negative for dysuria, flank pain and hematuria.   Skin:  Negative for rash.   Neurological:  Negative for headaches.   Hematological:  Does not bruise/bleed easily.          Objective   /84   Pulse 80   Temp 97.7 °F (36.5 °C)   Resp 18   Ht 1.727 m (5' 7.99\")   Wt 99.3 kg (219 lb)   SpO2 90%   BMI 33.31 kg/m²   Lab Results   Component Value Date    LABA1C 6.0 (H) 2024    LABA1C 5.6 2018    LABA1C 6.0 2018     Physical Exam  Eyes:      General:         Right eye: No discharge.         Left eye: No discharge.   Cardiovascular:      Rate and

## 2024-10-24 NOTE — ASSESSMENT & PLAN NOTE
Chronic, at goal (stable), continue current treatment plan    Orders:    HYDROcodone-acetaminophen (NORCO) 7.5-325 MG per tablet; Take 1 tablet by mouth every 6 hours as needed for Pain for up to 30 days. Max Daily Amount: 4 tablets  Controlled substances monitoring: no signs of potential drug abuse or diversion identified and OARRS report reviewed today- activity consistent with treatment plan.

## 2024-10-25 NOTE — DISCHARGE INSTRUCTIONS
Visit Discharge/Physician Orders     Discharge condition: Stable     Assessment of pain at discharge: mild     Anesthetic used: 4% Lidocaine     Discharge to: Home     Left via:Private automobile     Accompanied by: accompanied by self     ECF/HHA: [place]     Dressing Orders: Clean wounds with acetic acid. Apply silver collagen and secure with dry dressings. Change daily.     Treatment Orders:  Follow a nutritious diet. Choose foods high in protein: chicken, fish, and eggs. Choose foods high in Vitamin C. Multivitamin daily unless contraindicated. Please keep dressings clean and dry, do not work in garage while healing. Time dressing changes around work in garage if done.     Wound culture taken in clinic today- reviewed        [facility] followup visit ______1  week_______________________  (Please note your next appointment above and if you are unable to keep, kindly give a 24 hour notice. Thank you.)     Physician signature:__________________________        If you experience any of the following, please call the Wound Care Center during business hours:     * Increase in Pain  * Temperature over 101  * Increase in drainage from your wound  * Drainage with a foul odor  * Bleeding  * Increase in swelling  * Need for compression bandage changes due to slippage, breakthrough drainage.     If you need medical attention outside of the business hours of the Wound Care Centers please contact your PCP or go to the nearest emergency room.

## 2024-10-29 ENCOUNTER — HOSPITAL ENCOUNTER (OUTPATIENT)
Dept: WOUND CARE | Age: 74
Discharge: HOME | End: 2024-10-29
Attending: SURGERY
Payer: MEDICARE

## 2024-10-29 VITALS
HEART RATE: 72 BPM | RESPIRATION RATE: 18 BRPM | BODY MASS INDEX: 34.37 KG/M2 | WEIGHT: 219 LBS | HEIGHT: 67 IN | DIASTOLIC BLOOD PRESSURE: 70 MMHG | TEMPERATURE: 97.8 F | SYSTOLIC BLOOD PRESSURE: 124 MMHG

## 2024-10-29 DIAGNOSIS — T81.31XD SURGICAL WOUND DEHISCENCE, SUBSEQUENT ENCOUNTER: Primary | ICD-10-CM

## 2024-10-29 PROBLEM — L98.492 SKIN ULCER WITH FAT LAYER EXPOSED (HCC): Status: ACTIVE | Noted: 2024-10-29

## 2024-10-29 PROCEDURE — 11042 DBRDMT SUBQ TIS 1ST 20SQCM/<: CPT

## 2024-10-29 RX ORDER — MUPIROCIN 20 MG/G
OINTMENT TOPICAL ONCE
OUTPATIENT
Start: 2024-10-29 | End: 2024-10-29

## 2024-10-29 RX ORDER — SILVER SULFADIAZINE 10 MG/G
CREAM TOPICAL ONCE
OUTPATIENT
Start: 2024-10-29 | End: 2024-10-29

## 2024-10-29 RX ORDER — GENTAMICIN SULFATE 1 MG/G
OINTMENT TOPICAL ONCE
OUTPATIENT
Start: 2024-10-29 | End: 2024-10-29

## 2024-10-29 RX ORDER — BACITRACIN ZINC AND POLYMYXIN B SULFATE 500; 1000 [USP'U]/G; [USP'U]/G
OINTMENT TOPICAL ONCE
OUTPATIENT
Start: 2024-10-29 | End: 2024-10-29

## 2024-10-29 RX ORDER — LIDOCAINE HYDROCHLORIDE 40 MG/ML
SOLUTION TOPICAL ONCE
OUTPATIENT
Start: 2024-10-29 | End: 2024-10-29

## 2024-10-29 RX ORDER — LIDOCAINE HYDROCHLORIDE 40 MG/ML
SOLUTION TOPICAL ONCE
Status: COMPLETED | OUTPATIENT
Start: 2024-10-29 | End: 2024-10-29

## 2024-10-29 RX ORDER — NEOMYCIN/BACITRACIN/POLYMYXINB 3.5-400-5K
OINTMENT (GRAM) TOPICAL ONCE
OUTPATIENT
Start: 2024-10-29 | End: 2024-10-29

## 2024-10-29 RX ORDER — TRIAMCINOLONE ACETONIDE 1 MG/G
OINTMENT TOPICAL ONCE
OUTPATIENT
Start: 2024-10-29 | End: 2024-10-29

## 2024-10-29 RX ORDER — GINSENG 100 MG
CAPSULE ORAL ONCE
OUTPATIENT
Start: 2024-10-29 | End: 2024-10-29

## 2024-10-29 RX ORDER — CLOBETASOL PROPIONATE 0.5 MG/G
OINTMENT TOPICAL ONCE
OUTPATIENT
Start: 2024-10-29 | End: 2024-10-29

## 2024-10-29 RX ORDER — SODIUM CHLOR/HYPOCHLOROUS ACID 0.033 %
SOLUTION, IRRIGATION IRRIGATION ONCE
OUTPATIENT
Start: 2024-10-29 | End: 2024-10-29

## 2024-10-29 RX ORDER — LIDOCAINE 40 MG/G
CREAM TOPICAL ONCE
OUTPATIENT
Start: 2024-10-29 | End: 2024-10-29

## 2024-10-29 RX ORDER — LIDOCAINE 50 MG/G
OINTMENT TOPICAL ONCE
OUTPATIENT
Start: 2024-10-29 | End: 2024-10-29

## 2024-10-29 RX ORDER — BETAMETHASONE DIPROPIONATE 0.5 MG/G
CREAM TOPICAL ONCE
OUTPATIENT
Start: 2024-10-29 | End: 2024-10-29

## 2024-10-29 RX ADMIN — LIDOCAINE HYDROCHLORIDE 5 ML: 40 SOLUTION TOPICAL at 13:26

## 2024-10-29 NOTE — PROGRESS NOTES
Wound Healing Center /Hyperbarics   History and Physical/Consultation  General Surgery/Medicine    Referring Physician : Chance Jama DO  [facility]  MEDICAL RECORD NUMBER:  [id number]  AGE: 74 y.o.   GENDER: male  : 1950  EPISODE DATE:  10/29/2024  Subjective:     Chief Complaint   Patient presents with    Wound Check     Right index and middle finger         HISTORY of PRESENT ILLNESS HPI     Clark Maza is a 74 y.o. male who presents today for wound/ulcer evaluation.   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  This has been treated operative debridement / amputation, dry dressing and neosporin. On their initial visit to the wound healing center, 10/15/2024 ,  the patient has noted that the wound has not been improving.  The patient has not had similar previous wounds in the past.      CHF with CAD (coreg, lasix, zocor), follows with primary cardiology.      COPD still smoking, JOSE as well as hx of chronic hypoxemic respiratory failure not on supplemental oxygen according to patient (92%SPO2 RA, states he only use at home at times).  Notes indicate he desaturates to 80% on room air with ambulation and at 3LNC with ambulation 90%.   the patient on use of his oxygen, should have portable with him or concentrator, discuss with his PCP or pulmonologist.  Having proper tissue oxygenation is CRITICAL to wound healing.  He makes jokes but fully understands the gravity... this and continuing to smoke.  It was plainly explained we will do our best to support him but stupid decisions will earn in-kind prizes from Pranay's namesake.     We used motivational interviewing and active listening today in attempt to get him to agree to smoking cessation

## 2024-10-31 ENCOUNTER — TELEPHONE (OUTPATIENT)
Dept: FAMILY MEDICINE CLINIC | Age: 74
End: 2024-10-31

## 2024-10-31 NOTE — TELEPHONE ENCOUNTER
Called patient and left voicemail to inform him he should see his surgeon Dr. Hernández for the spots that were found today on thumb.

## 2024-10-31 NOTE — TELEPHONE ENCOUNTER
Called patient daughter, they declined being seen in the office due to having a future appointment with wound care on Tuesday.

## 2024-10-31 NOTE — TELEPHONE ENCOUNTER
Patient called requesting an acute appointment to be seen for spots on his skin located on his thumb. Patient states that he noticed these spots on his finger and then was amputated later on. Patient is nervous about these spots being the same spots that were found previously. I advised patient our schedule is at max capacity. I did schedule him for 11/6 with Dr. Jama due to him wanting to only see Dr. Jama. I advised patient if any concerns become more severe to be seen at the the emergency room or urgent care. Patient verbalized understanding at this time.

## 2024-11-01 NOTE — DISCHARGE INSTRUCTIONS
Visit Discharge/Physician Orders     Discharge condition: Stable     Assessment of pain at discharge: mild     Anesthetic used: 4% Lidocaine     Discharge to: Home     Left via:Private automobile     Accompanied by: accompanied by self     ECF/HHA: [place]     Dressing Orders: Clean wounds with acetic acid. Apply silver collagen and secure with dry dressings. Change daily.     Treatment Orders:  Follow a nutritious diet. Choose foods high in protein: chicken, fish, and eggs. Choose foods high in Vitamin C. Multivitamin daily unless contraindicated. Please keep dressings clean and dry, do not work in garage while healing. Time dressing changes around work in garage if done.     Wound culture taken in clinic today- reviewed        [facility] followup visit ______1  week_______________________  (Please note your next appointment above and if you are unable to keep, kindly give a 24 hour notice. Thank you.)     Physician signature:__________________________        If you experience any of the following, please call the Wound Care Center during business hours:     * Increase in Pain  * Temperature over 101  * Increase in drainage from your wound  * Drainage with a foul odor  * Bleeding  * Increase in swelling  * Need for compression bandage changes due to slippage, breakthrough drainage.     If you need medical attention outside of the business hours of the Wound Care Centers please contact your PCP or go to the nearest emergency room.

## 2024-11-05 ENCOUNTER — HOSPITAL ENCOUNTER (OUTPATIENT)
Dept: WOUND CARE | Age: 74
Discharge: HOME | End: 2024-11-05
Attending: SURGERY
Payer: MEDICARE

## 2024-11-05 VITALS
HEART RATE: 68 BPM | SYSTOLIC BLOOD PRESSURE: 127 MMHG | DIASTOLIC BLOOD PRESSURE: 87 MMHG | RESPIRATION RATE: 18 BRPM | TEMPERATURE: 96.7 F

## 2024-11-05 DIAGNOSIS — F17.200 TOBACCO USE DISORDER: ICD-10-CM

## 2024-11-05 DIAGNOSIS — T81.31XD SURGICAL WOUND DEHISCENCE, SUBSEQUENT ENCOUNTER: Primary | ICD-10-CM

## 2024-11-05 DIAGNOSIS — L98.492: ICD-10-CM

## 2024-11-05 PROCEDURE — 11042 DBRDMT SUBQ TIS 1ST 20SQCM/<: CPT

## 2024-11-05 RX ORDER — BACITRACIN ZINC AND POLYMYXIN B SULFATE 500; 1000 [USP'U]/G; [USP'U]/G
OINTMENT TOPICAL ONCE
OUTPATIENT
Start: 2024-11-05 | End: 2024-11-05

## 2024-11-05 RX ORDER — GINSENG 100 MG
CAPSULE ORAL ONCE
OUTPATIENT
Start: 2024-11-05 | End: 2024-11-05

## 2024-11-05 RX ORDER — LIDOCAINE HYDROCHLORIDE 40 MG/ML
SOLUTION TOPICAL ONCE
OUTPATIENT
Start: 2024-11-05 | End: 2024-11-05

## 2024-11-05 RX ORDER — GENTAMICIN SULFATE 1 MG/G
OINTMENT TOPICAL ONCE
OUTPATIENT
Start: 2024-11-05 | End: 2024-11-05

## 2024-11-05 RX ORDER — LIDOCAINE 40 MG/G
CREAM TOPICAL ONCE
OUTPATIENT
Start: 2024-11-05 | End: 2024-11-05

## 2024-11-05 RX ORDER — TRIAMCINOLONE ACETONIDE 1 MG/G
OINTMENT TOPICAL ONCE
OUTPATIENT
Start: 2024-11-05 | End: 2024-11-05

## 2024-11-05 RX ORDER — LIDOCAINE 50 MG/G
OINTMENT TOPICAL ONCE
OUTPATIENT
Start: 2024-11-05 | End: 2024-11-05

## 2024-11-05 RX ORDER — BETAMETHASONE DIPROPIONATE 0.5 MG/G
CREAM TOPICAL ONCE
OUTPATIENT
Start: 2024-11-05 | End: 2024-11-05

## 2024-11-05 RX ORDER — CLOBETASOL PROPIONATE 0.5 MG/G
OINTMENT TOPICAL ONCE
OUTPATIENT
Start: 2024-11-05 | End: 2024-11-05

## 2024-11-05 RX ORDER — MUPIROCIN 20 MG/G
OINTMENT TOPICAL ONCE
OUTPATIENT
Start: 2024-11-05 | End: 2024-11-05

## 2024-11-05 RX ORDER — SILVER SULFADIAZINE 10 MG/G
CREAM TOPICAL ONCE
OUTPATIENT
Start: 2024-11-05 | End: 2024-11-05

## 2024-11-05 RX ORDER — SODIUM CHLOR/HYPOCHLOROUS ACID 0.033 %
SOLUTION, IRRIGATION IRRIGATION ONCE
OUTPATIENT
Start: 2024-11-05 | End: 2024-11-05

## 2024-11-05 RX ORDER — LIDOCAINE HYDROCHLORIDE 40 MG/ML
SOLUTION TOPICAL ONCE
Status: DISCONTINUED | OUTPATIENT
Start: 2024-11-05 | End: 2024-11-06 | Stop reason: HOSPADM

## 2024-11-05 RX ORDER — NEOMYCIN/BACITRACIN/POLYMYXINB 3.5-400-5K
OINTMENT (GRAM) TOPICAL ONCE
OUTPATIENT
Start: 2024-11-05 | End: 2024-11-05

## 2024-11-05 NOTE — PROGRESS NOTES
coping mechanism.  Stay Informed: Educate yourself about the health risks of tobacco use and the benefits of quitting. Knowledge can reinforce your decision to remain tobacco-free.  Seek Professional Help: If needed, consult healthcare professionals for additional support and resources. They can provide personalized advice and treatment options.      Pt is not on abx at time of initial visit.      10/15/2024  -review ortho notes, pcp notes, cardiology notes with patient  -wound itself appears clean though there is necrotic eschar that was sharply removed  -7 simple interrupted sutures were removed (not approximating any tissue)  -counseled on avoidance of trauma (patient s/p amputation surgery was back into his garage, soiling wound, lifting objects and generally not compliant with advice)  -used teachback to demonstrate understanding, though he admits he will not follow any restriction ama  -no cardinal ss  -no constitutional ss  -debridement  -no ?s     10/22/24  Wounds measuring smaller   Wounds debrided  Continue Aquacel and dressing       10/29/2024  -decreased moisture in wound bed and decreased drainage, switch to collagen  -healthy granulating tissue  -calluses forming periwound patient is not following advice and will not follow advice to avoid trauma, he understands the potential risks in this  -debridement  -no cardinal ss  -no constitutional ss  -no ?s    11/5/2024  -overall, improving  -not following advice smoking, in garage / trauma  -debridement  -no cardinal ss  -no constitutional ss  -?s if same thing is happening on contralateral thumb, no pain, tenderness, erythema edema, streaking, ulceration, recommend he discuss with PCP or if alarm ss develop report to ED  -still does not want any aid to quit smoking    Wound/Ulcer Pain Timing/Severity: none  Quality of pain: N/A  Severity:  0 / 10   Modifying Factors: None  Associated Signs/Symptoms: drainage    Ulcer Identification:  Ulcer Type: non-healing

## 2024-11-07 NOTE — DISCHARGE INSTRUCTIONS
Visit Discharge/Physician Orders     Discharge condition: Stable     Assessment of pain at discharge: mild     Anesthetic used: 4% Lidocaine     Discharge to: Home     Left via:Private automobile     Accompanied by: accompanied by self     ECF/HHA: [place]     Dressing Orders: Clean wounds with acetic acid. Apply silver collagen and secure with dry dressings. Change daily.     Treatment Orders:  Follow a nutritious diet. Choose foods high in protein: chicken, fish, and eggs. Choose foods high in Vitamin C. Multivitamin daily unless contraindicated. Please keep dressings clean and dry, do not work in garage while healing. Time dressing changes around work in garage if done.     Wound culture taken in clinic today- reviewed        [facility] followup visit ______1  week_______________________  (Please note your next appointment above and if you are unable to keep, kindly give a 24 hour notice. Thank you.)     Physician signature:__________________________        If you experience any of the following, please call the Wound Care Center during business hours:     * Increase in Pain  * Temperature over 101  * Increase in drainage from your wound  * Drainage with a foul odor  * Bleeding  * Increase in swelling  * Need for compression bandage changes due to slippage, breakthrough drainage.     If you need medical attention outside of the business hours of the Wound Care Centers please contact your PCP or go to the nearest emergency room.

## 2024-11-12 ENCOUNTER — HOSPITAL ENCOUNTER (OUTPATIENT)
Dept: WOUND CARE | Age: 74
Discharge: HOME | End: 2024-11-12
Attending: SURGERY

## 2024-11-18 RX ORDER — CARVEDILOL 3.12 MG/1
3.12 TABLET ORAL DAILY
Qty: 30 TABLET | Refills: 1 | Status: SHIPPED | OUTPATIENT
Start: 2024-11-18

## 2024-11-21 ENCOUNTER — HOSPITAL ENCOUNTER (OUTPATIENT)
Dept: WOUND CARE | Age: 74
Discharge: HOME | End: 2024-11-21
Attending: SURGERY
Payer: MEDICARE

## 2024-11-21 VITALS — SYSTOLIC BLOOD PRESSURE: 160 MMHG | TEMPERATURE: 96.7 F | RESPIRATION RATE: 16 BRPM | DIASTOLIC BLOOD PRESSURE: 84 MMHG

## 2024-11-21 DIAGNOSIS — F17.200 TOBACCO USE DISORDER: ICD-10-CM

## 2024-11-21 DIAGNOSIS — T81.31XD SURGICAL WOUND DEHISCENCE, SUBSEQUENT ENCOUNTER: Primary | ICD-10-CM

## 2024-11-21 DIAGNOSIS — L98.492 TRAUMATIC ULCER WITH FAT LAYER EXPOSED (HCC): ICD-10-CM

## 2024-11-21 DIAGNOSIS — Z71.6 ENCOUNTER FOR TOBACCO USE CESSATION COUNSELING: ICD-10-CM

## 2024-11-21 PROCEDURE — 11042 DBRDMT SUBQ TIS 1ST 20SQCM/<: CPT

## 2024-11-21 RX ORDER — CLOBETASOL PROPIONATE 0.5 MG/G
OINTMENT TOPICAL ONCE
OUTPATIENT
Start: 2024-11-21 | End: 2024-11-21

## 2024-11-21 RX ORDER — LIDOCAINE HYDROCHLORIDE 40 MG/ML
SOLUTION TOPICAL ONCE
OUTPATIENT
Start: 2024-11-21 | End: 2024-11-21

## 2024-11-21 RX ORDER — LIDOCAINE 50 MG/G
OINTMENT TOPICAL ONCE
OUTPATIENT
Start: 2024-11-21 | End: 2024-11-21

## 2024-11-21 RX ORDER — GINSENG 100 MG
CAPSULE ORAL ONCE
OUTPATIENT
Start: 2024-11-21 | End: 2024-11-21

## 2024-11-21 RX ORDER — SODIUM CHLOR/HYPOCHLOROUS ACID 0.033 %
SOLUTION, IRRIGATION IRRIGATION ONCE
OUTPATIENT
Start: 2024-11-21 | End: 2024-11-21

## 2024-11-21 RX ORDER — TRIAMCINOLONE ACETONIDE 1 MG/G
OINTMENT TOPICAL ONCE
OUTPATIENT
Start: 2024-11-21 | End: 2024-11-21

## 2024-11-21 RX ORDER — SILVER SULFADIAZINE 10 MG/G
CREAM TOPICAL ONCE
OUTPATIENT
Start: 2024-11-21 | End: 2024-11-21

## 2024-11-21 RX ORDER — MUPIROCIN 20 MG/G
OINTMENT TOPICAL ONCE
OUTPATIENT
Start: 2024-11-21 | End: 2024-11-21

## 2024-11-21 RX ORDER — LIDOCAINE 40 MG/G
CREAM TOPICAL ONCE
OUTPATIENT
Start: 2024-11-21 | End: 2024-11-21

## 2024-11-21 RX ORDER — NEOMYCIN/BACITRACIN/POLYMYXINB 3.5-400-5K
OINTMENT (GRAM) TOPICAL ONCE
OUTPATIENT
Start: 2024-11-21 | End: 2024-11-21

## 2024-11-21 RX ORDER — BETAMETHASONE DIPROPIONATE 0.5 MG/G
CREAM TOPICAL ONCE
OUTPATIENT
Start: 2024-11-21 | End: 2024-11-21

## 2024-11-21 RX ORDER — VARENICLINE TARTRATE 0.5 (11)-1
KIT ORAL
Qty: 1 EACH | Refills: 0 | Status: SHIPPED | OUTPATIENT
Start: 2024-11-21

## 2024-11-21 RX ORDER — BACITRACIN ZINC AND POLYMYXIN B SULFATE 500; 1000 [USP'U]/G; [USP'U]/G
OINTMENT TOPICAL ONCE
OUTPATIENT
Start: 2024-11-21 | End: 2024-11-21

## 2024-11-21 RX ORDER — VARENICLINE TARTRATE 1 MG/1
1 TABLET, FILM COATED ORAL 2 TIMES DAILY
Qty: 60 TABLET | Refills: 3 | Status: SHIPPED | OUTPATIENT
Start: 2024-11-21

## 2024-11-21 RX ORDER — GENTAMICIN SULFATE 1 MG/G
OINTMENT TOPICAL ONCE
OUTPATIENT
Start: 2024-11-21 | End: 2024-11-21

## 2024-11-21 RX ORDER — LIDOCAINE HYDROCHLORIDE 40 MG/ML
SOLUTION TOPICAL ONCE
Status: COMPLETED | OUTPATIENT
Start: 2024-11-21 | End: 2024-11-21

## 2024-11-21 RX ADMIN — LIDOCAINE HYDROCHLORIDE 10 ML: 40 SOLUTION TOPICAL at 10:06

## 2024-11-21 NOTE — PROGRESS NOTES
refused.           -The patient’s tobacco use:  Advised to quit and impact of tobacco  Assessed willingness to attempt to quit [0/10]  Providing methods and skills for cessation  Medication management of tobacco session drugs [unwilling, chantix in past, patches, gum]  Resources provided  Set quit date [unwilling]  Follow-up arranged [next visit]  Amount of time spent counseling patient: 8 minutes      Staying tobacco-free can be challenging, but there are several behavioral modifications that can help you maintain your commitment. Here are some effective strategies that we reviewed in brief (refuses all):  Identify Triggers: Recognize situations, emotions, or activities that make you want to use tobacco. Once identified, you can develop strategies to avoid or cope with these triggers.  Develop a Support System: Surround yourself with supportive friends, family, or support groups. Sharing your journey with others can provide encouragement and accountability.  Replace Tobacco with Healthier Habits: Find alternative activities to replace tobacco use, such as exercising, chewing gum, or engaging in hobbies. This can help distract you from cravings.  Behavioral Counseling: Engage in counseling sessions, either one-on-one or in groups. Counseling can provide you with coping strategies and emotional support1.  Use Technology: Utilize apps, text messaging services, or online resources designed to help people quit smoking. These tools can offer tips, reminders, and support1.  Create a Tobacco-Free Environment: Make your home and car tobacco-free zones. This reduces exposure to triggers and reinforces your commitment to staying tobacco-free2.  Set Goals and Rewards: Set short-term and long-term goals for remaining tobacco-free. Reward yourself for reaching milestones to stay motivated.  Mindfulness and Stress Management: Practice mindfulness, meditation, or other stress-reduction techniques. Managing stress can reduce the

## 2024-11-21 NOTE — DISCHARGE INSTRUCTIONS
Written       Visit Discharge/Physician Orders     Discharge condition: Stable     Assessment of pain at discharge: mild     Anesthetic used: 4% Lidocaine     Discharge to: Home     Left via:Private automobile     Accompanied by: accompanied by self     ECF/HHA: [place]     Dressing Orders: right hand (fingers)- Clean wounds with acetic acid. Apply silver collagen and secure with dry dressings. Change daily.     Treatment Orders:  Follow a nutritious diet. Choose foods high in protein: chicken, fish, and eggs. Choose foods high in Vitamin C. Multivitamin daily unless contraindicated. Please keep dressings clean and dry, do not work in garage while healing. Time dressing changes around work in garage if done.    DO NOT SOAK RIGHT HAND        [facility] followup visit ______2 weeks Dr. Torres_______________________  (Please note your next appointment above and if you are unable to keep, kindly give a 24 hour notice. Thank you.)     Physician signature:__________________________        If you experience any of the following, please call the Wound Care Center during business hours:     * Increase in Pain  * Temperature over 101  * Increase in drainage from your wound  * Drainage with a foul odor  * Bleeding  * Increase in swelling  * Need for compression bandage changes due to slippage, breakthrough drainage.     If you need medical attention outside of the business hours of the Wound Care Centers please contact your PCP or go to the nearest emergency room.

## 2024-11-25 ENCOUNTER — OFFICE VISIT (OUTPATIENT)
Dept: FAMILY MEDICINE CLINIC | Age: 74
End: 2024-11-25

## 2024-11-25 VITALS
BODY MASS INDEX: 34.53 KG/M2 | WEIGHT: 220 LBS | HEART RATE: 46 BPM | SYSTOLIC BLOOD PRESSURE: 120 MMHG | RESPIRATION RATE: 16 BRPM | TEMPERATURE: 98.2 F | OXYGEN SATURATION: 93 % | DIASTOLIC BLOOD PRESSURE: 64 MMHG | HEIGHT: 67 IN

## 2024-11-25 DIAGNOSIS — M25.511 CHRONIC RIGHT SHOULDER PAIN: ICD-10-CM

## 2024-11-25 DIAGNOSIS — G89.29 CHRONIC RIGHT SHOULDER PAIN: ICD-10-CM

## 2024-11-25 RX ORDER — HYDROCODONE BITARTRATE AND ACETAMINOPHEN 7.5; 325 MG/1; MG/1
1 TABLET ORAL EVERY 6 HOURS PRN
Qty: 90 TABLET | Refills: 0 | Status: SHIPPED | OUTPATIENT
Start: 2024-11-25 | End: 2024-12-25

## 2024-11-25 ASSESSMENT — PATIENT HEALTH QUESTIONNAIRE - PHQ9: DEPRESSION UNABLE TO ASSESS: PT REFUSES

## 2024-11-25 ASSESSMENT — ENCOUNTER SYMPTOMS
CONSTIPATION: 0
DIARRHEA: 0
COUGH: 1
WHEEZING: 0
BLOOD IN STOOL: 0

## 2024-11-25 NOTE — PROGRESS NOTES
Clark Maza (:  1950) is a 74 y.o. male,Established patient, here for evaluation of the following chief complaint(s):  Cough (Wheezing, phlegm ) and Pain (Medication refill )      Assessment & Plan   ASSESSMENT/PLAN:  Assessment & Plan  Chronic right shoulder pain   Chronic, at goal (stable), continue current treatment plan    Orders:    HYDROcodone-acetaminophen (NORCO) 7.5-325 MG per tablet; Take 1 tablet by mouth every 6 hours as needed for Pain for up to 30 days. Max Daily Amount: 4 tablets  Controlled substances monitoring: no signs of potential drug abuse or diversion identified and OARRS report reviewed today- activity consistent with treatment plan.        No follow-ups on file.         Subjective   SUBJECTIVE/OBJECTIVE:  Cough  Pertinent negatives include no chest pain, chills, ear pain, headaches, rash or wheezing.   Pain  Associated symptoms include coughing. Pertinent negatives include no chest pain, chills, diaphoresis, headaches or rash.       Review of Systems   Constitutional:  Negative for chills and diaphoresis.   HENT:  Negative for ear discharge, ear pain, hearing loss, nosebleeds and tinnitus.    Respiratory:  Positive for cough. Negative for wheezing.    Cardiovascular:  Negative for chest pain.   Gastrointestinal:  Negative for blood in stool, constipation and diarrhea.   Genitourinary:  Negative for dysuria, flank pain and hematuria.   Skin:  Negative for rash.   Neurological:  Negative for headaches.   Hematological:  Does not bruise/bleed easily.          Objective   /64   Pulse (!) 46   Temp 98.2 °F (36.8 °C)   Resp 16   Ht 1.702 m (5' 7.01\")   Wt 99.8 kg (220 lb)   SpO2 93%   BMI 34.45 kg/m²   Lab Results   Component Value Date    LABA1C 6.0 (H) 2024    LABA1C 5.6 2018    LABA1C 6.0 2018     Physical Exam  Eyes:      General:         Right eye: No discharge.         Left eye: No discharge.   Cardiovascular:      Rate and Rhythm: Normal

## 2024-12-02 NOTE — DISCHARGE INSTRUCTIONS
Visit Discharge/Physician Orders     Discharge condition: Stable     Assessment of pain at discharge: mild     Anesthetic used: 4% Lidocaine     Discharge to: Home     Left via:Private automobile     Accompanied by: accompanied by self     ECF/HHA: [facility]     Dressing Orders: right hand (fingers)- Clean wounds with acetic acid. Apply silver collagen and secure with dry dressings. Change daily.     Treatment Orders:  Follow a nutritious diet. Choose foods high in protein: chicken, fish, and eggs. Choose foods high in Vitamin C. Multivitamin daily unless contraindicated. Please keep dressings clean and dry, do not work in garage while healing. Time dressing changes around work in garage if done.     DO NOT SOAK RIGHT HAND        WCC followup visit ______1 week Dr. Torres_______________________  (Please note your next appointment above and if you are unable to keep, kindly give a 24 hour notice. Thank you.)     Physician signature:__________________________        If you experience any of the following, please call the Wound Care Center during business hours:     * Increase in Pain  * Temperature over 101  * Increase in drainage from your wound  * Drainage with a foul odor  * Bleeding  * Increase in swelling  * Need for compression bandage changes due to slippage, breakthrough drainage.     If you need medical attention outside of the business hours of the Wound Care Centers please contact your PCP or go to the nearest emergency room.

## 2024-12-10 ENCOUNTER — HOSPITAL ENCOUNTER (OUTPATIENT)
Dept: WOUND CARE | Age: 74
Discharge: HOME | End: 2024-12-10
Attending: SURGERY
Payer: MEDICARE

## 2024-12-10 VITALS
HEART RATE: 66 BPM | HEIGHT: 67 IN | TEMPERATURE: 96.3 F | WEIGHT: 220 LBS | DIASTOLIC BLOOD PRESSURE: 64 MMHG | RESPIRATION RATE: 18 BRPM | SYSTOLIC BLOOD PRESSURE: 144 MMHG | BODY MASS INDEX: 34.53 KG/M2

## 2024-12-10 DIAGNOSIS — T81.31XD SURGICAL WOUND DEHISCENCE, SUBSEQUENT ENCOUNTER: Primary | ICD-10-CM

## 2024-12-10 PROCEDURE — 11042 DBRDMT SUBQ TIS 1ST 20SQCM/<: CPT

## 2024-12-10 RX ORDER — BACITRACIN ZINC AND POLYMYXIN B SULFATE 500; 1000 [USP'U]/G; [USP'U]/G
OINTMENT TOPICAL ONCE
OUTPATIENT
Start: 2024-12-10 | End: 2024-12-10

## 2024-12-10 RX ORDER — LIDOCAINE 50 MG/G
OINTMENT TOPICAL ONCE
OUTPATIENT
Start: 2024-12-10 | End: 2024-12-10

## 2024-12-10 RX ORDER — LIDOCAINE HYDROCHLORIDE 40 MG/ML
SOLUTION TOPICAL ONCE
OUTPATIENT
Start: 2024-12-10 | End: 2024-12-10

## 2024-12-10 RX ORDER — MUPIROCIN 20 MG/G
OINTMENT TOPICAL ONCE
OUTPATIENT
Start: 2024-12-10 | End: 2024-12-10

## 2024-12-10 RX ORDER — CLOBETASOL PROPIONATE 0.5 MG/G
OINTMENT TOPICAL ONCE
OUTPATIENT
Start: 2024-12-10 | End: 2024-12-10

## 2024-12-10 RX ORDER — SODIUM CHLOR/HYPOCHLOROUS ACID 0.033 %
SOLUTION, IRRIGATION IRRIGATION ONCE
OUTPATIENT
Start: 2024-12-10 | End: 2024-12-10

## 2024-12-10 RX ORDER — LIDOCAINE 40 MG/G
CREAM TOPICAL ONCE
OUTPATIENT
Start: 2024-12-10 | End: 2024-12-10

## 2024-12-10 RX ORDER — LIDOCAINE HYDROCHLORIDE 40 MG/ML
SOLUTION TOPICAL ONCE
Status: DISCONTINUED | OUTPATIENT
Start: 2024-12-10 | End: 2024-12-11 | Stop reason: HOSPADM

## 2024-12-10 RX ORDER — GENTAMICIN SULFATE 1 MG/G
OINTMENT TOPICAL ONCE
OUTPATIENT
Start: 2024-12-10 | End: 2024-12-10

## 2024-12-10 RX ORDER — GINSENG 100 MG
CAPSULE ORAL ONCE
OUTPATIENT
Start: 2024-12-10 | End: 2024-12-10

## 2024-12-10 RX ORDER — TRIAMCINOLONE ACETONIDE 1 MG/G
OINTMENT TOPICAL ONCE
OUTPATIENT
Start: 2024-12-10 | End: 2024-12-10

## 2024-12-10 RX ORDER — SILVER SULFADIAZINE 10 MG/G
CREAM TOPICAL ONCE
OUTPATIENT
Start: 2024-12-10 | End: 2024-12-10

## 2024-12-10 RX ORDER — BETAMETHASONE DIPROPIONATE 0.5 MG/G
CREAM TOPICAL ONCE
OUTPATIENT
Start: 2024-12-10 | End: 2024-12-10

## 2024-12-10 RX ORDER — NEOMYCIN/BACITRACIN/POLYMYXINB 3.5-400-5K
OINTMENT (GRAM) TOPICAL ONCE
OUTPATIENT
Start: 2024-12-10 | End: 2024-12-10

## 2024-12-10 NOTE — PROGRESS NOTES
and skills for cessation  Medication management of tobacco session drugs [chantix]  Resources provided  Set quit date [next 7 days]  Follow-up arranged [next visit]  Amount of time spent counseling patient: 6 minutes    Risk benefit common side effects, serious adverse / but rare side effects  All ?s answered  Chantix sent  Behavioral modification tips  -wounds overall improving though new at distal thumb in pulp, remodeling of digit #2 appears worse in surface area but depth is up, interdigital space is improving though there are signs of repetitive trauma and callus formation  -encourage to wear padded gloves in garage, limit time spent and avoid all potential trauma / thermal injury sources  -debridement  -no cardinal ss  -no constitutional ss    12/12/2024  -#2 healed  -overall, all other wounds improving in spite of patient's lack of compliance with wound care recommendations (in garage, repetitive trauma evidenced by thickening callus... suboptimal hygiene with periwound detritus ... Smoking still)  -no cardinal ss  -no constitutional ss  -debridement      Wound/Ulcer Pain Timing/Severity: none  Quality of pain: N/A  Severity:  0 / 10   Modifying Factors: None  Associated Signs/Symptoms: drainage    Ulcer Identification:  Ulcer Type: non-healing surgical and osteomyelitis   Contributing Factors: smoking, decreased tissue oxygenation, and noncompliance          PAST MEDICAL HISTORY      Diagnosis Date    Anesthesia complication     wakes up  violant   only ok if reversed with romazacon    Arthritis     shoulders,ankle    CAD (coronary artery disease)     Cardiomyopathy (HCC)     CHF (congestive heart failure) (HCC)     Chronic hypoxic respiratory failure     CKD (chronic kidney disease)     COPD (chronic obstructive pulmonary disease) (HCC)     Dependence on supplemental oxygen     PRN-per jason Zelaya, has not used since May, 2024    Erectile dysfunction     GERD (gastroesophageal reflux disease)     H/O

## 2024-12-17 ENCOUNTER — HOSPITAL ENCOUNTER (OUTPATIENT)
Dept: WOUND CARE | Age: 74
Discharge: HOME | End: 2024-12-17
Attending: SURGERY
Payer: MEDICARE

## 2024-12-17 VITALS
HEART RATE: 75 BPM | SYSTOLIC BLOOD PRESSURE: 178 MMHG | HEIGHT: 67 IN | RESPIRATION RATE: 18 BRPM | DIASTOLIC BLOOD PRESSURE: 85 MMHG | TEMPERATURE: 97.8 F | BODY MASS INDEX: 34.53 KG/M2 | WEIGHT: 220 LBS

## 2024-12-17 DIAGNOSIS — T81.31XD DEHISCENCE OF OPERATIVE WOUND, SUBSEQUENT ENCOUNTER: ICD-10-CM

## 2024-12-17 DIAGNOSIS — T81.31XD SURGICAL WOUND DEHISCENCE, SUBSEQUENT ENCOUNTER: Primary | ICD-10-CM

## 2024-12-17 DIAGNOSIS — L98.492 TRAUMATIC ULCER WITH FAT LAYER EXPOSED (HCC): ICD-10-CM

## 2024-12-17 PROCEDURE — 11042 DBRDMT SUBQ TIS 1ST 20SQCM/<: CPT

## 2024-12-17 PROCEDURE — 11042 DBRDMT SUBQ TIS 1ST 20SQCM/<: CPT | Performed by: SURGERY

## 2024-12-17 RX ORDER — POTASSIUM CHLORIDE 750 MG/1
10 TABLET, EXTENDED RELEASE ORAL 2 TIMES DAILY
Qty: 180 TABLET | Refills: 0 | Status: SHIPPED | OUTPATIENT
Start: 2024-12-17

## 2024-12-17 RX ORDER — BACITRACIN ZINC AND POLYMYXIN B SULFATE 500; 1000 [USP'U]/G; [USP'U]/G
OINTMENT TOPICAL ONCE
OUTPATIENT
Start: 2024-12-17 | End: 2024-12-17

## 2024-12-17 RX ORDER — TAMSULOSIN HYDROCHLORIDE 0.4 MG/1
CAPSULE ORAL
Qty: 90 CAPSULE | Refills: 0 | Status: SHIPPED | OUTPATIENT
Start: 2024-12-17

## 2024-12-17 RX ORDER — LIDOCAINE HYDROCHLORIDE 40 MG/ML
SOLUTION TOPICAL ONCE
OUTPATIENT
Start: 2024-12-17 | End: 2024-12-17

## 2024-12-17 RX ORDER — TRIAMCINOLONE ACETONIDE 1 MG/G
OINTMENT TOPICAL ONCE
OUTPATIENT
Start: 2024-12-17 | End: 2024-12-17

## 2024-12-17 RX ORDER — MUPIROCIN 20 MG/G
OINTMENT TOPICAL ONCE
OUTPATIENT
Start: 2024-12-17 | End: 2024-12-17

## 2024-12-17 RX ORDER — SODIUM CHLOR/HYPOCHLOROUS ACID 0.033 %
SOLUTION, IRRIGATION IRRIGATION ONCE
OUTPATIENT
Start: 2024-12-17 | End: 2024-12-17

## 2024-12-17 RX ORDER — CLOBETASOL PROPIONATE 0.5 MG/G
OINTMENT TOPICAL ONCE
OUTPATIENT
Start: 2024-12-17 | End: 2024-12-17

## 2024-12-17 RX ORDER — SILVER SULFADIAZINE 10 MG/G
CREAM TOPICAL ONCE
OUTPATIENT
Start: 2024-12-17 | End: 2024-12-17

## 2024-12-17 RX ORDER — NEOMYCIN/BACITRACIN/POLYMYXINB 3.5-400-5K
OINTMENT (GRAM) TOPICAL ONCE
OUTPATIENT
Start: 2024-12-17 | End: 2024-12-17

## 2024-12-17 RX ORDER — BETAMETHASONE DIPROPIONATE 0.5 MG/G
CREAM TOPICAL ONCE
OUTPATIENT
Start: 2024-12-17 | End: 2024-12-17

## 2024-12-17 RX ORDER — LIDOCAINE 40 MG/G
CREAM TOPICAL ONCE
OUTPATIENT
Start: 2024-12-17 | End: 2024-12-17

## 2024-12-17 RX ORDER — GINSENG 100 MG
CAPSULE ORAL ONCE
OUTPATIENT
Start: 2024-12-17 | End: 2024-12-17

## 2024-12-17 RX ORDER — LIDOCAINE 50 MG/G
OINTMENT TOPICAL ONCE
OUTPATIENT
Start: 2024-12-17 | End: 2024-12-17

## 2024-12-17 RX ORDER — GENTAMICIN SULFATE 1 MG/G
OINTMENT TOPICAL ONCE
OUTPATIENT
Start: 2024-12-17 | End: 2024-12-17

## 2024-12-17 NOTE — TELEPHONE ENCOUNTER
Name of Medication(s) Requested:  Requested Prescriptions     Pending Prescriptions Disp Refills    potassium chloride (KLOR-CON M) 10 MEQ extended release tablet [Pharmacy Med Name: Potassium Chloride 10mEq Extended-Release Tablet] 180 tablet 0     Sig: Take 1 tablet by mouth twice daily.    tamsulosin (FLOMAX) 0.4 MG capsule [Pharmacy Med Name: Tamsulosin Hydrochloride 0.4mg Capsule] 90 capsule 0     Sig: Take 1 capsule by mouth nightly at bedtime.       Medication is on current medication list Yes    Dosage and directions were verified? Yes    Quantity verified: 30 day supply     Pharmacy Verified?  Yes    Last Appointment:  11/25/2024    Future appts:  Future Appointments   Date Time Provider Department Center   12/17/2024  2:15 PM Rory Torres DO [facility]   12/23/2024 11:00 AM Chance Jama DO [place] [facility] DEP        (If no appt send self scheduling link. .REFILLAPPT)  Scheduling request sent?     [] Yes  [x] No    Does patient need updated?  [] Yes  [x] No

## 2024-12-17 NOTE — PROGRESS NOTES
based off the information that is available at this time this patient has appropriate indication for HBO Therapy: Not as a smoker    Treatment Note please see attached Discharge Instructions    Written patient dismissal instructions given to patient and signed by patient or POA.           Discharge Instructions         Visit Discharge/Physician Orders     Discharge condition: Stable     Assessment of pain at discharge: mild     Anesthetic used: 4% Lidocaine     Discharge to: Home     Left via:Private automobile     Accompanied by: accompanied by self     ECF/HHA: [place]     Dressing Orders: right hand (fingers)- Clean wounds with acetic acid. Apply silver collagen and secure with dry dressings. Change daily.     Treatment Orders:  Follow a nutritious diet. Choose foods high in protein: chicken, fish, and eggs. Choose foods high in Vitamin C. Multivitamin daily unless contraindicated. Please keep dressings clean and dry, do not work in garage while healing. Time dressing changes around work in garage if done.     DO NOT SOAK RIGHT HAND        WCC followup visit ______2 weeks Dr. Torres_______________________  (Please note your next appointment above and if you are unable to keep, kindly give a 24 hour notice. Thank you.)     Physician signature:__________________________        If you experience any of the following, please call the Wound Care Center during business hours:     * Increase in Pain  * Temperature over 101  * Increase in drainage from your wound  * Drainage with a foul odor  * Bleeding  * Increase in swelling  * Need for compression bandage changes due to slippage, breakthrough drainage.     If you need medical attention outside of the business hours of the Wound Care Centers please contact your PCP or go to the nearest emergency room.        Electronically signed by Rory Torres DO on 12/17/2024 at 4:50 PM

## 2024-12-18 NOTE — DISCHARGE INSTRUCTIONS
Visit Discharge/Physician Orders     Discharge condition: Stable     Assessment of pain at discharge: mild     Anesthetic used: 4% Lidocaine     Discharge to: Home     Left via:Private automobile     Accompanied by: accompanied by self     ECF/HHA: [place]     Dressing Orders: right hand (fingers)- Clean wounds with acetic acid. Apply silver collagen and secure with dry dressings. Change daily.     Treatment Orders:  Follow a nutritious diet. Choose foods high in protein: chicken, fish, and eggs. Choose foods high in Vitamin C. Multivitamin daily unless contraindicated. Please keep dressings clean and dry, do not work in garage while healing. Time dressing changes around work in garage if done.     DO NOT SOAK RIGHT HAND        WCC followup visit ______1 week Dr. Torres_______________________  (Please note your next appointment above and if you are unable to keep, kindly give a 24 hour notice. Thank you.)     Physician signature:__________________________        If you experience any of the following, please call the Wound Care Center during business hours:     * Increase in Pain  * Temperature over 101  * Increase in drainage from your wound  * Drainage with a foul odor  * Bleeding  * Increase in swelling  * Need for compression bandage changes due to slippage, breakthrough drainage.     If you need medical attention outside of the business hours of the Wound Care Centers please contact your PCP or go to the nearest emergency room.

## 2024-12-23 ENCOUNTER — OFFICE VISIT (OUTPATIENT)
Dept: FAMILY MEDICINE CLINIC | Age: 74
End: 2024-12-23
Payer: MEDICARE

## 2024-12-23 VITALS
DIASTOLIC BLOOD PRESSURE: 54 MMHG | HEIGHT: 67 IN | WEIGHT: 225.4 LBS | BODY MASS INDEX: 35.38 KG/M2 | TEMPERATURE: 97 F | HEART RATE: 93 BPM | OXYGEN SATURATION: 42 % | RESPIRATION RATE: 16 BRPM | SYSTOLIC BLOOD PRESSURE: 96 MMHG

## 2024-12-23 DIAGNOSIS — M25.511 CHRONIC RIGHT SHOULDER PAIN: ICD-10-CM

## 2024-12-23 DIAGNOSIS — G89.29 CHRONIC RIGHT SHOULDER PAIN: ICD-10-CM

## 2024-12-23 DIAGNOSIS — J44.9 CHRONIC OBSTRUCTIVE PULMONARY DISEASE, UNSPECIFIED COPD TYPE (HCC): ICD-10-CM

## 2024-12-23 PROCEDURE — 99213 OFFICE O/P EST LOW 20 MIN: CPT | Performed by: FAMILY MEDICINE

## 2024-12-23 PROCEDURE — 1124F ACP DISCUSS-NO DSCNMKR DOCD: CPT | Performed by: FAMILY MEDICINE

## 2024-12-23 PROCEDURE — 1159F MED LIST DOCD IN RCRD: CPT | Performed by: FAMILY MEDICINE

## 2024-12-23 RX ORDER — HYDROCODONE BITARTRATE AND ACETAMINOPHEN 7.5; 325 MG/1; MG/1
1 TABLET ORAL EVERY 6 HOURS PRN
Qty: 90 TABLET | Refills: 0 | Status: SHIPPED | OUTPATIENT
Start: 2024-12-23 | End: 2025-01-22

## 2024-12-23 ASSESSMENT — PATIENT HEALTH QUESTIONNAIRE - PHQ9
7. TROUBLE CONCENTRATING ON THINGS, SUCH AS READING THE NEWSPAPER OR WATCHING TELEVISION: NOT AT ALL
SUM OF ALL RESPONSES TO PHQ QUESTIONS 1-9: 16
1. LITTLE INTEREST OR PLEASURE IN DOING THINGS: NEARLY EVERY DAY
5. POOR APPETITE OR OVEREATING: NEARLY EVERY DAY
SUM OF ALL RESPONSES TO PHQ QUESTIONS 1-9: 16
4. FEELING TIRED OR HAVING LITTLE ENERGY: NEARLY EVERY DAY
SUM OF ALL RESPONSES TO PHQ QUESTIONS 1-9: 16
10. IF YOU CHECKED OFF ANY PROBLEMS, HOW DIFFICULT HAVE THESE PROBLEMS MADE IT FOR YOU TO DO YOUR WORK, TAKE CARE OF THINGS AT HOME, OR GET ALONG WITH OTHER PEOPLE: SOMEWHAT DIFFICULT
8. MOVING OR SPEAKING SO SLOWLY THAT OTHER PEOPLE COULD HAVE NOTICED. OR THE OPPOSITE, BEING SO FIGETY OR RESTLESS THAT YOU HAVE BEEN MOVING AROUND A LOT MORE THAN USUAL: NOT AT ALL
SUM OF ALL RESPONSES TO PHQ9 QUESTIONS 1 & 2: 6
2. FEELING DOWN, DEPRESSED OR HOPELESS: NEARLY EVERY DAY
3. TROUBLE FALLING OR STAYING ASLEEP: NEARLY EVERY DAY
6. FEELING BAD ABOUT YOURSELF - OR THAT YOU ARE A FAILURE OR HAVE LET YOURSELF OR YOUR FAMILY DOWN: NOT AT ALL
9. THOUGHTS THAT YOU WOULD BE BETTER OFF DEAD, OR OF HURTING YOURSELF: SEVERAL DAYS
SUM OF ALL RESPONSES TO PHQ QUESTIONS 1-9: 15

## 2024-12-23 ASSESSMENT — COLUMBIA-SUICIDE SEVERITY RATING SCALE - C-SSRS
1. WITHIN THE PAST MONTH, HAVE YOU WISHED YOU WERE DEAD OR WISHED YOU COULD GO TO SLEEP AND NOT WAKE UP?: YES
3. HAVE YOU BEEN THINKING ABOUT HOW YOU MIGHT KILL YOURSELF?: NO
5. HAVE YOU STARTED TO WORK OUT OR WORKED OUT THE DETAILS OF HOW TO KILL YOURSELF? DO YOU INTEND TO CARRY OUT THIS PLAN?: NO
2. HAVE YOU ACTUALLY HAD ANY THOUGHTS OF KILLING YOURSELF?: YES
6. HAVE YOU EVER DONE ANYTHING, STARTED TO DO ANYTHING, OR PREPARED TO DO ANYTHING TO END YOUR LIFE?: NO
4. HAVE YOU HAD THESE THOUGHTS AND HAD SOME INTENTION OF ACTING ON THEM?: NO

## 2024-12-23 ASSESSMENT — ENCOUNTER SYMPTOMS
DIARRHEA: 0
BLOOD IN STOOL: 0
WHEEZING: 0
CONSTIPATION: 0

## 2024-12-23 NOTE — ASSESSMENT & PLAN NOTE
Chronic, at goal (stable), continue current treatment plan    Orders:    HYDROcodone-acetaminophen (NORCO) 7.5-325 MG per tablet; Take 1 tablet by mouth every 6 hours as needed for Pain for up to 30 days. Max Daily Amount: 4 tablets

## 2024-12-23 NOTE — PROGRESS NOTES
Clark Maza (:  1950) is a 74 y.o. male,Established patient, here for evaluation of the following chief complaint(s):  Shoulder Pain (Medication refill ) and Sleep Problem (Sleeping to much. Can sleep up to 20 hours out of the day.)      Assessment & Plan   ASSESSMENT/PLAN:  Assessment & Plan  Chronic right shoulder pain   Chronic, at goal (stable), continue current treatment plan    Orders:    HYDROcodone-acetaminophen (NORCO) 7.5-325 MG per tablet; Take 1 tablet by mouth every 6 hours as needed for Pain for up to 30 days. Max Daily Amount: 4 tablets    Chronic obstructive pulmonary disease, unspecified COPD type (HCC)   Chronic, at goal (stable), stable              No follow-ups on file.         Subjective   SUBJECTIVE/OBJECTIVE:  Shoulder Pain (Medication refill ) and Sleep Problem (Sleeping to much. Can sleep up to 20 hours out of the day.)      Shoulder Pain     Sleep Problem  Pertinent negatives include no chest pain, chills, diaphoresis, headaches or rash.       Review of Systems   Constitutional:  Negative for chills and diaphoresis.   HENT:  Negative for ear discharge, ear pain, hearing loss, nosebleeds and tinnitus.    Respiratory:  Negative for wheezing.    Cardiovascular:  Negative for chest pain.   Gastrointestinal:  Negative for blood in stool, constipation and diarrhea.   Genitourinary:  Negative for dysuria, flank pain and hematuria.   Skin:  Negative for rash.   Neurological:  Negative for headaches.   Hematological:  Does not bruise/bleed easily.          Objective   BP (!) 96/54   Pulse 93   Temp 97 °F (36.1 °C)   Resp 16   Ht 1.702 m (5' 7.01\")   Wt 102.2 kg (225 lb 6.4 oz)   SpO2 (!) 42%   BMI 35.29 kg/m²   Lab Results   Component Value Date    LABA1C 6.0 (H) 2024    LABA1C 5.6 2018    LABA1C 6.0 2018     Physical Exam  Eyes:      General:         Right eye: No discharge.         Left eye: No discharge.   Cardiovascular:      Rate and Rhythm: Normal  99222-Initial OBS or IP - moderate complexity OR 55-74 mins

## 2024-12-31 ENCOUNTER — HOSPITAL ENCOUNTER (OUTPATIENT)
Dept: WOUND CARE | Age: 74
Discharge: HOME | End: 2024-12-31
Attending: SURGERY
Payer: MEDICARE

## 2024-12-31 VITALS
SYSTOLIC BLOOD PRESSURE: 168 MMHG | HEART RATE: 70 BPM | DIASTOLIC BLOOD PRESSURE: 71 MMHG | RESPIRATION RATE: 18 BRPM | TEMPERATURE: 97.1 F

## 2024-12-31 DIAGNOSIS — T81.31XD SURGICAL WOUND DEHISCENCE, SUBSEQUENT ENCOUNTER: Primary | ICD-10-CM

## 2024-12-31 PROCEDURE — 11042 DBRDMT SUBQ TIS 1ST 20SQCM/<: CPT

## 2024-12-31 PROCEDURE — 11042 DBRDMT SUBQ TIS 1ST 20SQCM/<: CPT | Performed by: SURGERY

## 2024-12-31 RX ORDER — BETAMETHASONE DIPROPIONATE 0.5 MG/G
CREAM TOPICAL ONCE
OUTPATIENT
Start: 2024-12-31 | End: 2024-12-31

## 2024-12-31 RX ORDER — LIDOCAINE 50 MG/G
OINTMENT TOPICAL ONCE
OUTPATIENT
Start: 2024-12-31 | End: 2024-12-31

## 2024-12-31 RX ORDER — GENTAMICIN SULFATE 1 MG/G
OINTMENT TOPICAL ONCE
OUTPATIENT
Start: 2024-12-31 | End: 2024-12-31

## 2024-12-31 RX ORDER — SODIUM CHLOR/HYPOCHLOROUS ACID 0.033 %
SOLUTION, IRRIGATION IRRIGATION ONCE
OUTPATIENT
Start: 2024-12-31 | End: 2024-12-31

## 2024-12-31 RX ORDER — SILVER SULFADIAZINE 10 MG/G
CREAM TOPICAL ONCE
OUTPATIENT
Start: 2024-12-31 | End: 2024-12-31

## 2024-12-31 RX ORDER — CLOBETASOL PROPIONATE 0.5 MG/G
OINTMENT TOPICAL ONCE
OUTPATIENT
Start: 2024-12-31 | End: 2024-12-31

## 2024-12-31 RX ORDER — MUPIROCIN 20 MG/G
OINTMENT TOPICAL ONCE
OUTPATIENT
Start: 2024-12-31 | End: 2024-12-31

## 2024-12-31 RX ORDER — GINSENG 100 MG
CAPSULE ORAL ONCE
OUTPATIENT
Start: 2024-12-31 | End: 2024-12-31

## 2024-12-31 RX ORDER — BACITRACIN ZINC AND POLYMYXIN B SULFATE 500; 1000 [USP'U]/G; [USP'U]/G
OINTMENT TOPICAL ONCE
OUTPATIENT
Start: 2024-12-31 | End: 2024-12-31

## 2024-12-31 RX ORDER — LIDOCAINE HYDROCHLORIDE 40 MG/ML
SOLUTION TOPICAL ONCE
Status: DISCONTINUED | OUTPATIENT
Start: 2024-12-31 | End: 2025-01-01 | Stop reason: HOSPADM

## 2024-12-31 RX ORDER — NEOMYCIN/BACITRACIN/POLYMYXINB 3.5-400-5K
OINTMENT (GRAM) TOPICAL ONCE
OUTPATIENT
Start: 2024-12-31 | End: 2024-12-31

## 2024-12-31 RX ORDER — LIDOCAINE 40 MG/G
CREAM TOPICAL ONCE
OUTPATIENT
Start: 2024-12-31 | End: 2024-12-31

## 2024-12-31 RX ORDER — LIDOCAINE HYDROCHLORIDE 40 MG/ML
SOLUTION TOPICAL ONCE
OUTPATIENT
Start: 2024-12-31 | End: 2024-12-31

## 2024-12-31 RX ORDER — TRIAMCINOLONE ACETONIDE 1 MG/G
OINTMENT TOPICAL ONCE
OUTPATIENT
Start: 2024-12-31 | End: 2024-12-31

## 2024-12-31 NOTE — PROGRESS NOTES
Wound Healing Center /Hyperbarics   History and Physical/Consultation  General Surgery/Medicine    Referring Physician : Chance Jama DO  Clark Maza  MEDICAL RECORD NUMBER:  [id number]  AGE: 74 y.o.   GENDER: male  : 1950  EPISODE DATE:  2024  Subjective:     Chief Complaint   Patient presents with    Wound Check         HISTORY of PRESENT ILLNESS HPI     Clark Maza is a 74 y.o. male who presents today for wound/ulcer evaluation.   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  This has been treated operative debridement / amputation, dry dressing and neosporin. On their initial visit to the wound healing center, 10/15/2024 ,  the patient has noted that the wound has not been improving.  The patient has not had similar previous wounds in the past.      CHF with CAD (coreg, lasix, zocor), follows with primary cardiology.      COPD still smoking, JOSE as well as hx of chronic hypoxemic respiratory failure not on supplemental oxygen according to patient (92%SPO2 RA, states he only use at home at times).  Notes indicate he desaturates to 80% on room air with ambulation and at 3LNC with ambulation 90%.   the patient on use of his oxygen, should have portable with him or concentrator, discuss with his PCP or pulmonologist.  Having proper tissue oxygenation is CRITICAL to wound healing.  He makes jokes but fully understands the gravity... this and continuing to smoke.  It was plainly explained we will do our best to support him but stupid decisions will earn in-kind prizes from Pranay's namesake.     We used motivational interviewing and active listening today in attempt to get him to agree to smoking cessation program but he refused.

## 2025-01-03 NOTE — DISCHARGE INSTRUCTIONS
Visit Discharge/Physician Orders     Discharge condition: Stable     Assessment of pain at discharge: mild     Anesthetic used: 4% Lidocaine     Discharge to: Home     Left via:Private automobile     Accompanied by: accompanied by self     ECF/HHA: [facility]     Dressing Orders: right hand (fingers)- Clean wounds with acetic acid. Apply silver collagen and secure with dry dressings. Change daily.     Treatment Orders:  Follow a nutritious diet. Choose foods high in protein: chicken, fish, and eggs. Choose foods high in Vitamin C. Multivitamin daily unless contraindicated. Please keep dressings clean and dry, do not work in garage while healing. Time dressing changes around work in garage if done.     DO NOT SOAK RIGHT HAND        WCC followup visit ______1 week Dr. Torres_______________________  (Please note your next appointment above and if you are unable to keep, kindly give a 24 hour notice. Thank you.)     Physician signature:__________________________        If you experience any of the following, please call the Wound Care Center during business hours:     * Increase in Pain  * Temperature over 101  * Increase in drainage from your wound  * Drainage with a foul odor  * Bleeding  * Increase in swelling  * Need for compression bandage changes due to slippage, breakthrough drainage.     If you need medical attention outside of the business hours of the Wound Care Centers please contact your PCP or go to the nearest emergency room.

## 2025-01-07 ENCOUNTER — HOSPITAL ENCOUNTER (OUTPATIENT)
Dept: WOUND CARE | Age: 75
Discharge: HOME | End: 2025-01-07
Attending: SURGERY
Payer: MEDICARE

## 2025-01-07 VITALS
TEMPERATURE: 97.2 F | SYSTOLIC BLOOD PRESSURE: 157 MMHG | WEIGHT: 225 LBS | HEIGHT: 67 IN | HEART RATE: 72 BPM | RESPIRATION RATE: 18 BRPM | BODY MASS INDEX: 35.31 KG/M2 | DIASTOLIC BLOOD PRESSURE: 75 MMHG

## 2025-01-07 DIAGNOSIS — T81.31XD SURGICAL WOUND DEHISCENCE, SUBSEQUENT ENCOUNTER: Primary | ICD-10-CM

## 2025-01-07 DIAGNOSIS — T81.31XD DEHISCENCE OF OPERATIVE WOUND, SUBSEQUENT ENCOUNTER: ICD-10-CM

## 2025-01-07 DIAGNOSIS — L98.492 TRAUMATIC ULCER WITH FAT LAYER EXPOSED (HCC): ICD-10-CM

## 2025-01-07 PROCEDURE — 11042 DBRDMT SUBQ TIS 1ST 20SQCM/<: CPT

## 2025-01-07 PROCEDURE — 11042 DBRDMT SUBQ TIS 1ST 20SQCM/<: CPT | Performed by: SURGERY

## 2025-01-07 RX ORDER — CLOBETASOL PROPIONATE 0.5 MG/G
OINTMENT TOPICAL ONCE
OUTPATIENT
Start: 2025-01-07 | End: 2025-01-07

## 2025-01-07 RX ORDER — LIDOCAINE 50 MG/G
OINTMENT TOPICAL ONCE
OUTPATIENT
Start: 2025-01-07 | End: 2025-01-07

## 2025-01-07 RX ORDER — LIDOCAINE 40 MG/G
CREAM TOPICAL ONCE
OUTPATIENT
Start: 2025-01-07 | End: 2025-01-07

## 2025-01-07 RX ORDER — LIDOCAINE HYDROCHLORIDE 40 MG/ML
SOLUTION TOPICAL ONCE
Status: DISCONTINUED | OUTPATIENT
Start: 2025-01-07 | End: 2025-01-08 | Stop reason: HOSPADM

## 2025-01-07 RX ORDER — SODIUM CHLOR/HYPOCHLOROUS ACID 0.033 %
SOLUTION, IRRIGATION IRRIGATION ONCE
OUTPATIENT
Start: 2025-01-07 | End: 2025-01-07

## 2025-01-07 RX ORDER — GINSENG 100 MG
CAPSULE ORAL ONCE
OUTPATIENT
Start: 2025-01-07 | End: 2025-01-07

## 2025-01-07 RX ORDER — MUPIROCIN 20 MG/G
OINTMENT TOPICAL ONCE
OUTPATIENT
Start: 2025-01-07 | End: 2025-01-07

## 2025-01-07 RX ORDER — LIDOCAINE HYDROCHLORIDE 40 MG/ML
SOLUTION TOPICAL ONCE
OUTPATIENT
Start: 2025-01-07 | End: 2025-01-07

## 2025-01-07 RX ORDER — NEOMYCIN/BACITRACIN/POLYMYXINB 3.5-400-5K
OINTMENT (GRAM) TOPICAL ONCE
OUTPATIENT
Start: 2025-01-07 | End: 2025-01-07

## 2025-01-07 RX ORDER — TRIAMCINOLONE ACETONIDE 1 MG/G
OINTMENT TOPICAL ONCE
OUTPATIENT
Start: 2025-01-07 | End: 2025-01-07

## 2025-01-07 RX ORDER — SILVER SULFADIAZINE 10 MG/G
CREAM TOPICAL ONCE
OUTPATIENT
Start: 2025-01-07 | End: 2025-01-07

## 2025-01-07 RX ORDER — BETAMETHASONE DIPROPIONATE 0.5 MG/G
CREAM TOPICAL ONCE
OUTPATIENT
Start: 2025-01-07 | End: 2025-01-07

## 2025-01-07 RX ORDER — BACITRACIN ZINC AND POLYMYXIN B SULFATE 500; 1000 [USP'U]/G; [USP'U]/G
OINTMENT TOPICAL ONCE
OUTPATIENT
Start: 2025-01-07 | End: 2025-01-07

## 2025-01-07 RX ORDER — GENTAMICIN SULFATE 1 MG/G
OINTMENT TOPICAL ONCE
OUTPATIENT
Start: 2025-01-07 | End: 2025-01-07

## 2025-01-07 NOTE — PROGRESS NOTES
1418   Wound Length (cm) 0 cm 12/31/24 1328   Wound Width (cm) 0 cm 12/31/24 1328   Wound Depth (cm) 0 cm 12/31/24 1328   Wound Surface Area (cm^2) 0 cm^2 12/31/24 1328   Change in Wound Size % (l*w) 100 12/31/24 1328   Wound Volume (cm^3) 0 cm^3 12/31/24 1328   Wound Healing % 100 12/31/24 1328   Post-Procedure Length (cm) 0 cm 12/31/24 1443   Post-Procedure Width (cm) 0 cm 12/31/24 1443   Post-Procedure Depth (cm) 0 cm 12/31/24 1443   Post-Procedure Surface Area (cm^2) 0 cm^2 12/31/24 1443   Post-Procedure Volume (cm^3) 0 cm^3 12/31/24 1443   Distance Tunneling (cm) 0 cm 12/31/24 1443   Wound Assessment Dry;Fibrin 12/31/24 1328   Drainage Amount None (dry) 12/31/24 1328   Drainage Description Serous 12/17/24 1356   Odor None 12/31/24 1328   Josey-wound Assessment Dry/flaky 12/31/24 1328   Number of days: 47       Wound 12/17/24 Finger (Comment which one) Right #5 index finger (Active)   Wound Image   12/17/24 1331   Dressing Status New dressing applied;Clean;Dry;Intact 12/31/24 1511   Wound Cleansed Cleansed with saline 12/31/24 1511   Dressing/Treatment Collagen with Ag;Dry dressing 12/31/24 1511   Offloading for Diabetic Foot Ulcers Offloading ordered 12/31/24 1511   Wound Length (cm) 0.3 cm 01/07/25 1338   Wound Width (cm) 2 cm 01/07/25 1338   Wound Depth (cm) 0.1 cm 01/07/25 1338   Wound Surface Area (cm^2) 0.6 cm^2 01/07/25 1338   Change in Wound Size % (l*w) 0 01/07/25 1338   Wound Volume (cm^3) 0.06 cm^3 01/07/25 1338   Wound Healing % 0 01/07/25 1338   Post-Procedure Length (cm) 0.3 cm 01/07/25 1357   Post-Procedure Width (cm) 2 cm 01/07/25 1357   Post-Procedure Depth (cm) 0.2 cm 01/07/25 1357   Post-Procedure Surface Area (cm^2) 0.6 cm^2 01/07/25 1357   Post-Procedure Volume (cm^3) 0.12 cm^3 01/07/25 1357   Wound Assessment Pink/red;Dry 01/07/25 1338   Drainage Amount None (dry) 01/07/25 1338   Drainage Description Serous 12/31/24 1328   Odor None 01/07/25 1338   Josey-wound Assessment Hyperkeratosis

## 2025-01-14 NOTE — DISCHARGE INSTRUCTIONS
Written             Visit Discharge/Physician Orders     Discharge condition: Stable     Assessment of pain at discharge: mild     Anesthetic used: 4% Lidocaine     Discharge to: Home     Left via:Private automobile     Accompanied by: accompanied by self     ECF/HHA: [facility]     Dressing Orders: right hand (fingers)- Clean wounds with acetic acid. Apply silver collagen and secure with dry dressings. Change daily.     Treatment Orders:  Follow a nutritious diet. Choose foods high in protein: chicken, fish, and eggs. Choose foods high in Vitamin C. Multivitamin daily unless contraindicated. Please keep dressings clean and dry, do not work in garage while healing. Time dressing changes around work in garage if done.     DO NOT SOAK RIGHT HAND        WCC followup visit ______1 week Dr. Torres_______________________  (Please note your next appointment above and if you are unable to keep, kindly give a 24 hour notice. Thank you.)     Physician signature:__________________________        If you experience any of the following, please call the Wound Care Center during business hours:     * Increase in Pain  * Temperature over 101  * Increase in drainage from your wound  * Drainage with a foul odor  * Bleeding  * Increase in swelling  * Need for compression bandage changes due to slippage, breakthrough drainage.     If you need medical attention outside of the business hours of the Wound Care Centers please contact your PCP or go to the nearest emergency room.

## 2025-01-16 RX ORDER — CARVEDILOL 3.12 MG/1
3.12 TABLET ORAL DAILY
Qty: 30 TABLET | Refills: 0 | Status: SHIPPED | OUTPATIENT
Start: 2025-01-16

## 2025-01-16 RX ORDER — BUPROPION HYDROCHLORIDE 150 MG/1
150 TABLET, EXTENDED RELEASE ORAL 2 TIMES DAILY
Qty: 180 TABLET | Refills: 1 | Status: SHIPPED | OUTPATIENT
Start: 2025-01-16

## 2025-01-16 RX ORDER — SERTRALINE HYDROCHLORIDE 100 MG/1
100 TABLET, FILM COATED ORAL NIGHTLY
Qty: 90 TABLET | Refills: 1 | Status: SHIPPED | OUTPATIENT
Start: 2025-01-16

## 2025-01-16 NOTE — TELEPHONE ENCOUNTER
Name of Medication(s) Requested:  Requested Prescriptions     Pending Prescriptions Disp Refills    sertraline (ZOLOFT) 100 MG tablet [Pharmacy Med Name: Sertraline Hydrochloride 100mg Tablet] 90 tablet 0     Sig: Take 1 tablet by mouth daily.    buPROPion (WELLBUTRIN SR) 150 MG extended release tablet [Pharmacy Med Name: Bupropion Hydrochloride 150mg Extended-Release (SR) Tablet] 180 tablet 0     Sig: Take 1 tablet by mouth twice daily.       Medication is on current medication list Yes    Dosage and directions were verified? Yes    Quantity verified: 90 day supply     Pharmacy Verified?  Yes    Last Appointment:  12/23/2024    Future appts:  Future Appointments   Date Time Provider Department Center   1/21/2025  1:45 PM Rory Torres DO [facility]   1/23/2025 11:00 AM Chance Jama DO [place] [facility] DEP        (If no appt send self scheduling link. .REFILLAPPT)  Scheduling request sent?     [] Yes  [] No    Does patient need updated?  [] Yes  [] No

## 2025-01-16 NOTE — DISCHARGE INSTRUCTIONS
Visit Discharge/Physician Orders     Discharge condition: Stable     Assessment of pain at discharge: mild     Anesthetic used: 4% Lidocaine     Discharge to: Home     Left via:Private automobile     Accompanied by: accompanied by self     ECF/HHA: [facility]     Dressing Orders: right hand (fingers/thumb)- Clean wounds with acetic acid. Apply silver collagen and secure with dry dressings. Change daily.     Treatment Orders:  Follow a nutritious diet. Choose foods high in protein: chicken, fish, and eggs. Choose foods high in Vitamin C. Multivitamin daily unless contraindicated. Please keep dressings clean and dry, do not work in garage while healing. Time dressing changes around work in garage if done.  NO TRAUMA TO FINGERS  DO NOT SOAK RIGHT HAND        [facility] followup visit ______1 week Dr. Torres_______________________  (Please note your next appointment above and if you are unable to keep, kindly give a 24 hour notice. Thank you.)     Physician signature:__________________________        If you experience any of the following, please call the Wound Care Center during business hours:     * Increase in Pain  * Temperature over 101  * Increase in drainage from your wound  * Drainage with a foul odor  * Bleeding  * Increase in swelling  * Need for compression bandage changes due to slippage, breakthrough drainage.     If you need medical attention outside of the business hours of the Wound Care Centers please contact your PCP or go to the nearest emergency room.

## 2025-01-17 ENCOUNTER — HOSPITAL ENCOUNTER (OUTPATIENT)
Dept: WOUND CARE | Age: 75
Discharge: HOME | End: 2025-01-17
Attending: SURGERY

## 2025-01-21 ENCOUNTER — HOSPITAL ENCOUNTER (OUTPATIENT)
Dept: WOUND CARE | Age: 75
Discharge: HOME | End: 2025-01-21
Attending: SURGERY
Payer: MEDICARE

## 2025-01-21 VITALS
DIASTOLIC BLOOD PRESSURE: 79 MMHG | RESPIRATION RATE: 18 BRPM | HEART RATE: 79 BPM | SYSTOLIC BLOOD PRESSURE: 176 MMHG | TEMPERATURE: 99.2 F

## 2025-01-21 DIAGNOSIS — T81.31XD SURGICAL WOUND DEHISCENCE, SUBSEQUENT ENCOUNTER: Primary | ICD-10-CM

## 2025-01-21 DIAGNOSIS — T81.31XD DEHISCENCE OF OPERATIVE WOUND, SUBSEQUENT ENCOUNTER: ICD-10-CM

## 2025-01-21 DIAGNOSIS — L98.492 TRAUMATIC ULCER WITH FAT LAYER EXPOSED (HCC): ICD-10-CM

## 2025-01-21 DIAGNOSIS — Z91.199 NONCOMPLIANCE: ICD-10-CM

## 2025-01-21 PROCEDURE — 11042 DBRDMT SUBQ TIS 1ST 20SQCM/<: CPT

## 2025-01-21 PROCEDURE — 11042 DBRDMT SUBQ TIS 1ST 20SQCM/<: CPT | Performed by: SURGERY

## 2025-01-21 RX ORDER — NEOMYCIN/BACITRACIN/POLYMYXINB 3.5-400-5K
OINTMENT (GRAM) TOPICAL ONCE
OUTPATIENT
Start: 2025-01-21 | End: 2025-01-21

## 2025-01-21 RX ORDER — LIDOCAINE 50 MG/G
OINTMENT TOPICAL ONCE
OUTPATIENT
Start: 2025-01-21 | End: 2025-01-21

## 2025-01-21 RX ORDER — LIDOCAINE HYDROCHLORIDE 40 MG/ML
SOLUTION TOPICAL ONCE
OUTPATIENT
Start: 2025-01-21 | End: 2025-01-21

## 2025-01-21 RX ORDER — BACITRACIN ZINC AND POLYMYXIN B SULFATE 500; 1000 [USP'U]/G; [USP'U]/G
OINTMENT TOPICAL ONCE
OUTPATIENT
Start: 2025-01-21 | End: 2025-01-21

## 2025-01-21 RX ORDER — GINSENG 100 MG
CAPSULE ORAL ONCE
OUTPATIENT
Start: 2025-01-21 | End: 2025-01-21

## 2025-01-21 RX ORDER — TRIAMCINOLONE ACETONIDE 1 MG/G
OINTMENT TOPICAL ONCE
OUTPATIENT
Start: 2025-01-21 | End: 2025-01-21

## 2025-01-21 RX ORDER — MUPIROCIN 20 MG/G
OINTMENT TOPICAL ONCE
OUTPATIENT
Start: 2025-01-21 | End: 2025-01-21

## 2025-01-21 RX ORDER — SILVER SULFADIAZINE 10 MG/G
CREAM TOPICAL ONCE
OUTPATIENT
Start: 2025-01-21 | End: 2025-01-21

## 2025-01-21 RX ORDER — BETAMETHASONE DIPROPIONATE 0.5 MG/G
CREAM TOPICAL ONCE
OUTPATIENT
Start: 2025-01-21 | End: 2025-01-21

## 2025-01-21 RX ORDER — LIDOCAINE 40 MG/G
CREAM TOPICAL ONCE
OUTPATIENT
Start: 2025-01-21 | End: 2025-01-21

## 2025-01-21 RX ORDER — CLOBETASOL PROPIONATE 0.5 MG/G
OINTMENT TOPICAL ONCE
OUTPATIENT
Start: 2025-01-21 | End: 2025-01-21

## 2025-01-21 RX ORDER — GENTAMICIN SULFATE 1 MG/G
OINTMENT TOPICAL ONCE
OUTPATIENT
Start: 2025-01-21 | End: 2025-01-21

## 2025-01-21 RX ORDER — SODIUM CHLOR/HYPOCHLOROUS ACID 0.033 %
SOLUTION, IRRIGATION IRRIGATION ONCE
OUTPATIENT
Start: 2025-01-21 | End: 2025-01-21

## 2025-01-21 NOTE — PROGRESS NOTES
Wound Healing Center /Hyperbarics   History and Physical/Consultation  General Surgery/Medicine    Referring Physician : Chance Jama DO  Clark Maza  MEDICAL RECORD NUMBER:  [id number]  AGE: 74 y.o.   GENDER: male  : 1950  EPISODE DATE:  2025  Subjective:     Chief Complaint   Patient presents with    Wound Check         HISTORY of PRESENT ILLNESS HPI     Clark Maza is a 74 y.o. male who presents today for wound/ulcer evaluation.   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  This has been treated operative debridement / amputation, dry dressing and neosporin. On their initial visit to the wound healing center, 10/15/2024 ,  the patient has noted that the wound has not been improving.  The patient has not had similar previous wounds in the past.      CHF with CAD (coreg, lasix, zocor), follows with primary cardiology.      COPD still smoking, JOSE as well as hx of chronic hypoxemic respiratory failure not on supplemental oxygen according to patient (92%SPO2 RA, states he only use at home at times).  Notes indicate he desaturates to 80% on room air with ambulation and at 3LNC with ambulation 90%.   the patient on use of his oxygen, should have portable with him or concentrator, discuss with his PCP or pulmonologist.  Having proper tissue oxygenation is CRITICAL to wound healing.  He makes jokes but fully understands the gravity... this and continuing to smoke.  It was plainly explained we will do our best to support him but stupid decisions will earn in-kind prizes from Pranay's namesake.     We used motivational interviewing and active listening today in attempt to get him to agree to smoking cessation program but he refused.           -The

## 2025-01-23 ENCOUNTER — OFFICE VISIT (OUTPATIENT)
Dept: FAMILY MEDICINE CLINIC | Age: 75
End: 2025-01-23

## 2025-01-23 VITALS
BODY MASS INDEX: 35 KG/M2 | DIASTOLIC BLOOD PRESSURE: 60 MMHG | HEIGHT: 67 IN | OXYGEN SATURATION: 94 % | RESPIRATION RATE: 16 BRPM | WEIGHT: 223 LBS | TEMPERATURE: 97.6 F | SYSTOLIC BLOOD PRESSURE: 122 MMHG | HEART RATE: 68 BPM

## 2025-01-23 DIAGNOSIS — J44.9 CHRONIC OBSTRUCTIVE PULMONARY DISEASE, UNSPECIFIED COPD TYPE (HCC): ICD-10-CM

## 2025-01-23 DIAGNOSIS — I50.42 CHRONIC COMBINED SYSTOLIC AND DIASTOLIC CONGESTIVE HEART FAILURE (HCC): ICD-10-CM

## 2025-01-23 DIAGNOSIS — R35.1 BENIGN PROSTATIC HYPERPLASIA WITH NOCTURIA: ICD-10-CM

## 2025-01-23 DIAGNOSIS — N18.31 STAGE 3A CHRONIC KIDNEY DISEASE (HCC): ICD-10-CM

## 2025-01-23 DIAGNOSIS — N40.1 BENIGN PROSTATIC HYPERPLASIA WITH NOCTURIA: ICD-10-CM

## 2025-01-23 DIAGNOSIS — M25.511 CHRONIC RIGHT SHOULDER PAIN: ICD-10-CM

## 2025-01-23 DIAGNOSIS — F11.99 OPIOID USE, UNSPECIFIED WITH UNSPECIFIED OPIOID-INDUCED DISORDER (HCC): ICD-10-CM

## 2025-01-23 DIAGNOSIS — J96.11 CHRONIC HYPOXEMIC RESPIRATORY FAILURE: ICD-10-CM

## 2025-01-23 DIAGNOSIS — C85.91 NON-HODGKIN LYMPHOMA OF LYMPH NODES OF NECK, UNSPECIFIED NON-HODGKIN LYMPHOMA TYPE (HCC): ICD-10-CM

## 2025-01-23 DIAGNOSIS — G89.29 CHRONIC RIGHT SHOULDER PAIN: ICD-10-CM

## 2025-01-23 RX ORDER — TRIAMCINOLONE ACETONIDE 40 MG/ML
40 INJECTION, SUSPENSION INTRA-ARTICULAR; INTRAMUSCULAR ONCE
Status: COMPLETED | OUTPATIENT
Start: 2025-01-23 | End: 2025-01-23

## 2025-01-23 RX ORDER — TAMSULOSIN HYDROCHLORIDE 0.4 MG/1
0.4 CAPSULE ORAL DAILY
Qty: 90 CAPSULE | Refills: 1 | Status: SHIPPED | OUTPATIENT
Start: 2025-01-23

## 2025-01-23 RX ORDER — HYDROCODONE BITARTRATE AND ACETAMINOPHEN 7.5; 325 MG/1; MG/1
1 TABLET ORAL EVERY 6 HOURS PRN
Qty: 90 TABLET | Refills: 0 | Status: SHIPPED | OUTPATIENT
Start: 2025-01-23 | End: 2025-02-22

## 2025-01-23 RX ADMIN — TRIAMCINOLONE ACETONIDE 40 MG: 40 INJECTION, SUSPENSION INTRA-ARTICULAR; INTRAMUSCULAR at 11:47

## 2025-01-23 SDOH — ECONOMIC STABILITY: FOOD INSECURITY: WITHIN THE PAST 12 MONTHS, YOU WORRIED THAT YOUR FOOD WOULD RUN OUT BEFORE YOU GOT MONEY TO BUY MORE.: SOMETIMES TRUE

## 2025-01-23 SDOH — ECONOMIC STABILITY: FOOD INSECURITY: WITHIN THE PAST 12 MONTHS, THE FOOD YOU BOUGHT JUST DIDN'T LAST AND YOU DIDN'T HAVE MONEY TO GET MORE.: SOMETIMES TRUE

## 2025-01-23 ASSESSMENT — PATIENT HEALTH QUESTIONNAIRE - PHQ9
SUM OF ALL RESPONSES TO PHQ9 QUESTIONS 1 & 2: 6
2. FEELING DOWN, DEPRESSED OR HOPELESS: NEARLY EVERY DAY
1. LITTLE INTEREST OR PLEASURE IN DOING THINGS: NEARLY EVERY DAY
3. TROUBLE FALLING OR STAYING ASLEEP: NEARLY EVERY DAY
SUM OF ALL RESPONSES TO PHQ QUESTIONS 1-9: 16
8. MOVING OR SPEAKING SO SLOWLY THAT OTHER PEOPLE COULD HAVE NOTICED. OR THE OPPOSITE, BEING SO FIGETY OR RESTLESS THAT YOU HAVE BEEN MOVING AROUND A LOT MORE THAN USUAL: NOT AT ALL
SUM OF ALL RESPONSES TO PHQ QUESTIONS 1-9: 16
9. THOUGHTS THAT YOU WOULD BE BETTER OFF DEAD, OR OF HURTING YOURSELF: SEVERAL DAYS
4. FEELING TIRED OR HAVING LITTLE ENERGY: NEARLY EVERY DAY
SUM OF ALL RESPONSES TO PHQ QUESTIONS 1-9: 16
6. FEELING BAD ABOUT YOURSELF - OR THAT YOU ARE A FAILURE OR HAVE LET YOURSELF OR YOUR FAMILY DOWN: NOT AT ALL
10. IF YOU CHECKED OFF ANY PROBLEMS, HOW DIFFICULT HAVE THESE PROBLEMS MADE IT FOR YOU TO DO YOUR WORK, TAKE CARE OF THINGS AT HOME, OR GET ALONG WITH OTHER PEOPLE: SOMEWHAT DIFFICULT
5. POOR APPETITE OR OVEREATING: NEARLY EVERY DAY
7. TROUBLE CONCENTRATING ON THINGS, SUCH AS READING THE NEWSPAPER OR WATCHING TELEVISION: NOT AT ALL
SUM OF ALL RESPONSES TO PHQ QUESTIONS 1-9: 15

## 2025-01-23 ASSESSMENT — ENCOUNTER SYMPTOMS
CONSTIPATION: 0
WHEEZING: 0
DIARRHEA: 0
BLOOD IN STOOL: 0

## 2025-01-23 ASSESSMENT — COLUMBIA-SUICIDE SEVERITY RATING SCALE - C-SSRS
1. WITHIN THE PAST MONTH, HAVE YOU WISHED YOU WERE DEAD OR WISHED YOU COULD GO TO SLEEP AND NOT WAKE UP?: YES
6. HAVE YOU EVER DONE ANYTHING, STARTED TO DO ANYTHING, OR PREPARED TO DO ANYTHING TO END YOUR LIFE?: NO
2. HAVE YOU ACTUALLY HAD ANY THOUGHTS OF KILLING YOURSELF?: NO

## 2025-01-23 NOTE — PROGRESS NOTES
Clark Maza (:  1950) is a 74 y.o. male,Established patient, here for evaluation of the following chief complaint(s):  Shoulder Pain (Medication refill/Requesting injection R shoulder )      Assessment & Plan   ASSESSMENT/PLAN:  Assessment & Plan  Chronic right shoulder pain   Chronic, not at goal (unstable),  right shoulder shot    Orders:    HYDROcodone-acetaminophen (NORCO) 7.5-325 MG per tablet; Take 1 tablet by mouth every 6 hours as needed for Pain for up to 30 days. Max Daily Amount: 4 tablets    DRAIN/INJECT LARGE JOINT/BURSA    triamcinolone acetonide (KENALOG-40) injection 40 mg    Benign prostatic hyperplasia with nocturia    Not sure if taking flomax or not, does not know pharmacy     Orders:    tamsulosin (FLOMAX) 0.4 MG capsule; Take 1 capsule by mouth daily    Non-Hodgkin lymphoma of lymph nodes of neck, unspecified non-Hodgkin lymphoma type (HCC)    No new disease         Opioid use, unspecified with unspecified opioid-induced disorder (HCC)   Chronic, at goal (stable), continue current treatment plan         Chronic hypoxemic respiratory failure   Chronic, at goal (stable), continue current treatment plan         Chronic combined systolic and diastolic congestive heart failure (HCC)   Chronic, at goal (stable), stable able to walk stairs         Chronic obstructive pulmonary disease, unspecified COPD type (HCC)   Chronic, at goal (stable), continue current treatment plan         Stage 3a chronic kidney disease (HCC)   Chronic, at goal (stable), will rechk              No follow-ups on file.         Subjective   SUBJECTIVE/OBJECTIVE:  Shoulder Pain         Review of Systems   Constitutional:  Negative for chills and diaphoresis.   HENT:  Negative for ear discharge, ear pain, hearing loss, nosebleeds and tinnitus.    Respiratory:  Negative for wheezing.    Cardiovascular:  Negative for chest pain.   Gastrointestinal:  Negative for blood in stool, constipation and diarrhea.

## 2025-01-23 NOTE — ASSESSMENT & PLAN NOTE
Chronic, not at goal (unstable), right shoulder shot    Orders:    HYDROcodone-acetaminophen (NORCO) 7.5-325 MG per tablet; Take 1 tablet by mouth every 6 hours as needed for Pain for up to 30 days. Max Daily Amount: 4 tablets    DRAIN/INJECT LARGE JOINT/BURSA    triamcinolone acetonide (KENALOG-40) injection 40 mg

## 2025-01-28 ENCOUNTER — HOSPITAL ENCOUNTER (OUTPATIENT)
Dept: WOUND CARE | Age: 75
Discharge: HOME | End: 2025-01-28
Attending: SURGERY

## 2025-02-17 NOTE — TELEPHONE ENCOUNTER
Name of Medication(s) Requested:  Requested Prescriptions     Pending Prescriptions Disp Refills    simvastatin (ZOCOR) 20 MG tablet [Pharmacy Med Name: Simvastatin 20mg Tablet] 90 tablet 0     Sig: Take 1 tablet by mouth daily.    omeprazole (PRILOSEC) 40 MG delayed release capsule [Pharmacy Med Name: Omeprazole 40mg Delayed-Release Capsule] 90 capsule 0     Sig: Take 1 capsule by mouth once daily.    clopidogrel (PLAVIX) 75 MG tablet [Pharmacy Med Name: Clopidogrel 75mg Tablet] 90 tablet 0     Sig: Take 1 tablet by mouth daily.    fenofibrate (TRICOR) 145 MG tablet [Pharmacy Med Name: Fenofibrate 145mg Tablet] 90 tablet 0     Sig: Take 1 tablet by mouth once daily.    montelukast (SINGULAIR) 10 MG tablet [Pharmacy Med Name: Montelukast Sodium 10mg Tablet] 90 tablet 0     Sig: Take 1 tablet by mouth once daily.       Medication is on current medication list Yes    Dosage and directions were verified? Yes    Quantity verified: 90 day supply     Pharmacy Verified?  Yes    Last Appointment:  1/23/2025    Future appts:  Future Appointments   Date Time Provider Department Center   2/20/2025 11:00 AM Chance Jama DO [place] [facility] DEP        (If no appt send self scheduling link. .REFILLAPPT)  Scheduling request sent?     [] Yes  [x] No    Does patient need updated?  [] Yes  [x] No

## 2025-02-18 RX ORDER — FENOFIBRATE 145 MG/1
145 TABLET, COATED ORAL DAILY
Qty: 90 TABLET | Refills: 0 | Status: SHIPPED | OUTPATIENT
Start: 2025-02-18

## 2025-02-18 RX ORDER — OMEPRAZOLE 40 MG/1
40 CAPSULE, DELAYED RELEASE ORAL DAILY
Qty: 90 CAPSULE | Refills: 0 | Status: SHIPPED | OUTPATIENT
Start: 2025-02-18

## 2025-02-18 RX ORDER — CLOPIDOGREL BISULFATE 75 MG/1
75 TABLET ORAL DAILY
Qty: 90 TABLET | Refills: 0 | Status: SHIPPED | OUTPATIENT
Start: 2025-02-18

## 2025-02-18 RX ORDER — MONTELUKAST SODIUM 10 MG/1
10 TABLET ORAL DAILY
Qty: 90 TABLET | Refills: 0 | Status: SHIPPED | OUTPATIENT
Start: 2025-02-18

## 2025-02-18 RX ORDER — SIMVASTATIN 20 MG
20 TABLET ORAL DAILY
Qty: 90 TABLET | Refills: 0 | Status: SHIPPED | OUTPATIENT
Start: 2025-02-18

## 2025-02-20 ENCOUNTER — OFFICE VISIT (OUTPATIENT)
Dept: FAMILY MEDICINE CLINIC | Age: 75
End: 2025-02-20

## 2025-02-20 VITALS
BODY MASS INDEX: 34.53 KG/M2 | HEART RATE: 75 BPM | HEIGHT: 67 IN | SYSTOLIC BLOOD PRESSURE: 116 MMHG | OXYGEN SATURATION: 94 % | TEMPERATURE: 97.6 F | RESPIRATION RATE: 16 BRPM | WEIGHT: 220 LBS | DIASTOLIC BLOOD PRESSURE: 62 MMHG

## 2025-02-20 DIAGNOSIS — Z00.00 MEDICARE ANNUAL WELLNESS VISIT, SUBSEQUENT: Primary | ICD-10-CM

## 2025-02-20 DIAGNOSIS — G89.29 CHRONIC RIGHT SHOULDER PAIN: ICD-10-CM

## 2025-02-20 DIAGNOSIS — M25.511 CHRONIC RIGHT SHOULDER PAIN: ICD-10-CM

## 2025-02-20 DIAGNOSIS — Z00.00 ENCOUNTER FOR ANNUAL WELLNESS VISIT (AWV) IN MEDICARE PATIENT: ICD-10-CM

## 2025-02-20 DIAGNOSIS — Z23 IMMUNIZATION DUE: ICD-10-CM

## 2025-02-20 DIAGNOSIS — Z12.5 ENCOUNTER FOR PROSTATE CANCER SCREENING: ICD-10-CM

## 2025-02-20 DIAGNOSIS — Z87.891 PERSONAL HISTORY OF TOBACCO USE: ICD-10-CM

## 2025-02-20 LAB — PROSTATE SPECIFIC ANTIGEN: 6.83 NG/ML (ref 0–4)

## 2025-02-20 RX ORDER — HYDROCODONE BITARTRATE AND ACETAMINOPHEN 7.5; 325 MG/1; MG/1
1 TABLET ORAL EVERY 6 HOURS PRN
Qty: 90 TABLET | Refills: 0 | Status: SHIPPED | OUTPATIENT
Start: 2025-02-20 | End: 2025-03-22

## 2025-02-20 ASSESSMENT — PATIENT HEALTH QUESTIONNAIRE - PHQ9
5. POOR APPETITE OR OVEREATING: NOT AT ALL
SUM OF ALL RESPONSES TO PHQ QUESTIONS 1-9: 18
3. TROUBLE FALLING OR STAYING ASLEEP: NEARLY EVERY DAY
1. LITTLE INTEREST OR PLEASURE IN DOING THINGS: NEARLY EVERY DAY
SUM OF ALL RESPONSES TO PHQ9 QUESTIONS 1 & 2: 6
8. MOVING OR SPEAKING SO SLOWLY THAT OTHER PEOPLE COULD HAVE NOTICED. OR THE OPPOSITE, BEING SO FIGETY OR RESTLESS THAT YOU HAVE BEEN MOVING AROUND A LOT MORE THAN USUAL: NEARLY EVERY DAY
6. FEELING BAD ABOUT YOURSELF - OR THAT YOU ARE A FAILURE OR HAVE LET YOURSELF OR YOUR FAMILY DOWN: NOT AT ALL
SUM OF ALL RESPONSES TO PHQ QUESTIONS 1-9: 18
SUM OF ALL RESPONSES TO PHQ QUESTIONS 1-9: 17
SUM OF ALL RESPONSES TO PHQ QUESTIONS 1-9: 18
4. FEELING TIRED OR HAVING LITTLE ENERGY: NEARLY EVERY DAY
2. FEELING DOWN, DEPRESSED OR HOPELESS: NEARLY EVERY DAY
9. THOUGHTS THAT YOU WOULD BE BETTER OFF DEAD, OR OF HURTING YOURSELF: SEVERAL DAYS
7. TROUBLE CONCENTRATING ON THINGS, SUCH AS READING THE NEWSPAPER OR WATCHING TELEVISION: MORE THAN HALF THE DAYS
10. IF YOU CHECKED OFF ANY PROBLEMS, HOW DIFFICULT HAVE THESE PROBLEMS MADE IT FOR YOU TO DO YOUR WORK, TAKE CARE OF THINGS AT HOME, OR GET ALONG WITH OTHER PEOPLE: VERY DIFFICULT

## 2025-02-20 ASSESSMENT — COLUMBIA-SUICIDE SEVERITY RATING SCALE - C-SSRS
2. HAVE YOU ACTUALLY HAD ANY THOUGHTS OF KILLING YOURSELF?: NO
1. WITHIN THE PAST MONTH, HAVE YOU WISHED YOU WERE DEAD OR WISHED YOU COULD GO TO SLEEP AND NOT WAKE UP?: YES
6. HAVE YOU EVER DONE ANYTHING, STARTED TO DO ANYTHING, OR PREPARED TO DO ANYTHING TO END YOUR LIFE?: NO

## 2025-02-20 ASSESSMENT — LIFESTYLE VARIABLES
HOW OFTEN DURING THE LAST YEAR HAVE YOU FAILED TO DO WHAT WAS NORMALLY EXPECTED FROM YOU BECAUSE OF DRINKING: NEVER
HOW OFTEN DURING THE LAST YEAR HAVE YOU BEEN UNABLE TO REMEMBER WHAT HAPPENED THE NIGHT BEFORE BECAUSE YOU HAD BEEN DRINKING: NEVER
HOW OFTEN DURING THE LAST YEAR HAVE YOU HAD A FEELING OF GUILT OR REMORSE AFTER DRINKING: NEVER
HAVE YOU OR SOMEONE ELSE BEEN INJURED AS A RESULT OF YOUR DRINKING: NO
HOW OFTEN DURING THE LAST YEAR HAVE YOU FOUND THAT YOU WERE NOT ABLE TO STOP DRINKING ONCE YOU HAD STARTED: NEVER
HOW MANY STANDARD DRINKS CONTAINING ALCOHOL DO YOU HAVE ON A TYPICAL DAY: 1 OR 2
HOW OFTEN DURING THE LAST YEAR HAVE YOU NEEDED AN ALCOHOLIC DRINK FIRST THING IN THE MORNING TO GET YOURSELF GOING AFTER A NIGHT OF HEAVY DRINKING: NEVER
HAS A RELATIVE, FRIEND, DOCTOR, OR ANOTHER HEALTH PROFESSIONAL EXPRESSED CONCERN ABOUT YOUR DRINKING OR SUGGESTED YOU CUT DOWN: NO
HOW OFTEN DO YOU HAVE A DRINK CONTAINING ALCOHOL: 4 OR MORE TIMES A WEEK

## 2025-02-20 NOTE — PATIENT INSTRUCTIONS
tasks. Your doctor may also want to know if you have changed the way you do a task because of a health problem. Your doctor may watch how you:  Walk back and forth.  Keep your balance while you stand or walk.  Move from sitting to standing or from a bed to a chair.  Button or unbutton a shirt or sweater.  Remove and put on your shoes.  It's common to feel a little worried or anxious if you find you can't do all the things you used to be able to do. Talking with your doctor about ADLs is a way to make sure you're as safe as possible and able to care for yourself as well as you can. You may want to bring a caregiver, friend, or family member to your checkup. They can help you talk to your doctor.  Follow-up care is a key part of your treatment and safety. Be sure to make and go to all appointments, and call your doctor if you are having problems. It's also a good idea to know your test results and keep a list of the medicines you take.  Current as of: October 24, 2023  Content Version: 14.3  © 2024 [employer].   Care instructions adapted under license by [employer]. If you have questions about a medical condition or this instruction, always ask your healthcare professional. [employer] disclaims any warranty or liability for your use of this information.         Eating Healthy Foods: Care Instructions  With every meal, you can make healthy food choices. Try to eat a variety of fruits, vegetables, whole grains, lean proteins, and low-fat dairy products. This can help you get the right balance of nutrients, including vitamins and minerals. Small changes add up over time. You can start by adding one healthy food to your meals each day.    Try to make half your plate fruits and vegetables, one-fourth whole grains, and one-fourth lean proteins. Try including dairy with your meals.   Eat more fruits and vegetables. Try to have them with most meals and snacks.   Foods for healthy eating

## 2025-02-20 NOTE — PROGRESS NOTES
4Medicare Annual Wellness Visit    Clark ZURITA Postlethsissy is here for Medicare AWV (No concerns or issues at this time ) and Shoulder Pain (Medication refill )    Assessment & Plan  1. Annual wellness examination.  His Prostate-Specific Antigen (PSA) levels were slightly elevated during the last assessment in 2023 but have since normalized. The Prescription Drug Monitoring Program (PDMP) is checked monthly and shows no evidence of abuse. A prescription for hydrocodone has been issued and sent to [facility] pharmacy. He will receive an influenza vaccine today. A low-dose CT scan of the chest has been ordered due to his 34 pack-year smoking history.  Medicare annual wellness visit, subsequent  Chronic right shoulder pain  -     HYDROcodone-acetaminophen (NORCO) 7.5-325 MG per tablet; Take 1 tablet by mouth every 6 hours as needed for Pain for up to 30 days. Max Daily Amount: 4 tablets, Disp-90 tablet, R-0Normal  Immunization due  -     Influenza, FLUAD Trivalent, (age 65 y+), IM, Preservative Free, 0.5mL  Encounter for prostate cancer screening  -     PSA Screening; Future  Encounter for annual wellness visit (AWV) in Medicare patient  Personal history of tobacco use  -     AL VISIT TO DISCUSS LUNG CA SCREEN W LDCT  -     CT Lung Screen (Initial/Annual/Baseline); Future    Results  Laboratory Studies  PSA levels were slightly elevated in 2023 but have since normalized.     Return for Medicare Annual Wellness Visit in 1 year.     Subjective   The following acute and/or chronic problems were also addressed today:  Chronic opioid management  History of Present Illness  The patient is a 54-year-old male who presents for an annual wellness visit.    He has been experiencing difficulties in obtaining a refill for his hydrocodone prescription, with several unsuccessful attempts noted. He reports no history of prostate cancer. His last CT scan of the chest was conducted approximately 10 to 12 years ago, although he has had

## 2025-03-17 RX ORDER — POTASSIUM CHLORIDE 750 MG/1
10 TABLET, EXTENDED RELEASE ORAL 2 TIMES DAILY
Qty: 180 TABLET | Refills: 0 | Status: SHIPPED | OUTPATIENT
Start: 2025-03-17

## 2025-03-17 RX ORDER — TAMSULOSIN HYDROCHLORIDE 0.4 MG/1
CAPSULE ORAL
Qty: 90 CAPSULE | Refills: 0 | Status: SHIPPED | OUTPATIENT
Start: 2025-03-17

## 2025-03-17 NOTE — TELEPHONE ENCOUNTER
Name of Medication(s) Requested:  Requested Prescriptions     Pending Prescriptions Disp Refills    potassium chloride (KLOR-CON M) 10 MEQ extended release tablet [Pharmacy Med Name: Potassium Chloride 10mEq Extended-Release Tablet] 180 tablet 0     Sig: Take 1 tablet by mouth twice daily.    tamsulosin (FLOMAX) 0.4 MG capsule [Pharmacy Med Name: Tamsulosin Hydrochloride 0.4mg Capsule] 90 capsule 0     Sig: Take 1 capsule by mouth nightly at bedtime.       Medication is on current medication list Yes    Dosage and directions were verified? Yes    Quantity verified: 90 day supply     Pharmacy Verified?  Yes    Last Appointment:  2/20/2025    Future appts:  Future Appointments   Date Time Provider Department Center   3/20/2025 10:15 AM Chance Jama DO [place] [facility] DEP        (If no appt send self scheduling link. .REFILLAPPT)  Scheduling request sent?     [] Yes  [x] No    Does patient need updated?  [] Yes  [x] No

## 2025-03-20 ENCOUNTER — OFFICE VISIT (OUTPATIENT)
Dept: FAMILY MEDICINE CLINIC | Age: 75
End: 2025-03-20
Payer: MEDICARE

## 2025-03-20 VITALS
TEMPERATURE: 97.5 F | BODY MASS INDEX: 35.25 KG/M2 | SYSTOLIC BLOOD PRESSURE: 130 MMHG | HEART RATE: 68 BPM | RESPIRATION RATE: 16 BRPM | OXYGEN SATURATION: 90 % | HEIGHT: 67 IN | DIASTOLIC BLOOD PRESSURE: 58 MMHG | WEIGHT: 224.6 LBS

## 2025-03-20 DIAGNOSIS — R97.20 ELEVATED PSA MEASUREMENT: ICD-10-CM

## 2025-03-20 DIAGNOSIS — M25.511 CHRONIC RIGHT SHOULDER PAIN: Primary | ICD-10-CM

## 2025-03-20 DIAGNOSIS — G89.29 CHRONIC RIGHT SHOULDER PAIN: Primary | ICD-10-CM

## 2025-03-20 LAB — PROSTATE SPECIFIC ANTIGEN: 5.63 NG/ML (ref 0–4)

## 2025-03-20 PROCEDURE — 1159F MED LIST DOCD IN RCRD: CPT | Performed by: FAMILY MEDICINE

## 2025-03-20 PROCEDURE — 99214 OFFICE O/P EST MOD 30 MIN: CPT | Performed by: FAMILY MEDICINE

## 2025-03-20 PROCEDURE — 36415 COLL VENOUS BLD VENIPUNCTURE: CPT | Performed by: FAMILY MEDICINE

## 2025-03-20 PROCEDURE — 1124F ACP DISCUSS-NO DSCNMKR DOCD: CPT | Performed by: FAMILY MEDICINE

## 2025-03-20 RX ORDER — HYDROCODONE BITARTRATE AND ACETAMINOPHEN 7.5; 325 MG/1; MG/1
1 TABLET ORAL EVERY 6 HOURS PRN
Qty: 90 TABLET | Refills: 0 | Status: SHIPPED | OUTPATIENT
Start: 2025-03-20 | End: 2025-04-19

## 2025-03-20 NOTE — ASSESSMENT & PLAN NOTE
Chronic, at goal (stable), continue pain medication Chronic opioid    Orders:    HYDROcodone-acetaminophen (NORCO) 7.5-325 MG per tablet; Take 1 tablet by mouth every 6 hours as needed for Pain for up to 30 days. Max Daily Amount: 4 tablets

## 2025-03-20 NOTE — PROGRESS NOTES
Clark Maza (:  1950) is a 74 y.o. male,Established patient, here for evaluation of the following chief complaint(s):  Shoulder Pain (Med Refill) and Abnormal Lab (PSA elevated from February)  Controlled substances monitoring: no signs of potential drug abuse or diversion identified and [place] report reviewed today- activity consistent with treatment plan.     Assessment & Plan   ASSESSMENT/PLAN:  Assessment & Plan  Chronic right shoulder pain   Chronic, at goal (stable),  continue pain medication Chronic opioid    Orders:    HYDROcodone-acetaminophen (NORCO) 7.5-325 MG per tablet; Take 1 tablet by mouth every 6 hours as needed for Pain for up to 30 days. Max Daily Amount: 4 tablets    Elevated PSA measurement   Chronic, at goal (stable), rechk    Orders:    PSA, Diagnostic       Assessment & Plan  1. Elevated Prostate-Specific Antigen (PSA) levels.  His PSA levels have been consistently elevated since , with a recent increase from the 5s to the 6s. A note was sent out regarding this issue. The patient was informed that elevated PSA levels could indicate prostate cancer, but levels typically associated with cancer are much higher (20-40). He was also informed that PSA tests are not highly reliable for cancer detection, so monitoring over time is essential. A PSA test will be ordered today. If the PSA levels remain elevated, a referral to a urologist will be considered.    2. Smoking cessation.  He resumed smoking 4-5 cigarettes a day approximately 6-8 months ago after quitting for 2 years. The risks of smoking, including lung cancer and chronic lung disease, were discussed. He was advised to consider quitting smoking again to reduce these risks.    No follow-ups on file.         Subjective   SUBJECTIVE/OBJECTIVE:  History of Present Illness  The patient presents for evaluation of elevated PSA and smoking cessation.    He has a history of smoking, with a cessation period of 2 years, but resumed

## 2025-03-24 ENCOUNTER — RESULTS FOLLOW-UP (OUTPATIENT)
Dept: FAMILY MEDICINE CLINIC | Age: 75
End: 2025-03-24

## 2025-03-24 DIAGNOSIS — R97.20 ELEVATED PROSTATE SPECIFIC ANTIGEN (PSA): Primary | ICD-10-CM

## 2025-03-24 DIAGNOSIS — R97.20 ELEVATED PSA: ICD-10-CM

## 2025-03-24 DIAGNOSIS — R97.20 ELEVATED PSA MEASUREMENT: ICD-10-CM

## 2025-04-16 RX ORDER — HYDRALAZINE HYDROCHLORIDE 25 MG/1
25 TABLET, FILM COATED ORAL 4 TIMES DAILY
Qty: 360 TABLET | Refills: 3 | Status: SHIPPED | OUTPATIENT
Start: 2025-04-16

## 2025-04-21 ENCOUNTER — OFFICE VISIT (OUTPATIENT)
Dept: FAMILY MEDICINE CLINIC | Age: 75
End: 2025-04-21
Payer: MEDICARE

## 2025-04-21 VITALS
SYSTOLIC BLOOD PRESSURE: 126 MMHG | OXYGEN SATURATION: 92 % | DIASTOLIC BLOOD PRESSURE: 72 MMHG | WEIGHT: 222.2 LBS | BODY MASS INDEX: 34.88 KG/M2 | HEIGHT: 67 IN | HEART RATE: 74 BPM | TEMPERATURE: 97.4 F | RESPIRATION RATE: 16 BRPM

## 2025-04-21 DIAGNOSIS — M25.511 CHRONIC RIGHT SHOULDER PAIN: Primary | ICD-10-CM

## 2025-04-21 DIAGNOSIS — N18.31 STAGE 3A CHRONIC KIDNEY DISEASE (HCC): ICD-10-CM

## 2025-04-21 DIAGNOSIS — I50.42 CHRONIC COMBINED SYSTOLIC AND DIASTOLIC CONGESTIVE HEART FAILURE (HCC): ICD-10-CM

## 2025-04-21 DIAGNOSIS — G89.29 CHRONIC RIGHT SHOULDER PAIN: Primary | ICD-10-CM

## 2025-04-21 PROCEDURE — 1124F ACP DISCUSS-NO DSCNMKR DOCD: CPT | Performed by: FAMILY MEDICINE

## 2025-04-21 PROCEDURE — 99214 OFFICE O/P EST MOD 30 MIN: CPT | Performed by: FAMILY MEDICINE

## 2025-04-21 PROCEDURE — 1159F MED LIST DOCD IN RCRD: CPT | Performed by: FAMILY MEDICINE

## 2025-04-21 RX ORDER — HYDROCODONE BITARTRATE AND ACETAMINOPHEN 7.5; 325 MG/1; MG/1
1 TABLET ORAL EVERY 6 HOURS PRN
Qty: 90 TABLET | Refills: 0 | Status: SHIPPED | OUTPATIENT
Start: 2025-04-21 | End: 2025-05-21

## 2025-04-21 ASSESSMENT — PATIENT HEALTH QUESTIONNAIRE - PHQ9
9. THOUGHTS THAT YOU WOULD BE BETTER OFF DEAD, OR OF HURTING YOURSELF: MORE THAN HALF THE DAYS
SUM OF ALL RESPONSES TO PHQ QUESTIONS 1-9: 19
7. TROUBLE CONCENTRATING ON THINGS, SUCH AS READING THE NEWSPAPER OR WATCHING TELEVISION: SEVERAL DAYS
6. FEELING BAD ABOUT YOURSELF - OR THAT YOU ARE A FAILURE OR HAVE LET YOURSELF OR YOUR FAMILY DOWN: NEARLY EVERY DAY
SUM OF ALL RESPONSES TO PHQ QUESTIONS 1-9: 19
2. FEELING DOWN, DEPRESSED OR HOPELESS: MORE THAN HALF THE DAYS
3. TROUBLE FALLING OR STAYING ASLEEP: NEARLY EVERY DAY
10. IF YOU CHECKED OFF ANY PROBLEMS, HOW DIFFICULT HAVE THESE PROBLEMS MADE IT FOR YOU TO DO YOUR WORK, TAKE CARE OF THINGS AT HOME, OR GET ALONG WITH OTHER PEOPLE: SOMEWHAT DIFFICULT
SUM OF ALL RESPONSES TO PHQ QUESTIONS 1-9: 17
SUM OF ALL RESPONSES TO PHQ QUESTIONS 1-9: 19
5. POOR APPETITE OR OVEREATING: SEVERAL DAYS
4. FEELING TIRED OR HAVING LITTLE ENERGY: NEARLY EVERY DAY
8. MOVING OR SPEAKING SO SLOWLY THAT OTHER PEOPLE COULD HAVE NOTICED. OR THE OPPOSITE, BEING SO FIGETY OR RESTLESS THAT YOU HAVE BEEN MOVING AROUND A LOT MORE THAN USUAL: MORE THAN HALF THE DAYS
1. LITTLE INTEREST OR PLEASURE IN DOING THINGS: MORE THAN HALF THE DAYS

## 2025-04-21 ASSESSMENT — ENCOUNTER SYMPTOMS
BLOOD IN STOOL: 0
DIARRHEA: 0
CONSTIPATION: 0
WHEEZING: 0

## 2025-04-21 NOTE — PROGRESS NOTES
cardiologist.  - No recent cardiology follow-up in the past 6 months.  - Discussed the importance of cardiology follow-up and monitoring of symptoms.  - Recommended scheduling an appointment with a cardiologist for further evaluation.    No follow-ups on file.         Subjective   SUBJECTIVE/OBJECTIVE:  History of Present Illness  The patient presents for evaluation of right shoulder pain, left shoulder pain, chest pain, and alcohol use disorder.    Persistent pain in the right shoulder is reported, accompanied by a decreased range of motion. Abduction is limited to 20 degrees. Pain is also experienced on the acromion process and acromioclavicular joint, along with decreased bicep long head pain.    The left shoulder exhibits decreased range of motion due to a rotator cuff tear.    A wound is being managed by dressing it. Smoking cessation has been maintained for the past 2 weeks. He had previously quit smoking for 2 years but resumed the habit. Concurrently, alcohol consumption had been stopped for a year but has since returned to consuming a 12-pack of beer every other day.    Occasional chest pain is experienced, which is not found concerning. No recent consultation with a cardiologist has occurred, with the last visit being approximately 6 months ago.    SOCIAL HISTORY  The patient quit smoking 2 weeks ago. The patient drinks a 12-pack of beer every other day.    Review of Systems   Constitutional:  Negative for chills and diaphoresis.   HENT:  Negative for ear discharge, ear pain, hearing loss, nosebleeds and tinnitus.    Respiratory:  Negative for wheezing.    Cardiovascular:  Negative for chest pain.   Gastrointestinal:  Negative for blood in stool, constipation and diarrhea.   Genitourinary:  Negative for dysuria, flank pain and hematuria.   Skin:  Negative for rash.   Neurological:  Negative for headaches.   Hematological:  Does not bruise/bleed easily.          Objective   /72   Pulse 74   Temp

## 2025-05-06 ENCOUNTER — RESULTS FOLLOW-UP (OUTPATIENT)
Dept: FAMILY MEDICINE CLINIC | Age: 75
End: 2025-05-06

## 2025-05-06 ENCOUNTER — OFFICE VISIT (OUTPATIENT)
Dept: FAMILY MEDICINE CLINIC | Age: 75
End: 2025-05-06

## 2025-05-06 VITALS
DIASTOLIC BLOOD PRESSURE: 62 MMHG | BODY MASS INDEX: 35.09 KG/M2 | HEIGHT: 67 IN | SYSTOLIC BLOOD PRESSURE: 120 MMHG | RESPIRATION RATE: 16 BRPM | OXYGEN SATURATION: 93 % | TEMPERATURE: 97.7 F | WEIGHT: 223.6 LBS | HEART RATE: 72 BPM

## 2025-05-06 DIAGNOSIS — J44.9 CHRONIC OBSTRUCTIVE PULMONARY DISEASE, UNSPECIFIED COPD TYPE (HCC): ICD-10-CM

## 2025-05-06 DIAGNOSIS — L98.492 SKIN ULCER WITH FAT LAYER EXPOSED (HCC): Primary | ICD-10-CM

## 2025-05-06 DIAGNOSIS — M86.9 OSTEOMYELITIS OF RIGHT HAND, UNSPECIFIED TYPE (HCC): ICD-10-CM

## 2025-05-06 DIAGNOSIS — R35.0 URINE FREQUENCY: Primary | ICD-10-CM

## 2025-05-06 DIAGNOSIS — L98.492 TRAUMATIC ULCER WITH FAT LAYER EXPOSED (HCC): ICD-10-CM

## 2025-05-06 DIAGNOSIS — F32.A DEPRESSION, UNSPECIFIED DEPRESSION TYPE: ICD-10-CM

## 2025-05-06 DIAGNOSIS — R35.1 BENIGN PROSTATIC HYPERPLASIA WITH NOCTURIA: ICD-10-CM

## 2025-05-06 DIAGNOSIS — N40.1 BENIGN PROSTATIC HYPERPLASIA WITH NOCTURIA: ICD-10-CM

## 2025-05-06 DIAGNOSIS — L98.492 SKIN ULCER WITH FAT LAYER EXPOSED (HCC): ICD-10-CM

## 2025-05-06 DIAGNOSIS — M86.9 OSTEOMYELITIS OF RIGHT HAND, UNSPECIFIED TYPE (HCC): Primary | ICD-10-CM

## 2025-05-06 LAB
BILIRUBIN, POC: NORMAL
BLOOD URINE, POC: NORMAL
CLARITY, POC: CLEAR
COLOR, POC: YELLOW
GLUCOSE URINE, POC: NORMAL MG/DL
KETONES, POC: NORMAL MG/DL
LEUKOCYTE EST, POC: NORMAL
NITRITE, POC: NORMAL
PH, POC: 5.5
PROTEIN, POC: NORMAL MG/DL
SPECIFIC GRAVITY, POC: 1.02
UROBILINOGEN, POC: 1 MG/DL

## 2025-05-06 RX ORDER — OMEPRAZOLE 40 MG/1
40 CAPSULE, DELAYED RELEASE ORAL DAILY
Qty: 90 CAPSULE | Refills: 0 | Status: SHIPPED | OUTPATIENT
Start: 2025-05-06

## 2025-05-06 RX ORDER — CEPHALEXIN 500 MG/1
500 CAPSULE ORAL 4 TIMES DAILY
Qty: 28 CAPSULE | Refills: 0 | Status: SHIPPED | OUTPATIENT
Start: 2025-05-06 | End: 2025-05-13

## 2025-05-06 RX ORDER — SIMVASTATIN 20 MG
20 TABLET ORAL DAILY
Qty: 90 TABLET | Refills: 0 | Status: SHIPPED | OUTPATIENT
Start: 2025-05-06

## 2025-05-06 RX ORDER — TAMSULOSIN HYDROCHLORIDE 0.4 MG/1
0.8 CAPSULE ORAL DAILY
Qty: 60 CAPSULE | Refills: 0 | Status: SHIPPED | OUTPATIENT
Start: 2025-05-06

## 2025-05-06 RX ORDER — FENOFIBRATE 145 MG/1
145 TABLET, FILM COATED ORAL DAILY
Qty: 90 TABLET | Refills: 0 | Status: SHIPPED | OUTPATIENT
Start: 2025-05-06

## 2025-05-06 RX ORDER — SULFAMETHOXAZOLE AND TRIMETHOPRIM 800; 160 MG/1; MG/1
1 TABLET ORAL 2 TIMES DAILY
Qty: 14 TABLET | Refills: 0 | Status: SHIPPED | OUTPATIENT
Start: 2025-05-06 | End: 2025-05-13

## 2025-05-06 RX ORDER — MONTELUKAST SODIUM 10 MG/1
10 TABLET ORAL DAILY
Qty: 90 TABLET | Refills: 0 | Status: SHIPPED | OUTPATIENT
Start: 2025-05-06

## 2025-05-06 RX ORDER — CLOPIDOGREL BISULFATE 75 MG/1
75 TABLET ORAL DAILY
Qty: 90 TABLET | Refills: 0 | Status: SHIPPED | OUTPATIENT
Start: 2025-05-06

## 2025-05-06 ASSESSMENT — PATIENT HEALTH QUESTIONNAIRE - PHQ9
1. LITTLE INTEREST OR PLEASURE IN DOING THINGS: NEARLY EVERY DAY
9. THOUGHTS THAT YOU WOULD BE BETTER OFF DEAD, OR OF HURTING YOURSELF: NEARLY EVERY DAY
2. FEELING DOWN, DEPRESSED OR HOPELESS: NEARLY EVERY DAY
SUM OF ALL RESPONSES TO PHQ QUESTIONS 1-9: 21
SUM OF ALL RESPONSES TO PHQ QUESTIONS 1-9: 21
8. MOVING OR SPEAKING SO SLOWLY THAT OTHER PEOPLE COULD HAVE NOTICED. OR THE OPPOSITE, BEING SO FIGETY OR RESTLESS THAT YOU HAVE BEEN MOVING AROUND A LOT MORE THAN USUAL: NEARLY EVERY DAY
5. POOR APPETITE OR OVEREATING: MORE THAN HALF THE DAYS
SUM OF ALL RESPONSES TO PHQ QUESTIONS 1-9: 18
10. IF YOU CHECKED OFF ANY PROBLEMS, HOW DIFFICULT HAVE THESE PROBLEMS MADE IT FOR YOU TO DO YOUR WORK, TAKE CARE OF THINGS AT HOME, OR GET ALONG WITH OTHER PEOPLE: SOMEWHAT DIFFICULT
7. TROUBLE CONCENTRATING ON THINGS, SUCH AS READING THE NEWSPAPER OR WATCHING TELEVISION: SEVERAL DAYS
3. TROUBLE FALLING OR STAYING ASLEEP: NEARLY EVERY DAY
6. FEELING BAD ABOUT YOURSELF - OR THAT YOU ARE A FAILURE OR HAVE LET YOURSELF OR YOUR FAMILY DOWN: NOT AT ALL
4. FEELING TIRED OR HAVING LITTLE ENERGY: NEARLY EVERY DAY
SUM OF ALL RESPONSES TO PHQ QUESTIONS 1-9: 21

## 2025-05-06 ASSESSMENT — COLUMBIA-SUICIDE SEVERITY RATING SCALE - C-SSRS
6. HAVE YOU EVER DONE ANYTHING, STARTED TO DO ANYTHING, OR PREPARED TO DO ANYTHING TO END YOUR LIFE?: NO
1. WITHIN THE PAST MONTH, HAVE YOU WISHED YOU WERE DEAD OR WISHED YOU COULD GO TO SLEEP AND NOT WAKE UP?: YES
2. HAVE YOU ACTUALLY HAD ANY THOUGHTS OF KILLING YOURSELF?: NO

## 2025-05-06 NOTE — PROGRESS NOTES
Clark Maza (:  1950) is a 74 y.o. male,Established patient, here for evaluation of the following chief complaint(s):  Urinary Frequency (Urinating 7-10x throughout the night ), Joint Swelling (Thumb swelling x2/3 weeks ), and Health Maintenance (Colonoscopy- patient declined )      Assessment & Plan   ASSESSMENT/PLAN:  Assessment & Plan  1. Urinary frequency.  - PSA levels have shown a slight increase, but there is no indication of prostate cancer.  - Urinary analysis conducted today did not reveal any signs of infection.  - Dosage of tamsulosin will be increased from 0.4 mg once daily to 0.8 mg twice daily for a duration of one month.  - Referral to a urologist will be made for further evaluation. If the increased dosage of tamsulosin does not alleviate symptoms, alternative medications will be considered.    2. Right thumb joint swelling.  - Redness and swelling observed in the right thumb raise concerns about a potential infection that could extend to the bone.  - Prescription for Keflex 500 mg four times daily will be provided.  - X-ray of the right hand will be ordered stat to rule out any bone infection.  - Referral back to wound care for further management. If the infection is found to have spread to the bone, intravenous antibiotics may be considered.    3. Depression.  - Reports that the current antidepressant may not be strong enough.  - Medication will be reviewed and adjusted as necessary.  - Currently prescribed Zoloft and Wellbutrin for anxiety and depression.    4. Chronic obstructive pulmonary disease (COPD).  - Reports constant coughing and shortness of breath, attributed to COPD and past occupational exposures.  - No changes in COPD management were discussed during this visit.    5. Medication management.  - Refills for Plavix, fenofibrate, Singulair, omeprazole, and simvastatin will be provided and sent to pill pack.  - Antibiotic and adjusted Flomax dose will be sent to

## 2025-05-15 ENCOUNTER — HOSPITAL ENCOUNTER (INPATIENT)
Age: 75
LOS: 4 days | Discharge: HOME | DRG: 464 | End: 2025-05-19
Attending: STUDENT IN AN ORGANIZED HEALTH CARE EDUCATION/TRAINING PROGRAM | Admitting: STUDENT IN AN ORGANIZED HEALTH CARE EDUCATION/TRAINING PROGRAM
Payer: MEDICARE

## 2025-05-15 DIAGNOSIS — M86.10 ACUTE OSTEOMYELITIS (HCC): ICD-10-CM

## 2025-05-15 PROBLEM — M86.9 OSTEOMYELITIS (HCC): Status: ACTIVE | Noted: 2025-05-15

## 2025-05-15 PROBLEM — M86.649: Status: ACTIVE | Noted: 2025-05-15

## 2025-05-15 PROCEDURE — 1200000000 HC SEMI PRIVATE

## 2025-05-15 PROCEDURE — 2500000003 HC RX 250 WO HCPCS

## 2025-05-15 PROCEDURE — 94640 AIRWAY INHALATION TREATMENT: CPT

## 2025-05-15 PROCEDURE — 6360000002 HC RX W HCPCS: Performed by: INTERNAL MEDICINE

## 2025-05-15 PROCEDURE — 6370000000 HC RX 637 (ALT 250 FOR IP): Performed by: INTERNAL MEDICINE

## 2025-05-15 RX ORDER — DEXTROSE MONOHYDRATE 100 MG/ML
INJECTION, SOLUTION INTRAVENOUS CONTINUOUS PRN
Status: DISCONTINUED | OUTPATIENT
Start: 2025-05-15 | End: 2025-05-17

## 2025-05-15 RX ORDER — FENOFIBRATE 160 MG/1
160 TABLET ORAL DAILY
Status: DISCONTINUED | OUTPATIENT
Start: 2025-05-15 | End: 2025-05-18

## 2025-05-15 RX ORDER — TAMSULOSIN HYDROCHLORIDE 0.4 MG/1
0.8 CAPSULE ORAL DAILY
Status: DISCONTINUED | OUTPATIENT
Start: 2025-05-16 | End: 2025-05-19 | Stop reason: HOSPADM

## 2025-05-15 RX ORDER — BUPROPION HYDROCHLORIDE 150 MG/1
150 TABLET, EXTENDED RELEASE ORAL 2 TIMES DAILY
Status: DISCONTINUED | OUTPATIENT
Start: 2025-05-15 | End: 2025-05-19 | Stop reason: HOSPADM

## 2025-05-15 RX ORDER — SODIUM CHLORIDE 0.9 % (FLUSH) 0.9 %
5-40 SYRINGE (ML) INJECTION PRN
Status: DISCONTINUED | OUTPATIENT
Start: 2025-05-15 | End: 2025-05-19 | Stop reason: HOSPADM

## 2025-05-15 RX ORDER — PANTOPRAZOLE SODIUM 40 MG/1
40 TABLET, DELAYED RELEASE ORAL
Status: DISCONTINUED | OUTPATIENT
Start: 2025-05-15 | End: 2025-05-19 | Stop reason: HOSPADM

## 2025-05-15 RX ORDER — POLYETHYLENE GLYCOL 3350 17 G/17G
17 POWDER, FOR SOLUTION ORAL DAILY PRN
Status: DISCONTINUED | OUTPATIENT
Start: 2025-05-15 | End: 2025-05-19 | Stop reason: HOSPADM

## 2025-05-15 RX ORDER — BUDESONIDE 0.25 MG/2ML
0.25 INHALANT ORAL
Status: DISCONTINUED | OUTPATIENT
Start: 2025-05-15 | End: 2025-05-16

## 2025-05-15 RX ORDER — TAMSULOSIN HYDROCHLORIDE 0.4 MG/1
0.4 CAPSULE ORAL DAILY
Status: DISCONTINUED | OUTPATIENT
Start: 2025-05-15 | End: 2025-05-15

## 2025-05-15 RX ORDER — CLOPIDOGREL BISULFATE 75 MG/1
75 TABLET ORAL DAILY
Status: DISCONTINUED | OUTPATIENT
Start: 2025-05-15 | End: 2025-05-17

## 2025-05-15 RX ORDER — HYDRALAZINE HYDROCHLORIDE 25 MG/1
25 TABLET, FILM COATED ORAL 4 TIMES DAILY
Status: DISCONTINUED | OUTPATIENT
Start: 2025-05-15 | End: 2025-05-19 | Stop reason: HOSPADM

## 2025-05-15 RX ORDER — ARFORMOTEROL TARTRATE 15 UG/2ML
15 SOLUTION RESPIRATORY (INHALATION)
Status: DISCONTINUED | OUTPATIENT
Start: 2025-05-15 | End: 2025-05-16

## 2025-05-15 RX ORDER — MONTELUKAST SODIUM 10 MG/1
10 TABLET ORAL DAILY
Status: DISCONTINUED | OUTPATIENT
Start: 2025-05-15 | End: 2025-05-19 | Stop reason: HOSPADM

## 2025-05-15 RX ORDER — SODIUM CHLORIDE 9 MG/ML
INJECTION, SOLUTION INTRAVENOUS PRN
Status: DISCONTINUED | OUTPATIENT
Start: 2025-05-15 | End: 2025-05-19 | Stop reason: HOSPADM

## 2025-05-15 RX ORDER — GLUCAGON 1 MG/ML
1 KIT INJECTION PRN
Status: DISCONTINUED | OUTPATIENT
Start: 2025-05-15 | End: 2025-05-17

## 2025-05-15 RX ORDER — ONDANSETRON 4 MG/1
4 TABLET, ORALLY DISINTEGRATING ORAL EVERY 8 HOURS PRN
Status: DISCONTINUED | OUTPATIENT
Start: 2025-05-15 | End: 2025-05-19 | Stop reason: HOSPADM

## 2025-05-15 RX ORDER — ONDANSETRON 2 MG/ML
4 INJECTION INTRAMUSCULAR; INTRAVENOUS EVERY 6 HOURS PRN
Status: DISCONTINUED | OUTPATIENT
Start: 2025-05-15 | End: 2025-05-19 | Stop reason: HOSPADM

## 2025-05-15 RX ORDER — ACETAMINOPHEN 650 MG/1
650 SUPPOSITORY RECTAL EVERY 6 HOURS PRN
Status: DISCONTINUED | OUTPATIENT
Start: 2025-05-15 | End: 2025-05-19 | Stop reason: HOSPADM

## 2025-05-15 RX ORDER — ACETAMINOPHEN 325 MG/1
650 TABLET ORAL EVERY 6 HOURS PRN
Status: DISCONTINUED | OUTPATIENT
Start: 2025-05-15 | End: 2025-05-19 | Stop reason: HOSPADM

## 2025-05-15 RX ORDER — POTASSIUM CHLORIDE 7.45 MG/ML
10 INJECTION INTRAVENOUS PRN
Status: DISCONTINUED | OUTPATIENT
Start: 2025-05-15 | End: 2025-05-19 | Stop reason: HOSPADM

## 2025-05-15 RX ORDER — MAGNESIUM SULFATE IN WATER 40 MG/ML
2000 INJECTION, SOLUTION INTRAVENOUS PRN
Status: DISCONTINUED | OUTPATIENT
Start: 2025-05-15 | End: 2025-05-19 | Stop reason: HOSPADM

## 2025-05-15 RX ORDER — SODIUM CHLORIDE 0.9 % (FLUSH) 0.9 %
5-40 SYRINGE (ML) INJECTION EVERY 12 HOURS SCHEDULED
Status: DISCONTINUED | OUTPATIENT
Start: 2025-05-15 | End: 2025-05-19 | Stop reason: HOSPADM

## 2025-05-15 RX ORDER — POTASSIUM CHLORIDE 1500 MG/1
40 TABLET, EXTENDED RELEASE ORAL PRN
Status: DISCONTINUED | OUTPATIENT
Start: 2025-05-15 | End: 2025-05-19 | Stop reason: HOSPADM

## 2025-05-15 RX ADMIN — HYDRALAZINE HYDROCHLORIDE 25 MG: 25 TABLET ORAL at 18:24

## 2025-05-15 RX ADMIN — PANTOPRAZOLE SODIUM 40 MG: 40 TABLET, DELAYED RELEASE ORAL at 18:24

## 2025-05-15 RX ADMIN — SODIUM CHLORIDE, PRESERVATIVE FREE 10 ML: 5 INJECTION INTRAVENOUS at 21:53

## 2025-05-15 RX ADMIN — MONTELUKAST 10 MG: 10 TABLET, FILM COATED ORAL at 18:24

## 2025-05-15 RX ADMIN — IPRATROPIUM BROMIDE 0.5 MG: 0.5 SOLUTION RESPIRATORY (INHALATION) at 18:56

## 2025-05-15 RX ADMIN — BUPROPION HYDROCHLORIDE 150 MG: 150 TABLET, FILM COATED, EXTENDED RELEASE ORAL at 21:45

## 2025-05-15 RX ADMIN — ARFORMOTEROL TARTRATE 15 MCG: 15 SOLUTION RESPIRATORY (INHALATION) at 18:56

## 2025-05-15 RX ADMIN — SERTRALINE 100 MG: 50 TABLET, FILM COATED ORAL at 21:44

## 2025-05-15 RX ADMIN — BUDESONIDE 250 MCG: 0.25 SUSPENSION RESPIRATORY (INHALATION) at 18:57

## 2025-05-15 RX ADMIN — TAMSULOSIN HYDROCHLORIDE 0.4 MG: 0.4 CAPSULE ORAL at 18:24

## 2025-05-15 ASSESSMENT — LIFESTYLE VARIABLES
HOW OFTEN DO YOU HAVE A DRINK CONTAINING ALCOHOL: 4 OR MORE TIMES A WEEK
HOW MANY STANDARD DRINKS CONTAINING ALCOHOL DO YOU HAVE ON A TYPICAL DAY: 1 OR 2

## 2025-05-16 ENCOUNTER — ANESTHESIA (OUTPATIENT)
Dept: OPERATING ROOM | Age: 75
End: 2025-05-16
Payer: MEDICARE

## 2025-05-16 ENCOUNTER — ANESTHESIA EVENT (OUTPATIENT)
Dept: OPERATING ROOM | Age: 75
End: 2025-05-16
Payer: MEDICARE

## 2025-05-16 LAB
ALBUMIN SERPL-MCNC: 3.6 G/DL (ref 3.5–5.2)
ALP SERPL-CCNC: 48 U/L (ref 40–129)
ALT SERPL-CCNC: 13 U/L (ref 0–40)
ANION GAP SERPL CALCULATED.3IONS-SCNC: 12 MMOL/L (ref 7–16)
AST SERPL-CCNC: 14 U/L (ref 0–39)
BASOPHILS # BLD: 0.05 K/UL (ref 0–0.2)
BASOPHILS NFR BLD: 1 % (ref 0–2)
BILIRUB SERPL-MCNC: 0.2 MG/DL (ref 0–1.2)
BUN SERPL-MCNC: 23 MG/DL (ref 6–23)
CALCIUM SERPL-MCNC: 9.2 MG/DL (ref 8.6–10.2)
CHLORIDE SERPL-SCNC: 103 MMOL/L (ref 98–107)
CHOLEST SERPL-MCNC: 173 MG/DL
CO2 SERPL-SCNC: 23 MMOL/L (ref 22–29)
CREAT SERPL-MCNC: 1.5 MG/DL (ref 0.7–1.2)
EOSINOPHIL # BLD: 0.14 K/UL (ref 0.05–0.5)
EOSINOPHILS RELATIVE PERCENT: 2 % (ref 0–6)
ERYTHROCYTE [DISTWIDTH] IN BLOOD BY AUTOMATED COUNT: 13.6 % (ref 11.5–15)
GFR, ESTIMATED: 49 ML/MIN/1.73M2
GLUCOSE SERPL-MCNC: 107 MG/DL (ref 74–99)
HBA1C MFR BLD: 6.4 % (ref 4–5.6)
HCT VFR BLD AUTO: 46.4 % (ref 37–54)
HDLC SERPL-MCNC: 35 MG/DL
HGB BLD-MCNC: 15 G/DL (ref 12.5–16.5)
IMM GRANULOCYTES # BLD AUTO: 0.04 K/UL (ref 0–0.58)
IMM GRANULOCYTES NFR BLD: 1 % (ref 0–5)
LDLC SERPL CALC-MCNC: 99 MG/DL
LYMPHOCYTES NFR BLD: 1.91 K/UL (ref 1.5–4)
LYMPHOCYTES RELATIVE PERCENT: 33 % (ref 20–42)
MAGNESIUM SERPL-MCNC: 1.6 MG/DL (ref 1.6–2.6)
MCH RBC QN AUTO: 28.1 PG (ref 26–35)
MCHC RBC AUTO-ENTMCNC: 32.3 G/DL (ref 32–34.5)
MCV RBC AUTO: 87.1 FL (ref 80–99.9)
MONOCYTES NFR BLD: 0.68 K/UL (ref 0.1–0.95)
MONOCYTES NFR BLD: 12 % (ref 2–12)
NEUTROPHILS NFR BLD: 51 % (ref 43–80)
NEUTS SEG NFR BLD: 2.97 K/UL (ref 1.8–7.3)
PHOSPHATE SERPL-MCNC: 3.2 MG/DL (ref 2.5–4.5)
PLATELET # BLD AUTO: 264 K/UL (ref 130–450)
PMV BLD AUTO: 11.3 FL (ref 7–12)
POTASSIUM SERPL-SCNC: 3.9 MMOL/L (ref 3.5–5)
PROCALCITONIN SERPL-MCNC: 0.11 NG/ML (ref 0–0.08)
PROT SERPL-MCNC: 6.7 G/DL (ref 6.4–8.3)
RBC # BLD AUTO: 5.33 M/UL (ref 3.8–5.8)
SODIUM SERPL-SCNC: 138 MMOL/L (ref 132–146)
T4 FREE SERPL-MCNC: 0.8 NG/DL (ref 0.9–1.7)
TRIGL SERPL-MCNC: 196 MG/DL
TSH SERPL DL<=0.05 MIU/L-ACNC: 2.35 UIU/ML (ref 0.27–4.2)
VLDLC SERPL CALC-MCNC: 39 MG/DL
WBC OTHER # BLD: 5.8 K/UL (ref 4.5–11.5)

## 2025-05-16 PROCEDURE — 84145 PROCALCITONIN (PCT): CPT

## 2025-05-16 PROCEDURE — 87070 CULTURE OTHR SPECIMN AEROBIC: CPT

## 2025-05-16 PROCEDURE — 87077 CULTURE AEROBIC IDENTIFY: CPT

## 2025-05-16 PROCEDURE — 87015 SPECIMEN INFECT AGNT CONCNTJ: CPT

## 2025-05-16 PROCEDURE — 83036 HEMOGLOBIN GLYCOSYLATED A1C: CPT

## 2025-05-16 PROCEDURE — 3700000000 HC ANESTHESIA ATTENDED CARE: Performed by: STUDENT IN AN ORGANIZED HEALTH CARE EDUCATION/TRAINING PROGRAM

## 2025-05-16 PROCEDURE — 87116 MYCOBACTERIA CULTURE: CPT

## 2025-05-16 PROCEDURE — 6370000000 HC RX 637 (ALT 250 FOR IP)

## 2025-05-16 PROCEDURE — 0X6L0Z0 DETACHMENT AT RIGHT THUMB, COMPLETE, OPEN APPROACH: ICD-10-PCS | Performed by: STUDENT IN AN ORGANIZED HEALTH CARE EDUCATION/TRAINING PROGRAM

## 2025-05-16 PROCEDURE — 84439 ASSAY OF FREE THYROXINE: CPT

## 2025-05-16 PROCEDURE — 87205 SMEAR GRAM STAIN: CPT

## 2025-05-16 PROCEDURE — 87206 SMEAR FLUORESCENT/ACID STAI: CPT

## 2025-05-16 PROCEDURE — 7100000000 HC PACU RECOVERY - FIRST 15 MIN: Performed by: STUDENT IN AN ORGANIZED HEALTH CARE EDUCATION/TRAINING PROGRAM

## 2025-05-16 PROCEDURE — 1200000000 HC SEMI PRIVATE

## 2025-05-16 PROCEDURE — 80053 COMPREHEN METABOLIC PANEL: CPT

## 2025-05-16 PROCEDURE — 87075 CULTR BACTERIA EXCEPT BLOOD: CPT

## 2025-05-16 PROCEDURE — 3600000002 HC SURGERY LEVEL 2 BASE: Performed by: STUDENT IN AN ORGANIZED HEALTH CARE EDUCATION/TRAINING PROGRAM

## 2025-05-16 PROCEDURE — 85025 COMPLETE CBC W/AUTO DIFF WBC: CPT

## 2025-05-16 PROCEDURE — 87102 FUNGUS ISOLATION CULTURE: CPT

## 2025-05-16 PROCEDURE — 2580000003 HC RX 258: Performed by: NURSE ANESTHETIST, CERTIFIED REGISTERED

## 2025-05-16 PROCEDURE — 6370000000 HC RX 637 (ALT 250 FOR IP): Performed by: INTERNAL MEDICINE

## 2025-05-16 PROCEDURE — 6360000002 HC RX W HCPCS: Performed by: NURSE ANESTHETIST, CERTIFIED REGISTERED

## 2025-05-16 PROCEDURE — 6360000002 HC RX W HCPCS: Performed by: INTERNAL MEDICINE

## 2025-05-16 PROCEDURE — 84100 ASSAY OF PHOSPHORUS: CPT

## 2025-05-16 PROCEDURE — 88311 DECALCIFY TISSUE: CPT

## 2025-05-16 PROCEDURE — 0HXFXZZ TRANSFER RIGHT HAND SKIN, EXTERNAL APPROACH: ICD-10-PCS | Performed by: STUDENT IN AN ORGANIZED HEALTH CARE EDUCATION/TRAINING PROGRAM

## 2025-05-16 PROCEDURE — 6360000002 HC RX W HCPCS: Performed by: STUDENT IN AN ORGANIZED HEALTH CARE EDUCATION/TRAINING PROGRAM

## 2025-05-16 PROCEDURE — 2500000003 HC RX 250 WO HCPCS

## 2025-05-16 PROCEDURE — 7100000001 HC PACU RECOVERY - ADDTL 15 MIN: Performed by: STUDENT IN AN ORGANIZED HEALTH CARE EDUCATION/TRAINING PROGRAM

## 2025-05-16 PROCEDURE — 94640 AIRWAY INHALATION TREATMENT: CPT

## 2025-05-16 PROCEDURE — 3600000012 HC SURGERY LEVEL 2 ADDTL 15MIN: Performed by: STUDENT IN AN ORGANIZED HEALTH CARE EDUCATION/TRAINING PROGRAM

## 2025-05-16 PROCEDURE — 3700000001 HC ADD 15 MINUTES (ANESTHESIA): Performed by: STUDENT IN AN ORGANIZED HEALTH CARE EDUCATION/TRAINING PROGRAM

## 2025-05-16 PROCEDURE — 87081 CULTURE SCREEN ONLY: CPT

## 2025-05-16 PROCEDURE — 83735 ASSAY OF MAGNESIUM: CPT

## 2025-05-16 PROCEDURE — 88305 TISSUE EXAM BY PATHOLOGIST: CPT

## 2025-05-16 PROCEDURE — 84443 ASSAY THYROID STIM HORMONE: CPT

## 2025-05-16 PROCEDURE — 2709999900 HC NON-CHARGEABLE SUPPLY: Performed by: STUDENT IN AN ORGANIZED HEALTH CARE EDUCATION/TRAINING PROGRAM

## 2025-05-16 PROCEDURE — 36415 COLL VENOUS BLD VENIPUNCTURE: CPT

## 2025-05-16 PROCEDURE — 80061 LIPID PANEL: CPT

## 2025-05-16 PROCEDURE — 6360000002 HC RX W HCPCS: Performed by: ANESTHESIOLOGY

## 2025-05-16 RX ORDER — DROPERIDOL 2.5 MG/ML
0.62 INJECTION, SOLUTION INTRAMUSCULAR; INTRAVENOUS
Status: DISCONTINUED | OUTPATIENT
Start: 2025-05-16 | End: 2025-05-16 | Stop reason: HOSPADM

## 2025-05-16 RX ORDER — IPRATROPIUM BROMIDE AND ALBUTEROL SULFATE 2.5; .5 MG/3ML; MG/3ML
1 SOLUTION RESPIRATORY (INHALATION)
Status: DISCONTINUED | OUTPATIENT
Start: 2025-05-16 | End: 2025-05-16 | Stop reason: HOSPADM

## 2025-05-16 RX ORDER — SODIUM CHLORIDE 9 MG/ML
INJECTION, SOLUTION INTRAVENOUS PRN
Status: DISCONTINUED | OUTPATIENT
Start: 2025-05-16 | End: 2025-05-16 | Stop reason: HOSPADM

## 2025-05-16 RX ORDER — PROCHLORPERAZINE EDISYLATE 5 MG/ML
5 INJECTION INTRAMUSCULAR; INTRAVENOUS
Status: DISCONTINUED | OUTPATIENT
Start: 2025-05-16 | End: 2025-05-16 | Stop reason: HOSPADM

## 2025-05-16 RX ORDER — LIDOCAINE HYDROCHLORIDE 5 MG/ML
INJECTION, SOLUTION INFILTRATION; INTRAVENOUS PRN
Status: DISCONTINUED | OUTPATIENT
Start: 2025-05-16 | End: 2025-05-16 | Stop reason: ALTCHOICE

## 2025-05-16 RX ORDER — LABETALOL HYDROCHLORIDE 5 MG/ML
10 INJECTION, SOLUTION INTRAVENOUS
Status: DISCONTINUED | OUTPATIENT
Start: 2025-05-16 | End: 2025-05-16 | Stop reason: HOSPADM

## 2025-05-16 RX ORDER — MECOBALAMIN 5000 MCG
5 TABLET,DISINTEGRATING ORAL NIGHTLY
Status: DISCONTINUED | OUTPATIENT
Start: 2025-05-16 | End: 2025-05-19 | Stop reason: HOSPADM

## 2025-05-16 RX ORDER — BUDESONIDE 0.25 MG/2ML
0.25 INHALANT ORAL
Status: DISCONTINUED | OUTPATIENT
Start: 2025-05-16 | End: 2025-05-19 | Stop reason: HOSPADM

## 2025-05-16 RX ORDER — FENTANYL CITRATE 0.05 MG/ML
25 INJECTION, SOLUTION INTRAMUSCULAR; INTRAVENOUS EVERY 5 MIN PRN
Status: COMPLETED | OUTPATIENT
Start: 2025-05-16 | End: 2025-05-16

## 2025-05-16 RX ORDER — CEFAZOLIN SODIUM 1 G/3ML
INJECTION, POWDER, FOR SOLUTION INTRAMUSCULAR; INTRAVENOUS
Status: DISCONTINUED | OUTPATIENT
Start: 2025-05-16 | End: 2025-05-16 | Stop reason: SDUPTHER

## 2025-05-16 RX ORDER — NALOXONE HYDROCHLORIDE 0.4 MG/ML
INJECTION, SOLUTION INTRAMUSCULAR; INTRAVENOUS; SUBCUTANEOUS PRN
Status: DISCONTINUED | OUTPATIENT
Start: 2025-05-16 | End: 2025-05-16 | Stop reason: HOSPADM

## 2025-05-16 RX ORDER — ARFORMOTEROL TARTRATE 15 UG/2ML
15 SOLUTION RESPIRATORY (INHALATION)
Status: DISCONTINUED | OUTPATIENT
Start: 2025-05-16 | End: 2025-05-19 | Stop reason: HOSPADM

## 2025-05-16 RX ORDER — DIPHENHYDRAMINE HYDROCHLORIDE 50 MG/ML
12.5 INJECTION, SOLUTION INTRAMUSCULAR; INTRAVENOUS
Status: DISCONTINUED | OUTPATIENT
Start: 2025-05-16 | End: 2025-05-16 | Stop reason: HOSPADM

## 2025-05-16 RX ORDER — SODIUM CHLORIDE 0.9 % (FLUSH) 0.9 %
5-40 SYRINGE (ML) INJECTION EVERY 12 HOURS SCHEDULED
Status: DISCONTINUED | OUTPATIENT
Start: 2025-05-16 | End: 2025-05-16 | Stop reason: HOSPADM

## 2025-05-16 RX ORDER — FENTANYL CITRATE 50 UG/ML
INJECTION, SOLUTION INTRAMUSCULAR; INTRAVENOUS
Status: DISCONTINUED | OUTPATIENT
Start: 2025-05-16 | End: 2025-05-16 | Stop reason: SDUPTHER

## 2025-05-16 RX ORDER — SODIUM CHLORIDE, SODIUM LACTATE, POTASSIUM CHLORIDE, CALCIUM CHLORIDE 600; 310; 30; 20 MG/100ML; MG/100ML; MG/100ML; MG/100ML
INJECTION, SOLUTION INTRAVENOUS
Status: DISCONTINUED | OUTPATIENT
Start: 2025-05-16 | End: 2025-05-16 | Stop reason: SDUPTHER

## 2025-05-16 RX ORDER — HYDRALAZINE HYDROCHLORIDE 20 MG/ML
10 INJECTION INTRAMUSCULAR; INTRAVENOUS
Status: DISCONTINUED | OUTPATIENT
Start: 2025-05-16 | End: 2025-05-16 | Stop reason: HOSPADM

## 2025-05-16 RX ORDER — CEFAZOLIN SODIUM 1 G/3ML
2000 INJECTION, POWDER, FOR SOLUTION INTRAMUSCULAR; INTRAVENOUS
Status: CANCELLED | OUTPATIENT
Start: 2025-05-16 | End: 2025-05-17

## 2025-05-16 RX ORDER — SODIUM CHLORIDE 0.9 % (FLUSH) 0.9 %
5-40 SYRINGE (ML) INJECTION PRN
Status: DISCONTINUED | OUTPATIENT
Start: 2025-05-16 | End: 2025-05-16 | Stop reason: HOSPADM

## 2025-05-16 RX ADMIN — IPRATROPIUM BROMIDE 0.5 MG: 0.5 SOLUTION RESPIRATORY (INHALATION) at 15:18

## 2025-05-16 RX ADMIN — PANTOPRAZOLE SODIUM 40 MG: 40 TABLET, DELAYED RELEASE ORAL at 06:17

## 2025-05-16 RX ADMIN — SODIUM CHLORIDE, PRESERVATIVE FREE 10 ML: 5 INJECTION INTRAVENOUS at 08:36

## 2025-05-16 RX ADMIN — BUPROPION HYDROCHLORIDE 150 MG: 150 TABLET, FILM COATED, EXTENDED RELEASE ORAL at 08:33

## 2025-05-16 RX ADMIN — BUPROPION HYDROCHLORIDE 150 MG: 150 TABLET, FILM COATED, EXTENDED RELEASE ORAL at 21:13

## 2025-05-16 RX ADMIN — HYDRALAZINE HYDROCHLORIDE 25 MG: 25 TABLET ORAL at 08:33

## 2025-05-16 RX ADMIN — FENTANYL CITRATE 50 MCG: 50 INJECTION, SOLUTION INTRAMUSCULAR; INTRAVENOUS at 18:08

## 2025-05-16 RX ADMIN — CEFAZOLIN SODIUM 2 G: 1 POWDER, FOR SOLUTION INTRAMUSCULAR; INTRAVENOUS at 17:57

## 2025-05-16 RX ADMIN — Medication 5 MG: at 21:13

## 2025-05-16 RX ADMIN — FENTANYL CITRATE 50 MCG: 50 INJECTION, SOLUTION INTRAMUSCULAR; INTRAVENOUS at 18:00

## 2025-05-16 RX ADMIN — ACETAMINOPHEN 650 MG: 325 TABLET ORAL at 11:17

## 2025-05-16 RX ADMIN — IPRATROPIUM BROMIDE 0.5 MG: 0.5 SOLUTION RESPIRATORY (INHALATION) at 06:25

## 2025-05-16 RX ADMIN — SODIUM CHLORIDE, POTASSIUM CHLORIDE, SODIUM LACTATE AND CALCIUM CHLORIDE: 600; 310; 30; 20 INJECTION, SOLUTION INTRAVENOUS at 17:54

## 2025-05-16 RX ADMIN — SERTRALINE 100 MG: 50 TABLET, FILM COATED ORAL at 21:13

## 2025-05-16 RX ADMIN — HYDRALAZINE HYDROCHLORIDE 25 MG: 25 TABLET ORAL at 21:13

## 2025-05-16 RX ADMIN — TAMSULOSIN HYDROCHLORIDE 0.8 MG: 0.4 CAPSULE ORAL at 08:33

## 2025-05-16 RX ADMIN — Medication 5 MG: at 01:31

## 2025-05-16 RX ADMIN — BUDESONIDE 250 MCG: 0.25 SUSPENSION RESPIRATORY (INHALATION) at 06:25

## 2025-05-16 RX ADMIN — FENTANYL CITRATE 25 MCG: 0.05 INJECTION, SOLUTION INTRAMUSCULAR; INTRAVENOUS at 19:06

## 2025-05-16 RX ADMIN — FENOFIBRATE 160 MG: 160 TABLET ORAL at 08:33

## 2025-05-16 RX ADMIN — MONTELUKAST 10 MG: 10 TABLET, FILM COATED ORAL at 08:33

## 2025-05-16 RX ADMIN — IPRATROPIUM BROMIDE 0.5 MG: 0.5 SOLUTION RESPIRATORY (INHALATION) at 10:21

## 2025-05-16 RX ADMIN — ARFORMOTEROL TARTRATE 15 MCG: 15 SOLUTION RESPIRATORY (INHALATION) at 06:25

## 2025-05-16 RX ADMIN — FENTANYL CITRATE 25 MCG: 0.05 INJECTION, SOLUTION INTRAMUSCULAR; INTRAVENOUS at 19:00

## 2025-05-16 ASSESSMENT — PAIN - FUNCTIONAL ASSESSMENT
PAIN_FUNCTIONAL_ASSESSMENT: 0-10
PAIN_FUNCTIONAL_ASSESSMENT: NONE - DENIES PAIN

## 2025-05-16 ASSESSMENT — PAIN SCALES - GENERAL
PAINLEVEL_OUTOF10: 6
PAINLEVEL_OUTOF10: 3
PAINLEVEL_OUTOF10: 6

## 2025-05-16 ASSESSMENT — PAIN DESCRIPTION - ORIENTATION: ORIENTATION: LOWER

## 2025-05-16 ASSESSMENT — PAIN DESCRIPTION - LOCATION: LOCATION: BACK

## 2025-05-16 ASSESSMENT — LIFESTYLE VARIABLES: SMOKING_STATUS: 1

## 2025-05-16 ASSESSMENT — PAIN DESCRIPTION - DESCRIPTORS: DESCRIPTORS: ACHING

## 2025-05-16 NOTE — OP NOTE
Operative Note      Patient: Clark ZURITA Postlethwait  YOB: 1950  MRN: [id number]    Date of Procedure: 5/16/2025    Pre-Op Diagnosis Codes:      * Acute osteomyelitis (HCC) [M86.10]    Post-Op Diagnosis: Same       : Procedure  Right thumb amputation with local flap advancement    Surgeon(s):  Tremayne Hernández DO    Assistant:   First Assistant: Deni Serrano    Anesthesia: Monitor Anesthesia Care    Estimated Blood Loss (mL): Minimal    Complications: None    Specimens:   ID Type Source Tests Collected by Time Destination   1 : DEEP TISSUE RIGHT THUMB FOR CULTURE-AEROBIC, ANAEROBIC, ACID FAST, GRAM STAIN, FUNGUS Specimen Hand CULTURE, ANAEROBIC, CULTURE, FUNGUS, GRAM STAIN, CULTURE, SURGICAL, CULTURE WITH SMEAR, ACID FAST BACILLIUS Tremayne Hernández, DO 5/16/2025 1814    2 : RIGHT THUMB BONE FOR CULTURE-AEROBIC, ANAEROBIC, ACID FAST, GRAM STAIN, FUNGUS Bone Bone CULTURE, ANAEROBIC, CULTURE, FUNGUS, GRAM STAIN, CULTURE, SURGICAL, CULTURE WITH SMEAR, ACID FAST BACILLIUS Tremayne Hernández, DO 5/16/2025 1820    A : RIGHT THUMB BONE FOR PATHOLOGY Bone Bone SURGICAL PATHOLOGY Tremayne Hernández, DO 5/16/2025 1820        Implants:  * No implants in log *      Drains: * No LDAs found *    Findings:  Infection Present At Time Of Surgery (PATOS) (choose all levels that have infection present):  - Deep Infection (muscle/fascia) present as evidenced by purulent fluid  Other Findings: Osteomyelitis distal phalanx      PROCEDURE IN DETAIL: The patient was identified in the holding area. The  Right hand was identified as the operative site. She was then seen by  Anesthesia, taken to the operating room, placed supine on the table, and  underwent monitored anesthesia care per Anesthesia Department. Under sterile  conditions, approximately 10 mL of 1:1 mixture of .5% lidocaine without epinephrine were infiltrated over the surgical site. With this accomplished, a well-padded arm tourniquet was placed. The right upper extremity was

## 2025-05-16 NOTE — PLAN OF CARE
Problem: Chronic Conditions and Co-morbidities  Goal: Patient's chronic conditions and co-morbidity symptoms are monitored and maintained or improved  Outcome: Progressing     Problem: Discharge Planning  Goal: Discharge to home or other facility with appropriate resources  Outcome: Progressing     Problem: Safety - Adult  Goal: Free from fall injury  Outcome: Progressing     Problem: Skin/Tissue Integrity  Goal: Skin integrity remains intact  Description: 1.  Monitor for areas of redness and/or skin breakdown2.  Assess vascular access sites hourly3.  Every 4-6 hours minimum:  Change oxygen saturation probe site4.  Every 4-6 hours:  If on nasal continuous positive airway pressure, respiratory therapy assess nares and determine need for appliance change or resting period  Outcome: Progressing     Problem: Pain  Goal: Verbalizes/displays adequate comfort level or baseline comfort level  Outcome: Progressing

## 2025-05-16 NOTE — H&P
Ortho & Spine Office Visit/H&P   Signed    Patient: Clark Maza  MR#: [id number]  : 1950  Acct:[id number]  Age/Sex: 74 / M  ADM Date: 25  Loc: [facility].508            Provider Location:   cc: Arabella Delgado NP; Dr. Chance Jama~        Intake  Vital Signs      25  13:32  Height   5 ft 9 in  Blood Pressure Location   Not Done    Intake  Visit Reasons: Hand Pain  Fall Within Last 3 Months: No  Screening Declined: Colorectal Cancer, Breast Cancer, Osteoporosis and Cervical Cancer  Current Pain: Yes  Allergies    No Known Allergies Allergy (Verified 25 13:32)      Safety Concerns: Feels Safe At This Time    HPI  History of Present Illness:   Patient is presenting today with increasing pain and redness to the thumb region.  Patient says noticed swelling to the thumb and index region.  Patient had x-rays taken last week demonstrating osteomyelitis and bone destruction of the distal phalanx thumb.  Patient is here to discuss surgical intervention.  Patient still having wounds over this region.  Patient still smoking.  Patient still has chronic lung issues.  H&P for Procedures  H&P for Procedure: Yes  Long/Short H&P: Long H&P  Chief Complaint: See HPI for Complaint    ROS  Const'l  Constitutional: Reports ROS Constitutional System Reviewed and No Additional Complaints, Except as Documented  Eyes  Eyes: Reports ROS Eyes System Reviewed and No Additional Complaints, Except as Documented  ENT  ENT: Reports ROS ENT System Reviewed and No Additional Complaints, Except as Documented  Cardio  Cardiovascular: Reports ROS CARD System Reviewed and No Additional Complaints, Except as documented  Respiratory  Respiratory: Reports ROS RESP System Reviewed and No Additional Complaints, Except as Documented  GI  Gastrointestinal: Reports ROS GI System Reviewed and No Additional Complaints, Except as Documented  MS  Musculoskeletal: Reports As Per HPI  Skin  Skin: Reports As Per

## 2025-05-16 NOTE — ANESTHESIA PRE PROCEDURE
Department of Anesthesiology  Preprocedure Note       Name:  Clark Maza   Age:  74 y.o.  :  1950                                          MRN:  [id number]         Date:  2025      Surgeon: Surgeon(s):  Tremayne Hernández DO    Procedure: Procedure(s):  RIGHT THUMB PARTIAL  AMPUTATION    Medications prior to admission:   Prior to Admission medications    Medication Sig Start Date End Date Taking? Authorizing Provider   clopidogrel (PLAVIX) 75 MG tablet Take 1 tablet by mouth daily 25  Yes Arabella Delgado APRN - CNP   fenofibrate (TRICOR) 145 MG tablet Take 1 tablet by mouth daily 25  Yes Arabella Delgado APRN - CNP   montelukast (SINGULAIR) 10 MG tablet Take 1 tablet by mouth daily 25  Yes Arabella Delgado APRN - CNP   omeprazole (PRILOSEC) 40 MG delayed release capsule Take 1 capsule by mouth daily 25  Yes Arabella Delgado APRN - CNP   simvastatin (ZOCOR) 20 MG tablet Take 1 tablet by mouth daily 25  Yes Arabella Delgado APRN - CNP   tamsulosin (FLOMAX) 0.4 MG capsule Take 2 capsules by mouth daily 25  Yes Arabella Delgado APRN - CNP   HYDROcodone-acetaminophen (NORCO) 7.5-325 MG per tablet Take 1 tablet by mouth every 6 hours as needed for Pain for up to 30 days. Max Daily Amount: 4 tablets 25 Yes Chance Jama DO   hydrALAZINE (APRESOLINE) 25 MG tablet Take 1 tablet by mouth four times daily. 25  Yes Dano Silva MD   potassium chloride (KLOR-CON M) 10 MEQ extended release tablet Take 1 tablet by mouth twice daily. 3/17/25  Yes Chance Jama DO   sertraline (ZOLOFT) 100 MG tablet Take 1 tablet by mouth at bedtime 25  Yes Chance Jama DO   buPROPion (WELLBUTRIN SR) 150 MG extended release tablet Take 1 tablet by mouth twice daily. 25  Yes Chance Jama DO   carvedilol (COREG) 3.125 MG tablet Take 1 tablet by mouth once daily. 25  Yes Dano Silva MD   fluticasone-umeclidin-vilant (TRELEGY

## 2025-05-16 NOTE — CONSULTS
ulceration, dental caries , dysphagia  Lungs: no cough no chest pain  CVS: no palpitation, no chest pain, no shortness of breath  GI: no abdominal pain, no nausea , no vomiting, no constipation  AMANDA: no dysuria, frequency and urgency, no hematuria, no kidney stones  Musculoskeletal: no joint pain, swelling , stiffness,  Endocrine: no polyuria, polydipsia, no cold or heat intolerance  Hematology: no anemia, no easy brusing or bleeding, no hx of clotting disorder  Dermatology: no skin rash, no eczema, no pruritis,  Psychiatry: no depression, no anxiety,no panic attacks, no suicide ideation    PHYSICAL EXAM:     BP (!) 148/89   Pulse 64   Temp 97.5 °F (36.4 °C) (Oral)   Resp 18   Ht 1.702 m (5' 7\")   Wt 100.2 kg (221 lb)   SpO2 90%   BMI 34.61 kg/m²   General:    Comfortable  inc bmi   HEENT:   AT/NC     Cardiovascular:   RRR ,S1S2,   Lungs:   CTAB  ant   Abdomen:    Soft, non tender to palpation. Distended   :  Extremities:    edema FROM  Skin:     Warm and dry. No rash   CNS:    Awake and alert  NAD  Lines:         Peripheral Intravenous Line:  Peripheral IV 05/15/25 Left Antecubital (Active)   Site Assessment Clean, dry & intact 05/16/25 0400   Line Status Brisk blood return;Flushed;Normal saline locked 05/16/25 0400   Line Care Connections checked and tightened 05/16/25 0400   Phlebitis Assessment No symptoms 05/16/25 0400   Infiltration Assessment 0 05/16/25 0400   Alcohol Cap Used Yes 05/16/25 0400   Dressing Status Clean, dry & intact 05/16/25 0400   Dressing Type Transparent 05/16/25 0400   Dressing Intervention New 05/15/25 1848            LABS:     CBC:   Recent Labs     05/16/25  0309   WBC 5.8   HGB 15.0        BMP:    Recent Labs     05/16/25  0309      K 3.9      CO2 23   BUN 23   CREATININE 1.5*   GLUCOSE 107*     Calcium:  Recent Labs     05/16/25  0309   CALCIUM 9.2     Ionized Calcium:Invalid input(s): \"IONCA\"  Magnesium:  Recent Labs     05/16/25  0309   MG 1.6 
difficulty.    Musculoskeletal:   Denies joint pain, joint stiffness, joint swelling or muscle pain    Neurology:    Denies any headache or focal neurological deficits. No weakness or paresthesia.    Derm:    Denies any rashes, ulcers, or excoriations.  Denies bruising.      Extremities:   Denies any lower extremity swelling or edema.      PHYSICAL EXAM: Abnormal findings noted  VITALS:  Vitals:    05/15/25 1655   BP: 124/77   Pulse: 84   Resp: 16   Temp: 98.1 °F (36.7 °C)   SpO2: 93%         CONSTITUTIONAL:    Awake, alert, cooperative, no apparent distress, and appears stated age    EYES:    PERRL, EOMI, sclera clear without icterus, conjunctiva normal    ENT:    Normocephalic, atraumatic, sinuses nontender on palpation. External ears without lesions. Oral pharynx with moist mucus membranes.  Tonsils without erythema or exudates.    NECK:    Supple, symmetrical, trachea midline, no adenopathy, thyroid symmetric, not enlarged and no tenderness, skin normal, no bruits, no JVD    HEMATOLOGIC/LYMPHATICS:    No cervical lymphadenopathy and no supraclavicular lymphadenopathy    LUNGS:    Symmetric. No increased work of breathing, good air exchange, clear to auscultation bilaterally, no wheezes, rhonchi, or rales,     CARDIOVASCULAR:    Normal apical impulse, regular rate and rhythm, normal S1 and S2, no S3 or S4, and no murmur noted    ABDOMEN:    Normal contour, normal bowel sounds, soft, non-distended, non-tender, no masses palpated, no hepatosplenomegaly, no rebound or guarding elicited on palpation     MUSCULOSKELETAL:    There is no redness, warmth, or swelling of the joints.  Full range of motion noted.  Motor strength is 5 out of 5 all extremities bilaterally.  Tone is normal.    NEUROLOGIC:    Awake, alert, oriented to name, place and time.  Cranial nerves II-XII are grossly intact.  Motor is 5 out of 5 bilaterally.      SKIN:    No bruising or bleeding.  No redness, warmth, or swelling    EXTREMITIES:

## 2025-05-17 LAB
ALBUMIN SERPL-MCNC: 3.8 G/DL (ref 3.5–5.2)
ALP SERPL-CCNC: 50 U/L (ref 40–129)
ALT SERPL-CCNC: 14 U/L (ref 0–40)
ANION GAP SERPL CALCULATED.3IONS-SCNC: 10 MMOL/L (ref 7–16)
AST SERPL-CCNC: 17 U/L (ref 0–39)
BASOPHILS # BLD: 0.03 K/UL (ref 0–0.2)
BASOPHILS NFR BLD: 0 % (ref 0–2)
BILIRUB SERPL-MCNC: 0.2 MG/DL (ref 0–1.2)
BUN SERPL-MCNC: 26 MG/DL (ref 6–23)
CALCIUM SERPL-MCNC: 9.1 MG/DL (ref 8.6–10.2)
CHLORIDE SERPL-SCNC: 103 MMOL/L (ref 98–107)
CO2 SERPL-SCNC: 23 MMOL/L (ref 22–29)
CREAT SERPL-MCNC: 1.5 MG/DL (ref 0.7–1.2)
CRP SERPL HS-MCNC: 7.3 MG/L (ref 0–5)
EOSINOPHIL # BLD: 0.17 K/UL (ref 0.05–0.5)
EOSINOPHILS RELATIVE PERCENT: 2 % (ref 0–6)
ERYTHROCYTE [DISTWIDTH] IN BLOOD BY AUTOMATED COUNT: 13.8 % (ref 11.5–15)
ERYTHROCYTE [SEDIMENTATION RATE] IN BLOOD BY WESTERGREN METHOD: 5 MM/HR (ref 0–15)
GFR, ESTIMATED: 48 ML/MIN/1.73M2
GLUCOSE SERPL-MCNC: 132 MG/DL (ref 74–99)
HCT VFR BLD AUTO: 46.9 % (ref 37–54)
HGB BLD-MCNC: 15.2 G/DL (ref 12.5–16.5)
IMM GRANULOCYTES # BLD AUTO: 0.04 K/UL (ref 0–0.58)
IMM GRANULOCYTES NFR BLD: 1 % (ref 0–5)
LYMPHOCYTES NFR BLD: 1.33 K/UL (ref 1.5–4)
LYMPHOCYTES RELATIVE PERCENT: 18 % (ref 20–42)
MAGNESIUM SERPL-MCNC: 1.6 MG/DL (ref 1.6–2.6)
MCH RBC QN AUTO: 28.4 PG (ref 26–35)
MCHC RBC AUTO-ENTMCNC: 32.4 G/DL (ref 32–34.5)
MCV RBC AUTO: 87.7 FL (ref 80–99.9)
MICROORGANISM SPEC CULT: NORMAL
MONOCYTES NFR BLD: 0.64 K/UL (ref 0.1–0.95)
MONOCYTES NFR BLD: 9 % (ref 2–12)
NEUTROPHILS NFR BLD: 70 % (ref 43–80)
NEUTS SEG NFR BLD: 5.17 K/UL (ref 1.8–7.3)
PHOSPHATE SERPL-MCNC: 3 MG/DL (ref 2.5–4.5)
PLATELET # BLD AUTO: 275 K/UL (ref 130–450)
PMV BLD AUTO: 10.9 FL (ref 7–12)
POTASSIUM SERPL-SCNC: 4.1 MMOL/L (ref 3.5–5)
PROT SERPL-MCNC: 6.9 G/DL (ref 6.4–8.3)
RBC # BLD AUTO: 5.35 M/UL (ref 3.8–5.8)
SERVICE CMNT-IMP: NORMAL
SODIUM SERPL-SCNC: 136 MMOL/L (ref 132–146)
SPECIMEN DESCRIPTION: NORMAL
WBC OTHER # BLD: 7.4 K/UL (ref 4.5–11.5)

## 2025-05-17 PROCEDURE — 84100 ASSAY OF PHOSPHORUS: CPT

## 2025-05-17 PROCEDURE — 83735 ASSAY OF MAGNESIUM: CPT

## 2025-05-17 PROCEDURE — 1200000000 HC SEMI PRIVATE

## 2025-05-17 PROCEDURE — 2500000003 HC RX 250 WO HCPCS: Performed by: SPECIALIST

## 2025-05-17 PROCEDURE — 2500000003 HC RX 250 WO HCPCS: Performed by: STUDENT IN AN ORGANIZED HEALTH CARE EDUCATION/TRAINING PROGRAM

## 2025-05-17 PROCEDURE — 6360000002 HC RX W HCPCS: Performed by: SPECIALIST

## 2025-05-17 PROCEDURE — 86140 C-REACTIVE PROTEIN: CPT

## 2025-05-17 PROCEDURE — 80053 COMPREHEN METABOLIC PANEL: CPT

## 2025-05-17 PROCEDURE — 6360000002 HC RX W HCPCS: Performed by: STUDENT IN AN ORGANIZED HEALTH CARE EDUCATION/TRAINING PROGRAM

## 2025-05-17 PROCEDURE — 85652 RBC SED RATE AUTOMATED: CPT

## 2025-05-17 PROCEDURE — 94640 AIRWAY INHALATION TREATMENT: CPT

## 2025-05-17 PROCEDURE — 6370000000 HC RX 637 (ALT 250 FOR IP): Performed by: STUDENT IN AN ORGANIZED HEALTH CARE EDUCATION/TRAINING PROGRAM

## 2025-05-17 PROCEDURE — 36415 COLL VENOUS BLD VENIPUNCTURE: CPT

## 2025-05-17 PROCEDURE — 85025 COMPLETE CBC W/AUTO DIFF WBC: CPT

## 2025-05-17 RX ORDER — MORPHINE SULFATE 2 MG/ML
2 INJECTION, SOLUTION INTRAMUSCULAR; INTRAVENOUS
Status: DISCONTINUED | OUTPATIENT
Start: 2025-05-17 | End: 2025-05-19 | Stop reason: HOSPADM

## 2025-05-17 RX ORDER — CLOPIDOGREL BISULFATE 75 MG/1
75 TABLET ORAL DAILY
Status: DISCONTINUED | OUTPATIENT
Start: 2025-05-18 | End: 2025-05-19 | Stop reason: HOSPADM

## 2025-05-17 RX ORDER — ENOXAPARIN SODIUM 100 MG/ML
40 INJECTION SUBCUTANEOUS DAILY
Status: DISCONTINUED | OUTPATIENT
Start: 2025-05-17 | End: 2025-05-19 | Stop reason: HOSPADM

## 2025-05-17 RX ORDER — OXYCODONE AND ACETAMINOPHEN 5; 325 MG/1; MG/1
1 TABLET ORAL EVERY 6 HOURS PRN
Status: DISCONTINUED | OUTPATIENT
Start: 2025-05-17 | End: 2025-05-19 | Stop reason: HOSPADM

## 2025-05-17 RX ADMIN — BUPROPION HYDROCHLORIDE 150 MG: 150 TABLET, FILM COATED, EXTENDED RELEASE ORAL at 09:33

## 2025-05-17 RX ADMIN — ARFORMOTEROL TARTRATE 15 MCG: 15 SOLUTION RESPIRATORY (INHALATION) at 05:14

## 2025-05-17 RX ADMIN — MONTELUKAST 10 MG: 10 TABLET, FILM COATED ORAL at 09:33

## 2025-05-17 RX ADMIN — BUDESONIDE 250 MCG: 0.25 SUSPENSION RESPIRATORY (INHALATION) at 05:14

## 2025-05-17 RX ADMIN — PANTOPRAZOLE SODIUM 40 MG: 40 TABLET, DELAYED RELEASE ORAL at 06:17

## 2025-05-17 RX ADMIN — Medication 5 MG: at 21:45

## 2025-05-17 RX ADMIN — HYDRALAZINE HYDROCHLORIDE 25 MG: 25 TABLET ORAL at 16:38

## 2025-05-17 RX ADMIN — SODIUM CHLORIDE, PRESERVATIVE FREE 10 ML: 5 INJECTION INTRAVENOUS at 21:48

## 2025-05-17 RX ADMIN — FENOFIBRATE 160 MG: 160 TABLET ORAL at 09:32

## 2025-05-17 RX ADMIN — TAMSULOSIN HYDROCHLORIDE 0.8 MG: 0.4 CAPSULE ORAL at 09:32

## 2025-05-17 RX ADMIN — HYDRALAZINE HYDROCHLORIDE 25 MG: 25 TABLET ORAL at 12:21

## 2025-05-17 RX ADMIN — SODIUM CHLORIDE, PRESERVATIVE FREE 10 ML: 5 INJECTION INTRAVENOUS at 09:32

## 2025-05-17 RX ADMIN — BUPROPION HYDROCHLORIDE 150 MG: 150 TABLET, FILM COATED, EXTENDED RELEASE ORAL at 21:47

## 2025-05-17 RX ADMIN — OXYCODONE AND ACETAMINOPHEN 1 TABLET: 5; 325 TABLET ORAL at 06:32

## 2025-05-17 RX ADMIN — HYDRALAZINE HYDROCHLORIDE 25 MG: 25 TABLET ORAL at 09:32

## 2025-05-17 RX ADMIN — WATER 2000 MG: 1 INJECTION INTRAMUSCULAR; INTRAVENOUS; SUBCUTANEOUS at 13:00

## 2025-05-17 RX ADMIN — ARFORMOTEROL TARTRATE 15 MCG: 15 SOLUTION RESPIRATORY (INHALATION) at 18:07

## 2025-05-17 RX ADMIN — HYDRALAZINE HYDROCHLORIDE 25 MG: 25 TABLET ORAL at 21:45

## 2025-05-17 RX ADMIN — OXYCODONE AND ACETAMINOPHEN 1 TABLET: 5; 325 TABLET ORAL at 00:30

## 2025-05-17 RX ADMIN — WATER 2000 MG: 1 INJECTION INTRAMUSCULAR; INTRAVENOUS; SUBCUTANEOUS at 21:45

## 2025-05-17 RX ADMIN — IPRATROPIUM BROMIDE 0.5 MG: 0.5 SOLUTION RESPIRATORY (INHALATION) at 09:06

## 2025-05-17 RX ADMIN — IPRATROPIUM BROMIDE 0.5 MG: 0.5 SOLUTION RESPIRATORY (INHALATION) at 18:07

## 2025-05-17 RX ADMIN — IPRATROPIUM BROMIDE 0.5 MG: 0.5 SOLUTION RESPIRATORY (INHALATION) at 05:14

## 2025-05-17 RX ADMIN — SERTRALINE 100 MG: 50 TABLET, FILM COATED ORAL at 21:45

## 2025-05-17 RX ADMIN — BUDESONIDE 250 MCG: 0.25 SUSPENSION RESPIRATORY (INHALATION) at 18:08

## 2025-05-17 ASSESSMENT — PAIN DESCRIPTION - LOCATION
LOCATION: HAND
LOCATION: HAND;FINGER (COMMENT WHICH ONE)

## 2025-05-17 ASSESSMENT — PAIN SCALES - GENERAL
PAINLEVEL_OUTOF10: 9
PAINLEVEL_OUTOF10: 4
PAINLEVEL_OUTOF10: 7

## 2025-05-17 ASSESSMENT — PAIN DESCRIPTION - ORIENTATION
ORIENTATION: RIGHT
ORIENTATION: RIGHT

## 2025-05-17 ASSESSMENT — PAIN DESCRIPTION - DESCRIPTORS
DESCRIPTORS: ACHING;DISCOMFORT;SORE
DESCRIPTORS: ACHING;DISCOMFORT;SORE

## 2025-05-17 NOTE — ANESTHESIA POSTPROCEDURE EVALUATION
Department of Anesthesiology  Postprocedure Note    Patient: Clark Maza  MRN: [id number]  YOB: 1950  Date of evaluation: 5/16/2025    Procedure Summary       Date: 05/16/25 Room / Location: [address]    Anesthesia Start: 1754 Anesthesia Stop: 1838    Procedure: RIGHT THUMB PARTIAL  AMPUTATION (Right: Hand) Diagnosis:       Acute osteomyelitis (HCC)      (Acute osteomyelitis (HCC) [M86.10])    Surgeons: Tremayne Hernández DO Responsible Provider: Jhonny Galindo DO    Anesthesia Type: MAC ASA Status: 4            Anesthesia Type: No value filed.    Laura Phase I: Laura Score: 9    Laura Phase II:      Anesthesia Post Evaluation    Patient location during evaluation: PACU  Patient participation: complete - patient participated  Level of consciousness: awake and alert  Pain score: 0  Airway patency: patent  Nausea & Vomiting: no nausea and no vomiting  Cardiovascular status: blood pressure returned to baseline and hemodynamically stable  Respiratory status: acceptable  Hydration status: stable  Pain management: adequate    No notable events documented.

## 2025-05-17 NOTE — PLAN OF CARE
Problem: Chronic Conditions and Co-morbidities  Goal: Patient's chronic conditions and co-morbidity symptoms are monitored and maintained or improved  5/17/2025 0940 by Bernarda Kincaid RN  Outcome: Progressing  5/17/2025 0431 by Lana Birch RN  Outcome: Progressing     Problem: Discharge Planning  Goal: Discharge to home or other facility with appropriate resources  5/17/2025 0940 by Bernarda Kincaid RN  Outcome: Progressing  5/17/2025 0431 by Lana Birch RN  Outcome: Progressing     Problem: Safety - Adult  Goal: Free from fall injury  5/17/2025 0940 by Bernarda Kincaid RN  Outcome: Progressing  5/17/2025 0431 by Lana Birch RN  Outcome: Progressing     Problem: Skin/Tissue Integrity  Goal: Skin integrity remains intact  Description: 1.  Monitor for areas of redness and/or skin breakdown2.  Assess vascular access sites hourly3.  Every 4-6 hours minimum:  Change oxygen saturation probe site4.  Every 4-6 hours:  If on nasal continuous positive airway pressure, respiratory therapy assess nares and determine need for appliance change or resting period  5/17/2025 0940 by Bernarda Kincaid RN  Outcome: Progressing  5/17/2025 0431 by Lana Birch RN  Outcome: Progressing     Problem: Pain  Goal: Verbalizes/displays adequate comfort level or baseline comfort level  5/17/2025 0940 by Bernarda Kincaid RN  Outcome: Progressing  5/17/2025 0431 by Lana Birch RN  Outcome: Progressing

## 2025-05-18 LAB
ALBUMIN SERPL-MCNC: 3.8 G/DL (ref 3.5–5.2)
ALP SERPL-CCNC: 47 U/L (ref 40–129)
ALT SERPL-CCNC: 11 U/L (ref 0–40)
ANION GAP SERPL CALCULATED.3IONS-SCNC: 14 MMOL/L (ref 7–16)
AST SERPL-CCNC: 14 U/L (ref 0–39)
BASOPHILS # BLD: 0.03 K/UL (ref 0–0.2)
BASOPHILS NFR BLD: 1 % (ref 0–2)
BILIRUB SERPL-MCNC: 0.2 MG/DL (ref 0–1.2)
BUN SERPL-MCNC: 27 MG/DL (ref 6–23)
CALCIUM SERPL-MCNC: 9 MG/DL (ref 8.6–10.2)
CHLORIDE SERPL-SCNC: 101 MMOL/L (ref 98–107)
CO2 SERPL-SCNC: 22 MMOL/L (ref 22–29)
CREAT SERPL-MCNC: 1.5 MG/DL (ref 0.7–1.2)
EOSINOPHIL # BLD: 0.17 K/UL (ref 0.05–0.5)
EOSINOPHILS RELATIVE PERCENT: 3 % (ref 0–6)
ERYTHROCYTE [DISTWIDTH] IN BLOOD BY AUTOMATED COUNT: 13.9 % (ref 11.5–15)
GFR, ESTIMATED: 49 ML/MIN/1.73M2
GLUCOSE SERPL-MCNC: 204 MG/DL (ref 74–99)
HCT VFR BLD AUTO: 47.2 % (ref 37–54)
HGB BLD-MCNC: 15.3 G/DL (ref 12.5–16.5)
IMM GRANULOCYTES # BLD AUTO: <0.03 K/UL (ref 0–0.58)
IMM GRANULOCYTES NFR BLD: 0 % (ref 0–5)
LYMPHOCYTES NFR BLD: 1.51 K/UL (ref 1.5–4)
LYMPHOCYTES RELATIVE PERCENT: 27 % (ref 20–42)
MAGNESIUM SERPL-MCNC: 1.5 MG/DL (ref 1.6–2.6)
MCH RBC QN AUTO: 28.4 PG (ref 26–35)
MCHC RBC AUTO-ENTMCNC: 32.4 G/DL (ref 32–34.5)
MCV RBC AUTO: 87.6 FL (ref 80–99.9)
MONOCYTES NFR BLD: 0.46 K/UL (ref 0.1–0.95)
MONOCYTES NFR BLD: 8 % (ref 2–12)
NEUTROPHILS NFR BLD: 61 % (ref 43–80)
NEUTS SEG NFR BLD: 3.42 K/UL (ref 1.8–7.3)
PHOSPHATE SERPL-MCNC: 2.9 MG/DL (ref 2.5–4.5)
PLATELET # BLD AUTO: 241 K/UL (ref 130–450)
PMV BLD AUTO: 11.3 FL (ref 7–12)
POTASSIUM SERPL-SCNC: 3.7 MMOL/L (ref 3.5–5)
PROT SERPL-MCNC: 6.6 G/DL (ref 6.4–8.3)
RBC # BLD AUTO: 5.39 M/UL (ref 3.8–5.8)
SODIUM SERPL-SCNC: 137 MMOL/L (ref 132–146)
WBC OTHER # BLD: 5.6 K/UL (ref 4.5–11.5)

## 2025-05-18 PROCEDURE — 6360000002 HC RX W HCPCS: Performed by: STUDENT IN AN ORGANIZED HEALTH CARE EDUCATION/TRAINING PROGRAM

## 2025-05-18 PROCEDURE — 36415 COLL VENOUS BLD VENIPUNCTURE: CPT

## 2025-05-18 PROCEDURE — 85025 COMPLETE CBC W/AUTO DIFF WBC: CPT

## 2025-05-18 PROCEDURE — 84100 ASSAY OF PHOSPHORUS: CPT

## 2025-05-18 PROCEDURE — 80053 COMPREHEN METABOLIC PANEL: CPT

## 2025-05-18 PROCEDURE — 83735 ASSAY OF MAGNESIUM: CPT

## 2025-05-18 PROCEDURE — 1200000000 HC SEMI PRIVATE

## 2025-05-18 PROCEDURE — 6370000000 HC RX 637 (ALT 250 FOR IP): Performed by: NURSE PRACTITIONER

## 2025-05-18 PROCEDURE — 6370000000 HC RX 637 (ALT 250 FOR IP): Performed by: STUDENT IN AN ORGANIZED HEALTH CARE EDUCATION/TRAINING PROGRAM

## 2025-05-18 PROCEDURE — 2500000003 HC RX 250 WO HCPCS: Performed by: SPECIALIST

## 2025-05-18 PROCEDURE — 2500000003 HC RX 250 WO HCPCS: Performed by: STUDENT IN AN ORGANIZED HEALTH CARE EDUCATION/TRAINING PROGRAM

## 2025-05-18 PROCEDURE — 94640 AIRWAY INHALATION TREATMENT: CPT

## 2025-05-18 PROCEDURE — 6360000002 HC RX W HCPCS: Performed by: SPECIALIST

## 2025-05-18 RX ORDER — FENOFIBRATE 54 MG/1
54 TABLET ORAL DAILY
Status: DISCONTINUED | OUTPATIENT
Start: 2025-05-19 | End: 2025-05-19 | Stop reason: HOSPADM

## 2025-05-18 RX ADMIN — WATER 2000 MG: 1 INJECTION INTRAMUSCULAR; INTRAVENOUS; SUBCUTANEOUS at 21:30

## 2025-05-18 RX ADMIN — ARFORMOTEROL TARTRATE 15 MCG: 15 SOLUTION RESPIRATORY (INHALATION) at 17:39

## 2025-05-18 RX ADMIN — MONTELUKAST 10 MG: 10 TABLET, FILM COATED ORAL at 09:36

## 2025-05-18 RX ADMIN — IPRATROPIUM BROMIDE 0.5 MG: 0.5 SOLUTION RESPIRATORY (INHALATION) at 08:55

## 2025-05-18 RX ADMIN — PANTOPRAZOLE SODIUM 40 MG: 40 TABLET, DELAYED RELEASE ORAL at 05:49

## 2025-05-18 RX ADMIN — HYDRALAZINE HYDROCHLORIDE 25 MG: 25 TABLET ORAL at 13:47

## 2025-05-18 RX ADMIN — FENOFIBRATE 160 MG: 160 TABLET ORAL at 09:36

## 2025-05-18 RX ADMIN — SODIUM CHLORIDE, PRESERVATIVE FREE 10 ML: 5 INJECTION INTRAVENOUS at 09:42

## 2025-05-18 RX ADMIN — HYDRALAZINE HYDROCHLORIDE 25 MG: 25 TABLET ORAL at 09:36

## 2025-05-18 RX ADMIN — SODIUM CHLORIDE, PRESERVATIVE FREE 10 ML: 5 INJECTION INTRAVENOUS at 21:40

## 2025-05-18 RX ADMIN — IPRATROPIUM BROMIDE 0.5 MG: 0.5 SOLUTION RESPIRATORY (INHALATION) at 14:31

## 2025-05-18 RX ADMIN — TAMSULOSIN HYDROCHLORIDE 0.8 MG: 0.4 CAPSULE ORAL at 09:36

## 2025-05-18 RX ADMIN — WATER 2000 MG: 1 INJECTION INTRAMUSCULAR; INTRAVENOUS; SUBCUTANEOUS at 05:49

## 2025-05-18 RX ADMIN — HYDRALAZINE HYDROCHLORIDE 25 MG: 25 TABLET ORAL at 21:30

## 2025-05-18 RX ADMIN — Medication 5 MG: at 21:30

## 2025-05-18 RX ADMIN — HYDRALAZINE HYDROCHLORIDE 25 MG: 25 TABLET ORAL at 17:23

## 2025-05-18 RX ADMIN — BUDESONIDE 250 MCG: 0.25 SUSPENSION RESPIRATORY (INHALATION) at 04:05

## 2025-05-18 RX ADMIN — OXYCODONE AND ACETAMINOPHEN 1 TABLET: 5; 325 TABLET ORAL at 03:05

## 2025-05-18 RX ADMIN — OXYCODONE AND ACETAMINOPHEN 1 TABLET: 5; 325 TABLET ORAL at 17:24

## 2025-05-18 RX ADMIN — WATER 2000 MG: 1 INJECTION INTRAMUSCULAR; INTRAVENOUS; SUBCUTANEOUS at 13:48

## 2025-05-18 RX ADMIN — CLOPIDOGREL BISULFATE 75 MG: 75 TABLET, FILM COATED ORAL at 09:36

## 2025-05-18 RX ADMIN — BUPROPION HYDROCHLORIDE 150 MG: 150 TABLET, FILM COATED, EXTENDED RELEASE ORAL at 09:36

## 2025-05-18 RX ADMIN — BUDESONIDE 250 MCG: 0.25 SUSPENSION RESPIRATORY (INHALATION) at 17:39

## 2025-05-18 RX ADMIN — BUPROPION HYDROCHLORIDE 150 MG: 150 TABLET, FILM COATED, EXTENDED RELEASE ORAL at 21:42

## 2025-05-18 RX ADMIN — ARFORMOTEROL TARTRATE 15 MCG: 15 SOLUTION RESPIRATORY (INHALATION) at 04:05

## 2025-05-18 RX ADMIN — SERTRALINE 100 MG: 50 TABLET, FILM COATED ORAL at 21:30

## 2025-05-18 RX ADMIN — IPRATROPIUM BROMIDE 0.5 MG: 0.5 SOLUTION RESPIRATORY (INHALATION) at 04:05

## 2025-05-18 RX ADMIN — OXYCODONE AND ACETAMINOPHEN 1 TABLET: 5; 325 TABLET ORAL at 09:39

## 2025-05-18 ASSESSMENT — PAIN DESCRIPTION - LOCATION: LOCATION: HAND

## 2025-05-18 ASSESSMENT — PAIN SCALES - GENERAL
PAINLEVEL_OUTOF10: 8
PAINLEVEL_OUTOF10: 5
PAINLEVEL_OUTOF10: 6

## 2025-05-18 ASSESSMENT — PAIN DESCRIPTION - ORIENTATION: ORIENTATION: RIGHT

## 2025-05-18 ASSESSMENT — PAIN SCALES - WONG BAKER: WONGBAKER_NUMERICALRESPONSE: HURTS A LITTLE BIT

## 2025-05-18 ASSESSMENT — PAIN DESCRIPTION - DESCRIPTORS: DESCRIPTORS: THROBBING;TENDER

## 2025-05-18 NOTE — PLAN OF CARE
Problem: Chronic Conditions and Co-morbidities  Goal: Patient's chronic conditions and co-morbidity symptoms are monitored and maintained or improved  5/18/2025 1004 by Lakisha Meadows RN  Outcome: Progressing  5/17/2025 2138 by Apolinar Barrett RN  Outcome: Progressing     Problem: Discharge Planning  Goal: Discharge to home or other facility with appropriate resources  5/18/2025 1004 by Lakisha Meadows RN  Outcome: Progressing  5/17/2025 2138 by Apolinar Barrett RN  Outcome: Progressing     Problem: Safety - Adult  Goal: Free from fall injury  Outcome: Progressing     Problem: Skin/Tissue Integrity  Goal: Skin integrity remains intact  Description: 1.  Monitor for areas of redness and/or skin breakdown2.  Assess vascular access sites hourly3.  Every 4-6 hours minimum:  Change oxygen saturation probe site4.  Every 4-6 hours:  If on nasal continuous positive airway pressure, respiratory therapy assess nares and determine need for appliance change or resting period  5/18/2025 1004 by Lakisha Meadows RN  Outcome: Progressing  5/17/2025 2138 by Apolinar Barrett RN  Outcome: Progressing     Problem: Pain  Goal: Verbalizes/displays adequate comfort level or baseline comfort level  5/18/2025 1004 by Lakisha Meadows RN  Outcome: Progressing  5/17/2025 2138 by Apolinar Barrett RN  Outcome: Progressing     Problem: Pain  Goal: Verbalizes/displays adequate comfort level or baseline comfort level  5/18/2025 1004 by Lakisha Meadows RN  Outcome: Progressing  5/17/2025 2138 by Apolinar Barrett RN  Outcome: Progressing     Problem: ABCDS Injury Assessment  Goal: Absence of physical injury  5/18/2025 1004 by Lakisha Meadows RN  Outcome: Progressing  5/17/2025 2138 by Apolinar Barrett RN  Outcome: Progressing

## 2025-05-18 NOTE — PLAN OF CARE
Problem: Chronic Conditions and Co-morbidities  Goal: Patient's chronic conditions and co-morbidity symptoms are monitored and maintained or improved  5/17/2025 2138 by Apolinar Barrett RN  Outcome: Progressing     Problem: Discharge Planning  Goal: Discharge to home or other facility with appropriate resources  5/17/2025 2138 by Apolinar Barrett RN  Outcome: Progressing     Problem: Skin/Tissue Integrity  Goal: Skin integrity remains intact  Description: 1.  Monitor for areas of redness and/or skin breakdown2.  Assess vascular access sites hourly3.  Every 4-6 hours minimum:  Change oxygen saturation probe site4.  Every 4-6 hours:  If on nasal continuous positive airway pressure, respiratory therapy assess nares and determine need for appliance change or resting period  5/17/2025 2138 by Apolinar Barrett RN  Outcome: Progressing     Problem: Pain  Goal: Verbalizes/displays adequate comfort level or baseline comfort level  5/17/2025 2138 by Apolinar Barrett RN  Outcome: Progressing     Problem: ABCDS Injury Assessment  Goal: Absence of physical injury  Outcome: Progressing

## 2025-05-19 VITALS
RESPIRATION RATE: 16 BRPM | DIASTOLIC BLOOD PRESSURE: 78 MMHG | BODY MASS INDEX: 34.69 KG/M2 | OXYGEN SATURATION: 92 % | TEMPERATURE: 98.2 F | SYSTOLIC BLOOD PRESSURE: 119 MMHG | HEIGHT: 67 IN | WEIGHT: 221 LBS | HEART RATE: 78 BPM

## 2025-05-19 LAB
ALBUMIN SERPL-MCNC: 3.8 G/DL (ref 3.5–5.2)
ALP SERPL-CCNC: 50 U/L (ref 40–129)
ALT SERPL-CCNC: 10 U/L (ref 0–40)
ANION GAP SERPL CALCULATED.3IONS-SCNC: 10 MMOL/L (ref 7–16)
AST SERPL-CCNC: 17 U/L (ref 0–39)
BASOPHILS # BLD: 0.03 K/UL (ref 0–0.2)
BASOPHILS NFR BLD: 1 % (ref 0–2)
BILIRUB SERPL-MCNC: 0.3 MG/DL (ref 0–1.2)
BUN SERPL-MCNC: 24 MG/DL (ref 6–23)
CALCIUM SERPL-MCNC: 9.2 MG/DL (ref 8.6–10.2)
CHLORIDE SERPL-SCNC: 105 MMOL/L (ref 98–107)
CK SERPL-CCNC: 56 U/L (ref 20–200)
CO2 SERPL-SCNC: 25 MMOL/L (ref 22–29)
CREAT SERPL-MCNC: 1.3 MG/DL (ref 0.7–1.2)
EOSINOPHIL # BLD: 0.19 K/UL (ref 0.05–0.5)
EOSINOPHILS RELATIVE PERCENT: 3 % (ref 0–6)
ERYTHROCYTE [DISTWIDTH] IN BLOOD BY AUTOMATED COUNT: 13.8 % (ref 11.5–15)
GFR, ESTIMATED: 57 ML/MIN/1.73M2
GLUCOSE SERPL-MCNC: 100 MG/DL (ref 74–99)
HCT VFR BLD AUTO: 48.5 % (ref 37–54)
HGB BLD-MCNC: 15.6 G/DL (ref 12.5–16.5)
IMM GRANULOCYTES # BLD AUTO: 0.04 K/UL (ref 0–0.58)
IMM GRANULOCYTES NFR BLD: 1 % (ref 0–5)
LYMPHOCYTES NFR BLD: 1.82 K/UL (ref 1.5–4)
LYMPHOCYTES RELATIVE PERCENT: 29 % (ref 20–42)
MAGNESIUM SERPL-MCNC: 1.9 MG/DL (ref 1.6–2.6)
MCH RBC QN AUTO: 28.4 PG (ref 26–35)
MCHC RBC AUTO-ENTMCNC: 32.2 G/DL (ref 32–34.5)
MCV RBC AUTO: 88.2 FL (ref 80–99.9)
MICROORGANISM SPEC CULT: ABNORMAL
MICROORGANISM SPEC CULT: ABNORMAL
MICROORGANISM SPEC CULT: NORMAL
MICROORGANISM SPEC CULT: NORMAL
MICROORGANISM/AGENT SPEC: ABNORMAL
MONOCYTES NFR BLD: 0.6 K/UL (ref 0.1–0.95)
MONOCYTES NFR BLD: 10 % (ref 2–12)
NEUTROPHILS NFR BLD: 57 % (ref 43–80)
NEUTS SEG NFR BLD: 3.56 K/UL (ref 1.8–7.3)
PHOSPHATE SERPL-MCNC: 3 MG/DL (ref 2.5–4.5)
PLATELET # BLD AUTO: 261 K/UL (ref 130–450)
PMV BLD AUTO: 11.2 FL (ref 7–12)
POTASSIUM SERPL-SCNC: 4.4 MMOL/L (ref 3.5–5)
PROT SERPL-MCNC: 6.8 G/DL (ref 6.4–8.3)
RBC # BLD AUTO: 5.5 M/UL (ref 3.8–5.8)
SERVICE CMNT-IMP: ABNORMAL
SERVICE CMNT-IMP: ABNORMAL
SERVICE CMNT-IMP: NORMAL
SERVICE CMNT-IMP: NORMAL
SODIUM SERPL-SCNC: 140 MMOL/L (ref 132–146)
SPECIMEN DESCRIPTION: ABNORMAL
SPECIMEN DESCRIPTION: ABNORMAL
SPECIMEN DESCRIPTION: NORMAL
SPECIMEN DESCRIPTION: NORMAL
WBC OTHER # BLD: 6.2 K/UL (ref 4.5–11.5)

## 2025-05-19 PROCEDURE — 84100 ASSAY OF PHOSPHORUS: CPT

## 2025-05-19 PROCEDURE — 36410 VNPNXR 3YR/> PHY/QHP DX/THER: CPT

## 2025-05-19 PROCEDURE — 6370000000 HC RX 637 (ALT 250 FOR IP): Performed by: NURSE PRACTITIONER

## 2025-05-19 PROCEDURE — 36415 COLL VENOUS BLD VENIPUNCTURE: CPT

## 2025-05-19 PROCEDURE — 6360000002 HC RX W HCPCS: Performed by: STUDENT IN AN ORGANIZED HEALTH CARE EDUCATION/TRAINING PROGRAM

## 2025-05-19 PROCEDURE — C1751 CATH, INF, PER/CENT/MIDLINE: HCPCS

## 2025-05-19 PROCEDURE — 6370000000 HC RX 637 (ALT 250 FOR IP): Performed by: STUDENT IN AN ORGANIZED HEALTH CARE EDUCATION/TRAINING PROGRAM

## 2025-05-19 PROCEDURE — 6360000002 HC RX W HCPCS: Performed by: CLINICAL NURSE SPECIALIST

## 2025-05-19 PROCEDURE — 94640 AIRWAY INHALATION TREATMENT: CPT

## 2025-05-19 PROCEDURE — 2500000003 HC RX 250 WO HCPCS: Performed by: STUDENT IN AN ORGANIZED HEALTH CARE EDUCATION/TRAINING PROGRAM

## 2025-05-19 PROCEDURE — 82550 ASSAY OF CK (CPK): CPT

## 2025-05-19 PROCEDURE — 2500000003 HC RX 250 WO HCPCS: Performed by: SPECIALIST

## 2025-05-19 PROCEDURE — 80053 COMPREHEN METABOLIC PANEL: CPT

## 2025-05-19 PROCEDURE — 05HB33Z INSERTION OF INFUSION DEVICE INTO RIGHT BASILIC VEIN, PERCUTANEOUS APPROACH: ICD-10-PCS | Performed by: STUDENT IN AN ORGANIZED HEALTH CARE EDUCATION/TRAINING PROGRAM

## 2025-05-19 PROCEDURE — 76937 US GUIDE VASCULAR ACCESS: CPT

## 2025-05-19 PROCEDURE — 85025 COMPLETE CBC W/AUTO DIFF WBC: CPT

## 2025-05-19 PROCEDURE — 83735 ASSAY OF MAGNESIUM: CPT

## 2025-05-19 PROCEDURE — 6360000002 HC RX W HCPCS: Performed by: SPECIALIST

## 2025-05-19 PROCEDURE — 2580000003 HC RX 258: Performed by: CLINICAL NURSE SPECIALIST

## 2025-05-19 RX ORDER — SODIUM CHLORIDE 9 MG/ML
5-250 INJECTION, SOLUTION INTRAVENOUS PRN
Status: CANCELLED | OUTPATIENT
Start: 2025-05-20

## 2025-05-19 RX ORDER — SODIUM CHLORIDE 9 MG/ML
INJECTION, SOLUTION INTRAVENOUS PRN
Status: DISCONTINUED | OUTPATIENT
Start: 2025-05-19 | End: 2025-05-19 | Stop reason: HOSPADM

## 2025-05-19 RX ORDER — SODIUM CHLORIDE 0.9 % (FLUSH) 0.9 %
5-40 SYRINGE (ML) INJECTION EVERY 12 HOURS SCHEDULED
Status: DISCONTINUED | OUTPATIENT
Start: 2025-05-19 | End: 2025-05-19 | Stop reason: HOSPADM

## 2025-05-19 RX ORDER — SODIUM CHLORIDE 0.9 % (FLUSH) 0.9 %
5-40 SYRINGE (ML) INJECTION PRN
Status: DISCONTINUED | OUTPATIENT
Start: 2025-05-19 | End: 2025-05-19 | Stop reason: HOSPADM

## 2025-05-19 RX ORDER — LIDOCAINE HYDROCHLORIDE 10 MG/ML
50 INJECTION, SOLUTION INFILTRATION; PERINEURAL ONCE
Status: DISCONTINUED | OUTPATIENT
Start: 2025-05-19 | End: 2025-05-19 | Stop reason: HOSPADM

## 2025-05-19 RX ADMIN — MONTELUKAST 10 MG: 10 TABLET, FILM COATED ORAL at 09:19

## 2025-05-19 RX ADMIN — FENOFIBRATE 54 MG: 54 TABLET ORAL at 09:19

## 2025-05-19 RX ADMIN — CLOPIDOGREL BISULFATE 75 MG: 75 TABLET, FILM COATED ORAL at 09:19

## 2025-05-19 RX ADMIN — IPRATROPIUM BROMIDE 0.5 MG: 0.5 SOLUTION RESPIRATORY (INHALATION) at 06:26

## 2025-05-19 RX ADMIN — SODIUM CHLORIDE 500 MG: 9 INJECTION INTRAMUSCULAR; INTRAVENOUS; SUBCUTANEOUS at 11:49

## 2025-05-19 RX ADMIN — PANTOPRAZOLE SODIUM 40 MG: 40 TABLET, DELAYED RELEASE ORAL at 06:07

## 2025-05-19 RX ADMIN — SODIUM CHLORIDE, PRESERVATIVE FREE 10 ML: 5 INJECTION INTRAVENOUS at 09:20

## 2025-05-19 RX ADMIN — HYDRALAZINE HYDROCHLORIDE 25 MG: 25 TABLET ORAL at 09:18

## 2025-05-19 RX ADMIN — BUDESONIDE 250 MCG: 0.25 SUSPENSION RESPIRATORY (INHALATION) at 06:26

## 2025-05-19 RX ADMIN — TAMSULOSIN HYDROCHLORIDE 0.8 MG: 0.4 CAPSULE ORAL at 09:19

## 2025-05-19 RX ADMIN — BUPROPION HYDROCHLORIDE 150 MG: 150 TABLET, FILM COATED, EXTENDED RELEASE ORAL at 09:19

## 2025-05-19 RX ADMIN — ARFORMOTEROL TARTRATE 15 MCG: 15 SOLUTION RESPIRATORY (INHALATION) at 06:26

## 2025-05-19 RX ADMIN — WATER 2000 MG: 1 INJECTION INTRAMUSCULAR; INTRAVENOUS; SUBCUTANEOUS at 06:06

## 2025-05-19 ASSESSMENT — PAIN SCALES - GENERAL: PAINLEVEL_OUTOF10: 0

## 2025-05-19 NOTE — PROGRESS NOTES
NAME: Clark Maza  MR:  [id number]  :   1950  Admit Date:  5/15/2025    Elements of this note, were copied and pasted from Previous. Updates have been made where noted and reflect current exam and medical decision making from the DOS of this encounter.  CHIEF COMPLAINT     No chief complaint on file.    HISTORY OF PRESENT ILLNESS     Clark Maza is a 74 y.o. male admitted on 5/15/2025    Patient Active Problem List   Diagnosis    Lymphoma (HCC)    Gastroenteritis    Back pain at L4-L5 level    Impotence    Chronic fatigue    Urinary frequency    Bronchitis    Gastroesophageal reflux disease with esophagitis    Depression    Coronary artery disease of native artery of native heart with stable angina pectoris    Chronic obstructive pulmonary disease (HCC)    Pure hypercholesterolemia    Moderate obesity    Major depressive disorder, recurrent, mild    Moderate asthma without complication    Complete tear of left rotator cuff    Rotator cuff arthropathy of left shoulder    Nontraumatic complete tear of rotator cuff, left    Bursitis of left shoulder    Impingement syndrome of left shoulder    Labral tear of shoulder, degenerative, left    S/P arthroscopy of shoulder    Obstructive sleep apnea    Stage 3a chronic kidney disease (HCC)    Opioid use, unspecified with unspecified opioid-induced disorder    Ischemic cardiomyopathy    Chronic combined systolic and diastolic congestive heart failure (HCC)    Acute hypoxemic respiratory failure (HCC)    Gluteal claudication    Chronic hypoxemic respiratory failure (HCC)    Chronic renal disease, stage III (HCC) [657640]    Long COVID    Chronic right shoulder pain    Arm laceration, right, initial encounter    Wound dehiscence    Rupture of operation wound    Tobacco use disorder    Encounter for tobacco use cessation counseling    Surgical wound dehiscence, subsequent encounter    Hand ulceration, with fat layer exposed (HCC)    Skin ulcer with 
    NAME: Clark Maza  MR:  [id number]  :   1950  Admit Date:  5/15/2025    Elements of this note, were copied and pasted from Previous. Updates have been made where noted and reflect current exam and medical decision making from the DOS of this encounter.  CHIEF COMPLAINT     No chief complaint on file.    HISTORY OF PRESENT ILLNESS     Clark Maza is a 74 y.o. male admitted on 5/15/2025    Patient Active Problem List   Diagnosis    Lymphoma (HCC)    Gastroenteritis    Back pain at L4-L5 level    Impotence    Chronic fatigue    Urinary frequency    Bronchitis    Gastroesophageal reflux disease with esophagitis    Depression    Coronary artery disease of native artery of native heart with stable angina pectoris    Chronic obstructive pulmonary disease (HCC)    Pure hypercholesterolemia    Moderate obesity    Major depressive disorder, recurrent, mild    Moderate asthma without complication    Complete tear of left rotator cuff    Rotator cuff arthropathy of left shoulder    Nontraumatic complete tear of rotator cuff, left    Bursitis of left shoulder    Impingement syndrome of left shoulder    Labral tear of shoulder, degenerative, left    S/P arthroscopy of shoulder    Obstructive sleep apnea    Stage 3a chronic kidney disease (HCC)    Opioid use, unspecified with unspecified opioid-induced disorder    Ischemic cardiomyopathy    Chronic combined systolic and diastolic congestive heart failure (HCC)    Acute hypoxemic respiratory failure (HCC)    Gluteal claudication    Chronic hypoxemic respiratory failure (HCC)    Chronic renal disease, stage III (HCC) [234931]    Long COVID    Chronic right shoulder pain    Arm laceration, right, initial encounter    Wound dehiscence    Rupture of operation wound    Tobacco use disorder    Encounter for tobacco use cessation counseling    Surgical wound dehiscence, subsequent encounter    Hand ulceration, with fat layer exposed (HCC)    Skin ulcer with 
Awaiting final ID recommendations and then patient may discharge and follow up with Dr Hernández outpatient  
Department of Orthopedic Surgery  Resident Progress Note    Patient seen and examined at bedside awake alert oriented x 3 upon entering the room.  Patient denies pain to right upper extremity at this time although of note patient has had injury in the past so he cannot feel in these fingers to begin with.  No other orthopedic complaints    VITALS:  /69   Pulse 70   Temp 98.1 °F (36.7 °C) (Oral)   Resp 18   Ht 1.702 m (5' 7\")   Wt 100.2 kg (221 lb)   SpO2 93%   BMI 34.61 kg/m²     General: Awake alert oriented x 3    MUSCULOSKELETAL:   right upper extremity:  Dressing C/D/I  Compartments soft and compressible  +AIN/PIN/Ulnar/Median/Radial nerve function intact grossly  +2/4 Radial pulse, Cap refill <2 sec  Slightly diminished to median nerve distribution which is normal at baseline.  Distal sensation grossly intact to ulnar/radial/axillary nerve distributions intact    CBC:   Lab Results   Component Value Date/Time    WBC 5.8 05/16/2025 03:09 AM    HGB 15.0 05/16/2025 03:09 AM    HCT 46.4 05/16/2025 03:09 AM     05/16/2025 03:09 AM     PT/INR:    Lab Results   Component Value Date/Time    PROTIME 12.4 02/02/2024 07:40 PM    PROTIME 13.0 11/03/2011 09:47 AM    INR 1.1 02/02/2024 07:40 PM       ASSESSMENT  S/P right thumb partial amputation    PLAN    Plan discussed patient all patient's questions answered to his satisfaction  Nonweightbearing right upper extremity for time being continue elevation  Continue 24 hours antibiotics ID recommendations appreciated  PT/OT as able  Okay to start DVT prophylaxis on postop day 1  Plan: Awaiting finalized ID recommendations and surgical cultures before patient be discharged.  D/W attending  Electronically signed by Kelby Joel DO on 5/17/2025 at 8:37 AM      
Internal Medicine Consult Note    [facility]    Clark Gee D.O., APPLEI.                    Kody Barajas D.O., BEATRICE Rodriguez D.O.     Michele Sampson, MSN, APRN-CNP  Moris Arzate, MSN, APRN-CNP  Katrina Theodore, MSN APRN-CNP  Chantal Dior, MSN. APRN-NP-C     Primary Care Physician: Chance Jama DO   Admitting Physician:  Tremayne Hernández DO  Admission date and time: 5/15/2025  4:47 PM    Room:  [address] [place] [address]  Admitting diagnosis: Osteomyelitis (HCC) [M86.9]  Chronic osteomyelitis, hand (HCC) [M86.649]    Patient Name: Clark Maza  MRN: [id number]    Date of Service: 5/17/2025     Subjective:  Clark is a 74 y.o. male who was seen and examined today,5/17/2025, at the bedside.  He has postoperative discomfort as to be expected.  We have reviewed the results of recent workup and plan of care moving forward.  He is eager for discharge.  No new symptoms or concerns otherwise. No family present during my examination.    Review of System:   Constitutional:   Denies fever or chills, weight loss or gain, fatigue or malaise.  HEENT:   Denies ear pain, sore throat, sinus or eye problems.  Cardiovascular:   Denies any chest pain or palpitations.   Respiratory:   Denies shortness of breath, coughing, sputum production, hemoptysis, or wheezing.  Gastrointestinal:   Denies nausea, vomiting, diarrhea, or constipation.  Denies any abdominal pain.  Genitourinary:    Denies any urgency, frequency, hematuria. Voiding  without difficulty.  Extremities:   Postoperative pain as to be expected, otherwise denies lower extremity swelling, edema or cyanosis.  Abnormality of the right hand  Neurology:    Denies any headache or focal neurological deficits, Denies generalized weakness or memory difficulty.   Psch:   Denies being anxious or depressed.  Musculoskeletal:    Postoperative pain is to be expected, otherwise denies  myalgias, joint complaints or 
Internal Medicine Consult Note    [facility]    Clark Gee D.O., APPLEI.                    Kody Barajas D.O., BEATRICE Rodriguez D.O.     Michele Sampson, MSN, APRN-CNP  Moris Arzate, MSN, APRN-CNP  Katrina Theodore, MSN APRN-CNP  Chantal Dior, MSN. APRN-NP-C     Primary Care Physician: Chance Jama DO   Admitting Physician:  Tremayne Hernández DO  Admission date and time: 5/15/2025  4:47 PM    Room:  [facility]/NONE  Admitting diagnosis: Osteomyelitis (HCC) [M86.9]  Chronic osteomyelitis, hand (HCC) [M86.649]    Patient Name: Clark Maza  MRN: [id number]    Date of Service: 5/16/2025     Subjective:  Clark is a 74 y.o. male who was seen and examined today,5/16/2025, at the bedside.  Patient resting company at the present time.  Patient is scheduled for surgical intervention today.  Does have open areas on the right hand and thumb and index finger.  Denies chest pain or shortness of breath    No family present during my examination.    Review of System:   Constitutional:   Denies fever or chills, weight loss or gain, fatigue or malaise.  HEENT:   Denies ear pain, sore throat, sinus or eye problems.  Cardiovascular:   Denies any chest pain, irregular heartbeats, or palpitations.   Respiratory:   Denies shortness of breath, coughing, sputum production, hemoptysis, or wheezing.  Gastrointestinal:   Denies nausea, vomiting, diarrhea, or constipation.  Denies any abdominal pain.  Genitourinary:    Denies any urgency, frequency, hematuria. Voiding  without difficulty.  Extremities:   Denies lower extremity swelling, edema or cyanosis.  Abnormality of the right hand  Neurology:    Denies any headache or focal neurological deficits, Denies generalized weakness or memory difficulty.   Psch:   Denies being anxious or depressed.  Musculoskeletal:    Denies  myalgias, joint complaints or back pain.   Integumentary:   Denies any rashes, ulcers, or 
Renal Dose Adjustment Policy (Generic)     This patient is on medication that requires renal, weight, and/or indication dose adjustment.      Date Body Weight IBW  Adjusted BW SCr  CrCl Dialysis status   5/18/2025 100.2 kg (221 lb) Ideal body weight: 66.1 kg (145 lb 11.6 oz)  Adjusted ideal body weight: 79.8 kg (175 lb 13.4 oz) Serum creatinine: 1.5 mg/dL (H) 05/18/25 0534  Estimated creatinine clearance: 49 mL/min (A) N/a       Pharmacy has dose-adjusted the following medication(s):    Date Previous Order Adjusted Order   5/18/2025 Fenofibrate 167mg po daily Fenofibrate 54mg po daily for crcl 31-80 per protocol     These changes were made per protocol according to the [facility]   Automatic Renal Dose Adjustment Policy.     *Please note this dose may need readjusted if patient's condition changes.    Please contact pharmacy with any questions regarding these changes.    Thank you,     Jhonny Espinal [facility]  5/18/2025   9:40 AM    SJW: [phone]    
[facility]  GIGIIDA  Progress Note  CC: right thumb osteomyelitis   Face to face encounter   SUBJECTIVE:  5/19/2025   Patient is resting in bed- no issues. Has not feeling to right finger.   Tolerating antibiotics- has been afebrile.   Patient is tolerating medications. No reported adverse drug reactions.  No nausea, vomiting, diarrhea.    Medications:  Scheduled Meds:   DAPTOmycin (CUBICIN) 500 mg in sodium chloride 0.9 % 50 mL IVPB  6 mg/kg (Adjusted) IntraVENous Q24H    sodium chloride flush  5-40 mL IntraVENous 2 times per day    lidocaine 1 % injection  50 mg IntraDERmal Once    fenofibrate  54 mg Oral Daily    enoxaparin  40 mg SubCUTAneous Daily    clopidogrel  75 mg Oral Daily    melatonin  5 mg Oral Nightly    budesonide  0.25 mg Nebulization BID RT    And    arformoterol tartrate  15 mcg Nebulization BID RT    And    ipratropium  0.5 mg Nebulization 4x Daily RT    sodium chloride flush  5-40 mL IntraVENous 2 times per day    buPROPion  150 mg Oral BID    sertraline  100 mg Oral Nightly    pantoprazole  40 mg Oral QAM AC    montelukast  10 mg Oral Daily    hydrALAZINE  25 mg Oral 4x daily    tamsulosin  0.8 mg Oral Daily     Continuous Infusions:   sodium chloride      sodium chloride       PRN Meds:sodium chloride flush, sodium chloride, oxyCODONE-acetaminophen, morphine, sodium chloride flush, sodium chloride, potassium chloride **OR** potassium alternative oral replacement **OR** potassium chloride, magnesium sulfate, ondansetron **OR** ondansetron, polyethylene glycol, acetaminophen **OR** acetaminophen  OBJECTIVE:  Patient Vitals for the past 24 hrs:   BP Temp Temp src Pulse Resp SpO2   05/19/25 0900 119/78 -- -- 78 -- --   05/19/25 0626 -- -- -- -- -- 92 %   05/19/25 0440 111/82 98.2 °F (36.8 °C) Oral 67 16 92 %   05/18/25 2130 (!) 142/106 -- -- -- -- --   05/18/25 1754 -- -- -- -- 18 --   05/18/25 1709 (!) 137/96 97.9 °F (36.6 °C) Oral 65 18 94 %   05/18/25 1347 (!) 
additional unilateral joint edema, erythema or warmth.  Gait not assessed.  Integumentary:  No rashes  Skin normal color and texture.  Genitalia/Breast:  Deferred    Medication:  Scheduled Meds:   enoxaparin  40 mg SubCUTAneous Daily    clopidogrel  75 mg Oral Daily    ceFAZolin  2,000 mg IntraVENous Q8H    melatonin  5 mg Oral Nightly    budesonide  0.25 mg Nebulization BID RT    And    arformoterol tartrate  15 mcg Nebulization BID RT    And    ipratropium  0.5 mg Nebulization 4x Daily RT    sodium chloride flush  5-40 mL IntraVENous 2 times per day    buPROPion  150 mg Oral BID    sertraline  100 mg Oral Nightly    pantoprazole  40 mg Oral QAM AC    montelukast  10 mg Oral Daily    hydrALAZINE  25 mg Oral 4x daily    fenofibrate  160 mg Oral Daily    tamsulosin  0.8 mg Oral Daily     Continuous Infusions:   sodium chloride         Objective Data:  Recent Labs     05/16/25  0309 05/17/25  0915 05/18/25  0534   WBC 5.8 7.4 5.6   RBC 5.33 5.35 5.39   HGB 15.0 15.2 15.3   HCT 46.4 46.9 47.2   MCV 87.1 87.7 87.6   MCH 28.1 28.4 28.4   MCHC 32.3 32.4 32.4   RDW 13.6 13.8 13.9    275 241   MPV 11.3 10.9 11.3     Recent Labs     05/16/25  0309 05/17/25  0915 05/18/25  0534    136 137   K 3.9 4.1 3.7    103 101   CO2 23 23 22   BUN 23 26* 27*   CREATININE 1.5* 1.5* 1.5*   GLUCOSE 107* 132* 204*   CALCIUM 9.2 9.1 9.0   BILITOT 0.2 0.2 0.2   ALKPHOS 48 50 47   AST 14 17 14   ALT 13 14 11   ALBUMIN 3.6 3.8 3.8       Wound Documentation:   See documentation    Assessment:  Cellulitis with osteomyelitis of the right thumb and index finger status post right thumb amputation with local flap advancement May 16, 2025 by Dr. Hernández   Coronary artery disease with status post stent placement  Chronic diastolic congestive heart failure  COPD with chronic respiratory failure on nightly nasal cannula oxygen  History of left carotid endarterectomy  Obesity BMI 34.61 kg meter squared with obstructive sleep apnea and

## 2025-05-19 NOTE — CARE COORDINATION
CM note: Met with patient with ID CNP, patient is agreeable to coming to the infusion center daily for 2 weeks.  Script received and faxed to the infusion center.  Pt will need line placed.  Requested patient be placed on schedule for tomorrow, CM will need to notify the infusion center when line is placed.

## 2025-05-19 NOTE — PROCEDURES
PROCEDURE NOTE  Date: 5/19/2025   Name: Clark ZUIRTA Postlethwait  YOB: 1950    Procedures  SL Midline Left   Left arm Midline  Placement 5/19/2025    Product number: [id number]   Lot Number: [id number]   Consult:  home antibiotics   Ultrasound: yes   R Basilic      Upper Arm Circumference: 30CM    Size: 4.5F/15CM    Exposed Length: 0    Internal Length: 15CM   Cut: 0   Vein Measurement: 0.50CM  Brisk blood return noted, NS flushed, clampled, labeled and alcohol cap applied    Concetta Brown RN  5/19/2025  11:19 AM

## 2025-05-19 NOTE — DISCHARGE INSTRUCTIONS
Your information:  Name: Clark Churchhwalance  : 1950    Your instructions:    Discharge home with midline.  Go to the [facility] on Tuesday, May 20th at 12pm. Their phone number is [phone].  Follow up with primary care provider and specialists as directed.  Remain nonweightbearing to right hand. Keep hand elevated as much as possible to decrease swelling.   Keep dressing clean, dry and intact until your follow up appointment.    Signs and symptoms to look out for:  Call your doctor now or seek immediate medical care if:    You have new pain, or your pain gets worse.     The skin near the wound is cool or pale or changes color.     The wound starts to bleed, and blood soaks through the bandage. Oozing small amounts of blood is normal.     Your wound comes open.     You have symptoms of infection, such as:  Increased pain, swelling, warmth, or redness near the wound.  Red streaks leading from the wound.  Pus draining from the wound.  A fever.   Watch closely for changes in your health, and be sure to contact your doctor if:    You do not get better as expected.     What to do after you leave the hospital:    Recommended diet: regular diet    Recommended activity: nonweightbearing to right hand    The following personal items were collected during your admission and were returned to you:    Belongings  Dental Appliances: None  Vision - Corrective Lenses: None  Hearing Aid: None  Clothing: At bedside  Jewelry: None  Body Piercings Removed: No  Electronic Devices: Cell Phone, At bedside  Weapons (Notify Protective Services/Security): None  Other Valuables: At home  Home Medications: None  Valuables Given To: Patient  Provide Name(s) of Who Valuable(s) Were Given To: mago  Responsible person(s) in the waiting room: mago  Patient approves for provider to speak to responsible person post operatively: Yes    Information obtained by:  By signing below, I understand that if any problems occur

## 2025-05-19 NOTE — CARE COORDINATION
CM note: Pt scheduled for 12 PM tomorrow at the infusion center.  Provided patient with campus map and contact information for the infusion center.

## 2025-05-19 NOTE — PLAN OF CARE
Problem: Chronic Conditions and Co-morbidities  Goal: Patient's chronic conditions and co-morbidity symptoms are monitored and maintained or improved  5/18/2025 2310 by Katarina Mao RN  Outcome: Progressing  5/18/2025 1004 by Lakisha Meadows RN  Outcome: Progressing     Problem: Discharge Planning  Goal: Discharge to home or other facility with appropriate resources  5/18/2025 2310 by Katarina Mao RN  Outcome: Progressing  5/18/2025 1004 by Lakisha Meadows RN  Outcome: Progressing     Problem: Safety - Adult  Goal: Free from fall injury  5/18/2025 2310 by Katarina Mao RN  Outcome: Progressing  5/18/2025 1004 by Lakisha Meadows RN  Outcome: Progressing     Problem: Skin/Tissue Integrity  Goal: Skin integrity remains intact  Description: 1.  Monitor for areas of redness and/or skin breakdown2.  Assess vascular access sites hourly3.  Every 4-6 hours minimum:  Change oxygen saturation probe site4.  Every 4-6 hours:  If on nasal continuous positive airway pressure, respiratory therapy assess nares and determine need for appliance change or resting period  5/18/2025 2310 by Katarina Mao RN  Outcome: Progressing  5/18/2025 1004 by Lakisha Meadows RN  Outcome: Progressing     Problem: Pain  Goal: Verbalizes/displays adequate comfort level or baseline comfort level  5/18/2025 2310 by Katarina Mao RN  Outcome: Progressing  5/18/2025 1004 by Lakisha Meadows RN  Outcome: Progressing     Problem: ABCDS Injury Assessment  Goal: Absence of physical injury  5/18/2025 2310 by Katarina Mao RN  Outcome: Progressing  5/18/2025 1004 by Lakisha Meadows RN  Outcome: Progressing

## 2025-05-19 NOTE — DISCHARGE SUMMARY
4.4 05/19/2025     05/19/2025    CREATININE 1.3 (H) 05/19/2025    BUN 24 (H) 05/19/2025    CO2 25 05/19/2025    GLUCOSE 100 (H) 05/19/2025    ALT 10 05/19/2025    AST 17 05/19/2025    INR 1.1 02/02/2024    APTT 31.0 02/02/2024     Lab Results   Component Value Date    INR 1.1 02/02/2024    INR 2.0 02/02/2024    INR 1.1 02/02/2024    PROTIME 12.4 02/02/2024    PROTIME 22.3 (H) 02/02/2024    PROTIME 12.2 02/02/2024      Lab Results   Component Value Date    TSH 2.35 05/16/2025     Lab Results   Component Value Date    TRIG 196 (H) 05/16/2025    TRIG 149 06/16/2021    TRIG 174 (H) 11/06/2019     Lab Results   Component Value Date    HDL 35 (L) 05/16/2025    HDL 43 06/16/2021    HDL 36 11/06/2019     No components found for: \"LDLCALC\"  Lab Results   Component Value Date    LABA1C 6.4 (H) 05/16/2025       IMAGING:  XR HAND RIGHT (MIN 3 VIEWS)  Result Date: 5/6/2025  EXAMINATION: THREE XRAY VIEWS OF THE RIGHT HAND 5/6/2025 10:48 am COMPARISON: X-ray right hand 09/17/2024 HISTORY: ORDERING SYSTEM PROVIDED HISTORY: Skin ulcer with fat layer exposed ([facility]) TECHNOLOGIST PROVIDED HISTORY: Reason for exam:->wound r/o osteo FINDINGS: Interval erosive osseous destruction of the 1st distal phalanx with overlying soft tissue wound.  Status post amputation at the 2nd proximal phalanx.  Mild scattered degenerative change.     Interval erosive osseous destruction of the 1st distal phalanx with overlying soft tissue wound, compatible with acute osteomyelitis.         HOSPITAL COURSE:   Clark was admitted due to osteomyelitis of the right thumb necessitating orthopedic surgery.  Patient underwent amputation of the right thumb with local flap advancement on May 16 and cultures indicated Staphylococcus epidermidis and a moderate growth.  Patient is transition from empiric IV to culture-guided antibiotic therapy as per the infectious disease team.  His Plavix was held for the procedure and was resumed once clinically appropriate

## 2025-05-20 ENCOUNTER — TELEPHONE (OUTPATIENT)
Dept: FAMILY MEDICINE CLINIC | Age: 75
End: 2025-05-20

## 2025-05-20 ENCOUNTER — HOSPITAL ENCOUNTER (OUTPATIENT)
Dept: INFUSION THERAPY | Age: 75
Setting detail: INFUSION SERIES
Discharge: HOME | End: 2025-05-20
Payer: MEDICARE

## 2025-05-20 ENCOUNTER — CARE COORDINATION (OUTPATIENT)
Dept: CARE COORDINATION | Age: 75
End: 2025-05-20

## 2025-05-20 VITALS — HEART RATE: 76 BPM | DIASTOLIC BLOOD PRESSURE: 68 MMHG | TEMPERATURE: 98 F | SYSTOLIC BLOOD PRESSURE: 150 MMHG

## 2025-05-20 DIAGNOSIS — M86.00 ACUTE HEMATOGENOUS OSTEOMYELITIS, UNSPECIFIED SITE (HCC): Primary | ICD-10-CM

## 2025-05-20 DIAGNOSIS — M86.9 OSTEOMYELITIS, UNSPECIFIED SITE, UNSPECIFIED TYPE (HCC): Primary | ICD-10-CM

## 2025-05-20 PROCEDURE — 96374 THER/PROPH/DIAG INJ IV PUSH: CPT

## 2025-05-20 PROCEDURE — 6360000002 HC RX W HCPCS: Performed by: CLINICAL NURSE SPECIALIST

## 2025-05-20 PROCEDURE — 2500000003 HC RX 250 WO HCPCS: Performed by: CLINICAL NURSE SPECIALIST

## 2025-05-20 PROCEDURE — 2580000003 HC RX 258: Performed by: CLINICAL NURSE SPECIALIST

## 2025-05-20 PROCEDURE — 1111F DSCHRG MED/CURRENT MED MERGE: CPT | Performed by: FAMILY MEDICINE

## 2025-05-20 RX ORDER — SODIUM CHLORIDE 0.9 % (FLUSH) 0.9 %
5-40 SYRINGE (ML) INJECTION PRN
Status: CANCELLED | OUTPATIENT
Start: 2025-05-21

## 2025-05-20 RX ORDER — SODIUM CHLORIDE 0.9 % (FLUSH) 0.9 %
5-40 SYRINGE (ML) INJECTION PRN
Status: DISCONTINUED | OUTPATIENT
Start: 2025-05-20 | End: 2025-05-21 | Stop reason: HOSPADM

## 2025-05-20 RX ORDER — HEPARIN 100 UNIT/ML
500 SYRINGE INTRAVENOUS PRN
Status: CANCELLED | OUTPATIENT
Start: 2025-05-21

## 2025-05-20 RX ORDER — SODIUM CHLORIDE 9 MG/ML
5-250 INJECTION, SOLUTION INTRAVENOUS PRN
Status: CANCELLED | OUTPATIENT
Start: 2025-05-21

## 2025-05-20 RX ORDER — HEPARIN 100 UNIT/ML
500 SYRINGE INTRAVENOUS PRN
Status: DISCONTINUED | OUTPATIENT
Start: 2025-05-20 | End: 2025-05-21 | Stop reason: HOSPADM

## 2025-05-20 RX ADMIN — Medication 10 ML: at 11:52

## 2025-05-20 RX ADMIN — SODIUM CHLORIDE 500 MG: 9 INJECTION INTRAMUSCULAR; INTRAVENOUS; SUBCUTANEOUS at 11:51

## 2025-05-20 RX ADMIN — Medication 10 ML: at 11:57

## 2025-05-20 RX ADMIN — HEPARIN 500 UNITS: 100 SYRINGE at 11:57

## 2025-05-20 NOTE — TELEPHONE ENCOUNTER
Care Transitions Initial Follow Up Call    Outreach made within 2 business days of discharge: Yes    Patient: Clark ZURITA Postlethwait Patient : 1950   MRN: [id number]  Reason for Admission:   Discharge Date: 25       Spoke with: Left voicemail message to call office     Appt scheduled for TCM on 2025 @ 8:45    Scheduled appointment with PCP within 7-14 days    Follow Up  Future Appointments   Date Time Provider Department Center   2025  8:45 AM Chance Jama DO [facility] ECC DEP   2025 12:00 PM [facility]IN[facility] ROOM 8 [facility] [place]   2025 12:00 PM [facility] ROOM 8 [facility] [place]   2025 12:00 PM [facility] ROOM 8 [facility] [place]   2025  8:00 AM [facility] ROOM 8 [facility] [place]   2025  8:00 AM [facility] ROOM 8 [facility] [place]   2025  8:00 AM [facility] ROOM 8 [facility] [place]   2025 12:00 PM [facility] ROOM 8 [facility] [place]   2025 12:00 PM [facility] ROOM 8 [facility] [place]   2025 12:00 PM [facility] ROOM 8 [facility] [place]   2025 12:00 PM [facility] ROOM 8 [facility] [place]   2025  8:00 AM [facility] ROOM 8 [facility] [place]   2025  8:00 AM [facility] ROOM 8 [facility] [place]   2025 12:00 PM [facility] ROOM 8 [facility] [place]       Fawn Quintana MA

## 2025-05-20 NOTE — CARE COORDINATION
Care Transitions Note    Initial Call - Call within 2 business days of discharge: Yes    Patient Current Location:  Home: [facility] [address]  [place] [address]    Care Transition Nurse contacted the patient by telephone to perform post hospital discharge assessment, verified name and  as identifiers.  Provided introduction to self, and explanation of the Care Transition Nurse role.    Patient: Clark ZURITA Postlethwait    Patient : 1950   MRN: [id number]    Reason for Admission: OM and s/p right thumb partial amputation  Discharge Date: 25  RURS: Readmission Risk Score: 10.4      Last Discharge Facility       Date Complaint Diagnosis Description Type Department Provider    5/15/25  Acute osteomyelitis (HCC) Admission (Discharged) [email or web address] 3 SURG Tremayne Hernández, DO            Was this an external facility discharge? No    Additional needs identified to be addressed with provider   No needs identified             Method of communication with provider: none.    Patients top risk factors for readmission: lack of knowledge about disease, level of motivation, medical condition-CRD, COPD, Asthma, CHF, polypharmacy, and utilization of services    Interventions to address risk factors:   Education: Discussed s/s of infection and knowing when to seek medical attention  Confirmed with patient he is established [facility] Dapt[facility]     Care Summary Note: Spoke with patient today 25 for initial CLAUDIA/hospital discharge follow up. Confirmed patient was admitted to [facility] and underwent a s/p right thumb partial amputation d/t OM. Patient states he is doing well and advises having post op pain to right hand/thumb which he rates 3/10 in severity. Denies taking any pain medication. States dressing is dry and is attending  [facility] for daily IV Daptomycin therapy. States no pain, redness or swelling to LUE PICC. Denies any worsening shortness of breath (COPD/Asthma/Chronic smoker).

## 2025-05-21 ENCOUNTER — HOSPITAL ENCOUNTER (OUTPATIENT)
Dept: INFUSION THERAPY | Age: 75
Setting detail: INFUSION SERIES
Discharge: HOME | End: 2025-05-21
Payer: MEDICARE

## 2025-05-21 ENCOUNTER — OFFICE VISIT (OUTPATIENT)
Dept: FAMILY MEDICINE CLINIC | Age: 75
End: 2025-05-21
Payer: MEDICARE

## 2025-05-21 VITALS
RESPIRATION RATE: 18 BRPM | TEMPERATURE: 98.7 F | OXYGEN SATURATION: 93 % | HEART RATE: 74 BPM | DIASTOLIC BLOOD PRESSURE: 76 MMHG | SYSTOLIC BLOOD PRESSURE: 144 MMHG

## 2025-05-21 VITALS
TEMPERATURE: 97.3 F | WEIGHT: 206 LBS | RESPIRATION RATE: 16 BRPM | BODY MASS INDEX: 32.33 KG/M2 | OXYGEN SATURATION: 93 % | HEIGHT: 67 IN | HEART RATE: 73 BPM | DIASTOLIC BLOOD PRESSURE: 72 MMHG | SYSTOLIC BLOOD PRESSURE: 134 MMHG

## 2025-05-21 DIAGNOSIS — I25.5 ISCHEMIC CARDIOMYOPATHY: Primary | ICD-10-CM

## 2025-05-21 DIAGNOSIS — E88.819 INSULIN RESISTANCE: ICD-10-CM

## 2025-05-21 DIAGNOSIS — E78.2 MIXED HYPERLIPIDEMIA: ICD-10-CM

## 2025-05-21 DIAGNOSIS — M86.00 ACUTE HEMATOGENOUS OSTEOMYELITIS, UNSPECIFIED SITE (HCC): Primary | ICD-10-CM

## 2025-05-21 DIAGNOSIS — M25.511 CHRONIC RIGHT SHOULDER PAIN: ICD-10-CM

## 2025-05-21 DIAGNOSIS — G89.29 CHRONIC RIGHT SHOULDER PAIN: ICD-10-CM

## 2025-05-21 DIAGNOSIS — E08.40 DIABETES MELLITUS DUE TO UNDERLYING CONDITION WITH DIABETIC NEUROPATHY, WITHOUT LONG-TERM CURRENT USE OF INSULIN (HCC): ICD-10-CM

## 2025-05-21 PROCEDURE — 2500000003 HC RX 250 WO HCPCS: Performed by: CLINICAL NURSE SPECIALIST

## 2025-05-21 PROCEDURE — 2580000003 HC RX 258: Performed by: CLINICAL NURSE SPECIALIST

## 2025-05-21 PROCEDURE — 1123F ACP DISCUSS/DSCN MKR DOCD: CPT | Performed by: FAMILY MEDICINE

## 2025-05-21 PROCEDURE — 96374 THER/PROPH/DIAG INJ IV PUSH: CPT

## 2025-05-21 PROCEDURE — 1159F MED LIST DOCD IN RCRD: CPT | Performed by: FAMILY MEDICINE

## 2025-05-21 PROCEDURE — 99214 OFFICE O/P EST MOD 30 MIN: CPT | Performed by: FAMILY MEDICINE

## 2025-05-21 PROCEDURE — 6360000002 HC RX W HCPCS: Performed by: CLINICAL NURSE SPECIALIST

## 2025-05-21 RX ORDER — HEPARIN 100 UNIT/ML
500 SYRINGE INTRAVENOUS PRN
Status: CANCELLED | OUTPATIENT
Start: 2025-05-22

## 2025-05-21 RX ORDER — HYDROCODONE BITARTRATE AND ACETAMINOPHEN 7.5; 325 MG/1; MG/1
1 TABLET ORAL EVERY 6 HOURS PRN
Qty: 90 TABLET | Refills: 0 | Status: SHIPPED | OUTPATIENT
Start: 2025-05-21 | End: 2025-06-20

## 2025-05-21 RX ORDER — SODIUM CHLORIDE 0.9 % (FLUSH) 0.9 %
5-40 SYRINGE (ML) INJECTION PRN
Status: CANCELLED | OUTPATIENT
Start: 2025-05-22

## 2025-05-21 RX ORDER — SODIUM CHLORIDE 9 MG/ML
5-250 INJECTION, SOLUTION INTRAVENOUS PRN
Status: CANCELLED | OUTPATIENT
Start: 2025-05-22

## 2025-05-21 RX ORDER — HEPARIN 100 UNIT/ML
500 SYRINGE INTRAVENOUS PRN
Status: DISCONTINUED | OUTPATIENT
Start: 2025-05-21 | End: 2025-05-22 | Stop reason: HOSPADM

## 2025-05-21 RX ORDER — SODIUM CHLORIDE 0.9 % (FLUSH) 0.9 %
5-40 SYRINGE (ML) INJECTION PRN
Status: DISCONTINUED | OUTPATIENT
Start: 2025-05-21 | End: 2025-05-22 | Stop reason: HOSPADM

## 2025-05-21 RX ADMIN — SODIUM CHLORIDE 500 MG: 9 INJECTION INTRAMUSCULAR; INTRAVENOUS; SUBCUTANEOUS at 11:44

## 2025-05-21 RX ADMIN — SODIUM CHLORIDE, PRESERVATIVE FREE 10 ML: 5 INJECTION INTRAVENOUS at 11:46

## 2025-05-21 RX ADMIN — HEPARIN 500 UNITS: 100 SYRINGE at 11:45

## 2025-05-21 RX ADMIN — SODIUM CHLORIDE, PRESERVATIVE FREE 10 ML: 5 INJECTION INTRAVENOUS at 11:45

## 2025-05-21 ASSESSMENT — ENCOUNTER SYMPTOMS
COUGH: 0
SHORTNESS OF BREATH: 0
WHEEZING: 0
DIARRHEA: 0
NAUSEA: 0
ABDOMINAL PAIN: 0
BLOOD IN STOOL: 0
CONSTIPATION: 0
VOMITING: 0

## 2025-05-21 ASSESSMENT — PATIENT HEALTH QUESTIONNAIRE - PHQ9
2. FEELING DOWN, DEPRESSED OR HOPELESS: NEARLY EVERY DAY
SUM OF ALL RESPONSES TO PHQ QUESTIONS 1-9: 12
SUM OF ALL RESPONSES TO PHQ QUESTIONS 1-9: 12
10. IF YOU CHECKED OFF ANY PROBLEMS, HOW DIFFICULT HAVE THESE PROBLEMS MADE IT FOR YOU TO DO YOUR WORK, TAKE CARE OF THINGS AT HOME, OR GET ALONG WITH OTHER PEOPLE: SOMEWHAT DIFFICULT
5. POOR APPETITE OR OVEREATING: NOT AT ALL
3. TROUBLE FALLING OR STAYING ASLEEP: NEARLY EVERY DAY
9. THOUGHTS THAT YOU WOULD BE BETTER OFF DEAD, OR OF HURTING YOURSELF: NOT AT ALL
8. MOVING OR SPEAKING SO SLOWLY THAT OTHER PEOPLE COULD HAVE NOTICED. OR THE OPPOSITE, BEING SO FIGETY OR RESTLESS THAT YOU HAVE BEEN MOVING AROUND A LOT MORE THAN USUAL: NOT AT ALL
SUM OF ALL RESPONSES TO PHQ QUESTIONS 1-9: 12
1. LITTLE INTEREST OR PLEASURE IN DOING THINGS: NEARLY EVERY DAY
4. FEELING TIRED OR HAVING LITTLE ENERGY: NEARLY EVERY DAY
6. FEELING BAD ABOUT YOURSELF - OR THAT YOU ARE A FAILURE OR HAVE LET YOURSELF OR YOUR FAMILY DOWN: NOT AT ALL
7. TROUBLE CONCENTRATING ON THINGS, SUCH AS READING THE NEWSPAPER OR WATCHING TELEVISION: NOT AT ALL
SUM OF ALL RESPONSES TO PHQ QUESTIONS 1-9: 12

## 2025-05-21 NOTE — PROGRESS NOTES
Clark Maza (:  1950) is a 74 y.o. male,Established patient, here for evaluation of the following chief complaint(s):  Follow-Up from Hospital (Right thumb surgery )    Controlled substances monitoring: no signs of potential drug abuse or diversion identified and OARRS report reviewed today- activity consistent with treatment plan.   Assessment & Plan   ASSESSMENT/PLAN:  Assessment & Plan  Chronic right shoulder pain   Chronic, at goal (stable), continue current treatment plan    Orders:    HYDROcodone-acetaminophen (NORCO) 7.5-325 MG per tablet; Take 1 tablet by mouth every 6 hours as needed for Pain for up to 30 days. Max Daily Amount: 4 tablets    Ischemic cardiomyopathy   Chronic, at goal (stable), continue current treatment plan    Orders:    Lipid Panel; Future    Insulin resistance   Chronic, at goal (stable), continue current treatment plan    Orders:    Hemoglobin A1C; Future    Mixed hyperlipidemia   Chronic, at goal (stable), continue current treatment plan    Orders:    Lipid Panel; Future    Diabetes mellitus due to underlying condition with diabetic neuropathy, without long-term current use of insulin (HCC)   Chronic, at goal (stable), continue current treatment plan    Orders:    Hemoglobin A1C; Future         No follow-ups on file.         Subjective   SUBJECTIVE/OBJECTIVE:  HPI    Review of Systems   Constitutional:  Negative for chills, diaphoresis and fever.   HENT:  Negative for ear discharge, ear pain, hearing loss, nosebleeds and tinnitus.    Respiratory:  Negative for cough, shortness of breath and wheezing.    Cardiovascular:  Negative for chest pain.   Gastrointestinal:  Negative for abdominal pain, blood in stool, constipation, diarrhea, nausea and vomiting.   Genitourinary:  Negative for dysuria, flank pain and hematuria.   Musculoskeletal:  Negative for myalgias.   Skin:  Negative for rash.   Neurological:  Negative for headaches.   Hematological:  Does not

## 2025-05-22 ENCOUNTER — TELEPHONE (OUTPATIENT)
Dept: ADMINISTRATIVE | Age: 75
End: 2025-05-22

## 2025-05-22 ENCOUNTER — HOSPITAL ENCOUNTER (OUTPATIENT)
Dept: INFUSION THERAPY | Age: 75
Setting detail: INFUSION SERIES
Discharge: HOME | End: 2025-05-22
Payer: MEDICARE

## 2025-05-22 VITALS
RESPIRATION RATE: 18 BRPM | TEMPERATURE: 98 F | OXYGEN SATURATION: 95 % | DIASTOLIC BLOOD PRESSURE: 76 MMHG | HEART RATE: 74 BPM | SYSTOLIC BLOOD PRESSURE: 154 MMHG

## 2025-05-22 DIAGNOSIS — M86.00 ACUTE HEMATOGENOUS OSTEOMYELITIS, UNSPECIFIED SITE (HCC): Primary | ICD-10-CM

## 2025-05-22 LAB
ALBUMIN SERPL-MCNC: 4.1 G/DL (ref 3.5–5.2)
ALP SERPL-CCNC: 51 U/L (ref 40–129)
ALT SERPL-CCNC: 11 U/L (ref 0–40)
ANION GAP SERPL CALCULATED.3IONS-SCNC: 12 MMOL/L (ref 7–16)
AST SERPL-CCNC: 18 U/L (ref 0–39)
BASOPHILS # BLD: 0.02 K/UL (ref 0–0.2)
BASOPHILS NFR BLD: 0 % (ref 0–2)
BILIRUB SERPL-MCNC: 0.3 MG/DL (ref 0–1.2)
BUN SERPL-MCNC: 25 MG/DL (ref 6–23)
CALCIUM SERPL-MCNC: 10 MG/DL (ref 8.6–10.2)
CHLORIDE SERPL-SCNC: 105 MMOL/L (ref 98–107)
CO2 SERPL-SCNC: 25 MMOL/L (ref 22–29)
CREAT SERPL-MCNC: 1.2 MG/DL (ref 0.7–1.2)
EOSINOPHIL # BLD: 0.16 K/UL (ref 0.05–0.5)
EOSINOPHILS RELATIVE PERCENT: 2 % (ref 0–6)
ERYTHROCYTE [DISTWIDTH] IN BLOOD BY AUTOMATED COUNT: 13.3 % (ref 11.5–15)
GFR, ESTIMATED: 63 ML/MIN/1.73M2
GLUCOSE SERPL-MCNC: 118 MG/DL (ref 74–99)
HCT VFR BLD AUTO: 47.6 % (ref 37–54)
HGB BLD-MCNC: 15 G/DL (ref 12.5–16.5)
IMM GRANULOCYTES # BLD AUTO: 0.03 K/UL (ref 0–0.58)
IMM GRANULOCYTES NFR BLD: 0 % (ref 0–5)
LYMPHOCYTES NFR BLD: 1.6 K/UL (ref 1.5–4)
LYMPHOCYTES RELATIVE PERCENT: 23 % (ref 20–42)
MCH RBC QN AUTO: 27.4 PG (ref 26–35)
MCHC RBC AUTO-ENTMCNC: 31.5 G/DL (ref 32–34.5)
MCV RBC AUTO: 87 FL (ref 80–99.9)
MICROORGANISM SPEC CULT: NORMAL
MICROORGANISM/AGENT SPEC: NORMAL
MONOCYTES NFR BLD: 0.56 K/UL (ref 0.1–0.95)
MONOCYTES NFR BLD: 8 % (ref 2–12)
NEUTROPHILS NFR BLD: 65 % (ref 43–80)
NEUTS SEG NFR BLD: 4.48 K/UL (ref 1.8–7.3)
PLATELET # BLD AUTO: 260 K/UL (ref 130–450)
PMV BLD AUTO: 10.9 FL (ref 7–12)
POTASSIUM SERPL-SCNC: 4.3 MMOL/L (ref 3.5–5)
PROT SERPL-MCNC: 6.8 G/DL (ref 6.4–8.3)
RBC # BLD AUTO: 5.47 M/UL (ref 3.8–5.8)
SERVICE CMNT-IMP: NORMAL
SODIUM SERPL-SCNC: 142 MMOL/L (ref 132–146)
SPECIMEN DESCRIPTION: NORMAL
WBC OTHER # BLD: 6.9 K/UL (ref 4.5–11.5)

## 2025-05-22 PROCEDURE — 85025 COMPLETE CBC W/AUTO DIFF WBC: CPT

## 2025-05-22 PROCEDURE — 2580000003 HC RX 258: Performed by: CLINICAL NURSE SPECIALIST

## 2025-05-22 PROCEDURE — 2500000003 HC RX 250 WO HCPCS: Performed by: CLINICAL NURSE SPECIALIST

## 2025-05-22 PROCEDURE — 6360000002 HC RX W HCPCS: Performed by: CLINICAL NURSE SPECIALIST

## 2025-05-22 PROCEDURE — 80053 COMPREHEN METABOLIC PANEL: CPT

## 2025-05-22 PROCEDURE — 96374 THER/PROPH/DIAG INJ IV PUSH: CPT

## 2025-05-22 PROCEDURE — 36592 COLLECT BLOOD FROM PICC: CPT

## 2025-05-22 RX ORDER — SODIUM CHLORIDE 0.9 % (FLUSH) 0.9 %
5-40 SYRINGE (ML) INJECTION PRN
Status: CANCELLED | OUTPATIENT
Start: 2025-05-23

## 2025-05-22 RX ORDER — SODIUM CHLORIDE 0.9 % (FLUSH) 0.9 %
5-40 SYRINGE (ML) INJECTION PRN
Status: DISCONTINUED | OUTPATIENT
Start: 2025-05-22 | End: 2025-05-23 | Stop reason: HOSPADM

## 2025-05-22 RX ORDER — HEPARIN 100 UNIT/ML
500 SYRINGE INTRAVENOUS PRN
Status: CANCELLED | OUTPATIENT
Start: 2025-05-23

## 2025-05-22 RX ORDER — SODIUM CHLORIDE 9 MG/ML
5-250 INJECTION, SOLUTION INTRAVENOUS PRN
Status: CANCELLED | OUTPATIENT
Start: 2025-05-23

## 2025-05-22 RX ORDER — HEPARIN 100 UNIT/ML
500 SYRINGE INTRAVENOUS PRN
Status: DISCONTINUED | OUTPATIENT
Start: 2025-05-22 | End: 2025-05-23 | Stop reason: HOSPADM

## 2025-05-22 RX ADMIN — SODIUM CHLORIDE, PRESERVATIVE FREE 10 ML: 5 INJECTION INTRAVENOUS at 13:02

## 2025-05-22 RX ADMIN — HEPARIN 500 UNITS: 100 SYRINGE at 13:08

## 2025-05-22 RX ADMIN — SODIUM CHLORIDE 500 MG: 9 INJECTION INTRAMUSCULAR; INTRAVENOUS; SUBCUTANEOUS at 13:02

## 2025-05-22 RX ADMIN — SODIUM CHLORIDE, PRESERVATIVE FREE 10 ML: 5 INJECTION INTRAVENOUS at 13:08

## 2025-05-22 NOTE — TELEPHONE ENCOUNTER
Patient is a former patient of Dr. Silva and chose Dr. Hernandez as his new Cardiologist. He was in the hospital for non-related Cardio issues and they took him off of Coreg and they wanted to know if that is normal. They also said that the SOB and fatigue has been getting worse over the last few months, and wanted to know if this is something to wait on or not.

## 2025-05-23 ENCOUNTER — HOSPITAL ENCOUNTER (OUTPATIENT)
Dept: INFUSION THERAPY | Age: 75
Setting detail: INFUSION SERIES
Discharge: HOME | End: 2025-05-23
Payer: MEDICARE

## 2025-05-23 VITALS
DIASTOLIC BLOOD PRESSURE: 73 MMHG | HEART RATE: 73 BPM | TEMPERATURE: 98 F | SYSTOLIC BLOOD PRESSURE: 145 MMHG | RESPIRATION RATE: 22 BRPM | OXYGEN SATURATION: 93 %

## 2025-05-23 DIAGNOSIS — M86.00 ACUTE HEMATOGENOUS OSTEOMYELITIS, UNSPECIFIED SITE (HCC): Primary | ICD-10-CM

## 2025-05-23 LAB — SURGICAL PATHOLOGY REPORT: NORMAL

## 2025-05-23 PROCEDURE — 2580000003 HC RX 258: Performed by: CLINICAL NURSE SPECIALIST

## 2025-05-23 PROCEDURE — 6360000002 HC RX W HCPCS: Performed by: CLINICAL NURSE SPECIALIST

## 2025-05-23 PROCEDURE — 96374 THER/PROPH/DIAG INJ IV PUSH: CPT

## 2025-05-23 PROCEDURE — 2500000003 HC RX 250 WO HCPCS: Performed by: CLINICAL NURSE SPECIALIST

## 2025-05-23 RX ORDER — SODIUM CHLORIDE 0.9 % (FLUSH) 0.9 %
5-40 SYRINGE (ML) INJECTION PRN
Status: DISCONTINUED | OUTPATIENT
Start: 2025-05-23 | End: 2025-05-24 | Stop reason: HOSPADM

## 2025-05-23 RX ORDER — SODIUM CHLORIDE 0.9 % (FLUSH) 0.9 %
5-40 SYRINGE (ML) INJECTION PRN
Status: CANCELLED | OUTPATIENT
Start: 2025-05-24

## 2025-05-23 RX ORDER — SODIUM CHLORIDE 9 MG/ML
5-250 INJECTION, SOLUTION INTRAVENOUS PRN
Status: CANCELLED | OUTPATIENT
Start: 2025-05-24

## 2025-05-23 RX ORDER — HEPARIN 100 UNIT/ML
500 SYRINGE INTRAVENOUS PRN
Status: CANCELLED | OUTPATIENT
Start: 2025-05-24

## 2025-05-23 RX ORDER — HEPARIN 100 UNIT/ML
500 SYRINGE INTRAVENOUS PRN
Status: DISCONTINUED | OUTPATIENT
Start: 2025-05-23 | End: 2025-05-24 | Stop reason: HOSPADM

## 2025-05-23 RX ADMIN — Medication 10 ML: at 09:23

## 2025-05-23 RX ADMIN — SODIUM CHLORIDE 500 MG: 9 INJECTION INTRAMUSCULAR; INTRAVENOUS; SUBCUTANEOUS at 09:18

## 2025-05-23 RX ADMIN — HEPARIN 500 UNITS: 100 SYRINGE at 09:24

## 2025-05-23 RX ADMIN — Medication 10 ML: at 09:18

## 2025-05-23 NOTE — TELEPHONE ENCOUNTER
Spoke to patient, requested I speak to daughter. I left message with daughter to call back and schedule appt.

## 2025-05-24 ENCOUNTER — HOSPITAL ENCOUNTER (OUTPATIENT)
Dept: INFUSION THERAPY | Age: 75
Setting detail: INFUSION SERIES
Discharge: HOME | End: 2025-05-24
Payer: MEDICARE

## 2025-05-24 DIAGNOSIS — M86.00 ACUTE HEMATOGENOUS OSTEOMYELITIS, UNSPECIFIED SITE (HCC): Primary | ICD-10-CM

## 2025-05-24 PROCEDURE — 6360000002 HC RX W HCPCS: Performed by: CLINICAL NURSE SPECIALIST

## 2025-05-24 PROCEDURE — 2580000003 HC RX 258: Performed by: CLINICAL NURSE SPECIALIST

## 2025-05-24 PROCEDURE — 96374 THER/PROPH/DIAG INJ IV PUSH: CPT

## 2025-05-24 PROCEDURE — 2500000003 HC RX 250 WO HCPCS: Performed by: CLINICAL NURSE SPECIALIST

## 2025-05-24 RX ORDER — SODIUM CHLORIDE 0.9 % (FLUSH) 0.9 %
5-40 SYRINGE (ML) INJECTION PRN
Status: DISCONTINUED | OUTPATIENT
Start: 2025-05-24 | End: 2025-05-25 | Stop reason: HOSPADM

## 2025-05-24 RX ORDER — SODIUM CHLORIDE 9 MG/ML
5-250 INJECTION, SOLUTION INTRAVENOUS PRN
Status: CANCELLED | OUTPATIENT
Start: 2025-05-25

## 2025-05-24 RX ORDER — HEPARIN 100 UNIT/ML
500 SYRINGE INTRAVENOUS PRN
Status: DISCONTINUED | OUTPATIENT
Start: 2025-05-24 | End: 2025-05-25 | Stop reason: HOSPADM

## 2025-05-24 RX ORDER — SODIUM CHLORIDE 0.9 % (FLUSH) 0.9 %
5-40 SYRINGE (ML) INJECTION PRN
Status: CANCELLED | OUTPATIENT
Start: 2025-05-25

## 2025-05-24 RX ORDER — HEPARIN 100 UNIT/ML
500 SYRINGE INTRAVENOUS PRN
Status: CANCELLED | OUTPATIENT
Start: 2025-05-25

## 2025-05-24 RX ADMIN — SODIUM CHLORIDE 500 MG: 9 INJECTION INTRAMUSCULAR; INTRAVENOUS; SUBCUTANEOUS at 09:54

## 2025-05-24 RX ADMIN — Medication 10 ML: at 09:53

## 2025-05-24 RX ADMIN — HEPARIN 500 UNITS: 100 SYRINGE at 09:57

## 2025-05-24 RX ADMIN — Medication 10 ML: at 09:57

## 2025-05-25 ENCOUNTER — HOSPITAL ENCOUNTER (OUTPATIENT)
Dept: INFUSION THERAPY | Age: 75
Setting detail: INFUSION SERIES
Discharge: HOME | End: 2025-05-25
Payer: MEDICARE

## 2025-05-25 DIAGNOSIS — M86.00 ACUTE HEMATOGENOUS OSTEOMYELITIS, UNSPECIFIED SITE (HCC): Primary | ICD-10-CM

## 2025-05-25 PROCEDURE — 96374 THER/PROPH/DIAG INJ IV PUSH: CPT

## 2025-05-25 PROCEDURE — 2580000003 HC RX 258: Performed by: CLINICAL NURSE SPECIALIST

## 2025-05-25 PROCEDURE — 2500000003 HC RX 250 WO HCPCS: Performed by: CLINICAL NURSE SPECIALIST

## 2025-05-25 PROCEDURE — 6360000002 HC RX W HCPCS: Performed by: CLINICAL NURSE SPECIALIST

## 2025-05-25 RX ORDER — HEPARIN 100 UNIT/ML
500 SYRINGE INTRAVENOUS PRN
Status: DISCONTINUED | OUTPATIENT
Start: 2025-05-25 | End: 2025-05-26 | Stop reason: HOSPADM

## 2025-05-25 RX ORDER — SODIUM CHLORIDE 9 MG/ML
5-250 INJECTION, SOLUTION INTRAVENOUS PRN
Status: CANCELLED | OUTPATIENT
Start: 2025-05-26

## 2025-05-25 RX ORDER — HEPARIN 100 UNIT/ML
500 SYRINGE INTRAVENOUS PRN
Status: CANCELLED | OUTPATIENT
Start: 2025-05-26

## 2025-05-25 RX ORDER — SODIUM CHLORIDE 0.9 % (FLUSH) 0.9 %
5-40 SYRINGE (ML) INJECTION PRN
Status: CANCELLED | OUTPATIENT
Start: 2025-05-26

## 2025-05-25 RX ORDER — SODIUM CHLORIDE 0.9 % (FLUSH) 0.9 %
5-40 SYRINGE (ML) INJECTION PRN
Status: DISCONTINUED | OUTPATIENT
Start: 2025-05-25 | End: 2025-05-26 | Stop reason: HOSPADM

## 2025-05-25 RX ADMIN — HEPARIN 500 UNITS: 100 SYRINGE at 09:59

## 2025-05-25 RX ADMIN — SODIUM CHLORIDE 500 MG: 9 INJECTION INTRAMUSCULAR; INTRAVENOUS; SUBCUTANEOUS at 09:55

## 2025-05-25 RX ADMIN — Medication 10 ML: at 09:55

## 2025-05-25 RX ADMIN — Medication 10 ML: at 09:59

## 2025-05-26 ENCOUNTER — HOSPITAL ENCOUNTER (OUTPATIENT)
Dept: INFUSION THERAPY | Age: 75
Setting detail: INFUSION SERIES
Discharge: HOME | End: 2025-05-26
Payer: MEDICARE

## 2025-05-26 VITALS
HEART RATE: 70 BPM | SYSTOLIC BLOOD PRESSURE: 150 MMHG | OXYGEN SATURATION: 96 % | DIASTOLIC BLOOD PRESSURE: 70 MMHG | TEMPERATURE: 98 F | RESPIRATION RATE: 24 BRPM

## 2025-05-26 DIAGNOSIS — M86.00 ACUTE HEMATOGENOUS OSTEOMYELITIS, UNSPECIFIED SITE (HCC): Primary | ICD-10-CM

## 2025-05-26 LAB
ALBUMIN SERPL-MCNC: 4 G/DL (ref 3.5–5.2)
ALP SERPL-CCNC: 58 U/L (ref 40–129)
ALT SERPL-CCNC: 17 U/L (ref 0–40)
ANION GAP SERPL CALCULATED.3IONS-SCNC: 11 MMOL/L (ref 7–16)
AST SERPL-CCNC: 24 U/L (ref 0–39)
BASOPHILS # BLD: 0.05 K/UL (ref 0–0.2)
BASOPHILS NFR BLD: 1 % (ref 0–2)
BILIRUB SERPL-MCNC: 0.4 MG/DL (ref 0–1.2)
BUN SERPL-MCNC: 22 MG/DL (ref 6–23)
CALCIUM SERPL-MCNC: 9.3 MG/DL (ref 8.6–10.2)
CHLORIDE SERPL-SCNC: 104 MMOL/L (ref 98–107)
CK SERPL-CCNC: 103 U/L (ref 20–200)
CO2 SERPL-SCNC: 28 MMOL/L (ref 22–29)
CREAT SERPL-MCNC: 1.2 MG/DL (ref 0.7–1.2)
CRP SERPL HS-MCNC: 25.7 MG/L (ref 0–5)
EOSINOPHIL # BLD: 0.25 K/UL (ref 0.05–0.5)
EOSINOPHILS RELATIVE PERCENT: 4 % (ref 0–6)
ERYTHROCYTE [DISTWIDTH] IN BLOOD BY AUTOMATED COUNT: 13.7 % (ref 11.5–15)
ERYTHROCYTE [SEDIMENTATION RATE] IN BLOOD BY WESTERGREN METHOD: 0 MM/HR (ref 0–15)
GFR, ESTIMATED: 63 ML/MIN/1.73M2
GLUCOSE SERPL-MCNC: 134 MG/DL (ref 74–99)
HCT VFR BLD AUTO: 48.5 % (ref 37–54)
HGB BLD-MCNC: 15.7 G/DL (ref 12.5–16.5)
IMM GRANULOCYTES # BLD AUTO: <0.03 K/UL (ref 0–0.58)
IMM GRANULOCYTES NFR BLD: 0 % (ref 0–5)
LYMPHOCYTES NFR BLD: 1.24 K/UL (ref 1.5–4)
LYMPHOCYTES RELATIVE PERCENT: 20 % (ref 20–42)
MCH RBC QN AUTO: 28 PG (ref 26–35)
MCHC RBC AUTO-ENTMCNC: 32.4 G/DL (ref 32–34.5)
MCV RBC AUTO: 86.5 FL (ref 80–99.9)
MONOCYTES NFR BLD: 0.45 K/UL (ref 0.1–0.95)
MONOCYTES NFR BLD: 7 % (ref 2–12)
NEUTROPHILS NFR BLD: 68 % (ref 43–80)
NEUTS SEG NFR BLD: 4.32 K/UL (ref 1.8–7.3)
PLATELET # BLD AUTO: 245 K/UL (ref 130–450)
PMV BLD AUTO: 11.1 FL (ref 7–12)
POTASSIUM SERPL-SCNC: 4 MMOL/L (ref 3.5–5)
PROT SERPL-MCNC: 7.1 G/DL (ref 6.4–8.3)
RBC # BLD AUTO: 5.61 M/UL (ref 3.8–5.8)
SODIUM SERPL-SCNC: 143 MMOL/L (ref 132–146)
WBC OTHER # BLD: 6.3 K/UL (ref 4.5–11.5)

## 2025-05-26 PROCEDURE — 2500000003 HC RX 250 WO HCPCS: Performed by: CLINICAL NURSE SPECIALIST

## 2025-05-26 PROCEDURE — 96374 THER/PROPH/DIAG INJ IV PUSH: CPT

## 2025-05-26 PROCEDURE — 86140 C-REACTIVE PROTEIN: CPT

## 2025-05-26 PROCEDURE — 36592 COLLECT BLOOD FROM PICC: CPT

## 2025-05-26 PROCEDURE — 85025 COMPLETE CBC W/AUTO DIFF WBC: CPT

## 2025-05-26 PROCEDURE — 2580000003 HC RX 258: Performed by: CLINICAL NURSE SPECIALIST

## 2025-05-26 PROCEDURE — 80053 COMPREHEN METABOLIC PANEL: CPT

## 2025-05-26 PROCEDURE — 85652 RBC SED RATE AUTOMATED: CPT

## 2025-05-26 PROCEDURE — 6360000002 HC RX W HCPCS: Performed by: CLINICAL NURSE SPECIALIST

## 2025-05-26 PROCEDURE — 82550 ASSAY OF CK (CPK): CPT

## 2025-05-26 PROCEDURE — 99211 OFF/OP EST MAY X REQ PHY/QHP: CPT

## 2025-05-26 RX ORDER — HEPARIN 100 UNIT/ML
500 SYRINGE INTRAVENOUS PRN
Status: CANCELLED | OUTPATIENT
Start: 2025-05-27

## 2025-05-26 RX ORDER — SODIUM CHLORIDE 0.9 % (FLUSH) 0.9 %
5-40 SYRINGE (ML) INJECTION PRN
Status: DISCONTINUED | OUTPATIENT
Start: 2025-05-26 | End: 2025-05-27 | Stop reason: HOSPADM

## 2025-05-26 RX ORDER — SODIUM CHLORIDE 9 MG/ML
5-250 INJECTION, SOLUTION INTRAVENOUS PRN
Status: CANCELLED | OUTPATIENT
Start: 2025-05-27

## 2025-05-26 RX ORDER — SODIUM CHLORIDE 0.9 % (FLUSH) 0.9 %
5-40 SYRINGE (ML) INJECTION PRN
Status: CANCELLED | OUTPATIENT
Start: 2025-05-27

## 2025-05-26 RX ORDER — HEPARIN 100 UNIT/ML
500 SYRINGE INTRAVENOUS PRN
Status: DISCONTINUED | OUTPATIENT
Start: 2025-05-26 | End: 2025-05-27 | Stop reason: HOSPADM

## 2025-05-26 RX ADMIN — HEPARIN 500 UNITS: 100 SYRINGE at 09:48

## 2025-05-26 RX ADMIN — SODIUM CHLORIDE, PRESERVATIVE FREE 10 ML: 5 INJECTION INTRAVENOUS at 09:48

## 2025-05-26 RX ADMIN — SODIUM CHLORIDE 500 MG: 9 INJECTION INTRAMUSCULAR; INTRAVENOUS; SUBCUTANEOUS at 09:38

## 2025-05-26 RX ADMIN — SODIUM CHLORIDE, PRESERVATIVE FREE 10 ML: 5 INJECTION INTRAVENOUS at 09:37

## 2025-05-27 ENCOUNTER — HOSPITAL ENCOUNTER (OUTPATIENT)
Dept: INFUSION THERAPY | Age: 75
Setting detail: INFUSION SERIES
Discharge: HOME | End: 2025-05-27
Payer: MEDICARE

## 2025-05-27 VITALS — SYSTOLIC BLOOD PRESSURE: 130 MMHG | DIASTOLIC BLOOD PRESSURE: 70 MMHG | OXYGEN SATURATION: 99 %

## 2025-05-27 DIAGNOSIS — M86.00 ACUTE HEMATOGENOUS OSTEOMYELITIS, UNSPECIFIED SITE (HCC): Primary | ICD-10-CM

## 2025-05-27 PROCEDURE — 2580000003 HC RX 258: Performed by: CLINICAL NURSE SPECIALIST

## 2025-05-27 PROCEDURE — 96374 THER/PROPH/DIAG INJ IV PUSH: CPT

## 2025-05-27 PROCEDURE — 6360000002 HC RX W HCPCS: Performed by: CLINICAL NURSE SPECIALIST

## 2025-05-27 PROCEDURE — 2500000003 HC RX 250 WO HCPCS: Performed by: CLINICAL NURSE SPECIALIST

## 2025-05-27 RX ORDER — HEPARIN 100 UNIT/ML
500 SYRINGE INTRAVENOUS PRN
Status: CANCELLED | OUTPATIENT
Start: 2025-05-28

## 2025-05-27 RX ORDER — SODIUM CHLORIDE 0.9 % (FLUSH) 0.9 %
5-40 SYRINGE (ML) INJECTION PRN
Status: CANCELLED | OUTPATIENT
Start: 2025-05-28

## 2025-05-27 RX ORDER — HEPARIN 100 UNIT/ML
500 SYRINGE INTRAVENOUS PRN
Status: DISCONTINUED | OUTPATIENT
Start: 2025-05-27 | End: 2025-05-28 | Stop reason: HOSPADM

## 2025-05-27 RX ORDER — SODIUM CHLORIDE 0.9 % (FLUSH) 0.9 %
5-40 SYRINGE (ML) INJECTION PRN
Status: DISCONTINUED | OUTPATIENT
Start: 2025-05-27 | End: 2025-05-28 | Stop reason: HOSPADM

## 2025-05-27 RX ORDER — SODIUM CHLORIDE 9 MG/ML
5-250 INJECTION, SOLUTION INTRAVENOUS PRN
Status: CANCELLED | OUTPATIENT
Start: 2025-05-28

## 2025-05-27 RX ADMIN — Medication 10 ML: at 09:40

## 2025-05-27 RX ADMIN — HEPARIN 500 UNITS: 100 SYRINGE at 09:49

## 2025-05-27 RX ADMIN — Medication 10 ML: at 09:49

## 2025-05-27 RX ADMIN — SODIUM CHLORIDE 500 MG: 9 INJECTION INTRAMUSCULAR; INTRAVENOUS; SUBCUTANEOUS at 09:41

## 2025-05-28 ENCOUNTER — HOSPITAL ENCOUNTER (OUTPATIENT)
Dept: INFUSION THERAPY | Age: 75
Setting detail: INFUSION SERIES
Discharge: HOME | End: 2025-05-28
Payer: MEDICARE

## 2025-05-28 VITALS — DIASTOLIC BLOOD PRESSURE: 59 MMHG | RESPIRATION RATE: 20 BRPM | SYSTOLIC BLOOD PRESSURE: 120 MMHG | TEMPERATURE: 98.5 F

## 2025-05-28 DIAGNOSIS — M86.00 ACUTE HEMATOGENOUS OSTEOMYELITIS, UNSPECIFIED SITE (HCC): Primary | ICD-10-CM

## 2025-05-28 PROCEDURE — 2580000003 HC RX 258: Performed by: CLINICAL NURSE SPECIALIST

## 2025-05-28 PROCEDURE — 6360000002 HC RX W HCPCS: Performed by: CLINICAL NURSE SPECIALIST

## 2025-05-28 PROCEDURE — 2500000003 HC RX 250 WO HCPCS: Performed by: CLINICAL NURSE SPECIALIST

## 2025-05-28 PROCEDURE — 96374 THER/PROPH/DIAG INJ IV PUSH: CPT

## 2025-05-28 RX ORDER — SODIUM CHLORIDE 0.9 % (FLUSH) 0.9 %
5-40 SYRINGE (ML) INJECTION PRN
Status: DISCONTINUED | OUTPATIENT
Start: 2025-05-28 | End: 2025-05-29 | Stop reason: HOSPADM

## 2025-05-28 RX ORDER — SODIUM CHLORIDE 0.9 % (FLUSH) 0.9 %
5-40 SYRINGE (ML) INJECTION PRN
Status: CANCELLED | OUTPATIENT
Start: 2025-05-29

## 2025-05-28 RX ORDER — SODIUM CHLORIDE 9 MG/ML
5-250 INJECTION, SOLUTION INTRAVENOUS PRN
Status: CANCELLED | OUTPATIENT
Start: 2025-05-29

## 2025-05-28 RX ORDER — HEPARIN 100 UNIT/ML
500 SYRINGE INTRAVENOUS PRN
Status: DISCONTINUED | OUTPATIENT
Start: 2025-05-28 | End: 2025-05-29 | Stop reason: HOSPADM

## 2025-05-28 RX ORDER — HEPARIN 100 UNIT/ML
500 SYRINGE INTRAVENOUS PRN
Status: CANCELLED | OUTPATIENT
Start: 2025-05-29

## 2025-05-28 RX ADMIN — HEPARIN 500 UNITS: 100 SYRINGE at 12:06

## 2025-05-28 RX ADMIN — Medication 20 ML: at 12:06

## 2025-05-28 RX ADMIN — SODIUM CHLORIDE 500 MG: 9 INJECTION INTRAMUSCULAR; INTRAVENOUS; SUBCUTANEOUS at 12:04

## 2025-05-29 ENCOUNTER — HOSPITAL ENCOUNTER (OUTPATIENT)
Dept: INFUSION THERAPY | Age: 75
Setting detail: INFUSION SERIES
Discharge: HOME | End: 2025-05-29
Payer: MEDICARE

## 2025-05-29 VITALS
HEART RATE: 68 BPM | SYSTOLIC BLOOD PRESSURE: 147 MMHG | OXYGEN SATURATION: 93 % | RESPIRATION RATE: 20 BRPM | TEMPERATURE: 98.1 F | DIASTOLIC BLOOD PRESSURE: 75 MMHG

## 2025-05-29 DIAGNOSIS — M86.00 ACUTE HEMATOGENOUS OSTEOMYELITIS, UNSPECIFIED SITE (HCC): Primary | ICD-10-CM

## 2025-05-29 LAB
ALBUMIN SERPL-MCNC: 4.2 G/DL (ref 3.5–5.2)
ALP SERPL-CCNC: 54 U/L (ref 40–129)
ALT SERPL-CCNC: 19 U/L (ref 0–40)
ANION GAP SERPL CALCULATED.3IONS-SCNC: 14 MMOL/L (ref 7–16)
AST SERPL-CCNC: 22 U/L (ref 0–39)
BASOPHILS # BLD: 0.06 K/UL (ref 0–0.2)
BASOPHILS NFR BLD: 1 % (ref 0–2)
BILIRUB SERPL-MCNC: 0.4 MG/DL (ref 0–1.2)
BUN SERPL-MCNC: 23 MG/DL (ref 6–23)
CALCIUM SERPL-MCNC: 9.8 MG/DL (ref 8.6–10.2)
CHLORIDE SERPL-SCNC: 103 MMOL/L (ref 98–107)
CO2 SERPL-SCNC: 24 MMOL/L (ref 22–29)
CREAT SERPL-MCNC: 1.3 MG/DL (ref 0.7–1.2)
EOSINOPHIL # BLD: 0.21 K/UL (ref 0.05–0.5)
EOSINOPHILS RELATIVE PERCENT: 3 % (ref 0–6)
ERYTHROCYTE [DISTWIDTH] IN BLOOD BY AUTOMATED COUNT: 13.9 % (ref 11.5–15)
GFR, ESTIMATED: 60 ML/MIN/1.73M2
GLUCOSE SERPL-MCNC: 101 MG/DL (ref 74–99)
HCT VFR BLD AUTO: 46 % (ref 37–54)
HGB BLD-MCNC: 14.9 G/DL (ref 12.5–16.5)
IMM GRANULOCYTES # BLD AUTO: 0.05 K/UL (ref 0–0.58)
IMM GRANULOCYTES NFR BLD: 1 % (ref 0–5)
LYMPHOCYTES NFR BLD: 1.87 K/UL (ref 1.5–4)
LYMPHOCYTES RELATIVE PERCENT: 25 % (ref 20–42)
MCH RBC QN AUTO: 27.9 PG (ref 26–35)
MCHC RBC AUTO-ENTMCNC: 32.4 G/DL (ref 32–34.5)
MCV RBC AUTO: 86.1 FL (ref 80–99.9)
MONOCYTES NFR BLD: 0.7 K/UL (ref 0.1–0.95)
MONOCYTES NFR BLD: 9 % (ref 2–12)
NEUTROPHILS NFR BLD: 62 % (ref 43–80)
NEUTS SEG NFR BLD: 4.61 K/UL (ref 1.8–7.3)
PLATELET # BLD AUTO: 255 K/UL (ref 130–450)
PMV BLD AUTO: 11.3 FL (ref 7–12)
POTASSIUM SERPL-SCNC: 3.9 MMOL/L (ref 3.5–5)
PROT SERPL-MCNC: 6.9 G/DL (ref 6.4–8.3)
RBC # BLD AUTO: 5.34 M/UL (ref 3.8–5.8)
SODIUM SERPL-SCNC: 141 MMOL/L (ref 132–146)
WBC OTHER # BLD: 7.5 K/UL (ref 4.5–11.5)

## 2025-05-29 PROCEDURE — 2580000003 HC RX 258: Performed by: CLINICAL NURSE SPECIALIST

## 2025-05-29 PROCEDURE — 36592 COLLECT BLOOD FROM PICC: CPT

## 2025-05-29 PROCEDURE — 85025 COMPLETE CBC W/AUTO DIFF WBC: CPT

## 2025-05-29 PROCEDURE — 2500000003 HC RX 250 WO HCPCS: Performed by: CLINICAL NURSE SPECIALIST

## 2025-05-29 PROCEDURE — 80053 COMPREHEN METABOLIC PANEL: CPT

## 2025-05-29 PROCEDURE — 6360000002 HC RX W HCPCS: Performed by: CLINICAL NURSE SPECIALIST

## 2025-05-29 PROCEDURE — 96374 THER/PROPH/DIAG INJ IV PUSH: CPT

## 2025-05-29 RX ORDER — HEPARIN 100 UNIT/ML
500 SYRINGE INTRAVENOUS PRN
Status: CANCELLED | OUTPATIENT
Start: 2025-05-30

## 2025-05-29 RX ORDER — SODIUM CHLORIDE 9 MG/ML
5-250 INJECTION, SOLUTION INTRAVENOUS PRN
Status: CANCELLED | OUTPATIENT
Start: 2025-05-30

## 2025-05-29 RX ORDER — HEPARIN 100 UNIT/ML
500 SYRINGE INTRAVENOUS PRN
Status: DISCONTINUED | OUTPATIENT
Start: 2025-05-29 | End: 2025-05-30 | Stop reason: HOSPADM

## 2025-05-29 RX ORDER — SODIUM CHLORIDE 0.9 % (FLUSH) 0.9 %
5-40 SYRINGE (ML) INJECTION PRN
Status: CANCELLED | OUTPATIENT
Start: 2025-05-30

## 2025-05-29 RX ORDER — SODIUM CHLORIDE 0.9 % (FLUSH) 0.9 %
5-40 SYRINGE (ML) INJECTION PRN
Status: DISCONTINUED | OUTPATIENT
Start: 2025-05-29 | End: 2025-05-30 | Stop reason: HOSPADM

## 2025-05-29 RX ADMIN — SODIUM CHLORIDE, PRESERVATIVE FREE 10 ML: 5 INJECTION INTRAVENOUS at 12:56

## 2025-05-29 RX ADMIN — DAPTOMYCIN 500 MG: 500 INJECTION, POWDER, LYOPHILIZED, FOR SOLUTION INTRAVENOUS at 12:48

## 2025-05-29 RX ADMIN — SODIUM CHLORIDE, PRESERVATIVE FREE 10 ML: 5 INJECTION INTRAVENOUS at 12:55

## 2025-05-29 RX ADMIN — HEPARIN 500 UNITS: 100 SYRINGE at 12:56

## 2025-05-30 ENCOUNTER — HOSPITAL ENCOUNTER (OUTPATIENT)
Dept: INFUSION THERAPY | Age: 75
Setting detail: INFUSION SERIES
Discharge: HOME | End: 2025-05-30
Payer: MEDICARE

## 2025-05-30 ENCOUNTER — CARE COORDINATION (OUTPATIENT)
Dept: CARE COORDINATION | Age: 75
End: 2025-05-30

## 2025-05-30 VITALS
SYSTOLIC BLOOD PRESSURE: 136 MMHG | RESPIRATION RATE: 20 BRPM | DIASTOLIC BLOOD PRESSURE: 60 MMHG | OXYGEN SATURATION: 94 % | HEART RATE: 66 BPM | TEMPERATURE: 98 F

## 2025-05-30 DIAGNOSIS — M86.00 ACUTE HEMATOGENOUS OSTEOMYELITIS, UNSPECIFIED SITE (HCC): Primary | ICD-10-CM

## 2025-05-30 PROCEDURE — 2580000003 HC RX 258: Performed by: CLINICAL NURSE SPECIALIST

## 2025-05-30 PROCEDURE — 2500000003 HC RX 250 WO HCPCS: Performed by: CLINICAL NURSE SPECIALIST

## 2025-05-30 PROCEDURE — 96374 THER/PROPH/DIAG INJ IV PUSH: CPT

## 2025-05-30 PROCEDURE — 6360000002 HC RX W HCPCS: Performed by: CLINICAL NURSE SPECIALIST

## 2025-05-30 RX ORDER — HEPARIN 100 UNIT/ML
500 SYRINGE INTRAVENOUS PRN
Status: DISCONTINUED | OUTPATIENT
Start: 2025-05-30 | End: 2025-05-31 | Stop reason: HOSPADM

## 2025-05-30 RX ORDER — SODIUM CHLORIDE 0.9 % (FLUSH) 0.9 %
5-40 SYRINGE (ML) INJECTION PRN
Status: DISCONTINUED | OUTPATIENT
Start: 2025-05-30 | End: 2025-05-31 | Stop reason: HOSPADM

## 2025-05-30 RX ORDER — SODIUM CHLORIDE 9 MG/ML
5-250 INJECTION, SOLUTION INTRAVENOUS PRN
Status: CANCELLED | OUTPATIENT
Start: 2025-05-31

## 2025-05-30 RX ORDER — HEPARIN 100 UNIT/ML
500 SYRINGE INTRAVENOUS PRN
Status: CANCELLED | OUTPATIENT
Start: 2025-05-31

## 2025-05-30 RX ORDER — SODIUM CHLORIDE 0.9 % (FLUSH) 0.9 %
5-40 SYRINGE (ML) INJECTION PRN
Status: CANCELLED | OUTPATIENT
Start: 2025-05-31

## 2025-05-30 RX ADMIN — SODIUM CHLORIDE, PRESERVATIVE FREE 10 ML: 5 INJECTION INTRAVENOUS at 13:12

## 2025-05-30 RX ADMIN — SODIUM CHLORIDE, PRESERVATIVE FREE 10 ML: 5 INJECTION INTRAVENOUS at 13:14

## 2025-05-30 RX ADMIN — DAPTOMYCIN 500 MG: 500 INJECTION, POWDER, LYOPHILIZED, FOR SOLUTION INTRAVENOUS at 13:12

## 2025-05-30 RX ADMIN — HEPARIN 500 UNITS: 100 SYRINGE at 13:14

## 2025-05-30 NOTE — CARE COORDINATION
Care Transitions Note    Final Call     Patient Current Location:  Home: [address] [place] [address]    Care Transition Nurse contacted the patient by telephone. Verified name and  as identifiers.    Patient graduated from the Care Transitions program on 25.  Patient/family verbalizes confidence in the ability to self-manage at this time..      Advance Care Planning:   Does patient have an Advance Directive: reviewed and current.    Handoff:   Patient was not referred to the ACM team due to no additional needs identified.       Care Summary Note: Spoke with patient today 25 for final CLAUDIA/hospital discharge (low risk) follow up for OM and s/p right thumb partial amputation. Patient states he continues doing well and offers no complaints. States only having Band Aid covering right thumb which is dry and scheduled for f/u with Perry Hernández (ortho surg) on 25.     Patient states he is scheduled to finish IV ATB at  Infusion Center on 25. States no issues with LE PICC which is doing well. Denies any shortness of breath, chest pain chest discomfort, nausea, vomiting, diarrhea, chills or fever. Reiterated s/s of infection and knowing when to seek medical attention.     Confirmed patient completed HFU appt with PCP and he advises his DTR is scheduling f/u with Dr. Pappas (ID). Patient denies any needs or concerns and in agreement to final call. CTN signing off.     Assessments:  Care Transitions Subsequent and Final Call    Schedule Follow Up Appointment with PCP: Declined  Subsequent and Final Calls  Do you have any ongoing symptoms?: No  Have your medications changed?: No  Do you have any questions related to your medications?: No  Do you currently have any active services?: Yes  Are you currently active with any services?: Other  Identified Barriers: Lack of Education, Lack of Motivation  Care Transitions Interventions    Smoking Cessation: Declined    Other Interventions:              Upcoming

## 2025-05-31 ENCOUNTER — HOSPITAL ENCOUNTER (OUTPATIENT)
Dept: INFUSION THERAPY | Age: 75
Setting detail: INFUSION SERIES
Discharge: HOME | End: 2025-05-31
Payer: MEDICARE

## 2025-05-31 VITALS
OXYGEN SATURATION: 95 % | DIASTOLIC BLOOD PRESSURE: 61 MMHG | HEART RATE: 66 BPM | RESPIRATION RATE: 22 BRPM | TEMPERATURE: 98 F | SYSTOLIC BLOOD PRESSURE: 113 MMHG

## 2025-05-31 DIAGNOSIS — M86.00 ACUTE HEMATOGENOUS OSTEOMYELITIS, UNSPECIFIED SITE (HCC): Primary | ICD-10-CM

## 2025-05-31 PROCEDURE — 6360000002 HC RX W HCPCS: Performed by: CLINICAL NURSE SPECIALIST

## 2025-05-31 PROCEDURE — 2580000003 HC RX 258: Performed by: CLINICAL NURSE SPECIALIST

## 2025-05-31 PROCEDURE — 96374 THER/PROPH/DIAG INJ IV PUSH: CPT

## 2025-05-31 PROCEDURE — 2500000003 HC RX 250 WO HCPCS: Performed by: CLINICAL NURSE SPECIALIST

## 2025-05-31 RX ORDER — SODIUM CHLORIDE 9 MG/ML
5-250 INJECTION, SOLUTION INTRAVENOUS PRN
Status: CANCELLED | OUTPATIENT
Start: 2025-06-01

## 2025-05-31 RX ORDER — HEPARIN 100 UNIT/ML
500 SYRINGE INTRAVENOUS PRN
Status: CANCELLED | OUTPATIENT
Start: 2025-06-01

## 2025-05-31 RX ORDER — HEPARIN 100 UNIT/ML
500 SYRINGE INTRAVENOUS PRN
Status: DISCONTINUED | OUTPATIENT
Start: 2025-05-31 | End: 2025-06-01 | Stop reason: HOSPADM

## 2025-05-31 RX ORDER — SODIUM CHLORIDE 0.9 % (FLUSH) 0.9 %
5-40 SYRINGE (ML) INJECTION PRN
Status: CANCELLED | OUTPATIENT
Start: 2025-06-01

## 2025-05-31 RX ORDER — SODIUM CHLORIDE 0.9 % (FLUSH) 0.9 %
5-40 SYRINGE (ML) INJECTION PRN
Status: DISCONTINUED | OUTPATIENT
Start: 2025-05-31 | End: 2025-06-01 | Stop reason: HOSPADM

## 2025-05-31 RX ADMIN — DAPTOMYCIN 500 MG: 500 INJECTION, POWDER, LYOPHILIZED, FOR SOLUTION INTRAVENOUS at 09:56

## 2025-05-31 RX ADMIN — SODIUM CHLORIDE, PRESERVATIVE FREE 10 ML: 5 INJECTION INTRAVENOUS at 10:01

## 2025-05-31 RX ADMIN — SODIUM CHLORIDE, PRESERVATIVE FREE 10 ML: 5 INJECTION INTRAVENOUS at 09:55

## 2025-05-31 RX ADMIN — HEPARIN 500 UNITS: 100 SYRINGE at 10:01

## 2025-06-01 ENCOUNTER — HOSPITAL ENCOUNTER (OUTPATIENT)
Dept: INFUSION THERAPY | Age: 75
Setting detail: INFUSION SERIES
Discharge: HOME | End: 2025-06-01
Payer: MEDICARE

## 2025-06-01 VITALS
HEART RATE: 67 BPM | TEMPERATURE: 98 F | SYSTOLIC BLOOD PRESSURE: 118 MMHG | RESPIRATION RATE: 22 BRPM | OXYGEN SATURATION: 95 % | DIASTOLIC BLOOD PRESSURE: 78 MMHG

## 2025-06-01 DIAGNOSIS — M86.00 ACUTE HEMATOGENOUS OSTEOMYELITIS, UNSPECIFIED SITE (HCC): Primary | ICD-10-CM

## 2025-06-01 PROCEDURE — 6360000002 HC RX W HCPCS: Performed by: CLINICAL NURSE SPECIALIST

## 2025-06-01 PROCEDURE — 96374 THER/PROPH/DIAG INJ IV PUSH: CPT

## 2025-06-01 PROCEDURE — 2580000003 HC RX 258: Performed by: CLINICAL NURSE SPECIALIST

## 2025-06-01 PROCEDURE — 2500000003 HC RX 250 WO HCPCS: Performed by: CLINICAL NURSE SPECIALIST

## 2025-06-01 RX ORDER — SODIUM CHLORIDE 0.9 % (FLUSH) 0.9 %
5-40 SYRINGE (ML) INJECTION PRN
Status: CANCELLED | OUTPATIENT
Start: 2025-06-02

## 2025-06-01 RX ORDER — HEPARIN 100 UNIT/ML
500 SYRINGE INTRAVENOUS PRN
Status: DISCONTINUED | OUTPATIENT
Start: 2025-06-01 | End: 2025-06-02 | Stop reason: HOSPADM

## 2025-06-01 RX ORDER — SODIUM CHLORIDE 9 MG/ML
5-250 INJECTION, SOLUTION INTRAVENOUS PRN
Status: CANCELLED | OUTPATIENT
Start: 2025-06-02

## 2025-06-01 RX ORDER — SODIUM CHLORIDE 0.9 % (FLUSH) 0.9 %
5-40 SYRINGE (ML) INJECTION PRN
Status: DISCONTINUED | OUTPATIENT
Start: 2025-06-01 | End: 2025-06-02 | Stop reason: HOSPADM

## 2025-06-01 RX ORDER — HEPARIN 100 UNIT/ML
500 SYRINGE INTRAVENOUS PRN
Status: CANCELLED | OUTPATIENT
Start: 2025-06-02

## 2025-06-01 RX ADMIN — DAPTOMYCIN 500 MG: 500 INJECTION, POWDER, LYOPHILIZED, FOR SOLUTION INTRAVENOUS at 09:57

## 2025-06-01 RX ADMIN — SODIUM CHLORIDE, PRESERVATIVE FREE 10 ML: 5 INJECTION INTRAVENOUS at 09:57

## 2025-06-01 RX ADMIN — SODIUM CHLORIDE, PRESERVATIVE FREE 10 ML: 5 INJECTION INTRAVENOUS at 10:01

## 2025-06-01 RX ADMIN — HEPARIN 500 UNITS: 100 SYRINGE at 10:01

## 2025-06-02 ENCOUNTER — HOSPITAL ENCOUNTER (OUTPATIENT)
Dept: INFUSION THERAPY | Age: 75
Setting detail: INFUSION SERIES
Discharge: HOME | End: 2025-06-02
Payer: MEDICARE

## 2025-06-02 VITALS
HEART RATE: 63 BPM | DIASTOLIC BLOOD PRESSURE: 69 MMHG | RESPIRATION RATE: 18 BRPM | OXYGEN SATURATION: 91 % | SYSTOLIC BLOOD PRESSURE: 121 MMHG | TEMPERATURE: 97.8 F

## 2025-06-02 DIAGNOSIS — M86.00 ACUTE HEMATOGENOUS OSTEOMYELITIS, UNSPECIFIED SITE (HCC): Primary | ICD-10-CM

## 2025-06-02 LAB
ALBUMIN SERPL-MCNC: 3.9 G/DL (ref 3.5–5.2)
ALP SERPL-CCNC: 50 U/L (ref 40–129)
ALT SERPL-CCNC: 13 U/L (ref 0–40)
ANION GAP SERPL CALCULATED.3IONS-SCNC: 11 MMOL/L (ref 7–16)
AST SERPL-CCNC: 13 U/L (ref 0–39)
BASOPHILS # BLD: 0.02 K/UL (ref 0–0.2)
BASOPHILS NFR BLD: 0 % (ref 0–2)
BILIRUB SERPL-MCNC: 0.3 MG/DL (ref 0–1.2)
BUN SERPL-MCNC: 22 MG/DL (ref 6–23)
CALCIUM SERPL-MCNC: 9.5 MG/DL (ref 8.6–10.2)
CHLORIDE SERPL-SCNC: 106 MMOL/L (ref 98–107)
CK SERPL-CCNC: 72 U/L (ref 20–200)
CO2 SERPL-SCNC: 26 MMOL/L (ref 22–29)
CREAT SERPL-MCNC: 1.2 MG/DL (ref 0.7–1.2)
CRP SERPL HS-MCNC: 9.8 MG/L (ref 0–5)
EOSINOPHIL # BLD: 0.1 K/UL (ref 0.05–0.5)
EOSINOPHILS RELATIVE PERCENT: 2 % (ref 0–6)
ERYTHROCYTE [DISTWIDTH] IN BLOOD BY AUTOMATED COUNT: 13.9 % (ref 11.5–15)
ERYTHROCYTE [SEDIMENTATION RATE] IN BLOOD BY WESTERGREN METHOD: 8 MM/HR (ref 0–15)
GFR, ESTIMATED: 65 ML/MIN/1.73M2
GLUCOSE SERPL-MCNC: 161 MG/DL (ref 74–99)
HCT VFR BLD AUTO: 45.9 % (ref 37–54)
HGB BLD-MCNC: 14.8 G/DL (ref 12.5–16.5)
IMM GRANULOCYTES # BLD AUTO: 0.03 K/UL (ref 0–0.58)
LYMPHOCYTES NFR BLD: 1.27 K/UL (ref 1.5–4)
LYMPHOCYTES RELATIVE PERCENT: 19 % (ref 20–42)
MCH RBC QN AUTO: 28.2 PG (ref 26–35)
MCHC RBC AUTO-ENTMCNC: 32.2 G/DL (ref 32–34.5)
MCV RBC AUTO: 87.6 FL (ref 80–99.9)
MONOCYTES NFR BLD: 0.36 K/UL (ref 0.1–0.95)
MONOCYTES NFR BLD: 6 % (ref 2–12)
NEUTROPHILS NFR BLD: 73 % (ref 43–80)
NEUTS SEG NFR BLD: 4.82 K/UL (ref 1.8–7.3)
PLATELET # BLD AUTO: 245 K/UL (ref 130–450)
PMV BLD AUTO: 11.6 FL (ref 7–12)
POTASSIUM SERPL-SCNC: 3.7 MMOL/L (ref 3.5–5)
PROT SERPL-MCNC: 6.5 G/DL (ref 6.4–8.3)
RBC # BLD AUTO: 5.24 M/UL (ref 3.8–5.8)
SODIUM SERPL-SCNC: 143 MMOL/L (ref 132–146)
WBC OTHER # BLD: 6.6 K/UL (ref 4.5–11.5)

## 2025-06-02 PROCEDURE — 96374 THER/PROPH/DIAG INJ IV PUSH: CPT

## 2025-06-02 PROCEDURE — 80053 COMPREHEN METABOLIC PANEL: CPT

## 2025-06-02 PROCEDURE — 82550 ASSAY OF CK (CPK): CPT

## 2025-06-02 PROCEDURE — 85025 COMPLETE CBC W/AUTO DIFF WBC: CPT

## 2025-06-02 PROCEDURE — 6360000002 HC RX W HCPCS: Performed by: CLINICAL NURSE SPECIALIST

## 2025-06-02 PROCEDURE — 2580000003 HC RX 258: Performed by: CLINICAL NURSE SPECIALIST

## 2025-06-02 PROCEDURE — 86140 C-REACTIVE PROTEIN: CPT

## 2025-06-02 PROCEDURE — 36592 COLLECT BLOOD FROM PICC: CPT

## 2025-06-02 PROCEDURE — 85652 RBC SED RATE AUTOMATED: CPT

## 2025-06-02 RX ORDER — HEPARIN 100 UNIT/ML
500 SYRINGE INTRAVENOUS PRN
Status: DISCONTINUED | OUTPATIENT
Start: 2025-06-02 | End: 2025-06-03 | Stop reason: HOSPADM

## 2025-06-02 RX ORDER — SODIUM CHLORIDE 0.9 % (FLUSH) 0.9 %
5-40 SYRINGE (ML) INJECTION PRN
Status: DISCONTINUED | OUTPATIENT
Start: 2025-06-02 | End: 2025-06-03 | Stop reason: HOSPADM

## 2025-06-02 RX ORDER — SODIUM CHLORIDE 9 MG/ML
5-250 INJECTION, SOLUTION INTRAVENOUS PRN
Status: CANCELLED | OUTPATIENT
Start: 2025-06-08

## 2025-06-02 RX ORDER — HEPARIN 100 UNIT/ML
500 SYRINGE INTRAVENOUS PRN
Status: CANCELLED | OUTPATIENT
Start: 2025-06-08

## 2025-06-02 RX ORDER — SODIUM CHLORIDE 0.9 % (FLUSH) 0.9 %
5-40 SYRINGE (ML) INJECTION PRN
Status: CANCELLED | OUTPATIENT
Start: 2025-06-08

## 2025-06-02 RX ADMIN — DAPTOMYCIN 500 MG: 500 INJECTION, POWDER, LYOPHILIZED, FOR SOLUTION INTRAVENOUS at 12:49

## 2025-06-03 ENCOUNTER — HOSPITAL ENCOUNTER (OUTPATIENT)
Dept: INFUSION THERAPY | Age: 75
Setting detail: INFUSION SERIES
Discharge: HOME | End: 2025-06-03
Payer: MEDICARE

## 2025-06-03 VITALS
SYSTOLIC BLOOD PRESSURE: 150 MMHG | DIASTOLIC BLOOD PRESSURE: 80 MMHG | RESPIRATION RATE: 18 BRPM | HEART RATE: 66 BPM | OXYGEN SATURATION: 92 % | TEMPERATURE: 98 F

## 2025-06-03 DIAGNOSIS — M86.00 ACUTE HEMATOGENOUS OSTEOMYELITIS, UNSPECIFIED SITE (HCC): Primary | ICD-10-CM

## 2025-06-03 PROCEDURE — 2500000003 HC RX 250 WO HCPCS: Performed by: CLINICAL NURSE SPECIALIST

## 2025-06-03 PROCEDURE — 96375 TX/PRO/DX INJ NEW DRUG ADDON: CPT

## 2025-06-03 PROCEDURE — 6360000002 HC RX W HCPCS: Performed by: CLINICAL NURSE SPECIALIST

## 2025-06-03 PROCEDURE — 2580000003 HC RX 258: Performed by: SPECIALIST

## 2025-06-03 PROCEDURE — 96374 THER/PROPH/DIAG INJ IV PUSH: CPT

## 2025-06-03 PROCEDURE — 6360000002 HC RX W HCPCS: Performed by: SPECIALIST

## 2025-06-03 RX ORDER — HEPARIN 100 UNIT/ML
500 SYRINGE INTRAVENOUS PRN
Status: CANCELLED | OUTPATIENT
Start: 2025-06-04

## 2025-06-03 RX ORDER — SODIUM CHLORIDE 0.9 % (FLUSH) 0.9 %
5-40 SYRINGE (ML) INJECTION PRN
Status: DISCONTINUED | OUTPATIENT
Start: 2025-06-03 | End: 2025-06-04 | Stop reason: HOSPADM

## 2025-06-03 RX ORDER — SODIUM CHLORIDE 0.9 % (FLUSH) 0.9 %
5-40 SYRINGE (ML) INJECTION PRN
Status: CANCELLED | OUTPATIENT
Start: 2025-06-04

## 2025-06-03 RX ORDER — SODIUM CHLORIDE 9 MG/ML
5-250 INJECTION, SOLUTION INTRAVENOUS PRN
Status: CANCELLED | OUTPATIENT
Start: 2025-06-04

## 2025-06-03 RX ORDER — HEPARIN 100 UNIT/ML
500 SYRINGE INTRAVENOUS PRN
Status: DISCONTINUED | OUTPATIENT
Start: 2025-06-03 | End: 2025-06-04 | Stop reason: HOSPADM

## 2025-06-03 RX ADMIN — HEPARIN 500 UNITS: 100 SYRINGE at 14:04

## 2025-06-03 RX ADMIN — DAPTOMYCIN 500 MG: 500 INJECTION, POWDER, LYOPHILIZED, FOR SOLUTION INTRAVENOUS at 13:58

## 2025-06-03 RX ADMIN — SODIUM CHLORIDE, PRESERVATIVE FREE 10 ML: 5 INJECTION INTRAVENOUS at 13:58

## 2025-06-03 RX ADMIN — SODIUM CHLORIDE, PRESERVATIVE FREE 10 ML: 5 INJECTION INTRAVENOUS at 14:04

## 2025-06-04 ENCOUNTER — HOSPITAL ENCOUNTER (OUTPATIENT)
Dept: INFUSION THERAPY | Age: 75
Setting detail: INFUSION SERIES
Discharge: HOME | End: 2025-06-04
Payer: MEDICARE

## 2025-06-04 VITALS
DIASTOLIC BLOOD PRESSURE: 88 MMHG | SYSTOLIC BLOOD PRESSURE: 137 MMHG | RESPIRATION RATE: 19 BRPM | HEART RATE: 108 BPM | OXYGEN SATURATION: 91 % | TEMPERATURE: 98.6 F

## 2025-06-04 DIAGNOSIS — M86.00 ACUTE HEMATOGENOUS OSTEOMYELITIS, UNSPECIFIED SITE (HCC): Primary | ICD-10-CM

## 2025-06-04 PROCEDURE — 6360000002 HC RX W HCPCS: Performed by: SPECIALIST

## 2025-06-04 PROCEDURE — 2580000003 HC RX 258: Performed by: SPECIALIST

## 2025-06-04 PROCEDURE — 6360000002 HC RX W HCPCS: Performed by: CLINICAL NURSE SPECIALIST

## 2025-06-04 PROCEDURE — 96374 THER/PROPH/DIAG INJ IV PUSH: CPT

## 2025-06-04 PROCEDURE — 2500000003 HC RX 250 WO HCPCS: Performed by: CLINICAL NURSE SPECIALIST

## 2025-06-04 PROCEDURE — 99211 OFF/OP EST MAY X REQ PHY/QHP: CPT

## 2025-06-04 RX ORDER — SODIUM CHLORIDE 0.9 % (FLUSH) 0.9 %
5-40 SYRINGE (ML) INJECTION PRN
Status: CANCELLED | OUTPATIENT
Start: 2025-06-05

## 2025-06-04 RX ORDER — SODIUM CHLORIDE 0.9 % (FLUSH) 0.9 %
5-40 SYRINGE (ML) INJECTION PRN
Status: DISCONTINUED | OUTPATIENT
Start: 2025-06-04 | End: 2025-06-05 | Stop reason: HOSPADM

## 2025-06-04 RX ORDER — SODIUM CHLORIDE 9 MG/ML
5-250 INJECTION, SOLUTION INTRAVENOUS PRN
Status: CANCELLED | OUTPATIENT
Start: 2025-06-05

## 2025-06-04 RX ORDER — HEPARIN 100 UNIT/ML
500 SYRINGE INTRAVENOUS PRN
Status: DISCONTINUED | OUTPATIENT
Start: 2025-06-04 | End: 2025-06-05 | Stop reason: HOSPADM

## 2025-06-04 RX ORDER — HEPARIN 100 UNIT/ML
500 SYRINGE INTRAVENOUS PRN
Status: CANCELLED | OUTPATIENT
Start: 2025-06-05

## 2025-06-04 RX ADMIN — HEPARIN 500 UNITS: 100 SYRINGE at 11:27

## 2025-06-04 RX ADMIN — SODIUM CHLORIDE, PRESERVATIVE FREE 10 ML: 5 INJECTION INTRAVENOUS at 11:51

## 2025-06-04 RX ADMIN — SODIUM CHLORIDE, PRESERVATIVE FREE 10 ML: 5 INJECTION INTRAVENOUS at 11:27

## 2025-06-04 RX ADMIN — DAPTOMYCIN 500 MG: 500 INJECTION, POWDER, LYOPHILIZED, FOR SOLUTION INTRAVENOUS at 11:26

## 2025-06-05 ENCOUNTER — HOSPITAL ENCOUNTER (OUTPATIENT)
Dept: INFUSION THERAPY | Age: 75
Setting detail: INFUSION SERIES
Discharge: HOME | End: 2025-06-05
Payer: MEDICARE

## 2025-06-05 ENCOUNTER — HOSPITAL ENCOUNTER (OUTPATIENT)
Dept: GENERAL RADIOLOGY | Age: 75
Discharge: HOME | End: 2025-06-07
Payer: MEDICARE

## 2025-06-05 ENCOUNTER — HOSPITAL ENCOUNTER (OUTPATIENT)
Age: 75
Discharge: HOME | End: 2025-06-07
Payer: MEDICARE

## 2025-06-05 VITALS
SYSTOLIC BLOOD PRESSURE: 159 MMHG | HEART RATE: 89 BPM | DIASTOLIC BLOOD PRESSURE: 72 MMHG | TEMPERATURE: 98.1 F | RESPIRATION RATE: 18 BRPM | OXYGEN SATURATION: 93 %

## 2025-06-05 DIAGNOSIS — L98.492: ICD-10-CM

## 2025-06-05 DIAGNOSIS — M86.649 CHRONIC OSTEOMYELITIS, HAND (HCC): ICD-10-CM

## 2025-06-05 DIAGNOSIS — M86.00 ACUTE HEMATOGENOUS OSTEOMYELITIS, UNSPECIFIED SITE (HCC): Primary | ICD-10-CM

## 2025-06-05 LAB
ALBUMIN SERPL-MCNC: 4 G/DL (ref 3.5–5.2)
ALP SERPL-CCNC: 51 U/L (ref 40–129)
ALT SERPL-CCNC: 14 U/L (ref 0–40)
ANION GAP SERPL CALCULATED.3IONS-SCNC: 13 MMOL/L (ref 7–16)
AST SERPL-CCNC: 21 U/L (ref 0–39)
BASOPHILS # BLD: 0.04 K/UL (ref 0–0.2)
BASOPHILS NFR BLD: 1 % (ref 0–2)
BILIRUB SERPL-MCNC: 0.5 MG/DL (ref 0–1.2)
BUN SERPL-MCNC: 22 MG/DL (ref 6–23)
CALCIUM SERPL-MCNC: 9.4 MG/DL (ref 8.6–10.2)
CHLORIDE SERPL-SCNC: 103 MMOL/L (ref 98–107)
CO2 SERPL-SCNC: 24 MMOL/L (ref 22–29)
CREAT SERPL-MCNC: 1.3 MG/DL (ref 0.7–1.2)
EOSINOPHIL # BLD: 0.13 K/UL (ref 0.05–0.5)
EOSINOPHILS RELATIVE PERCENT: 2 % (ref 0–6)
ERYTHROCYTE [DISTWIDTH] IN BLOOD BY AUTOMATED COUNT: 14.2 % (ref 11.5–15)
GFR, ESTIMATED: 60 ML/MIN/1.73M2
GLUCOSE SERPL-MCNC: 123 MG/DL (ref 74–99)
HCT VFR BLD AUTO: 46.8 % (ref 37–54)
HGB BLD-MCNC: 15.4 G/DL (ref 12.5–16.5)
IMM GRANULOCYTES # BLD AUTO: 0.04 K/UL (ref 0–0.58)
IMM GRANULOCYTES NFR BLD: 1 % (ref 0–5)
LYMPHOCYTES NFR BLD: 1.61 K/UL (ref 1.5–4)
LYMPHOCYTES RELATIVE PERCENT: 22 % (ref 20–42)
MCH RBC QN AUTO: 28.5 PG (ref 26–35)
MCHC RBC AUTO-ENTMCNC: 32.9 G/DL (ref 32–34.5)
MCV RBC AUTO: 86.5 FL (ref 80–99.9)
MONOCYTES NFR BLD: 0.48 K/UL (ref 0.1–0.95)
MONOCYTES NFR BLD: 7 % (ref 2–12)
NEUTROPHILS NFR BLD: 69 % (ref 43–80)
NEUTS SEG NFR BLD: 5.02 K/UL (ref 1.8–7.3)
PLATELET # BLD AUTO: 266 K/UL (ref 130–450)
PMV BLD AUTO: 11.2 FL (ref 7–12)
POTASSIUM SERPL-SCNC: 4 MMOL/L (ref 3.5–5)
PROT SERPL-MCNC: 6.8 G/DL (ref 6.4–8.3)
RBC # BLD AUTO: 5.41 M/UL (ref 3.8–5.8)
SODIUM SERPL-SCNC: 140 MMOL/L (ref 132–146)
WBC OTHER # BLD: 7.3 K/UL (ref 4.5–11.5)

## 2025-06-05 PROCEDURE — 73130 X-RAY EXAM OF HAND: CPT

## 2025-06-05 PROCEDURE — 80053 COMPREHEN METABOLIC PANEL: CPT

## 2025-06-05 PROCEDURE — 6360000002 HC RX W HCPCS: Performed by: CLINICAL NURSE SPECIALIST

## 2025-06-05 PROCEDURE — 6360000002 HC RX W HCPCS: Performed by: SPECIALIST

## 2025-06-05 PROCEDURE — 2580000003 HC RX 258: Performed by: SPECIALIST

## 2025-06-05 PROCEDURE — 2500000003 HC RX 250 WO HCPCS: Performed by: CLINICAL NURSE SPECIALIST

## 2025-06-05 PROCEDURE — 96374 THER/PROPH/DIAG INJ IV PUSH: CPT

## 2025-06-05 PROCEDURE — 36592 COLLECT BLOOD FROM PICC: CPT

## 2025-06-05 PROCEDURE — 85025 COMPLETE CBC W/AUTO DIFF WBC: CPT

## 2025-06-05 RX ORDER — SODIUM CHLORIDE 0.9 % (FLUSH) 0.9 %
5-40 SYRINGE (ML) INJECTION PRN
Status: CANCELLED | OUTPATIENT
Start: 2025-06-06

## 2025-06-05 RX ORDER — SODIUM CHLORIDE 0.9 % (FLUSH) 0.9 %
5-40 SYRINGE (ML) INJECTION PRN
Status: DISCONTINUED | OUTPATIENT
Start: 2025-06-05 | End: 2025-06-06 | Stop reason: HOSPADM

## 2025-06-05 RX ORDER — HEPARIN 100 UNIT/ML
500 SYRINGE INTRAVENOUS PRN
Status: CANCELLED | OUTPATIENT
Start: 2025-06-06

## 2025-06-05 RX ORDER — HEPARIN 100 UNIT/ML
500 SYRINGE INTRAVENOUS PRN
Status: DISCONTINUED | OUTPATIENT
Start: 2025-06-05 | End: 2025-06-06 | Stop reason: HOSPADM

## 2025-06-05 RX ORDER — SODIUM CHLORIDE 9 MG/ML
5-250 INJECTION, SOLUTION INTRAVENOUS PRN
Status: CANCELLED | OUTPATIENT
Start: 2025-06-06

## 2025-06-05 RX ADMIN — HEPARIN 500 UNITS: 100 SYRINGE at 11:39

## 2025-06-05 RX ADMIN — SODIUM CHLORIDE, PRESERVATIVE FREE 10 ML: 5 INJECTION INTRAVENOUS at 11:39

## 2025-06-05 RX ADMIN — SODIUM CHLORIDE, PRESERVATIVE FREE 10 ML: 5 INJECTION INTRAVENOUS at 11:38

## 2025-06-05 RX ADMIN — SODIUM CHLORIDE 500 MG: 9 INJECTION INTRAMUSCULAR; INTRAVENOUS; SUBCUTANEOUS at 11:38

## 2025-06-06 ENCOUNTER — HOSPITAL ENCOUNTER (OUTPATIENT)
Dept: INFUSION THERAPY | Age: 75
Setting detail: INFUSION SERIES
Discharge: HOME | End: 2025-06-06
Payer: MEDICARE

## 2025-06-06 VITALS
TEMPERATURE: 97.8 F | RESPIRATION RATE: 20 BRPM | HEART RATE: 71 BPM | SYSTOLIC BLOOD PRESSURE: 129 MMHG | DIASTOLIC BLOOD PRESSURE: 62 MMHG | OXYGEN SATURATION: 91 %

## 2025-06-06 DIAGNOSIS — M86.00 ACUTE HEMATOGENOUS OSTEOMYELITIS, UNSPECIFIED SITE (HCC): Primary | ICD-10-CM

## 2025-06-06 PROCEDURE — 6360000002 HC RX W HCPCS: Performed by: CLINICAL NURSE SPECIALIST

## 2025-06-06 PROCEDURE — 2500000003 HC RX 250 WO HCPCS: Performed by: CLINICAL NURSE SPECIALIST

## 2025-06-06 PROCEDURE — 6360000002 HC RX W HCPCS: Performed by: SPECIALIST

## 2025-06-06 PROCEDURE — 96374 THER/PROPH/DIAG INJ IV PUSH: CPT

## 2025-06-06 PROCEDURE — 2500000003 HC RX 250 WO HCPCS: Performed by: SPECIALIST

## 2025-06-06 RX ORDER — SODIUM CHLORIDE 9 MG/ML
5-250 INJECTION, SOLUTION INTRAVENOUS PRN
Status: CANCELLED | OUTPATIENT
Start: 2025-06-07

## 2025-06-06 RX ORDER — SODIUM CHLORIDE 0.9 % (FLUSH) 0.9 %
5-40 SYRINGE (ML) INJECTION PRN
Status: DISCONTINUED | OUTPATIENT
Start: 2025-06-06 | End: 2025-06-07 | Stop reason: HOSPADM

## 2025-06-06 RX ORDER — HEPARIN 100 UNIT/ML
500 SYRINGE INTRAVENOUS PRN
Status: CANCELLED | OUTPATIENT
Start: 2025-06-07

## 2025-06-06 RX ORDER — HEPARIN 100 UNIT/ML
500 SYRINGE INTRAVENOUS PRN
Status: DISCONTINUED | OUTPATIENT
Start: 2025-06-06 | End: 2025-06-07 | Stop reason: HOSPADM

## 2025-06-06 RX ORDER — SODIUM CHLORIDE 0.9 % (FLUSH) 0.9 %
5-40 SYRINGE (ML) INJECTION PRN
Status: CANCELLED | OUTPATIENT
Start: 2025-06-07

## 2025-06-06 RX ADMIN — SODIUM CHLORIDE 500 MG: 9 INJECTION INTRAMUSCULAR; INTRAVENOUS; SUBCUTANEOUS at 13:48

## 2025-06-06 RX ADMIN — Medication 10 ML: at 13:50

## 2025-06-06 RX ADMIN — HEPARIN 500 UNITS: 100 SYRINGE at 13:50

## 2025-06-07 ENCOUNTER — HOSPITAL ENCOUNTER (OUTPATIENT)
Dept: INFUSION THERAPY | Age: 75
Setting detail: INFUSION SERIES
Discharge: HOME | End: 2025-06-07
Payer: MEDICARE

## 2025-06-07 VITALS
RESPIRATION RATE: 20 BRPM | DIASTOLIC BLOOD PRESSURE: 80 MMHG | TEMPERATURE: 98 F | SYSTOLIC BLOOD PRESSURE: 122 MMHG | HEART RATE: 74 BPM | OXYGEN SATURATION: 94 %

## 2025-06-07 DIAGNOSIS — M86.00 ACUTE HEMATOGENOUS OSTEOMYELITIS, UNSPECIFIED SITE (HCC): Primary | ICD-10-CM

## 2025-06-07 PROCEDURE — 2500000003 HC RX 250 WO HCPCS: Performed by: SPECIALIST

## 2025-06-07 PROCEDURE — 6360000002 HC RX W HCPCS: Performed by: SPECIALIST

## 2025-06-07 PROCEDURE — 6360000002 HC RX W HCPCS: Performed by: CLINICAL NURSE SPECIALIST

## 2025-06-07 PROCEDURE — 96374 THER/PROPH/DIAG INJ IV PUSH: CPT

## 2025-06-07 PROCEDURE — 2500000003 HC RX 250 WO HCPCS: Performed by: CLINICAL NURSE SPECIALIST

## 2025-06-07 RX ORDER — HEPARIN 100 UNIT/ML
500 SYRINGE INTRAVENOUS PRN
Status: DISCONTINUED | OUTPATIENT
Start: 2025-06-07 | End: 2025-06-08 | Stop reason: HOSPADM

## 2025-06-07 RX ORDER — SODIUM CHLORIDE 0.9 % (FLUSH) 0.9 %
5-40 SYRINGE (ML) INJECTION PRN
Status: DISCONTINUED | OUTPATIENT
Start: 2025-06-07 | End: 2025-06-08 | Stop reason: HOSPADM

## 2025-06-07 RX ORDER — SODIUM CHLORIDE 0.9 % (FLUSH) 0.9 %
5-40 SYRINGE (ML) INJECTION PRN
Status: CANCELLED | OUTPATIENT
Start: 2025-06-08

## 2025-06-07 RX ORDER — HEPARIN 100 UNIT/ML
500 SYRINGE INTRAVENOUS PRN
Status: CANCELLED | OUTPATIENT
Start: 2025-06-08

## 2025-06-07 RX ORDER — SODIUM CHLORIDE 9 MG/ML
5-250 INJECTION, SOLUTION INTRAVENOUS PRN
Status: CANCELLED | OUTPATIENT
Start: 2025-06-08

## 2025-06-07 RX ADMIN — SODIUM CHLORIDE, PRESERVATIVE FREE 10 ML: 5 INJECTION INTRAVENOUS at 10:17

## 2025-06-07 RX ADMIN — HEPARIN 500 UNITS: 100 SYRINGE at 10:17

## 2025-06-07 RX ADMIN — DAPTOMYCIN 500 MG: 500 INJECTION, POWDER, LYOPHILIZED, FOR SOLUTION INTRAVENOUS at 10:12

## 2025-06-07 RX ADMIN — SODIUM CHLORIDE, PRESERVATIVE FREE 10 ML: 5 INJECTION INTRAVENOUS at 10:12

## 2025-06-08 ENCOUNTER — HOSPITAL ENCOUNTER (OUTPATIENT)
Dept: INFUSION THERAPY | Age: 75
Setting detail: INFUSION SERIES
Discharge: HOME | End: 2025-06-08
Payer: MEDICARE

## 2025-06-08 VITALS
RESPIRATION RATE: 24 BRPM | DIASTOLIC BLOOD PRESSURE: 87 MMHG | HEART RATE: 80 BPM | SYSTOLIC BLOOD PRESSURE: 176 MMHG | TEMPERATURE: 98.2 F

## 2025-06-08 DIAGNOSIS — M86.00 ACUTE HEMATOGENOUS OSTEOMYELITIS, UNSPECIFIED SITE (HCC): Primary | ICD-10-CM

## 2025-06-08 PROCEDURE — 96374 THER/PROPH/DIAG INJ IV PUSH: CPT

## 2025-06-08 PROCEDURE — 6360000002 HC RX W HCPCS: Performed by: CLINICAL NURSE SPECIALIST

## 2025-06-08 PROCEDURE — 6360000002 HC RX W HCPCS: Performed by: SPECIALIST

## 2025-06-08 PROCEDURE — 2500000003 HC RX 250 WO HCPCS: Performed by: CLINICAL NURSE SPECIALIST

## 2025-06-08 PROCEDURE — 2500000003 HC RX 250 WO HCPCS: Performed by: SPECIALIST

## 2025-06-08 RX ORDER — HEPARIN 100 UNIT/ML
500 SYRINGE INTRAVENOUS PRN
Status: CANCELLED | OUTPATIENT
Start: 2025-06-09

## 2025-06-08 RX ORDER — SODIUM CHLORIDE 9 MG/ML
5-250 INJECTION, SOLUTION INTRAVENOUS PRN
Status: CANCELLED | OUTPATIENT
Start: 2025-06-09

## 2025-06-08 RX ORDER — SODIUM CHLORIDE 0.9 % (FLUSH) 0.9 %
5-40 SYRINGE (ML) INJECTION PRN
Status: CANCELLED | OUTPATIENT
Start: 2025-06-09

## 2025-06-08 RX ORDER — SODIUM CHLORIDE 0.9 % (FLUSH) 0.9 %
5-40 SYRINGE (ML) INJECTION PRN
Status: DISCONTINUED | OUTPATIENT
Start: 2025-06-08 | End: 2025-06-09 | Stop reason: HOSPADM

## 2025-06-08 RX ORDER — HEPARIN 100 UNIT/ML
500 SYRINGE INTRAVENOUS PRN
Status: DISCONTINUED | OUTPATIENT
Start: 2025-06-08 | End: 2025-06-09 | Stop reason: HOSPADM

## 2025-06-08 RX ADMIN — Medication 10 ML: at 09:44

## 2025-06-08 RX ADMIN — HEPARIN 500 UNITS: 100 SYRINGE at 09:44

## 2025-06-08 RX ADMIN — Medication 10 ML: at 09:39

## 2025-06-08 RX ADMIN — DAPTOMYCIN 500 MG: 500 INJECTION, POWDER, LYOPHILIZED, FOR SOLUTION INTRAVENOUS at 09:39

## 2025-06-09 ENCOUNTER — HOSPITAL ENCOUNTER (OUTPATIENT)
Dept: INFUSION THERAPY | Age: 75
Setting detail: INFUSION SERIES
Discharge: HOME | End: 2025-06-09
Payer: MEDICARE

## 2025-06-09 VITALS
DIASTOLIC BLOOD PRESSURE: 85 MMHG | TEMPERATURE: 98 F | OXYGEN SATURATION: 98 % | HEART RATE: 87 BPM | SYSTOLIC BLOOD PRESSURE: 147 MMHG | RESPIRATION RATE: 22 BRPM

## 2025-06-09 DIAGNOSIS — E86.0 DEHYDRATION: Primary | ICD-10-CM

## 2025-06-09 DIAGNOSIS — M86.00 ACUTE HEMATOGENOUS OSTEOMYELITIS, UNSPECIFIED SITE (HCC): Primary | ICD-10-CM

## 2025-06-09 LAB
ALBUMIN SERPL-MCNC: 4.1 G/DL (ref 3.5–5.2)
ALP SERPL-CCNC: 51 U/L (ref 40–129)
ALT SERPL-CCNC: 14 U/L (ref 0–40)
ANION GAP SERPL CALCULATED.3IONS-SCNC: 17 MMOL/L (ref 7–16)
AST SERPL-CCNC: 27 U/L (ref 0–39)
BASOPHILS # BLD: 0.03 K/UL (ref 0–0.2)
BASOPHILS NFR BLD: 0 % (ref 0–2)
BILIRUB SERPL-MCNC: 0.5 MG/DL (ref 0–1.2)
BUN SERPL-MCNC: 26 MG/DL (ref 6–23)
CALCIUM SERPL-MCNC: 9.6 MG/DL (ref 8.6–10.2)
CHLORIDE SERPL-SCNC: 101 MMOL/L (ref 98–107)
CK SERPL-CCNC: 222 U/L (ref 20–200)
CO2 SERPL-SCNC: 20 MMOL/L (ref 22–29)
CREAT SERPL-MCNC: 1.3 MG/DL (ref 0.7–1.2)
EOSINOPHIL # BLD: 0.2 K/UL (ref 0.05–0.5)
EOSINOPHILS RELATIVE PERCENT: 3 % (ref 0–6)
ERYTHROCYTE [DISTWIDTH] IN BLOOD BY AUTOMATED COUNT: 14 % (ref 11.5–15)
ERYTHROCYTE [SEDIMENTATION RATE] IN BLOOD BY WESTERGREN METHOD: 7 MM/HR (ref 0–15)
GFR, ESTIMATED: 60 ML/MIN/1.73M2
GLUCOSE SERPL-MCNC: 105 MG/DL (ref 74–99)
HCT VFR BLD AUTO: 46.1 % (ref 37–54)
HGB BLD-MCNC: 15.2 G/DL (ref 12.5–16.5)
IMM GRANULOCYTES # BLD AUTO: 0.03 K/UL (ref 0–0.58)
IMM GRANULOCYTES NFR BLD: 0 % (ref 0–5)
LYMPHOCYTES NFR BLD: 1.6 K/UL (ref 1.5–4)
LYMPHOCYTES RELATIVE PERCENT: 23 % (ref 20–42)
MCH RBC QN AUTO: 28.4 PG (ref 26–35)
MCHC RBC AUTO-ENTMCNC: 33 G/DL (ref 32–34.5)
MCV RBC AUTO: 86.2 FL (ref 80–99.9)
MONOCYTES NFR BLD: 0.66 K/UL (ref 0.1–0.95)
MONOCYTES NFR BLD: 10 % (ref 2–12)
NEUTROPHILS NFR BLD: 64 % (ref 43–80)
NEUTS SEG NFR BLD: 4.4 K/UL (ref 1.8–7.3)
PLATELET # BLD AUTO: 265 K/UL (ref 130–450)
PMV BLD AUTO: 11.4 FL (ref 7–12)
POTASSIUM SERPL-SCNC: 4.2 MMOL/L (ref 3.5–5)
PROT SERPL-MCNC: 7.1 G/DL (ref 6.4–8.3)
RBC # BLD AUTO: 5.35 M/UL (ref 3.8–5.8)
SODIUM SERPL-SCNC: 138 MMOL/L (ref 132–146)
WBC OTHER # BLD: 6.9 K/UL (ref 4.5–11.5)

## 2025-06-09 PROCEDURE — 2500000003 HC RX 250 WO HCPCS: Performed by: SPECIALIST

## 2025-06-09 PROCEDURE — 85652 RBC SED RATE AUTOMATED: CPT

## 2025-06-09 PROCEDURE — 6360000002 HC RX W HCPCS: Performed by: SPECIALIST

## 2025-06-09 PROCEDURE — 36415 COLL VENOUS BLD VENIPUNCTURE: CPT

## 2025-06-09 PROCEDURE — 86140 C-REACTIVE PROTEIN: CPT

## 2025-06-09 PROCEDURE — 2500000003 HC RX 250 WO HCPCS: Performed by: CLINICAL NURSE SPECIALIST

## 2025-06-09 PROCEDURE — 6360000002 HC RX W HCPCS: Performed by: CLINICAL NURSE SPECIALIST

## 2025-06-09 PROCEDURE — 96374 THER/PROPH/DIAG INJ IV PUSH: CPT

## 2025-06-09 PROCEDURE — 80053 COMPREHEN METABOLIC PANEL: CPT

## 2025-06-09 PROCEDURE — 82550 ASSAY OF CK (CPK): CPT

## 2025-06-09 PROCEDURE — 96372 THER/PROPH/DIAG INJ SC/IM: CPT

## 2025-06-09 PROCEDURE — 85025 COMPLETE CBC W/AUTO DIFF WBC: CPT

## 2025-06-09 RX ORDER — HEPARIN 100 UNIT/ML
500 SYRINGE INTRAVENOUS PRN
Status: DISCONTINUED | OUTPATIENT
Start: 2025-06-09 | End: 2025-06-10 | Stop reason: HOSPADM

## 2025-06-09 RX ORDER — SODIUM CHLORIDE 9 MG/ML
5-250 INJECTION, SOLUTION INTRAVENOUS PRN
Status: CANCELLED | OUTPATIENT
Start: 2025-06-10

## 2025-06-09 RX ORDER — 0.9 % SODIUM CHLORIDE 0.9 %
1000 INTRAVENOUS SOLUTION INTRAVENOUS ONCE
Status: CANCELLED | OUTPATIENT
Start: 2025-06-10 | End: 2025-06-10

## 2025-06-09 RX ORDER — SODIUM CHLORIDE 0.9 % (FLUSH) 0.9 %
5-40 SYRINGE (ML) INJECTION PRN
Status: CANCELLED | OUTPATIENT
Start: 2025-06-10

## 2025-06-09 RX ORDER — SODIUM CHLORIDE 0.9 % (FLUSH) 0.9 %
5-40 SYRINGE (ML) INJECTION PRN
Status: DISCONTINUED | OUTPATIENT
Start: 2025-06-09 | End: 2025-06-10 | Stop reason: HOSPADM

## 2025-06-09 RX ORDER — HEPARIN 100 UNIT/ML
500 SYRINGE INTRAVENOUS PRN
Status: CANCELLED | OUTPATIENT
Start: 2025-06-10

## 2025-06-09 RX ADMIN — Medication 10 ML: at 11:33

## 2025-06-09 RX ADMIN — Medication 10 ML: at 11:24

## 2025-06-09 RX ADMIN — SODIUM CHLORIDE 500 MG: 9 INJECTION INTRAMUSCULAR; INTRAVENOUS; SUBCUTANEOUS at 11:25

## 2025-06-09 RX ADMIN — HEPARIN 500 UNITS: 100 SYRINGE at 11:33

## 2025-06-10 ENCOUNTER — HOSPITAL ENCOUNTER (OUTPATIENT)
Dept: INFUSION THERAPY | Age: 75
Setting detail: INFUSION SERIES
Discharge: HOME | End: 2025-06-10
Payer: MEDICARE

## 2025-06-10 VITALS
RESPIRATION RATE: 22 BRPM | HEART RATE: 88 BPM | SYSTOLIC BLOOD PRESSURE: 169 MMHG | DIASTOLIC BLOOD PRESSURE: 90 MMHG | TEMPERATURE: 98 F | OXYGEN SATURATION: 98 %

## 2025-06-10 DIAGNOSIS — M86.00 ACUTE HEMATOGENOUS OSTEOMYELITIS, UNSPECIFIED SITE (HCC): Primary | ICD-10-CM

## 2025-06-10 DIAGNOSIS — E86.0 DEHYDRATION: ICD-10-CM

## 2025-06-10 DIAGNOSIS — M86.9 OSTEOMYELITIS, UNSPECIFIED SITE, UNSPECIFIED TYPE (HCC): ICD-10-CM

## 2025-06-10 LAB
CRP SERPL HS-MCNC: 16 MG/L (ref 0–5)
MICROORGANISM SPEC CULT: NORMAL
MICROORGANISM SPEC CULT: NORMAL
MICROORGANISM/AGENT SPEC: NORMAL
MICROORGANISM/AGENT SPEC: NORMAL
SERVICE CMNT-IMP: NORMAL
SERVICE CMNT-IMP: NORMAL
SPECIMEN DESCRIPTION: NORMAL
SPECIMEN DESCRIPTION: NORMAL

## 2025-06-10 PROCEDURE — 6360000002 HC RX W HCPCS: Performed by: CLINICAL NURSE SPECIALIST

## 2025-06-10 PROCEDURE — 6360000002 HC RX W HCPCS: Performed by: SPECIALIST

## 2025-06-10 PROCEDURE — 99211 OFF/OP EST MAY X REQ PHY/QHP: CPT

## 2025-06-10 PROCEDURE — 2500000003 HC RX 250 WO HCPCS: Performed by: CLINICAL NURSE SPECIALIST

## 2025-06-10 PROCEDURE — 96361 HYDRATE IV INFUSION ADD-ON: CPT

## 2025-06-10 PROCEDURE — 96374 THER/PROPH/DIAG INJ IV PUSH: CPT

## 2025-06-10 PROCEDURE — 2580000003 HC RX 258: Performed by: SPECIALIST

## 2025-06-10 PROCEDURE — 2500000003 HC RX 250 WO HCPCS: Performed by: SPECIALIST

## 2025-06-10 RX ORDER — HEPARIN 100 UNIT/ML
500 SYRINGE INTRAVENOUS PRN
Status: CANCELLED | OUTPATIENT
Start: 2025-06-11

## 2025-06-10 RX ORDER — 0.9 % SODIUM CHLORIDE 0.9 %
1000 INTRAVENOUS SOLUTION INTRAVENOUS ONCE
Status: COMPLETED | OUTPATIENT
Start: 2025-06-10 | End: 2025-06-10

## 2025-06-10 RX ORDER — SODIUM CHLORIDE 0.9 % (FLUSH) 0.9 %
5-40 SYRINGE (ML) INJECTION PRN
OUTPATIENT
Start: 2025-06-10

## 2025-06-10 RX ORDER — SODIUM CHLORIDE 0.9 % (FLUSH) 0.9 %
5-40 SYRINGE (ML) INJECTION PRN
Status: CANCELLED | OUTPATIENT
Start: 2025-06-11

## 2025-06-10 RX ORDER — SODIUM CHLORIDE 9 MG/ML
5-250 INJECTION, SOLUTION INTRAVENOUS PRN
Status: CANCELLED | OUTPATIENT
Start: 2025-06-11

## 2025-06-10 RX ORDER — 0.9 % SODIUM CHLORIDE 0.9 %
1000 INTRAVENOUS SOLUTION INTRAVENOUS ONCE
Status: CANCELLED | OUTPATIENT
Start: 2025-06-10 | End: 2025-06-10

## 2025-06-10 RX ORDER — SODIUM CHLORIDE 0.9 % (FLUSH) 0.9 %
5-40 SYRINGE (ML) INJECTION PRN
Status: DISCONTINUED | OUTPATIENT
Start: 2025-06-10 | End: 2025-06-11 | Stop reason: HOSPADM

## 2025-06-10 RX ORDER — SODIUM CHLORIDE 9 MG/ML
5-250 INJECTION, SOLUTION INTRAVENOUS PRN
OUTPATIENT
Start: 2025-06-10

## 2025-06-10 RX ORDER — HEPARIN 100 UNIT/ML
500 SYRINGE INTRAVENOUS PRN
Status: DISCONTINUED | OUTPATIENT
Start: 2025-06-10 | End: 2025-06-11 | Stop reason: HOSPADM

## 2025-06-10 RX ADMIN — DAPTOMYCIN 500 MG: 500 INJECTION, POWDER, LYOPHILIZED, FOR SOLUTION INTRAVENOUS at 12:31

## 2025-06-10 RX ADMIN — SODIUM CHLORIDE, PRESERVATIVE FREE 10 ML: 5 INJECTION INTRAVENOUS at 12:33

## 2025-06-10 RX ADMIN — SODIUM CHLORIDE, PRESERVATIVE FREE 10 ML: 5 INJECTION INTRAVENOUS at 11:15

## 2025-06-10 RX ADMIN — SODIUM CHLORIDE 1000 ML: 0.9 INJECTION, SOLUTION INTRAVENOUS at 11:16

## 2025-06-10 RX ADMIN — HEPARIN 500 UNITS: 100 SYRINGE at 12:33

## 2025-06-11 ENCOUNTER — HOSPITAL ENCOUNTER (OUTPATIENT)
Dept: INFUSION THERAPY | Age: 75
Setting detail: INFUSION SERIES
Discharge: HOME | End: 2025-06-11
Payer: MEDICARE

## 2025-06-11 VITALS
TEMPERATURE: 98 F | RESPIRATION RATE: 24 BRPM | HEART RATE: 75 BPM | DIASTOLIC BLOOD PRESSURE: 67 MMHG | OXYGEN SATURATION: 92 % | SYSTOLIC BLOOD PRESSURE: 158 MMHG

## 2025-06-11 DIAGNOSIS — J44.9 CHRONIC OBSTRUCTIVE PULMONARY DISEASE, UNSPECIFIED COPD TYPE (HCC): ICD-10-CM

## 2025-06-11 DIAGNOSIS — M86.00 ACUTE HEMATOGENOUS OSTEOMYELITIS, UNSPECIFIED SITE (HCC): Primary | ICD-10-CM

## 2025-06-11 DIAGNOSIS — Z72.0 TOBACCO ABUSE DISORDER: ICD-10-CM

## 2025-06-11 PROCEDURE — 2500000003 HC RX 250 WO HCPCS: Performed by: CLINICAL NURSE SPECIALIST

## 2025-06-11 PROCEDURE — 6360000002 HC RX W HCPCS: Performed by: CLINICAL NURSE SPECIALIST

## 2025-06-11 PROCEDURE — 6360000002 HC RX W HCPCS: Performed by: SPECIALIST

## 2025-06-11 PROCEDURE — 96374 THER/PROPH/DIAG INJ IV PUSH: CPT

## 2025-06-11 PROCEDURE — 2500000003 HC RX 250 WO HCPCS: Performed by: SPECIALIST

## 2025-06-11 RX ORDER — SODIUM CHLORIDE 9 MG/ML
5-250 INJECTION, SOLUTION INTRAVENOUS PRN
Status: CANCELLED | OUTPATIENT
Start: 2025-06-12

## 2025-06-11 RX ORDER — HEPARIN 100 UNIT/ML
500 SYRINGE INTRAVENOUS PRN
Status: DISCONTINUED | OUTPATIENT
Start: 2025-06-11 | End: 2025-06-12 | Stop reason: HOSPADM

## 2025-06-11 RX ORDER — SODIUM CHLORIDE 0.9 % (FLUSH) 0.9 %
5-40 SYRINGE (ML) INJECTION PRN
Status: DISCONTINUED | OUTPATIENT
Start: 2025-06-11 | End: 2025-06-12 | Stop reason: HOSPADM

## 2025-06-11 RX ORDER — VARENICLINE TARTRATE 1 MG/1
1 TABLET, FILM COATED ORAL 2 TIMES DAILY
Qty: 56 TABLET | Refills: 0 | Status: SHIPPED | OUTPATIENT
Start: 2025-06-11

## 2025-06-11 RX ORDER — FLUTICASONE FUROATE, UMECLIDINIUM BROMIDE AND VILANTEROL TRIFENATATE 100; 62.5; 25 UG/1; UG/1; UG/1
POWDER RESPIRATORY (INHALATION)
Qty: 120 EACH | Refills: 0 | Status: SHIPPED | OUTPATIENT
Start: 2025-06-11

## 2025-06-11 RX ORDER — SODIUM CHLORIDE 0.9 % (FLUSH) 0.9 %
5-40 SYRINGE (ML) INJECTION PRN
Status: CANCELLED | OUTPATIENT
Start: 2025-06-12

## 2025-06-11 RX ORDER — HEPARIN 100 UNIT/ML
500 SYRINGE INTRAVENOUS PRN
Status: CANCELLED | OUTPATIENT
Start: 2025-06-12

## 2025-06-11 RX ADMIN — DAPTOMYCIN 500 MG: 500 INJECTION, POWDER, LYOPHILIZED, FOR SOLUTION INTRAVENOUS at 12:59

## 2025-06-11 RX ADMIN — SODIUM CHLORIDE, PRESERVATIVE FREE 10 ML: 5 INJECTION INTRAVENOUS at 13:06

## 2025-06-11 RX ADMIN — SODIUM CHLORIDE, PRESERVATIVE FREE 10 ML: 5 INJECTION INTRAVENOUS at 12:59

## 2025-06-11 RX ADMIN — HEPARIN 500 UNITS: 100 SYRINGE at 13:06

## 2025-06-11 NOTE — TELEPHONE ENCOUNTER
Last seen 5/21/2025  Next appt 6/19/2025    Requested Prescriptions     Pending Prescriptions Disp Refills    varenicline (CHANTIX) 1 MG tablet [Pharmacy Med Name: Varenicline Tartrate 1 MG Oral Tablet] 56 tablet 0     Sig: Take 1 tablet by mouth twice daily      Name of Medication(s) Requested:  Requested Prescriptions     Pending Prescriptions Disp Refills    varenicline (CHANTIX) 1 MG tablet [Pharmacy Med Name: Varenicline Tartrate 1 MG Oral Tablet] 56 tablet 0     Sig: Take 1 tablet by mouth twice daily       Medication is on current medication list Yes    Dosage and directions were verified? Yes    Quantity verified: 90 day supply     Pharmacy Verified?  Yes    Last Appointment:  5/21/2025    Future appts:  Future Appointments   Date Time Provider Department Center   6/12/2025  8:30 AM [facility] INF CLINIC ROOM [address] [place] [address]   6/13/2025  8:30 AM [facility] INF CLINIC ROOM 8 [facility]   6/14/2025  8:30 AM [facility] INF CLINIC ROOM 8 [facility]   6/15/2025  8:30 AM [facility] INF CLINIC ROOM 8 [facility]   6/16/2025  8:30 AM [facility] INF CLINIC ROOM 8 [facility]   6/17/2025  8:30 AM [facility] INF CLINIC ROOM 8 [facility]   6/18/2025  8:30 AM [facility] INF CLINIC ROOM 8 [facility]   6/18/2025  9:30 AM Morgan Verdugo MD [facility]   6/19/2025  8:30 AM [facility] INF CLINIC ROOM 8 [facility]   6/19/2025 10:30 AM Chance Jama DO [facility]   6/20/2025  2:00 PM Lois Pappas MD [facility]        (If no appt send self scheduling link. .REFILLAPPT)  Scheduling request sent?     [] Yes  [x] No    Does patient need updated?  [] Yes  [x] No

## 2025-06-11 NOTE — TELEPHONE ENCOUNTER
Last seen 5/21/2025  Next appt 6/19/2025    Requested Prescriptions     Pending Prescriptions Disp Refills    TRELEGY ELLIPTA 100-62.5-25 MCG/ACT AEPB inhaler [Pharmacy Med Name: Trelegy Ellipta 100-62.5-25 MCG/INH Inhalation Aerosol Powder Breath Activated] 120 each 0     Sig: INHALE 1 PUFF IN THE MORNING      Name of Medication(s) Requested:  Requested Prescriptions     Pending Prescriptions Disp Refills    TRELEGY ELLIPTA 100-62.5-25 MCG/ACT AEPB inhaler [Pharmacy Med Name: Trelegy Ellipta 100-62.5-25 MCG/INH Inhalation Aerosol Powder Breath Activated] 120 each 0     Sig: INHALE 1 PUFF IN THE MORNING       Medication is on current medication list Yes    Dosage and directions were verified? Yes    Quantity verified: 30 day supply     Pharmacy Verified?  Yes    Last Appointment:  5/21/2025    Future appts:  Future Appointments   Date Time Provider Department Center   6/12/2025  8:30 AM [facility] INF CLINIC ROOM 8 [email or web address] Inf [facility]   6/13/2025  8:30 AM [facility] INF CLINIC ROOM 8 [email or web address] Inf [facility]   6/14/2025  8:30 AM [facility] INF CLINIC ROOM 8 [email or web address] Inf [facility]   6/15/2025  8:30 AM [facility] INF CLINIC ROOM 8 [email or web address] Inf [facility]   6/16/2025  8:30 AM [facility] INF CLINIC ROOM 8 [email or web address] Inf [facility]   6/17/2025  8:30 AM [facility] INF CLINIC ROOM 8 [email or web address] Inf [facility]   6/18/2025  8:30 AM [facility] INF CLINIC ROOM 8 [email or web address] Inf [facility]   6/18/2025  9:30 AM [facility]   6/19/2025  8:30 AM [facility] INF CLINIC ROOM 8 [facility]   6/19/2025 10:30 AM Chance Jama DO [facility]   6/20/2025  2:00 PM Lois Pappas MD [facility]        (If no appt send self scheduling link. .REFILLAPPT)  Scheduling request sent?     [] Yes  [x] No    Does patient need updated?  [] Yes  [x] No

## 2025-06-12 ENCOUNTER — HOSPITAL ENCOUNTER (OUTPATIENT)
Dept: INFUSION THERAPY | Age: 75
Setting detail: INFUSION SERIES
Discharge: HOME | End: 2025-06-12
Payer: MEDICARE

## 2025-06-12 DIAGNOSIS — M86.00 ACUTE HEMATOGENOUS OSTEOMYELITIS, UNSPECIFIED SITE (HCC): Primary | ICD-10-CM

## 2025-06-12 LAB
ALBUMIN SERPL-MCNC: 3.9 G/DL (ref 3.5–5.2)
ALP SERPL-CCNC: 53 U/L (ref 40–129)
ALT SERPL-CCNC: 12 U/L (ref 0–50)
ANION GAP SERPL CALCULATED.3IONS-SCNC: 12 MMOL/L (ref 7–16)
AST SERPL-CCNC: 17 U/L (ref 0–50)
BASOPHILS # BLD: 0.05 K/UL (ref 0–0.2)
BASOPHILS NFR BLD: 1 % (ref 0–2)
BILIRUB SERPL-MCNC: 0.4 MG/DL (ref 0–1.2)
BUN SERPL-MCNC: 23 MG/DL (ref 8–23)
CALCIUM SERPL-MCNC: 9.2 MG/DL (ref 8.8–10.2)
CHLORIDE SERPL-SCNC: 107 MMOL/L (ref 98–107)
CK SERPL-CCNC: 87 U/L (ref 0–190)
CO2 SERPL-SCNC: 21 MMOL/L (ref 22–29)
CREAT SERPL-MCNC: 1.2 MG/DL (ref 0.7–1.2)
CRP SERPL HS-MCNC: 14.8 MG/L (ref 0–5)
EOSINOPHIL # BLD: 0.22 K/UL (ref 0.05–0.5)
EOSINOPHILS RELATIVE PERCENT: 3 % (ref 0–6)
ERYTHROCYTE [DISTWIDTH] IN BLOOD BY AUTOMATED COUNT: 14.1 % (ref 11.5–15)
ERYTHROCYTE [SEDIMENTATION RATE] IN BLOOD BY WESTERGREN METHOD: 5 MM/HR (ref 0–15)
GFR, ESTIMATED: 64 ML/MIN/1.73M2
GLUCOSE SERPL-MCNC: 129 MG/DL (ref 74–99)
HCT VFR BLD AUTO: 47.7 % (ref 37–54)
HGB BLD-MCNC: 15.3 G/DL (ref 12.5–16.5)
IMM GRANULOCYTES # BLD AUTO: <0.03 K/UL (ref 0–0.58)
IMM GRANULOCYTES NFR BLD: 0 % (ref 0–5)
LYMPHOCYTES NFR BLD: 1.38 K/UL (ref 1.5–4)
LYMPHOCYTES RELATIVE PERCENT: 20 % (ref 20–42)
MCH RBC QN AUTO: 28.1 PG (ref 26–35)
MCHC RBC AUTO-ENTMCNC: 32.1 G/DL (ref 32–34.5)
MCV RBC AUTO: 87.5 FL (ref 80–99.9)
MONOCYTES NFR BLD: 0.57 K/UL (ref 0.1–0.95)
MONOCYTES NFR BLD: 8 % (ref 2–12)
NEUTROPHILS NFR BLD: 68 % (ref 43–80)
NEUTS SEG NFR BLD: 4.72 K/UL (ref 1.8–7.3)
PLATELET # BLD AUTO: 280 K/UL (ref 130–450)
PMV BLD AUTO: 11.4 FL (ref 7–12)
POTASSIUM SERPL-SCNC: 4 MMOL/L (ref 3.5–5.1)
PROT SERPL-MCNC: 6.8 G/DL (ref 6.4–8.3)
RBC # BLD AUTO: 5.45 M/UL (ref 3.8–5.8)
SODIUM SERPL-SCNC: 141 MMOL/L (ref 136–145)
WBC OTHER # BLD: 7 K/UL (ref 4.5–11.5)

## 2025-06-12 PROCEDURE — 85652 RBC SED RATE AUTOMATED: CPT

## 2025-06-12 PROCEDURE — 82550 ASSAY OF CK (CPK): CPT

## 2025-06-12 PROCEDURE — 96374 THER/PROPH/DIAG INJ IV PUSH: CPT

## 2025-06-12 PROCEDURE — 80053 COMPREHEN METABOLIC PANEL: CPT

## 2025-06-12 PROCEDURE — 85025 COMPLETE CBC W/AUTO DIFF WBC: CPT

## 2025-06-12 PROCEDURE — 36415 COLL VENOUS BLD VENIPUNCTURE: CPT

## 2025-06-12 PROCEDURE — 86140 C-REACTIVE PROTEIN: CPT

## 2025-06-12 PROCEDURE — 6360000002 HC RX W HCPCS: Performed by: SPECIALIST

## 2025-06-12 PROCEDURE — 2500000003 HC RX 250 WO HCPCS: Performed by: SPECIALIST

## 2025-06-12 PROCEDURE — 6360000002 HC RX W HCPCS: Performed by: CLINICAL NURSE SPECIALIST

## 2025-06-12 PROCEDURE — 2500000003 HC RX 250 WO HCPCS: Performed by: CLINICAL NURSE SPECIALIST

## 2025-06-12 RX ORDER — HEPARIN 100 UNIT/ML
500 SYRINGE INTRAVENOUS PRN
Status: DISCONTINUED | OUTPATIENT
Start: 2025-06-12 | End: 2025-06-13 | Stop reason: HOSPADM

## 2025-06-12 RX ORDER — SODIUM CHLORIDE 0.9 % (FLUSH) 0.9 %
5-40 SYRINGE (ML) INJECTION PRN
Status: DISCONTINUED | OUTPATIENT
Start: 2025-06-12 | End: 2025-06-13 | Stop reason: HOSPADM

## 2025-06-12 RX ORDER — HEPARIN 100 UNIT/ML
500 SYRINGE INTRAVENOUS PRN
Status: CANCELLED | OUTPATIENT
Start: 2025-06-13

## 2025-06-12 RX ORDER — SODIUM CHLORIDE 0.9 % (FLUSH) 0.9 %
5-40 SYRINGE (ML) INJECTION PRN
Status: CANCELLED | OUTPATIENT
Start: 2025-06-13

## 2025-06-12 RX ORDER — SODIUM CHLORIDE 9 MG/ML
5-250 INJECTION, SOLUTION INTRAVENOUS PRN
Status: CANCELLED | OUTPATIENT
Start: 2025-06-13

## 2025-06-12 RX ADMIN — HEPARIN 500 UNITS: 100 SYRINGE at 12:23

## 2025-06-12 RX ADMIN — SODIUM CHLORIDE, PRESERVATIVE FREE 10 ML: 5 INJECTION INTRAVENOUS at 12:23

## 2025-06-12 RX ADMIN — SODIUM CHLORIDE, PRESERVATIVE FREE 10 ML: 5 INJECTION INTRAVENOUS at 12:14

## 2025-06-12 RX ADMIN — SODIUM CHLORIDE 500 MG: 9 INJECTION INTRAMUSCULAR; INTRAVENOUS; SUBCUTANEOUS at 12:16

## 2025-06-12 NOTE — DISCHARGE INSTRUCTIONS
Visit Discharge/Physician Orders    Discharge condition: Stable    Assessment of pain at discharge:Assessed    Anesthetic used: lidocaine 4%     Discharge to: Home    Left via:Private automobile    Accompanied by: daughter     CARLA/YANIRAA: Devin- phone: [phone]    Dressing Orders:Cleanse wounds to right fingers with normal saline, apply CLAIRE to wound beds and cover with gauze- adhere with tape, change daily.   May use small finger tubigrip to hold dressings in place     Treatment Orders:FOLLOW NUTRITIOUS DIET. CHOOSE FOODS HIGH IN PROTEIN -CHICKEN- FISH-AND EGGS,  CHOOSE FOODS HIGH IN VITAMIN C.   MULTIVITAMIN DAILY.    6/18 Quit smoking   6/18 Vascular studies of the arm to be scheduled     [facility] followup visit _______1 week______________________  (Please note your next appointment above and if you are unable to keep, kindly give a 24 hour notice. Thank you.)    Physician signature:__________________________      If you experience any of the following, please call the Wound Care Center during business hours:    * Increase in Pain  * Temperature over 101  * Increase in drainage from your wound  * Drainage with a foul odor  * Bleeding  * Increase in swelling  * Need for compression bandage changes due to slippage, breakthrough drainage.    If you need medical attention outside of the business hours of the Wound Care Centers please contact your PCP or go to the nearest emergency room.

## 2025-06-13 ENCOUNTER — HOSPITAL ENCOUNTER (OUTPATIENT)
Age: 75
Discharge: HOME | End: 2025-06-13
Payer: MEDICARE

## 2025-06-13 ENCOUNTER — HOSPITAL ENCOUNTER (OUTPATIENT)
Dept: ULTRASOUND IMAGING | Age: 75
Discharge: HOME | End: 2025-06-13
Payer: MEDICARE

## 2025-06-13 ENCOUNTER — HOSPITAL ENCOUNTER (OUTPATIENT)
Dept: INFUSION THERAPY | Age: 75
Setting detail: INFUSION SERIES
Discharge: HOME | End: 2025-06-13
Payer: MEDICARE

## 2025-06-13 VITALS
DIASTOLIC BLOOD PRESSURE: 78 MMHG | SYSTOLIC BLOOD PRESSURE: 134 MMHG | HEART RATE: 81 BPM | RESPIRATION RATE: 18 BRPM | TEMPERATURE: 97.9 F | OXYGEN SATURATION: 91 %

## 2025-06-13 DIAGNOSIS — M79.89 ARM SWELLING: ICD-10-CM

## 2025-06-13 DIAGNOSIS — M86.00 ACUTE HEMATOGENOUS OSTEOMYELITIS, UNSPECIFIED SITE (HCC): Primary | ICD-10-CM

## 2025-06-13 PROCEDURE — 6360000002 HC RX W HCPCS: Performed by: SPECIALIST

## 2025-06-13 PROCEDURE — 2500000003 HC RX 250 WO HCPCS: Performed by: SPECIALIST

## 2025-06-13 PROCEDURE — 96374 THER/PROPH/DIAG INJ IV PUSH: CPT

## 2025-06-13 PROCEDURE — 93971 EXTREMITY STUDY: CPT

## 2025-06-13 RX ORDER — SODIUM CHLORIDE 0.9 % (FLUSH) 0.9 %
5-40 SYRINGE (ML) INJECTION PRN
Status: CANCELLED | OUTPATIENT
Start: 2025-06-14

## 2025-06-13 RX ORDER — HEPARIN 100 UNIT/ML
500 SYRINGE INTRAVENOUS PRN
Status: DISCONTINUED | OUTPATIENT
Start: 2025-06-13 | End: 2025-06-14 | Stop reason: HOSPADM

## 2025-06-13 RX ORDER — HEPARIN 100 UNIT/ML
500 SYRINGE INTRAVENOUS PRN
Status: CANCELLED | OUTPATIENT
Start: 2025-06-14

## 2025-06-13 RX ORDER — SODIUM CHLORIDE 0.9 % (FLUSH) 0.9 %
5-40 SYRINGE (ML) INJECTION PRN
Status: DISCONTINUED | OUTPATIENT
Start: 2025-06-13 | End: 2025-06-14 | Stop reason: HOSPADM

## 2025-06-13 RX ORDER — SODIUM CHLORIDE 9 MG/ML
5-250 INJECTION, SOLUTION INTRAVENOUS PRN
Status: CANCELLED | OUTPATIENT
Start: 2025-06-14

## 2025-06-13 RX ADMIN — SODIUM CHLORIDE 500 MG: 9 INJECTION INTRAMUSCULAR; INTRAVENOUS; SUBCUTANEOUS at 09:45

## 2025-06-14 ENCOUNTER — HOSPITAL ENCOUNTER (OUTPATIENT)
Dept: INFUSION THERAPY | Age: 75
Setting detail: INFUSION SERIES
Discharge: HOME | End: 2025-06-14
Payer: MEDICARE

## 2025-06-14 VITALS
TEMPERATURE: 97.8 F | SYSTOLIC BLOOD PRESSURE: 155 MMHG | DIASTOLIC BLOOD PRESSURE: 73 MMHG | OXYGEN SATURATION: 92 % | HEART RATE: 87 BPM | RESPIRATION RATE: 20 BRPM

## 2025-06-14 DIAGNOSIS — M86.00 ACUTE HEMATOGENOUS OSTEOMYELITIS, UNSPECIFIED SITE (HCC): Primary | ICD-10-CM

## 2025-06-14 PROCEDURE — 2500000003 HC RX 250 WO HCPCS: Performed by: SPECIALIST

## 2025-06-14 PROCEDURE — 6360000002 HC RX W HCPCS: Performed by: SPECIALIST

## 2025-06-14 PROCEDURE — 2500000003 HC RX 250 WO HCPCS: Performed by: CLINICAL NURSE SPECIALIST

## 2025-06-14 PROCEDURE — 6360000002 HC RX W HCPCS: Performed by: CLINICAL NURSE SPECIALIST

## 2025-06-14 PROCEDURE — 96374 THER/PROPH/DIAG INJ IV PUSH: CPT

## 2025-06-14 RX ORDER — SODIUM CHLORIDE 0.9 % (FLUSH) 0.9 %
5-40 SYRINGE (ML) INJECTION PRN
Status: DISCONTINUED | OUTPATIENT
Start: 2025-06-14 | End: 2025-06-15 | Stop reason: HOSPADM

## 2025-06-14 RX ORDER — SODIUM CHLORIDE 0.9 % (FLUSH) 0.9 %
5-40 SYRINGE (ML) INJECTION PRN
Status: CANCELLED | OUTPATIENT
Start: 2025-06-15

## 2025-06-14 RX ORDER — SODIUM CHLORIDE 9 MG/ML
5-250 INJECTION, SOLUTION INTRAVENOUS PRN
Status: CANCELLED | OUTPATIENT
Start: 2025-06-15

## 2025-06-14 RX ORDER — HEPARIN 100 UNIT/ML
500 SYRINGE INTRAVENOUS PRN
Status: CANCELLED | OUTPATIENT
Start: 2025-06-15

## 2025-06-14 RX ORDER — HEPARIN 100 UNIT/ML
500 SYRINGE INTRAVENOUS PRN
Status: DISCONTINUED | OUTPATIENT
Start: 2025-06-14 | End: 2025-06-15 | Stop reason: HOSPADM

## 2025-06-14 RX ADMIN — DAPTOMYCIN 500 MG: 500 INJECTION, POWDER, LYOPHILIZED, FOR SOLUTION INTRAVENOUS at 09:40

## 2025-06-14 RX ADMIN — Medication 10 ML: at 09:43

## 2025-06-14 RX ADMIN — HEPARIN 500 UNITS: 100 SYRINGE at 09:43

## 2025-06-15 ENCOUNTER — HOSPITAL ENCOUNTER (OUTPATIENT)
Dept: INFUSION THERAPY | Age: 75
Setting detail: INFUSION SERIES
Discharge: HOME | End: 2025-06-15
Payer: MEDICARE

## 2025-06-15 VITALS
HEART RATE: 70 BPM | OXYGEN SATURATION: 91 % | TEMPERATURE: 98 F | SYSTOLIC BLOOD PRESSURE: 144 MMHG | DIASTOLIC BLOOD PRESSURE: 79 MMHG | RESPIRATION RATE: 22 BRPM

## 2025-06-15 DIAGNOSIS — M86.00 ACUTE HEMATOGENOUS OSTEOMYELITIS, UNSPECIFIED SITE (HCC): Primary | ICD-10-CM

## 2025-06-15 PROCEDURE — 6360000002 HC RX W HCPCS: Performed by: CLINICAL NURSE SPECIALIST

## 2025-06-15 PROCEDURE — 2500000003 HC RX 250 WO HCPCS: Performed by: SPECIALIST

## 2025-06-15 PROCEDURE — 96374 THER/PROPH/DIAG INJ IV PUSH: CPT

## 2025-06-15 PROCEDURE — 6360000002 HC RX W HCPCS: Performed by: SPECIALIST

## 2025-06-15 PROCEDURE — 2500000003 HC RX 250 WO HCPCS: Performed by: CLINICAL NURSE SPECIALIST

## 2025-06-15 RX ORDER — HEPARIN 100 UNIT/ML
500 SYRINGE INTRAVENOUS PRN
Status: CANCELLED | OUTPATIENT
Start: 2025-06-16

## 2025-06-15 RX ORDER — HEPARIN 100 UNIT/ML
500 SYRINGE INTRAVENOUS PRN
Status: DISCONTINUED | OUTPATIENT
Start: 2025-06-15 | End: 2025-06-16 | Stop reason: HOSPADM

## 2025-06-15 RX ORDER — SODIUM CHLORIDE 0.9 % (FLUSH) 0.9 %
5-40 SYRINGE (ML) INJECTION PRN
Status: CANCELLED | OUTPATIENT
Start: 2025-06-16

## 2025-06-15 RX ORDER — SODIUM CHLORIDE 9 MG/ML
5-250 INJECTION, SOLUTION INTRAVENOUS PRN
Status: CANCELLED | OUTPATIENT
Start: 2025-06-16

## 2025-06-15 RX ORDER — SODIUM CHLORIDE 0.9 % (FLUSH) 0.9 %
5-40 SYRINGE (ML) INJECTION PRN
Status: DISCONTINUED | OUTPATIENT
Start: 2025-06-15 | End: 2025-06-16 | Stop reason: HOSPADM

## 2025-06-15 RX ADMIN — HEPARIN 500 UNITS: 100 SYRINGE at 07:51

## 2025-06-15 RX ADMIN — Medication 10 ML: at 07:51

## 2025-06-15 RX ADMIN — DAPTOMYCIN 500 MG: 500 INJECTION, POWDER, LYOPHILIZED, FOR SOLUTION INTRAVENOUS at 07:47

## 2025-06-16 ENCOUNTER — HOSPITAL ENCOUNTER (OUTPATIENT)
Dept: INFUSION THERAPY | Age: 75
Setting detail: INFUSION SERIES
Discharge: HOME | End: 2025-06-16
Payer: MEDICARE

## 2025-06-16 DIAGNOSIS — M86.00 ACUTE HEMATOGENOUS OSTEOMYELITIS, UNSPECIFIED SITE (HCC): Primary | ICD-10-CM

## 2025-06-16 LAB
ALBUMIN SERPL-MCNC: 4 G/DL (ref 3.5–5.2)
ALP SERPL-CCNC: 53 U/L (ref 40–129)
ALT SERPL-CCNC: 13 U/L (ref 0–50)
ANION GAP SERPL CALCULATED.3IONS-SCNC: 11 MMOL/L (ref 7–16)
AST SERPL-CCNC: 15 U/L (ref 0–50)
BASOPHILS # BLD: 0.06 K/UL (ref 0–0.2)
BASOPHILS NFR BLD: 1 % (ref 0–2)
BILIRUB SERPL-MCNC: 0.4 MG/DL (ref 0–1.2)
BUN SERPL-MCNC: 29 MG/DL (ref 8–23)
CALCIUM SERPL-MCNC: 9.8 MG/DL (ref 8.8–10.2)
CHLORIDE SERPL-SCNC: 102 MMOL/L (ref 98–107)
CK SERPL-CCNC: 59 U/L (ref 0–190)
CO2 SERPL-SCNC: 27 MMOL/L (ref 22–29)
CREAT SERPL-MCNC: 1.6 MG/DL (ref 0.7–1.2)
CRP SERPL HS-MCNC: 8.9 MG/L (ref 0–5)
EOSINOPHIL # BLD: 0.17 K/UL (ref 0.05–0.5)
EOSINOPHILS RELATIVE PERCENT: 2 % (ref 0–6)
ERYTHROCYTE [DISTWIDTH] IN BLOOD BY AUTOMATED COUNT: 14 % (ref 11.5–15)
ERYTHROCYTE [SEDIMENTATION RATE] IN BLOOD BY WESTERGREN METHOD: 15 MM/HR (ref 0–15)
GFR, ESTIMATED: 46 ML/MIN/1.73M2
GLUCOSE SERPL-MCNC: 93 MG/DL (ref 74–99)
HCT VFR BLD AUTO: 48.6 % (ref 37–54)
HGB BLD-MCNC: 15.9 G/DL (ref 12.5–16.5)
IMM GRANULOCYTES # BLD AUTO: 0.03 K/UL (ref 0–0.58)
IMM GRANULOCYTES NFR BLD: 0 % (ref 0–5)
LYMPHOCYTES NFR BLD: 2.13 K/UL (ref 1.5–4)
LYMPHOCYTES RELATIVE PERCENT: 27 % (ref 20–42)
MCH RBC QN AUTO: 28.6 PG (ref 26–35)
MCHC RBC AUTO-ENTMCNC: 32.7 G/DL (ref 32–34.5)
MCV RBC AUTO: 87.4 FL (ref 80–99.9)
MONOCYTES NFR BLD: 0.74 K/UL (ref 0.1–0.95)
MONOCYTES NFR BLD: 9 % (ref 2–12)
NEUTROPHILS NFR BLD: 61 % (ref 43–80)
NEUTS SEG NFR BLD: 4.8 K/UL (ref 1.8–7.3)
PLATELET # BLD AUTO: 300 K/UL (ref 130–450)
PMV BLD AUTO: 11.3 FL (ref 7–12)
POTASSIUM SERPL-SCNC: 4.1 MMOL/L (ref 3.5–5.1)
PROT SERPL-MCNC: 7 G/DL (ref 6.4–8.3)
RBC # BLD AUTO: 5.56 M/UL (ref 3.8–5.8)
SODIUM SERPL-SCNC: 140 MMOL/L (ref 136–145)
WBC OTHER # BLD: 7.9 K/UL (ref 4.5–11.5)

## 2025-06-16 PROCEDURE — 80053 COMPREHEN METABOLIC PANEL: CPT

## 2025-06-16 PROCEDURE — 82550 ASSAY OF CK (CPK): CPT

## 2025-06-16 PROCEDURE — 36592 COLLECT BLOOD FROM PICC: CPT

## 2025-06-16 PROCEDURE — 6360000002 HC RX W HCPCS: Performed by: CLINICAL NURSE SPECIALIST

## 2025-06-16 PROCEDURE — 85652 RBC SED RATE AUTOMATED: CPT

## 2025-06-16 PROCEDURE — 96374 THER/PROPH/DIAG INJ IV PUSH: CPT

## 2025-06-16 PROCEDURE — 85025 COMPLETE CBC W/AUTO DIFF WBC: CPT

## 2025-06-16 PROCEDURE — 2500000003 HC RX 250 WO HCPCS: Performed by: CLINICAL NURSE SPECIALIST

## 2025-06-16 PROCEDURE — 86140 C-REACTIVE PROTEIN: CPT

## 2025-06-16 PROCEDURE — 6360000002 HC RX W HCPCS: Performed by: SPECIALIST

## 2025-06-16 PROCEDURE — 2500000003 HC RX 250 WO HCPCS: Performed by: SPECIALIST

## 2025-06-16 RX ORDER — SODIUM CHLORIDE 0.9 % (FLUSH) 0.9 %
5-40 SYRINGE (ML) INJECTION PRN
Status: DISCONTINUED | OUTPATIENT
Start: 2025-06-16 | End: 2025-06-17 | Stop reason: HOSPADM

## 2025-06-16 RX ORDER — SODIUM CHLORIDE 0.9 % (FLUSH) 0.9 %
5-40 SYRINGE (ML) INJECTION PRN
Status: CANCELLED | OUTPATIENT
Start: 2025-06-17

## 2025-06-16 RX ORDER — HEPARIN 100 UNIT/ML
500 SYRINGE INTRAVENOUS PRN
Status: CANCELLED | OUTPATIENT
Start: 2025-06-17

## 2025-06-16 RX ORDER — HEPARIN 100 UNIT/ML
500 SYRINGE INTRAVENOUS PRN
Status: DISCONTINUED | OUTPATIENT
Start: 2025-06-16 | End: 2025-06-17 | Stop reason: HOSPADM

## 2025-06-16 RX ORDER — SODIUM CHLORIDE 9 MG/ML
5-250 INJECTION, SOLUTION INTRAVENOUS PRN
Status: CANCELLED | OUTPATIENT
Start: 2025-06-17

## 2025-06-16 RX ADMIN — Medication 10 ML: at 14:48

## 2025-06-16 RX ADMIN — SODIUM CHLORIDE 500 MG: 9 INJECTION INTRAMUSCULAR; INTRAVENOUS; SUBCUTANEOUS at 14:48

## 2025-06-16 RX ADMIN — Medication 10 ML: at 14:53

## 2025-06-16 RX ADMIN — HEPARIN 500 UNITS: 100 SYRINGE at 14:53

## 2025-06-17 ENCOUNTER — HOSPITAL ENCOUNTER (OUTPATIENT)
Dept: INFUSION THERAPY | Age: 75
Setting detail: INFUSION SERIES
Discharge: HOME | End: 2025-06-17
Payer: MEDICARE

## 2025-06-17 VITALS
SYSTOLIC BLOOD PRESSURE: 120 MMHG | DIASTOLIC BLOOD PRESSURE: 84 MMHG | TEMPERATURE: 98.1 F | OXYGEN SATURATION: 97 % | HEART RATE: 78 BPM | RESPIRATION RATE: 20 BRPM

## 2025-06-17 DIAGNOSIS — M86.00 ACUTE HEMATOGENOUS OSTEOMYELITIS, UNSPECIFIED SITE (HCC): Primary | ICD-10-CM

## 2025-06-17 PROCEDURE — 6360000002 HC RX W HCPCS: Performed by: SPECIALIST

## 2025-06-17 PROCEDURE — 99211 OFF/OP EST MAY X REQ PHY/QHP: CPT

## 2025-06-17 PROCEDURE — 96374 THER/PROPH/DIAG INJ IV PUSH: CPT

## 2025-06-17 PROCEDURE — 2500000003 HC RX 250 WO HCPCS: Performed by: SPECIALIST

## 2025-06-17 RX ORDER — HEPARIN 100 UNIT/ML
500 SYRINGE INTRAVENOUS PRN
Status: CANCELLED | OUTPATIENT
Start: 2025-06-18

## 2025-06-17 RX ORDER — HEPARIN 100 UNIT/ML
500 SYRINGE INTRAVENOUS PRN
Status: DISCONTINUED | OUTPATIENT
Start: 2025-06-17 | End: 2025-06-18 | Stop reason: HOSPADM

## 2025-06-17 RX ORDER — SODIUM CHLORIDE 0.9 % (FLUSH) 0.9 %
5-40 SYRINGE (ML) INJECTION PRN
Status: DISCONTINUED | OUTPATIENT
Start: 2025-06-17 | End: 2025-06-18 | Stop reason: HOSPADM

## 2025-06-17 RX ORDER — SODIUM CHLORIDE 0.9 % (FLUSH) 0.9 %
5-40 SYRINGE (ML) INJECTION PRN
Status: CANCELLED | OUTPATIENT
Start: 2025-06-18

## 2025-06-17 RX ORDER — SODIUM CHLORIDE 9 MG/ML
5-250 INJECTION, SOLUTION INTRAVENOUS PRN
Status: CANCELLED | OUTPATIENT
Start: 2025-06-18

## 2025-06-17 RX ADMIN — SODIUM CHLORIDE 500 MG: 9 INJECTION INTRAMUSCULAR; INTRAVENOUS; SUBCUTANEOUS at 10:39

## 2025-06-18 ENCOUNTER — HOSPITAL ENCOUNTER (OUTPATIENT)
Dept: INFUSION THERAPY | Age: 75
Setting detail: INFUSION SERIES
Discharge: HOME | End: 2025-06-18
Payer: MEDICARE

## 2025-06-18 ENCOUNTER — HOSPITAL ENCOUNTER (OUTPATIENT)
Dept: WOUND CARE | Age: 75
Discharge: HOME | End: 2025-06-18
Payer: MEDICARE

## 2025-06-18 ENCOUNTER — RESULTS FOLLOW-UP (OUTPATIENT)
Dept: INFECTIOUS DISEASES | Age: 75
End: 2025-06-18

## 2025-06-18 VITALS
SYSTOLIC BLOOD PRESSURE: 138 MMHG | OXYGEN SATURATION: 96 % | DIASTOLIC BLOOD PRESSURE: 80 MMHG | HEART RATE: 74 BPM | TEMPERATURE: 98 F

## 2025-06-18 VITALS
WEIGHT: 233 LBS | HEART RATE: 75 BPM | BODY MASS INDEX: 35.31 KG/M2 | DIASTOLIC BLOOD PRESSURE: 55 MMHG | HEIGHT: 68 IN | TEMPERATURE: 96 F | RESPIRATION RATE: 20 BRPM | SYSTOLIC BLOOD PRESSURE: 144 MMHG

## 2025-06-18 DIAGNOSIS — I70.25 ATHEROSCLEROSIS OF NATIVE ARTERY OF OTHER EXTREMITY WITH ULCERATION (HCC): Chronic | ICD-10-CM

## 2025-06-18 DIAGNOSIS — T81.31XD SURGICAL WOUND DEHISCENCE, SUBSEQUENT ENCOUNTER: Primary | ICD-10-CM

## 2025-06-18 DIAGNOSIS — M86.00 ACUTE HEMATOGENOUS OSTEOMYELITIS, UNSPECIFIED SITE (HCC): Primary | ICD-10-CM

## 2025-06-18 DIAGNOSIS — L98.494: Chronic | ICD-10-CM

## 2025-06-18 PROCEDURE — 6360000002 HC RX W HCPCS: Performed by: CLINICAL NURSE SPECIALIST

## 2025-06-18 PROCEDURE — 96374 THER/PROPH/DIAG INJ IV PUSH: CPT

## 2025-06-18 PROCEDURE — 2500000003 HC RX 250 WO HCPCS: Performed by: SPECIALIST

## 2025-06-18 PROCEDURE — 99213 OFFICE O/P EST LOW 20 MIN: CPT

## 2025-06-18 PROCEDURE — 99204 OFFICE O/P NEW MOD 45 MIN: CPT | Performed by: SURGERY

## 2025-06-18 PROCEDURE — 11042 DBRDMT SUBQ TIS 1ST 20SQCM/<: CPT

## 2025-06-18 PROCEDURE — 99212 OFFICE O/P EST SF 10 MIN: CPT

## 2025-06-18 PROCEDURE — 2500000003 HC RX 250 WO HCPCS: Performed by: CLINICAL NURSE SPECIALIST

## 2025-06-18 PROCEDURE — 11042 DBRDMT SUBQ TIS 1ST 20SQCM/<: CPT | Performed by: SURGERY

## 2025-06-18 PROCEDURE — 6360000002 HC RX W HCPCS: Performed by: SPECIALIST

## 2025-06-18 RX ORDER — SODIUM CHLORIDE 0.9 % (FLUSH) 0.9 %
5-40 SYRINGE (ML) INJECTION PRN
Status: DISCONTINUED | OUTPATIENT
Start: 2025-06-18 | End: 2025-06-19 | Stop reason: HOSPADM

## 2025-06-18 RX ORDER — HEPARIN 100 UNIT/ML
500 SYRINGE INTRAVENOUS PRN
Status: CANCELLED | OUTPATIENT
Start: 2025-06-19

## 2025-06-18 RX ORDER — SODIUM CHLORIDE 0.9 % (FLUSH) 0.9 %
5-40 SYRINGE (ML) INJECTION PRN
Status: CANCELLED | OUTPATIENT
Start: 2025-06-19

## 2025-06-18 RX ORDER — SODIUM CHLORIDE 9 MG/ML
5-250 INJECTION, SOLUTION INTRAVENOUS PRN
Status: CANCELLED | OUTPATIENT
Start: 2025-06-19

## 2025-06-18 RX ORDER — HEPARIN 100 UNIT/ML
500 SYRINGE INTRAVENOUS PRN
Status: DISCONTINUED | OUTPATIENT
Start: 2025-06-18 | End: 2025-06-19 | Stop reason: HOSPADM

## 2025-06-18 RX ADMIN — HEPARIN 500 UNITS: 100 SYRINGE at 12:15

## 2025-06-18 RX ADMIN — SODIUM CHLORIDE, PRESERVATIVE FREE 10 ML: 5 INJECTION INTRAVENOUS at 12:15

## 2025-06-18 RX ADMIN — SODIUM CHLORIDE, PRESERVATIVE FREE 10 ML: 5 INJECTION INTRAVENOUS at 12:11

## 2025-06-18 RX ADMIN — SODIUM CHLORIDE 500 MG: 9 INJECTION INTRAMUSCULAR; INTRAVENOUS; SUBCUTANEOUS at 12:11

## 2025-06-18 NOTE — DISCHARGE INSTRUCTIONS
Visit Discharge/Physician Orders     Discharge condition: Stable     Assessment of pain at discharge:Assessed     Anesthetic used: lidocaine 4%      Discharge to: Home     Left via:Private automobile     Accompanied by: daughter      CARLA/YANIRAA: Devin- phone: [phone]     Dressing Orders:Cleanse wounds to right fingers with normal saline, apply CLAIRE to wound beds and cover with gauze- adhere with tape, change daily.   May use small finger tubigrip to hold dressings in place      Treatment Orders:FOLLOW NUTRITIOUS DIET. CHOOSE FOODS HIGH IN PROTEIN -CHICKEN- FISH-AND EGGS,  CHOOSE FOODS HIGH IN VITAMIN C.   MULTIVITAMIN DAILY.    6/18 Quit smoking   6/18 Arterial  studies of the arm to be scheduled      [facility] followup visit _______1 week______________________  (Please note your next appointment above and if you are unable to keep, kindly give a 24 hour notice. Thank you.)     Physician signature:__________________________        If you experience any of the following, please call the Wound Care Center during business hours:     * Increase in Pain  * Temperature over 101  * Increase in drainage from your wound  * Drainage with a foul odor  * Bleeding  * Increase in swelling  * Need for compression bandage changes due to slippage, breakthrough drainage.     If you need medical attention outside of the business hours of the Wound Care Centers please contact your PCP or go to the nearest emergency room.

## 2025-06-18 NOTE — PROGRESS NOTES
Wound Care Supplies      Supply Company:     [facility] [address] [place] [address]  p: [phone] f: [phone]     Ordering Center:     [facility]  [address] [place] [address]  [phone]  WOUND CARE Dept: [phone]   FAX NUMBER [phone]    Patient Information:      Clark ZURITA Postlethwait  [address] [address]   [phone]- call first   : 1950  AGE: 74 y.o.     GENDER: male   EPISODE DATE: 2025    Insurance:      PRIMARY INSURANCE:  Plan: [facility] MEDICARE ADVANTAGE  Coverage: [facility] MEDICARE  Effective Date: 2021  Group Number: [unfilled]  Subscriber Number: [id number] - (Medicare Managed)    Payer/Plan Subscr  Sex Relation Sub. Ins. ID Effective Group Num   1. [facility] MEDICARE * POSTLETHWAIT,EDGAR* 1950 Male Self [id number]                                   [address] [address]       Patient Wound Information:      Problem List Items Addressed This Visit          Other    Surgical wound dehiscence, subsequent encounter - Primary       ICD 10 Code: L98.492    WOUNDS REQUIRING DRESSING SUPPLIES:     Wound 25 Finger (Comment which one) Right #1 thumb (Active)   Wound Image   25 0939   Wound Etiology Arterial 25 0939   Wound Length (cm) 1 cm 25 0939   Wound Width (cm) 2 cm 25 0939   Wound Depth (cm) 0.1 cm 25 0939   Wound Surface Area (cm^2) 2 cm^2 25 0939   Wound Volume (cm^3) 0.2 cm^3 25 0939   Wound Assessment Fibrin;Pink/red 25 1028   Drainage Amount Scant (moist but unmeasurable) 25 1028   Drainage Description Sanguinous 25 1028   Odor None 25 0939   Josey-wound Assessment Intact 25 0939   Margins Attached edges 25 0939   Wound Thickness Description not for Pressure Injury Full thickness 25 0939   Number of days: 0       Wound 25 Finger (Comment which one) Right #2 second (Active)

## 2025-06-18 NOTE — PROGRESS NOTES
Wound Healing Center /Hyperbarics   History and Physical/Consultation  Vascular    Referring Physician : Chance Jama DO  Clark Maza  MEDICAL RECORD NUMBER:  [id number]  AGE: 74 y.o.   GENDER: male  : 1950  EPISODE DATE:  2025  Subjective:     Chief Complaint   Patient presents with    Wound Check     Right hand, right thumb, right second finger, right third finger         HISTORY of PRESENT ILLNESS HPI     Clark Maza is a 74 y.o. male who presents today for wound/ulcer evaluation.   History of Wound Context:  The patient has had a wound of right fingers which was first noted approximately 2024.  This has been treated surgical intervention per Dr Hernández.    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  The patient has had similar previous wounds in the past.      Pt is on abx at time of initial visit.    He is not a DM.  He is a smoker but he has only smoked 2-3 cigarettes in past 1 month.      Due to trauma as listed above his radial artery was ligated in mid forearm.      Patient has minimal sensation in hand after surgery.  He admits to continuing to use his fingers doing projects in his garage even while these issues are ongoing.      He has followed in wound care previously with Dr JOSELINE Torres.    He was referred by

## 2025-06-18 NOTE — PLAN OF CARE
Problem: Wound:  Goal: Will show signs of wound healing; wound closure and no evidence of infection  Description: Will show signs of wound healing; wound closure and no evidence of infection  Outcome: Not Progressing

## 2025-06-19 ENCOUNTER — OFFICE VISIT (OUTPATIENT)
Dept: FAMILY MEDICINE CLINIC | Age: 75
End: 2025-06-19

## 2025-06-19 ENCOUNTER — HOSPITAL ENCOUNTER (OUTPATIENT)
Dept: INFUSION THERAPY | Age: 75
Setting detail: INFUSION SERIES
Discharge: HOME | End: 2025-06-19
Payer: MEDICARE

## 2025-06-19 VITALS
OXYGEN SATURATION: 92 % | SYSTOLIC BLOOD PRESSURE: 117 MMHG | DIASTOLIC BLOOD PRESSURE: 88 MMHG | HEART RATE: 80 BPM | RESPIRATION RATE: 24 BRPM | TEMPERATURE: 98 F

## 2025-06-19 VITALS
DIASTOLIC BLOOD PRESSURE: 80 MMHG | TEMPERATURE: 97.7 F | HEART RATE: 67 BPM | SYSTOLIC BLOOD PRESSURE: 138 MMHG | HEIGHT: 68 IN | BODY MASS INDEX: 34.59 KG/M2 | WEIGHT: 228.2 LBS

## 2025-06-19 DIAGNOSIS — M25.811 IMPINGEMENT OF RIGHT SHOULDER: Primary | ICD-10-CM

## 2025-06-19 DIAGNOSIS — J44.9 CHRONIC OBSTRUCTIVE PULMONARY DISEASE, UNSPECIFIED COPD TYPE (HCC): ICD-10-CM

## 2025-06-19 DIAGNOSIS — M86.00 ACUTE HEMATOGENOUS OSTEOMYELITIS, UNSPECIFIED SITE (HCC): Primary | ICD-10-CM

## 2025-06-19 LAB
ALBUMIN SERPL-MCNC: 4 G/DL (ref 3.5–5.2)
ALP SERPL-CCNC: 52 U/L (ref 40–129)
ALT SERPL-CCNC: 14 U/L (ref 0–50)
ANION GAP SERPL CALCULATED.3IONS-SCNC: 11 MMOL/L (ref 7–16)
AST SERPL-CCNC: 20 U/L (ref 0–50)
BASOPHILS # BLD: 0.06 K/UL (ref 0–0.2)
BASOPHILS NFR BLD: 1 % (ref 0–2)
BILIRUB SERPL-MCNC: 0.3 MG/DL (ref 0–1.2)
BUN SERPL-MCNC: 26 MG/DL (ref 8–23)
CALCIUM SERPL-MCNC: 9.2 MG/DL (ref 8.8–10.2)
CHLORIDE SERPL-SCNC: 105 MMOL/L (ref 98–107)
CO2 SERPL-SCNC: 24 MMOL/L (ref 22–29)
CREAT SERPL-MCNC: 1.2 MG/DL (ref 0.7–1.2)
EOSINOPHIL # BLD: 0.17 K/UL (ref 0.05–0.5)
EOSINOPHILS RELATIVE PERCENT: 2 % (ref 0–6)
ERYTHROCYTE [DISTWIDTH] IN BLOOD BY AUTOMATED COUNT: 13.7 % (ref 11.5–15)
GFR, ESTIMATED: 65 ML/MIN/1.73M2
GLUCOSE SERPL-MCNC: 92 MG/DL (ref 74–99)
HCT VFR BLD AUTO: 45.5 % (ref 37–54)
HGB BLD-MCNC: 15 G/DL (ref 12.5–16.5)
IMM GRANULOCYTES # BLD AUTO: 0.05 K/UL (ref 0–0.58)
IMM GRANULOCYTES NFR BLD: 1 % (ref 0–5)
LYMPHOCYTES NFR BLD: 1.73 K/UL (ref 1.5–4)
LYMPHOCYTES RELATIVE PERCENT: 24 % (ref 20–42)
MCH RBC QN AUTO: 28.8 PG (ref 26–35)
MCHC RBC AUTO-ENTMCNC: 33 G/DL (ref 32–34.5)
MCV RBC AUTO: 87.3 FL (ref 80–99.9)
MONOCYTES NFR BLD: 0.67 K/UL (ref 0.1–0.95)
MONOCYTES NFR BLD: 9 % (ref 2–12)
NEUTROPHILS NFR BLD: 63 % (ref 43–80)
NEUTS SEG NFR BLD: 4.53 K/UL (ref 1.8–7.3)
PLATELET # BLD AUTO: 276 K/UL (ref 130–450)
PMV BLD AUTO: 10.9 FL (ref 7–12)
POTASSIUM SERPL-SCNC: 4.1 MMOL/L (ref 3.5–5.1)
PROT SERPL-MCNC: 6.8 G/DL (ref 6.4–8.3)
RBC # BLD AUTO: 5.21 M/UL (ref 3.8–5.8)
SODIUM SERPL-SCNC: 140 MMOL/L (ref 136–145)
WBC OTHER # BLD: 7.2 K/UL (ref 4.5–11.5)

## 2025-06-19 PROCEDURE — 96374 THER/PROPH/DIAG INJ IV PUSH: CPT

## 2025-06-19 PROCEDURE — 2500000003 HC RX 250 WO HCPCS: Performed by: CLINICAL NURSE SPECIALIST

## 2025-06-19 PROCEDURE — 80053 COMPREHEN METABOLIC PANEL: CPT

## 2025-06-19 PROCEDURE — 6360000002 HC RX W HCPCS: Performed by: CLINICAL NURSE SPECIALIST

## 2025-06-19 PROCEDURE — 36592 COLLECT BLOOD FROM PICC: CPT

## 2025-06-19 PROCEDURE — 85025 COMPLETE CBC W/AUTO DIFF WBC: CPT

## 2025-06-19 PROCEDURE — 2500000003 HC RX 250 WO HCPCS: Performed by: SPECIALIST

## 2025-06-19 PROCEDURE — 6360000002 HC RX W HCPCS: Performed by: SPECIALIST

## 2025-06-19 RX ORDER — TRIAMCINOLONE ACETONIDE 40 MG/ML
40 INJECTION, SUSPENSION INTRA-ARTICULAR; INTRAMUSCULAR ONCE
Status: SHIPPED | OUTPATIENT
Start: 2025-06-19

## 2025-06-19 RX ORDER — METHYLPREDNISOLONE ACETATE 40 MG/ML
40 INJECTION, SUSPENSION INTRA-ARTICULAR; INTRALESIONAL; INTRAMUSCULAR; SOFT TISSUE ONCE
Status: COMPLETED | OUTPATIENT
Start: 2025-06-19 | End: 2025-06-19

## 2025-06-19 RX ORDER — SODIUM CHLORIDE 0.9 % (FLUSH) 0.9 %
5-40 SYRINGE (ML) INJECTION PRN
Status: CANCELLED | OUTPATIENT
Start: 2025-06-22

## 2025-06-19 RX ORDER — SODIUM CHLORIDE 9 MG/ML
5-250 INJECTION, SOLUTION INTRAVENOUS PRN
Status: CANCELLED | OUTPATIENT
Start: 2025-06-22

## 2025-06-19 RX ORDER — HEPARIN 100 UNIT/ML
500 SYRINGE INTRAVENOUS PRN
Status: CANCELLED | OUTPATIENT
Start: 2025-06-22

## 2025-06-19 RX ORDER — FLUTICASONE FUROATE, UMECLIDINIUM BROMIDE AND VILANTEROL TRIFENATATE 100; 62.5; 25 UG/1; UG/1; UG/1
1 POWDER RESPIRATORY (INHALATION) DAILY
Qty: 120 EACH | Refills: 0 | Status: SHIPPED | OUTPATIENT
Start: 2025-06-19

## 2025-06-19 RX ORDER — SODIUM CHLORIDE 0.9 % (FLUSH) 0.9 %
5-40 SYRINGE (ML) INJECTION PRN
Status: DISCONTINUED | OUTPATIENT
Start: 2025-06-19 | End: 2025-06-20 | Stop reason: HOSPADM

## 2025-06-19 RX ORDER — HEPARIN 100 UNIT/ML
500 SYRINGE INTRAVENOUS PRN
Status: DISCONTINUED | OUTPATIENT
Start: 2025-06-19 | End: 2025-06-20 | Stop reason: HOSPADM

## 2025-06-19 RX ADMIN — SODIUM CHLORIDE 500 MG: 9 INJECTION INTRAMUSCULAR; INTRAVENOUS; SUBCUTANEOUS at 12:02

## 2025-06-19 RX ADMIN — Medication 10 ML: at 12:06

## 2025-06-19 RX ADMIN — METHYLPREDNISOLONE ACETATE 40 MG: 40 INJECTION, SUSPENSION INTRA-ARTICULAR; INTRALESIONAL; INTRAMUSCULAR; SOFT TISSUE at 13:18

## 2025-06-19 RX ADMIN — HEPARIN 500 UNITS: 100 SYRINGE at 12:06

## 2025-06-19 RX ADMIN — Medication 10 ML: at 11:58

## 2025-06-19 NOTE — ASSESSMENT & PLAN NOTE
Procedure:  Intra-Articular Injection right shoulder:    The patient was counseled on the options for treating the affected shoulder.  These include but were not limited to trail of oral anti-inflammatories, oral steroids, physical therapy referral, or ortho consultation.  The patient is electing injection.  The risks of the injection were explained, including but not limited to infection, bleeding, bruising, tendon injury, skin contour deformity and soft tissue/fat necrosis. The patient gave verbal permission to proceed.      The patient was placed in a seated position.  The skin of [facility] was prepped with Betadine swabs. It was allowed to dry.    Utilizing a  Posterior Sub-Acromial approach an injection of 40 mg of  Kenalog and 1 cc of 1% lidocaine without epinephrine and 0.5 cc of 0.25% Bupivacaine,was injected into the right Shoulder joint after first aspirating to assure no blood return.  The injection site was then wiped again with Betadine and covered with a band aid.  The procedure was tolerated well.  Routine wound instructions given verbally to the patient.  Advised to notify if bleeding, excessive bruising, or swelling develop.    Surgeon: Chance Jama D.O.    Orders:    fluticasone-umeclidin-vilant (TRELEGY ELLIPTA) 100-62.5-25 MCG/ACT AEPB inhaler; Inhale 1 puff into the lungs daily

## 2025-06-19 NOTE — PROGRESS NOTES
Clark Maza (:  1950) is a 74 y.o. male,Established patient, here for evaluation of the following chief complaint(s):  1 Month Follow-Up (Patient is a 1 month F/U and has recent labs on file to review. )      Assessment & Plan   ASSESSMENT/PLAN:  Assessment & Plan  Chronic obstructive pulmonary disease, unspecified COPD type (HCC)   Procedure:  Intra-Articular Injection right shoulder:    The patient was counseled on the options for treating the affected shoulder.  These include but were not limited to trail of oral anti-inflammatories, oral steroids, physical therapy referral, or ortho consultation.  The patient is electing injection.  The risks of the injection were explained, including but not limited to infection, bleeding, bruising, tendon injury, skin contour deformity and soft tissue/fat necrosis. The patient gave verbal permission to proceed.      The patient was placed in a seated position.  The skin of [facility] was prepped with Betadine swabs. It was allowed to dry.    Utilizing a  Posterior Sub-Acromial approach an injection of 40 mg of  Kenalog and 1 cc of 1% lidocaine without epinephrine and 0.5 cc of 0.25% Bupivacaine,was injected into the right Shoulder joint after first aspirating to assure no blood return.  The injection site was then wiped again with Betadine and covered with a band aid.  The procedure was tolerated well.  Routine wound instructions given verbally to the patient.  Advised to notify if bleeding, excessive bruising, or swelling develop.    Surgeon: Chance Jama D.O.    Orders:    fluticasone-umeclidin-vilant (TRELEGY ELLIPTA) 100-62.5-25 MCG/ACT AEPB inhaler; Inhale 1 puff into the lungs daily    Impingement of right shoulder   Chronic, at goal (stable), shot    Orders:    DRAIN/INJECT LARGE JOINT/BURSA    triamcinolone acetonide (KENALOG-40) injection 40 mg       Assessment & Plan  1. Wound.  - The wound size has increased following

## 2025-06-20 ENCOUNTER — HOSPITAL ENCOUNTER (OUTPATIENT)
Dept: INFUSION THERAPY | Age: 75
Setting detail: INFUSION SERIES
Discharge: HOME | End: 2025-06-20
Payer: MEDICARE

## 2025-06-20 DIAGNOSIS — M86.00 ACUTE HEMATOGENOUS OSTEOMYELITIS, UNSPECIFIED SITE (HCC): Primary | ICD-10-CM

## 2025-06-20 DIAGNOSIS — G89.29 CHRONIC RIGHT SHOULDER PAIN: ICD-10-CM

## 2025-06-20 DIAGNOSIS — M25.511 CHRONIC RIGHT SHOULDER PAIN: ICD-10-CM

## 2025-06-20 PROCEDURE — 99211 OFF/OP EST MAY X REQ PHY/QHP: CPT

## 2025-06-20 PROCEDURE — 96523 IRRIG DRUG DELIVERY DEVICE: CPT

## 2025-06-20 PROCEDURE — 2500000003 HC RX 250 WO HCPCS: Performed by: CLINICAL NURSE SPECIALIST

## 2025-06-20 RX ORDER — HEPARIN 100 UNIT/ML
500 SYRINGE INTRAVENOUS PRN
OUTPATIENT
Start: 2025-06-22

## 2025-06-20 RX ORDER — SODIUM CHLORIDE 0.9 % (FLUSH) 0.9 %
5-40 SYRINGE (ML) INJECTION PRN
Status: DISCONTINUED | OUTPATIENT
Start: 2025-06-20 | End: 2025-06-21 | Stop reason: HOSPADM

## 2025-06-20 RX ORDER — HYDROCODONE BITARTRATE AND ACETAMINOPHEN 7.5; 325 MG/1; MG/1
1 TABLET ORAL EVERY 6 HOURS PRN
Qty: 90 TABLET | Refills: 0 | Status: SHIPPED | OUTPATIENT
Start: 2025-06-20 | End: 2025-07-20

## 2025-06-20 RX ORDER — SODIUM CHLORIDE 0.9 % (FLUSH) 0.9 %
5-40 SYRINGE (ML) INJECTION PRN
OUTPATIENT
Start: 2025-06-22

## 2025-06-20 RX ORDER — SODIUM CHLORIDE 9 MG/ML
5-250 INJECTION, SOLUTION INTRAVENOUS PRN
OUTPATIENT
Start: 2025-06-22

## 2025-06-20 RX ADMIN — Medication 10 ML: at 14:40

## 2025-06-20 NOTE — PROGRESS NOTES
PICC line removed from left arm per protocol.  PICC cath tip visualized and intact.   Pressure dressing applied.    No redness, ecchymosis, edema, swelling, or drainage noted at site.   Instructions provided on post PICC discharge care, including follow-up notification instructions.

## 2025-06-20 NOTE — TELEPHONE ENCOUNTER
Name of Medication(s) Requested:  Requested Prescriptions     Pending Prescriptions Disp Refills    HYDROcodone-acetaminophen (NORCO) 7.5-325 MG per tablet 90 tablet 0     Sig: Take 1 tablet by mouth every 6 hours as needed for Pain for up to 30 days. Max Daily Amount: 4 tablets       Medication is on current medication list Yes    Dosage and directions were verified? Yes    Quantity verified: 30 day supply     Pharmacy Verified?  Yes    Last Appointment:  6/19/2025    Future appts:  Future Appointments   Date Time Provider Department Center   6/25/2025  9:30 AM Morgan Verdugo MD [facility] WOUND [facility]   7/9/2025  9:15 AM Lois Pappas MD [facility] INF   7/17/2025  9:00 AM Chance Jama DO [facility] ECC DEP   8/11/2025  1:00 PM [facility] CARDIOVASCULAR RM 2 SJWZ V LAB [facility] Radiolo        (If no appt send self scheduling link. .[email or web address])  Scheduling request sent?     [] Yes  [x] No    Does patient need updated?  [] Yes  [x] No

## 2025-06-25 ENCOUNTER — HOSPITAL ENCOUNTER (OUTPATIENT)
Dept: WOUND CARE | Age: 75
Discharge: HOME | End: 2025-06-25

## 2025-06-30 NOTE — DISCHARGE INSTRUCTIONS
Visit Discharge/Physician Orders     Discharge condition: Stable     Assessment of pain at discharge:Assessed     Anesthetic used: lidocaine 4%      Discharge to: Home     Left via:Private automobile     Accompanied by: daughter      CARLA/YANIRAA: Devin- phone: [phone]     Dressing Orders:Cleanse wounds to right fingers with normal saline, apply CLAIRE to wound beds and cover with gauze- adhere with tape, change daily.   May use small finger tubigrip to hold dressings in place      Treatment Orders:FOLLOW NUTRITIOUS DIET. CHOOSE FOODS HIGH IN PROTEIN -CHICKEN- FISH-AND EGGS,  CHOOSE FOODS HIGH IN VITAMIN C.   MULTIVITAMIN DAILY.      Smoking Cessation Encouraged    Arterial studies of the arm scheduled for 8/11/25     [facility] followup visit _______Dr. SANTILLAN 2 week______________________  (Please note your next appointment above and if you are unable to keep, kindly give a 24 hour notice. Thank you.)     Physician signature:__________________________        If you experience any of the following, please call the Wound Care Center during business hours:     * Increase in Pain  * Temperature over 101  * Increase in drainage from your wound  * Drainage with a foul odor  * Bleeding  * Increase in swelling  * Need for compression bandage changes due to slippage, breakthrough drainage.     If you need medical attention outside of the business hours of the Wound Care Centers please contact your PCP or go to the nearest emergency room.

## 2025-07-02 ENCOUNTER — HOSPITAL ENCOUNTER (OUTPATIENT)
Dept: WOUND CARE | Age: 75
Discharge: HOME | End: 2025-07-02
Payer: MEDICARE

## 2025-07-02 VITALS
SYSTOLIC BLOOD PRESSURE: 121 MMHG | WEIGHT: 228 LBS | DIASTOLIC BLOOD PRESSURE: 59 MMHG | BODY MASS INDEX: 34.56 KG/M2 | RESPIRATION RATE: 18 BRPM | HEIGHT: 68 IN | HEART RATE: 84 BPM | TEMPERATURE: 97.6 F

## 2025-07-02 DIAGNOSIS — L98.494: Chronic | ICD-10-CM

## 2025-07-02 DIAGNOSIS — I70.25 ATHEROSCLEROSIS OF NATIVE ARTERY OF OTHER EXTREMITY WITH ULCERATION (HCC): Chronic | ICD-10-CM

## 2025-07-02 DIAGNOSIS — T81.31XD SURGICAL WOUND DEHISCENCE, SUBSEQUENT ENCOUNTER: Primary | ICD-10-CM

## 2025-07-02 PROCEDURE — 11042 DBRDMT SUBQ TIS 1ST 20SQCM/<: CPT

## 2025-07-02 PROCEDURE — 11042 DBRDMT SUBQ TIS 1ST 20SQCM/<: CPT | Performed by: SURGERY

## 2025-07-02 RX ORDER — TRIAMCINOLONE ACETONIDE 1 MG/G
OINTMENT TOPICAL PRN
OUTPATIENT
Start: 2025-07-02

## 2025-07-02 RX ORDER — BACITRACIN ZINC 500 [USP'U]/G
OINTMENT TOPICAL PRN
OUTPATIENT
Start: 2025-07-02

## 2025-07-02 RX ORDER — LIDOCAINE HYDROCHLORIDE 40 MG/ML
SOLUTION TOPICAL PRN
OUTPATIENT
Start: 2025-07-02

## 2025-07-02 RX ORDER — LIDOCAINE 40 MG/G
CREAM TOPICAL PRN
OUTPATIENT
Start: 2025-07-02

## 2025-07-02 RX ORDER — MUPIROCIN 2 %
OINTMENT (GRAM) TOPICAL PRN
OUTPATIENT
Start: 2025-07-02

## 2025-07-02 RX ORDER — GENTAMICIN SULFATE 1 MG/G
OINTMENT TOPICAL PRN
OUTPATIENT
Start: 2025-07-02

## 2025-07-02 RX ORDER — BETAMETHASONE DIPROPIONATE 0.5 MG/G
CREAM TOPICAL PRN
OUTPATIENT
Start: 2025-07-02

## 2025-07-02 RX ORDER — LIDOCAINE HYDROCHLORIDE 20 MG/ML
JELLY TOPICAL PRN
OUTPATIENT
Start: 2025-07-02

## 2025-07-02 RX ORDER — BACITRACIN ZINC AND POLYMYXIN B SULFATE 500; 1000 [USP'U]/G; [USP'U]/G
OINTMENT TOPICAL PRN
OUTPATIENT
Start: 2025-07-02

## 2025-07-02 RX ORDER — NEOMYCIN/BACITRACIN/POLYMYXINB 3.5-400-5K
OINTMENT (GRAM) TOPICAL PRN
OUTPATIENT
Start: 2025-07-02

## 2025-07-02 RX ORDER — SILVER SULFADIAZINE 10 MG/G
CREAM TOPICAL PRN
OUTPATIENT
Start: 2025-07-02

## 2025-07-02 RX ORDER — SODIUM CHLOR/HYPOCHLOROUS ACID 0.033 %
SOLUTION, IRRIGATION IRRIGATION PRN
OUTPATIENT
Start: 2025-07-02

## 2025-07-02 RX ORDER — CLOBETASOL PROPIONATE 0.5 MG/G
OINTMENT TOPICAL PRN
OUTPATIENT
Start: 2025-07-02

## 2025-07-02 RX ORDER — LIDOCAINE 50 MG/G
OINTMENT TOPICAL PRN
OUTPATIENT
Start: 2025-07-02

## 2025-07-02 NOTE — PROGRESS NOTES
Wound Healing Center /Hyperbarics   History and Physical/Consultation  Vascular    Referring Physician : Chance Jama DO  Clark Maza  MEDICAL RECORD NUMBER:  [id number]  AGE: 74 y.o.   GENDER: male  : 1950  EPISODE DATE:  2025  Subjective:     Chief Complaint   Patient presents with    Wound Check     fingers         HISTORY of PRESENT ILLNESS HPI     Clark Maza is a 74 y.o. male who presents today for wound/ulcer evaluation.   History of Wound Context:  The patient has had a wound of right fingers which was first noted approximately 2024.  This has been treated surgical intervention per Dr Hernández.    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  The patient has had similar previous wounds in the past.      Pt is on abx at time of initial visit.    He is not a DM.  He is a smoker but he has only smoked 2-3 cigarettes in past 1 month.      Due to trauma as listed above his radial artery was ligated in mid forearm.      Patient has minimal sensation in hand after surgery.  He admits to continuing to use his fingers doing projects in his garage even while these issues are ongoing.      He has followed in wound care previously with Dr JOSELINE Torres.    He was referred by Dr Pappas.    25  Once wound debrided tissue

## 2025-07-10 NOTE — DISCHARGE INSTRUCTIONS
Visit Discharge/Physician Orders     Discharge condition: Stable     Assessment of pain at discharge:Assessed     Anesthetic used: lidocaine 4%      Discharge to: Home     Left via:Private automobile     Accompanied by: daughter      CARLA/YANIRAA: Devin- phone: [phone]     Dressing Orders:Cleanse wound to right finger with normal saline, apply CLAIRE to wound beds and cover with gauze- adhere with tape, change daily.   May use small finger tubigrip to hold dressings in place      Treatment Orders:FOLLOW NUTRITIOUS DIET. CHOOSE FOODS HIGH IN PROTEIN -CHICKEN- FISH-AND EGGS,  CHOOSE FOODS HIGH IN VITAMIN C.   MULTIVITAMIN DAILY.       Smoking Cessation Encouraged     Arterial studies of the arm scheduled for 8/11/25     [facility] followup visit _______Dr. SANTILLAN 2 week______________________  (Please note your next appointment above and if you are unable to keep, kindly give a 24 hour notice. Thank you.)     Physician signature:__________________________        If you experience any of the following, please call the Wound Care Center during business hours:     * Increase in Pain  * Temperature over 101  * Increase in drainage from your wound  * Drainage with a foul odor  * Bleeding  * Increase in swelling  * Need for compression bandage changes due to slippage, breakthrough drainage.     If you need medical attention outside of the business hours of the Wound Care Centers please contact your PCP or go to the nearest emergency room.

## 2025-07-16 ENCOUNTER — HOSPITAL ENCOUNTER (OUTPATIENT)
Dept: WOUND CARE | Age: 75
Discharge: HOME | End: 2025-07-16
Payer: MEDICARE

## 2025-07-16 VITALS
HEART RATE: 87 BPM | SYSTOLIC BLOOD PRESSURE: 130 MMHG | RESPIRATION RATE: 18 BRPM | DIASTOLIC BLOOD PRESSURE: 32 MMHG | TEMPERATURE: 97.2 F

## 2025-07-16 DIAGNOSIS — I70.25 ATHEROSCLEROSIS OF NATIVE ARTERY OF OTHER EXTREMITY WITH ULCERATION (HCC): ICD-10-CM

## 2025-07-16 DIAGNOSIS — L98.494: ICD-10-CM

## 2025-07-16 DIAGNOSIS — T81.31XD SURGICAL WOUND DEHISCENCE, SUBSEQUENT ENCOUNTER: Primary | ICD-10-CM

## 2025-07-16 PROCEDURE — 11042 DBRDMT SUBQ TIS 1ST 20SQCM/<: CPT | Performed by: SURGERY

## 2025-07-16 PROCEDURE — 11042 DBRDMT SUBQ TIS 1ST 20SQCM/<: CPT

## 2025-07-16 RX ORDER — TRIAMCINOLONE ACETONIDE 1 MG/G
OINTMENT TOPICAL PRN
OUTPATIENT
Start: 2025-07-16

## 2025-07-16 RX ORDER — SILVER SULFADIAZINE 10 MG/G
CREAM TOPICAL PRN
OUTPATIENT
Start: 2025-07-16

## 2025-07-16 RX ORDER — GENTAMICIN SULFATE 1 MG/G
OINTMENT TOPICAL PRN
OUTPATIENT
Start: 2025-07-16

## 2025-07-16 RX ORDER — MUPIROCIN 2 %
OINTMENT (GRAM) TOPICAL PRN
OUTPATIENT
Start: 2025-07-16

## 2025-07-16 RX ORDER — LIDOCAINE 50 MG/G
OINTMENT TOPICAL PRN
OUTPATIENT
Start: 2025-07-16

## 2025-07-16 RX ORDER — LIDOCAINE HYDROCHLORIDE 40 MG/ML
SOLUTION TOPICAL PRN
Status: DISCONTINUED | OUTPATIENT
Start: 2025-07-16 | End: 2025-07-17 | Stop reason: HOSPADM

## 2025-07-16 RX ORDER — LIDOCAINE HYDROCHLORIDE 20 MG/ML
JELLY TOPICAL PRN
OUTPATIENT
Start: 2025-07-16

## 2025-07-16 RX ORDER — LIDOCAINE HYDROCHLORIDE 40 MG/ML
SOLUTION TOPICAL PRN
OUTPATIENT
Start: 2025-07-16

## 2025-07-16 RX ORDER — BACITRACIN ZINC AND POLYMYXIN B SULFATE 500; 1000 [USP'U]/G; [USP'U]/G
OINTMENT TOPICAL PRN
OUTPATIENT
Start: 2025-07-16

## 2025-07-16 RX ORDER — BACITRACIN ZINC 500 [USP'U]/G
OINTMENT TOPICAL PRN
OUTPATIENT
Start: 2025-07-16

## 2025-07-16 RX ORDER — BETAMETHASONE DIPROPIONATE 0.5 MG/G
CREAM TOPICAL PRN
OUTPATIENT
Start: 2025-07-16

## 2025-07-16 RX ORDER — LIDOCAINE 40 MG/G
CREAM TOPICAL PRN
OUTPATIENT
Start: 2025-07-16

## 2025-07-16 RX ORDER — CLOBETASOL PROPIONATE 0.5 MG/G
OINTMENT TOPICAL PRN
OUTPATIENT
Start: 2025-07-16

## 2025-07-16 RX ORDER — NEOMYCIN/BACITRACIN/POLYMYXINB 3.5-400-5K
OINTMENT (GRAM) TOPICAL PRN
OUTPATIENT
Start: 2025-07-16

## 2025-07-16 RX ORDER — SODIUM CHLOR/HYPOCHLOROUS ACID 0.033 %
SOLUTION, IRRIGATION IRRIGATION PRN
OUTPATIENT
Start: 2025-07-16

## 2025-07-16 NOTE — PROGRESS NOTES
Wound Healing Center /Hyperbarics   History and Physical/Consultation  Vascular    Referring Physician : Chance Jama DO  Clark Maza  MEDICAL RECORD NUMBER:  [id number]  AGE: 74 y.o.   GENDER: male  : 1950  EPISODE DATE:  2025  Subjective:     Chief Complaint   Patient presents with    Wound Check     Right hand         HISTORY of PRESENT ILLNESS HPI     Clark Maza is a 74 y.o. male who presents today for wound/ulcer evaluation.   History of Wound Context:  The patient has had a wound of right fingers which was first noted approximately 2024.  This has been treated surgical intervention per Dr Hernández.    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  The patient has had similar previous wounds in the past.      Pt is on abx at time of initial visit.    He is not a DM.  He is a smoker but he has only smoked 2-3 cigarettes in past 1 month.      Due to trauma as listed above his radial artery was ligated in mid forearm.      Patient has minimal sensation in hand after surgery.  He admits to continuing to use his fingers doing projects in his garage even while these issues are ongoing.      He has followed in wound care previously with Dr JOSELINE Torres.    He was referred by Dr Pappas.    25  Once wound debrided tissue

## 2025-07-17 ENCOUNTER — OFFICE VISIT (OUTPATIENT)
Dept: FAMILY MEDICINE CLINIC | Age: 75
End: 2025-07-17

## 2025-07-17 VITALS
WEIGHT: 223.2 LBS | OXYGEN SATURATION: 92 % | SYSTOLIC BLOOD PRESSURE: 100 MMHG | DIASTOLIC BLOOD PRESSURE: 60 MMHG | HEART RATE: 84 BPM | RESPIRATION RATE: 16 BRPM | HEIGHT: 68 IN | TEMPERATURE: 97.5 F | BODY MASS INDEX: 33.83 KG/M2

## 2025-07-17 DIAGNOSIS — G89.29 CHRONIC RIGHT SHOULDER PAIN: ICD-10-CM

## 2025-07-17 DIAGNOSIS — M25.511 CHRONIC RIGHT SHOULDER PAIN: ICD-10-CM

## 2025-07-17 RX ORDER — HYDROCODONE BITARTRATE AND ACETAMINOPHEN 7.5; 325 MG/1; MG/1
1 TABLET ORAL EVERY 6 HOURS PRN
Qty: 90 TABLET | Refills: 0 | Status: SHIPPED | OUTPATIENT
Start: 2025-07-17 | End: 2025-08-16

## 2025-07-17 ASSESSMENT — PATIENT HEALTH QUESTIONNAIRE - PHQ9: DEPRESSION UNABLE TO ASSESS: PT REFUSES

## 2025-07-17 NOTE — PROGRESS NOTES
Clark Maza (:  1950) is a 74 y.o. male,Established patient, here for evaluation of the following chief complaint(s):  Shoulder Pain (Medication refill ) and Fatigue (Extreme fatigue, sleeping longs hours at times. No energy )      Assessment & Plan   ASSESSMENT/PLAN:  Assessment & Plan  Chronic right shoulder pain   Chronic, at goal (stable), continue current treatment plan    Orders:    HYDROcodone-acetaminophen (NORCO) 7.5-325 MG per tablet; Take 1 tablet by mouth every 6 hours as needed for Pain for up to 30 days. Max Daily Amount: 4 tablets     Controlled substances monitoring: no signs of potential drug abuse or diversion identified and [place] report reviewed today- activity consistent with treatment plan.     No follow-ups on file.         Subjective   SUBJECTIVE/OBJECTIVE:  Shoulder Pain   Pertinent negatives include no fever.   Fatigue  Associated symptoms include fatigue. Pertinent negatives include no abdominal pain, chest pain, chills, coughing, diaphoresis, fever, headaches, myalgias, nausea, rash or vomiting.       Review of Systems   Constitutional:  Positive for fatigue. Negative for chills, diaphoresis and fever.   HENT:  Negative for ear discharge, ear pain, hearing loss, nosebleeds and tinnitus.    Respiratory:  Negative for cough, shortness of breath and wheezing.    Cardiovascular:  Negative for chest pain.   Gastrointestinal:  Negative for abdominal pain, blood in stool, constipation, diarrhea, nausea and vomiting.   Genitourinary:  Negative for dysuria, flank pain and hematuria.   Musculoskeletal:  Negative for myalgias.   Skin:  Negative for rash.   Neurological:  Negative for headaches.   Hematological:  Does not bruise/bleed easily.   Psychiatric/Behavioral:  Negative for hallucinations and suicidal ideas.           Objective   /60   Pulse 84   Temp 97.5 °F (36.4 °C)   Resp 16   Ht 1.727 m (5' 7.99\")   Wt 101.2 kg (223 lb 3.2 oz)   SpO2 92%   BMI 33.95 kg/m²

## 2025-07-24 NOTE — DISCHARGE INSTRUCTIONS
Visit Discharge/Physician Orders     Discharge condition: Stable     Assessment of pain at discharge:Assessed     Anesthetic used: lidocaine 4%      Discharge to: Home     Left via:Private automobile     Accompanied by: daughter      CARLA/YANIRAA: Devin- phone: [phone]     Dressing Orders:Cleanse wound to right finger with normal saline, apply CLAIRE to wound beds and cover with gauze- adhere with tape, change daily.   May use small finger tubigrip to hold dressings in place      Treatment Orders:FOLLOW NUTRITIOUS DIET. CHOOSE FOODS HIGH IN PROTEIN -CHICKEN- FISH-AND EGGS,  CHOOSE FOODS HIGH IN VITAMIN C.   MULTIVITAMIN DAILY.       Smoking Cessation Encouraged     Arterial studies of the arm scheduled for 8/11/25     [facility] followup visit _______Dr. SANTILLAN 2 week______________________  (Please note your next appointment above and if you are unable to keep, kindly give a 24 hour notice. Thank you.)     Physician signature:__________________________        If you experience any of the following, please call the Wound Care Center during business hours:     * Increase in Pain  * Temperature over 101  * Increase in drainage from your wound  * Drainage with a foul odor  * Bleeding  * Increase in swelling  * Need for compression bandage changes due to slippage, breakthrough drainage.     If you need medical attention outside of the business hours of the Wound Care Centers please contact your PCP or go to the nearest emergency room.

## 2025-07-30 ENCOUNTER — HOSPITAL ENCOUNTER (OUTPATIENT)
Dept: WOUND CARE | Age: 75
Discharge: HOME | End: 2025-07-30
Payer: MEDICARE

## 2025-07-30 VITALS
HEART RATE: 79 BPM | DIASTOLIC BLOOD PRESSURE: 73 MMHG | WEIGHT: 218 LBS | SYSTOLIC BLOOD PRESSURE: 137 MMHG | BODY MASS INDEX: 33.15 KG/M2 | TEMPERATURE: 96.9 F | RESPIRATION RATE: 16 BRPM

## 2025-07-30 DIAGNOSIS — L98.494: ICD-10-CM

## 2025-07-30 DIAGNOSIS — I70.25 ATHEROSCLEROSIS OF NATIVE ARTERY OF OTHER EXTREMITY WITH ULCERATION (HCC): ICD-10-CM

## 2025-07-30 DIAGNOSIS — T81.31XD SURGICAL WOUND DEHISCENCE, SUBSEQUENT ENCOUNTER: Primary | ICD-10-CM

## 2025-07-30 PROBLEM — J96.01 ACUTE HYPOXEMIC RESPIRATORY FAILURE (HCC): Status: RESOLVED | Noted: 2022-02-08 | Resolved: 2025-07-30

## 2025-07-30 PROCEDURE — 11042 DBRDMT SUBQ TIS 1ST 20SQCM/<: CPT | Performed by: SURGERY

## 2025-07-30 PROCEDURE — 11042 DBRDMT SUBQ TIS 1ST 20SQCM/<: CPT

## 2025-07-30 RX ORDER — GENTAMICIN SULFATE 1 MG/G
OINTMENT TOPICAL PRN
OUTPATIENT
Start: 2025-07-30

## 2025-07-30 RX ORDER — CLOBETASOL PROPIONATE 0.5 MG/G
OINTMENT TOPICAL PRN
OUTPATIENT
Start: 2025-07-30

## 2025-07-30 RX ORDER — NEOMYCIN/BACITRACIN/POLYMYXINB 3.5-400-5K
OINTMENT (GRAM) TOPICAL PRN
OUTPATIENT
Start: 2025-07-30

## 2025-07-30 RX ORDER — LIDOCAINE 40 MG/G
CREAM TOPICAL PRN
OUTPATIENT
Start: 2025-07-30

## 2025-07-30 RX ORDER — SODIUM CHLOR/HYPOCHLOROUS ACID 0.033 %
SOLUTION, IRRIGATION IRRIGATION PRN
OUTPATIENT
Start: 2025-07-30

## 2025-07-30 RX ORDER — LIDOCAINE HYDROCHLORIDE 40 MG/ML
SOLUTION TOPICAL PRN
OUTPATIENT
Start: 2025-07-30

## 2025-07-30 RX ORDER — BACITRACIN ZINC AND POLYMYXIN B SULFATE 500; 1000 [USP'U]/G; [USP'U]/G
OINTMENT TOPICAL PRN
OUTPATIENT
Start: 2025-07-30

## 2025-07-30 RX ORDER — BETAMETHASONE DIPROPIONATE 0.5 MG/G
CREAM TOPICAL PRN
OUTPATIENT
Start: 2025-07-30

## 2025-07-30 RX ORDER — LIDOCAINE HYDROCHLORIDE 20 MG/ML
JELLY TOPICAL PRN
OUTPATIENT
Start: 2025-07-30

## 2025-07-30 RX ORDER — LIDOCAINE 50 MG/G
OINTMENT TOPICAL PRN
OUTPATIENT
Start: 2025-07-30

## 2025-07-30 RX ORDER — MUPIROCIN 2 %
OINTMENT (GRAM) TOPICAL PRN
OUTPATIENT
Start: 2025-07-30

## 2025-07-30 RX ORDER — LIDOCAINE HYDROCHLORIDE 40 MG/ML
SOLUTION TOPICAL PRN
Status: DISCONTINUED | OUTPATIENT
Start: 2025-07-30 | End: 2025-07-31 | Stop reason: HOSPADM

## 2025-07-30 RX ORDER — TRIAMCINOLONE ACETONIDE 1 MG/G
OINTMENT TOPICAL PRN
OUTPATIENT
Start: 2025-07-30

## 2025-07-30 RX ORDER — SILVER SULFADIAZINE 10 MG/G
CREAM TOPICAL PRN
OUTPATIENT
Start: 2025-07-30

## 2025-07-30 RX ORDER — BACITRACIN ZINC 500 [USP'U]/G
OINTMENT TOPICAL PRN
OUTPATIENT
Start: 2025-07-30

## 2025-07-30 RX ADMIN — LIDOCAINE HYDROCHLORIDE 5 ML: 40 SOLUTION TOPICAL at 09:46

## 2025-07-30 NOTE — PROGRESS NOTES
Wound Healing Center /Hyperbarics   History and Physical/Consultation  Vascular    Referring Physician : Chance Jama DO  Clark Maza  MEDICAL RECORD NUMBER:  [id number]  AGE: 74 y.o.   GENDER: male  : 1950  EPISODE DATE:  2025  Subjective:     Chief Complaint   Patient presents with    Wound Check     Finger          HISTORY of PRESENT ILLNESS HPI     Clark Maza is a 74 y.o. male who presents today for wound/ulcer evaluation.   History of Wound Context:  The patient has had a wound of right fingers which was first noted approximately 2024.  This has been treated surgical intervention per Dr Hernández.    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  The patient has had similar previous wounds in the past.      Pt is on abx at time of initial visit.    He is not a DM.  He is a smoker but he has only smoked 2-3 cigarettes in past 1 month.      Due to trauma as listed above his radial artery was ligated in mid forearm.      Patient has minimal sensation in hand after surgery.  He admits to continuing to use his fingers doing projects in his garage even while these issues are ongoing.      He has followed in wound care previously with Dr JOSELINE Torres.    He was referred by Dr Pappas.    25  Once wound debrided tissue

## 2025-08-08 ENCOUNTER — TELEPHONE (OUTPATIENT)
Dept: INTERVENTIONAL RADIOLOGY/VASCULAR | Age: 75
End: 2025-08-08

## 2025-08-11 ENCOUNTER — HOSPITAL ENCOUNTER (OUTPATIENT)
Dept: INTERVENTIONAL RADIOLOGY/VASCULAR | Age: 75
Discharge: HOME | End: 2025-08-13
Payer: MEDICARE

## 2025-08-11 ENCOUNTER — CLINICAL DOCUMENTATION (OUTPATIENT)
Dept: VASCULAR SURGERY | Age: 75
End: 2025-08-11

## 2025-08-11 DIAGNOSIS — L98.494: Chronic | ICD-10-CM

## 2025-08-11 DIAGNOSIS — I70.25 ATHEROSCLEROSIS OF NATIVE ARTERY OF OTHER EXTREMITY WITH ULCERATION (HCC): Chronic | ICD-10-CM

## 2025-08-11 PROCEDURE — 93923 UPR/LXTR ART STDY 3+ LVLS: CPT

## 2025-08-13 ENCOUNTER — HOSPITAL ENCOUNTER (OUTPATIENT)
Dept: WOUND CARE | Age: 75
Discharge: HOME | End: 2025-08-13
Payer: MEDICARE

## 2025-08-13 VITALS
TEMPERATURE: 98.4 F | RESPIRATION RATE: 16 BRPM | HEART RATE: 76 BPM | WEIGHT: 217 LBS | BODY MASS INDEX: 32.89 KG/M2 | SYSTOLIC BLOOD PRESSURE: 138 MMHG | DIASTOLIC BLOOD PRESSURE: 78 MMHG | HEIGHT: 68 IN

## 2025-08-13 DIAGNOSIS — T81.31XD SURGICAL WOUND DEHISCENCE, SUBSEQUENT ENCOUNTER: Primary | ICD-10-CM

## 2025-08-13 DIAGNOSIS — L98.494: ICD-10-CM

## 2025-08-13 DIAGNOSIS — I70.25 ATHEROSCLEROSIS OF NATIVE ARTERY OF OTHER EXTREMITY WITH ULCERATION (HCC): ICD-10-CM

## 2025-08-13 PROCEDURE — 11042 DBRDMT SUBQ TIS 1ST 20SQCM/<: CPT | Performed by: SURGERY

## 2025-08-13 PROCEDURE — 11042 DBRDMT SUBQ TIS 1ST 20SQCM/<: CPT

## 2025-08-13 RX ORDER — LIDOCAINE HYDROCHLORIDE 40 MG/ML
SOLUTION TOPICAL PRN
OUTPATIENT
Start: 2025-08-13

## 2025-08-13 RX ORDER — BETAMETHASONE DIPROPIONATE 0.5 MG/G
CREAM TOPICAL PRN
OUTPATIENT
Start: 2025-08-13

## 2025-08-13 RX ORDER — NEOMYCIN/BACITRACIN/POLYMYXINB 3.5-400-5K
OINTMENT (GRAM) TOPICAL PRN
OUTPATIENT
Start: 2025-08-13

## 2025-08-13 RX ORDER — LIDOCAINE 50 MG/G
OINTMENT TOPICAL PRN
OUTPATIENT
Start: 2025-08-13

## 2025-08-13 RX ORDER — LIDOCAINE 40 MG/G
CREAM TOPICAL PRN
OUTPATIENT
Start: 2025-08-13

## 2025-08-13 RX ORDER — CLOBETASOL PROPIONATE 0.5 MG/G
OINTMENT TOPICAL PRN
OUTPATIENT
Start: 2025-08-13

## 2025-08-13 RX ORDER — BACITRACIN ZINC AND POLYMYXIN B SULFATE 500; 1000 [USP'U]/G; [USP'U]/G
OINTMENT TOPICAL PRN
OUTPATIENT
Start: 2025-08-13

## 2025-08-13 RX ORDER — SILVER SULFADIAZINE 10 MG/G
CREAM TOPICAL PRN
OUTPATIENT
Start: 2025-08-13

## 2025-08-13 RX ORDER — LIDOCAINE HYDROCHLORIDE 20 MG/ML
JELLY TOPICAL PRN
OUTPATIENT
Start: 2025-08-13

## 2025-08-13 RX ORDER — GENTAMICIN SULFATE 1 MG/G
OINTMENT TOPICAL PRN
OUTPATIENT
Start: 2025-08-13

## 2025-08-13 RX ORDER — BACITRACIN ZINC 500 [USP'U]/G
OINTMENT TOPICAL PRN
OUTPATIENT
Start: 2025-08-13

## 2025-08-13 RX ORDER — MUPIROCIN 2 %
OINTMENT (GRAM) TOPICAL PRN
OUTPATIENT
Start: 2025-08-13

## 2025-08-13 RX ORDER — TRIAMCINOLONE ACETONIDE 1 MG/G
OINTMENT TOPICAL PRN
OUTPATIENT
Start: 2025-08-13

## 2025-08-13 RX ORDER — LIDOCAINE HYDROCHLORIDE 40 MG/ML
SOLUTION TOPICAL PRN
Status: DISCONTINUED | OUTPATIENT
Start: 2025-08-13 | End: 2025-08-14 | Stop reason: HOSPADM

## 2025-08-13 RX ORDER — SODIUM CHLOR/HYPOCHLOROUS ACID 0.033 %
SOLUTION, IRRIGATION IRRIGATION PRN
OUTPATIENT
Start: 2025-08-13

## 2025-08-13 RX ADMIN — LIDOCAINE HYDROCHLORIDE 10 ML: 40 SOLUTION TOPICAL at 10:01

## 2025-08-13 ASSESSMENT — PAIN SCALES - GENERAL: PAINLEVEL_OUTOF10: 0

## 2025-08-14 ENCOUNTER — TELEPHONE (OUTPATIENT)
Dept: PHARMACY | Facility: CLINIC | Age: 75
End: 2025-08-14

## 2025-08-15 ENCOUNTER — HOSPITAL ENCOUNTER (EMERGENCY)
Age: 75
Discharge: HOME | End: 2025-08-15
Attending: EMERGENCY MEDICINE
Payer: MEDICARE

## 2025-08-15 VITALS
DIASTOLIC BLOOD PRESSURE: 71 MMHG | WEIGHT: 216 LBS | TEMPERATURE: 97.7 F | BODY MASS INDEX: 32.84 KG/M2 | OXYGEN SATURATION: 94 % | RESPIRATION RATE: 16 BRPM | HEART RATE: 82 BPM | SYSTOLIC BLOOD PRESSURE: 127 MMHG

## 2025-08-15 DIAGNOSIS — S61.412A LACERATION OF LEFT HAND WITHOUT FOREIGN BODY, INITIAL ENCOUNTER: Primary | ICD-10-CM

## 2025-08-15 PROCEDURE — 6360000002 HC RX W HCPCS

## 2025-08-15 PROCEDURE — 99282 EMERGENCY DEPT VISIT SF MDM: CPT

## 2025-08-15 PROCEDURE — 12001 RPR S/N/AX/GEN/TRNK 2.5CM/<: CPT

## 2025-08-15 RX ORDER — LIDOCAINE HYDROCHLORIDE 10 MG/ML
INJECTION, SOLUTION INFILTRATION; PERINEURAL
Status: COMPLETED
Start: 2025-08-15 | End: 2025-08-15

## 2025-08-15 RX ADMIN — LIDOCAINE HYDROCHLORIDE 10 ML: 10 INJECTION, SOLUTION INFILTRATION; PERINEURAL at 15:20

## 2025-08-15 ASSESSMENT — PAIN SCALES - GENERAL
PAINLEVEL_OUTOF10: 0

## 2025-08-15 ASSESSMENT — PAIN - FUNCTIONAL ASSESSMENT
PAIN_FUNCTIONAL_ASSESSMENT: 0-10

## 2025-08-23 RX ORDER — DOXYCYCLINE HYCLATE 100 MG
100 TABLET ORAL 2 TIMES DAILY
Qty: 20 TABLET | Refills: 0 | Status: SHIPPED | OUTPATIENT
Start: 2025-08-23 | End: 2025-09-02

## 2025-08-25 ENCOUNTER — OFFICE VISIT (OUTPATIENT)
Dept: FAMILY MEDICINE CLINIC | Age: 75
End: 2025-08-25
Payer: MEDICARE

## 2025-08-25 VITALS
BODY MASS INDEX: 33.71 KG/M2 | OXYGEN SATURATION: 94 % | WEIGHT: 222.4 LBS | HEIGHT: 68 IN | SYSTOLIC BLOOD PRESSURE: 120 MMHG | RESPIRATION RATE: 16 BRPM | DIASTOLIC BLOOD PRESSURE: 64 MMHG | HEART RATE: 58 BPM | TEMPERATURE: 98.3 F

## 2025-08-25 DIAGNOSIS — M25.511 CHRONIC RIGHT SHOULDER PAIN: ICD-10-CM

## 2025-08-25 DIAGNOSIS — G89.29 CHRONIC RIGHT SHOULDER PAIN: ICD-10-CM

## 2025-08-25 DIAGNOSIS — J96.11 CHRONIC HYPOXEMIC RESPIRATORY FAILURE (HCC): Primary | ICD-10-CM

## 2025-08-25 DIAGNOSIS — R35.1 BENIGN PROSTATIC HYPERPLASIA WITH NOCTURIA: ICD-10-CM

## 2025-08-25 DIAGNOSIS — N40.1 BENIGN PROSTATIC HYPERPLASIA WITH NOCTURIA: ICD-10-CM

## 2025-08-25 PROCEDURE — 99214 OFFICE O/P EST MOD 30 MIN: CPT | Performed by: FAMILY MEDICINE

## 2025-08-25 PROCEDURE — 1124F ACP DISCUSS-NO DSCNMKR DOCD: CPT | Performed by: FAMILY MEDICINE

## 2025-08-25 RX ORDER — HYDROCODONE BITARTRATE AND ACETAMINOPHEN 7.5; 325 MG/1; MG/1
1 TABLET ORAL EVERY 6 HOURS PRN
Qty: 90 TABLET | Refills: 0 | Status: SHIPPED | OUTPATIENT
Start: 2025-08-25 | End: 2025-09-24

## 2025-08-25 ASSESSMENT — PATIENT HEALTH QUESTIONNAIRE - PHQ9
SUM OF ALL RESPONSES TO PHQ QUESTIONS 1-9: 18
4. FEELING TIRED OR HAVING LITTLE ENERGY: NEARLY EVERY DAY
7. TROUBLE CONCENTRATING ON THINGS, SUCH AS READING THE NEWSPAPER OR WATCHING TELEVISION: NEARLY EVERY DAY
SUM OF ALL RESPONSES TO PHQ QUESTIONS 1-9: 18
10. IF YOU CHECKED OFF ANY PROBLEMS, HOW DIFFICULT HAVE THESE PROBLEMS MADE IT FOR YOU TO DO YOUR WORK, TAKE CARE OF THINGS AT HOME, OR GET ALONG WITH OTHER PEOPLE: EXTREMELY DIFFICULT
2. FEELING DOWN, DEPRESSED OR HOPELESS: NEARLY EVERY DAY
9. THOUGHTS THAT YOU WOULD BE BETTER OFF DEAD, OR OF HURTING YOURSELF: NOT AT ALL
6. FEELING BAD ABOUT YOURSELF - OR THAT YOU ARE A FAILURE OR HAVE LET YOURSELF OR YOUR FAMILY DOWN: NOT AT ALL
8. MOVING OR SPEAKING SO SLOWLY THAT OTHER PEOPLE COULD HAVE NOTICED. OR THE OPPOSITE, BEING SO FIGETY OR RESTLESS THAT YOU HAVE BEEN MOVING AROUND A LOT MORE THAN USUAL: NEARLY EVERY DAY
5. POOR APPETITE OR OVEREATING: NOT AT ALL
1. LITTLE INTEREST OR PLEASURE IN DOING THINGS: NEARLY EVERY DAY
SUM OF ALL RESPONSES TO PHQ QUESTIONS 1-9: 18
SUM OF ALL RESPONSES TO PHQ QUESTIONS 1-9: 18
3. TROUBLE FALLING OR STAYING ASLEEP: NEARLY EVERY DAY

## 2025-08-27 ENCOUNTER — HOSPITAL ENCOUNTER (OUTPATIENT)
Dept: WOUND CARE | Age: 75
Discharge: HOME | End: 2025-08-27
Payer: MEDICARE

## 2025-08-27 VITALS
SYSTOLIC BLOOD PRESSURE: 146 MMHG | WEIGHT: 222 LBS | HEART RATE: 67 BPM | RESPIRATION RATE: 18 BRPM | DIASTOLIC BLOOD PRESSURE: 80 MMHG | HEIGHT: 68 IN | BODY MASS INDEX: 33.65 KG/M2 | TEMPERATURE: 96.4 F

## 2025-08-27 DIAGNOSIS — L98.494: ICD-10-CM

## 2025-08-27 DIAGNOSIS — T81.31XD SURGICAL WOUND DEHISCENCE, SUBSEQUENT ENCOUNTER: Primary | ICD-10-CM

## 2025-08-27 DIAGNOSIS — I70.25 ATHEROSCLEROSIS OF NATIVE ARTERY OF OTHER EXTREMITY WITH ULCERATION (HCC): ICD-10-CM

## 2025-08-27 PROCEDURE — 11042 DBRDMT SUBQ TIS 1ST 20SQCM/<: CPT

## 2025-08-27 RX ORDER — MUPIROCIN 2 %
OINTMENT (GRAM) TOPICAL PRN
OUTPATIENT
Start: 2025-08-27

## 2025-08-27 RX ORDER — LIDOCAINE HYDROCHLORIDE 20 MG/ML
JELLY TOPICAL PRN
OUTPATIENT
Start: 2025-08-27

## 2025-08-27 RX ORDER — BACITRACIN ZINC 500 [USP'U]/G
OINTMENT TOPICAL PRN
OUTPATIENT
Start: 2025-08-27

## 2025-08-27 RX ORDER — BETAMETHASONE DIPROPIONATE 0.5 MG/G
CREAM TOPICAL PRN
OUTPATIENT
Start: 2025-08-27

## 2025-08-27 RX ORDER — CLOBETASOL PROPIONATE 0.5 MG/G
OINTMENT TOPICAL PRN
OUTPATIENT
Start: 2025-08-27

## 2025-08-27 RX ORDER — LIDOCAINE 40 MG/G
CREAM TOPICAL PRN
OUTPATIENT
Start: 2025-08-27

## 2025-08-27 RX ORDER — LIDOCAINE HYDROCHLORIDE 40 MG/ML
SOLUTION TOPICAL PRN
OUTPATIENT
Start: 2025-08-27

## 2025-08-27 RX ORDER — GENTAMICIN SULFATE 1 MG/G
OINTMENT TOPICAL PRN
OUTPATIENT
Start: 2025-08-27

## 2025-08-27 RX ORDER — LIDOCAINE HYDROCHLORIDE 40 MG/ML
SOLUTION TOPICAL PRN
Status: DISCONTINUED | OUTPATIENT
Start: 2025-08-27 | End: 2025-08-28 | Stop reason: HOSPADM

## 2025-08-27 RX ORDER — SODIUM CHLOR/HYPOCHLOROUS ACID 0.033 %
SOLUTION, IRRIGATION IRRIGATION PRN
OUTPATIENT
Start: 2025-08-27

## 2025-08-27 RX ORDER — NEOMYCIN/BACITRACIN/POLYMYXINB 3.5-400-5K
OINTMENT (GRAM) TOPICAL PRN
OUTPATIENT
Start: 2025-08-27

## 2025-08-27 RX ORDER — TRIAMCINOLONE ACETONIDE 1 MG/G
OINTMENT TOPICAL PRN
OUTPATIENT
Start: 2025-08-27

## 2025-08-27 RX ORDER — BACITRACIN ZINC AND POLYMYXIN B SULFATE 500; 1000 [USP'U]/G; [USP'U]/G
OINTMENT TOPICAL PRN
OUTPATIENT
Start: 2025-08-27

## 2025-08-27 RX ORDER — LIDOCAINE 50 MG/G
OINTMENT TOPICAL PRN
OUTPATIENT
Start: 2025-08-27

## 2025-08-27 RX ORDER — SILVER SULFADIAZINE 10 MG/G
CREAM TOPICAL PRN
OUTPATIENT
Start: 2025-08-27

## 2025-08-27 RX ADMIN — LIDOCAINE HYDROCHLORIDE 5 ML: 40 SOLUTION TOPICAL at 09:51

## 2025-08-27 ASSESSMENT — PAIN SCALES - GENERAL: PAINLEVEL_OUTOF10: 0

## 2025-09-02 PROBLEM — L98.492 FINGER ULCER, WITH FAT LAYER EXPOSED (HCC): Chronic | Status: ACTIVE | Noted: 2025-09-02

## (undated) DEVICE — GOWN,SIRUS,FABRNF,XL,20/CS: Brand: MEDLINE

## (undated) DEVICE — GLOVE ORANGE PI 7 1/2   MSG9075

## (undated) DEVICE — COVER,TABLE,44X90,STERILE: Brand: MEDLINE

## (undated) DEVICE — TOWEL,OR,DSP,ST,BLUE,STD,6/PK,12PK/CS: Brand: MEDLINE

## (undated) DEVICE — CHLORAPREP 26ML ORANGE

## (undated) DEVICE — SURGICAL PROCEDURE PACK HND

## (undated) DEVICE — [HIGH FLOW INSUFFLATOR,  DO NOT USE IF PACKAGE IS DAMAGED,  KEEP DRY,  KEEP AWAY FROM SUNLIGHT,  PROTECT FROM HEAT AND RADIOACTIVE SOURCES.]: Brand: PNEUMOSURE

## (undated) DEVICE — PADDING UNDERCAST W4INXL4YD COT FBR LO LINTING WYTEX

## (undated) DEVICE — DOUBLE BASIN SET: Brand: MEDLINE INDUSTRIES, INC.

## (undated) DEVICE — PADDING,UNDERCAST,COTTON, 4"X4YD STERILE: Brand: MEDLINE

## (undated) DEVICE — 4-PORT MANIFOLD: Brand: NEPTUNE 2

## (undated) DEVICE — HYPODERMIC SAFETY NEEDLE: Brand: MAGELLAN

## (undated) DEVICE — COVER,LIGHT HANDLE,FLX,1/PK: Brand: MEDLINE INDUSTRIES, INC.

## (undated) DEVICE — SET ENDO INSTR LAPAROSCOPIC INCISIONAL

## (undated) DEVICE — BANDAGE COMPR W3INXL5YD WHT BGE POLY COT M E WRP WV HK AND

## (undated) DEVICE — SUTURE ABSRB L6IN L37MM 0 GS-21 GRN 1/2 CIR TAPR PNT NDL VLOCL0306

## (undated) DEVICE — ELECTRO LUBE IS A SINGLE PATIENT USE DEVICE THAT IS INTENDED TO BE USED ON ELECTROSURGICAL ELECTRODES TO REDUCE STICKING.: Brand: KEY SURGICAL ELECTRO LUBE

## (undated) DEVICE — ELECTRODE PT RET AD L9FT HI MOIST COND ADH HYDRGEL CORDED

## (undated) DEVICE — MARKER,SKIN,WI/RULER AND LABELS: Brand: MEDLINE

## (undated) DEVICE — GAUZE,SPONGE,4"X4",16PLY,STRL,LF,10/TRAY: Brand: MEDLINE

## (undated) DEVICE — 3M™ STERI-DRAPE™ U-DRAPE 1015: Brand: STERI-DRAPE™

## (undated) DEVICE — Device

## (undated) DEVICE — 1000 S-DRAPE TOWEL DRAPE 10/BX: Brand: STERI-DRAPE™

## (undated) DEVICE — Device: Brand: INSTRUSAFE

## (undated) DEVICE — DRAPE,SHOULDER,ORTHOMAX,W/POUCH,5/CS: Brand: MEDLINE

## (undated) DEVICE — PLUMEPORT LAPAROSCOPIC SMOKE FILTRATION DEVICE: Brand: PLUMEPORT ACTIV

## (undated) DEVICE — DRAPE,REIN 53X77,STERILE: Brand: MEDLINE

## (undated) DEVICE — 3M™ IOBAN™ 2 ANTIMICROBIAL INCISE DRAPE 6650EZ: Brand: IOBAN™ 2

## (undated) DEVICE — GARMENT,MEDLINE,DVT,INT,CALF,MED, GEN2: Brand: MEDLINE

## (undated) DEVICE — GAUZE,SPONGE,AVANT,4"X4",4PLY,STRL,10/TR: Brand: MEDLINE

## (undated) DEVICE — INSUFFLATION NEEDLE TO ESTABLISH PNEUMOPERITONEUM.: Brand: INSUFFLATION NEEDLE

## (undated) DEVICE — WARMER SCP LAP

## (undated) DEVICE — TUBING, SUCTION, 9/32" X 10', STRAIGHT: Brand: MEDLINE

## (undated) DEVICE — 3 ML SYRINGE LUER-LOCK TIP: Brand: MONOJECT

## (undated) DEVICE — BLADE,STAINLESS-STEEL,15,STRL,DISPOSABLE: Brand: MEDLINE

## (undated) DEVICE — GAUZE,SPONGE,4"X4",16PLY,XRAY,STRL,LF: Brand: MEDLINE

## (undated) DEVICE — SLING SHLDR STD UNIV FOAM BILAT ADJ UNISX

## (undated) DEVICE — BANDAGE,ELASTIC,ESMARK,STERILE,4"X9',LF: Brand: MEDLINE

## (undated) DEVICE — BLADE SHVR AGRSV + RED BL 4.0MM

## (undated) DEVICE — NEEDLE SUT PASS FOR ROT CUF LABRAL REP MULTFI SCORPION

## (undated) DEVICE — SET SURG INSTR DISSECT

## (undated) DEVICE — SYRINGE IRRIG 60ML SFT PLIABLE BLB EZ TO GRP 1 HND USE W/

## (undated) DEVICE — PREMIUM WET SKIN PREP TRAY: Brand: MEDLINE INDUSTRIES, INC.

## (undated) DEVICE — Z DISCONTINUED USE 2275686 GLOVE SURG SZ 8 L12IN FNGR THK13MIL WHT ISOLEX POLYISOPRENE

## (undated) DEVICE — MEGA SUTURECUT ND: Brand: ENDOWRIST

## (undated) DEVICE — GLOVE ORANGE PI 8   MSG9080

## (undated) DEVICE — STANDARD HYPODERMIC NEEDLE,POLYPROPYLENE HUB: Brand: MONOJECT

## (undated) DEVICE — NEEDLE HYPO 25GA L1.5IN BLU POLYPR HUB S STL REG BVL STR

## (undated) DEVICE — SOLUTION IV IRRIG LACTATED RINGERS 3000ML 2B7487

## (undated) DEVICE — BANDAGE,GAUZE,4.5"X4.1YD,STERILE,LF: Brand: MEDLINE

## (undated) DEVICE — ARM DRAPE

## (undated) DEVICE — SWAB SPEC COLL SHFT L5.25IN POLYUR FOAM TIP SFT DBL MEDIA

## (undated) DEVICE — MEDI-VAC YANKAUER SUCTION HANDLE: Brand: CARDINAL HEALTH

## (undated) DEVICE — CONVERTORS STOCKINETTE: Brand: CONVERTORS

## (undated) DEVICE — BANDAGE,SELF ADHRNT,COFLEX,4"X5YD,STRL: Brand: COLABEL

## (undated) DEVICE — NEEDLE SPNL L3.5IN PNK HUB S STL REG WALL FIT STYL W/ QNCKE

## (undated) DEVICE — BNDG,ELSTC,MATRIX,STRL,2"X5YD,LF,HOOK&LP: Brand: MEDLINE

## (undated) DEVICE — PADDING CAST W2INXL4YD COT LO LINTING WYTEX

## (undated) DEVICE — APPLICATOR MEDICATED 26 CC SOLUTION HI LT ORNG CHLORAPREP

## (undated) DEVICE — SHEET,DRAPE,40X58,STERILE: Brand: MEDLINE

## (undated) DEVICE — BNDG,ELSTC,MATRIX,STRL,4"X5YD,LF,HOOK&LP: Brand: MEDLINE

## (undated) DEVICE — PADDING CAST W4INXL4YD NONSTERILE COT COHESIVE HND TEARABLE

## (undated) DEVICE — TIP COVER ACCESSORY

## (undated) DEVICE — TUBING SUCT 12FR MAL ALUM SHFT FN CAP VENT UNIV CONN W/ OBT

## (undated) DEVICE — 3M™ COBAN™ NL STERILE NON-LATEX SELF-ADHERENT WRAP, 2084S, 4 IN X 5 YD (10 CM X 4,5 M), 18 ROLLS/CASE: Brand: 3M™ COBAN™

## (undated) DEVICE — CAMERA STRYKER 1488 HD GEN

## (undated) DEVICE — PADDING,UNDERCAST,COTTON, 3X4YD STERILE: Brand: MEDLINE

## (undated) DEVICE — BAG SPECIMEN BIOHAZARD 6IN X 9IN

## (undated) DEVICE — SHEET DRAPE FULL 70X100

## (undated) DEVICE — TUBING PMP L16FT MAIN DISP FOR AR-6400 AR-6475

## (undated) DEVICE — 450 ML BOTTLE OF 0.05% CHLORHEXIDINE GLUCONATE IN 99.95% STERILE WATER FOR IRRIGATION, USP AND APPLICATOR.: Brand: IRRISEPT ANTIMICROBIAL WOUND LAVAGE

## (undated) DEVICE — NEEDLE,22GX1.5",REG,BEVEL: Brand: MEDLINE

## (undated) DEVICE — CRADLE ARM W8.75XH12.5XL16IN FOAM SUPP ELEVATION VENT

## (undated) DEVICE — PROGRASP FORCEPS: Brand: ENDOWRIST

## (undated) DEVICE — BANDAGE COMPR W4INXL5YD WHT BGE POLY COT M E WRP WV HK AND

## (undated) DEVICE — TUBING, SUCTION, 1/4" X 10', STRAIGHT: Brand: MEDLINE

## (undated) DEVICE — SCISSORS SURG DIA8MM MPLR CRV ENDOWRIST

## (undated) DEVICE — BASIC DOUBLE BASIN 2-LF: Brand: MEDLINE INDUSTRIES, INC.

## (undated) DEVICE — SYRINGE MED 50ML LUERLOCK TIP

## (undated) DEVICE — ZIMMER® STERILE DISPOSABLE TOURNIQUET CUFF WITH PLC, DUAL PORT, SINGLE BLADDER, 18 IN. (46 CM)

## (undated) DEVICE — PACK PROCEDURE SURG GEN CUST

## (undated) DEVICE — ELASTIC BANDAGE: Brand: DEROYAL

## (undated) DEVICE — DRAPE,HAND,STERILE: Brand: MEDLINE

## (undated) DEVICE — PENCIL ES L3M BTTN SWCH HOLSTER W/ BLDE ELECTRD EDGE

## (undated) DEVICE — SCOPE DAVINCI XI 30 DEG W/CORD

## (undated) DEVICE — WIPES SKIN CLOTH READYPREP 9 X 10.5 IN 2% CHLORHEX GLUCONATE CHG PREOP

## (undated) DEVICE — GLOVE ORTHO 8   MSG9480

## (undated) DEVICE — COLUMN DRAPE

## (undated) DEVICE — DRESSING PETRO W3XL3IN OIL EMUL N ADH GZ KNIT IMPREG CELOS

## (undated) DEVICE — STOCKINETTE: Brand: CONVERTORS

## (undated) DEVICE — CONTROL SYRINGE LUER-LOCK TIP: Brand: MONOJECT

## (undated) DEVICE — PATIENT RETURN ELECTRODE, SINGLE-USE, CONTACT QUALITY MONITORING, ADULT, WITH 9FT CORD, FOR PATIENTS WEIGING OVER 33LBS. (15KG): Brand: MEGADYNE

## (undated) DEVICE — BLADE,STAINLESS-STEEL,10,STRL,DISPOSABLE: Brand: MEDLINE

## (undated) DEVICE — BASIC: Brand: MEDLINE INDUSTRIES, INC.

## (undated) DEVICE — GLOVE SURG SZ 8 L12IN FNGR THK79MIL GRN LTX FREE

## (undated) DEVICE — SET INST DAVINCI XI ACCESSORIES

## (undated) DEVICE — SOLUTION IRRIG 1000ML 0.9% SOD CHL USP POUR PLAS BTL

## (undated) DEVICE — PACK SURG LAP CHOLE CUSTOM

## (undated) DEVICE — TRAP,MUCUS SPECIMEN,40CC: Brand: MEDLINE

## (undated) DEVICE — BLADELESS OBTURATOR: Brand: WECK VISTA

## (undated) DEVICE — DRAPE,U/ SHT,SPLIT,PLAS,STERIL: Brand: MEDLINE

## (undated) DEVICE — INTENDED FOR TISSUE SEPARATION, AND OTHER PROCEDURES THAT REQUIRE A SHARP SURGICAL BLADE TO PUNCTURE OR CUT.: Brand: BARD-PARKER ® STAINLESS STEEL BLADES

## (undated) DEVICE — BANDAGE COMPR COHESIVE 5 YDX2 IN SELF ADH TAN COFLX LTX

## (undated) DEVICE — GOWN,SIRUS,NONRNF,SETINSLV,XL,20/CS: Brand: MEDLINE

## (undated) DEVICE — LIQUIBAND RAPID ADHESIVE 36/CS 0.8ML: Brand: MEDLINE

## (undated) DEVICE — COVER HNDL LT DISP

## (undated) DEVICE — PAD,ABDOMINAL,5"X9",ST,LF,25/BX: Brand: MEDLINE INDUSTRIES, INC.

## (undated) DEVICE — XEROFORM 5X9 PK

## (undated) DEVICE — DRESSING PETRO W3XL8IN OIL EMUL N ADH GZ KNIT IMPREG CELOS

## (undated) DEVICE — ZIMMER® STERILE DISPOSABLE TOURNIQUET CUFF WITH PROTECTIVE SLEEVE AND PLC, DUAL PORT, SINGLE BLADDER, 18 IN. (46 CM)

## (undated) DEVICE — NDL CNTR 40CT FM MAG: Brand: MEDLINE INDUSTRIES, INC.

## (undated) DEVICE — GAUZE,SPONGE,4"X4",8PLY,STRL,LF,10/TRAY: Brand: MEDLINE

## (undated) DEVICE — Z DISCONTINUED NO SUB IDED GLOVE SURG BEAD CUF 8 STD PF WHT STRL TRIUMPH LT LTX

## (undated) DEVICE — GOWN,SIRUS,POLYRNF,BRTHSLV,XLN/XL,20/CS: Brand: MEDLINE

## (undated) DEVICE — CANNULA SEAL

## (undated) DEVICE — BNDG,ELSTC,MATRIX,STRL,3"X5YD,LF,HOOK&LP: Brand: MEDLINE

## (undated) DEVICE — SUPER TURBOVAC 90 INTEGRATED CABLE WAND ICW: Brand: COBLATION

## (undated) DEVICE — DRESSING GZ W1XL8IN COT XRFRM N ADH OVERWRAP CURAD

## (undated) DEVICE — SCOPE DAVINCI XI 0 DEG W/CORD

## (undated) DEVICE — SYRINGE 20ML LL S/C 50

## (undated) DEVICE — PACK,EXTREMITY,ORTHOMAX,5/CS: Brand: MEDLINE

## (undated) DEVICE — BLADE ES ELASTOMERIC COAT INSUL DURABLE BEND UPTO 90DEG

## (undated) DEVICE — SYRINGE MED 10ML LUERLOCK TIP W/O SFTY DISP

## (undated) DEVICE — SCISSORS ENDOSCP DIA5MM CRV MPLR CAUT W/ RATCH HNDL

## (undated) DEVICE — GOWN,SIRUS,FABRNF,L,20/CS: Brand: MEDLINE

## (undated) DEVICE — BANDAGE COMPR W4INXL10YD WHITE/BEIGE E MTRX HK LOOP CLSR